# Patient Record
Sex: FEMALE | NOT HISPANIC OR LATINO | URBAN - METROPOLITAN AREA
[De-identification: names, ages, dates, MRNs, and addresses within clinical notes are randomized per-mention and may not be internally consistent; named-entity substitution may affect disease eponyms.]

---

## 2017-05-31 ENCOUNTER — EMERGENCY (EMERGENCY)
Facility: HOSPITAL | Age: 47
LOS: 1 days | Discharge: HOME | End: 2017-05-31
Attending: EMERGENCY MEDICINE | Admitting: EMERGENCY MEDICINE
Payer: COMMERCIAL

## 2017-05-31 VITALS
OXYGEN SATURATION: 100 % | TEMPERATURE: 98 F | HEART RATE: 69 BPM | SYSTOLIC BLOOD PRESSURE: 121 MMHG | DIASTOLIC BLOOD PRESSURE: 71 MMHG | RESPIRATION RATE: 18 BRPM

## 2017-05-31 VITALS
HEIGHT: 66 IN | SYSTOLIC BLOOD PRESSURE: 147 MMHG | DIASTOLIC BLOOD PRESSURE: 85 MMHG | RESPIRATION RATE: 22 BRPM | HEART RATE: 79 BPM | TEMPERATURE: 98 F | OXYGEN SATURATION: 98 %

## 2017-05-31 DIAGNOSIS — Z93.3 COLOSTOMY STATUS: Chronic | ICD-10-CM

## 2017-05-31 DIAGNOSIS — Z90.710 ACQUIRED ABSENCE OF BOTH CERVIX AND UTERUS: Chronic | ICD-10-CM

## 2017-05-31 DIAGNOSIS — R10.9 UNSPECIFIED ABDOMINAL PAIN: ICD-10-CM

## 2017-05-31 LAB
ALBUMIN SERPL ELPH-MCNC: 4.3 G/DL — SIGNIFICANT CHANGE UP (ref 3.3–5)
ALP SERPL-CCNC: 70 U/L — SIGNIFICANT CHANGE UP (ref 40–120)
ALT FLD-CCNC: 16 U/L RC — SIGNIFICANT CHANGE UP (ref 10–45)
ANION GAP SERPL CALC-SCNC: 18 MMOL/L — HIGH (ref 5–17)
APTT BLD: 29 SEC — SIGNIFICANT CHANGE UP (ref 27.5–37.4)
AST SERPL-CCNC: 20 U/L — SIGNIFICANT CHANGE UP (ref 10–40)
BASOPHILS # BLD AUTO: 0 K/UL — SIGNIFICANT CHANGE UP (ref 0–0.2)
BASOPHILS NFR BLD AUTO: 0.2 % — SIGNIFICANT CHANGE UP (ref 0–2)
BILIRUB SERPL-MCNC: 0.4 MG/DL — SIGNIFICANT CHANGE UP (ref 0.2–1.2)
BLD GP AB SCN SERPL QL: NEGATIVE — SIGNIFICANT CHANGE UP
BUN SERPL-MCNC: 21 MG/DL — SIGNIFICANT CHANGE UP (ref 7–23)
CALCIUM SERPL-MCNC: 9.8 MG/DL — SIGNIFICANT CHANGE UP (ref 8.4–10.5)
CHLORIDE SERPL-SCNC: 100 MMOL/L — SIGNIFICANT CHANGE UP (ref 96–108)
CO2 SERPL-SCNC: 26 MMOL/L — SIGNIFICANT CHANGE UP (ref 22–31)
CREAT SERPL-MCNC: 0.66 MG/DL — SIGNIFICANT CHANGE UP (ref 0.5–1.3)
EOSINOPHIL # BLD AUTO: 0 K/UL — SIGNIFICANT CHANGE UP (ref 0–0.5)
EOSINOPHIL NFR BLD AUTO: 0.1 % — SIGNIFICANT CHANGE UP (ref 0–6)
GLUCOSE SERPL-MCNC: 155 MG/DL — HIGH (ref 70–99)
HCT VFR BLD CALC: 41.7 % — SIGNIFICANT CHANGE UP (ref 34.5–45)
HGB BLD-MCNC: 13.5 G/DL — SIGNIFICANT CHANGE UP (ref 11.5–15.5)
INR BLD: 1.09 RATIO — SIGNIFICANT CHANGE UP (ref 0.88–1.16)
LYMPHOCYTES # BLD AUTO: 0.7 K/UL — LOW (ref 1–3.3)
LYMPHOCYTES # BLD AUTO: 5 % — LOW (ref 13–44)
MCHC RBC-ENTMCNC: 28.1 PG — SIGNIFICANT CHANGE UP (ref 27–34)
MCHC RBC-ENTMCNC: 32.4 GM/DL — SIGNIFICANT CHANGE UP (ref 32–36)
MCV RBC AUTO: 87 FL — SIGNIFICANT CHANGE UP (ref 80–100)
MONOCYTES # BLD AUTO: 0.3 K/UL — SIGNIFICANT CHANGE UP (ref 0–0.9)
MONOCYTES NFR BLD AUTO: 1.8 % — LOW (ref 2–14)
NEUTROPHILS # BLD AUTO: 13 K/UL — HIGH (ref 1.8–7.4)
NEUTROPHILS NFR BLD AUTO: 92.9 % — HIGH (ref 43–77)
PLATELET # BLD AUTO: 234 K/UL — SIGNIFICANT CHANGE UP (ref 150–400)
POTASSIUM SERPL-MCNC: 4.1 MMOL/L — SIGNIFICANT CHANGE UP (ref 3.5–5.3)
POTASSIUM SERPL-SCNC: 4.1 MMOL/L — SIGNIFICANT CHANGE UP (ref 3.5–5.3)
PROT SERPL-MCNC: 7.5 G/DL — SIGNIFICANT CHANGE UP (ref 6–8.3)
PROTHROM AB SERPL-ACNC: 11.8 SEC — SIGNIFICANT CHANGE UP (ref 9.8–12.7)
RBC # BLD: 4.79 M/UL — SIGNIFICANT CHANGE UP (ref 3.8–5.2)
RBC # FLD: 14.1 % — SIGNIFICANT CHANGE UP (ref 10.3–14.5)
RH IG SCN BLD-IMP: POSITIVE — SIGNIFICANT CHANGE UP
RH IG SCN BLD-IMP: POSITIVE — SIGNIFICANT CHANGE UP
SODIUM SERPL-SCNC: 144 MMOL/L — SIGNIFICANT CHANGE UP (ref 135–145)
WBC # BLD: 14 K/UL — HIGH (ref 3.8–10.5)
WBC # FLD AUTO: 14 K/UL — HIGH (ref 3.8–10.5)

## 2017-05-31 PROCEDURE — 85730 THROMBOPLASTIN TIME PARTIAL: CPT

## 2017-05-31 PROCEDURE — 86850 RBC ANTIBODY SCREEN: CPT

## 2017-05-31 PROCEDURE — 86900 BLOOD TYPING SEROLOGIC ABO: CPT

## 2017-05-31 PROCEDURE — 96375 TX/PRO/DX INJ NEW DRUG ADDON: CPT

## 2017-05-31 PROCEDURE — 71010: CPT | Mod: 26

## 2017-05-31 PROCEDURE — 85610 PROTHROMBIN TIME: CPT

## 2017-05-31 PROCEDURE — 99285 EMERGENCY DEPT VISIT HI MDM: CPT | Mod: 25

## 2017-05-31 PROCEDURE — 93005 ELECTROCARDIOGRAM TRACING: CPT

## 2017-05-31 PROCEDURE — 74177 CT ABD & PELVIS W/CONTRAST: CPT

## 2017-05-31 PROCEDURE — 93010 ELECTROCARDIOGRAM REPORT: CPT | Mod: 59

## 2017-05-31 PROCEDURE — 96374 THER/PROPH/DIAG INJ IV PUSH: CPT | Mod: XU

## 2017-05-31 PROCEDURE — 99284 EMERGENCY DEPT VISIT MOD MDM: CPT | Mod: 25

## 2017-05-31 PROCEDURE — 74177 CT ABD & PELVIS W/CONTRAST: CPT | Mod: 26

## 2017-05-31 PROCEDURE — 74020: CPT

## 2017-05-31 PROCEDURE — 85027 COMPLETE CBC AUTOMATED: CPT

## 2017-05-31 PROCEDURE — 74020: CPT | Mod: 26

## 2017-05-31 PROCEDURE — 86901 BLOOD TYPING SEROLOGIC RH(D): CPT

## 2017-05-31 PROCEDURE — 80053 COMPREHEN METABOLIC PANEL: CPT

## 2017-05-31 PROCEDURE — 71045 X-RAY EXAM CHEST 1 VIEW: CPT

## 2017-05-31 RX ORDER — MORPHINE SULFATE 50 MG/1
4 CAPSULE, EXTENDED RELEASE ORAL ONCE
Qty: 0 | Refills: 0 | Status: DISCONTINUED | OUTPATIENT
Start: 2017-05-31 | End: 2017-05-31

## 2017-05-31 RX ORDER — SODIUM CHLORIDE 9 MG/ML
3 INJECTION INTRAMUSCULAR; INTRAVENOUS; SUBCUTANEOUS ONCE
Qty: 0 | Refills: 0 | Status: COMPLETED | OUTPATIENT
Start: 2017-05-31 | End: 2017-05-31

## 2017-05-31 RX ORDER — ACETAMINOPHEN 500 MG
1000 TABLET ORAL ONCE
Qty: 0 | Refills: 0 | Status: COMPLETED | OUTPATIENT
Start: 2017-05-31 | End: 2017-05-31

## 2017-05-31 RX ORDER — FAMOTIDINE 10 MG/ML
20 INJECTION INTRAVENOUS ONCE
Qty: 0 | Refills: 0 | Status: COMPLETED | OUTPATIENT
Start: 2017-05-31 | End: 2017-05-31

## 2017-05-31 RX ADMIN — FAMOTIDINE 20 MILLIGRAM(S): 10 INJECTION INTRAVENOUS at 16:53

## 2017-05-31 RX ADMIN — MORPHINE SULFATE 4 MILLIGRAM(S): 50 CAPSULE, EXTENDED RELEASE ORAL at 16:53

## 2017-05-31 RX ADMIN — Medication 400 MILLIGRAM(S): at 16:53

## 2017-05-31 RX ADMIN — MORPHINE SULFATE 4 MILLIGRAM(S): 50 CAPSULE, EXTENDED RELEASE ORAL at 18:09

## 2017-05-31 RX ADMIN — Medication 1000 MILLIGRAM(S): at 18:09

## 2017-05-31 RX ADMIN — SODIUM CHLORIDE 3 MILLILITER(S): 9 INJECTION INTRAMUSCULAR; INTRAVENOUS; SUBCUTANEOUS at 16:44

## 2017-05-31 NOTE — ED PROVIDER NOTE - NS ED MD SCRIBE ATTENDING SCRIBE SECTIONS
VITAL SIGNS( Pullset)/PHYSICAL EXAM/RESULTS/HISTORY OF PRESENT ILLNESS/REVIEW OF SYSTEMS/DISPOSITION/PROGRESS NOTE/HIV/PAST MEDICAL/SURGICAL/SOCIAL HISTORY

## 2017-05-31 NOTE — ED PROVIDER NOTE - PHYSICAL EXAMINATION
NAD, NCAT, mild dry mucous membranes, Trachea midline, PERRL, normal conjunctiva, CTAB, non-tachypneic, Non-tachycardic, abdomen with exploratory laparoscopic scar well healed to distal with wound dressing, no abdominal distension, mild voluntary guarding, mild RUQ abdominal discomfort, normal bowel sounds, output noted in colostomy bag, flatus noted in colostomy bag, No edema, Appropriate, Cooperative with capacitive insight, No rashes, CN grossly intact NAD, NCAT, mild dry mucous membranes, Trachea midline, PERRL, normal conjunctiva, CTAB, non-tachypneic, Non-tachycardic, abdomen with exploratory laparoscopic scar well healed to caudal region with wound dressing, no abdominal distension, mild voluntary guarding, mild RUQ abdominal discomfort, normal bowel sounds, output noted in colostomy bag, flatus noted in colostomy bag, No edema, Appropriate, Cooperative with capacitive insight, No rashes, CN grossly intact, ostomy beefy and well appearing.

## 2017-05-31 NOTE — ED ADULT TRIAGE NOTE - CHIEF COMPLAINT QUOTE
vomiting and abd pain x 2 hrs, sent by visiting nurse, (s/p hysterectomy dec 2016, with subsequent colostomy at [facility] for ovarian cancer), also notes no stool in colostomy since last night, afebrile at home, +voiding normal, (last follow up with surgeon x 3 wks ago)

## 2017-05-31 NOTE — ED PROVIDER NOTE - OBJECTIVE STATEMENT
46 year old female patient with pmhx of ovarian cancer s/p total hysterectomy Dec 2016 presents to the ED c/o abdominal pain today with 1 episode of vomiting, fever, and cold sweats today. Rates pain 10/10. Afebrile now. Patient notes an empty ostomy bag and is concerned for SBO. Patient has a colostomy in place during the hysterectomy. Has not been passing stool through the rectum recently. Still has gallbladder.  Denies fever  Surgeon: Dr. Agata Roth 46 year old female patient with pmhx of ovarian cancer s/p total hysterectomy Dec 2016 presents to the ED c/o abdominal pain today with 1 episode of vomiting, fever, and cold sweats today. Rates pain 10/10. Afebrile now. Crescendo. Patient notes an empty ostomy bag and is concerned for SBO secondary to decreased output and flatus. Patient has a colostomy in place during the hysterectomy. Has not been passing stool through the rectum since surgery. Still has gallbladder in place.  Denies fever  Surgeon: Dr. Agata Roth

## 2017-05-31 NOTE — ED PROVIDER NOTE - PROGRESS NOTE DETAILS
Pt with extensive peritoneal carcinoma mitosis s/p cholecystomy has deduced output for ostomy. Diffused tenderness in the abdomen worse in the RUQ. Bowel sounds presents. CT scan of abdomen reveals extensive carcinoma. Possibility partial SBO. Will call surgery to re-evaluate. Pt with extensive peritoneal carcinoma mitosis s/p cholecystomy has deduced output for ostomy. Diffused tenderness in the abdomen worse in the RUQ. Bowel sounds presents. CT scan of abdomen reveals extensive carcinoma matrices. Possibility partial SBO. Will call surgery to re-evaluate. Pt feeling better tolerating fluids and desires to go home Evaluated by surgery and GYN  will d/c --Dayo Pt with extensive peritoneal carcinoma mitosis s/p cholecystomy has deduced output for ostomy. Diffused tenderness in the abdomen worse in the RUQ. Bowel sounds presents. CT scan of abdomen reveals  extensive carcinoma matrices. Possibility partial SBO. Will call surgery to re-evaluate. -- Oshea

## 2017-05-31 NOTE — ED ADULT NURSE NOTE - CHPI ED SYMPTOMS NEG
no nausea/no fever/no tingling/no numbness/no decreased eating/drinking/no chills/no vomiting/no weakness/no dizziness

## 2017-05-31 NOTE — ED ADULT NURSE NOTE - OBJECTIVE STATEMENT
Patient states that she had a total hysterectomy in december of last year after being diagnosed with cervical cancer with mets to her liver. Patient states that today around 1130 she started to have generalized abdominal pain and vomited one time. Patient states that she was also concerned that her ostomy bag was empty and she was concerned for an obstructions. Patient states that she not having any N/V at this time. Patient has stool noted in her ostomy and gas. Patient states that she is still having sharp abdominal pain. NAD noted.

## 2017-05-31 NOTE — ED PROVIDER NOTE - DETAILS:
Ez Camarillo MD note: The scribe's documentation has been prepared under my direction and personally reviewed by me.  I confirm that the note above accurately reflects my work, treatment, procedures, and medical decision making.

## 2017-05-31 NOTE — ED PROVIDER NOTE - PSH
Colostomy in place  dec 2016 (placed during hysterectomy in dec 2016 at [facility] [place])  H/O abdominal hysterectomy  dec 2016

## 2017-05-31 NOTE — ED PROVIDER NOTE - CARE PLAN
Principal Discharge DX:	Abdominal pain  Secondary Diagnosis:	Malignant neoplasm of ovary, unspecified laterality

## 2017-05-31 NOTE — ED ADULT NURSE NOTE - PSH
Colostomy in place  dec 2016 (placed during hysterectomy in dec 2016 at [facility] [place])  H/O abdominal hysterectomy  dec 2016

## 2017-05-31 NOTE — ED ADULT NURSE NOTE - CHIEF COMPLAINT QUOTE
vomiting and abd pain x 2 hrs, sent by visiting nurse, (s/p hysterectomy dec 2016, with subsequent colostomy at [facility] for ovarian cancer), also notes no stool in colostomy since last night, afebrile at home, +voiding normal, (last follow up with surgeon x 3 wks ago)

## 2017-05-31 NOTE — ED ADULT NURSE REASSESSMENT NOTE - NS ED NURSE REASSESS COMMENT FT1
Patient continues to rest in bed at this time with family at the bedside. Patient states that her pain is better after the pain medication. NAD noted. Updated on progress.

## 2017-05-31 NOTE — ED PROVIDER NOTE - MEDICAL DECISION MAKING DETAILS
Patient with history of hysterectomy and oophorectomy. Now with symptoms including nausea, vomiting, and abdominal pain rated 10/10. Will get labs, abdominal series x-rays to see air fluid levels, antiemetic prn, CT abd/pel with oral contrast to r/o SBO.

## 2017-06-01 ENCOUNTER — INPATIENT (INPATIENT)
Facility: HOSPITAL | Age: 47
LOS: 33 days | Discharge: HOME | DRG: 853 | End: 2017-07-05
Attending: SURGERY | Admitting: SURGERY
Payer: COMMERCIAL

## 2017-06-01 ENCOUNTER — TRANSCRIPTION ENCOUNTER (OUTPATIENT)
Age: 47
End: 2017-06-01

## 2017-06-01 VITALS
OXYGEN SATURATION: 98 % | HEIGHT: 66 IN | TEMPERATURE: 100 F | HEART RATE: 92 BPM | SYSTOLIC BLOOD PRESSURE: 131 MMHG | RESPIRATION RATE: 17 BRPM | DIASTOLIC BLOOD PRESSURE: 81 MMHG

## 2017-06-01 DIAGNOSIS — Z90.710 ACQUIRED ABSENCE OF BOTH CERVIX AND UTERUS: Chronic | ICD-10-CM

## 2017-06-01 DIAGNOSIS — Z93.3 COLOSTOMY STATUS: Chronic | ICD-10-CM

## 2017-06-01 PROBLEM — Z00.00 ENCOUNTER FOR PREVENTIVE HEALTH EXAMINATION: Status: ACTIVE | Noted: 2017-06-01

## 2017-06-01 LAB
ALBUMIN SERPL ELPH-MCNC: 4.1 G/DL — SIGNIFICANT CHANGE UP (ref 3.3–5)
ALP SERPL-CCNC: 70 U/L — SIGNIFICANT CHANGE UP (ref 40–120)
ALT FLD-CCNC: 12 U/L RC — SIGNIFICANT CHANGE UP (ref 10–45)
ANION GAP SERPL CALC-SCNC: 16 MMOL/L — SIGNIFICANT CHANGE UP (ref 5–17)
AST SERPL-CCNC: 17 U/L — SIGNIFICANT CHANGE UP (ref 10–40)
BASOPHILS # BLD AUTO: 0 K/UL — SIGNIFICANT CHANGE UP (ref 0–0.2)
BILIRUB SERPL-MCNC: 1.3 MG/DL — HIGH (ref 0.2–1.2)
BUN SERPL-MCNC: 24 MG/DL — HIGH (ref 7–23)
CALCIUM SERPL-MCNC: 9.8 MG/DL — SIGNIFICANT CHANGE UP (ref 8.4–10.5)
CHLORIDE SERPL-SCNC: 94 MMOL/L — LOW (ref 96–108)
CO2 SERPL-SCNC: 25 MMOL/L — SIGNIFICANT CHANGE UP (ref 22–31)
CREAT SERPL-MCNC: 0.57 MG/DL — SIGNIFICANT CHANGE UP (ref 0.5–1.3)
EOSINOPHIL # BLD AUTO: 0 K/UL — SIGNIFICANT CHANGE UP (ref 0–0.5)
GAS PNL BLDV: SIGNIFICANT CHANGE UP
GLUCOSE SERPL-MCNC: 146 MG/DL — HIGH (ref 70–99)
HCT VFR BLD CALC: 41.4 % — SIGNIFICANT CHANGE UP (ref 34.5–45)
HGB BLD-MCNC: 13.5 G/DL — SIGNIFICANT CHANGE UP (ref 11.5–15.5)
LYMPHOCYTES # BLD AUTO: 0.7 K/UL — LOW (ref 1–3.3)
LYMPHOCYTES # BLD AUTO: 2 % — LOW (ref 13–44)
MCHC RBC-ENTMCNC: 28.1 PG — SIGNIFICANT CHANGE UP (ref 27–34)
MCHC RBC-ENTMCNC: 32.6 GM/DL — SIGNIFICANT CHANGE UP (ref 32–36)
MCV RBC AUTO: 86.1 FL — SIGNIFICANT CHANGE UP (ref 80–100)
MONOCYTES # BLD AUTO: 1.3 K/UL — HIGH (ref 0–0.9)
MONOCYTES NFR BLD AUTO: 4 % — SIGNIFICANT CHANGE UP (ref 2–14)
NEUTROPHILS # BLD AUTO: 22.6 K/UL — HIGH (ref 1.8–7.4)
NEUTROPHILS NFR BLD AUTO: 92 % — HIGH (ref 43–77)
PLATELET # BLD AUTO: 208 K/UL — SIGNIFICANT CHANGE UP (ref 150–400)
POTASSIUM SERPL-MCNC: 3.5 MMOL/L — SIGNIFICANT CHANGE UP (ref 3.5–5.3)
POTASSIUM SERPL-SCNC: 3.5 MMOL/L — SIGNIFICANT CHANGE UP (ref 3.5–5.3)
PROT SERPL-MCNC: 7.6 G/DL — SIGNIFICANT CHANGE UP (ref 6–8.3)
RBC # BLD: 4.81 M/UL — SIGNIFICANT CHANGE UP (ref 3.8–5.2)
RBC # FLD: 14 % — SIGNIFICANT CHANGE UP (ref 10.3–14.5)
SODIUM SERPL-SCNC: 135 MMOL/L — SIGNIFICANT CHANGE UP (ref 135–145)
WBC # BLD: 24.5 K/UL — HIGH (ref 3.8–10.5)
WBC # FLD AUTO: 24.5 K/UL — HIGH (ref 3.8–10.5)

## 2017-06-01 PROCEDURE — 99285 EMERGENCY DEPT VISIT HI MDM: CPT | Mod: 25

## 2017-06-01 PROCEDURE — 71010: CPT | Mod: 26

## 2017-06-01 PROCEDURE — 93010 ELECTROCARDIOGRAM REPORT: CPT | Mod: 59

## 2017-06-01 RX ORDER — SODIUM CHLORIDE 9 MG/ML
1000 INJECTION INTRAMUSCULAR; INTRAVENOUS; SUBCUTANEOUS ONCE
Qty: 0 | Refills: 0 | Status: COMPLETED | OUTPATIENT
Start: 2017-06-01 | End: 2017-06-01

## 2017-06-01 RX ORDER — MORPHINE SULFATE 50 MG/1
4 CAPSULE, EXTENDED RELEASE ORAL ONCE
Qty: 0 | Refills: 0 | Status: DISCONTINUED | OUTPATIENT
Start: 2017-06-01 | End: 2017-06-01

## 2017-06-01 RX ADMIN — SODIUM CHLORIDE 2000 MILLILITER(S): 9 INJECTION INTRAMUSCULAR; INTRAVENOUS; SUBCUTANEOUS at 22:32

## 2017-06-01 RX ADMIN — MORPHINE SULFATE 4 MILLIGRAM(S): 50 CAPSULE, EXTENDED RELEASE ORAL at 23:15

## 2017-06-01 RX ADMIN — MORPHINE SULFATE 4 MILLIGRAM(S): 50 CAPSULE, EXTENDED RELEASE ORAL at 22:32

## 2017-06-01 NOTE — ED PROVIDER NOTE - ATTENDING CONTRIBUTION TO CARE
Dr. Deleon (Attending Physician)  I performed a history and physical exam of the patient and discussed their management with the resident. I reviewed the resident's note and agree with the documented findings and plan of care. My medical decision making and observations are found above.

## 2017-06-01 NOTE — ED PROVIDER NOTE - PROGRESS NOTE DETAILS
Consulted OBGYN.  Will see patient in the ED. CT shows acute cholecystitis, no central PE.  Consulted surgery for acute cholecystitis and will admit to surgery for treatment.

## 2017-06-01 NOTE — ED PROVIDER NOTE - PHYSICAL EXAMINATION
NAD, NCAT, mild dry mucous membranes, Trachea midline, PERRL, normal conjunctiva, CTAB, non-tachypneic, Non-tachycardic, abdomen with exploratory laparoscopic scar well healed to distal with wound dressing, no abdominal distension, voluntary guarding, LUQ and RUQ abdominal TTP, TTP BL lower chest, worse on R, normal bowel sounds, small output noted in colostomy bag, flatus noted in colostomy bag, No edema, Appropriate, Cooperative with capacitive insight, No rashes, CN grossly intact

## 2017-06-01 NOTE — ED ADULT NURSE NOTE - ED STAT RN HANDOFF DETAILS 2
report given to DYLON Siegel in holding, pt remains A;Ox3, no acute distress noted, will continue to monitor

## 2017-06-01 NOTE — ED ADULT NURSE NOTE - OBJECTIVE STATEMENT
46y female c/o abdominal pain. A&Ox3, neuro intact, VSS. Hx of ovarian ca with mets to liver, hysterectomy in december. Patient was in [facility] ED yesterday with abdominal pain, CT negative for bowel obstruction. Patient sent home with pain controlled. Today, patient reports chest pressure starting at 5am 46y female c/o abdominal pain. A&Ox3, neuro intact, VSS. Hx of ovarian ca with mets to liver, hysterectomy in december. Patient was in [facility] ED yesterday with abdominal pain, CT negative for bowel obstruction. Patient sent home with pain controlled. Today, patient reports chest pressure starting at 5am and b/l upper quadrants abdominal pain. PatienUpon exam, abdomen is soft, tender in RUQ, ostomy present in LLQ, 46y female c/o abdominal pain. A&Ox3, neuro intact, VSS. Hx of ovarian ca with mets to liver, hysterectomy in december. Patient was in [facility] ED yesterday with abdominal pain, CT negative for bowel obstruction. Patient sent home with pain controlled. Today, patient reports chest pressure starting at 5am and b/l upper quadrants abdominal pain. States she took 200mg motrin at 1000 and 1400, tylenol 500mg at 1600 with no relief. Patient reports low grade fever, mild nausea, decreased PO intake and + passing gas. Denies vomiting.  Upon exam, abdomen is soft, tender in RUQ, ostomy present in LLQ, +bowel sounds.

## 2017-06-01 NOTE — ED PROVIDER NOTE - NS ED ROS FT
ROS: GENERAL: as per hpi HEENT: no epistaxis, no eye pain, no ear pain, no throat pain CARDIAC: as per hpi PULM: as per hpi GI: as per hpi : no dysuria, no hematuria EXTREMITIES: no arm pain, no leg pain, no back pain SKIN: no purpura, no petechiae NEURO: no headache, no neck pain, no loss of strength/sensation HEME: no easy bruising, no easy bleeding

## 2017-06-01 NOTE — ED PROVIDER NOTE - OBJECTIVE STATEMENT
46 year old female patient with pmhx of ovarian cancer s/p total hysterectomy Dec 2016 presents to the ED c/o worsening abdominal pain and chest pain for the past day.  Pain is BL upper quadrant abdominal pain/lower chest pain.  Pain is sharp, pressure like, w/w movement and palpation, non-pleuritic.  Also c/o SOB, decreasing ostomy output and decreased appetite and PO intake.  Had 1 episode of n/v yesterday.  Pt has a colostomy in place during hysterectomy.  Has not been passing stool through the rectum recently.  Denies fever, diarrhea, HA, diaphoresis.  Has f/u appt with "Dr. Lane" next week to discuss treatment options.      Surgeon: Agata Roth

## 2017-06-01 NOTE — ED PROVIDER NOTE - MEDICAL DECISION MAKING DETAILS
Dr. Deleon (Attending Physician)  Pt. with ovarian ca and metastatic peritoneal carcinomatosis s/p hysterectomy oophorectomy and colectomy with colostomy had CT scan yesterday without clear obstruction now pw worsening abd. pain, no BM, No gas in ostomy bag, as well as lower anterior pleuritic chest pain and shortness of breath.  Will CTA chest and repeat CT of the abdomen with oral contrast.  Labs, Consult gyn, pain control.

## 2017-06-01 NOTE — ED ADULT NURSE NOTE - PSH
Colostomy in place  dec 2016 (placed during hysterectomy in dec 2016 at [facility] [place])  H/O abdominal hysterectomy  dec 2016

## 2017-06-01 NOTE — ED PROVIDER NOTE - PSH
Colostomy in place  dec 2016 (placed during hysterectomy in dec 2016 at [facility] [place])  H/O abdominal hysterectomy  dec 2016

## 2017-06-02 DIAGNOSIS — K81.0 ACUTE CHOLECYSTITIS: ICD-10-CM

## 2017-06-02 LAB
ALBUMIN SERPL ELPH-MCNC: 3.3 G/DL — SIGNIFICANT CHANGE UP (ref 3.3–5)
ALP SERPL-CCNC: 66 U/L — SIGNIFICANT CHANGE UP (ref 40–120)
ALT FLD-CCNC: 26 U/L RC — SIGNIFICANT CHANGE UP (ref 10–45)
ANION GAP SERPL CALC-SCNC: 13 MMOL/L — SIGNIFICANT CHANGE UP (ref 5–17)
APPEARANCE UR: ABNORMAL
APTT BLD: 30.2 SEC — SIGNIFICANT CHANGE UP (ref 27.5–37.4)
AST SERPL-CCNC: 36 U/L — SIGNIFICANT CHANGE UP (ref 10–40)
BILIRUB DIRECT SERPL-MCNC: 0.2 MG/DL — SIGNIFICANT CHANGE UP (ref 0–0.2)
BILIRUB INDIRECT FLD-MCNC: 0.6 MG/DL — SIGNIFICANT CHANGE UP (ref 0.2–1)
BILIRUB SERPL-MCNC: 0.8 MG/DL — SIGNIFICANT CHANGE UP (ref 0.2–1.2)
BILIRUB UR-MCNC: NEGATIVE — SIGNIFICANT CHANGE UP
BLD GP AB SCN SERPL QL: NEGATIVE — SIGNIFICANT CHANGE UP
BUN SERPL-MCNC: 16 MG/DL — SIGNIFICANT CHANGE UP (ref 7–23)
CALCIUM SERPL-MCNC: 8.9 MG/DL — SIGNIFICANT CHANGE UP (ref 8.4–10.5)
CHLORIDE SERPL-SCNC: 100 MMOL/L — SIGNIFICANT CHANGE UP (ref 96–108)
CO2 SERPL-SCNC: 24 MMOL/L — SIGNIFICANT CHANGE UP (ref 22–31)
COLOR SPEC: YELLOW — SIGNIFICANT CHANGE UP
CREAT SERPL-MCNC: 0.52 MG/DL — SIGNIFICANT CHANGE UP (ref 0.5–1.3)
DIFF PNL FLD: ABNORMAL
EPI CELLS # UR: SIGNIFICANT CHANGE UP /HPF
GLUCOSE SERPL-MCNC: 103 MG/DL — HIGH (ref 70–99)
GLUCOSE UR QL: 50
GRAM STN FLD: SIGNIFICANT CHANGE UP
HCT VFR BLD CALC: 38.8 % — SIGNIFICANT CHANGE UP (ref 34.5–45)
HGB BLD-MCNC: 12.2 G/DL — SIGNIFICANT CHANGE UP (ref 11.5–15.5)
HYALINE CASTS # UR AUTO: ABNORMAL
INR BLD: 1.44 RATIO — HIGH (ref 0.88–1.16)
KETONES UR-MCNC: ABNORMAL
LEUKOCYTE ESTERASE UR-ACNC: ABNORMAL
MCHC RBC-ENTMCNC: 27.2 PG — SIGNIFICANT CHANGE UP (ref 27–34)
MCHC RBC-ENTMCNC: 31.4 GM/DL — LOW (ref 32–36)
MCV RBC AUTO: 86.6 FL — SIGNIFICANT CHANGE UP (ref 80–100)
NITRITE UR-MCNC: NEGATIVE — SIGNIFICANT CHANGE UP
PH UR: 6 — SIGNIFICANT CHANGE UP (ref 5–8)
PLATELET # BLD AUTO: 152 K/UL — SIGNIFICANT CHANGE UP (ref 150–400)
POTASSIUM SERPL-MCNC: 3.9 MMOL/L — SIGNIFICANT CHANGE UP (ref 3.5–5.3)
POTASSIUM SERPL-SCNC: 3.9 MMOL/L — SIGNIFICANT CHANGE UP (ref 3.5–5.3)
PROT SERPL-MCNC: 6.3 G/DL — SIGNIFICANT CHANGE UP (ref 6–8.3)
PROT UR-MCNC: 300 MG/DL
PROTHROM AB SERPL-ACNC: 15.7 SEC — HIGH (ref 9.8–12.7)
RBC # BLD: 4.48 M/UL — SIGNIFICANT CHANGE UP (ref 3.8–5.2)
RBC # FLD: 14 % — SIGNIFICANT CHANGE UP (ref 10.3–14.5)
RBC CASTS # UR COMP ASSIST: ABNORMAL /HPF (ref 0–2)
RH IG SCN BLD-IMP: POSITIVE — SIGNIFICANT CHANGE UP
SODIUM SERPL-SCNC: 137 MMOL/L — SIGNIFICANT CHANGE UP (ref 135–145)
SP GR SPEC: >1.03 — HIGH (ref 1.01–1.02)
SPECIMEN SOURCE: SIGNIFICANT CHANGE UP
SPECIMEN SOURCE: SIGNIFICANT CHANGE UP
UROBILINOGEN FLD QL: NEGATIVE — SIGNIFICANT CHANGE UP
WBC # BLD: 16.5 K/UL — HIGH (ref 3.8–10.5)
WBC # FLD AUTO: 16.5 K/UL — HIGH (ref 3.8–10.5)
WBC UR QL: SIGNIFICANT CHANGE UP /HPF (ref 0–5)

## 2017-06-02 PROCEDURE — 71275 CT ANGIOGRAPHY CHEST: CPT | Mod: 26

## 2017-06-02 PROCEDURE — 99222 1ST HOSP IP/OBS MODERATE 55: CPT | Mod: 25,GC

## 2017-06-02 PROCEDURE — 74177 CT ABD & PELVIS W/CONTRAST: CPT | Mod: 26

## 2017-06-02 PROCEDURE — 43240 EGD W/TRANSMURAL DRAIN CYST: CPT | Mod: GC

## 2017-06-02 RX ORDER — ACETAMINOPHEN 500 MG
1000 TABLET ORAL ONCE
Qty: 0 | Refills: 0 | Status: COMPLETED | OUTPATIENT
Start: 2017-06-02 | End: 2017-06-02

## 2017-06-02 RX ORDER — AZTREONAM 2 G
1000 VIAL (EA) INJECTION EVERY 8 HOURS
Qty: 0 | Refills: 0 | Status: DISCONTINUED | OUTPATIENT
Start: 2017-06-03 | End: 2017-06-03

## 2017-06-02 RX ORDER — METRONIDAZOLE 500 MG
TABLET ORAL
Qty: 0 | Refills: 0 | Status: DISCONTINUED | OUTPATIENT
Start: 2017-06-02 | End: 2017-06-02

## 2017-06-02 RX ORDER — AZTREONAM 2 G
1000 VIAL (EA) INJECTION ONCE
Qty: 0 | Refills: 0 | Status: DISCONTINUED | OUTPATIENT
Start: 2017-06-02 | End: 2017-06-03

## 2017-06-02 RX ORDER — DEXTROSE MONOHYDRATE, SODIUM CHLORIDE, AND POTASSIUM CHLORIDE 50; .745; 4.5 G/1000ML; G/1000ML; G/1000ML
1000 INJECTION, SOLUTION INTRAVENOUS
Qty: 0 | Refills: 0 | Status: DISCONTINUED | OUTPATIENT
Start: 2017-06-02 | End: 2017-06-06

## 2017-06-02 RX ORDER — METRONIDAZOLE 500 MG
500 TABLET ORAL ONCE
Qty: 0 | Refills: 0 | Status: COMPLETED | OUTPATIENT
Start: 2017-06-02 | End: 2017-06-02

## 2017-06-02 RX ORDER — HYDROMORPHONE HYDROCHLORIDE 2 MG/ML
0.5 INJECTION INTRAMUSCULAR; INTRAVENOUS; SUBCUTANEOUS EVERY 4 HOURS
Qty: 0 | Refills: 0 | Status: DISCONTINUED | OUTPATIENT
Start: 2017-06-02 | End: 2017-06-03

## 2017-06-02 RX ORDER — ACETAMINOPHEN 500 MG
1000 TABLET ORAL ONCE
Qty: 0 | Refills: 0 | Status: COMPLETED | OUTPATIENT
Start: 2017-06-02 | End: 2017-06-03

## 2017-06-02 RX ORDER — ENOXAPARIN SODIUM 100 MG/ML
40 INJECTION SUBCUTANEOUS DAILY
Qty: 0 | Refills: 0 | Status: DISCONTINUED | OUTPATIENT
Start: 2017-06-02 | End: 2017-06-12

## 2017-06-02 RX ORDER — AZTREONAM 2 G
VIAL (EA) INJECTION
Qty: 0 | Refills: 0 | Status: DISCONTINUED | OUTPATIENT
Start: 2017-06-02 | End: 2017-06-03

## 2017-06-02 RX ORDER — CEFEPIME 1 G/1
2000 INJECTION, POWDER, FOR SOLUTION INTRAMUSCULAR; INTRAVENOUS EVERY 12 HOURS
Qty: 0 | Refills: 0 | Status: DISCONTINUED | OUTPATIENT
Start: 2017-06-02 | End: 2017-06-02

## 2017-06-02 RX ORDER — SODIUM CHLORIDE 9 MG/ML
1000 INJECTION INTRAMUSCULAR; INTRAVENOUS; SUBCUTANEOUS ONCE
Qty: 0 | Refills: 0 | Status: COMPLETED | OUTPATIENT
Start: 2017-06-02 | End: 2017-06-02

## 2017-06-02 RX ORDER — SODIUM CHLORIDE 9 MG/ML
1000 INJECTION, SOLUTION INTRAVENOUS
Qty: 0 | Refills: 0 | Status: DISCONTINUED | OUTPATIENT
Start: 2017-06-02 | End: 2017-06-02

## 2017-06-02 RX ORDER — IBUPROFEN 200 MG
600 TABLET ORAL ONCE
Qty: 0 | Refills: 0 | Status: COMPLETED | OUTPATIENT
Start: 2017-06-02 | End: 2017-06-02

## 2017-06-02 RX ORDER — METRONIDAZOLE 500 MG
500 TABLET ORAL EVERY 8 HOURS
Qty: 0 | Refills: 0 | Status: DISCONTINUED | OUTPATIENT
Start: 2017-06-02 | End: 2017-06-02

## 2017-06-02 RX ADMIN — SODIUM CHLORIDE 150 MILLILITER(S): 9 INJECTION, SOLUTION INTRAVENOUS at 05:58

## 2017-06-02 RX ADMIN — CEFEPIME 100 MILLIGRAM(S): 1 INJECTION, POWDER, FOR SOLUTION INTRAMUSCULAR; INTRAVENOUS at 06:35

## 2017-06-02 RX ADMIN — Medication 100 MILLIGRAM(S): at 05:58

## 2017-06-02 RX ADMIN — HYDROMORPHONE HYDROCHLORIDE 0.5 MILLIGRAM(S): 2 INJECTION INTRAMUSCULAR; INTRAVENOUS; SUBCUTANEOUS at 23:10

## 2017-06-02 RX ADMIN — Medication 1000 MILLIGRAM(S): at 20:44

## 2017-06-02 RX ADMIN — Medication 600 MILLIGRAM(S): at 08:49

## 2017-06-02 RX ADMIN — DEXTROSE MONOHYDRATE, SODIUM CHLORIDE, AND POTASSIUM CHLORIDE 75 MILLILITER(S): 50; .745; 4.5 INJECTION, SOLUTION INTRAVENOUS at 18:44

## 2017-06-02 RX ADMIN — Medication 100 MILLIGRAM(S): at 13:42

## 2017-06-02 RX ADMIN — CEFEPIME 100 MILLIGRAM(S): 1 INJECTION, POWDER, FOR SOLUTION INTRAMUSCULAR; INTRAVENOUS at 20:14

## 2017-06-02 RX ADMIN — HYDROMORPHONE HYDROCHLORIDE 0.5 MILLIGRAM(S): 2 INJECTION INTRAMUSCULAR; INTRAVENOUS; SUBCUTANEOUS at 23:25

## 2017-06-02 RX ADMIN — Medication 400 MILLIGRAM(S): at 20:14

## 2017-06-02 RX ADMIN — SODIUM CHLORIDE 2000 MILLILITER(S): 9 INJECTION INTRAMUSCULAR; INTRAVENOUS; SUBCUTANEOUS at 01:11

## 2017-06-02 RX ADMIN — Medication 400 MILLIGRAM(S): at 03:43

## 2017-06-02 NOTE — H&P ADULT. - PSH
Colostomy in place  dec 2016 (placed during hysterectomy in dec 2016 at [facility] [place])  H/O abdominal hysterectomy  dec 2016

## 2017-06-02 NOTE — H&P ADULT. - HISTORY OF PRESENT ILLNESS
46F presents with abdominal pain. Patient stated that the pain began 5/31, at which point she came to the [facility] ED, was discharged when the pain improved. Patient endorses subjective, low-grade fevers at home with decrease in ostomy output, diaphoresis, one episode of nausea/vomiting. Pain is localized primarily to RUQ.Gonzalez  Patient's past medical/surgical history is primarily significant for ovarian CA s/p GRETCHEN Dec 2016, also has had bowel resection with ostomy at that time. Her surgeries and treatments were at [facility], Dr. Roth. Initiation of chemotherapy was delayed 2/2 poor wound healing. Patient was scheduled to start chemotherapy in the next few weeks, sees Dr. PJ Lane of [facility]. Denies other PMH, denies medications. Allergy to PCN.  On exam patient was afebrile with stable vital signs, uncomfortable appearing. Healing midline incision scar on abdomen with small area of open skin in inferior aspect. LLQ ostomy with gas and stool in bag. Tender RUQ with guarding.   WBC 24, T bili 1.3, CT shows distended gallbladder with thickened wall and pericholecystic fluid.

## 2017-06-02 NOTE — ED ADULT NURSE REASSESSMENT NOTE - NS ED NURSE REASSESS COMMENT FT1
pt remains A;Ox3, no acute resp distress noted, pt remains febrile, MD notified, v/s stable, no c/o abd pain, will continue to monitor

## 2017-06-02 NOTE — H&P ADULT. - ASSESSMENT
46F ovarian CA, acute cholecystitis  - Admit to [facility], Dr. Johns  - Pain control  - Abx  - IV hydration  - Plan for percutaneous cholecystostomy to allow for quicker initiation of chemotherapy, patient's history of poor wound healing

## 2017-06-03 LAB
ALBUMIN SERPL ELPH-MCNC: 2.9 G/DL — LOW (ref 3.3–5)
ALP SERPL-CCNC: 69 U/L — SIGNIFICANT CHANGE UP (ref 40–120)
ALT FLD-CCNC: 27 U/L RC — SIGNIFICANT CHANGE UP (ref 10–45)
ANION GAP SERPL CALC-SCNC: 12 MMOL/L — SIGNIFICANT CHANGE UP (ref 5–17)
AST SERPL-CCNC: 32 U/L — SIGNIFICANT CHANGE UP (ref 10–40)
BILIRUB DIRECT SERPL-MCNC: 0.2 MG/DL — SIGNIFICANT CHANGE UP (ref 0–0.2)
BILIRUB INDIRECT FLD-MCNC: 0.3 MG/DL — SIGNIFICANT CHANGE UP (ref 0.2–1)
BILIRUB SERPL-MCNC: 0.5 MG/DL — SIGNIFICANT CHANGE UP (ref 0.2–1.2)
BUN SERPL-MCNC: 16 MG/DL — SIGNIFICANT CHANGE UP (ref 7–23)
CALCIUM SERPL-MCNC: 8.6 MG/DL — SIGNIFICANT CHANGE UP (ref 8.4–10.5)
CHLORIDE SERPL-SCNC: 102 MMOL/L — SIGNIFICANT CHANGE UP (ref 96–108)
CO2 SERPL-SCNC: 24 MMOL/L — SIGNIFICANT CHANGE UP (ref 22–31)
CREAT SERPL-MCNC: 0.56 MG/DL — SIGNIFICANT CHANGE UP (ref 0.5–1.3)
CULTURE RESULTS: SIGNIFICANT CHANGE UP
GLUCOSE SERPL-MCNC: 98 MG/DL — SIGNIFICANT CHANGE UP (ref 70–99)
GRAM STN FLD: SIGNIFICANT CHANGE UP
HCT VFR BLD CALC: 37.4 % — SIGNIFICANT CHANGE UP (ref 34.5–45)
HGB BLD-MCNC: 11.9 G/DL — SIGNIFICANT CHANGE UP (ref 11.5–15.5)
MAGNESIUM SERPL-MCNC: 1.8 MG/DL — SIGNIFICANT CHANGE UP (ref 1.6–2.6)
MCHC RBC-ENTMCNC: 27.8 PG — SIGNIFICANT CHANGE UP (ref 27–34)
MCHC RBC-ENTMCNC: 31.7 GM/DL — LOW (ref 32–36)
MCV RBC AUTO: 87.6 FL — SIGNIFICANT CHANGE UP (ref 80–100)
PHOSPHATE SERPL-MCNC: 2 MG/DL — LOW (ref 2.5–4.5)
PLATELET # BLD AUTO: 141 K/UL — LOW (ref 150–400)
POTASSIUM SERPL-MCNC: 3.9 MMOL/L — SIGNIFICANT CHANGE UP (ref 3.5–5.3)
POTASSIUM SERPL-SCNC: 3.9 MMOL/L — SIGNIFICANT CHANGE UP (ref 3.5–5.3)
PROT SERPL-MCNC: 5.9 G/DL — LOW (ref 6–8.3)
RBC # BLD: 4.27 M/UL — SIGNIFICANT CHANGE UP (ref 3.8–5.2)
RBC # FLD: 14.1 % — SIGNIFICANT CHANGE UP (ref 10.3–14.5)
SODIUM SERPL-SCNC: 138 MMOL/L — SIGNIFICANT CHANGE UP (ref 135–145)
SPECIMEN SOURCE: SIGNIFICANT CHANGE UP
SPECIMEN SOURCE: SIGNIFICANT CHANGE UP
WBC # BLD: 17.8 K/UL — HIGH (ref 3.8–10.5)
WBC # FLD AUTO: 17.8 K/UL — HIGH (ref 3.8–10.5)

## 2017-06-03 PROCEDURE — 93010 ELECTROCARDIOGRAM REPORT: CPT

## 2017-06-03 PROCEDURE — 74000: CPT | Mod: 26

## 2017-06-03 PROCEDURE — 71010: CPT | Mod: 26

## 2017-06-03 PROCEDURE — 99233 SBSQ HOSP IP/OBS HIGH 50: CPT

## 2017-06-03 PROCEDURE — 99232 SBSQ HOSP IP/OBS MODERATE 35: CPT | Mod: GC

## 2017-06-03 RX ORDER — MAGNESIUM SULFATE 500 MG/ML
2 VIAL (ML) INJECTION ONCE
Qty: 0 | Refills: 0 | Status: COMPLETED | OUTPATIENT
Start: 2017-06-03 | End: 2017-06-03

## 2017-06-03 RX ORDER — CEFEPIME 1 G/1
2000 INJECTION, POWDER, FOR SOLUTION INTRAMUSCULAR; INTRAVENOUS EVERY 12 HOURS
Qty: 0 | Refills: 0 | Status: DISCONTINUED | OUTPATIENT
Start: 2017-06-03 | End: 2017-06-21

## 2017-06-03 RX ORDER — HYDROMORPHONE HYDROCHLORIDE 2 MG/ML
0.5 INJECTION INTRAMUSCULAR; INTRAVENOUS; SUBCUTANEOUS
Qty: 0 | Refills: 0 | Status: DISCONTINUED | OUTPATIENT
Start: 2017-06-03 | End: 2017-06-05

## 2017-06-03 RX ADMIN — CEFEPIME 100 MILLIGRAM(S): 1 INJECTION, POWDER, FOR SOLUTION INTRAMUSCULAR; INTRAVENOUS at 18:03

## 2017-06-03 RX ADMIN — HYDROMORPHONE HYDROCHLORIDE 0.5 MILLIGRAM(S): 2 INJECTION INTRAMUSCULAR; INTRAVENOUS; SUBCUTANEOUS at 14:12

## 2017-06-03 RX ADMIN — HYDROMORPHONE HYDROCHLORIDE 0.5 MILLIGRAM(S): 2 INJECTION INTRAMUSCULAR; INTRAVENOUS; SUBCUTANEOUS at 21:20

## 2017-06-03 RX ADMIN — Medication 50 GRAM(S): at 15:55

## 2017-06-03 RX ADMIN — HYDROMORPHONE HYDROCHLORIDE 0.5 MILLIGRAM(S): 2 INJECTION INTRAMUSCULAR; INTRAVENOUS; SUBCUTANEOUS at 04:25

## 2017-06-03 RX ADMIN — ENOXAPARIN SODIUM 40 MILLIGRAM(S): 100 INJECTION SUBCUTANEOUS at 13:46

## 2017-06-03 RX ADMIN — HYDROMORPHONE HYDROCHLORIDE 0.5 MILLIGRAM(S): 2 INJECTION INTRAMUSCULAR; INTRAVENOUS; SUBCUTANEOUS at 13:57

## 2017-06-03 RX ADMIN — Medication 63.75 MILLIMOLE(S): at 18:02

## 2017-06-03 RX ADMIN — HYDROMORPHONE HYDROCHLORIDE 0.5 MILLIGRAM(S): 2 INJECTION INTRAMUSCULAR; INTRAVENOUS; SUBCUTANEOUS at 08:44

## 2017-06-03 RX ADMIN — HYDROMORPHONE HYDROCHLORIDE 0.5 MILLIGRAM(S): 2 INJECTION INTRAMUSCULAR; INTRAVENOUS; SUBCUTANEOUS at 18:02

## 2017-06-03 RX ADMIN — Medication 1000 MILLIGRAM(S): at 05:20

## 2017-06-03 RX ADMIN — HYDROMORPHONE HYDROCHLORIDE 0.5 MILLIGRAM(S): 2 INJECTION INTRAMUSCULAR; INTRAVENOUS; SUBCUTANEOUS at 08:59

## 2017-06-03 RX ADMIN — CEFEPIME 100 MILLIGRAM(S): 1 INJECTION, POWDER, FOR SOLUTION INTRAMUSCULAR; INTRAVENOUS at 04:41

## 2017-06-03 RX ADMIN — Medication 63.75 MILLIMOLE(S): at 21:20

## 2017-06-03 RX ADMIN — HYDROMORPHONE HYDROCHLORIDE 0.5 MILLIGRAM(S): 2 INJECTION INTRAMUSCULAR; INTRAVENOUS; SUBCUTANEOUS at 18:17

## 2017-06-03 RX ADMIN — HYDROMORPHONE HYDROCHLORIDE 0.5 MILLIGRAM(S): 2 INJECTION INTRAMUSCULAR; INTRAVENOUS; SUBCUTANEOUS at 21:35

## 2017-06-03 RX ADMIN — Medication 400 MILLIGRAM(S): at 05:20

## 2017-06-03 RX ADMIN — HYDROMORPHONE HYDROCHLORIDE 0.5 MILLIGRAM(S): 2 INJECTION INTRAMUSCULAR; INTRAVENOUS; SUBCUTANEOUS at 04:09

## 2017-06-04 LAB
-  AMIKACIN: SIGNIFICANT CHANGE UP
-  AMPICILLIN/SULBACTAM: SIGNIFICANT CHANGE UP
-  AMPICILLIN: SIGNIFICANT CHANGE UP
-  AZTREONAM: SIGNIFICANT CHANGE UP
-  CEFAZOLIN: SIGNIFICANT CHANGE UP
-  CEFEPIME: SIGNIFICANT CHANGE UP
-  CEFOXITIN: SIGNIFICANT CHANGE UP
-  CEFTAZIDIME: SIGNIFICANT CHANGE UP
-  CEFTRIAXONE: SIGNIFICANT CHANGE UP
-  CIPROFLOXACIN: SIGNIFICANT CHANGE UP
-  ERTAPENEM: SIGNIFICANT CHANGE UP
-  GENTAMICIN: SIGNIFICANT CHANGE UP
-  IMIPENEM: SIGNIFICANT CHANGE UP
-  LEVOFLOXACIN: SIGNIFICANT CHANGE UP
-  MEROPENEM: SIGNIFICANT CHANGE UP
-  PIPERACILLIN/TAZOBACTAM: SIGNIFICANT CHANGE UP
-  TOBRAMYCIN: SIGNIFICANT CHANGE UP
-  TRIMETHOPRIM/SULFAMETHOXAZOLE: SIGNIFICANT CHANGE UP
ALBUMIN SERPL ELPH-MCNC: 2.2 G/DL — LOW (ref 3.3–5)
ALP SERPL-CCNC: 63 U/L — SIGNIFICANT CHANGE UP (ref 40–120)
ALT FLD-CCNC: 14 U/L RC — SIGNIFICANT CHANGE UP (ref 10–45)
ANION GAP SERPL CALC-SCNC: 11 MMOL/L — SIGNIFICANT CHANGE UP (ref 5–17)
AST SERPL-CCNC: 16 U/L — SIGNIFICANT CHANGE UP (ref 10–40)
BILIRUB SERPL-MCNC: 0.4 MG/DL — SIGNIFICANT CHANGE UP (ref 0.2–1.2)
BUN SERPL-MCNC: 15 MG/DL — SIGNIFICANT CHANGE UP (ref 7–23)
CALCIUM SERPL-MCNC: 8.3 MG/DL — LOW (ref 8.4–10.5)
CHLORIDE SERPL-SCNC: 100 MMOL/L — SIGNIFICANT CHANGE UP (ref 96–108)
CO2 SERPL-SCNC: 24 MMOL/L — SIGNIFICANT CHANGE UP (ref 22–31)
CREAT SERPL-MCNC: 0.45 MG/DL — LOW (ref 0.5–1.3)
CULTURE RESULTS: SIGNIFICANT CHANGE UP
CULTURE RESULTS: SIGNIFICANT CHANGE UP
GLUCOSE SERPL-MCNC: 109 MG/DL — HIGH (ref 70–99)
HCT VFR BLD CALC: 35.9 % — SIGNIFICANT CHANGE UP (ref 34.5–45)
HGB BLD-MCNC: 11.1 G/DL — LOW (ref 11.5–15.5)
MAGNESIUM SERPL-MCNC: 1.8 MG/DL — SIGNIFICANT CHANGE UP (ref 1.6–2.6)
MCHC RBC-ENTMCNC: 27.3 PG — SIGNIFICANT CHANGE UP (ref 27–34)
MCHC RBC-ENTMCNC: 31 GM/DL — LOW (ref 32–36)
MCV RBC AUTO: 88 FL — SIGNIFICANT CHANGE UP (ref 80–100)
METHOD TYPE: SIGNIFICANT CHANGE UP
ORGANISM # SPEC MICROSCOPIC CNT: SIGNIFICANT CHANGE UP
ORGANISM # SPEC MICROSCOPIC CNT: SIGNIFICANT CHANGE UP
PHOSPHATE SERPL-MCNC: 1.8 MG/DL — LOW (ref 2.5–4.5)
PLATELET # BLD AUTO: 142 K/UL — LOW (ref 150–400)
POTASSIUM SERPL-MCNC: 3.7 MMOL/L — SIGNIFICANT CHANGE UP (ref 3.5–5.3)
POTASSIUM SERPL-SCNC: 3.7 MMOL/L — SIGNIFICANT CHANGE UP (ref 3.5–5.3)
PROT SERPL-MCNC: 5.2 G/DL — LOW (ref 6–8.3)
RBC # BLD: 4.08 M/UL — SIGNIFICANT CHANGE UP (ref 3.8–5.2)
RBC # FLD: 14 % — SIGNIFICANT CHANGE UP (ref 10.3–14.5)
SODIUM SERPL-SCNC: 135 MMOL/L — SIGNIFICANT CHANGE UP (ref 135–145)
SPECIMEN SOURCE: SIGNIFICANT CHANGE UP
SPECIMEN SOURCE: SIGNIFICANT CHANGE UP
WBC # BLD: 10.9 K/UL — HIGH (ref 3.8–10.5)
WBC # FLD AUTO: 10.9 K/UL — HIGH (ref 3.8–10.5)

## 2017-06-04 PROCEDURE — 99232 SBSQ HOSP IP/OBS MODERATE 35: CPT | Mod: GC

## 2017-06-04 RX ORDER — MAGNESIUM SULFATE 500 MG/ML
2 VIAL (ML) INJECTION ONCE
Qty: 0 | Refills: 0 | Status: COMPLETED | OUTPATIENT
Start: 2017-06-04 | End: 2017-06-04

## 2017-06-04 RX ORDER — POTASSIUM PHOSPHATE, MONOBASIC POTASSIUM PHOSPHATE, DIBASIC 236; 224 MG/ML; MG/ML
15 INJECTION, SOLUTION INTRAVENOUS ONCE
Qty: 0 | Refills: 0 | Status: COMPLETED | OUTPATIENT
Start: 2017-06-04 | End: 2017-06-04

## 2017-06-04 RX ADMIN — HYDROMORPHONE HYDROCHLORIDE 0.5 MILLIGRAM(S): 2 INJECTION INTRAMUSCULAR; INTRAVENOUS; SUBCUTANEOUS at 12:44

## 2017-06-04 RX ADMIN — CEFEPIME 100 MILLIGRAM(S): 1 INJECTION, POWDER, FOR SOLUTION INTRAMUSCULAR; INTRAVENOUS at 17:30

## 2017-06-04 RX ADMIN — HYDROMORPHONE HYDROCHLORIDE 0.5 MILLIGRAM(S): 2 INJECTION INTRAMUSCULAR; INTRAVENOUS; SUBCUTANEOUS at 02:33

## 2017-06-04 RX ADMIN — HYDROMORPHONE HYDROCHLORIDE 0.5 MILLIGRAM(S): 2 INJECTION INTRAMUSCULAR; INTRAVENOUS; SUBCUTANEOUS at 17:01

## 2017-06-04 RX ADMIN — HYDROMORPHONE HYDROCHLORIDE 0.5 MILLIGRAM(S): 2 INJECTION INTRAMUSCULAR; INTRAVENOUS; SUBCUTANEOUS at 09:12

## 2017-06-04 RX ADMIN — ENOXAPARIN SODIUM 40 MILLIGRAM(S): 100 INJECTION SUBCUTANEOUS at 12:45

## 2017-06-04 RX ADMIN — Medication 50 GRAM(S): at 10:56

## 2017-06-04 RX ADMIN — HYDROMORPHONE HYDROCHLORIDE 0.5 MILLIGRAM(S): 2 INJECTION INTRAMUSCULAR; INTRAVENOUS; SUBCUTANEOUS at 02:55

## 2017-06-04 RX ADMIN — HYDROMORPHONE HYDROCHLORIDE 0.5 MILLIGRAM(S): 2 INJECTION INTRAMUSCULAR; INTRAVENOUS; SUBCUTANEOUS at 08:57

## 2017-06-04 RX ADMIN — POTASSIUM PHOSPHATE, MONOBASIC POTASSIUM PHOSPHATE, DIBASIC 62.5 MILLIMOLE(S): 236; 224 INJECTION, SOLUTION INTRAVENOUS at 13:27

## 2017-06-04 RX ADMIN — HYDROMORPHONE HYDROCHLORIDE 0.5 MILLIGRAM(S): 2 INJECTION INTRAMUSCULAR; INTRAVENOUS; SUBCUTANEOUS at 21:25

## 2017-06-04 RX ADMIN — HYDROMORPHONE HYDROCHLORIDE 0.5 MILLIGRAM(S): 2 INJECTION INTRAMUSCULAR; INTRAVENOUS; SUBCUTANEOUS at 12:59

## 2017-06-04 RX ADMIN — CEFEPIME 100 MILLIGRAM(S): 1 INJECTION, POWDER, FOR SOLUTION INTRAMUSCULAR; INTRAVENOUS at 05:11

## 2017-06-04 RX ADMIN — HYDROMORPHONE HYDROCHLORIDE 0.5 MILLIGRAM(S): 2 INJECTION INTRAMUSCULAR; INTRAVENOUS; SUBCUTANEOUS at 20:55

## 2017-06-04 RX ADMIN — HYDROMORPHONE HYDROCHLORIDE 0.5 MILLIGRAM(S): 2 INJECTION INTRAMUSCULAR; INTRAVENOUS; SUBCUTANEOUS at 17:16

## 2017-06-05 ENCOUNTER — OUTPATIENT (OUTPATIENT)
Dept: OUTPATIENT SERVICES | Facility: HOSPITAL | Age: 47
LOS: 1 days | Discharge: HOME | End: 2017-06-05

## 2017-06-05 DIAGNOSIS — C56.9 MALIGNANT NEOPLASM OF UNSPECIFIED OVARY: ICD-10-CM

## 2017-06-05 DIAGNOSIS — Z93.3 COLOSTOMY STATUS: Chronic | ICD-10-CM

## 2017-06-05 DIAGNOSIS — Z90.710 ACQUIRED ABSENCE OF BOTH CERVIX AND UTERUS: Chronic | ICD-10-CM

## 2017-06-05 LAB
ALBUMIN SERPL ELPH-MCNC: 2.5 G/DL — LOW (ref 3.3–5)
ALP SERPL-CCNC: 55 U/L — SIGNIFICANT CHANGE UP (ref 40–120)
ALT FLD-CCNC: 10 U/L RC — SIGNIFICANT CHANGE UP (ref 10–45)
ANION GAP SERPL CALC-SCNC: 11 MMOL/L — SIGNIFICANT CHANGE UP (ref 5–17)
APTT BLD: 30.3 SEC — SIGNIFICANT CHANGE UP (ref 27.5–37.4)
AST SERPL-CCNC: 9 U/L — LOW (ref 10–40)
BASOPHILS # BLD AUTO: 0 K/UL — SIGNIFICANT CHANGE UP (ref 0–0.2)
BASOPHILS NFR BLD AUTO: 0 % — SIGNIFICANT CHANGE UP (ref 0–2)
BILIRUB DIRECT SERPL-MCNC: 0.2 MG/DL — SIGNIFICANT CHANGE UP (ref 0–0.2)
BILIRUB INDIRECT FLD-MCNC: 0.4 MG/DL — SIGNIFICANT CHANGE UP (ref 0.2–1)
BILIRUB SERPL-MCNC: 0.6 MG/DL — SIGNIFICANT CHANGE UP (ref 0.2–1.2)
BLD GP AB SCN SERPL QL: NEGATIVE — SIGNIFICANT CHANGE UP
BUN SERPL-MCNC: 11 MG/DL — SIGNIFICANT CHANGE UP (ref 7–23)
CALCIUM SERPL-MCNC: 8 MG/DL — LOW (ref 8.4–10.5)
CHLORIDE SERPL-SCNC: 98 MMOL/L — SIGNIFICANT CHANGE UP (ref 96–108)
CO2 SERPL-SCNC: 26 MMOL/L — SIGNIFICANT CHANGE UP (ref 22–31)
CREAT SERPL-MCNC: 0.42 MG/DL — LOW (ref 0.5–1.3)
EOSINOPHIL # BLD AUTO: 0 K/UL — SIGNIFICANT CHANGE UP (ref 0–0.5)
EOSINOPHIL NFR BLD AUTO: 0.4 % — SIGNIFICANT CHANGE UP (ref 0–6)
GLUCOSE SERPL-MCNC: 113 MG/DL — HIGH (ref 70–99)
HCG SERPL-ACNC: <2 MIU/ML — SIGNIFICANT CHANGE UP
HCT VFR BLD CALC: 34.2 % — LOW (ref 34.5–45)
HGB BLD-MCNC: 10.5 G/DL — LOW (ref 11.5–15.5)
INR BLD: 1.13 RATIO — SIGNIFICANT CHANGE UP (ref 0.88–1.16)
LYMPHOCYTES # BLD AUTO: 0.8 K/UL — LOW (ref 1–3.3)
LYMPHOCYTES # BLD AUTO: 7.5 % — LOW (ref 13–44)
MAGNESIUM SERPL-MCNC: 1.7 MG/DL — SIGNIFICANT CHANGE UP (ref 1.6–2.6)
MCHC RBC-ENTMCNC: 26.8 PG — LOW (ref 27–34)
MCHC RBC-ENTMCNC: 30.8 GM/DL — LOW (ref 32–36)
MCV RBC AUTO: 87.2 FL — SIGNIFICANT CHANGE UP (ref 80–100)
MONOCYTES # BLD AUTO: 1.1 K/UL — HIGH (ref 0–0.9)
MONOCYTES NFR BLD AUTO: 10.5 % — SIGNIFICANT CHANGE UP (ref 2–14)
NEUTROPHILS # BLD AUTO: 8.6 K/UL — HIGH (ref 1.8–7.4)
NEUTROPHILS NFR BLD AUTO: 81.5 % — HIGH (ref 43–77)
PHOSPHATE SERPL-MCNC: 2.6 MG/DL — SIGNIFICANT CHANGE UP (ref 2.5–4.5)
PLATELET # BLD AUTO: 133 K/UL — LOW (ref 150–400)
POTASSIUM SERPL-MCNC: 3.8 MMOL/L — SIGNIFICANT CHANGE UP (ref 3.5–5.3)
POTASSIUM SERPL-SCNC: 3.8 MMOL/L — SIGNIFICANT CHANGE UP (ref 3.5–5.3)
PROT SERPL-MCNC: 5.2 G/DL — LOW (ref 6–8.3)
PROTHROM AB SERPL-ACNC: 12.3 SEC — SIGNIFICANT CHANGE UP (ref 9.8–12.7)
RBC # BLD: 3.92 M/UL — SIGNIFICANT CHANGE UP (ref 3.8–5.2)
RBC # FLD: 13.7 % — SIGNIFICANT CHANGE UP (ref 10.3–14.5)
RH IG SCN BLD-IMP: POSITIVE — SIGNIFICANT CHANGE UP
SODIUM SERPL-SCNC: 135 MMOL/L — SIGNIFICANT CHANGE UP (ref 135–145)
WBC # BLD: 10.6 K/UL — HIGH (ref 3.8–10.5)
WBC # FLD AUTO: 10.6 K/UL — HIGH (ref 3.8–10.5)

## 2017-06-05 PROCEDURE — 99232 SBSQ HOSP IP/OBS MODERATE 35: CPT

## 2017-06-05 PROCEDURE — 99223 1ST HOSP IP/OBS HIGH 75: CPT

## 2017-06-05 PROCEDURE — 99232 SBSQ HOSP IP/OBS MODERATE 35: CPT | Mod: GC

## 2017-06-05 RX ORDER — ACETAMINOPHEN 500 MG
1000 TABLET ORAL ONCE
Qty: 0 | Refills: 0 | Status: DISCONTINUED | OUTPATIENT
Start: 2017-06-05 | End: 2017-06-21

## 2017-06-05 RX ORDER — HYDROMORPHONE HYDROCHLORIDE 2 MG/ML
1 INJECTION INTRAMUSCULAR; INTRAVENOUS; SUBCUTANEOUS
Qty: 0 | Refills: 0 | Status: DISCONTINUED | OUTPATIENT
Start: 2017-06-05 | End: 2017-06-07

## 2017-06-05 RX ORDER — POTASSIUM PHOSPHATE, MONOBASIC POTASSIUM PHOSPHATE, DIBASIC 236; 224 MG/ML; MG/ML
15 INJECTION, SOLUTION INTRAVENOUS ONCE
Qty: 0 | Refills: 0 | Status: COMPLETED | OUTPATIENT
Start: 2017-06-05 | End: 2017-06-05

## 2017-06-05 RX ORDER — OCTREOTIDE ACETATE 200 UG/ML
25 INJECTION, SOLUTION INTRAVENOUS; SUBCUTANEOUS
Qty: 500 | Refills: 0 | Status: DISCONTINUED | OUTPATIENT
Start: 2017-06-05 | End: 2017-06-11

## 2017-06-05 RX ORDER — OXYCODONE HYDROCHLORIDE 5 MG/1
5 TABLET ORAL EVERY 4 HOURS
Qty: 0 | Refills: 0 | Status: DISCONTINUED | OUTPATIENT
Start: 2017-06-05 | End: 2017-06-05

## 2017-06-05 RX ORDER — HYDROMORPHONE HYDROCHLORIDE 2 MG/ML
30 INJECTION INTRAMUSCULAR; INTRAVENOUS; SUBCUTANEOUS
Qty: 0 | Refills: 0 | Status: DISCONTINUED | OUTPATIENT
Start: 2017-06-05 | End: 2017-06-07

## 2017-06-05 RX ORDER — NALOXONE HYDROCHLORIDE 4 MG/.1ML
0.1 SPRAY NASAL
Qty: 0 | Refills: 0 | Status: DISCONTINUED | OUTPATIENT
Start: 2017-06-05 | End: 2017-06-21

## 2017-06-05 RX ORDER — ONDANSETRON 8 MG/1
4 TABLET, FILM COATED ORAL EVERY 8 HOURS
Qty: 0 | Refills: 0 | Status: DISCONTINUED | OUTPATIENT
Start: 2017-06-05 | End: 2017-06-15

## 2017-06-05 RX ORDER — ONDANSETRON 8 MG/1
4 TABLET, FILM COATED ORAL EVERY 6 HOURS
Qty: 0 | Refills: 0 | Status: DISCONTINUED | OUTPATIENT
Start: 2017-06-05 | End: 2017-06-05

## 2017-06-05 RX ORDER — ONDANSETRON 8 MG/1
4 TABLET, FILM COATED ORAL EVERY 8 HOURS
Qty: 0 | Refills: 0 | Status: DISCONTINUED | OUTPATIENT
Start: 2017-06-05 | End: 2017-06-21

## 2017-06-05 RX ADMIN — HYDROMORPHONE HYDROCHLORIDE 0.5 MILLIGRAM(S): 2 INJECTION INTRAMUSCULAR; INTRAVENOUS; SUBCUTANEOUS at 05:58

## 2017-06-05 RX ADMIN — HYDROMORPHONE HYDROCHLORIDE 0.5 MILLIGRAM(S): 2 INJECTION INTRAMUSCULAR; INTRAVENOUS; SUBCUTANEOUS at 05:28

## 2017-06-05 RX ADMIN — DEXTROSE MONOHYDRATE, SODIUM CHLORIDE, AND POTASSIUM CHLORIDE 30 MILLILITER(S): 50; .745; 4.5 INJECTION, SOLUTION INTRAVENOUS at 18:20

## 2017-06-05 RX ADMIN — HYDROMORPHONE HYDROCHLORIDE 0.5 MILLIGRAM(S): 2 INJECTION INTRAMUSCULAR; INTRAVENOUS; SUBCUTANEOUS at 01:24

## 2017-06-05 RX ADMIN — HYDROMORPHONE HYDROCHLORIDE 0.5 MILLIGRAM(S): 2 INJECTION INTRAMUSCULAR; INTRAVENOUS; SUBCUTANEOUS at 14:08

## 2017-06-05 RX ADMIN — ENOXAPARIN SODIUM 40 MILLIGRAM(S): 100 INJECTION SUBCUTANEOUS at 11:11

## 2017-06-05 RX ADMIN — CEFEPIME 100 MILLIGRAM(S): 1 INJECTION, POWDER, FOR SOLUTION INTRAMUSCULAR; INTRAVENOUS at 18:17

## 2017-06-05 RX ADMIN — OCTREOTIDE ACETATE 5 MICROGRAM(S)/HR: 200 INJECTION, SOLUTION INTRAVENOUS; SUBCUTANEOUS at 21:12

## 2017-06-05 RX ADMIN — HYDROMORPHONE HYDROCHLORIDE 30 MILLILITER(S): 2 INJECTION INTRAMUSCULAR; INTRAVENOUS; SUBCUTANEOUS at 20:38

## 2017-06-05 RX ADMIN — HYDROMORPHONE HYDROCHLORIDE 0.5 MILLIGRAM(S): 2 INJECTION INTRAMUSCULAR; INTRAVENOUS; SUBCUTANEOUS at 09:53

## 2017-06-05 RX ADMIN — HYDROMORPHONE HYDROCHLORIDE 0.5 MILLIGRAM(S): 2 INJECTION INTRAMUSCULAR; INTRAVENOUS; SUBCUTANEOUS at 09:23

## 2017-06-05 RX ADMIN — POTASSIUM PHOSPHATE, MONOBASIC POTASSIUM PHOSPHATE, DIBASIC 62.5 MILLIMOLE(S): 236; 224 INJECTION, SOLUTION INTRAVENOUS at 11:11

## 2017-06-05 RX ADMIN — CEFEPIME 100 MILLIGRAM(S): 1 INJECTION, POWDER, FOR SOLUTION INTRAMUSCULAR; INTRAVENOUS at 05:30

## 2017-06-05 RX ADMIN — HYDROMORPHONE HYDROCHLORIDE 0.5 MILLIGRAM(S): 2 INJECTION INTRAMUSCULAR; INTRAVENOUS; SUBCUTANEOUS at 13:38

## 2017-06-05 RX ADMIN — HYDROMORPHONE HYDROCHLORIDE 0.5 MILLIGRAM(S): 2 INJECTION INTRAMUSCULAR; INTRAVENOUS; SUBCUTANEOUS at 00:54

## 2017-06-05 NOTE — PROVIDER CONTACT NOTE (MEDICATION) - SITUATION
Sandostatin infusion was ordered for a patient by Dr. Jones from GI and the patient & her  need explanation of the medication. [facility] advised to contact the GI team. Still unable to get in touch.

## 2017-06-06 LAB
ALBUMIN SERPL ELPH-MCNC: 2.6 G/DL — LOW (ref 3.3–5)
ALP SERPL-CCNC: 70 U/L — SIGNIFICANT CHANGE UP (ref 40–120)
ALT FLD-CCNC: 11 U/L RC — SIGNIFICANT CHANGE UP (ref 10–45)
ANION GAP SERPL CALC-SCNC: 10 MMOL/L — SIGNIFICANT CHANGE UP (ref 5–17)
AST SERPL-CCNC: 11 U/L — SIGNIFICANT CHANGE UP (ref 10–40)
BILIRUB SERPL-MCNC: 0.6 MG/DL — SIGNIFICANT CHANGE UP (ref 0.2–1.2)
BUN SERPL-MCNC: 8 MG/DL — SIGNIFICANT CHANGE UP (ref 7–23)
CALCIUM SERPL-MCNC: 8.5 MG/DL — SIGNIFICANT CHANGE UP (ref 8.4–10.5)
CHLORIDE SERPL-SCNC: 96 MMOL/L — SIGNIFICANT CHANGE UP (ref 96–108)
CO2 SERPL-SCNC: 28 MMOL/L — SIGNIFICANT CHANGE UP (ref 22–31)
CREAT SERPL-MCNC: 0.47 MG/DL — LOW (ref 0.5–1.3)
GLUCOSE SERPL-MCNC: 140 MG/DL — HIGH (ref 70–99)
HCT VFR BLD CALC: 35.2 % — SIGNIFICANT CHANGE UP (ref 34.5–45)
HGB BLD-MCNC: 10.9 G/DL — LOW (ref 11.5–15.5)
MAGNESIUM SERPL-MCNC: 1.7 MG/DL — SIGNIFICANT CHANGE UP (ref 1.6–2.6)
MCHC RBC-ENTMCNC: 27.1 PG — SIGNIFICANT CHANGE UP (ref 27–34)
MCHC RBC-ENTMCNC: 31.1 GM/DL — LOW (ref 32–36)
MCV RBC AUTO: 87.2 FL — SIGNIFICANT CHANGE UP (ref 80–100)
PHOSPHATE SERPL-MCNC: 3.9 MG/DL — SIGNIFICANT CHANGE UP (ref 2.5–4.5)
PLATELET # BLD AUTO: 187 K/UL — SIGNIFICANT CHANGE UP (ref 150–400)
POTASSIUM SERPL-MCNC: 4.2 MMOL/L — SIGNIFICANT CHANGE UP (ref 3.5–5.3)
POTASSIUM SERPL-SCNC: 4.2 MMOL/L — SIGNIFICANT CHANGE UP (ref 3.5–5.3)
PROT SERPL-MCNC: 5.6 G/DL — LOW (ref 6–8.3)
RBC # BLD: 4.04 M/UL — SIGNIFICANT CHANGE UP (ref 3.8–5.2)
RBC # FLD: 13.6 % — SIGNIFICANT CHANGE UP (ref 10.3–14.5)
SODIUM SERPL-SCNC: 134 MMOL/L — LOW (ref 135–145)
WBC # BLD: 14.4 K/UL — HIGH (ref 3.8–10.5)
WBC # FLD AUTO: 14.4 K/UL — HIGH (ref 3.8–10.5)

## 2017-06-06 PROCEDURE — 74177 CT ABD & PELVIS W/CONTRAST: CPT | Mod: 26

## 2017-06-06 PROCEDURE — 99233 SBSQ HOSP IP/OBS HIGH 50: CPT | Mod: GC

## 2017-06-06 PROCEDURE — 99232 SBSQ HOSP IP/OBS MODERATE 35: CPT

## 2017-06-06 RX ORDER — MAGNESIUM SULFATE 500 MG/ML
2 VIAL (ML) INJECTION ONCE
Qty: 0 | Refills: 0 | Status: COMPLETED | OUTPATIENT
Start: 2017-06-06 | End: 2017-06-06

## 2017-06-06 RX ORDER — DEXTROSE MONOHYDRATE, SODIUM CHLORIDE, AND POTASSIUM CHLORIDE 50; .745; 4.5 G/1000ML; G/1000ML; G/1000ML
1000 INJECTION, SOLUTION INTRAVENOUS
Qty: 0 | Refills: 0 | Status: DISCONTINUED | OUTPATIENT
Start: 2017-06-06 | End: 2017-06-11

## 2017-06-06 RX ADMIN — CEFEPIME 100 MILLIGRAM(S): 1 INJECTION, POWDER, FOR SOLUTION INTRAMUSCULAR; INTRAVENOUS at 19:34

## 2017-06-06 RX ADMIN — CEFEPIME 100 MILLIGRAM(S): 1 INJECTION, POWDER, FOR SOLUTION INTRAMUSCULAR; INTRAVENOUS at 05:41

## 2017-06-06 RX ADMIN — HYDROMORPHONE HYDROCHLORIDE 30 MILLILITER(S): 2 INJECTION INTRAMUSCULAR; INTRAVENOUS; SUBCUTANEOUS at 08:00

## 2017-06-06 RX ADMIN — OCTREOTIDE ACETATE 5 MICROGRAM(S)/HR: 200 INJECTION, SOLUTION INTRAVENOUS; SUBCUTANEOUS at 15:33

## 2017-06-06 RX ADMIN — DEXTROSE MONOHYDRATE, SODIUM CHLORIDE, AND POTASSIUM CHLORIDE 30 MILLILITER(S): 50; .745; 4.5 INJECTION, SOLUTION INTRAVENOUS at 14:08

## 2017-06-06 RX ADMIN — ENOXAPARIN SODIUM 40 MILLIGRAM(S): 100 INJECTION SUBCUTANEOUS at 13:55

## 2017-06-06 RX ADMIN — Medication 50 GRAM(S): at 13:56

## 2017-06-06 RX ADMIN — HYDROMORPHONE HYDROCHLORIDE 30 MILLILITER(S): 2 INJECTION INTRAMUSCULAR; INTRAVENOUS; SUBCUTANEOUS at 19:44

## 2017-06-07 ENCOUNTER — APPOINTMENT (OUTPATIENT)
Dept: HEMATOLOGY ONCOLOGY | Facility: CLINIC | Age: 47
End: 2017-06-07

## 2017-06-07 DIAGNOSIS — Z51.5 ENCOUNTER FOR PALLIATIVE CARE: ICD-10-CM

## 2017-06-07 DIAGNOSIS — R52 PAIN, UNSPECIFIED: ICD-10-CM

## 2017-06-07 DIAGNOSIS — K56.60 UNSPECIFIED INTESTINAL OBSTRUCTION: ICD-10-CM

## 2017-06-07 DIAGNOSIS — C56.9 MALIGNANT NEOPLASM OF UNSPECIFIED OVARY: ICD-10-CM

## 2017-06-07 LAB
ALBUMIN SERPL ELPH-MCNC: 2.6 G/DL — LOW (ref 3.3–5)
ALP SERPL-CCNC: 65 U/L — SIGNIFICANT CHANGE UP (ref 40–120)
ALT FLD-CCNC: 7 U/L RC — LOW (ref 10–45)
ANION GAP SERPL CALC-SCNC: 9 MMOL/L — SIGNIFICANT CHANGE UP (ref 5–17)
AST SERPL-CCNC: 10 U/L — SIGNIFICANT CHANGE UP (ref 10–40)
BILIRUB DIRECT SERPL-MCNC: 0.1 MG/DL — SIGNIFICANT CHANGE UP (ref 0–0.2)
BILIRUB INDIRECT FLD-MCNC: 0.4 MG/DL — SIGNIFICANT CHANGE UP (ref 0.2–1)
BILIRUB SERPL-MCNC: 0.5 MG/DL — SIGNIFICANT CHANGE UP (ref 0.2–1.2)
BUN SERPL-MCNC: 6 MG/DL — LOW (ref 7–23)
CALCIUM SERPL-MCNC: 8.1 MG/DL — LOW (ref 8.4–10.5)
CHLORIDE SERPL-SCNC: 97 MMOL/L — SIGNIFICANT CHANGE UP (ref 96–108)
CO2 SERPL-SCNC: 30 MMOL/L — SIGNIFICANT CHANGE UP (ref 22–31)
CREAT SERPL-MCNC: 0.46 MG/DL — LOW (ref 0.5–1.3)
GLUCOSE SERPL-MCNC: 135 MG/DL — HIGH (ref 70–99)
HCT VFR BLD CALC: 34.3 % — LOW (ref 34.5–45)
HGB BLD-MCNC: 10.8 G/DL — LOW (ref 11.5–15.5)
MAGNESIUM SERPL-MCNC: 1.9 MG/DL — SIGNIFICANT CHANGE UP (ref 1.6–2.6)
MCHC RBC-ENTMCNC: 27.3 PG — SIGNIFICANT CHANGE UP (ref 27–34)
MCHC RBC-ENTMCNC: 31.4 GM/DL — LOW (ref 32–36)
MCV RBC AUTO: 87 FL — SIGNIFICANT CHANGE UP (ref 80–100)
PHOSPHATE SERPL-MCNC: 3.5 MG/DL — SIGNIFICANT CHANGE UP (ref 2.5–4.5)
PLATELET # BLD AUTO: 257 K/UL — SIGNIFICANT CHANGE UP (ref 150–400)
POTASSIUM SERPL-MCNC: 4.2 MMOL/L — SIGNIFICANT CHANGE UP (ref 3.5–5.3)
POTASSIUM SERPL-SCNC: 4.2 MMOL/L — SIGNIFICANT CHANGE UP (ref 3.5–5.3)
PROT SERPL-MCNC: 5.4 G/DL — LOW (ref 6–8.3)
RBC # BLD: 3.94 M/UL — SIGNIFICANT CHANGE UP (ref 3.8–5.2)
RBC # FLD: 13.4 % — SIGNIFICANT CHANGE UP (ref 10.3–14.5)
SODIUM SERPL-SCNC: 136 MMOL/L — SIGNIFICANT CHANGE UP (ref 135–145)
WBC # BLD: 14.1 K/UL — HIGH (ref 3.8–10.5)
WBC # FLD AUTO: 14.1 K/UL — HIGH (ref 3.8–10.5)

## 2017-06-07 PROCEDURE — 99232 SBSQ HOSP IP/OBS MODERATE 35: CPT

## 2017-06-07 PROCEDURE — 43235 EGD DIAGNOSTIC BRUSH WASH: CPT | Mod: GC

## 2017-06-07 PROCEDURE — 99233 SBSQ HOSP IP/OBS HIGH 50: CPT | Mod: GC

## 2017-06-07 RX ORDER — HYDROMORPHONE HYDROCHLORIDE 2 MG/ML
30 INJECTION INTRAMUSCULAR; INTRAVENOUS; SUBCUTANEOUS
Qty: 0 | Refills: 0 | Status: DISCONTINUED | OUTPATIENT
Start: 2017-06-07 | End: 2017-06-14

## 2017-06-07 RX ORDER — FLUCONAZOLE 150 MG/1
TABLET ORAL
Qty: 0 | Refills: 0 | Status: DISCONTINUED | OUTPATIENT
Start: 2017-06-07 | End: 2017-06-21

## 2017-06-07 RX ORDER — FLUCONAZOLE 150 MG/1
200 TABLET ORAL ONCE
Qty: 0 | Refills: 0 | Status: COMPLETED | OUTPATIENT
Start: 2017-06-07 | End: 2017-06-07

## 2017-06-07 RX ORDER — HYDROMORPHONE HYDROCHLORIDE 2 MG/ML
0.5 INJECTION INTRAMUSCULAR; INTRAVENOUS; SUBCUTANEOUS
Qty: 0 | Refills: 0 | Status: DISCONTINUED | OUTPATIENT
Start: 2017-06-07 | End: 2017-06-12

## 2017-06-07 RX ORDER — TETRACAINE/BENZOCAINE/BUTAMBEN 2%-14%-2%
1 OINTMENT (GRAM) TOPICAL ONCE
Qty: 0 | Refills: 0 | Status: COMPLETED | OUTPATIENT
Start: 2017-06-07 | End: 2017-06-08

## 2017-06-07 RX ORDER — FLUCONAZOLE 150 MG/1
200 TABLET ORAL EVERY 24 HOURS
Qty: 0 | Refills: 0 | Status: DISCONTINUED | OUTPATIENT
Start: 2017-06-08 | End: 2017-06-21

## 2017-06-07 RX ORDER — HYDROMORPHONE HYDROCHLORIDE 2 MG/ML
1 INJECTION INTRAMUSCULAR; INTRAVENOUS; SUBCUTANEOUS
Qty: 0 | Refills: 0 | Status: DISCONTINUED | OUTPATIENT
Start: 2017-06-07 | End: 2017-06-14

## 2017-06-07 RX ADMIN — ONDANSETRON 4 MILLIGRAM(S): 8 TABLET, FILM COATED ORAL at 21:29

## 2017-06-07 RX ADMIN — CEFEPIME 100 MILLIGRAM(S): 1 INJECTION, POWDER, FOR SOLUTION INTRAMUSCULAR; INTRAVENOUS at 18:29

## 2017-06-07 RX ADMIN — HYDROMORPHONE HYDROCHLORIDE 30 MILLILITER(S): 2 INJECTION INTRAMUSCULAR; INTRAVENOUS; SUBCUTANEOUS at 15:42

## 2017-06-07 RX ADMIN — CEFEPIME 100 MILLIGRAM(S): 1 INJECTION, POWDER, FOR SOLUTION INTRAMUSCULAR; INTRAVENOUS at 05:24

## 2017-06-07 RX ADMIN — HYDROMORPHONE HYDROCHLORIDE 30 MILLILITER(S): 2 INJECTION INTRAMUSCULAR; INTRAVENOUS; SUBCUTANEOUS at 20:11

## 2017-06-07 RX ADMIN — HYDROMORPHONE HYDROCHLORIDE 1 MILLIGRAM(S): 2 INJECTION INTRAMUSCULAR; INTRAVENOUS; SUBCUTANEOUS at 15:42

## 2017-06-07 RX ADMIN — HYDROMORPHONE HYDROCHLORIDE 30 MILLILITER(S): 2 INJECTION INTRAMUSCULAR; INTRAVENOUS; SUBCUTANEOUS at 08:00

## 2017-06-07 RX ADMIN — FLUCONAZOLE 100 MILLIGRAM(S): 150 TABLET ORAL at 19:41

## 2017-06-07 RX ADMIN — ENOXAPARIN SODIUM 40 MILLIGRAM(S): 100 INJECTION SUBCUTANEOUS at 18:29

## 2017-06-07 RX ADMIN — DEXTROSE MONOHYDRATE, SODIUM CHLORIDE, AND POTASSIUM CHLORIDE 100 MILLILITER(S): 50; .745; 4.5 INJECTION, SOLUTION INTRAVENOUS at 05:30

## 2017-06-07 NOTE — PROGRESS NOTE ADULT - SUBJECTIVE AND OBJECTIVE BOX
Referring Physician: Alexander Johns    Procedure: EGD/EUS/ERCP    Moderate sedation [ ]      Sedation by Anesthesia [ ]    Indication for Procedure: Cholecystitis    Pertinent History: 47 yo female with h/o Ovarian Ca    PAST MEDICAL & SURGICAL HISTORY:  Ovarian cancer: dx in dec 2016 (started as fibroid --&gt; large cancerous tumor)  Colostomy in place: dec 2016 (placed during hysterectomy in dec 2016 at [facility])  H/O abdominal hysterectomy: dec 2016      PMH of Gastroparesis [ ]  Gastric Surgery [ ]  Gastric Outlet Obstruction [ ]    Allergies    penicillin (Rash)    Intolerances: None        Latex allergy [ ] yes [x ] no    Medications:MEDICATIONS  (STANDING):  enoxaparin Injectable 40milliGRAM(s) SubCutaneous daily  cefepime  IVPB 2000milliGRAM(s) IV Intermittent every 12 hours  acetaminophen  IVPB. 1000milliGRAM(s) IV Intermittent once  octreotide  Infusion 25MICROgram(s)/Hr IV Continuous <Continuous>  ondansetron Injectable 4milliGRAM(s) IV Push every 8 hours  dextrose 5% + sodium chloride 0.9% with potassium chloride 20 mEq/L 1000milliLiter(s) IV Continuous <Continuous>    MEDICATIONS  (PRN):  naloxone Injectable 0.1milliGRAM(s) IV Push every 3 minutes PRN For ANY of the following changes in patient status:  A. RR LESS THAN 10 breaths per minute, B. Oxygen saturation LESS THAN 90%, C. Sedation score of 6  ondansetron Injectable 4milliGRAM(s) IV Push every 8 hours PRN Nausea      Smoking: [ ] yes  [x ] no    AICD/PPM: [ ] yes   [x ] no    Pertinent lab data:                        10.8   14.1  )-----------( 257      ( 07 Jun 2017 05:37 )             34.3     06-07    136  |  97  |  6<L>  ----------------------------<  135<H>  4.2   |  30  |  0.46<L>    Ca    8.1<L>      07 Jun 2017 05:37  Phos  3.5     06-07  Mg     1.9     06-07    TPro  5.4<L>  /  Alb  2.6<L>  /  TBili  0.5  /  DBili  0.1  /  AST  10  /  ALT  7<L>  /  AlkPhos  65  06-07                Physical Examination:  Daily     Daily   Vital Signs Last 24 Hrs  T(C): 37.3, Max: 37.6 (06-07 @ 05:26)  T(F): 99.2, Max: 99.6 (06-07 @ 05:26)  HR: 91 (73 - 99)  BP: 110/76 (100/68 - 136/86)  BP(mean): --  RR: 18 (16 - 18)  SpO2: 96% (94% - 97%)  BP:                 HR:                  SPO2:               Temperature:    Constitutional: NAD  HEENT: PERRLA, EOMI,     Neck:  No JVD  Respiratory: CTAB/L  Cardiovascular: S1 and S2  Gastrointestinal: BS+, soft, NT/ND  Extremities: No peripheral edema  Neurological: A/O x 3, no focal deficits  Psychiatric: Normal mood, normal affect  : No Lynn  Skin: No rashes    Comments:    ASA Class: I [ ]  II [ ]  III [ ]  IV [ ]    The patient is a suitable candidate for the planned procedure unless box checked [ ]  No, explain: Referring Physician: Alexander Johns    Procedure: EGD/EUS/ERCP    Moderate sedation [ ]      Sedation by Anesthesia [ ]    Indication for Procedure: Acute  Cholecystitis    Pertinent History: 45 yo female with h/o Ovarian Ca with acute cholecystits- s/p axios stent/drainage, E. coli Bacteremia    PAST MEDICAL & SURGICAL HISTORY:  Ovarian cancer: dx in dec 2016 (started as fibroid --&gt; large cancerous tumor)  Colostomy in place: dec 2016 (placed during hysterectomy in dec 2016 at [facility])  H/O abdominal hysterectomy: dec 2016      PMH of Gastroparesis [ ]  Gastric Surgery [ ]  Gastric Outlet Obstruction [ ]    Allergies    penicillin (Rash)    Intolerances: None        Latex allergy [ ] yes [x ] no    Medications:MEDICATIONS  (STANDING):  enoxaparin Injectable 40milliGRAM(s) SubCutaneous daily  cefepime  IVPB 2000milliGRAM(s) IV Intermittent every 12 hours  acetaminophen  IVPB. 1000milliGRAM(s) IV Intermittent once  octreotide  Infusion 25MICROgram(s)/Hr IV Continuous <Continuous>  ondansetron Injectable 4milliGRAM(s) IV Push every 8 hours  dextrose 5% + sodium chloride 0.9% with potassium chloride 20 mEq/L 1000milliLiter(s) IV Continuous <Continuous>    MEDICATIONS  (PRN):  naloxone Injectable 0.1milliGRAM(s) IV Push every 3 minutes PRN For ANY of the following changes in patient status:  A. RR LESS THAN 10 breaths per minute, B. Oxygen saturation LESS THAN 90%, C. Sedation score of 6  ondansetron Injectable 4milliGRAM(s) IV Push every 8 hours PRN Nausea      Smoking: [ ] yes  [x ] no    AICD/PPM: [ ] yes   [x ] no    Pertinent lab data:                        10.8   14.1  )-----------( 257      ( 07 Jun 2017 05:37 )             34.3     06-07    136  |  97  |  6<L>  ----------------------------<  135<H>  4.2   |  30  |  0.46<L>    Ca    8.1<L>      07 Jun 2017 05:37  Phos  3.5     06-07  Mg     1.9     06-07    TPro  5.4<L>  /  Alb  2.6<L>  /  TBili  0.5  /  DBili  0.1  /  AST  10  /  ALT  7<L>  /  AlkPhos  65  06-07                Physical Examination:     Daily   Vital Signs Last 24 Hrs  T(C): 37.3, Max: 37.6 (06-07 @ 05:26)  T(F): 99.2, Max: 99.6 (06-07 @ 05:26)  HR: 91 (73 - 99)  BP: 110/76 (100/68 - 136/86)  BP(mean): --  RR: 18 (16 - 18)  SpO2: 96% (94% - 97%)  BP:  106/67        HR:  94                SPO2: 95%            Temperature: 36.9    Constitutional: NAD  HEENT: PERRLA, EOMI,     Neck:  No JVD  Respiratory: CTAB/L  Cardiovascular: S1 and S2  Gastrointestinal: BS+, soft, NT/ND  Extremities: No peripheral edema  Neurological: A/O x 3, no focal deficits  Psychiatric: Normal mood, normal affect  : No Lynn  Skin: No rashes    Comments:    ASA Class: I [ ]  II [ ]  III [x ]  IV [ ]    The patient is a suitable candidate for the planned procedure unless box checked [ ]  No, explain:

## 2017-06-07 NOTE — PROGRESS NOTE ADULT - PROBLEM SELECTOR PLAN 1
Secondary to CA and perforated gallbladder, partial bowel obstruction and abscesses within the abdomen.  Will re-start Dilaudid PCA at 0.5 mg q 20' DD, no CR, and 1 mg q 1 CB.   Continue Octreotide @ 25 mcg/h   Holist nurse consult called   Management of abscess and perforated GB as per Sx/GI

## 2017-06-07 NOTE — PROGRESS NOTE ADULT - SUBJECTIVE AND OBJECTIVE BOX
INTERVAL HPI/OVERNIGHT EVENTS:    Allergies    penicillin (Rash)    Intolerances        ADVANCE DIRECTIVES:    DNR: [ ] YES [X ] NO           PRESENT SYMPTOMS:   SOURCE:  [ X] Patient   [ ] Family   [ ] Team     Pain:     Dyspnea:  [ ] YES [ ] NO  Anxiety:  [ ] YES [ ] NO  Fatigue: [ ] YES [ ] NO  Nausea: [ ] YES [ ] NO  Loss of Appetite: [ ] YES [ ] NO  Constipation [ ] YES   [ ] No     OTHER SYMPTOMS:  [ X] All other ROS negative     [ ] Unable to obtain due to poor mentation    MEDICATIONS  (STANDING):  enoxaparin Injectable 40milliGRAM(s) SubCutaneous daily  cefepime  IVPB 2000milliGRAM(s) IV Intermittent every 12 hours  acetaminophen  IVPB. 1000milliGRAM(s) IV Intermittent once  octreotide  Infusion 25MICROgram(s)/Hr IV Continuous <Continuous>  ondansetron Injectable 4milliGRAM(s) IV Push every 8 hours  dextrose 5% + sodium chloride 0.9% with potassium chloride 20 mEq/L 1000milliLiter(s) IV Continuous <Continuous>    MEDICATIONS  (PRN):  naloxone Injectable 0.1milliGRAM(s) IV Push every 3 minutes PRN For ANY of the following changes in patient status:  A. RR LESS THAN 10 breaths per minute, B. Oxygen saturation LESS THAN 90%, C. Sedation score of 6  ondansetron Injectable 4milliGRAM(s) IV Push every 8 hours PRN Nausea      Karnofsky Performance Score/Palliative Performance Status Version 2:         %  Protein Calorie Malnutrition:  [ ] Mild   [ ] Moderate   [ ] Severe     Physical Exam:    General: [ ] Alert,  A&O x     [ ] lethargic   [ ] Agitated   [ ] Cachexia   HEENT: [ ] Normal   [ ] Dry mouth   [ ] ET Tube    [ ] Trach   Lungs: [ ] Clear [ ] Rhonchi  [ ] Crackles [ ] Wheezing [ ] Tachypnea  [ ] Audible excessive secretions   Cardiovascular:  [ ] Regular rate and rhythm  [ ] Irregular [ ] Tachycardia   [ ] Bradycardia   Abdomen: [ ] Soft  [ ] Distended  [ ]  [ ] +BS  [ ] Non tender [ ] Tender  [ ]PEG   [ ] NGT   Last BM:     Genitourinary:  [ ] Normal [ ] Incontinent   [ ] Oliguria/Anuria   [ ] Lynn  Musculoskeletal:  [ ] Normal   [ ] Generalized weakness  [ ] Bedbound   Neurological: [ ] No focal deficits  [ ] Cognitive impairment     Skin: [ ] Normal   [ ] Pressure ulcers     Vital Signs Last 24 Hrs  T(C): 37.3, Max: 37.6 (06-07 @ 05:26)  T(F): 99.2, Max: 99.6 (06-07 @ 05:26)  HR: 91 (73 - 99)  BP: 110/76 (100/68 - 136/86)  BP(mean): --  RR: 18 (16 - 18)  SpO2: 96% (94% - 97%)    LABS:                        10.8   14.1  )-----------( 257      ( 07 Jun 2017 05:37 )             34.3     06-07    136  |  97  |  6<L>  ----------------------------<  135<H>  4.2   |  30  |  0.46<L>    Ca    8.1<L>      07 Jun 2017 05:37  Phos  3.5     06-07  Mg     1.9     06-07    TPro  5.4<L>  /  Alb  2.6<L>  /  TBili  0.5  /  DBili  0.1  /  AST  10  /  ALT  7<L>  /  AlkPhos  65  06-07        I&O's Summary  I & Os for 24h ending 07 Jun 2017 07:00  =============================================  IN: 2120 ml / OUT: 1600 ml / NET: 520 ml    I & Os for current day (as of 07 Jun 2017 10:24)  =============================================  IN: 315 ml / OUT: 300 ml / NET: 15 ml      RADIOLOGY & ADDITIONAL STUDIES:  CT Abd/ Pelvis  IMPRESSION:     1. Perforated gangrenous cholecystitis with surrounding abscesses.  2. Cyst gastrostomy tube patency is in question as there is communication   with a very large gas and fluid abscess in the left abdomen.  3. Peritoneal carcinomatosis.  4. Colostomy and Larson's pouch. Mild small and large bowel distention   without a clear transition point, possibly an ileus orpartial   obstruction. INTERVAL HPI/OVERNIGHT EVENTS:    Patient had her CT yesterday. GI to do EGD this AM to re-evaluate her stent per CT findings.   Patient's pain is mild, however, PCA was turned off as patient will get General Anesthesia in endoscopy.   Patient has not had any bowel movements. Per surgery, there may be a malfunctioning ostomy.  There is evidence of partial obstruction vs. ileus on CT findings. Afebrile overnight  Patient is not nauseous. Still getting standing zofran and PRN       ADVANCE DIRECTIVES:    DNR: [ ] YES [X ] NO           PRESENT SYMPTOMS:   SOURCE:  [ X] Patient   [ ] Family   [ ] Team     Pain:     Dyspnea:  [ ] YES [X ] NO  Anxiety:  [ ] YES [X ] NO  Fatigue: [X ] YES [ ] NO  Nausea: [ ] YES [X ] NO  Loss of Appetite: [ ] YES [X ] NO  Constipation [ X] YES   [ ] No     OTHER SYMPTOMS:  [ X] All other ROS negative     [ ] Unable to obtain due to poor mentation    MEDICATIONS  (STANDING):  enoxaparin Injectable 40milliGRAM(s) SubCutaneous daily  cefepime  IVPB 2000milliGRAM(s) IV Intermittent every 12 hours  acetaminophen  IVPB. 1000milliGRAM(s) IV Intermittent once  octreotide  Infusion 25MICROgram(s)/Hr IV Continuous <Continuous>  ondansetron Injectable 4milliGRAM(s) IV Push every 8 hours  dextrose 5% + sodium chloride 0.9% with potassium chloride 20 mEq/L 1000milliLiter(s) IV Continuous <Continuous>    MEDICATIONS  (PRN):  naloxone Injectable 0.1milliGRAM(s) IV Push every 3 minutes PRN For ANY of the following changes in patient status:  A. RR LESS THAN 10 breaths per minute, B. Oxygen saturation LESS THAN 90%, C. Sedation score of 6  ondansetron Injectable 4milliGRAM(s) IV Push every 8 hours PRN Nausea      Karnofsky Performance Score/Palliative Performance Status Version 2:   70 %  Protein Calorie Malnutrition:  [ ] Mild   [ ] Moderate   [ ] Severe     Physical Exam:    General: [X ] Alert,  A&O x3   HEENT: [X ] Normal   [X ] Dry mouth   Lungs: [X ] Clear to ausc. No wheezing or rhonci  Cardiovascular:  [X ] Regular rate and rhythm, No murmurs  Abdomen:[ X] Tender to palpate diffusely with guarding Last BM: 5/31/17  Rigid, firm abdomen. Decreased bowel sounds-Hypoactive  Genitourinary:  [X ] Normal  Musculoskeletal: Generalized weakness   Neurological:   No focal deficits    Skin: [ X] Normal     Vital Signs Last 24 Hrs  T(C): 37.3, Max: 37.6 (06-07 @ 05:26)  T(F): 99.2, Max: 99.6 (06-07 @ 05:26)  HR: 91 (73 - 99)  BP: 110/76 (100/68 - 136/86)  BP(mean): --  RR: 18 (16 - 18)  SpO2: 96% (94% - 97%)    LABS:                        10.8   14.1  )-----------( 257      ( 07 Jun 2017 05:37 )             34.3     136  |  97  |  6<L>  ----------------------------<  135<H>  4.2   |  30  |  0.46<L>    Ca    8.1<L>      07 Jun 2017 05:37  Phos  3.5     06-07  Mg     1.9     06-07    TPro  5.4<L>  /  Alb  2.6<L>  /  TBili  0.5  /  DBili  0.1  /  AST  10  /  ALT  7<L>  /  AlkPhos  65  06-07        I&O's Summary  I & Os for 24h ending 07 Jun 2017 07:00  =============================================  IN: 2120 ml / OUT: 1600 ml / NET: 520 ml    I & Os for current day (as of 07 Jun 2017 10:24)  =============================================  IN: 315 ml / OUT: 300 ml / NET: 15 ml      RADIOLOGY & ADDITIONAL STUDIES:  CT Abd/ Pelvis  IMPRESSION:     1. Perforated gangrenous cholecystitis with surrounding abscesses.  2. Cyst gastrostomy tube patency is in question as there is communication   with a very large gas and fluid abscess in the left abdomen.  3. Peritoneal carcinomatosis.  4. Colostomy and Larson's pouch. Mild small and large bowel distention   without a clear transition point, possibly an ileus orpartial   obstruction. INTERVAL HPI/OVERNIGHT EVENTS: F/U for pain     Patient had her CT yesterday showing an intra-abdominal abscess and a partial bowel obstruction vs. and Ileus. She is just s/p EGD (results are not available right now).   Patient's pain is mild, however, PCA was turned off as patient will get General Anesthesia in endoscopy. Patient still has not had any bowel movements. Afebrile overnight  Patient is not nauseous. Still getting standing zofran and PRN       ADVANCE DIRECTIVES:    DNR: [ ] YES [X ] NO           PRESENT SYMPTOMS:   SOURCE:  [ X] Patient   [ ] Family   [ ] Team     Pain: Mild at this time.     Dyspnea:  [ ] YES [X ] NO  Anxiety:  [ ] YES [X ] NO  Fatigue: [X ] YES [ ] NO  Nausea: [ ] YES [X ] NO  Loss of Appetite: [ ] YES [X ] NO  Constipation [ X] YES   [ ] No     OTHER SYMPTOMS:  [ X] All other ROS negative     [ ] Unable to obtain due to poor mentation    MEDICATIONS  (STANDING):  enoxaparin Injectable 40milliGRAM(s) SubCutaneous daily  cefepime  IVPB 2000milliGRAM(s) IV Intermittent every 12 hours  acetaminophen  IVPB. 1000milliGRAM(s) IV Intermittent once  octreotide  Infusion 25MICROgram(s)/Hr IV Continuous <Continuous>  ondansetron Injectable 4milliGRAM(s) IV Push every 8 hours  dextrose 5% + sodium chloride 0.9% with potassium chloride 20 mEq/L 1000milliLiter(s) IV Continuous <Continuous>    MEDICATIONS  (PRN):  naloxone Injectable 0.1milliGRAM(s) IV Push every 3 minutes PRN For ANY of the following changes in patient status:  A. RR LESS THAN 10 breaths per minute, B. Oxygen saturation LESS THAN 90%, C. Sedation score of 6  ondansetron Injectable 4milliGRAM(s) IV Push every 8 hours PRN Nausea      Karnofsky Performance Score/Palliative Performance Status Version 2:   70 %  Protein Calorie Malnutrition:  [ ] Mild   [ ] Moderate   [ ] Severe     Physical Exam:    General: [X ] Alert,  A&O x3   HEENT: [X ] Normal   [X ] Dry mouth   Lungs: [X ] Clear to ausc. No wheezing or rhonci  Cardiovascular:  [X ] Regular rate and rhythm, No murmurs  Abdomen:[ X] Tender to palpate diffusely with guarding Last BM: 5/31/17  Rigid, firm abdomen. Decreased bowel sounds-Hypoactive  Genitourinary:  [X ] Normal  Musculoskeletal: Generalized weakness   Neurological:   No focal deficits    Skin: [ X] Normal     Vital Signs Last 24 Hrs  T(C): 37.3, Max: 37.6 (06-07 @ 05:26)  T(F): 99.2, Max: 99.6 (06-07 @ 05:26)  HR: 91 (73 - 99)  BP: 110/76 (100/68 - 136/86)  BP(mean): --  RR: 18 (16 - 18)  SpO2: 96% (94% - 97%)    LABS:                        10.8   14.1  )-----------( 257      ( 07 Jun 2017 05:37 )             34.3     136  |  97  |  6<L>  ----------------------------<  135<H>  4.2   |  30  |  0.46<L>    Ca    8.1<L>      07 Jun 2017 05:37  Phos  3.5     06-07  Mg     1.9     06-07    TPro  5.4<L>  /  Alb  2.6<L>  /  TBili  0.5  /  DBili  0.1  /  AST  10  /  ALT  7<L>  /  AlkPhos  65  06-07        I&O's Summary  I & Os for 24h ending 07 Jun 2017 07:00  =============================================  IN: 2120 ml / OUT: 1600 ml / NET: 520 ml    I & Os for current day (as of 07 Jun 2017 10:24)  =============================================  IN: 315 ml / OUT: 300 ml / NET: 15 ml      RADIOLOGY & ADDITIONAL STUDIES:  CT Abd/ Pelvis  IMPRESSION:     1. Perforated gangrenous cholecystitis with surrounding abscesses.  2. Cyst gastrostomy tube patency is in question as there is communication   with a very large gas and fluid abscess in the left abdomen.  3. Peritoneal carcinomatosis.  4. Colostomy and Larson's pouch. Mild small and large bowel distention   without a clear transition point, possibly an ileus orpartial   obstruction.

## 2017-06-07 NOTE — PROGRESS NOTE ADULT - SUBJECTIVE AND OBJECTIVE BOX
[facility] [place] Progress Note    Still with abdominal pain.      OBJECTIVE:   Vital Signs Last 24 Hrs  T(C): 37.3, Max: 37.6 (06-07 @ 05:26)  T(F): 99.2, Max: 99.6 (06-07 @ 05:26)  HR: 91 (73 - 99)  BP: 110/76 (100/68 - 136/86)  BP(mean): --  RR: 18 (16 - 18)  SpO2: 96% (94% - 97%)    Awake, alert  No acute distress  abd; mildly distended, mild diffuse tenderness    LABS:                        10.8   14.1  )-----------( 257      ( 07 Jun 2017 05:37 )             34.3     06-07    136  |  97  |  6<L>  ----------------------------<  135<H>  4.2   |  30  |  0.46<L>    Ca    8.1<L>      07 Jun 2017 05:37  Phos  3.5     06-07  Mg     1.9     06-07    TPro  5.4<L>  /  Alb  2.6<L>  /  TBili  0.5  /  DBili  0.1  /  AST  10  /  ALT  7<L>  /  AlkPhos  65  06-07      MEDICATIONS:  octreotide  Infusion 25MICROgram(s)/Hr IV Continuous <Continuous>  naloxone Injectable 0.1milliGRAM(s) IV Push every 3 minutes PRN  dextrose 5% + sodium chloride 0.9% with potassium chloride 20 mEq/L 1000milliLiter(s) IV Continuous <Continuous>    Anticoagulation:  enoxaparin Injectable 40milliGRAM(s) SubCutaneous daily    Antibiotics:   cefepime  IVPB 2000milliGRAM(s) IV Intermittent every 12 hours    Pain medications:   acetaminophen  IVPB. 1000milliGRAM(s) IV Intermittent once  ondansetron Injectable 4milliGRAM(s) IV Push every 8 hours PRN  ondansetron Injectable 4milliGRAM(s) IV Push every 8 hours      RADIOLOGY & ADDITIONAL STUDIES:    A/P :    Imaging reviewed at length with GI service.  Will need repeat endoscopy for gastric decompression of retro-gastric collection and attempt at gallbladder decompression via ERCP and cystic duct.  The GI service feels that this is the best plan of care given large abscess posterior to stomach and gastric stent not in place in gallbladder  At this time patient is not showing signs of uncontrolled severe sepsis.  Care discussed with patient and  at bedside.  -    Multimodal pain control  -

## 2017-06-07 NOTE — PROGRESS NOTE ADULT - ASSESSMENT
47 yo F with Ovarian cancer s/p total hysterectomy, presented with cholecystitis, abdominal pain, constipation. Now with complications including perforated gallbladder, partial obstruction and abscesses within the abdomen.  Pt currently on her way to EGD. 47 yo F with Ovarian cancer s/p total hysterectomy, presented with cholecystitis, abdominal pain, constipation. Now with complications including perforated gallbladder, partial bowel obstruction and abscesses within the abdomen.

## 2017-06-07 NOTE — PROGRESS NOTE ADULT - SUBJECTIVE AND OBJECTIVE BOX
ADVANCED GI FOLLOW UP NOTE:    SUBJECTIVE:  - Pt reports minimal pain  - No nausea or vomiting  - No fever or chills    OBJECTIVE:    Vital Signs Last 24 Hrs  T(C): 36.9, Max: 37.6 (06-07 @ 05:26)  T(F): 98.5, Max: 99.6 (06-07 @ 05:26)  HR: 73 (73 - 99)  BP: 136/86 (100/68 - 136/86)  BP(mean): --  RR: 16 (16 - 18)  SpO2: 95% (94% - 97%)    PHYSICAL EXAM:  Constitutional: no acute distress  Eyes: no icterus  Neck: no masses, no LAD  Respiratory: normal inspiratory effort; no wheezing or crackles  Cardiovascular: RRR, normal S1/S2, no murmurs/rubs/gallops  Gastrointestinal: soft, L sided ostomy in place, nontender, +BS  Extremities: no LE edema  Neurological: AAOx3, no asterixis  Skin: no rashes, bruises, petechiae    LABS:                      10.8   14.1  )-----------( 257      ( 07 Jun 2017 05:37 )             34.3     06-07  136  |  97  |  6<L>  ----------------------------<  135<H>  4.2   |  30  |  0.46<L>  Ca    8.1<L>      07 Jun 2017 05:37  Phos  3.5     06-07  Mg     1.9     06-07    TPro  5.4<L>  /  Alb  2.6<L>  /  TBili  0.5  /  DBili  0.1  /  AST  10  /  ALT  7<L>  /  AlkPhos  65  06-07    PT/INR - ( 05 Jun 2017 08:20 )   PT: 12.3 sec;   INR: 1.13 ratio       PTT - ( 05 Jun 2017 08:20 )  PTT:30.3 sec  LIVER FUNCTIONS - ( 07 Jun 2017 05:37 )  Alb: 2.6 g/dL / Pro: 5.4 g/dL / ALK PHOS: 65 U/L / ALT: 7 U/L RC / AST: 10 U/L / GGT: x

## 2017-06-07 NOTE — PROGRESS NOTE ADULT - ASSESSMENT
Impression:  1) Acute Cholecystitis - s/p EUS guided Axios stent placement with dilation.  2) Uterine Cancer s/p GRETCHEN and Ilesostomy  3) E Coli Bacteremia    Plan:  - Monitor clinically  - Continue IV abx  - npo  - Plan for EGD with gallbladder cleanout and stent evaluation today Impression:  1) Loculated fluid collection.  EUS-guided stent connects the stomach antrum with the loculated fluid collection in the lesser sac, with expansion of the collection with air, which is contributing to her abdominal pain.    2) Acute cholecystitis, not successfully drained.  3) Uterine Cancer s/p GRETCHEN and Ileostomy, peritoneal carcinomatosis.    Plan:  - Monitor clinically  - Continue IV abx  - npo  - Plan for endoscopic cleanout, drainage of loculated fluid collection, ERCP to evaluate/treat bile leak contributing to loculated fluid collection, and reassessment for endoscopic gallbladder drainage.

## 2017-06-07 NOTE — PROGRESS NOTE ADULT - SUBJECTIVE AND OBJECTIVE BOX
Day _2__ of Anesthesia Pain Management Service    SUBJECTIVE:    Pain Scale Score	At rest: ___ 	With Activity: ___ 	[x ] Refer to charted pain scores    THERAPY:    [ ] IV PCA Morphine		[ ] 5 mg/mL	[ ] 1 mg/mL  [x ] IV PCA Hydromorphone	[ ] 5 mg/mL	[x ] 1 mg/mL  [ ] IV PCA Fentanyl		[ ] 50 micrograms/mL    Demand dose .5   lockout 20   (minutes)     MEDICATIONS  (STANDING):  enoxaparin Injectable 40milliGRAM(s) SubCutaneous daily  cefepime  IVPB 2000milliGRAM(s) IV Intermittent every 12 hours  acetaminophen  IVPB. 1000milliGRAM(s) IV Intermittent once  octreotide  Infusion 25MICROgram(s)/Hr IV Continuous <Continuous>  ondansetron Injectable 4milliGRAM(s) IV Push every 8 hours  dextrose 5% + sodium chloride 0.9% with potassium chloride 20 mEq/L 1000milliLiter(s) IV Continuous <Continuous>    MEDICATIONS  (PRN):  naloxone Injectable 0.1milliGRAM(s) IV Push every 3 minutes PRN For ANY of the following changes in patient status:  A. RR LESS THAN 10 breaths per minute, B. Oxygen saturation LESS THAN 90%, C. Sedation score of 6  ondansetron Injectable 4milliGRAM(s) IV Push every 8 hours PRN Nausea      OBJECTIVE:    Sedation Score:	[x ] Alert	[ ] Drowsy 	[ ] Arousable	[ ] Asleep	[ ] Unresponsive    Side Effects:	[x ] None	[ ] Nausea	[ ] Vomiting	[ ] Pruritus  		[ ] Other:    Vital Signs Last 24 Hrs  T(C): 37.3, Max: 37.6 (06-07 @ 05:26)  T(F): 99.2, Max: 99.6 (06-07 @ 05:26)  HR: 91 (73 - 99)  BP: 110/76 (100/68 - 136/86)  BP(mean): --  RR: 18 (16 - 18)  SpO2: 96% (94% - 97%)    ASSESSMENT/ PLAN    Therapy to  be:	[ ] Continue   [ x] Discontinued   [x ] Change to prn Analgesics    Documentation and Verification of current medications:   [X] Done	[ ] Not done, not elligible    Comments:

## 2017-06-07 NOTE — PROGRESS NOTE ADULT - ATTENDING COMMENTS
Agree with above  Follow Temp and CBC  Consider repeat scan  GB appears decompressed and evidence of biliary drainage from yesterdays procedure  Large walled off collection +/- infection in lesser sac with original transgastric drainage, now removed and cavity lavaged.  This may ultimately require a percutaneous drain.  This also seems to correlate to area of patients long standing pain.

## 2017-06-08 DIAGNOSIS — C56.9 MALIGNANT NEOPLASM OF UNSPECIFIED OVARY: ICD-10-CM

## 2017-06-08 DIAGNOSIS — K81.0 ACUTE CHOLECYSTITIS: ICD-10-CM

## 2017-06-08 LAB
ALBUMIN SERPL ELPH-MCNC: 2.6 G/DL — LOW (ref 3.3–5)
ALP SERPL-CCNC: 59 U/L — SIGNIFICANT CHANGE UP (ref 40–120)
ALT FLD-CCNC: 8 U/L RC — LOW (ref 10–45)
ANION GAP SERPL CALC-SCNC: 10 MMOL/L — SIGNIFICANT CHANGE UP (ref 5–17)
AST SERPL-CCNC: 11 U/L — SIGNIFICANT CHANGE UP (ref 10–40)
BILIRUB DIRECT SERPL-MCNC: 0.2 MG/DL — SIGNIFICANT CHANGE UP (ref 0–0.2)
BILIRUB INDIRECT FLD-MCNC: 0.4 MG/DL — SIGNIFICANT CHANGE UP (ref 0.2–1)
BILIRUB SERPL-MCNC: 0.6 MG/DL — SIGNIFICANT CHANGE UP (ref 0.2–1.2)
BUN SERPL-MCNC: 6 MG/DL — LOW (ref 7–23)
CALCIUM SERPL-MCNC: 8 MG/DL — LOW (ref 8.4–10.5)
CHLORIDE SERPL-SCNC: 95 MMOL/L — LOW (ref 96–108)
CO2 SERPL-SCNC: 29 MMOL/L — SIGNIFICANT CHANGE UP (ref 22–31)
CREAT SERPL-MCNC: 0.46 MG/DL — LOW (ref 0.5–1.3)
CULTURE RESULTS: SIGNIFICANT CHANGE UP
CULTURE RESULTS: SIGNIFICANT CHANGE UP
GLUCOSE SERPL-MCNC: 135 MG/DL — HIGH (ref 70–99)
HCT VFR BLD CALC: 35.2 % — SIGNIFICANT CHANGE UP (ref 34.5–45)
HGB BLD-MCNC: 10.9 G/DL — LOW (ref 11.5–15.5)
MAGNESIUM SERPL-MCNC: 1.9 MG/DL — SIGNIFICANT CHANGE UP (ref 1.6–2.6)
MCHC RBC-ENTMCNC: 27 PG — SIGNIFICANT CHANGE UP (ref 27–34)
MCHC RBC-ENTMCNC: 30.8 GM/DL — LOW (ref 32–36)
MCV RBC AUTO: 87.5 FL — SIGNIFICANT CHANGE UP (ref 80–100)
PHOSPHATE SERPL-MCNC: 3.3 MG/DL — SIGNIFICANT CHANGE UP (ref 2.5–4.5)
PLATELET # BLD AUTO: 381 K/UL — SIGNIFICANT CHANGE UP (ref 150–400)
POTASSIUM SERPL-MCNC: 4.3 MMOL/L — SIGNIFICANT CHANGE UP (ref 3.5–5.3)
POTASSIUM SERPL-SCNC: 4.3 MMOL/L — SIGNIFICANT CHANGE UP (ref 3.5–5.3)
PROT SERPL-MCNC: 5.4 G/DL — LOW (ref 6–8.3)
RBC # BLD: 4.02 M/UL — SIGNIFICANT CHANGE UP (ref 3.8–5.2)
RBC # FLD: 13.3 % — SIGNIFICANT CHANGE UP (ref 10.3–14.5)
SODIUM SERPL-SCNC: 134 MMOL/L — LOW (ref 135–145)
SPECIMEN SOURCE: SIGNIFICANT CHANGE UP
SPECIMEN SOURCE: SIGNIFICANT CHANGE UP
WBC # BLD: 15.5 K/UL — HIGH (ref 3.8–10.5)
WBC # FLD AUTO: 15.5 K/UL — HIGH (ref 3.8–10.5)

## 2017-06-08 PROCEDURE — 99231 SBSQ HOSP IP/OBS SF/LOW 25: CPT | Mod: GC

## 2017-06-08 PROCEDURE — 99233 SBSQ HOSP IP/OBS HIGH 50: CPT | Mod: GC

## 2017-06-08 PROCEDURE — 99232 SBSQ HOSP IP/OBS MODERATE 35: CPT

## 2017-06-08 RX ORDER — MAGNESIUM SULFATE 500 MG/ML
2 VIAL (ML) INJECTION ONCE
Qty: 0 | Refills: 0 | Status: COMPLETED | OUTPATIENT
Start: 2017-06-08 | End: 2017-06-08

## 2017-06-08 RX ADMIN — CEFEPIME 100 MILLIGRAM(S): 1 INJECTION, POWDER, FOR SOLUTION INTRAMUSCULAR; INTRAVENOUS at 06:04

## 2017-06-08 RX ADMIN — Medication 50 GRAM(S): at 13:53

## 2017-06-08 RX ADMIN — CEFEPIME 100 MILLIGRAM(S): 1 INJECTION, POWDER, FOR SOLUTION INTRAMUSCULAR; INTRAVENOUS at 18:21

## 2017-06-08 RX ADMIN — ONDANSETRON 4 MILLIGRAM(S): 8 TABLET, FILM COATED ORAL at 13:53

## 2017-06-08 RX ADMIN — Medication 1 SPRAY(S): at 01:31

## 2017-06-08 RX ADMIN — HYDROMORPHONE HYDROCHLORIDE 30 MILLILITER(S): 2 INJECTION INTRAMUSCULAR; INTRAVENOUS; SUBCUTANEOUS at 07:35

## 2017-06-08 RX ADMIN — FLUCONAZOLE 100 MILLIGRAM(S): 150 TABLET ORAL at 17:10

## 2017-06-08 RX ADMIN — HYDROMORPHONE HYDROCHLORIDE 30 MILLILITER(S): 2 INJECTION INTRAMUSCULAR; INTRAVENOUS; SUBCUTANEOUS at 19:49

## 2017-06-08 RX ADMIN — ENOXAPARIN SODIUM 40 MILLIGRAM(S): 100 INJECTION SUBCUTANEOUS at 13:53

## 2017-06-08 RX ADMIN — ONDANSETRON 4 MILLIGRAM(S): 8 TABLET, FILM COATED ORAL at 06:04

## 2017-06-08 RX ADMIN — ONDANSETRON 4 MILLIGRAM(S): 8 TABLET, FILM COATED ORAL at 21:50

## 2017-06-08 RX ADMIN — OCTREOTIDE ACETATE 5 MICROGRAM(S)/HR: 200 INJECTION, SOLUTION INTRAVENOUS; SUBCUTANEOUS at 13:53

## 2017-06-08 NOTE — PROGRESS NOTE ADULT - ASSESSMENT
45 y/o F s/p endoscopy/stent removal  -await GI function  -pain control  -f/u GI recommendations  -AM labs  -OOB/amb 45 y/o F s/p endoscopy/stent removal  -await GI function  -pain control  -f/u GI recommendations  -AM labs  -OOB/amb  -IR for possible perc ubaldo - will discuss with IR

## 2017-06-08 NOTE — CONSULT NOTE ADULT - ASSESSMENT
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 Pt is a 47yo woman w/ at least FIGO stage IIIB high-grade serous ovarian cancer s/p debulking in December 2016 c/b delayed wound healing and lack of adjuvant chemotherapy, here with acute cholecystitis c/b intra-abdominal abscess and partial SBO.

## 2017-06-08 NOTE — PROGRESS NOTE ADULT - SUBJECTIVE AND OBJECTIVE BOX
INTERVAL HPI/OVERNIGHT EVENTS:    No events overnight, however this AM, patient spiked a fever. Patient's pain was well controlled after starting her PCA around 4 pm yesterday. Patient used about 10 mg total, pushed 20 times since 4 pm yesterday to 10:30 AM.  Patient denies any nausea or vomiting. Currently NPO, with NG to suction.  She did notice some liquid from ostomy. Not much gas. She denies any sob or chest pain.  Abdomen feels a little softer to her, but she still has pain about 5/10 this AM.  She is walking around.    Patient was started on Fluconazole for fungal coverage.     ADVANCE DIRECTIVES:    DNR: NO           PRESENT SYMPTOMS:   SOURCE: Patient      Pain:     Dyspnea:   NO  Anxiety:NO  Fatigue: NO  Nausea: NO  Loss of Appetite:NO  Constipation [ ] YES      OTHER SYMPTOMS:  [ X] All other ROS negative     [ ] Unable to obtain due to poor mentation    MEDICATIONS  (STANDING):  enoxaparin Injectable 40milliGRAM(s) SubCutaneous daily  cefepime  IVPB 2000milliGRAM(s) IV Intermittent every 12 hours  acetaminophen  IVPB. 1000milliGRAM(s) IV Intermittent once  octreotide  Infusion 25MICROgram(s)/Hr IV Continuous <Continuous>  ondansetron Injectable 4milliGRAM(s) IV Push every 8 hours  dextrose 5% + sodium chloride 0.9% with potassium chloride 20 mEq/L 1000milliLiter(s) IV Continuous <Continuous>  HYDROmorphone PCA (1 mG/mL) 30milliLiter(s) PCA Continuous PCA Continuous  fluconAZOLE IVPB  IV Intermittent   fluconAZOLE IVPB 200milliGRAM(s) IV Intermittent every 24 hours    MEDICATIONS  (PRN):  naloxone Injectable 0.1milliGRAM(s) IV Push every 3 minutes PRN For ANY of the following changes in patient status:  A. RR LESS THAN 10 breaths per minute, B. Oxygen saturation LESS THAN 90%, C. Sedation score of 6  ondansetron Injectable 4milliGRAM(s) IV Push every 8 hours PRN Nausea  HYDROmorphone PCA (1 mG/mL) Rescue Clinician Bolus 1milliGRAM(s) IV Push every 1 hour PRN for Pain Scale GREATER THAN 6  HYDROmorphone  Injectable 0.5milliGRAM(s) IV Push every 1 hour PRN Severe Pain (7 - 10)      Karnofsky Performance Score/Palliative Performance Status Version 2: 60  %  Protein Calorie Malnutrition:  [ ] Mild   [ ] Moderate   [ ] Severe     Physical Exam:    General: Awake alert, comfortable.   HEENT: PERRL with EOMI.   Lungs: Clear to Ausc bilaterally, no wheezing.   Cardiovascular:  RRR, no murmurs   Abdomen: Rigid, firm, tenderness to palpate diffusely, normal bowel sounds (increased compared to yesterday)    Last BM:5/31/2017     Genitourinary: Normal  Musculoskeletal:  Normal  Neurological: no focal deficits    Skin: warm and dry     Vital Signs Last 24 Hrs  T(C): 37.4, Max: 38.2 (06-08 @ 00:15)  T(F): 99.4, Max: 100.7 (06-08 @ 00:15)  HR: 101 (80 - 101)  BP: 102/70 (102/70 - 122/76)  BP(mean): --  RR: 18 (17 - 18)  SpO2: 95% (91% - 96%)    LABS:                        10.9   15.5  )-----------( 381      ( 08 Jun 2017 07:38 )             35.2     06-08    134<L>  |  95<L>  |  6<L>  ----------------------------<  135<H>  4.3   |  29  |  0.46<L>    Ca    8.0<L>      08 Jun 2017 07:38  Phos  3.3     06-08  Mg     1.9     06-08    TPro  5.4<L>  /  Alb  2.6<L>  /  TBili  0.6  /  DBili  0.2  /  AST  11  /  ALT  8<L>  /  AlkPhos  59  06-08        I&O's Summary    I & Os for current day (as of 08 Jun 2017 11:02)  =============================================  IN: 2355 ml / OUT: 1205 ml / NET: 1150 ml      RADIOLOGY & ADDITIONAL STUDIES:  Upper Endoscopy   - Normal esophagus.   - A previously placed lumen apposing metal stent connecting the stomach and    the collection was found in the gastric antrum . The cavity of the collection    was entered and the fluid was suctioned. The defect was closed.    - Prior drainage of large collection adjacent to GB in area of lesser sac   appears well contained and clinically consistent with where the patient's   pain ahs been for the last few months.   - Considering improvement in WBC it may have intially been infected or   contributory.    - Collection decompressed and should heal, however some fluid is expected to     remain as there is likely an element of carcinomatosis. INTERVAL HPI/OVERNIGHT EVENTS: Following up for pain     No events overnight, however this AM, patient spiked a fever. Patient's pain was well controlled after starting her PCA around 4 pm yesterday. Patient used about 10 mg total, pushed 20 times since 4 pm yesterday to 10:30 AM.  Patient denies any nausea or vomiting. Currently NPO, with NG to suction.  She did notice some liquid from ostomy. Not much gas. She denies any sob or chest pain.  Abdomen feels a little softer to her, but she still has pain about 5/10 this AM.  She is walking around.    Patient was started on Fluconazole for fungal coverage.     ADVANCE DIRECTIVES:    DNR: NO           PRESENT SYMPTOMS:   SOURCE: Patient      Pain:     Dyspnea:   NO  Anxiety:NO  Fatigue: NO  Nausea: NO  Loss of Appetite:NO  Constipation [ x ] YES      OTHER SYMPTOMS:  [ X] All other ROS negative     [ ] Unable to obtain due to poor mentation    MEDICATIONS  (STANDING):  enoxaparin Injectable 40milliGRAM(s) SubCutaneous daily  cefepime  IVPB 2000milliGRAM(s) IV Intermittent every 12 hours  acetaminophen  IVPB. 1000milliGRAM(s) IV Intermittent once  octreotide  Infusion 25MICROgram(s)/Hr IV Continuous <Continuous>  ondansetron Injectable 4milliGRAM(s) IV Push every 8 hours  dextrose 5% + sodium chloride 0.9% with potassium chloride 20 mEq/L 1000milliLiter(s) IV Continuous <Continuous>  HYDROmorphone PCA (1 mG/mL) 30milliLiter(s) PCA Continuous PCA Continuous  fluconAZOLE IVPB  IV Intermittent   fluconAZOLE IVPB 200milliGRAM(s) IV Intermittent every 24 hours    MEDICATIONS  (PRN):  naloxone Injectable 0.1milliGRAM(s) IV Push every 3 minutes PRN For ANY of the following changes in patient status:  A. RR LESS THAN 10 breaths per minute, B. Oxygen saturation LESS THAN 90%, C. Sedation score of 6  ondansetron Injectable 4milliGRAM(s) IV Push every 8 hours PRN Nausea  HYDROmorphone PCA (1 mG/mL) Rescue Clinician Bolus 1milliGRAM(s) IV Push every 1 hour PRN for Pain Scale GREATER THAN 6  HYDROmorphone  Injectable 0.5milliGRAM(s) IV Push every 1 hour PRN Severe Pain (7 - 10)      Karnofsky Performance Score/Palliative Performance Status Version 2: 60  %  Protein Calorie Malnutrition:  [ ] Mild   [ ] Moderate   [ ] Severe     Physical Exam:    General: Awake alert, comfortable.   HEENT: PERRL with EOMI.   Lungs: Clear to Ausc bilaterally, no wheezing.   Cardiovascular: S1, S2. No S3  RRR, no murmurs   Abdomen: Rigid, firm, tenderness to palpate diffusely, normal bowel sounds (increased compared to yesterday)    Last BM:5/31/2017     Genitourinary: Normal  Musculoskeletal:  Normal  Neurological: no focal deficits. Alert, oriented x 3     Skin: warm and dry     Vital Signs Last 24 Hrs  T(C): 37.4, Max: 38.2 (06-08 @ 00:15)  T(F): 99.4, Max: 100.7 (06-08 @ 00:15)  HR: 101 (80 - 101)  BP: 102/70 (102/70 - 122/76)  BP(mean): --  RR: 18 (17 - 18)  SpO2: 95% (91% - 96%)    LABS:                        10.9   15.5  )-----------( 381      ( 08 Jun 2017 07:38 )             35.2     06-08    134<L>  |  95<L>  |  6<L>  ----------------------------<  135<H>  4.3   |  29  |  0.46<L>    Ca    8.0<L>      08 Jun 2017 07:38  Phos  3.3     06-08  Mg     1.9     06-08    TPro  5.4<L>  /  Alb  2.6<L>  /  TBili  0.6  /  DBili  0.2  /  AST  11  /  ALT  8<L>  /  AlkPhos  59  06-08        I&O's Summary    I & Os for current day (as of 08 Jun 2017 11:02)  =============================================  IN: 2355 ml / OUT: 1205 ml / NET: 1150 ml      RADIOLOGY & ADDITIONAL STUDIES:  Upper Endoscopy   - Normal esophagus.   - A previously placed lumen apposing metal stent connecting the stomach and    the collection was found in the gastric antrum . The cavity of the collection    was entered and the fluid was suctioned. The defect was closed.    - Prior drainage of large collection adjacent to GB in area of lesser sac   appears well contained and clinically consistent with where the patient's   pain ahs been for the last few months.   - Considering improvement in WBC it may have intially been infected or   contributory.    - Collection decompressed and should heal, however some fluid is expected to     remain as there is likely an element of carcinomatosis.

## 2017-06-08 NOTE — PROGRESS NOTE ADULT - SUBJECTIVE AND OBJECTIVE BOX
Pain Management Attending Addendum    SUBJECTIVE:    Therapy:	  [ x] IV PCA	   [ ] Epidural           [ ] s/p Spinal Opoid              [ ] Postpartum infusion	  [ ] Patient controlled regional anesthesia (PCRA)    [ ] prn Analgesics    OBJECTIVE:   [x ] No new signs     [ ] Other:    Side Effects:  [x ] None			[ ] Other:    Assessment of Catheter Site:		[ ] Intact		[ ] Other:    ASSESSMENT/PLAN  [x ] Continue current therapy    [ ] Therapy changed to:    [ ] IV PCA       [ ] Epidural     [ ] prn Analgesics     [ ] post partum infusion    Comments:

## 2017-06-08 NOTE — CONSULT NOTE ADULT - SUBJECTIVE AND OBJECTIVE BOX
Oncology Consult Note    HPI:  46F presents with abdominal pain. Patient stated that the pain began 5/31, at which point she came to the [facility] ED, was discharged when the pain improved. Patient endorses subjective, low-grade fevers at home with decrease in ostomy output, diaphoresis, one episode of nausea/vomiting. Pain is localized primarily to RUQ.Gonzalez  Patient's past medical/surgical history is primarily significant for ovarian CA s/p GRETCHEN Dec 2016, also has had bowel resection with ostomy at that time. Her surgeries and treatments were at [facility], Dr. Roth. Initiation of chemotherapy was delayed 2/2 poor wound healing. Patient was scheduled to start chemotherapy in the next few weeks, was scheduled to see Dr[facility] Kaila Lane [facility].        Allergies    penicillin (Rash)    Intolerances        MEDICATIONS  (STANDING):  enoxaparin Injectable 40milliGRAM(s) SubCutaneous daily  cefepime  IVPB 2000milliGRAM(s) IV Intermittent every 12 hours  acetaminophen  IVPB. 1000milliGRAM(s) IV Intermittent once  octreotide  Infusion 25MICROgram(s)/Hr IV Continuous <Continuous>  ondansetron Injectable 4milliGRAM(s) IV Push every 8 hours  dextrose 5% + sodium chloride 0.9% with potassium chloride 20 mEq/L 1000milliLiter(s) IV Continuous <Continuous>  HYDROmorphone PCA (1 mG/mL) 30milliLiter(s) PCA Continuous PCA Continuous  fluconAZOLE IVPB  IV Intermittent   fluconAZOLE IVPB 200milliGRAM(s) IV Intermittent every 24 hours    MEDICATIONS  (PRN):  naloxone Injectable 0.1milliGRAM(s) IV Push every 3 minutes PRN For ANY of the following changes in patient status:  A. RR LESS THAN 10 breaths per minute, B. Oxygen saturation LESS THAN 90%, C. Sedation score of 6  ondansetron Injectable 4milliGRAM(s) IV Push every 8 hours PRN Nausea  HYDROmorphone PCA (1 mG/mL) Rescue Clinician Bolus 1milliGRAM(s) IV Push every 1 hour PRN for Pain Scale GREATER THAN 6  HYDROmorphone  Injectable 0.5milliGRAM(s) IV Push every 1 hour PRN Severe Pain (7 - 10)      PAST MEDICAL & SURGICAL HISTORY:  Ovarian cancer: dx in dec 2016 (started as fibroid --&gt; large cancerous tumor)  Colostomy in place: dec 2016 (placed during hysterectomy in dec 2016 at [facility] [place])  H/O abdominal hysterectomy: dec 2016      FAMILY HISTORY:  No pertinent family history in first degree relatives      SOCIAL HISTORY: No EtOH, no tobacco    REVIEW OF SYSTEMS:    CONSTITUTIONAL: No weakness, fevers or chills  EYES/ENT: No visual changes;  No vertigo or throat pain   NECK: No pain or stiffness  RESPIRATORY: No cough, wheezing, hemoptysis; No shortness of breath  CARDIOVASCULAR: No chest pain or palpitations  GASTROINTESTINAL: No abdominal or epigastric pain. No nausea, vomiting, or hematemesis; No diarrhea or constipation. No melena or hematochezia.  GENITOURINARY: No dysuria, frequency or hematuria  NEUROLOGICAL: No numbness or weakness  SKIN: No itching, burning, rashes, or lesions   All other review of systems is negative unless indicated above.        T(F): 98.7, Max: 100.7 (06-08 @ 00:15)  HR: 76  BP: 94/67  RR: 18  SpO2: 94%  Wt(kg): --    GENERAL: NAD, well-developed  HEAD:  Atraumatic, Normocephalic  EYES: EOMI, PERRLA, conjunctiva and sclera clear  NECK: Supple, No JVD  CHEST/LUNG: Clear to auscultation bilaterally; No wheeze  HEART: Regular rate and rhythm; No murmurs, rubs, or gallops  ABDOMEN: Soft, Nontender, Nondistended; Bowel sounds present  EXTREMITIES:  2+ Peripheral Pulses, No clubbing, cyanosis, or edema  NEUROLOGY: non-focal  SKIN: No rashes or lesions                          10.9   15.5  )-----------( 381      ( 08 Jun 2017 07:38 )             35.2       06-08    134<L>  |  95<L>  |  6<L>  ----------------------------<  135<H>  4.3   |  29  |  0.46<L>    Ca    8.0<L>      08 Jun 2017 07:38  Phos  3.3     06-08  Mg     1.9     06-08    TPro  5.4<L>  /  Alb  2.6<L>  /  TBili  0.6  /  DBili  0.2  /  AST  11  /  ALT  8<L>  /  AlkPhos  59  06-08      Magnesium, Serum: 1.9 mg/dL (06-08 @ 07:38)  Phosphorus Level, Serum: 3.3 mg/dL (06-08 @ 07:38) Oncology Consult Note    HPI:  46F presents with abdominal pain. Patient stated that the pain began 5/31, at which point she came to the [facility] ED, was discharged when the pain improved. Patient endorses subjective, low-grade fevers at home with decrease in ostomy output, diaphoresis, one episode of nausea/vomiting. Pain is localized primarily to RUQ.Gonzalez  Patient's past medical/surgical history is primarily significant for ovarian CA s/p GRETCHEN Dec 2016, also has had bowel resection with ostomy at that time. Her surgeries and treatments were at [facility], Dr. Roth. Initiation of chemotherapy was delayed 2/2 poor wound healing. Patient was scheduled to start chemotherapy in the next few weeks, was scheduled to see Dr. Kaila Lane of [facility].        Allergies    penicillin (Rash)    Intolerances        MEDICATIONS  (STANDING):  enoxaparin Injectable 40milliGRAM(s) SubCutaneous daily  cefepime  IVPB 2000milliGRAM(s) IV Intermittent every 12 hours  acetaminophen  IVPB. 1000milliGRAM(s) IV Intermittent once  octreotide  Infusion 25MICROgram(s)/Hr IV Continuous <Continuous>  ondansetron Injectable 4milliGRAM(s) IV Push every 8 hours  dextrose 5% + sodium chloride 0.9% with potassium chloride 20 mEq/L 1000milliLiter(s) IV Continuous <Continuous>  HYDROmorphone PCA (1 mG/mL) 30milliLiter(s) PCA Continuous PCA Continuous  fluconAZOLE IVPB  IV Intermittent   fluconAZOLE IVPB 200milliGRAM(s) IV Intermittent every 24 hours    MEDICATIONS  (PRN):  naloxone Injectable 0.1milliGRAM(s) IV Push every 3 minutes PRN For ANY of the following changes in patient status:  A. RR LESS THAN 10 breaths per minute, B. Oxygen saturation LESS THAN 90%, C. Sedation score of 6  ondansetron Injectable 4milliGRAM(s) IV Push every 8 hours PRN Nausea  HYDROmorphone PCA (1 mG/mL) Rescue Clinician Bolus 1milliGRAM(s) IV Push every 1 hour PRN for Pain Scale GREATER THAN 6  HYDROmorphone  Injectable 0.5milliGRAM(s) IV Push every 1 hour PRN Severe Pain (7 - 10)      PAST MEDICAL & SURGICAL HISTORY:  Ovarian cancer: dx in dec 2016 (started as fibroid --&gt; large cancerous tumor)  Colostomy in place: dec 2016 (placed during hysterectomy in dec 2016 at [facility] [place])  H/O abdominal hysterectomy: dec 2016      FAMILY HISTORY:  No pertinent family history in first degree relatives      SOCIAL HISTORY: No EtOH, no tobacco    REVIEW OF SYSTEMS:    CONSTITUTIONAL: No weakness, fevers or chills  EYES/ENT: No visual changes;  No vertigo or throat pain   NECK: No pain or stiffness  RESPIRATORY: No cough, wheezing, hemoptysis; No shortness of breath  CARDIOVASCULAR: No chest pain or palpitations  GASTROINTESTINAL: No abdominal or epigastric pain. No nausea, vomiting, or hematemesis; No diarrhea or constipation. No melena or hematochezia.  GENITOURINARY: No dysuria, frequency or hematuria  NEUROLOGICAL: No numbness or weakness  SKIN: No itching, burning, rashes, or lesions   All other review of systems is negative unless indicated above.        T(F): 98.7, Max: 100.7 (06-08 @ 00:15)  HR: 76  BP: 94/67  RR: 18  SpO2: 94%  Wt(kg): --    GENERAL: NAD, well-developed  HEAD:  Atraumatic, Normocephalic  EYES: EOMI, PERRLA, conjunctiva and sclera clear  NECK: Supple, No JVD  CHEST/LUNG: Clear to auscultation bilaterally; No wheeze  HEART: Regular rate and rhythm; No murmurs, rubs, or gallops  ABDOMEN: Soft, Nontender, Nondistended; Bowel sounds present  EXTREMITIES:  2+ Peripheral Pulses, No clubbing, cyanosis, or edema  NEUROLOGY: non-focal  SKIN: No rashes or lesions                          10.9   15.5  )-----------( 381      ( 08 Jun 2017 07:38 )             35.2       06-08    134<L>  |  95<L>  |  6<L>  ----------------------------<  135<H>  4.3   |  29  |  0.46<L>    Ca    8.0<L>      08 Jun 2017 07:38  Phos  3.3     06-08  Mg     1.9     06-08    TPro  5.4<L>  /  Alb  2.6<L>  /  TBili  0.6  /  DBili  0.2  /  AST  11  /  ALT  8<L>  /  AlkPhos  59  06-08      Magnesium, Serum: 1.9 mg/dL (06-08 @ 07:38)  Phosphorus Level, Serum: 3.3 mg/dL (06-08 @ 07:38)    CT Abd/Pelvis (06-06):    IMPRESSION:     1. Perforated gangrenous cholecystitis with surrounding abscesses.  2. Cyst gastrostomy tube patency is in question as there is communication   with a very large gas and fluid abscess in the left abdomen.  3. Peritoneal carcinomatosis.  4. Colostomy and Larson's pouch. Mild small and large bowel distention   without a clear transition point, possibly an ileus orpartial   obstruction. Oncology Consult Note    HPI:  46F presents with abdominal pain. Patient stated that the pain began 5/31, at which point she came to the [facility] ED, was discharged when the pain improved. Patient endorses subjective, low-grade fevers at home with decrease in ostomy output, diaphoresis, one episode of nausea/vomiting. Pain is localized primarily to RUQ.Gonzalez  Patient's past medical/surgical history is primarily significant for ovarian CA s/p GRETCHEN Dec 2016, also has had bowel resection with ostomy at that time. Her surgeries and treatments were at [facility], Dr. Roth. Initiation of chemotherapy was delayed 2/2 poor wound healing. Patient was scheduled to start chemotherapy in the next few weeks, was scheduled to see Dr. Kaila Lane of [facility].        Allergies    penicillin (Rash)    Intolerances        MEDICATIONS  (STANDING):  enoxaparin Injectable 40milliGRAM(s) SubCutaneous daily  cefepime  IVPB 2000milliGRAM(s) IV Intermittent every 12 hours  acetaminophen  IVPB. 1000milliGRAM(s) IV Intermittent once  octreotide  Infusion 25MICROgram(s)/Hr IV Continuous <Continuous>  ondansetron Injectable 4milliGRAM(s) IV Push every 8 hours  dextrose 5% + sodium chloride 0.9% with potassium chloride 20 mEq/L 1000milliLiter(s) IV Continuous <Continuous>  HYDROmorphone PCA (1 mG/mL) 30milliLiter(s) PCA Continuous PCA Continuous  fluconAZOLE IVPB  IV Intermittent   fluconAZOLE IVPB 200milliGRAM(s) IV Intermittent every 24 hours    MEDICATIONS  (PRN):  naloxone Injectable 0.1milliGRAM(s) IV Push every 3 minutes PRN For ANY of the following changes in patient status:  A. RR LESS THAN 10 breaths per minute, B. Oxygen saturation LESS THAN 90%, C. Sedation score of 6  ondansetron Injectable 4milliGRAM(s) IV Push every 8 hours PRN Nausea  HYDROmorphone PCA (1 mG/mL) Rescue Clinician Bolus 1milliGRAM(s) IV Push every 1 hour PRN for Pain Scale GREATER THAN 6  HYDROmorphone  Injectable 0.5milliGRAM(s) IV Push every 1 hour PRN Severe Pain (7 - 10)      PAST MEDICAL & SURGICAL HISTORY:  Ovarian cancer: dx in dec 2016 (started as fibroid --&gt; large cancerous tumor)  Colostomy in place: dec 2016 (placed during hysterectomy in dec 2016 at [facility] [place])  H/O abdominal hysterectomy: dec 2016      FAMILY HISTORY:  No pertinent family history in first degree relatives      SOCIAL HISTORY: No EtOH, no tobacco    REVIEW OF SYSTEMS:    CONSTITUTIONAL: Weakness, no fevers or chills  EYES/ENT: No visual changes;  No vertigo or throat pain   NECK: No pain or stiffness  RESPIRATORY: No cough, wheezing, hemoptysis; No shortness of breath  CARDIOVASCULAR: No chest pain or palpitations  GASTROINTESTINAL: Diffuse abdominal pain, improved with PCA pump  GENITOURINARY: No dysuria, frequency or hematuria  NEUROLOGICAL: No numbness or weakness  SKIN: No itching, burning, rashes, or lesions   All other review of systems is negative unless indicated above.        T(F): 98.7, Max: 100.7 (06-08 @ 00:15)  HR: 76  BP: 94/67  RR: 18  SpO2: 94%  Wt(kg): --    GENERAL: NAD, well-developed, NGT in place  HEAD:  Atraumatic, Normocephalic  EYES: EOMI, PERRLA, conjunctiva and sclera clear  NECK: Supple, No JVD  CHEST/LUNG: Clear to auscultation bilaterally anteriorly; No wheeze  HEART: Regular rate and rhythm; No murmurs, rubs, or gallops  ABDOMEN: Tender diffusely with guarding. Healing ex-lap scar with small focus of granulation tissue at rostral border. Ostomy w/o stool in bag.  EXTREMITIES:  2+ Peripheral Pulses, No clubbing, cyanosis, or edema  NEUROLOGY: non-focal  SKIN: No rashes or lesions                          10.9   15.5  )-----------( 381      ( 08 Jun 2017 07:38 )             35.2       06-08    134<L>  |  95<L>  |  6<L>  ----------------------------<  135<H>  4.3   |  29  |  0.46<L>    Ca    8.0<L>      08 Jun 2017 07:38  Phos  3.3     06-08  Mg     1.9     06-08    TPro  5.4<L>  /  Alb  2.6<L>  /  TBili  0.6  /  DBili  0.2  /  AST  11  /  ALT  8<L>  /  AlkPhos  59  06-08      Magnesium, Serum: 1.9 mg/dL (06-08 @ 07:38)  Phosphorus Level, Serum: 3.3 mg/dL (06-08 @ 07:38)    CT Abd/Pelvis (06-06):    IMPRESSION:     1. Perforated gangrenous cholecystitis with surrounding abscesses.  2. Cyst gastrostomy tube patency is in question as there is communication   with a very large gas and fluid abscess in the left abdomen.  3. Peritoneal carcinomatosis.  4. Colostomy and Larson's pouch. Mild small and large bowel distention   without a clear transition point, possibly an ileus orpartial   obstruction.

## 2017-06-08 NOTE — PROGRESS NOTE ADULT - ASSESSMENT
45 yo F with ovarian cancer s/p GRETCHEN, presented with abdominal pain, found to have acute cholecystitis, complicated after stent placement with perforated gallbladder with abscesses.  S/p stent removal 6/7/2017 via endoscopy with NG tube with intermittent suction.

## 2017-06-08 NOTE — PROGRESS NOTE ADULT - PROBLEM SELECTOR PLAN 4
Follow up with medical oncology Follow up with medical oncology  Patient is hoping for getting diease modifying Rx

## 2017-06-08 NOTE — PROGRESS NOTE ADULT - PROBLEM SELECTOR PLAN 1
Patient is currently on PCA Dilaudid for pain control.   She is using it appropriately, with pain relief.

## 2017-06-08 NOTE — PROGRESS NOTE ADULT - SUBJECTIVE AND OBJECTIVE BOX
ADVANCED GI FOLLOW UP NOTE:    SUBJECTIVE:  - Pain is controlled with IV medications  - No nausea  - NGT drained 250cc of fluid    OBJECTIVE:    Vital Signs Last 24 Hrs  T(C): 37.9, Max: 38.2 (06-08 @ 00:15)  T(F): 100.2, Max: 100.7 (06-08 @ 00:15)  HR: 96 (80 - 96)  BP: 110/73 (105/72 - 122/76)  BP(mean): --  RR: 17 (17 - 18)  SpO2: 96% (91% - 96%)    PHYSICAL EXAM:  Constitutional: no acute distress, NGT in place with bilious contents  Eyes: no icterus  Neck: no masses, no LAD  Respiratory: normal inspiratory effort; no wheezing or crackles  Cardiovascular: RRR, normal S1/S2, no murmurs/rubs/gallops  Gastrointestinal: soft, nondistended, nontender, left sided colostomy  Extremities: no LE edema  Neurological: AAOx3, no asterixis  Skin: no rashes, bruises, petechiae    LABS:             10.9   15.5  )-----------( 381      ( 08 Jun 2017 07:38 )             35.2     06-08    134<L>  |  95<L>  |  6<L>  ----------------------------<  135<H>  4.3   |  29  |  0.46<L>  Ca    8.0<L>      08 Jun 2017 07:38  Phos  3.3     06-08  Mg     1.9     06-08  TPro  5.4<L>  /  Alb  2.6<L>  /  TBili  0.6  /  DBili  0.2  /  AST  11  /  ALT  8<L>  /  AlkPhos  59  06-08    LIVER FUNCTIONS - ( 08 Jun 2017 07:38 )  Alb: 2.6 g/dL / Pro: 5.4 g/dL / ALK PHOS: 59 U/L / ALT: 8 U/L RC / AST: 11 U/L / GGT: ADVANCED GI FOLLOW UP NOTE:    SUBJECTIVE:  - Pain is controlled with IV medications  - No nausea  - NGT drained 250cc of fluid  - Had low grade fever at 100.7 overnight    OBJECTIVE:    Vital Signs Last 24 Hrs  T(C): 37.9, Max: 38.2 (06-08 @ 00:15)  T(F): 100.2, Max: 100.7 (06-08 @ 00:15)  HR: 96 (80 - 96)  BP: 110/73 (105/72 - 122/76)  BP(mean): --  RR: 17 (17 - 18)  SpO2: 96% (91% - 96%)    PHYSICAL EXAM:  Constitutional: no acute distress, NGT in place with bilious contents  Eyes: no icterus  Neck: no masses, no LAD  Respiratory: normal inspiratory effort; no wheezing or crackles  Cardiovascular: RRR, normal S1/S2, no murmurs/rubs/gallops  Gastrointestinal: soft, nondistended, nontender, left sided colostomy  Extremities: no LE edema  Neurological: AAOx3, no asterixis  Skin: no rashes, bruises, petechiae    LABS:             10.9   15.5  )-----------( 381      ( 08 Jun 2017 07:38 )             35.2     06-08    134<L>  |  95<L>  |  6<L>  ----------------------------<  135<H>  4.3   |  29  |  0.46<L>  Ca    8.0<L>      08 Jun 2017 07:38  Phos  3.3     06-08  Mg     1.9     06-08  TPro  5.4<L>  /  Alb  2.6<L>  /  TBili  0.6  /  DBili  0.2  /  AST  11  /  ALT  8<L>  /  AlkPhos  59  06-08    LIVER FUNCTIONS - ( 08 Jun 2017 07:38 )  Alb: 2.6 g/dL / Pro: 5.4 g/dL / ALK PHOS: 59 U/L / ALT: 8 U/L RC / AST: 11 U/L / GGT:

## 2017-06-08 NOTE — PROGRESS NOTE ADULT - PROBLEM SELECTOR PLAN 2
Patient is now s/p gallbladder perforation, necrosis and abscess, cleared by GI via endoscopy with stent removal.   Continuing Cefepime for E.Coli bacteremia, with likely source being biliary tract.   Care per primary Patient is now s/p gallbladder perforation, necrosis and abscess, cleared by GI via endoscopy with stent removal.   Continuing Cefepime for E.Coli bacteremia, with likely source being biliary tract.   Any further work up and treatment as per GI/Sx/? IR   Care per primary

## 2017-06-08 NOTE — PROGRESS NOTE ADULT - ASSESSMENT
47 yo F s/p Acute cholecystitis s/p gastrocholecystic drainage w/ stent + dilation; E. Coli bacteremia (6/2), s/p endoscopy, removal of stent 6/7

## 2017-06-08 NOTE — PROGRESS NOTE ADULT - SUBJECTIVE AND OBJECTIVE BOX
GENERAL SURGERY DAILY PROGRESS NOTE:       Subjective:  Feels OK.  No n/v.  Pain controlled      Objective:  NAD  Ostomy with gas, no stool                    MEDICATIONS  (STANDING):  enoxaparin Injectable 40milliGRAM(s) SubCutaneous daily  cefepime  IVPB 2000milliGRAM(s) IV Intermittent every 12 hours  acetaminophen  IVPB. 1000milliGRAM(s) IV Intermittent once  octreotide  Infusion 25MICROgram(s)/Hr IV Continuous <Continuous>  ondansetron Injectable 4milliGRAM(s) IV Push every 8 hours  dextrose 5% + sodium chloride 0.9% with potassium chloride 20 mEq/L 1000milliLiter(s) IV Continuous <Continuous>  HYDROmorphone PCA (1 mG/mL) 30milliLiter(s) PCA Continuous PCA Continuous  fluconAZOLE IVPB  IV Intermittent   fluconAZOLE IVPB 200milliGRAM(s) IV Intermittent every 24 hours    MEDICATIONS  (PRN):  naloxone Injectable 0.1milliGRAM(s) IV Push every 3 minutes PRN For ANY of the following changes in patient status:  A. RR LESS THAN 10 breaths per minute, B. Oxygen saturation LESS THAN 90%, C. Sedation score of 6  ondansetron Injectable 4milliGRAM(s) IV Push every 8 hours PRN Nausea  HYDROmorphone PCA (1 mG/mL) Rescue Clinician Bolus 1milliGRAM(s) IV Push every 1 hour PRN for Pain Scale GREATER THAN 6  HYDROmorphone  Injectable 0.5milliGRAM(s) IV Push every 1 hour PRN Severe Pain (7 - 10)      Vital Signs Last 24 Hrs  T(C): 37.9, Max: 38.2 (06-08 @ 00:15)  T(F): 100.2, Max: 100.7 (06-08 @ 00:15)  HR: 96 (80 - 96)  BP: 110/73 (105/72 - 122/76)  BP(mean): --  RR: 17 (17 - 18)  SpO2: 96% (91% - 96%)    I&O's Detail    I & Os for current day (as of 08 Jun 2017 07:38)  =============================================  IN:    dextrose 5% + sodium chloride 0.9% with potassium chloride 20 mEq/L: 1900 ml    IV PiggyBack: 150 ml    octreotide  Infusion: 95 ml    Total IN: 2145 ml  ---------------------------------------------  OUT:    Voided: 950 ml    Nasoenteral Tube: 50 ml    Colostomy: 5 ml    Total OUT: 1005 ml  ---------------------------------------------  Total NET: 1140 ml      Daily     Daily     LABS:                        10.8   14.1  )-----------( 257      ( 07 Jun 2017 05:37 )             34.3     06-07    136  |  97  |  6<L>  ----------------------------<  135<H>  4.2   |  30  |  0.46<L>    Ca    8.1<L>      07 Jun 2017 05:37  Phos  3.5     06-07  Mg     1.9     06-07    TPro  5.4<L>  /  Alb  2.6<L>  /  TBili  0.5  /  DBili  0.1  /  AST  10  /  ALT  7<L>  /  AlkPhos  65  06-07

## 2017-06-08 NOTE — CONSULT NOTE ADULT - PROBLEM SELECTOR RECOMMENDATION 9
-s/p debulking (unclear if optimally debulked at time of surgery), c/b delayed wound healing and inability to begin adjuvant therapy.   -Please obtain surgical documentation from OSH regarding intraoperative findings, as well as imaging from OSH to compare current disease status to prior imaging.  -Now clearly with persistent peritoneal carcinomatosis versus progression of her disease seen on CT A/P.  -Given lack of administration of adjuvant chemotherapy post-operatively, any further treatment would be considered palliative in nature, as this would now be considered an incurable disease  -Given current active infection and bowel obstruction, no treatment is currently being offered, pending resolution of her acute issues.   -Upon discharge, will plan on re-referral to Dr. Kaila Lane/[facility] for evaluation for possible palliative chemotherapy. Appreciate palliative f/u and ongoing symptom management. Pt and  continue to be hopeful for chemotherapeutic intervention.

## 2017-06-08 NOTE — PROGRESS NOTE ADULT - ATTENDING COMMENTS
Impression:    #1.  Loculated fluid collection, s/p removal of lumen apposing stent which was not draining fluid into stomach, and closure of the gastrostomy using endoscopic clips     #2.  Acute cholecystitis, still with RUQ tenderness, but improved.    #3.  Metastatic cancer (uterine).    Recommendations:    #1.  NPO, NGT to low intermittent suction    #2.  Continue IV abx    #3.  No plan to reattempt drainage of the gallbladder endoscopically.  Surgery is evaluating patient for consideration of percutaneous cholecystostomy tube by interventional radiology if there remains concern that the gallbladder is significantly inflammed.

## 2017-06-08 NOTE — PROGRESS NOTE ADULT - ASSESSMENT
Impression:  1) Loculated fluid collection. S/p removal of stent, with cleanout of the gallbladder, and closure of the tract with clips. Suspect component of carcinomatosis contributing to fluid collection.  2) Acute cholecystitis.  3) Uterine Cancer s/p GRETCHEN and Ileostomy, peritoneal carcinomatosis.    Plan:  - Monitor clinically  - Continue IV abx  - npo  - NGT to low intermittent suction  - If clinical condition worsens, consider percutaneous biliary drain Impression:  1) Loculated fluid collection. S/p removal of stent, with cleanout of the cavity containing air, fluid, and debris, and closure of the tract connecting the stomach antrum to the fluid collection with clips. Suspect component of carcinomatosis contributing to fluid collection.  2) Acute cholecystitis, still with some RUQ tenderness, although gallbladder has looked better on serial imaging.  3) Uterine Cancer s/p GRETCHEN and Ileostomy, peritoneal carcinomatosis.    Plan:  - Monitor clinically  - Continue IV abx  - npo  - NGT to low intermittent suction  - If clinical condition worsens, consider percutaneous biliary drain

## 2017-06-09 LAB
ALBUMIN SERPL ELPH-MCNC: 2.6 G/DL — LOW (ref 3.3–5)
ALP SERPL-CCNC: 72 U/L — SIGNIFICANT CHANGE UP (ref 40–120)
ALT FLD-CCNC: 8 U/L RC — LOW (ref 10–45)
ANION GAP SERPL CALC-SCNC: 12 MMOL/L — SIGNIFICANT CHANGE UP (ref 5–17)
AST SERPL-CCNC: 11 U/L — SIGNIFICANT CHANGE UP (ref 10–40)
BILIRUB SERPL-MCNC: 0.7 MG/DL — SIGNIFICANT CHANGE UP (ref 0.2–1.2)
BUN SERPL-MCNC: 6 MG/DL — LOW (ref 7–23)
CALCIUM SERPL-MCNC: 8.5 MG/DL — SIGNIFICANT CHANGE UP (ref 8.4–10.5)
CHLORIDE SERPL-SCNC: 90 MMOL/L — LOW (ref 96–108)
CO2 SERPL-SCNC: 30 MMOL/L — SIGNIFICANT CHANGE UP (ref 22–31)
CREAT SERPL-MCNC: 0.64 MG/DL — SIGNIFICANT CHANGE UP (ref 0.5–1.3)
CULTURE RESULTS: SIGNIFICANT CHANGE UP
CULTURE RESULTS: SIGNIFICANT CHANGE UP
GLUCOSE SERPL-MCNC: 90 MG/DL — SIGNIFICANT CHANGE UP (ref 70–99)
HCT VFR BLD CALC: 35 % — SIGNIFICANT CHANGE UP (ref 34.5–45)
HGB BLD-MCNC: 11.2 G/DL — LOW (ref 11.5–15.5)
MCHC RBC-ENTMCNC: 28.3 PG — SIGNIFICANT CHANGE UP (ref 27–34)
MCHC RBC-ENTMCNC: 31.9 GM/DL — LOW (ref 32–36)
MCV RBC AUTO: 88.8 FL — SIGNIFICANT CHANGE UP (ref 80–100)
PLATELET # BLD AUTO: 538 K/UL — HIGH (ref 150–400)
POTASSIUM SERPL-MCNC: 4.4 MMOL/L — SIGNIFICANT CHANGE UP (ref 3.5–5.3)
POTASSIUM SERPL-SCNC: 4.4 MMOL/L — SIGNIFICANT CHANGE UP (ref 3.5–5.3)
PROT SERPL-MCNC: 6 G/DL — SIGNIFICANT CHANGE UP (ref 6–8.3)
RBC # BLD: 3.94 M/UL — SIGNIFICANT CHANGE UP (ref 3.8–5.2)
RBC # FLD: 13.1 % — SIGNIFICANT CHANGE UP (ref 10.3–14.5)
SODIUM SERPL-SCNC: 132 MMOL/L — LOW (ref 135–145)
SPECIMEN SOURCE: SIGNIFICANT CHANGE UP
SPECIMEN SOURCE: SIGNIFICANT CHANGE UP
WBC # BLD: 18.2 K/UL — HIGH (ref 3.8–10.5)
WBC # FLD AUTO: 18.2 K/UL — HIGH (ref 3.8–10.5)

## 2017-06-09 PROCEDURE — 99232 SBSQ HOSP IP/OBS MODERATE 35: CPT | Mod: GC

## 2017-06-09 PROCEDURE — 47490 INCISION OF GALLBLADDER: CPT

## 2017-06-09 RX ORDER — TETRACAINE/BENZOCAINE/BUTAMBEN 2%-14%-2%
1 OINTMENT (GRAM) TOPICAL THREE TIMES A DAY
Qty: 0 | Refills: 0 | Status: DISCONTINUED | OUTPATIENT
Start: 2017-06-09 | End: 2017-06-21

## 2017-06-09 RX ADMIN — ONDANSETRON 4 MILLIGRAM(S): 8 TABLET, FILM COATED ORAL at 05:06

## 2017-06-09 RX ADMIN — Medication 1 SPRAY(S): at 23:08

## 2017-06-09 RX ADMIN — HYDROMORPHONE HYDROCHLORIDE 0.5 MILLIGRAM(S): 2 INJECTION INTRAMUSCULAR; INTRAVENOUS; SUBCUTANEOUS at 14:55

## 2017-06-09 RX ADMIN — DEXTROSE MONOHYDRATE, SODIUM CHLORIDE, AND POTASSIUM CHLORIDE 100 MILLILITER(S): 50; .745; 4.5 INJECTION, SOLUTION INTRAVENOUS at 14:56

## 2017-06-09 RX ADMIN — ENOXAPARIN SODIUM 40 MILLIGRAM(S): 100 INJECTION SUBCUTANEOUS at 16:53

## 2017-06-09 RX ADMIN — HYDROMORPHONE HYDROCHLORIDE 0.5 MILLIGRAM(S): 2 INJECTION INTRAMUSCULAR; INTRAVENOUS; SUBCUTANEOUS at 14:40

## 2017-06-09 RX ADMIN — HYDROMORPHONE HYDROCHLORIDE 30 MILLILITER(S): 2 INJECTION INTRAMUSCULAR; INTRAVENOUS; SUBCUTANEOUS at 15:48

## 2017-06-09 RX ADMIN — CEFEPIME 100 MILLIGRAM(S): 1 INJECTION, POWDER, FOR SOLUTION INTRAMUSCULAR; INTRAVENOUS at 05:06

## 2017-06-09 RX ADMIN — CEFEPIME 100 MILLIGRAM(S): 1 INJECTION, POWDER, FOR SOLUTION INTRAMUSCULAR; INTRAVENOUS at 18:49

## 2017-06-09 RX ADMIN — HYDROMORPHONE HYDROCHLORIDE 30 MILLILITER(S): 2 INJECTION INTRAMUSCULAR; INTRAVENOUS; SUBCUTANEOUS at 20:18

## 2017-06-09 RX ADMIN — Medication 1 SPRAY(S): at 06:30

## 2017-06-09 RX ADMIN — HYDROMORPHONE HYDROCHLORIDE 30 MILLILITER(S): 2 INJECTION INTRAMUSCULAR; INTRAVENOUS; SUBCUTANEOUS at 07:20

## 2017-06-09 RX ADMIN — OCTREOTIDE ACETATE 5 MICROGRAM(S)/HR: 200 INJECTION, SOLUTION INTRAVENOUS; SUBCUTANEOUS at 14:56

## 2017-06-09 RX ADMIN — ONDANSETRON 4 MILLIGRAM(S): 8 TABLET, FILM COATED ORAL at 23:08

## 2017-06-09 RX ADMIN — FLUCONAZOLE 100 MILLIGRAM(S): 150 TABLET ORAL at 16:54

## 2017-06-09 RX ADMIN — ONDANSETRON 4 MILLIGRAM(S): 8 TABLET, FILM COATED ORAL at 16:53

## 2017-06-09 NOTE — PROGRESS NOTE ADULT - SUBJECTIVE AND OBJECTIVE BOX
Interventional Radiology Procedure Note    Procedure: CHolecystostomy    Indication: Acute cholecystitis    Operators: Mohinder     Anesthesia (type): General    Contrast: 5cc    EBL: 5 cc    Findings/Follow up Plan of Care:  Successful placement of 8 [place] cholecystostomy using ultrasound and fluoroscopic guidance.  Approximately 60cc of brown thick fluid obtained.    Specimens Removed:  Bile culture    Implants: 8 [place] APD    Complications: none    Condition/Disposition:  Pacu then floors if stable.  Discussed with Dr. Johns.    Please call Interventional Radiology x [phone] with any questions, concerns, or issues.

## 2017-06-09 NOTE — PROGRESS NOTE ADULT - ASSESSMENT
Impression:  1) Loculated fluid collection. S/p removal of stent. Suspect component of carcinomatosis contributing to fluid collection.  2) Acute cholecystitis, still with some RUQ tenderness, although gallbladder has looked better on serial imaging.  3) Uterine Cancer s/p GRETCHEN and Ileostomy, peritoneal carcinomatosis.    Plan:  - IR drainage of the gallbladder  - Consider IR drainage of the loculated fluid collection  - Continue IV Abx  - Monitor liver enzymes  - IVF

## 2017-06-09 NOTE — PROGRESS NOTE ADULT - SUBJECTIVE AND OBJECTIVE BOX
TRAUMA SURGERY      SUBJECTIVE:       OBJECTIVE:   T(C): 37.8, Max: 37.9 (06-08 @ 06:06)  HR: 98 (76 - 101)  BP: 129/76 (94/67 - 129/76)  RR: 17 (17 - 18)  SpO2: 92% (92% - 96%)  Wt(kg): --      I&O's Detail  I & Os for 24h ending 08 Jun 2017 07:00  =============================================  IN:    dextrose 5% + sodium chloride 0.9% with potassium chloride 20 mEq/L: 2100 ml    IV PiggyBack: 150 ml    octreotide  Infusion: 105 ml    Total IN: 2355 ml  ---------------------------------------------  OUT:    Voided: 950 ml    Nasoenteral Tube: 250 ml    Colostomy: 5 ml    Total OUT: 1205 ml  ---------------------------------------------  Total NET: 1150 ml    I & Os for current day (as of 09 Jun 2017 05:07)  =============================================  IN:    dextrose 5% + sodium chloride 0.9% with potassium chloride 20 mEq/L: 2100 ml    octreotide  Infusion: 110 ml    IV PiggyBack: 50 ml    Total IN: 2260 ml  ---------------------------------------------  OUT:    Voided: 1000 ml    Nasoenteral Tube: 200 ml    Total OUT: 1200 ml  ---------------------------------------------  Total NET: 1060 ml      Physical exam:  General:  Abdomen:    MEDICATIONS  (STANDING):  enoxaparin Injectable 40milliGRAM(s) SubCutaneous daily  cefepime  IVPB 2000milliGRAM(s) IV Intermittent every 12 hours  acetaminophen  IVPB. 1000milliGRAM(s) IV Intermittent once  octreotide  Infusion 25MICROgram(s)/Hr IV Continuous <Continuous>  ondansetron Injectable 4milliGRAM(s) IV Push every 8 hours  dextrose 5% + sodium chloride 0.9% with potassium chloride 20 mEq/L 1000milliLiter(s) IV Continuous <Continuous>  HYDROmorphone PCA (1 mG/mL) 30milliLiter(s) PCA Continuous PCA Continuous  fluconAZOLE IVPB  IV Intermittent   fluconAZOLE IVPB 200milliGRAM(s) IV Intermittent every 24 hours    MEDICATIONS  (PRN):  naloxone Injectable 0.1milliGRAM(s) IV Push every 3 minutes PRN For ANY of the following changes in patient status:  A. RR LESS THAN 10 breaths per minute, B. Oxygen saturation LESS THAN 90%, C. Sedation score of 6  ondansetron Injectable 4milliGRAM(s) IV Push every 8 hours PRN Nausea  HYDROmorphone PCA (1 mG/mL) Rescue Clinician Bolus 1milliGRAM(s) IV Push every 1 hour PRN for Pain Scale GREATER THAN 6  HYDROmorphone  Injectable 0.5milliGRAM(s) IV Push every 1 hour PRN Severe Pain (7 - 10)      LABS:                        10.9   15.5  )-----------( 381      ( 08 Jun 2017 07:38 )             35.2     06-08    134<L>  |  95<L>  |  6<L>  ----------------------------<  135<H>  4.3   |  29  |  0.46<L>    Ca    8.0<L>      08 Jun 2017 07:38  Phos  3.3     06-08  Mg     1.9     06-08    TPro  5.4<L>  /  Alb  2.6<L>  /  TBili  0.6  /  DBili  0.2  /  AST  11  /  ALT  8<L>  /  AlkPhos  59  06-08        Assessment/Plan: 46y Female acute ubaldo s/p gastrocholecystic drainage w/ stent, E. coli bacteremia, s/p endoscopy TRAUMA SURGERY      SUBJECTIVE:  denies n/v/f/c, denies SOB or CP   pain well controlled       OBJECTIVE:   T(C): 37.8, Max: 37.9 (06-08 @ 06:06)  HR: 98 (76 - 101)  BP: 129/76 (94/67 - 129/76)  RR: 17 (17 - 18)  SpO2: 92% (92% - 96%)  Wt(kg): --      I&O's Detail  I & Os for 24h ending 08 Jun 2017 07:00  =============================================  IN:    dextrose 5% + sodium chloride 0.9% with potassium chloride 20 mEq/L: 2100 ml    IV PiggyBack: 150 ml    octreotide  Infusion: 105 ml    Total IN: 2355 ml  ---------------------------------------------  OUT:    Voided: 950 ml    Nasoenteral Tube: 250 ml    Colostomy: 5 ml    Total OUT: 1205 ml  ---------------------------------------------  Total NET: 1150 ml    I & Os for current day (as of 09 Jun 2017 05:07)  =============================================  IN:    dextrose 5% + sodium chloride 0.9% with potassium chloride 20 mEq/L: 2100 ml    octreotide  Infusion: 110 ml    IV PiggyBack: 50 ml    Total IN: 2260 ml  ---------------------------------------------  OUT:    Voided: 1000 ml    Nasoenteral Tube: 200 ml    Total OUT: 1200 ml  ---------------------------------------------  Total NET: 1060 ml      Physical exam:  General: NAD, AAOx3  HEENT: WNL  Lymph nodes: Non-tender, no palpable masses  Neck: No masses  Cardiovascular: Regular rate and rhythm; normal S1, S2; no murmurs, rubs, or gallops  Lungs: Lungs clear to auscultation bilaterally; No wheezes or crackles    Abdominal:  -Abdomen soft and non-distended  -No pulsatile masses, no abdominal bruits ascultated  - mildly Tender; No reflex tenderness; No guarding;   - midline scar    MEDICATIONS  (STANDING):  enoxaparin Injectable 40milliGRAM(s) SubCutaneous daily  cefepime  IVPB 2000milliGRAM(s) IV Intermittent every 12 hours  acetaminophen  IVPB. 1000milliGRAM(s) IV Intermittent once  octreotide  Infusion 25MICROgram(s)/Hr IV Continuous <Continuous>  ondansetron Injectable 4milliGRAM(s) IV Push every 8 hours  dextrose 5% + sodium chloride 0.9% with potassium chloride 20 mEq/L 1000milliLiter(s) IV Continuous <Continuous>  HYDROmorphone PCA (1 mG/mL) 30milliLiter(s) PCA Continuous PCA Continuous  fluconAZOLE IVPB  IV Intermittent   fluconAZOLE IVPB 200milliGRAM(s) IV Intermittent every 24 hours    MEDICATIONS  (PRN):  naloxone Injectable 0.1milliGRAM(s) IV Push every 3 minutes PRN For ANY of the following changes in patient status:  A. RR LESS THAN 10 breaths per minute, B. Oxygen saturation LESS THAN 90%, C. Sedation score of 6  ondansetron Injectable 4milliGRAM(s) IV Push every 8 hours PRN Nausea  HYDROmorphone PCA (1 mG/mL) Rescue Clinician Bolus 1milliGRAM(s) IV Push every 1 hour PRN for Pain Scale GREATER THAN 6  HYDROmorphone  Injectable 0.5milliGRAM(s) IV Push every 1 hour PRN Severe Pain (7 - 10)      LABS:                        10.9   15.5  )-----------( 381      ( 08 Jun 2017 07:38 )             35.2     06-08    134<L>  |  95<L>  |  6<L>  ----------------------------<  135<H>  4.3   |  29  |  0.46<L>    Ca    8.0<L>      08 Jun 2017 07:38  Phos  3.3     06-08  Mg     1.9     06-08    TPro  5.4<L>  /  Alb  2.6<L>  /  TBili  0.6  /  DBili  0.2  /  AST  11  /  ALT  8<L>  /  AlkPhos  59  06-08        Assessment/Plan: 46y Female acute ubaldo s/p gastrocholecystic drainage w/ stent, E. coli bacteremia, s/p endoscopy

## 2017-06-09 NOTE — CHART NOTE - NSCHARTNOTEFT_GEN_A_CORE
ATP SURGERY    STATUS POST:  perc ubaldo    SUBJECTIVE:   Patient reports mild back pain near the perc ubaldo insertion site, otherwise well.     OBJECTIVE:   T(C): 37.1, Max: 37.8 (06-09 @ 04:55)  HR: 91 (82 - 98)  BP: 113/77 (97/67 - 129/76)  RR: 18 (16 - 18)  SpO2: 95% (92% - 100%)  Wt(kg): --      I&O's Detail  I & Os for 24h ending 09 Jun 2017 07:00  =============================================  IN:    dextrose 5% + sodium chloride 0.9% with potassium chloride 20 mEq/L: 2400 ml    IV PiggyBack: 150 ml    octreotide  Infusion: 125 ml    Total IN: 2675 ml  ---------------------------------------------  OUT:    Voided: 1000 ml    Nasoenteral Tube: 300 ml    Total OUT: 1300 ml  ---------------------------------------------  Total NET: 1375 ml    I & Os for current day (as of 09 Jun 2017 22:19)  =============================================  IN:    dextrose 5% + sodium chloride 0.9% with potassium chloride 20 mEq/L: 1200 ml    IV PiggyBack: 200 ml    octreotide  Infusion: 55 ml    Total IN: 1455 ml  ---------------------------------------------  OUT:    Colostomy: 200 ml    Nasoenteral Tube: 50 ml    Total OUT: 250 ml  ---------------------------------------------  Total NET: 1205 ml      Physical exam:  General: NAD  Abdomen: Perc ubaldo tube in place - copious bilious output.   Abdomen softly distended    MEDICATIONS  (STANDING):  enoxaparin Injectable 40milliGRAM(s) SubCutaneous daily  cefepime  IVPB 2000milliGRAM(s) IV Intermittent every 12 hours  acetaminophen  IVPB. 1000milliGRAM(s) IV Intermittent once  octreotide  Infusion 25MICROgram(s)/Hr IV Continuous <Continuous>  ondansetron Injectable 4milliGRAM(s) IV Push every 8 hours  dextrose 5% + sodium chloride 0.9% with potassium chloride 20 mEq/L 1000milliLiter(s) IV Continuous <Continuous>  HYDROmorphone PCA (1 mG/mL) 30milliLiter(s) PCA Continuous PCA Continuous  fluconAZOLE IVPB  IV Intermittent   fluconAZOLE IVPB 200milliGRAM(s) IV Intermittent every 24 hours  tetracaine/benzocaine/butamben Spray 1Spray(s) Topical three times a day    MEDICATIONS  (PRN):  naloxone Injectable 0.1milliGRAM(s) IV Push every 3 minutes PRN For ANY of the following changes in patient status:  A. RR LESS THAN 10 breaths per minute, B. Oxygen saturation LESS THAN 90%, C. Sedation score of 6  ondansetron Injectable 4milliGRAM(s) IV Push every 8 hours PRN Nausea  HYDROmorphone PCA (1 mG/mL) Rescue Clinician Bolus 1milliGRAM(s) IV Push every 1 hour PRN for Pain Scale GREATER THAN 6  HYDROmorphone  Injectable 0.5milliGRAM(s) IV Push every 1 hour PRN Severe Pain (7 - 10)      LABS:                        11.2   18.2  )-----------( 538      ( 09 Jun 2017 07:19 )             35.0     06-09    132<L>  |  90<L>  |  6<L>  ----------------------------<  90  4.4   |  30  |  0.64    Ca    8.5      09 Jun 2017 07:19  Phos  3.3     06-08  Mg     1.9     06-08    TPro  6.0  /  Alb  2.6<L>  /  TBili  0.7  /  DBili  x   /  AST  11  /  ALT  8<L>  /  AlkPhos  72  06-09          RADIOLOGY & ADDITIONAL STUDIES:    Assessment/Plan: 46y Female s/p IR perc ubaldo  - NPO/NGT  - Pain control PRN  - f/u AM labs

## 2017-06-09 NOTE — PROGRESS NOTE ADULT - ASSESSMENT
45 yo F s/p Acute cholecystitis s/p gastrocholecystic drainage w/ stent + dilation; E. Coli bacteremia (6/2), s/p endoscopy, removal of stent 6/7  - pain control   - IR perc ubaldo today  -DVT ppx  - out of bed, ambulate  - Incentive spirometry  - f/u am labs

## 2017-06-09 NOTE — PROGRESS NOTE ADULT - SUBJECTIVE AND OBJECTIVE BOX
Interventional Radiology Pre-Procedure Note    This is a 46y Female with PMH of Ovarian CA s/p GRETCHEN. Pt was admitted with acute cholecystitis s/p stent placement. PT under went stent dilatation on 6/2. On 6/7 stent was removed d/t it position in the gastric antrum which was previously connected the stomach to the collection. Pt was presents to IR for planned Percutaneous cholecystostomy. CT abd/ pelvis from 6/6 shows perforated gangrenous cholecystitis with surrounding abscesses.     PAST MEDICAL & SURGICAL HISTORY:  Ovarian cancer: dx in dec 2016 (started as fibroid --&gt; large cancerous tumor)  Colostomy in place: dec 2016 (placed during hysterectomy in dec 2016 at [facility])  H/O abdominal hysterectomy: dec 2016       Vitals:Vital Signs Last 24 Hrs  T(C): 37.1, Max: 37.8 (06-09 @ 04:55)  T(F): 98.7, Max: 100.1 (06-09 @ 04:55)  HR: 82 (76 - 98)  BP: 106/71 (94/67 - 129/76)  BP(mean): --  RR: 18 (17 - 18)  SpO2: 94% (92% - 96%)    Allergies: Allergies    penicillin (Rash)    Intolerances        Physical Exam: Gen: NAD A&O x3    Labs:                         11.2   18.2  )-----------( 538      ( 09 Jun 2017 07:19 )             35.0     06-09    132<L>  |  90<L>  |  6<L>  ----------------------------<  90  4.4   |  30  |  0.64    Ca    8.5      09 Jun 2017 07:19  Phos  3.3     06-08  Mg     1.9     06-08    TPro  6.0  /  Alb  2.6<L>  /  TBili  0.7  /  DBili  x   /  AST  11  /  ALT  8<L>  /  AlkPhos  72  06-09        Informed consent obtained. All questions and concerns have been addressed at this time. Interventional Radiology Pre-Procedure Note    This is a 46y Female with PMH of Ovarian CA s/p GRETCHEN. Pt was admitted with acute cholecystitis s/p stent placement. PT under went stent dilatation on 6/2. On 6/7 stent was removed d/t it position in the gastric antrum which was previously connected the stomach to the collection. Pt was presents to IR for planned Percutaneous cholecystostomy. CT abd/ pelvis from 6/6 shows perforated gangrenous cholecystitis with surrounding abscesses.     PAST MEDICAL & SURGICAL HISTORY:  Ovarian cancer: dx in dec 2016 (started as fibroid --&gt; large cancerous tumor)  Colostomy in place: dec 2016 (placed during hysterectomy in dec 2016 at [facility])  H/O abdominal hysterectomy: dec 2016  s/p EUS- 6/2  s/p Endoscopy-6/7 removal of stent       Vitals:Vital Signs Last 24 Hrs  T(C): 37.1, Max: 37.8 (06-09 @ 04:55)  T(F): 98.7, Max: 100.1 (06-09 @ 04:55)  HR: 82 (76 - 98)  BP: 106/71 (94/67 - 129/76)  BP(mean): --  RR: 18 (17 - 18)  SpO2: 94% (92% - 96%)    Allergies: Allergies    penicillin (Rash)    Intolerances        Physical Exam: Gen: NAD A&O x3    Labs:                         11.2   18.2  )-----------( 538      ( 09 Jun 2017 07:19 )             35.0     06-09    132<L>  |  90<L>  |  6<L>  ----------------------------<  90  4.4   |  30  |  0.64    Ca    8.5      09 Jun 2017 07:19  Phos  3.3     06-08  Mg     1.9     06-08    TPro  6.0  /  Alb  2.6<L>  /  TBili  0.7  /  DBili  x   /  AST  11  /  ALT  8<L>  /  AlkPhos  72  06-09        Informed consent obtained. All questions and concerns have been addressed at this time. Interventional Radiology Pre-Procedure Note    This is a 46y Female with PMH of Ovarian CA s/p GRETCHEN. Pt was admitted with acute cholecystitis s/p stent placement. PT under went stent dilatation on 6/2. On 6/7 stent was removed as it was positioned in the gastric antrum which was previously connected the stomach to the collection. Pt presents to IR for planned Percutaneous cholecystostomy. CT abd/ pelvis from 6/6 shows perforated gangrenous cholecystitis with surrounding abscesses.     PAST MEDICAL & SURGICAL HISTORY:  Ovarian cancer: dx in dec 2016 (started as fibroid --&gt; large cancerous tumor)  Colostomy in place: dec 2016 (placed during hysterectomy in dec 2016 at [facility])  H/O abdominal hysterectomy: dec 2016  s/p EUS- 6/2  s/p Endoscopy-6/7 removal of stent       Vitals:Vital Signs Last 24 Hrs  T(C): 37.1, Max: 37.8 (06-09 @ 04:55)  T(F): 98.7, Max: 100.1 (06-09 @ 04:55)  HR: 82 (76 - 98)  BP: 106/71 (94/67 - 129/76)  BP(mean): --  RR: 18 (17 - 18)  SpO2: 94% (92% - 96%)    Allergies: Allergies    penicillin (Rash)    Intolerances        Physical Exam: Gen: NAD A&O x3    Labs:                         11.2   18.2  )-----------( 538      ( 09 Jun 2017 07:19 )             35.0     06-09    132<L>  |  90<L>  |  6<L>  ----------------------------<  90  4.4   |  30  |  0.64    Ca    8.5      09 Jun 2017 07:19  Phos  3.3     06-08  Mg     1.9     06-08    TPro  6.0  /  Alb  2.6<L>  /  TBili  0.7  /  DBili  x   /  AST  11  /  ALT  8<L>  /  AlkPhos  72  06-09        Informed consent obtained. All questions and concerns have been addressed at this time.     As above, pt is on IV antiobiotics.  Will perform with general anesthesia given NG tube and risk of aspiration.

## 2017-06-09 NOTE — PROGRESS NOTE ADULT - SUBJECTIVE AND OBJECTIVE BOX
ADVANCED GI FOLLOW UP NOTE:    SUBJECTIVE:  - NGT drained 300cc of fluid  - Pain is controlled with IV medications  - Denies nausea or vomiting  - Had low grade temp of 100.1 overnight    OBJECTIVE:    Vital Signs Last 24 Hrs  T(C): 37.1, Max: 37.8 (06-09 @ 04:55)  T(F): 98.7, Max: 100.1 (06-09 @ 04:55)  HR: 82 (76 - 98)  BP: 106/71 (94/67 - 129/76)  BP(mean): --  RR: 18 (17 - 18)  SpO2: 94% (92% - 96%)    PHYSICAL EXAM:  Constitutional: no acute distress  Eyes: no icterus  Neck: no masses, no LAD. NGT in place,  Respiratory: normal inspiratory effort; no wheezing or crackles  Cardiovascular: RRR, normal S1/S2, no murmurs/rubs/gallops  Gastrointestinal: soft, nondistended, nontender, +BS. Left sided ostomy in place.  Extremities: no LE edema  Neurological: AAOx3, no asterixis  Skin: no rashes, bruises, petechiae    LABS:                        11.2   18.2  )-----------( 538      ( 09 Jun 2017 07:19 )             35.0     06-09    132<L>  |  90<L>  |  6<L>  ----------------------------<  90  4.4   |  30  |  0.64    Ca    8.5      09 Jun 2017 07:19  Phos  3.3     06-08  Mg     1.9     06-08    TPro  6.0  /  Alb  2.6<L>  /  TBili  0.7  /  DBili  x   /  AST  11  /  ALT  8<L>  /  AlkPhos  72  06-09      LIVER FUNCTIONS - ( 09 Jun 2017 07:19 )  Alb: 2.6 g/dL / Pro: 6.0 g/dL / ALK PHOS: 72 U/L / ALT: 8 U/L RC / AST: 11 U/L / GGT: x

## 2017-06-09 NOTE — PROGRESS NOTE ADULT - ATTENDING COMMENTS
As above.  Spoke to IR attending Dr. Ramires to consider assessment/drainage of loculated fluid collection at same time as percutaneous cholecystostomy tube.  CT scan requested by IR, spoke to Dr. Dilip Johns to order.

## 2017-06-10 DIAGNOSIS — G89.3 NEOPLASM RELATED PAIN (ACUTE) (CHRONIC): ICD-10-CM

## 2017-06-10 LAB
ALBUMIN SERPL ELPH-MCNC: 2.6 G/DL — LOW (ref 3.3–5)
ALP SERPL-CCNC: 62 U/L — SIGNIFICANT CHANGE UP (ref 40–120)
ALT FLD-CCNC: 6 U/L RC — LOW (ref 10–45)
ANION GAP SERPL CALC-SCNC: 10 MMOL/L — SIGNIFICANT CHANGE UP (ref 5–17)
AST SERPL-CCNC: 9 U/L — LOW (ref 10–40)
BILIRUB DIRECT SERPL-MCNC: 0.2 MG/DL — SIGNIFICANT CHANGE UP (ref 0–0.2)
BILIRUB INDIRECT FLD-MCNC: 0.4 MG/DL — SIGNIFICANT CHANGE UP (ref 0.2–1)
BILIRUB SERPL-MCNC: 0.6 MG/DL — SIGNIFICANT CHANGE UP (ref 0.2–1.2)
BUN SERPL-MCNC: 4 MG/DL — LOW (ref 7–23)
CALCIUM SERPL-MCNC: 8.2 MG/DL — LOW (ref 8.4–10.5)
CHLORIDE SERPL-SCNC: 92 MMOL/L — LOW (ref 96–108)
CO2 SERPL-SCNC: 29 MMOL/L — SIGNIFICANT CHANGE UP (ref 22–31)
CREAT SERPL-MCNC: 0.52 MG/DL — SIGNIFICANT CHANGE UP (ref 0.5–1.3)
CULTURE RESULTS: SIGNIFICANT CHANGE UP
GLUCOSE SERPL-MCNC: 157 MG/DL — HIGH (ref 70–99)
GRAM STN FLD: SIGNIFICANT CHANGE UP
HCT VFR BLD CALC: 33.7 % — LOW (ref 34.5–45)
HGB BLD-MCNC: 11.3 G/DL — LOW (ref 11.5–15.5)
MAGNESIUM SERPL-MCNC: 1.8 MG/DL — SIGNIFICANT CHANGE UP (ref 1.6–2.6)
MCHC RBC-ENTMCNC: 29.4 PG — SIGNIFICANT CHANGE UP (ref 27–34)
MCHC RBC-ENTMCNC: 33.6 GM/DL — SIGNIFICANT CHANGE UP (ref 32–36)
MCV RBC AUTO: 87.4 FL — SIGNIFICANT CHANGE UP (ref 80–100)
PHOSPHATE SERPL-MCNC: 3.4 MG/DL — SIGNIFICANT CHANGE UP (ref 2.5–4.5)
PLATELET # BLD AUTO: 530 K/UL — HIGH (ref 150–400)
POTASSIUM SERPL-MCNC: 4.5 MMOL/L — SIGNIFICANT CHANGE UP (ref 3.5–5.3)
POTASSIUM SERPL-SCNC: 4.5 MMOL/L — SIGNIFICANT CHANGE UP (ref 3.5–5.3)
PROT SERPL-MCNC: 5.9 G/DL — LOW (ref 6–8.3)
RBC # BLD: 3.85 M/UL — SIGNIFICANT CHANGE UP (ref 3.8–5.2)
RBC # FLD: 12.9 % — SIGNIFICANT CHANGE UP (ref 10.3–14.5)
SODIUM SERPL-SCNC: 131 MMOL/L — LOW (ref 135–145)
SPECIMEN SOURCE: SIGNIFICANT CHANGE UP
SPECIMEN SOURCE: SIGNIFICANT CHANGE UP
WBC # BLD: 17.4 K/UL — HIGH (ref 3.8–10.5)
WBC # FLD AUTO: 17.4 K/UL — HIGH (ref 3.8–10.5)

## 2017-06-10 PROCEDURE — 99233 SBSQ HOSP IP/OBS HIGH 50: CPT | Mod: GC

## 2017-06-10 PROCEDURE — 99232 SBSQ HOSP IP/OBS MODERATE 35: CPT

## 2017-06-10 PROCEDURE — 99232 SBSQ HOSP IP/OBS MODERATE 35: CPT | Mod: GC

## 2017-06-10 RX ADMIN — HYDROMORPHONE HYDROCHLORIDE 30 MILLILITER(S): 2 INJECTION INTRAMUSCULAR; INTRAVENOUS; SUBCUTANEOUS at 20:21

## 2017-06-10 RX ADMIN — OCTREOTIDE ACETATE 5 MICROGRAM(S)/HR: 200 INJECTION, SOLUTION INTRAVENOUS; SUBCUTANEOUS at 12:05

## 2017-06-10 RX ADMIN — Medication 1 SPRAY(S): at 05:02

## 2017-06-10 RX ADMIN — ENOXAPARIN SODIUM 40 MILLIGRAM(S): 100 INJECTION SUBCUTANEOUS at 11:11

## 2017-06-10 RX ADMIN — HYDROMORPHONE HYDROCHLORIDE 30 MILLILITER(S): 2 INJECTION INTRAMUSCULAR; INTRAVENOUS; SUBCUTANEOUS at 07:14

## 2017-06-10 RX ADMIN — ONDANSETRON 4 MILLIGRAM(S): 8 TABLET, FILM COATED ORAL at 09:01

## 2017-06-10 RX ADMIN — CEFEPIME 100 MILLIGRAM(S): 1 INJECTION, POWDER, FOR SOLUTION INTRAMUSCULAR; INTRAVENOUS at 19:13

## 2017-06-10 RX ADMIN — Medication 1 SPRAY(S): at 19:13

## 2017-06-10 RX ADMIN — CEFEPIME 100 MILLIGRAM(S): 1 INJECTION, POWDER, FOR SOLUTION INTRAMUSCULAR; INTRAVENOUS at 05:03

## 2017-06-10 RX ADMIN — DEXTROSE MONOHYDRATE, SODIUM CHLORIDE, AND POTASSIUM CHLORIDE 100 MILLILITER(S): 50; .745; 4.5 INJECTION, SOLUTION INTRAVENOUS at 09:01

## 2017-06-10 RX ADMIN — FLUCONAZOLE 100 MILLIGRAM(S): 150 TABLET ORAL at 19:05

## 2017-06-10 NOTE — PROGRESS NOTE ADULT - SUBJECTIVE AND OBJECTIVE BOX
INTERVAL HPI/OVERNIGHT EVENTS: Patient is being seen for pain management, follow-up visit.  Dx is ovarian cancer, st IV, bowel obstruction with colostomy, sp IR drainage of gangrenous gallbladder      Allergies    penicillin (Rash)    Intolerances      ADVANCE DIRECTIVES:    DNR  No  MOLST [ ] YES [x ] NO     MEDICATIONS  (STANDING):  enoxaparin Injectable 40milliGRAM(s) SubCutaneous daily  cefepime  IVPB 2000milliGRAM(s) IV Intermittent every 12 hours  acetaminophen  IVPB. 1000milliGRAM(s) IV Intermittent once  octreotide  Infusion 25MICROgram(s)/Hr IV Continuous <Continuous>  ondansetron Injectable 4milliGRAM(s) IV Push every 8 hours  dextrose 5% + sodium chloride 0.9% with potassium chloride 20 mEq/L 1000milliLiter(s) IV Continuous <Continuous>  HYDROmorphone PCA (1 mG/mL) 30milliLiter(s) PCA Continuous PCA Continuous  fluconAZOLE IVPB  IV Intermittent   fluconAZOLE IVPB 200milliGRAM(s) IV Intermittent every 24 hours  tetracaine/benzocaine/butamben Spray 1Spray(s) Topical three times a day    MEDICATIONS  (PRN):  naloxone Injectable 0.1milliGRAM(s) IV Push every 3 minutes PRN For ANY of the following changes in patient status:  A. RR LESS THAN 10 breaths per minute, B. Oxygen saturation LESS THAN 90%, C. Sedation score of 6  ondansetron Injectable 4milliGRAM(s) IV Push every 8 hours PRN Nausea  HYDROmorphone PCA (1 mG/mL) Rescue Clinician Bolus 1milliGRAM(s) IV Push every 1 hour PRN for Pain Scale GREATER THAN 6  HYDROmorphone  Injectable 0.5milliGRAM(s) IV Push every 1 hour PRN Severe Pain (7 - 10)      PRESENT SYMPTOMS:  Source: [x Patient   [ ] Family   [ ] Team     Pain:                        [ ] No [x ] Yes             [ ] Mild [x ] Moderate [ ] Severe    Onset - On or about June 7th  Location - RUQ, at incision site and from NGT  Duration - see onset  Character - somatic/visceral  Alleviating/Aggravating - pain medication is helping achieve pain score of 2-4, movement exacerbates  Radiation - None  Timing - every 1-3 hours    Dyspnea:                [x ] No [ ] Yes             [ ] Mild [ ] Moderate [ ] Severe    Anxiety:                  [x ] No [ ] Yes             [ ] Mild [ ] Moderate [ ] Severe    Fatigue:                  [ ] No [ x] Yes             x[ ] Mild [ ] Moderate [ ] Severe    Nausea:                  [x ] No [ ] Yes             [ ] Mild [ ] Moderate [ ] Severe    Loss of appetite:   x[ ] No [ ] Yes             [ ] Mild [ ] Moderate [ ] Severe    Constipation:        [ x] No [ ] Yes             [ ] Mild [ ] Moderate [ ] Severe    Other Symptoms:  [x ] All other review of systems negative   [ ] Unable to obtain due to poor mentation     Karnofsky Performance Score/Palliative Performance Status Version 2:  60      %    PHYSICAL EXAM:  Vital Signs Last 24 Hrs  T(C): 36.8, Max: 37.6 (06-10 @ 01:20)  T(F): 98.2, Max: 99.6 (06-10 @ 01:20)  HR: 85 (85 - 92)  BP: 98/67 (98/67 - 118/66)  BP(mean): 87 (77 - 87)  RR: 17 (16 - 18)  SpO2: 95% (91% - 100%) I&O's Summary  I & Os for 24h ending 10 Alexx 2017 07:00  =============================================  IN: 2505 ml / OUT: 450 ml / NET: 2055 ml    I & Os for current day (as of 10 Alexx 2017 11:12)  =============================================  IN: 0 ml / OUT: 150 ml / NET: -150 ml      General:  x[ ] Alert  [ x] Oriented x3      [ ] Lethargic  [ ] Agitated   [ ] Cachexia   [ ] Unarousable  [ x] Verbal  [ ] Non-Verbal    HEENT:  [x ] Normal   [ ] Dry mouth   [ ] ET Tube    [ ] Trach  [ ] Oral lesions    Lungs:   [ x] Clear [ ] Tachypnea  [ ] Audible excessive secretions   [ ] Rhonchi        [ ] Right [ ] Left [ ] Bilateral  [ ] Crackles        [ ] Right [ ] Left [ ] Bilateral  [ ] Wheezing     [ ] Right [ ] Left [ ] Bilateral    Cardiovascular:  [ x] Regular S1S2, no murmer [ ] Irregular [ ] Tachycardia   [ ] Bradycardia  [ ] Murmur [ ] Other    Abdomen: [ ] Soft  [ ] Distended   [ x] +BS  [ ] Non tender [x ] Tender  [ ]PEG   [x ]OGT/ NGT   Last BM:   Colostomy midline incision drain     Genitourinary: [x ] Normal [ ] Incontinent   [ ] Oliguria/Anuria   [ ] Lynn    Musculoskeletal:  [x ] Normal   [ ] Weakness  [ ] Bedbound/Wheelchair bound [ ] Edema    Neurological: [x ] No focal deficits  [ ] Cognitive impairment  [ ] Dysphagia [ ] Dysarthria [ ] Paresis [ ] Other     Skin: [x ] Normal   [ ] Pressure ulcer(s)                  [ ] Rash    LABS:                        11.3   17.4  )-----------( 530      ( 10 Alexx 2017 06:04 )             33.7     06-10    131<L>  |  92<L>  |  4<L>  ----------------------------<  157<H>  4.5   |  29  |  0.52    Ca    8.2<L>      10 Alexx 2017 06:04  Phos  3.4     06-10  Mg     1.8     06-10    TPro  5.9<L>  /  Alb  2.6<L>  /  TBili  0.6  /  DBili  0.2  /  AST  9<L>  /  ALT  6<L>  /  AlkPhos  62  06-10          Shock: [ ] Septic [ ] Cardiogenic [ ] Neurologic [ ] Hypovolemic  Vasopressors x   Inotrophs x     Oral Intake: [x ] Unable/mouth care only [ ] Minimal [ ] Moderate [ ] Full Capability    Diet: [ x] NPO [ ] Tube feeds [ ] TPN [ ] Other     RADIOLOGY & ADDITIONAL STUDIES: Reviewed    REFERRALS:   [ ] Chaplaincy  [ ] Hospice  [ ] Child Life  [ ] Social Work  [ ] Case management [ ] Holistic Therapy

## 2017-06-10 NOTE — PROGRESS NOTE ADULT - ASSESSMENT
47 yo F with ovarian cancer s/p GRETCHEN, presented with abdominal pain, found to have acute cholecystitis, with perforated gallbladder with necrosis.  Now s/p IR drainage of gallbladder bed.

## 2017-06-10 NOTE — PROGRESS NOTE ADULT - PROBLEM SELECTOR PLAN 1
Patient is currently on PCA Dilaudid for pain control.   She is using it appropriately, with pain relief.  Usage is stable requiring no increase in dosing today.

## 2017-06-10 NOTE — PROGRESS NOTE ADULT - SUBJECTIVE AND OBJECTIVE BOX
ATP SURGERY    SUBJECTIVE:       OBJECTIVE:   T(C): 37.4, Max: 37.6 (06-10 @ 01:20)  HR: 90 (82 - 92)  BP: 109/71 (106/61 - 118/66)  RR: 17 (16 - 18)  SpO2: 95% (91% - 100%)  Wt(kg): --      I&O's Detail  I & Os for 24h ending 09 Jun 2017 07:00  =============================================  IN:    dextrose 5% + sodium chloride 0.9% with potassium chloride 20 mEq/L: 2400 ml    IV PiggyBack: 150 ml    octreotide  Infusion: 125 ml    Total IN: 2675 ml  ---------------------------------------------  OUT:    Voided: 1000 ml    Nasoenteral Tube: 300 ml    Total OUT: 1300 ml  ---------------------------------------------  Total NET: 1375 ml    I & Os for current day (as of 10 Alexx 2017 05:21)  =============================================  IN:    dextrose 5% + sodium chloride 0.9% with potassium chloride 20 mEq/L: 2200 ml    IV PiggyBack: 200 ml    octreotide  Infusion: 105 ml    Total IN: 2505 ml  ---------------------------------------------  OUT:    Colostomy: 200 ml    Nasoenteral Tube: 50 ml    Total OUT: 250 ml  ---------------------------------------------  Total NET: 2255 ml      Physical exam:  General:  Abdomen:    MEDICATIONS  (STANDING):  enoxaparin Injectable 40milliGRAM(s) SubCutaneous daily  cefepime  IVPB 2000milliGRAM(s) IV Intermittent every 12 hours  acetaminophen  IVPB. 1000milliGRAM(s) IV Intermittent once  octreotide  Infusion 25MICROgram(s)/Hr IV Continuous <Continuous>  ondansetron Injectable 4milliGRAM(s) IV Push every 8 hours  dextrose 5% + sodium chloride 0.9% with potassium chloride 20 mEq/L 1000milliLiter(s) IV Continuous <Continuous>  HYDROmorphone PCA (1 mG/mL) 30milliLiter(s) PCA Continuous PCA Continuous  fluconAZOLE IVPB  IV Intermittent   fluconAZOLE IVPB 200milliGRAM(s) IV Intermittent every 24 hours  tetracaine/benzocaine/butamben Spray 1Spray(s) Topical three times a day    MEDICATIONS  (PRN):  naloxone Injectable 0.1milliGRAM(s) IV Push every 3 minutes PRN For ANY of the following changes in patient status:  A. RR LESS THAN 10 breaths per minute, B. Oxygen saturation LESS THAN 90%, C. Sedation score of 6  ondansetron Injectable 4milliGRAM(s) IV Push every 8 hours PRN Nausea  HYDROmorphone PCA (1 mG/mL) Rescue Clinician Bolus 1milliGRAM(s) IV Push every 1 hour PRN for Pain Scale GREATER THAN 6  HYDROmorphone  Injectable 0.5milliGRAM(s) IV Push every 1 hour PRN Severe Pain (7 - 10) ATP SURGERY    SUBJECTIVE:   denies n/v/f/c, denies SOB or CP     OBJECTIVE:       T(C): 37.4, Max: 37.6 (06-10 @ 01:20)  HR: 90 (82 - 92)  BP: 109/71 (106/61 - 118/66)  RR: 17 (16 - 18)  SpO2: 95% (91% - 100%)  Wt(kg): --      General: NAD, NGT in place  HEENT: WNL  Lymph nodes: Non-tender, no palpable masses  Neck: No masses  Cardiovascular: Regular rate and rhythm; normal S1, S2; no murmurs, rubs, or gallops  Lungs: Lungs clear to auscultation bilaterally; No wheezes or crackles    Abdominal:  -Abdomen soft and non-distended  -No pulsatile masses, no abdominal bruits ascultated  -Non-Tender; No reflex tenderness; No guarding;   - wound well healing    I&O's Detail  I & Os for 24h ending 09 Jun 2017 07:00  =============================================  IN:    dextrose 5% + sodium chloride 0.9% with potassium chloride 20 mEq/L: 2400 ml    IV PiggyBack: 150 ml    octreotide  Infusion: 125 ml    Total IN: 2675 ml  ---------------------------------------------  OUT:    Voided: 1000 ml    Nasoenteral Tube: 300 ml    Total OUT: 1300 ml  ---------------------------------------------  Total NET: 1375 ml    I & Os for current day (as of 10 Alexx 2017 05:21)  =============================================  IN:    dextrose 5% + sodium chloride 0.9% with potassium chloride 20 mEq/L: 2200 ml    IV PiggyBack: 200 ml    octreotide  Infusion: 105 ml    Total IN: 2505 ml  ---------------------------------------------  OUT:    Colostomy: 200 ml    Nasoenteral Tube: 50 ml    Total OUT: 250 ml  ---------------------------------------------  Total NET: 2255 ml      Physical exam:  General:  Abdomen:    MEDICATIONS  (STANDING):  enoxaparin Injectable 40milliGRAM(s) SubCutaneous daily  cefepime  IVPB 2000milliGRAM(s) IV Intermittent every 12 hours  acetaminophen  IVPB. 1000milliGRAM(s) IV Intermittent once  octreotide  Infusion 25MICROgram(s)/Hr IV Continuous <Continuous>  ondansetron Injectable 4milliGRAM(s) IV Push every 8 hours  dextrose 5% + sodium chloride 0.9% with potassium chloride 20 mEq/L 1000milliLiter(s) IV Continuous <Continuous>  HYDROmorphone PCA (1 mG/mL) 30milliLiter(s) PCA Continuous PCA Continuous  fluconAZOLE IVPB  IV Intermittent   fluconAZOLE IVPB 200milliGRAM(s) IV Intermittent every 24 hours  tetracaine/benzocaine/butamben Spray 1Spray(s) Topical three times a day    MEDICATIONS  (PRN):  naloxone Injectable 0.1milliGRAM(s) IV Push every 3 minutes PRN For ANY of the following changes in patient status:  A. RR LESS THAN 10 breaths per minute, B. Oxygen saturation LESS THAN 90%, C. Sedation score of 6  ondansetron Injectable 4milliGRAM(s) IV Push every 8 hours PRN Nausea  HYDROmorphone PCA (1 mG/mL) Rescue Clinician Bolus 1milliGRAM(s) IV Push every 1 hour PRN for Pain Scale GREATER THAN 6  HYDROmorphone  Injectable 0.5milliGRAM(s) IV Push every 1 hour PRN Severe Pain (7 - 10)

## 2017-06-10 NOTE — PROGRESS NOTE ADULT - ASSESSMENT
Impression:  1. Acute cholecystitis- with attempted axios stenting and removal, now with IR perc ubaldo  2. Stage 4 ovarian CA  3. Colostomy due to obstruction    Plan:  1. Monitor CMP  2. Serial abd exams  3. Management by surgical team Impression:  1. Acute cholecystitis- with axios stenting and removal secondary to migration, now with IR perc ubaldo  2. Stage 4 ovarian CA  3. Colostomy due to obstruction    Plan:  1. Monitor CMP  2. Serial abd exams  3. Management by surgical team

## 2017-06-10 NOTE — PROGRESS NOTE ADULT - SUBJECTIVE AND OBJECTIVE BOX
Chief Complaint:  Patient is a 46y old  Female who presents with a chief complaint of Abdominal pain (02 Jun 2017 05:03)      Interval Events: Pt feels better. Still has NGT to low intermittent suction. Had successful IR placement of Perc ubaldo tube yesterday    Allergies:  penicillin (Rash)      Hospital Medications:  enoxaparin Injectable 40milliGRAM(s) SubCutaneous daily  cefepime  IVPB 2000milliGRAM(s) IV Intermittent every 12 hours  acetaminophen  IVPB. 1000milliGRAM(s) IV Intermittent once  octreotide  Infusion 25MICROgram(s)/Hr IV Continuous <Continuous>  naloxone Injectable 0.1milliGRAM(s) IV Push every 3 minutes PRN  ondansetron Injectable 4milliGRAM(s) IV Push every 8 hours PRN  ondansetron Injectable 4milliGRAM(s) IV Push every 8 hours  dextrose 5% + sodium chloride 0.9% with potassium chloride 20 mEq/L 1000milliLiter(s) IV Continuous <Continuous>  HYDROmorphone PCA (1 mG/mL) 30milliLiter(s) PCA Continuous PCA Continuous  HYDROmorphone PCA (1 mG/mL) Rescue Clinician Bolus 1milliGRAM(s) IV Push every 1 hour PRN  HYDROmorphone  Injectable 0.5milliGRAM(s) IV Push every 1 hour PRN  fluconAZOLE IVPB  IV Intermittent   fluconAZOLE IVPB 200milliGRAM(s) IV Intermittent every 24 hours  tetracaine/benzocaine/butamben Spray 1Spray(s) Topical three times a day      PMHX/PSHX:  Ovarian cancer  Colostomy in place  H/O abdominal hysterectomy      Family history:  No pertinent family history in first degree relatives      ROS:     General:  No wt loss, fevers, chills, night sweats, fatigue,   Eyes:  Good vision, no reported pain  ENT:  No sore throat, pain, runny nose, dysphagia  CV:  No pain, palpitations, hypo/hypertension  Resp:  No dyspnea, cough, tachypnea, wheezing  GI:  See HPI  :  No pain, bleeding, incontinence, nocturia  Muscle:  No pain, weakness  Neuro:  No weakness, tingling, memory problems  Psych:  No fatigue, insomnia, mood problems, depression  Endocrine:  No polyuria, polydipsia, cold/heat intolerance  Heme:  No petechiae, ecchymosis, easy bruisability  Skin:  No rash, edema      PHYSICAL EXAM:     GENERAL:  Appears stated age, well-groomed, well-nourished, no distress  HEENT:  NC/AT,  conjunctivae clear, sclera -anicteric  CHEST:  Full & symmetric excursion, no increased effort, breath sounds clear  HEART:  Regular rhythm, S1, S2, no murmur/rub/S3/S4,  no edema  ABDOMEN:  Soft, non-tender, non-distended, normoactive bowel sounds,  no masses ,no hepato-splenomegaly,   EXTREMITIES:  no cyanosis,clubbing or edema  SKIN:  No rash/erythema/ecchymoses/petechiae/wounds/abscess/warm/dry  NEURO:  Alert, oriented    Vital Signs:  Vital Signs Last 24 Hrs  T(C): 37.4, Max: 37.6 (06-10 @ 01:20)  T(F): 99.3, Max: 99.6 (06-10 @ 01:20)  HR: 90 (82 - 92)  BP: 109/71 (106/61 - 118/66)  BP(mean): 87 (77 - 87)  RR: 17 (16 - 18)  SpO2: 95% (91% - 100%)  Daily     Daily     LABS:                        11.3   17.4  )-----------( 530      ( 10 Alexx 2017 06:04 )             33.7     06-10    131<L>  |  92<L>  |  4<L>  ----------------------------<  157<H>  4.5   |  29  |  0.52    Ca    8.2<L>      10 Alexx 2017 06:04  Phos  3.4     06-10  Mg     1.8     06-10    TPro  5.9<L>  /  Alb  2.6<L>  /  TBili  0.6  /  DBili  0.2  /  AST  9<L>  /  ALT  6<L>  /  AlkPhos  62  06-10    LIVER FUNCTIONS - ( 10 Alexx 2017 06:04 )  Alb: 2.6 g/dL / Pro: 5.9 g/dL / ALK PHOS: 62 U/L / ALT: 6 U/L RC / AST: 9 U/L / GGT: x                   Imaging: Chief Complaint:  Patient is a 46y old  Female who presents with a chief complaint of Abdominal pain (02 Jun 2017 05:03)      Interval Events: Pt feels better. Still has NGT to low intermittent suction. Had successful IR placement of Perc ubaldo tube yesterday    Allergies:  penicillin (Rash)      Hospital Medications:  enoxaparin Injectable 40milliGRAM(s) SubCutaneous daily  cefepime  IVPB 2000milliGRAM(s) IV Intermittent every 12 hours  acetaminophen  IVPB. 1000milliGRAM(s) IV Intermittent once  octreotide  Infusion 25MICROgram(s)/Hr IV Continuous <Continuous>  naloxone Injectable 0.1milliGRAM(s) IV Push every 3 minutes PRN  ondansetron Injectable 4milliGRAM(s) IV Push every 8 hours PRN  ondansetron Injectable 4milliGRAM(s) IV Push every 8 hours  dextrose 5% + sodium chloride 0.9% with potassium chloride 20 mEq/L 1000milliLiter(s) IV Continuous <Continuous>  HYDROmorphone PCA (1 mG/mL) 30milliLiter(s) PCA Continuous PCA Continuous  HYDROmorphone PCA (1 mG/mL) Rescue Clinician Bolus 1milliGRAM(s) IV Push every 1 hour PRN  HYDROmorphone  Injectable 0.5milliGRAM(s) IV Push every 1 hour PRN  fluconAZOLE IVPB  IV Intermittent   fluconAZOLE IVPB 200milliGRAM(s) IV Intermittent every 24 hours  tetracaine/benzocaine/butamben Spray 1Spray(s) Topical three times a day      PMHX/PSHX:  Ovarian cancer  Colostomy in place  H/O abdominal hysterectomy      Family history:  No pertinent family history in first degree relatives      ROS:     General:  No wt loss, fevers, chills, night sweats, fatigue,   Eyes:  Good vision, no reported pain  ENT:  No sore throat, pain, runny nose, dysphagia  CV:  No pain, palpitations, hypo/hypertension  Resp:  No dyspnea, cough, tachypnea, wheezing  GI:  See HPI  :  No pain, bleeding, incontinence, nocturia  Muscle:  No pain, weakness  Neuro:  No weakness, tingling, memory problems  Psych:  No fatigue, insomnia, mood problems, depression  Endocrine:  No polyuria, polydipsia, cold/heat intolerance  Heme:  No petechiae, ecchymosis, easy bruisability  Skin:  No rash, edema      PHYSICAL EXAM:     GENERAL:  Appears stated age, well-groomed, well-nourished, no distress  HEENT:  NC/AT,  conjunctivae clear, sclera -anicteric  CHEST:  Full & symmetric excursion, no increased effort, breath sounds clear  HEART:  Regular rhythm, S1, S2, no murmur/rub/S3/S4,  no edema  ABDOMEN:  Soft, surgical wound, colostomy, IR drain with bile  EXTREMITIES:  no cyanosis,clubbing or edema  SKIN:  No rash/erythema/ecchymoses/petechiae/wounds/abscess/warm/dry  NEURO:  Alert, oriented    Vital Signs:  Vital Signs Last 24 Hrs  T(C): 37.4, Max: 37.6 (06-10 @ 01:20)  T(F): 99.3, Max: 99.6 (06-10 @ 01:20)  HR: 90 (82 - 92)  BP: 109/71 (106/61 - 118/66)  BP(mean): 87 (77 - 87)  RR: 17 (16 - 18)  SpO2: 95% (91% - 100%)  Daily     Daily     LABS:                        11.3   17.4  )-----------( 530      ( 10 Alexx 2017 06:04 )             33.7     06-10    131<L>  |  92<L>  |  4<L>  ----------------------------<  157<H>  4.5   |  29  |  0.52    Ca    8.2<L>      10 Alexx 2017 06:04  Phos  3.4     06-10  Mg     1.8     06-10    TPro  5.9<L>  /  Alb  2.6<L>  /  TBili  0.6  /  DBili  0.2  /  AST  9<L>  /  ALT  6<L>  /  AlkPhos  62  06-10    LIVER FUNCTIONS - ( 10 Alexx 2017 06:04 )  Alb: 2.6 g/dL / Pro: 5.9 g/dL / ALK PHOS: 62 U/L / ALT: 6 U/L RC / AST: 9 U/L / GGT: x                   Imaging:

## 2017-06-10 NOTE — PROGRESS NOTE ADULT - ATTENDING COMMENTS
Patient seen and examined.  Agree with house staff note with the exception that at this time patient has mild diffuse abdominal tenderness without peritoneal signs.  GI and VIR notes appreciated.  1. Will continue IV antibiotics for acute cholecystitis and intraabdominal abscess.  2. Will repeat imaging on 6/12/17 to assure complete source control. If imaging showing good source control, will re-start oral nutrition.  3. Palliative care / oncology seeing patient for pain control and eventual palliative chemo tx.  Care discussed with patient and  at bedside.

## 2017-06-10 NOTE — PROGRESS NOTE ADULT - ASSESSMENT
47 yo F s/p Acute cholecystitis s/p gastrocholecystic drainage w/ stent + dilation; E. Coli bacteremia (6/2), s/p endoscopy, removal of stent 6/7  - pain control   - DVT ppx  - out of bed, ambulate  - Incentive spirometry  - f/u am labs 45 yo F s/p Acute cholecystitis s/p gastrocholecystic drainage w/ stent + dilation; E. Coli bacteremia (6/2), s/p endoscopy, removal of stent 6/7  - pain control   - DVT ppx  - out of bed, ambulate  - Incentive spirometry  - f/u am labs   -CT in AM tomorrow

## 2017-06-11 LAB
ANION GAP SERPL CALC-SCNC: 12 MMOL/L — SIGNIFICANT CHANGE UP (ref 5–17)
BUN SERPL-MCNC: 4 MG/DL — LOW (ref 7–23)
CALCIUM SERPL-MCNC: 8.4 MG/DL — SIGNIFICANT CHANGE UP (ref 8.4–10.5)
CHLORIDE SERPL-SCNC: 93 MMOL/L — LOW (ref 96–108)
CO2 SERPL-SCNC: 29 MMOL/L — SIGNIFICANT CHANGE UP (ref 22–31)
CREAT SERPL-MCNC: 0.52 MG/DL — SIGNIFICANT CHANGE UP (ref 0.5–1.3)
GLUCOSE SERPL-MCNC: 124 MG/DL — HIGH (ref 70–99)
HCT VFR BLD CALC: 36 % — SIGNIFICANT CHANGE UP (ref 34.5–45)
HGB BLD-MCNC: 11.1 G/DL — LOW (ref 11.5–15.5)
MAGNESIUM SERPL-MCNC: 2 MG/DL — SIGNIFICANT CHANGE UP (ref 1.6–2.6)
MCHC RBC-ENTMCNC: 27 PG — SIGNIFICANT CHANGE UP (ref 27–34)
MCHC RBC-ENTMCNC: 30.7 GM/DL — LOW (ref 32–36)
MCV RBC AUTO: 87.9 FL — SIGNIFICANT CHANGE UP (ref 80–100)
PHOSPHATE SERPL-MCNC: 3.9 MG/DL — SIGNIFICANT CHANGE UP (ref 2.5–4.5)
PLATELET # BLD AUTO: 539 K/UL — HIGH (ref 150–400)
POTASSIUM SERPL-MCNC: 4.7 MMOL/L — SIGNIFICANT CHANGE UP (ref 3.5–5.3)
POTASSIUM SERPL-SCNC: 4.7 MMOL/L — SIGNIFICANT CHANGE UP (ref 3.5–5.3)
RBC # BLD: 4.1 M/UL — SIGNIFICANT CHANGE UP (ref 3.8–5.2)
RBC # FLD: 13 % — SIGNIFICANT CHANGE UP (ref 10.3–14.5)
SODIUM SERPL-SCNC: 134 MMOL/L — LOW (ref 135–145)
WBC # BLD: 17.1 K/UL — HIGH (ref 3.8–10.5)
WBC # FLD AUTO: 17.1 K/UL — HIGH (ref 3.8–10.5)

## 2017-06-11 PROCEDURE — 74177 CT ABD & PELVIS W/CONTRAST: CPT | Mod: 26

## 2017-06-11 PROCEDURE — 99232 SBSQ HOSP IP/OBS MODERATE 35: CPT

## 2017-06-11 RX ORDER — SODIUM CHLORIDE 9 MG/ML
1000 INJECTION, SOLUTION INTRAVENOUS
Qty: 0 | Refills: 0 | Status: DISCONTINUED | OUTPATIENT
Start: 2017-06-11 | End: 2017-06-21

## 2017-06-11 RX ORDER — SODIUM CHLORIDE 9 MG/ML
1 INJECTION INTRAMUSCULAR; INTRAVENOUS; SUBCUTANEOUS ONCE
Qty: 0 | Refills: 0 | Status: COMPLETED | OUTPATIENT
Start: 2017-06-11 | End: 2017-06-11

## 2017-06-11 RX ADMIN — ONDANSETRON 4 MILLIGRAM(S): 8 TABLET, FILM COATED ORAL at 16:12

## 2017-06-11 RX ADMIN — HYDROMORPHONE HYDROCHLORIDE 30 MILLILITER(S): 2 INJECTION INTRAMUSCULAR; INTRAVENOUS; SUBCUTANEOUS at 19:26

## 2017-06-11 RX ADMIN — SODIUM CHLORIDE 100 MILLILITER(S): 9 INJECTION, SOLUTION INTRAVENOUS at 19:18

## 2017-06-11 RX ADMIN — ENOXAPARIN SODIUM 40 MILLIGRAM(S): 100 INJECTION SUBCUTANEOUS at 11:22

## 2017-06-11 RX ADMIN — FLUCONAZOLE 100 MILLIGRAM(S): 150 TABLET ORAL at 16:12

## 2017-06-11 RX ADMIN — DEXTROSE MONOHYDRATE, SODIUM CHLORIDE, AND POTASSIUM CHLORIDE 100 MILLILITER(S): 50; .745; 4.5 INJECTION, SOLUTION INTRAVENOUS at 01:37

## 2017-06-11 RX ADMIN — HYDROMORPHONE HYDROCHLORIDE 30 MILLILITER(S): 2 INJECTION INTRAMUSCULAR; INTRAVENOUS; SUBCUTANEOUS at 21:56

## 2017-06-11 RX ADMIN — SODIUM CHLORIDE 1 GRAM(S): 9 INJECTION INTRAMUSCULAR; INTRAVENOUS; SUBCUTANEOUS at 10:06

## 2017-06-11 RX ADMIN — CEFEPIME 100 MILLIGRAM(S): 1 INJECTION, POWDER, FOR SOLUTION INTRAMUSCULAR; INTRAVENOUS at 17:33

## 2017-06-11 RX ADMIN — HYDROMORPHONE HYDROCHLORIDE 30 MILLILITER(S): 2 INJECTION INTRAMUSCULAR; INTRAVENOUS; SUBCUTANEOUS at 07:36

## 2017-06-11 RX ADMIN — CEFEPIME 100 MILLIGRAM(S): 1 INJECTION, POWDER, FOR SOLUTION INTRAMUSCULAR; INTRAVENOUS at 06:25

## 2017-06-11 RX ADMIN — ONDANSETRON 4 MILLIGRAM(S): 8 TABLET, FILM COATED ORAL at 07:38

## 2017-06-11 RX ADMIN — OCTREOTIDE ACETATE 5 MICROGRAM(S)/HR: 200 INJECTION, SOLUTION INTRAVENOUS; SUBCUTANEOUS at 11:22

## 2017-06-11 NOTE — PROGRESS NOTE ADULT - SUBJECTIVE AND OBJECTIVE BOX
Patient seen and examined.    C/O throat pain from NGT.    vitals stable, afebrile  Appears chronically ill  No acute distress  abdomen with moderate diffuse tenderness without peritoneal signs  RUQ drain with bilious fluid    CT-abd / pelvis repeated today.  Retrogastric collection still present (to my eye).    Care discussed at length with GI / VIR attendings yesterday.    Will likely need VIR guided drainage of retrogastric collection.  Discussed with patient.  Once collection well drained, will attempt oral nutrition.      Will d/c octreotide at this time as I am not sure if it is being effective with current pathology.

## 2017-06-11 NOTE — PROGRESS NOTE ADULT - SUBJECTIVE AND OBJECTIVE BOX
ATP SURGERY    SUBJECTIVE:   denies n/v/f/c, denies SOB or CP.     OBJECTIVE:       T(C): 37.4, Max: 37.6 (06-10 @ 01:20)  HR: 90 (82 - 92)  BP: 109/71 (106/61 - 118/66)  RR: 17 (16 - 18)  SpO2: 95% (91% - 100%)  Wt(kg): --      General: NAD, NGT in place  HEENT: WNL  Lymph nodes: Non-tender, no palpable masses  Neck: No masses  Cardiovascular: Regular rate and rhythm; normal S1, S2; no murmurs, rubs, or gallops  Lungs: Lungs clear to auscultation bilaterally; No wheezes or crackles    Abdominal:  -Abdomen soft and non-distended. Pink, prolapsed ostomy   -Non-Tender; No reflex tenderness; No guarding;   - wound well healing  - Perc ubaldo drain with bilious output; NGT with bilious output.     Vital Signs Last 24 Hrs  T(C): 37.2, Max: 37.4 (06-11 @ 06:28)  T(F): 99, Max: 99.4 (06-11 @ 06:28)  HR: 96 (78 - 96)  BP: 111/75 (100/67 - 111/75)  BP(mean): --  RR: 16 (16 - 18)  SpO2: 94% (93% - 96%)    I&O's Detail    I & Os for current day (as of 11 Jun 2017 11:35)  =============================================  IN:    dextrose 5% + sodium chloride 0.9% with potassium chloride 20 mEq/L: 2300 ml    octreotide  Infusion: 120 ml    IV PiggyBack: 100 ml    Solution: 50 ml    Total IN: 2570 ml  ---------------------------------------------  OUT:    Voided: 2000 ml    Drain: 400 ml    Nasoenteral Tube: 80 ml    Total OUT: 2480 ml  ---------------------------------------------  Total NET: 90 ml                            11.1   17.1  )-----------( 539      ( 11 Jun 2017 07:02 )             36.0       06-11    134<L>  |  93<L>  |  4<L>  ----------------------------<  124<H>  4.7   |  29  |  0.52    Ca    8.4      11 Jun 2017 07:02  Phos  3.9     06-11  Mg     2.0     06-11    TPro  5.9<L>  /  Alb  2.6<L>  /  TBili  0.6  /  DBili  0.2  /  AST  9<L>  /  ALT  6<L>  /  AlkPhos  62  06-10

## 2017-06-11 NOTE — PROGRESS NOTE ADULT - ASSESSMENT
45 yo F s/p Acute cholecystitis s/p gastrocholecystic drainage w/ stent + dilation; E. Coli bacteremia (6/2), s/p endoscopy, removal of stent 6/7    - CT a/p today  - pain control   - DVT ppx  - out of bed, ambulate  - Incentive spirometry    PANTERA Sultana, PGY1  Pager: [phone]

## 2017-06-12 DIAGNOSIS — K25.1 ACUTE GASTRIC ULCER WITH PERFORATION: ICD-10-CM

## 2017-06-12 DIAGNOSIS — K65.1 PERITONEAL ABSCESS: ICD-10-CM

## 2017-06-12 DIAGNOSIS — C80.0 DISSEMINATED MALIGNANT NEOPLASM, UNSPECIFIED: ICD-10-CM

## 2017-06-12 DIAGNOSIS — S36.39XA OTHER INJURY OF STOMACH, INITIAL ENCOUNTER: ICD-10-CM

## 2017-06-12 LAB
ALBUMIN SERPL ELPH-MCNC: 2.7 G/DL — LOW (ref 3.3–5)
ALP SERPL-CCNC: 65 U/L — SIGNIFICANT CHANGE UP (ref 40–120)
ALT FLD-CCNC: 7 U/L RC — LOW (ref 10–45)
ANION GAP SERPL CALC-SCNC: 10 MMOL/L — SIGNIFICANT CHANGE UP (ref 5–17)
APTT BLD: 31.2 SEC — SIGNIFICANT CHANGE UP (ref 27.5–37.4)
AST SERPL-CCNC: 9 U/L — LOW (ref 10–40)
BILIRUB SERPL-MCNC: 0.5 MG/DL — SIGNIFICANT CHANGE UP (ref 0.2–1.2)
BUN SERPL-MCNC: 5 MG/DL — LOW (ref 7–23)
CALCIUM SERPL-MCNC: 8.3 MG/DL — LOW (ref 8.4–10.5)
CHLORIDE SERPL-SCNC: 93 MMOL/L — LOW (ref 96–108)
CO2 SERPL-SCNC: 29 MMOL/L — SIGNIFICANT CHANGE UP (ref 22–31)
CREAT SERPL-MCNC: 0.52 MG/DL — SIGNIFICANT CHANGE UP (ref 0.5–1.3)
GLUCOSE SERPL-MCNC: 122 MG/DL — HIGH (ref 70–99)
HCT VFR BLD CALC: 34.9 % — SIGNIFICANT CHANGE UP (ref 34.5–45)
HGB BLD-MCNC: 11.2 G/DL — LOW (ref 11.5–15.5)
INR BLD: 1.32 RATIO — HIGH (ref 0.88–1.16)
MCHC RBC-ENTMCNC: 28.2 PG — SIGNIFICANT CHANGE UP (ref 27–34)
MCHC RBC-ENTMCNC: 31.9 GM/DL — LOW (ref 32–36)
MCV RBC AUTO: 88.5 FL — SIGNIFICANT CHANGE UP (ref 80–100)
PLATELET # BLD AUTO: 709 K/UL — HIGH (ref 150–400)
POTASSIUM SERPL-MCNC: 4.5 MMOL/L — SIGNIFICANT CHANGE UP (ref 3.5–5.3)
POTASSIUM SERPL-SCNC: 4.5 MMOL/L — SIGNIFICANT CHANGE UP (ref 3.5–5.3)
PROT SERPL-MCNC: 6.1 G/DL — SIGNIFICANT CHANGE UP (ref 6–8.3)
PROTHROM AB SERPL-ACNC: 14.3 SEC — HIGH (ref 9.8–12.7)
RBC # BLD: 3.95 M/UL — SIGNIFICANT CHANGE UP (ref 3.8–5.2)
RBC # FLD: 12.8 % — SIGNIFICANT CHANGE UP (ref 10.3–14.5)
SODIUM SERPL-SCNC: 132 MMOL/L — LOW (ref 135–145)
WBC # BLD: 16.2 K/UL — HIGH (ref 3.8–10.5)
WBC # FLD AUTO: 16.2 K/UL — HIGH (ref 3.8–10.5)

## 2017-06-12 PROCEDURE — 99231 SBSQ HOSP IP/OBS SF/LOW 25: CPT

## 2017-06-12 PROCEDURE — 99232 SBSQ HOSP IP/OBS MODERATE 35: CPT | Mod: GC

## 2017-06-12 PROCEDURE — 99233 SBSQ HOSP IP/OBS HIGH 50: CPT

## 2017-06-12 PROCEDURE — 49406 IMAGE CATH FLUID PERI/RETRO: CPT | Mod: 79

## 2017-06-12 RX ORDER — ENOXAPARIN SODIUM 100 MG/ML
40 INJECTION SUBCUTANEOUS DAILY
Qty: 0 | Refills: 0 | Status: DISCONTINUED | OUTPATIENT
Start: 2017-06-12 | End: 2017-06-21

## 2017-06-12 RX ORDER — OCTREOTIDE ACETATE 200 UG/ML
200 INJECTION, SOLUTION INTRAVENOUS; SUBCUTANEOUS THREE TIMES A DAY
Qty: 0 | Refills: 0 | Status: DISCONTINUED | OUTPATIENT
Start: 2017-06-12 | End: 2017-06-14

## 2017-06-12 RX ADMIN — HYDROMORPHONE HYDROCHLORIDE 30 MILLILITER(S): 2 INJECTION INTRAMUSCULAR; INTRAVENOUS; SUBCUTANEOUS at 07:10

## 2017-06-12 RX ADMIN — HYDROMORPHONE HYDROCHLORIDE 30 MILLILITER(S): 2 INJECTION INTRAMUSCULAR; INTRAVENOUS; SUBCUTANEOUS at 15:36

## 2017-06-12 RX ADMIN — CEFEPIME 100 MILLIGRAM(S): 1 INJECTION, POWDER, FOR SOLUTION INTRAMUSCULAR; INTRAVENOUS at 17:23

## 2017-06-12 RX ADMIN — CEFEPIME 100 MILLIGRAM(S): 1 INJECTION, POWDER, FOR SOLUTION INTRAMUSCULAR; INTRAVENOUS at 05:50

## 2017-06-12 RX ADMIN — Medication 1 SPRAY(S): at 19:02

## 2017-06-12 RX ADMIN — HYDROMORPHONE HYDROCHLORIDE 30 MILLILITER(S): 2 INJECTION INTRAMUSCULAR; INTRAVENOUS; SUBCUTANEOUS at 19:30

## 2017-06-12 RX ADMIN — FLUCONAZOLE 100 MILLIGRAM(S): 150 TABLET ORAL at 16:30

## 2017-06-12 RX ADMIN — Medication 1 SPRAY(S): at 05:51

## 2017-06-12 NOTE — PROGRESS NOTE ADULT - PROBLEM SELECTOR PLAN 5
Not a candidate for disease modifying therapies given multiple ongoing infectious issues and persistent small bowel obstruction

## 2017-06-12 NOTE — PROGRESS NOTE ADULT - PROBLEM SELECTOR PLAN 4
Unclear if mechanical (due to compression by the large collection), adhesive (given extensive surgical history), or malignant due to carcinomatosis. Not a surgical candidate given very high risks and low likelihood for significant benefit.    Hopeful for some improvement after drainage of the abdominal fluid. Continue octreotide for malignant SBO. Not a good candidate for steroids given infectious issues. If no improvement with drainage of the fluid, will need to re-evaluate if any other palliative options exist with GI (stenting?).

## 2017-06-12 NOTE — PROGRESS NOTE ADULT - ASSESSMENT
45 yo F s/p Acute cholecystitis s/p gastrocholecystic drainage w/ stent + dilation; E. Coli bacteremia (6/2), s/p endoscopy, removal of stent 6/7    - pain control   - DVT ppx  - out of bed, ambulate  - Incentive spirometry    Pager: [phone]

## 2017-06-12 NOTE — PROGRESS NOTE ADULT - ASSESSMENT
Impression:  1) Loculated Perigastric Fluid Collection: S/p removal of stent. Suspect component of carcinomatosis contributing to fluid collection.  2) Acute cholecystitis: s/p IR Percutaneous drain.   3) Uterine Cancer s/p GRETCHEN and Ileostomy, peritoneal carcinomatosis.    Plan:  - Consider IR drainage of the perigastric fluid collection  - Continue IV Abx  - Monitor NGT output  - IVF  - Monitor electrolytes

## 2017-06-12 NOTE — CHART NOTE - NSCHARTNOTEFT_GEN_A_CORE
Interventional Radiology                                                                          Allergy: PCN rash  Pre-Procedure Note-JUAN DAVID    This is a 46 year old right hand dominant female with hx of  acute cholecystitis, intestinal obstruction, ovarian cancer, cervical herniated disc,  who is transported to IR for   an ultrasound and or imaging guided abdominal fluid collection.           Allergies: penicillin   rash  PMHX:  ovarian cancer. cholecystitis, cervical herniated disc  PSHX:  hx of hysterectomy hx of colostomy placement  T(C): 37.1    HR 89  BP 99/65  RR 18  Spo2 95%    Medications: cefepime  IVPB 2000milliGRAM(s) IV Intermittent every 12 hours  acetaminophen  IVPB. 1000milliGRAM(s) IV Intermittent once  naloxone Injectable 0.1milliGRAM(s) IV Push every 3 minutes PRN  ondansetron Injectable 4milliGRAM(s) IV Push every 8 hours PRN  ondansetron Injectable 4milliGRAM(s) IV Push every 8 hours  HYDROmorphone PCA (1 mG/mL) 30milliLiter(s) PCA Continuous PCA Continuous  HYDROmorphone PCA (1 mG/mL) Rescue Clinician Bolus 1milliGRAM(s) IV Push every 1 hour PRN  HYDROmorphone  Injectable 0.5milliGRAM(s) IV Push every 1 hour PRN  fluconAZOLE IVPB  IV Intermittent   fluconAZOLE IVPB 200milliGRAM(s) IV Intermittent every 24 hours  tetracaine/benzocaine/butamben Spray 1Spray(s) Topical three times a day  dextrose 5% + sodium chloride 0.9%. 1000milliLiter(s) IV Continuous <Continuous>    Labs:                      11.2   16.2  )-----------( 709      ( 12 Jun 2017 06:33 )             34.9     132<L>  |  93<L>  |  5<L>  ----------------------------<  122<H>  4.5   |  29  |  0.52    Ca    8.3<L>      12 Jun 2017 06:33  Phos  3.9     06-11  Mg     2.0     06-11    TPro  6.1  /  Alb  2.7<L>  /  TBili  0.5  /  DBili  x   /  AST  9<L>  /  ALT  7<L>  /  AlkPhos  65  06-12    PT/INR: 12 Jun 2017   PT: 14.3 sec;   INR: 1.32 ratio    PTT:31.2 sec    Patient is a 46 year old right hand dominant female with acute cholecystitis,  transported to IR for an ultrasound-imaging  guided abdominal drainage.    Procedure, goals, risks, benefits and alternatives were discussed with patient.  All questions were answered    Patient demonstrates understanding of all risks involved with this procedure and wish to continue.  Risks include but are not limited to infection, hemorrhage and or mild to moderate pain.   Patient signed appropriate consent and consent is in patient's chart. Interventional Radiology                                                                          Allergy: PCN rash  Pre-Procedure Note-JUAN DAVID    This is a 46 year old right hand dominant female with hx of  acute cholecystitis, intestinal obstruction, ovarian cancer, cervical herniated disc,  who is transported to IR for   an ultrasound and or imaging guided abdominal fluid collection.           Allergies: penicillin   rash  PMHX:  ovarian cancer. cholecystitis, cervical herniated disc  PSHX:  hx of hysterectomy hx of colostomy placement  T(C): 37.1    HR 89  BP 99/65  RR 18  Spo2 95%    Medications: cefepime  IVPB 2000milliGRAM(s) IV Intermittent every 12 hours  acetaminophen  IVPB. 1000milliGRAM(s) IV Intermittent once  naloxone Injectable 0.1milliGRAM(s) IV Push every 3 minutes PRN  ondansetron Injectable 4milliGRAM(s) IV Push every 8 hours PRN  ondansetron Injectable 4milliGRAM(s) IV Push every 8 hours  HYDROmorphone PCA (1 mG/mL) 30milliLiter(s) PCA Continuous PCA Continuous  HYDROmorphone PCA (1 mG/mL) Rescue Clinician Bolus 1milliGRAM(s) IV Push every 1 hour PRN  HYDROmorphone  Injectable 0.5milliGRAM(s) IV Push every 1 hour PRN  fluconAZOLE IVPB  IV Intermittent   fluconAZOLE IVPB 200milliGRAM(s) IV Intermittent every 24 hours  tetracaine/benzocaine/butamben Spray 1Spray(s) Topical three times a day  dextrose 5% + sodium chloride 0.9%. 1000milliLiter(s) IV Continuous     CT scan:  6- CT Abd/Pel The retrogastric collection measures 11.4 x 7.3 x 22.6 cm  Labs:                      11.2   16.2  )-----------( 709      ( 12 Jun 2017 06:33 )             34.9     132<L>  |  93<L>  |  5<L>  ----------------------------<  122<H>  4.5   |  29  |  0.52    Ca    8.3<L>      12 Jun 2017 06:33  Phos  3.9     06-11  Mg     2.0     06-11    TPro  6.1  /  Alb  2.7<L>  /  TBili  0.5  /  DBili  x   /  AST  9<L>  /  ALT  7<L>  /  AlkPhos  65  06-12    PT/INR: 12 Jun 2017   PT: 14.3 sec;   INR: 1.32 ratio    PTT:31.2 sec    Patient is a 46 year old right hand dominant female with acute cholecystitis,  transported to IR for an ultrasound-imaging  guided abdominal drainage.    Procedure, goals, risks, benefits and alternatives were discussed with patient.  All questions were answered    Patient demonstrates understanding of all risks involved with this procedure and wish to continue.  Risks include but are not limited to infection, hemorrhage and or mild to moderate pain.   Patient signed appropriate consent and consent is in patient's chart. Interventional Radiology                                                                          Allergy: PCN rash  Pre-Procedure Note-JUAN DAVID    This is a 46 year old right hand dominant female with hx of  acute cholecystitis, intestinal obstruction, ovarian cancer, cervical herniated disc,  who is transported to IR for   an ultrasound and or imaging guided abdominal fluid collection.           Allergies: penicillin   rash  PMHX:  ovarian cancer. cholecystitis, cervical herniated disc  PSHX:  hx of hysterectomy hx of colostomy placement  T(C): 37.1    HR 89  BP 99/65  RR 18  Spo2 95%    Medications: cefepime  IVPB 2000milliGRAM(s) IV Intermittent every 12 hours  acetaminophen  IVPB. 1000milliGRAM(s) IV Intermittent once  naloxone Injectable 0.1milliGRAM(s) IV Push every 3 minutes PRN  ondansetron Injectable 4milliGRAM(s) IV Push every 8 hours PRN  ondansetron Injectable 4milliGRAM(s) IV Push every 8 hours  HYDROmorphone PCA (1 mG/mL) 30milliLiter(s) PCA Continuous PCA Continuous  HYDROmorphone PCA (1 mG/mL) Rescue Clinician Bolus 1milliGRAM(s) IV Push every 1 hour PRN  HYDROmorphone  Injectable 0.5milliGRAM(s) IV Push every 1 hour PRN  fluconAZOLE IVPB  IV Intermittent   fluconAZOLE IVPB 200milliGRAM(s) IV Intermittent every 24 hours  tetracaine/benzocaine/butamben Spray 1Spray(s) Topical three times a day  dextrose 5% + sodium chloride 0.9%. 1000milliLiter(s) IV Continuous     CT scan:  6- CT Abd/Pel The retrogastric collection measures 11.4 x 7.3 x 22.6 cm  Labs:                      11.2   16.2  )-----------( 709      ( 12 Jun 2017 06:33 )             34.9     132<L>  |  93<L>  |  5<L>  ----------------------------<  122<H>  4.5   |  29  |  0.52    Ca    8.3<L>      12 Jun 2017 06:33  Phos  3.9     06-11  Mg     2.0     06-11    TPro  6.1  /  Alb  2.7<L>  /  TBili  0.5  /  DBili  x   /  AST  9<L>  /  ALT  7<L>  /  AlkPhos  65  06-12    PT/INR: 12 Jun 2017   PT: 14.3 sec;   INR: 1.32 ratio    PTT:31.2 sec    Patient is a 46 year old right hand dominant female with acute cholecystitis,  transported to IR for an ultrasound-imaging  guided abdominal drainage.    Procedure, goals, risks, benefits and alternatives were discussed with patient.  All questions were answered    Patient demonstrates understanding of all risks involved with this procedure and wish to continue.  Risks include but are not limited to infection, hemorrhage and or mild to moderate pain.   Patient signed appropriate consent and consent is in patient's chart.    As above, discussed case with Dr. More of surgery.  Will proceed with drainage of lesser sac collection.  Consent obtained and reinforced.

## 2017-06-12 NOTE — PROGRESS NOTE ADULT - PROBLEM SELECTOR PLAN 3
Large lesser sac collection adjacent to gastric perforation. Unresolved after endoscopic drainage. Plan for IR drain today. Will need tube check in ~1 week to assess for any persistent communication with the stomach

## 2017-06-12 NOTE — PROGRESS NOTE ADULT - SUBJECTIVE AND OBJECTIVE BOX
ADVANCED GI FOLLOW UP NOTE:    SUBJECTIVE:  - NGT suctioned 680cc of bilious fluid  - Pain is controlled. No nausea    Review of Systems: negative except as above.    OBJECTIVE:  Vital Signs Last 24 Hrs  T(C): 37.5, Max: 37.6 (06-12 @ 01:06)  T(F): 99.5, Max: 99.7 (06-12 @ 01:06)  HR: 88 (81 - 96)  BP: 101/69 (100/66 - 111/75)  BP(mean): --  RR: 17 (16 - 18)  SpO2: 94% (93% - 95%)    PHYSICAL EXAM:  Constitutional: no acute distress, NGT in place.  Eyes: no icterus  Neck: no masses, no LAD  Respiratory: normal inspiratory effort; no wheezing or crackles  Cardiovascular: RRR, normal S1/S2, no murmurs/rubs/gallops  Gastrointestinal: soft, nontender, left ostomy bag, +BS, RUQ biliary drain  Extremities: no LE edema  Neurological: AAOx3, no asterixis  Skin: no rashes, bruises, petechiae    LABS:                        11.2   16.2  )-----------( 709      ( 12 Jun 2017 06:33 )             34.9     06-12    132<L>  |  93<L>  |  5<L>  ----------------------------<  122<H>  4.5   |  29  |  0.52    Ca    8.3<L>      12 Jun 2017 06:33  Phos  3.9     06-11  Mg     2.0     06-11    TPro  6.1  /  Alb  2.7<L>  /  TBili  0.5  /  DBili  x   /  AST  9<L>  /  ALT  7<L>  /  AlkPhos  65  06-12    PT/INR - ( 12 Jun 2017 06:33 )   PT: 14.3 sec;   INR: 1.32 ratio       PTT - ( 12 Jun 2017 06:33 )  PTT:31.2 sec  LIVER FUNCTIONS - ( 12 Jun 2017 06:33 )  Alb: 2.7 g/dL / Pro: 6.1 g/dL / ALK PHOS: 65 U/L / ALT: 7 U/L RC / AST: 9 U/L / GGT: x

## 2017-06-12 NOTE — PROGRESS NOTE ADULT - ASSESSMENT
47 yo F with ovarian cancer s/p GRETCHEN, presented with abdominal pain, found to have acute cholecystitis, complicated after stent placement with perforated gallbladder with abscesses.  S/p stent removal 6/7/2017 via endoscopy with NG tube placement on intermittent suction. 47 yo F with ovarian cancer s/p GRETCHEN, presented with abdominal pain, found to have acute cholecystitis, complicated after stent placement with perforated gallbladder with ans associated intra-abdominal abscesse.  S/p stent removal 6/7/2017 via endoscopy with NG tube placement on intermittent suction.

## 2017-06-12 NOTE — PROGRESS NOTE ADULT - PROBLEM SELECTOR PLAN 4
At this time I doubt the patient is a candidate for disease modifying Rx understanding advanced illness, fluid collections, complications of procedures, and issues with route for nutrition.  Patient is to follow up with medical ONC when stable and after acute issues resolved.

## 2017-06-12 NOTE — PROGRESS NOTE ADULT - SUBJECTIVE AND OBJECTIVE BOX
ATP SURGERY    SUBJECTIVE:       OBJECTIVE:       General: NAD    Abdominal:  -Abdomen soft and non-distended. Pink, prolapsed ostomy   -Non-Tender; No reflex tenderness; No guarding;   - wound well healing  - Perc ubaldo drain with bilious output; NGT with bilious output. ATP SURGERY    SUBJECTIVE:   denies n/v/f/c, denies SOB or CP       OBJECTIVE:   General: NAD    Abdominal:  -Abdomen soft and non-distended. Pink, prolapsed ostomy   -Non-Tender; No reflex tenderness; No guarding;   - wound well healing  - Perc ubaldo drain with bilious output; NGT with bilious output.     GENERAL SURGERY DAILY PROGRESS NOTE:       Subjective:      Objective:      MEDICATIONS  (STANDING):  enoxaparin Injectable 40milliGRAM(s) SubCutaneous daily  cefepime  IVPB 2000milliGRAM(s) IV Intermittent every 12 hours  acetaminophen  IVPB. 1000milliGRAM(s) IV Intermittent once  ondansetron Injectable 4milliGRAM(s) IV Push every 8 hours  HYDROmorphone PCA (1 mG/mL) 30milliLiter(s) PCA Continuous PCA Continuous  fluconAZOLE IVPB  IV Intermittent   fluconAZOLE IVPB 200milliGRAM(s) IV Intermittent every 24 hours  tetracaine/benzocaine/butamben Spray 1Spray(s) Topical three times a day  dextrose 5% + sodium chloride 0.9%. 1000milliLiter(s) IV Continuous <Continuous>    MEDICATIONS  (PRN):  naloxone Injectable 0.1milliGRAM(s) IV Push every 3 minutes PRN For ANY of the following changes in patient status:  A. RR LESS THAN 10 breaths per minute, B. Oxygen saturation LESS THAN 90%, C. Sedation score of 6  ondansetron Injectable 4milliGRAM(s) IV Push every 8 hours PRN Nausea  HYDROmorphone PCA (1 mG/mL) Rescue Clinician Bolus 1milliGRAM(s) IV Push every 1 hour PRN for Pain Scale GREATER THAN 6  HYDROmorphone  Injectable 0.5milliGRAM(s) IV Push every 1 hour PRN Severe Pain (7 - 10)      Vital Signs Last 24 Hrs  T(C): 37.5, Max: 37.6 (06-12 @ 01:06)  T(F): 99.5, Max: 99.7 (06-12 @ 01:06)  HR: 88 (81 - 96)  BP: 101/69 (100/66 - 111/75)  BP(mean): --  RR: 17 (16 - 18)  SpO2: 94% (93% - 95%)    I&O's Detail    I & Os for current day (as of 12 Jun 2017 08:15)  =============================================  IN:    dextrose 5% + sodium chloride 0.9%.: 1200 ml    dextrose 5% + sodium chloride 0.9% with potassium chloride 20 mEq/L: 950 ml    IV PiggyBack: 200 ml    octreotide  Infusion: 60 ml    Total IN: 2410 ml  ---------------------------------------------  OUT:    Voided: 1600 ml    Nasoenteral Tube: 680 ml    Drain: 225 ml    Total OUT: 2505 ml  ---------------------------------------------  Total NET: -95 ml      Daily     Daily     LABS:                        11.2   16.2  )-----------( 709      ( 12 Jun 2017 06:33 )             34.9     06-12    132<L>  |  93<L>  |  5<L>  ----------------------------<  122<H>  4.5   |  29  |  0.52    Ca    8.3<L>      12 Jun 2017 06:33  Phos  3.9     06-11  Mg     2.0     06-11    TPro  6.1  /  Alb  2.7<L>  /  TBili  0.5  /  DBili  x   /  AST  9<L>  /  ALT  7<L>  /  AlkPhos  65  06-12    PT/INR - ( 12 Jun 2017 06:33 )   PT: 14.3 sec;   INR: 1.32 ratio         PTT - ( 12 Jun 2017 06:33 )  PTT:31.2 sec

## 2017-06-12 NOTE — PROGRESS NOTE ADULT - SUBJECTIVE AND OBJECTIVE BOX
Interventional Radiology Procedure Note    Procedure:  Drainage abdominal abscess    Indication:  Abscess  persistent pain    Operators: Jose Armando Vines    Anesthesia (type):  General    Contrast:  20 cc    EBL: 10 cc    Findings/Follow up Plan of Care: Drainage using US and fluoro guidance yielded approx 400 cc foul-smelling yellow turbid fluid - specimen sent for culture.    Specimens Removed: culture    Implants: 12 [place] drain    Complications: None    Condition/Disposition:  Extubate- PACU then floors.    Please call Interventional Radiology x [phone] with any questions, concerns, or issues.

## 2017-06-12 NOTE — PROGRESS NOTE ADULT - SUBJECTIVE AND OBJECTIVE BOX
INTERVAL HPI/OVERNIGHT EVENTS: Following up for pain     No events overnight, however this AM, patient spiked a fever. Patient's pain was well controlled after starting her PCA around 4 pm yesterday. Patient used about 10 mg total, pushed 20 times since 4 pm yesterday to 10:30 AM.  Patient denies any nausea or vomiting. Currently NPO, with NG to suction.  She did notice some liquid from ostomy. Not much gas. She denies any sob or chest pain.  Abdomen feels a little softer to her, but she still has pain about 5/10 this AM.  She is walking around.    Patient was started on Fluconazole for fungal coverage.     ADVANCE DIRECTIVES:    DNR: NO           PRESENT SYMPTOMS:   SOURCE: Patient      Pain:     Dyspnea:   NO  Anxiety:NO  Fatigue: NO  Nausea: NO  Loss of Appetite:NO  Constipation [ x ] YES      OTHER SYMPTOMS:  [ X] All other ROS negative     [ ] Unable to obtain due to poor mentation    MEDICATIONS  (STANDING):  cefepime  IVPB 2000milliGRAM(s) IV Intermittent every 12 hours  acetaminophen  IVPB. 1000milliGRAM(s) IV Intermittent once  ondansetron Injectable 4milliGRAM(s) IV Push every 8 hours  HYDROmorphone PCA (1 mG/mL) 30milliLiter(s) PCA Continuous PCA Continuous  fluconAZOLE IVPB  IV Intermittent   fluconAZOLE IVPB 200milliGRAM(s) IV Intermittent every 24 hours  tetracaine/benzocaine/butamben Spray 1Spray(s) Topical three times a day  dextrose 5% + sodium chloride 0.9%. 1000milliLiter(s) IV Continuous <Continuous>    MEDICATIONS  (PRN):  naloxone Injectable 0.1milliGRAM(s) IV Push every 3 minutes PRN For ANY of the following changes in patient status:  A. RR LESS THAN 10 breaths per minute, B. Oxygen saturation LESS THAN 90%, C. Sedation score of 6  ondansetron Injectable 4milliGRAM(s) IV Push every 8 hours PRN Nausea  HYDROmorphone PCA (1 mG/mL) Rescue Clinician Bolus 1milliGRAM(s) IV Push every 1 hour PRN for Pain Scale GREATER THAN 6  HYDROmorphone  Injectable 0.5milliGRAM(s) IV Push every 1 hour PRN Severe Pain (7 - 10)      Karnofsky Performance Score/Palliative Performance Status Version 2: 60  %  Protein Calorie Malnutrition:  [ ] Mild   [ ] Moderate   [ ] Severe     Physical Exam:    General: Awake alert, comfortable.   HEENT: PERRL with EOMI.   Lungs: Clear to Ausc bilaterally, no wheezing.   Cardiovascular: S1, S2. No S3  RRR, no murmurs   Abdomen: Rigid, firm, tenderness to palpate diffusely, normal bowel sounds (increased compared to yesterday)    Last BM:5/31/2017     Genitourinary: Normal  Musculoskeletal:  Normal  Neurological: no focal deficits. Alert, oriented x 3     Skin: warm and dry     Vital Signs Last 24 Hrs  T(C): 37.1, Max: 37.6 (06-12 @ 01:06)  T(F): 98.7, Max: 99.7 (06-12 @ 01:06)  HR: 89 (81 - 90)  BP: 99/65 (99/65 - 110/73)  BP(mean): --  RR: 18 (16 - 18)  SpO2: 95% (93% - 95%)                        11.2   16.2  )-----------( 709      ( 12 Jun 2017 06:33 )             34.9     06-12    132<L>  |  93<L>  |  5<L>  ----------------------------<  122<H>  4.5   |  29  |  0.52    Ca    8.3<L>      12 Jun 2017 06:33  Phos  3.9     06-11  Mg     2.0     06-11    TPro  6.1  /  Alb  2.7<L>  /  TBili  0.5  /  DBili  x   /  AST  9<L>  /  ALT  7<L>  /  AlkPhos  65  06-12    IMPRESSION:   1.  The stomach, duodenum, and jejunum are dilated with an abrupt zone of   transition in the left upper quadrant.  2.  The cystogastrostomy communication is not visualized with multiple   clips at its expected location (? Mobile/ligation).  3.  Peritoneal carcinomatosis is unchanged. INTERVAL HPI/OVERNIGHT EVENTS: Following up for pain     Patient indicates pain is well controlled with Dialudid PCA. She indicates she is still not able to have a BM. She denies nausea. CT showing intraabdominal abscess and bowel obstruction.      ADVANCE DIRECTIVES:    DNR: NO           PRESENT SYMPTOMS:   SOURCE: Patient      Pain: Moderate. Dull. Constant. Improving with Dilaudid. Increasing with palpation.     Dyspnea:   NO  Anxiety:NO  Fatigue: Mild to moderate   Nausea: NO  Loss of Appetite:NO  Constipation [ x ] YES. Colostomy output: Elisabeth 280ml since 6/2    OTHER SYMPTOMS:  [ X] All other ROS negative     [ ] Unable to obtain due to poor mentation    MEDICATIONS  (STANDING):  cefepime  IVPB 2000milliGRAM(s) IV Intermittent every 12 hours  acetaminophen  IVPB. 1000milliGRAM(s) IV Intermittent once  ondansetron Injectable 4milliGRAM(s) IV Push every 8 hours  HYDROmorphone PCA (1 mG/mL) 30milliLiter(s) PCA Continuous PCA Continuous  fluconAZOLE IVPB  IV Intermittent   fluconAZOLE IVPB 200milliGRAM(s) IV Intermittent every 24 hours  tetracaine/benzocaine/butamben Spray 1Spray(s) Topical three times a day  dextrose 5% + sodium chloride 0.9%. 1000milliLiter(s) IV Continuous <Continuous>    MEDICATIONS  (PRN):  naloxone Injectable 0.1milliGRAM(s) IV Push every 3 minutes PRN For ANY of the following changes in patient status:  A. RR LESS THAN 10 breaths per minute, B. Oxygen saturation LESS THAN 90%, C. Sedation score of 6  ondansetron Injectable 4milliGRAM(s) IV Push every 8 hours PRN Nausea  HYDROmorphone PCA (1 mG/mL) Rescue Clinician Bolus 1milliGRAM(s) IV Push every 1 hour PRN for Pain Scale GREATER THAN 6  HYDROmorphone  Injectable 0.5milliGRAM(s) IV Push every 1 hour PRN Severe Pain (7 - 10)      Karnofsky Performance Score/Palliative Performance Status Version 2: 60  %  Protein Calorie Malnutrition:  [ ] Mild   [ ] Moderate   [ ] Severe     Physical Exam:    General: Awake alert, comfortable.   HEENT: PERRL with EOMI.   Lungs: Clear to Ausc bilaterally, no wheezing.   Cardiovascular: S1, S2. No S3  RRR, no murmurs   Abdomen: Soft, voluntary guarding, tenderness to palpate diffusely, BS appear hyperactive.     Genitourinary: Normal  Musculoskeletal:  Weakness   Neurological: no focal deficits. Alert, oriented x 3     Skin: warm and dry     Vital Signs Last 24 Hrs  T(C): 37.1, Max: 37.6 (06-12 @ 01:06)  T(F): 98.7, Max: 99.7 (06-12 @ 01:06)  HR: 89 (81 - 90)  BP: 99/65 (99/65 - 110/73)  BP(mean): --  RR: 18 (16 - 18)  SpO2: 95% (93% - 95%)                        11.2   16.2  )-----------( 709      ( 12 Jun 2017 06:33 )             34.9     06-12    132<L>  |  93<L>  |  5<L>  ----------------------------<  122<H>  4.5   |  29  |  0.52    Ca    8.3<L>      12 Jun 2017 06:33  Phos  3.9     06-11  Mg     2.0     06-11    TPro  6.1  /  Alb  2.7<L>  /  TBili  0.5  /  DBili  x   /  AST  9<L>  /  ALT  7<L>  /  AlkPhos  65  06-12    IMPRESSION:   1.  The stomach, duodenum, and jejunum are dilated with an abrupt zone of   transition in the left upper quadrant.  2.  The cystogastrostomy communication is not visualized with multiple   clips at its expected location (? Mobile/ligation).  3.  Peritoneal carcinomatosis is unchanged.

## 2017-06-12 NOTE — DIETITIAN INITIAL EVALUATION ADULT. - OTHER INFO
seen for length of stay. NPO with NGT for suction, 45 yo F with ovarian cancer s/p GRETCHEN,ostomy, presented with abdominal pain, found to have acute cholecystitis, complicated after stent placement with perforated gallbladder with abscesses.  S/p stent removal 6/7/2017 via endoscopy with NG tube placement on intermittent suction.  KATHY

## 2017-06-12 NOTE — PROGRESS NOTE ADULT - ATTENDING COMMENTS
I saw and examined the patient with the ACS team. I reviewed and agree with the findings and plan in the resident's note, except as otherwise noted above.

## 2017-06-12 NOTE — CHART NOTE - NSCHARTNOTEFT_GEN_A_CORE
ATP POST PROCEDURE CHECK    PROCEDURE: IR-guided drainage of abdominal abscess yielding 400 cc foul-smelling yellow turbid fluid; 12 F drain in place    SUBJECTIVE: Patient complains of LUQ abdominal pain, limiting her ability to be OOB. Per her , she is more drowsy than usual tonight and she had to use the bedpan instead of getting OOB due to her discomfort. Patient denies N/V.     OBJECTIVE:     Gen: Resting in bed, AOx3  Abd: Soft, ND, LUQ tenderness. Pink, prolapsed ostomy without gas or contents in bag.    - IR drain with scant purulent drainage. Dressing c/d/i.    - Perc ubaldo drain with bilious output   - NGT with bilious output    Vital Signs Last 24 Hrs  T(C): 36.9, Max: 37.6 (06-12 @ 01:06)  T(F): 98.5, Max: 99.7 (06-12 @ 01:06)  HR: 79 (79 - 96)  BP: 101/69 (99/65 - 114/62)  BP(mean): --  RR: 18 (12 - 18)  SpO2: 93% (93% - 99%)    I&O's Detail  I & Os for 24h ending 12 Jun 2017 07:00  =============================================  IN:    dextrose 5% + sodium chloride 0.9%.: 1200 ml    dextrose 5% + sodium chloride 0.9% with potassium chloride 20 mEq/L: 950 ml    IV PiggyBack: 200 ml    octreotide  Infusion: 60 ml    Total IN: 2410 ml  ---------------------------------------------  OUT:    Voided: 1600 ml    Nasoenteral Tube: 680 ml    Drain: 225 ml    Total OUT: 2505 ml  ---------------------------------------------  Total NET: -95 ml    I & Os for current day (as of 12 Jun 2017 21:47)  =============================================  IN:    dextrose 5% + sodium chloride 0.9%.: 100 ml    Total IN: 100 ml  ---------------------------------------------  OUT:    Voided: 700 ml    Drain: 110 ml    Nasoenteral Tube: 30 ml    Drain: 3 ml    Total OUT: 843 ml  ---------------------------------------------  Total NET: -743 ml                            11.2   16.2  )-----------( 709      ( 12 Jun 2017 06:33 )             34.9       06-12    132<L>  |  93<L>  |  5<L>  ----------------------------<  122<H>  4.5   |  29  |  0.52    Ca    8.3<L>      12 Jun 2017 06:33  Phos  3.9     06-11  Mg     2.0     06-11    TPro  6.1  /  Alb  2.7<L>  /  TBili  0.5  /  DBili  x   /  AST  9<L>  /  ALT  7<L>  /  AlkPhos  65  06-12    ASSESSMENT:   47 yo F s/p Acute cholecystitis s/p gastrocholecystic drainage w/ stent + dilation; E. Coli bacteremia (6/2), s/p endoscopy, removal of stent 6/7 s/p IR drainage (6/12) with SBO of unclear provenance    PLAN:   - NPO / NGT; f/u ostomy function  - c/w octreotide for malignant SBO  - Pain control with PCA  - IR tube check in approximately one week    PANTERA Sultana PGY1  Pager: [phone]

## 2017-06-12 NOTE — PROGRESS NOTE ADULT - SUBJECTIVE AND OBJECTIVE BOX
[facility] Trauma/Acute Care [place] Progress Note    HOSPITAL DAY #: 11  STATUS POST:   6/2: EUS w/ AXIOS stent placement  6/7: EGD w/ AXIOS stent removal, lesser sac drainage and Ovesco clip placement    SUBJECTIVE: no n/v, upper abdominal pain well controlled. no flatus or stool. pain meds are controlling pain well    Pain (0-10): 0    ROS: see HPI    OBJECTIVE:   Vital Signs Last 24 Hrs  T(C): 36.2, Max: 37.6 (06-12 @ 01:06)  T(F): 97.2, Max: 99.7 (06-12 @ 01:06)  HR: 87 (82 - 90)  BP: 106/62 (99/65 - 114/62)  BP(mean): --  RR: 14 (12 - 18)  SpO2: 96% (93% - 99%)    Constitutional: appears comfortable  Respiratory: non-labored, CTAB  Cardiovascular: RRR, no murmors  Gastrointestinal: soft, minimal upper abdominal tenderness  Musculoskeletal: no CCE, DAVIS without pain  Integumentary: CDI  Neurological: alert and oriented x 3, no focal deficits  Psychiatric: somewhat flattened affect, appropriately anxious    LABS:                        11.2   16.2  )-----------( 709      ( 12 Jun 2017 06:33 )             34.9     06-12    132<L>  |  93<L>  |  5<L>  ----------------------------<  122<H>  4.5   |  29  |  0.52    Ca    8.3<L>      12 Jun 2017 06:33  Phos  3.9     06-11  Mg     2.0     06-11    TPro  6.1  /  Alb  2.7<L>  /  TBili  0.5  /  DBili  x   /  AST  9<L>  /  ALT  7<L>  /  AlkPhos  65  06-12    PT/INR - ( 12 Jun 2017 06:33 )   PT: 14.3 sec;   INR: 1.32 ratio         PTT - ( 12 Jun 2017 06:33 )  PTT:31.2 sec      MEDICATIONS:  naloxone Injectable 0.1milliGRAM(s) IV Push every 3 minutes PRN  tetracaine/benzocaine/butamben Spray 1Spray(s) Topical three times a day  dextrose 5% + sodium chloride 0.9%. 1000milliLiter(s) IV Continuous <Continuous>    Anticoagulation:    Antibiotics:   cefepime  IVPB 2000milliGRAM(s) IV Intermittent every 12 hours  fluconAZOLE IVPB  IV Intermittent   fluconAZOLE IVPB 200milliGRAM(s) IV Intermittent every 24 hours    Pain medications:   acetaminophen  IVPB. 1000milliGRAM(s) IV Intermittent once  ondansetron Injectable 4milliGRAM(s) IV Push every 8 hours PRN  ondansetron Injectable 4milliGRAM(s) IV Push every 8 hours  HYDROmorphone PCA (1 mG/mL) 30milliLiter(s) PCA Continuous PCA Continuous  HYDROmorphone PCA (1 mG/mL) Rescue Clinician Bolus 1milliGRAM(s) IV Push every 1 hour PRN  HYDROmorphone  Injectable 0.5milliGRAM(s) IV Push every 1 hour PRN      RADIOLOGY & ADDITIONAL STUDIES:  6/11 CT Abdomen Pelvis images personally viewed  1.  The stomach, duodenum, and jejunum are dilated with an abrupt zone of transition in the left upper quadrant.  (This is adjacent to the lesser sac collection, as well as in a zone of carcinomatosis with possible narrowing of the   2.  The cystogastrostomy communication is not visualized with multiple clips at its expected location (? Mobile/ligation).  3.  Peritoneal carcinomatosis is unchanged.      A/P :  CHAYA PALACIO is a 46y Female who []  -    Pain control with PCA  -    VTE ppx: [ ] Lovenox [ ] Heparin  [ ] contraindicated due to [held for IR procedure]   -    Diet: NPO/NGT  -    Dispo: Home [ ]     Rehab [ ]      [facility] [ ]      To be determined [X] [facility] Trauma/Acute Care [place] Progress Note    HOSPITAL DAY #: 11  STATUS POST:   6/2: EUS w/ AXIOS stent placement  6/7: EGD w/ AXIOS stent removal, lesser sac drainage and Ovesco clip placement    SUBJECTIVE: no n/v, upper abdominal pain well controlled. no flatus or stool. pain meds are controlling pain well    Pain (0-10): 0    ROS: see HPI    PMH/PSH: reviewed in H&P    OBJECTIVE:   Vital Signs Last 24 Hrs  T(C): 36.2, Max: 37.6 (06-12 @ 01:06)  T(F): 97.2, Max: 99.7 (06-12 @ 01:06)  HR: 87 (82 - 90)  BP: 106/62 (99/65 - 114/62)  BP(mean): --  RR: 14 (12 - 18)  SpO2: 96% (93% - 99%)    Constitutional: appears comfortable  Respiratory: non-labored, CTAB  Cardiovascular: RRR, no murmors  Gastrointestinal: soft, minimal upper abdominal tenderness  Musculoskeletal: no CCE, DAVIS without pain  Integumentary: CDI  Neurological: alert and oriented x 3, no focal deficits  Psychiatric: somewhat flattened affect, appropriately anxious    LABS:                        11.2   16.2  )-----------( 709      ( 12 Jun 2017 06:33 )             34.9     06-12    132<L>  |  93<L>  |  5<L>  ----------------------------<  122<H>  4.5   |  29  |  0.52    Ca    8.3<L>      12 Jun 2017 06:33  Phos  3.9     06-11  Mg     2.0     06-11    TPro  6.1  /  Alb  2.7<L>  /  TBili  0.5  /  DBili  x   /  AST  9<L>  /  ALT  7<L>  /  AlkPhos  65  06-12    PT/INR - ( 12 Jun 2017 06:33 )   PT: 14.3 sec;   INR: 1.32 ratio         PTT - ( 12 Jun 2017 06:33 )  PTT:31.2 sec      MEDICATIONS:  naloxone Injectable 0.1milliGRAM(s) IV Push every 3 minutes PRN  tetracaine/benzocaine/butamben Spray 1Spray(s) Topical three times a day  dextrose 5% + sodium chloride 0.9%. 1000milliLiter(s) IV Continuous <Continuous>    Anticoagulation:    Antibiotics:   cefepime  IVPB 2000milliGRAM(s) IV Intermittent every 12 hours  fluconAZOLE IVPB  IV Intermittent   fluconAZOLE IVPB 200milliGRAM(s) IV Intermittent every 24 hours    Pain medications:   acetaminophen  IVPB. 1000milliGRAM(s) IV Intermittent once  ondansetron Injectable 4milliGRAM(s) IV Push every 8 hours PRN  ondansetron Injectable 4milliGRAM(s) IV Push every 8 hours  HYDROmorphone PCA (1 mG/mL) 30milliLiter(s) PCA Continuous PCA Continuous  HYDROmorphone PCA (1 mG/mL) Rescue Clinician Bolus 1milliGRAM(s) IV Push every 1 hour PRN  HYDROmorphone  Injectable 0.5milliGRAM(s) IV Push every 1 hour PRN      RADIOLOGY & ADDITIONAL STUDIES:  6/11 CT Abdomen Pelvis images personally viewed  1.  The stomach, duodenum, and jejunum are dilated with an abrupt zone of transition in the left upper quadrant.  (This is adjacent to the lesser sac collection, as well as in a zone of carcinomatosis with possible narrowing of the   2.  The cystogastrostomy communication is not visualized with multiple clips at its expected location (? Mobile/ligation).  3.  Peritoneal carcinomatosis is unchanged.      A/P :  CHAYA PALACIO is a 46y Female who presented with acute cholecystitis. She underwent an attempted endoscopic cholecystogastrostomy, complicated by gastric perforation into the lesser sac. The AXIOS stent was removed and but she has a persistent lesser sac peritoneal abscess, to be drained by IR today. She also has developed a SBO of unclear provenance (malignant vs adhesive vs mechanical from extrinsic compression by the abscess) which is requiring ongoing NGT drainage.  -    Pain control with PCA  -    VTE ppx: [ ] Lovenox [ ] Heparin  [ ] contraindicated due to [held for IR procedure]   -    Diet: NPO/NGT  -    Dispo: Home [ ]     Rehab [ ]      [facility] [ ]      To be determined [X]

## 2017-06-12 NOTE — PROGRESS NOTE ADULT - PROBLEM SELECTOR PLAN 2
Patient is now s/p gallbladder perforation, necrosis and abscess,  Abx as per primary .   Planning for IR drainage of persistent fluid collection   Any further work up and treatment as per GI/Sx/? IR Patient is now s/p gallbladder perforation, necrosis and abscess,  Abx as per primary .   Planning for IR drainage of persistent fluid collection   Any further work up and treatment as per GI/Sx/IR

## 2017-06-13 LAB
ALBUMIN SERPL ELPH-MCNC: 2.6 G/DL — LOW (ref 3.3–5)
ALP SERPL-CCNC: 59 U/L — SIGNIFICANT CHANGE UP (ref 40–120)
ALT FLD-CCNC: 5 U/L RC — LOW (ref 10–45)
ANION GAP SERPL CALC-SCNC: 13 MMOL/L — SIGNIFICANT CHANGE UP (ref 5–17)
AST SERPL-CCNC: 10 U/L — SIGNIFICANT CHANGE UP (ref 10–40)
BILIRUB DIRECT SERPL-MCNC: 0.1 MG/DL — SIGNIFICANT CHANGE UP (ref 0–0.2)
BILIRUB INDIRECT FLD-MCNC: 0.4 MG/DL — SIGNIFICANT CHANGE UP (ref 0.2–1)
BILIRUB SERPL-MCNC: 0.5 MG/DL — SIGNIFICANT CHANGE UP (ref 0.2–1.2)
BUN SERPL-MCNC: 4 MG/DL — LOW (ref 7–23)
CALCIUM SERPL-MCNC: 8.4 MG/DL — SIGNIFICANT CHANGE UP (ref 8.4–10.5)
CHLORIDE SERPL-SCNC: 94 MMOL/L — LOW (ref 96–108)
CO2 SERPL-SCNC: 28 MMOL/L — SIGNIFICANT CHANGE UP (ref 22–31)
CREAT SERPL-MCNC: 0.41 MG/DL — LOW (ref 0.5–1.3)
GLUCOSE SERPL-MCNC: 125 MG/DL — HIGH (ref 70–99)
GRAM STN FLD: SIGNIFICANT CHANGE UP
HCT VFR BLD CALC: 36.3 % — SIGNIFICANT CHANGE UP (ref 34.5–45)
HGB BLD-MCNC: 10.8 G/DL — LOW (ref 11.5–15.5)
MAGNESIUM SERPL-MCNC: 2.2 MG/DL — SIGNIFICANT CHANGE UP (ref 1.6–2.6)
MCHC RBC-ENTMCNC: 26.3 PG — LOW (ref 27–34)
MCHC RBC-ENTMCNC: 29.7 GM/DL — LOW (ref 32–36)
MCV RBC AUTO: 88.5 FL — SIGNIFICANT CHANGE UP (ref 80–100)
PHOSPHATE SERPL-MCNC: 3.7 MG/DL — SIGNIFICANT CHANGE UP (ref 2.5–4.5)
PLATELET # BLD AUTO: 655 K/UL — HIGH (ref 150–400)
POTASSIUM SERPL-MCNC: 4.2 MMOL/L — SIGNIFICANT CHANGE UP (ref 3.5–5.3)
POTASSIUM SERPL-SCNC: 4.2 MMOL/L — SIGNIFICANT CHANGE UP (ref 3.5–5.3)
PROT SERPL-MCNC: 6 G/DL — SIGNIFICANT CHANGE UP (ref 6–8.3)
RBC # BLD: 4.1 M/UL — SIGNIFICANT CHANGE UP (ref 3.8–5.2)
RBC # FLD: 12.8 % — SIGNIFICANT CHANGE UP (ref 10.3–14.5)
SODIUM SERPL-SCNC: 135 MMOL/L — SIGNIFICANT CHANGE UP (ref 135–145)
SPECIMEN SOURCE: SIGNIFICANT CHANGE UP
WBC # BLD: 10.2 K/UL — SIGNIFICANT CHANGE UP (ref 3.8–10.5)
WBC # FLD AUTO: 10.2 K/UL — SIGNIFICANT CHANGE UP (ref 3.8–10.5)

## 2017-06-13 PROCEDURE — 99232 SBSQ HOSP IP/OBS MODERATE 35: CPT | Mod: GC

## 2017-06-13 PROCEDURE — 99233 SBSQ HOSP IP/OBS HIGH 50: CPT

## 2017-06-13 RX ORDER — METRONIDAZOLE 500 MG
500 TABLET ORAL ONCE
Qty: 0 | Refills: 0 | Status: COMPLETED | OUTPATIENT
Start: 2017-06-13 | End: 2017-06-13

## 2017-06-13 RX ORDER — METRONIDAZOLE 500 MG
500 TABLET ORAL EVERY 8 HOURS
Qty: 0 | Refills: 0 | Status: DISCONTINUED | OUTPATIENT
Start: 2017-06-13 | End: 2017-06-21

## 2017-06-13 RX ORDER — METRONIDAZOLE 500 MG
TABLET ORAL
Qty: 0 | Refills: 0 | Status: DISCONTINUED | OUTPATIENT
Start: 2017-06-13 | End: 2017-06-21

## 2017-06-13 RX ADMIN — Medication 1 SPRAY(S): at 05:14

## 2017-06-13 RX ADMIN — ONDANSETRON 4 MILLIGRAM(S): 8 TABLET, FILM COATED ORAL at 08:09

## 2017-06-13 RX ADMIN — OCTREOTIDE ACETATE 200 MICROGRAM(S): 200 INJECTION, SOLUTION INTRAVENOUS; SUBCUTANEOUS at 00:54

## 2017-06-13 RX ADMIN — CEFEPIME 100 MILLIGRAM(S): 1 INJECTION, POWDER, FOR SOLUTION INTRAMUSCULAR; INTRAVENOUS at 05:13

## 2017-06-13 RX ADMIN — CEFEPIME 100 MILLIGRAM(S): 1 INJECTION, POWDER, FOR SOLUTION INTRAMUSCULAR; INTRAVENOUS at 18:13

## 2017-06-13 RX ADMIN — Medication 1 SPRAY(S): at 13:01

## 2017-06-13 RX ADMIN — HYDROMORPHONE HYDROCHLORIDE 30 MILLILITER(S): 2 INJECTION INTRAMUSCULAR; INTRAVENOUS; SUBCUTANEOUS at 07:35

## 2017-06-13 RX ADMIN — FLUCONAZOLE 100 MILLIGRAM(S): 150 TABLET ORAL at 17:28

## 2017-06-13 RX ADMIN — ONDANSETRON 4 MILLIGRAM(S): 8 TABLET, FILM COATED ORAL at 15:56

## 2017-06-13 RX ADMIN — HYDROMORPHONE HYDROCHLORIDE 30 MILLILITER(S): 2 INJECTION INTRAMUSCULAR; INTRAVENOUS; SUBCUTANEOUS at 19:38

## 2017-06-13 RX ADMIN — OCTREOTIDE ACETATE 200 MICROGRAM(S): 200 INJECTION, SOLUTION INTRAVENOUS; SUBCUTANEOUS at 10:42

## 2017-06-13 RX ADMIN — Medication 100 MILLIGRAM(S): at 11:04

## 2017-06-13 RX ADMIN — OCTREOTIDE ACETATE 200 MICROGRAM(S): 200 INJECTION, SOLUTION INTRAVENOUS; SUBCUTANEOUS at 15:56

## 2017-06-13 RX ADMIN — ENOXAPARIN SODIUM 40 MILLIGRAM(S): 100 INJECTION SUBCUTANEOUS at 11:04

## 2017-06-13 NOTE — PROGRESS NOTE ADULT - NSHPATTENDINGPLANDISCUSS_GEN_ALL_CORE
Maryellen Camarillo (GI), Giovanni (Surg Onc), and Fatoumata (Onc), as well as with the patient herself
patient and patient's

## 2017-06-13 NOTE — CONSULT NOTE ADULT - ASSESSMENT
46y Female who presented with acute cholecystitis. Pt underwent an attempted endoscopic cholecystogastrostomy, complicated by gastric perforation into the lesser sac. She has developed a SBO of unclear provenance (malignant vs adhesive vs mechanical from extrinsic compression by the abscess) which is requiring ongoing NGT drainage.  - recommend  TPN  - can consider gastrectomy 46y Female who presented with acute cholecystitis. Pt underwent an attempted endoscopic cholecystogastrostomy, complicated by gastric perforation into the lesser sac. She has developed a SBO of unclear provenance (malignant vs adhesive vs mechanical from extrinsic compression by the abscess) which is requiring ongoing NGT drainage.  - recommend  TPN  - can consider g-tube  - HARSHIL lipase, pH, amylase

## 2017-06-13 NOTE — PROGRESS NOTE ADULT - ASSESSMENT
47 yo F with ovarian cancer s/p GRETCHEN, presented with abdominal pain, found to have acute cholecystitis, complicated after stent placement with perforated gallbladder with ans associated intra-abdominal abscesse.  S/p stent removal 6/7/2017 via endoscopy with NG tube placement. s/p intraabdominal drain for abscess drainage.

## 2017-06-13 NOTE — PROGRESS NOTE ADULT - ASSESSMENT
1) Loculated Perigastric Fluid Collection likely abscess: S/p removal of stent. Now s/p IR drain.  2) Acute cholecystitis: s/p IR Percutaneous drain.   3) Uterine Cancer s/p GRETCHEN and Ileostomy, peritoneal carcinomatosis.    Plan:  - Monitor IR drain output  - Continue IV Abx  - Monitor NGT output  - IVF  - Monitor electrolytes

## 2017-06-13 NOTE — PROGRESS NOTE ADULT - SUBJECTIVE AND OBJECTIVE BOX
ATP SURGERY    SUBJECTIVE:   Reports LUQ abdominal pain overnight, limiting her ability to be OOB. Denies nausea or vomiting.       OBJECTIVE:   General: NAD    Abdominal: Soft, ND, LUQ tenderness. Pink, prolapsed ostomy without gas or contents in bag.    - IR drain with scant purulent drainage. Dressing c/d/i.    - Perc ubaldo drain with bilious output   - NGT with bilious output

## 2017-06-13 NOTE — PROGRESS NOTE ADULT - ASSESSMENT
47 yo F s/p endoscopy, removal of stent 6/7, s/p IR perc ubaldo 6/9 s/p ir drainage of collection  - cont antibiotics  - await GI function  - NPO/NGT  - oob/ amb  - pain control with pca  - f/u pallative  - f/u cultures from abscess (sent by IR)  - f/u oncology for any further recommendations 47 yo F s/p endoscopy, removal of stent 6/7, s/p IR perc ubaldo 6/9 s/p ir drainage of collection, malignant sbo  - cont antibiotics  - await ostomy fucntion  - NPO/NGT  - oob/ amb  -cont pca  -f/u pallative recommendations regarding prognosis (goals of care)  - f/u cultures from abscess (sent by IR)  - f/u oncology for any further recommendations     Nickie AKBAR [phone]

## 2017-06-13 NOTE — PROGRESS NOTE ADULT - PROBLEM SELECTOR PLAN 4
Unclear if mechanical (due to compression by the large collection), adhesive (given extensive surgical history), or malignant due to carcinomatosis.     - Hopeful for some improvement after drainage of the abdominal fluid. Given that the drain clogged and she continues to have pain, it may take some time for the abscess to fully decompress and alleviate the obstruction (if inflammation or compression by the abscess is the cause)  - Continue octreotide for malignant SBO. Not a good candidate for steroids given infectious issues. Will get surgical oncology consult to evaluate for any palliative surgical options (bypass?)  - May benefit from palliative venting PEG  - Will need to consider the role of nutritional support with PICC/TPN

## 2017-06-13 NOTE — PROGRESS NOTE ADULT - SUBJECTIVE AND OBJECTIVE BOX
IR Follow- Up Note    46y Female with acute cholecystitis s/p percutaneous cholecystostomy on 6/9/2017 and ultrasound guided placement of a lesser sac abdominal drain on 6/12/2017 with Dr. Vines.     Patient seen and examined at the bedside.   The cholecystostomy drain site is c/d/i with 125 cc output over the last 24 hours. Cx results demonstrate no growth.   The left upper quadrant drain site is c/d/i with 93 cc of output since placement on the afternoon of 6/12/2017. Cx results are pending. Leukocytosis has resolved.    T(F): 98.1, Max: 98.7 (06-12 @ 16:31)  HR: 71 (71 - 96)  BP: 102/69 (99/66 - 114/62)  RR: 18 (12 - 18)  SpO2: 94% (93% - 99%)    LABS:                        10.8   10.2  )-----------( 655      ( 13 Jun 2017 06:54 )             36.3     06-12    132<L>  |  93<L>  |  5<L>  ----------------------------<  122<H>  4.5   |  29  |  0.52    Ca    8.3<L>      12 Jun 2017 06:33    TPro  6.1  /  Alb  2.7<L>  /  TBili  0.5  /  DBili  x   /  AST  9<L>  /  ALT  7<L>  /  AlkPhos  65  06-12    PT/INR - ( 12 Jun 2017 06:33 )   PT: 14.3 sec;   INR: 1.32 ratio         PTT - ( 12 Jun 2017 06:33 )  PTT:31.2 sec    Output:    Voided: 1100 ml    Drain  (cholecystostomy): 125 cc/24 hours    Nasoenteral Tube: 130 ml    Drain (LUQ): 93 cc since placement on the afternoon of 6/12/2017      PHYSICAL EXAM:  General: Nontoxic, in NAD.  Neuro:  Alert & oriented x 3  Abdomen: The abdomen is soft. The cholecystostomy is c/d/i with bilious drainage. The left upper quadrant drain site is c/d/i with pink fluid in the bulb. Colostomy is pink without gas or contents in bag.      Impression: 46y Female admitted with aute cholecystitis s/p percutaneous cholecystostomy and a lesser sac sac collection s/p ultrasound-guided pigtail catheter drain placement.    [facility] Ovarian cancer      Plan:  - Continue to monitor drain outputs.  - Continue to flush drain per doctor orders.  - Continue to monitor patient vital signs and bloodwork.  - Findings and plan to be discussed with IR attending.     Please call IR at extension [phone] with any questions, concerns, or issues regarding above.

## 2017-06-13 NOTE — PROGRESS NOTE ADULT - ATTENDING COMMENTS
1) Loculated Perigastric Fluid Collection likely abscess: S/p removal of lumen-apposing stent draining collection into stomach last week. Now s/p IR drain yesterday with improvement in pain.  2) Acute cholecystitis: s/p IR Percutaneous drain several days ago.   3) Uterine Cancer s/p GRETCHEN and Ileostomy, peritoneal carcinomatosis.  4) Possible small bowel obstruction on imaging, with transition point in jejunum. NG tube output is diminishing.    Plan:  - Monitor IR drain output  - Continue IV Abx  - Monitor NGT output  - IVF  - Monitor electrolytes  - If small bowel obstruction worsens, will consider enteroscopy to evaluate for stent placement.

## 2017-06-13 NOTE — PROGRESS NOTE ADULT - PROBLEM SELECTOR PLAN 5
Discussed case with Onc. The patient's current infections and obstruction preclude chemo, but they will follow-up with the patient and assist with ongoing [facility] discussions with the patient and family (along with the other consulting services)

## 2017-06-13 NOTE — PROGRESS NOTE ADULT - PROBLEM SELECTOR PLAN 2
Patient is now s/p gallbladder perforation, necrosis and abscess,  Abx as per primary .   She is s/p  IR drainage of persistent fluid collection   Any further work up and treatment as per GI/Sx/IR

## 2017-06-13 NOTE — PROGRESS NOTE ADULT - PROBLEM SELECTOR PLAN 3
Large lesser sac collection adjacent to gastric perforation, s/p IR drain  - flush drain q6h due to thick pus clogging tue

## 2017-06-13 NOTE — PROGRESS NOTE ADULT - SUBJECTIVE AND OBJECTIVE BOX
[facility] GENERAL SURGERY DAILY PROGRESS NOTE:       SUBJECTIVE/ROS:   Pt stable overnight. Pt has had no flatus or bowel movement. NGT in place. Abdominal pain is controlled with pca.   [x] A ten-point review of systems was otherwise negative except as noted above.       MEDICATIONS  (STANDING):  cefepime  IVPB 2000milliGRAM(s) IV Intermittent every 12 hours  acetaminophen  IVPB. 1000milliGRAM(s) IV Intermittent once  ondansetron Injectable 4milliGRAM(s) IV Push every 8 hours  HYDROmorphone PCA (1 mG/mL) 30milliLiter(s) PCA Continuous PCA Continuous  fluconAZOLE IVPB  IV Intermittent   fluconAZOLE IVPB 200milliGRAM(s) IV Intermittent every 24 hours  tetracaine/benzocaine/butamben Spray 1Spray(s) Topical three times a day  dextrose 5% + sodium chloride 0.9%. 1000milliLiter(s) IV Continuous <Continuous>  octreotide  Injectable 200MICROGram(s) SubCutaneous three times a day  enoxaparin Injectable 40milliGRAM(s) SubCutaneous daily    MEDICATIONS  (PRN):  naloxone Injectable 0.1milliGRAM(s) IV Push every 3 minutes PRN For ANY of the following changes in patient status:  A. RR LESS THAN 10 breaths per minute, B. Oxygen saturation LESS THAN 90%, C. Sedation score of 6  ondansetron Injectable 4milliGRAM(s) IV Push every 8 hours PRN Nausea  HYDROmorphone PCA (1 mG/mL) Rescue Clinician Bolus 1milliGRAM(s) IV Push every 1 hour PRN for Pain Scale GREATER THAN 6      OBJECTIVE:    Vital Signs Last 24 Hrs  T(C): 37, Max: 37.1 ( @ 09:05)  T(F): 98.6, Max: 98.7 ( @ 09:05)  HR: 76 (75 - 96)  BP: 100/65 (99/65 - 114/62)  BP(mean): --  RR: 18 (12 - 18)  SpO2: 95% (93% - 99%)    I&O's Detail    I & Os for current day (as of 2017 07:12)  =============================================  IN:    dextrose 5% + sodium chloride 0.9%.: 1300 ml    IV PiggyBack: 50 ml    Total IN: 1350 ml  ---------------------------------------------  OUT:    Voided: 1400 ml    Drain: 150 ml    Nasoenteral Tube: 130 ml    Drain: 3 ml    Total OUT: 1683 ml  ---------------------------------------------  Total NET: -333 ml      Daily     Daily Weight in k.6 (2017 12:19)    LABS:                        11.2   16.2  )-----------( 709      ( 2017 06:33 )             34.9     06-12    132<L>  |  93<L>  |  5<L>  ----------------------------<  122<H>  4.5   |  29  |  0.52    Ca    8.3<L>      2017 06:33    TPro  6.1  /  Alb  2.7<L>  /  TBili  0.5  /  DBili  x   /  AST  9<L>  /  ALT  7<L>  /  AlkPhos  65  06-12    PT/INR - ( 2017 06:33 )   PT: 14.3 sec;   INR: 1.32 ratio         PTT - ( 2017 06:33 )  PTT:31.2 sec              PHYSICAL EXAM:  Constitutional: well developed, well nourished, NAD  Eyes: anicteric  ENMT: normal facies, symmetric  Neck: supple  Respiratory: CTA bilaterally  Cardiovascular: RRR  Gastrointestinal: abdomen soft, nontender, mild distention. No obvious masses. No peritonitis  Extremities: FROM, warm  Neurological: intact, non-focal  Skin: no gross lesions  Lymph Nodes: no gross adenopathy  Musculoskeletal: equal strength bilateral upper and lower extremities  Psychiatric: oriented x 3; appropriate  Rectal: not examined    Nickie AKBAR   Pager [phone] [facility] GENERAL SURGERY DAILY PROGRESS NOTE:       SUBJECTIVE/ROS:   Pt stable overnight. Pt has no ostomy function. NGT in place. Abdominal pain is controlled with pca.         MEDICATIONS  (STANDING):  cefepime  IVPB 2000milliGRAM(s) IV Intermittent every 12 hours  acetaminophen  IVPB. 1000milliGRAM(s) IV Intermittent once  ondansetron Injectable 4milliGRAM(s) IV Push every 8 hours  HYDROmorphone PCA (1 mG/mL) 30milliLiter(s) PCA Continuous PCA Continuous  fluconAZOLE IVPB  IV Intermittent   fluconAZOLE IVPB 200milliGRAM(s) IV Intermittent every 24 hours  tetracaine/benzocaine/butamben Spray 1Spray(s) Topical three times a day  dextrose 5% + sodium chloride 0.9%. 1000milliLiter(s) IV Continuous <Continuous>  octreotide  Injectable 200MICROGram(s) SubCutaneous three times a day  enoxaparin Injectable 40milliGRAM(s) SubCutaneous daily    MEDICATIONS  (PRN):  naloxone Injectable 0.1milliGRAM(s) IV Push every 3 minutes PRN For ANY of the following changes in patient status:  A. RR LESS THAN 10 breaths per minute, B. Oxygen saturation LESS THAN 90%, C. Sedation score of 6  ondansetron Injectable 4milliGRAM(s) IV Push every 8 hours PRN Nausea  HYDROmorphone PCA (1 mG/mL) Rescue Clinician Bolus 1milliGRAM(s) IV Push every 1 hour PRN for Pain Scale GREATER THAN 6      OBJECTIVE:    Vital Signs Last 24 Hrs  T(C): 37, Max: 37.1 ( @ 09:05)  T(F): 98.6, Max: 98.7 ( @ 09:05)  HR: 76 (75 - 96)  BP: 100/65 (99/65 - 114/62)  BP(mean): --  RR: 18 (12 - 18)  SpO2: 95% (93% - 99%)    I&O's Detail    I & Os for current day (as of 2017 07:12)  =============================================  IN:    dextrose 5% + sodium chloride 0.9%.: 1300 ml    IV PiggyBack: 50 ml    Total IN: 1350 ml  ---------------------------------------------  OUT:    Voided: 1400 ml    Drain: 150 ml    Nasoenteral Tube: 130 ml    Drain: 3 ml    Total OUT: 1683 ml  ---------------------------------------------  Total NET: -333 ml      Daily     Daily Weight in k.6 (2017 12:19)    LABS:                        11.2   16.2  )-----------( 709      ( 2017 06:33 )             34.9     06-12    132<L>  |  93<L>  |  5<L>  ----------------------------<  122<H>  4.5   |  29  |  0.52    Ca    8.3<L>      2017 06:33    TPro  6.1  /  Alb  2.7<L>  /  TBili  0.5  /  DBili  x   /  AST  9<L>  /  ALT  7<L>  /  AlkPhos  65  06-12    PT/INR - ( 2017 06:33 )   PT: 14.3 sec;   INR: 1.32 ratio         PTT - ( 2017 06:33 )  PTT:31.2 sec              PHYSICAL EXAM:  Constitutional: well developed, well nourished, NAD  Eyes: anicteric  ENMT: normal facies, symmetric  Neck: supple  Respiratory: CTA bilaterally  Cardiovascular: RRR  Gastrointestinal: abdomen soft, nontender, mild distention. No obvious masses. No peritonitis   ostomy- viable, no function  Extremities: FROM, warm  Neurological: intact, non-focal  Skin: no gross lesions  Lymph Nodes: no gross adenopathy  Musculoskeletal: equal strength bilateral upper and lower extremities  Psychiatric: oriented x 3; appropriate  Rectal: not examined    Nickie AKBAR   Pager [phone] [facility] GENERAL SURGERY DAILY PROGRESS NOTE:       SUBJECTIVE/ROS:   Pt stable overnight. Pt has no ostomy function. NGT in place. Abdominal pain is controlled with pca.  No nausea or vomiting. Stable upper abdominal pain, left > right. No radiation. No fevers.        MEDICATIONS  (STANDING):  cefepime  IVPB 2000milliGRAM(s) IV Intermittent every 12 hours  acetaminophen  IVPB. 1000milliGRAM(s) IV Intermittent once  ondansetron Injectable 4milliGRAM(s) IV Push every 8 hours  HYDROmorphone PCA (1 mG/mL) 30milliLiter(s) PCA Continuous PCA Continuous  fluconAZOLE IVPB  IV Intermittent   fluconAZOLE IVPB 200milliGRAM(s) IV Intermittent every 24 hours  tetracaine/benzocaine/butamben Spray 1Spray(s) Topical three times a day  dextrose 5% + sodium chloride 0.9%. 1000milliLiter(s) IV Continuous <Continuous>  octreotide  Injectable 200MICROGram(s) SubCutaneous three times a day  enoxaparin Injectable 40milliGRAM(s) SubCutaneous daily    MEDICATIONS  (PRN):  naloxone Injectable 0.1milliGRAM(s) IV Push every 3 minutes PRN For ANY of the following changes in patient status:  A. RR LESS THAN 10 breaths per minute, B. Oxygen saturation LESS THAN 90%, C. Sedation score of 6  ondansetron Injectable 4milliGRAM(s) IV Push every 8 hours PRN Nausea  HYDROmorphone PCA (1 mG/mL) Rescue Clinician Bolus 1milliGRAM(s) IV Push every 1 hour PRN for Pain Scale GREATER THAN 6      OBJECTIVE:    Vital Signs Last 24 Hrs  T(C): 37, Max: 37.1 ( @ 09:05)  T(F): 98.6, Max: 98.7 ( @ 09:05)  HR: 76 (75 - 96)  BP: 100/65 (99/65 - 114/62)  BP(mean): --  RR: 18 (12 - 18)  SpO2: 95% (93% - 99%)    I&O's Detail    I & Os for current day (as of 2017 07:12)  =============================================  IN:    dextrose 5% + sodium chloride 0.9%.: 1300 ml    IV PiggyBack: 50 ml    Total IN: 1350 ml  ---------------------------------------------  OUT:    Voided: 1400 ml    Drain: 150 ml    Nasoenteral Tube: 130 ml    Drain: 3 ml    Total OUT: 1683 ml  ---------------------------------------------  Total NET: -333 ml      Daily     Daily Weight in k.6 (2017 12:19)    LABS:                        11.2   16.2  )-----------( 709      ( 2017 06:33 )             34.9     06-12    132<L>  |  93<L>  |  5<L>  ----------------------------<  122<H>  4.5   |  29  |  0.52    Ca    8.3<L>      2017 06:33    TPro  6.1  /  Alb  2.7<L>  /  TBili  0.5  /  DBili  x   /  AST  9<L>  /  ALT  7<L>  /  AlkPhos  65  06-12    PT/INR - ( 2017 06:33 )   PT: 14.3 sec;   INR: 1.32 ratio         PTT - ( 2017 06:33 )  PTT:31.2 sec              PHYSICAL EXAM:  Constitutional: well developed, well nourished, NAD  Eyes: anicteric  ENMT: normal facies, symmetric  Neck: supple  Respiratory: CTA bilaterally  Cardiovascular: RRR  Gastrointestinal: abdomen soft, tender in upper abdomen, mild distention. No obvious masses. No peritonitis   ostomy- viable, no function. HARSHIL drain with purulent output. Clogged on my visit, flushed twice with 10 ml saline with return of large amount of pus.  Extremities: FROM, warm  Neurological: intact, non-focal  Skin: no gross lesions  Lymph Nodes: no gross adenopathy  Musculoskeletal: equal strength bilateral upper and lower extremities  Psychiatric: oriented x 3; appropriate  Rectal: not examined    Nickie AKBAR   Pager [phone]

## 2017-06-13 NOTE — PROGRESS NOTE ADULT - SUBJECTIVE AND OBJECTIVE BOX
ADVANCED GI FOLLOW UP NOTE:    SUBJECTIVE:  - Underwent IR drainage of intraabdominal collection - 400cc of foul smelling fluid aspiration and drain in place  - Pt reports controlled abdominal pain  - No fever or chills    Review of Systems: negative except as above.    OBJECTIVE:    Vital Signs Last 24 Hrs  T(C): 36.6, Max: 37.1 (06-12 @ 16:31)  T(F): 97.8, Max: 98.7 (06-12 @ 16:31)  HR: 79 (71 - 96)  BP: 93/59 (93/59 - 114/62)  BP(mean): --  RR: 18 (12 - 18)  SpO2: 94% (93% - 99%)    PHYSICAL EXAM:  Constitutional: no acute distress. NGT in place.  Eyes: no icterus  Neck: no masses, no LAD  Respiratory: normal inspiratory effort; no wheezing or crackles  Cardiovascular: RRR, normal S1/S2, no murmurs/rubs/gallops  Gastrointestinal: soft, nondistended, nontender, +BS. Two drains in place.  Extremities: no LE edema  Neurological: AAOx3, no asterixis  Skin: no rashes, bruises, petechiae    LABS:                        10.8   10.2  )-----------( 655      ( 13 Jun 2017 06:54 )             36.3     135  |  94<L>  |  4<L>  ----------------------------<  125<H>  4.2   |  28  |  0.41<L>    Ca    8.4      13 Jun 2017 10:53  Phos  3.7     06-13  Mg     2.2     06-13    TPro  6.0  /  Alb  2.6<L>  /  TBili  0.5  /  DBili  0.1  /  AST  10  /  ALT  5<L>  /  AlkPhos  59  06-13    PT/INR - ( 12 Jun 2017 06:33 )   PT: 14.3 sec;   INR: 1.32 ratio      PTT - ( 12 Jun 2017 06:33 )  PTT:31.2 sec  LIVER FUNCTIONS - ( 13 Jun 2017 10:53 )  Alb: 2.6 g/dL / Pro: 6.0 g/dL / ALK PHOS: 59 U/L / ALT: 5 U/L RC / AST: 10 U/L / GGT: x

## 2017-06-13 NOTE — PROGRESS NOTE ADULT - PROBLEM SELECTOR PLAN 1
Patient is currently on PCA Dilaudid for pain control. She got 16 demand dosages (DD) over 24 hours. She is c/o recurrent pain that is controlled with PCA. Will start a continues rate at 0.2 mg/hour.   She is using it appropriately, with pain relief.

## 2017-06-13 NOTE — PROGRESS NOTE ADULT - SUBJECTIVE AND OBJECTIVE BOX
INTERVAL HPI/OVERNIGHT EVENTS: Following up for pain     Patient indicates pain is well controlled with Dialudid PCA. She indicates she is still not able to have a BM. She denies nausea. She is s/p intraabdominal drain placement for intraabdominal abscess       ADVANCE DIRECTIVES:    DNR: NO           PRESENT SYMPTOMS:   SOURCE: Patient      Pain: Moderate. Dull. Constant. Improving with Dilaudid. Increasing with palpation.     Dyspnea:   NO  Anxiety:NO  Fatigue: Mild to moderate   Nausea: NO  Loss of Appetite:NO  Constipation [ x ] YES. Colostomy output: Elisabeth 280ml since 6/2    OTHER SYMPTOMS:  [ X] All other ROS negative     [ ] Unable to obtain due to poor mentation    MEDICATIONS  (STANDING):  cefepime  IVPB 2000milliGRAM(s) IV Intermittent every 12 hours  acetaminophen  IVPB. 1000milliGRAM(s) IV Intermittent once  ondansetron Injectable 4milliGRAM(s) IV Push every 8 hours  HYDROmorphone PCA (1 mG/mL) 30milliLiter(s) PCA Continuous PCA Continuous  fluconAZOLE IVPB  IV Intermittent   fluconAZOLE IVPB 200milliGRAM(s) IV Intermittent every 24 hours  tetracaine/benzocaine/butamben Spray 1Spray(s) Topical three times a day  dextrose 5% + sodium chloride 0.9%. 1000milliLiter(s) IV Continuous <Continuous>  octreotide  Injectable 200MICROGram(s) SubCutaneous three times a day  enoxaparin Injectable 40milliGRAM(s) SubCutaneous daily  metroNIDAZOLE  IVPB  IV Intermittent   metroNIDAZOLE  IVPB 500milliGRAM(s) IV Intermittent every 8 hours    MEDICATIONS  (PRN):  naloxone Injectable 0.1milliGRAM(s) IV Push every 3 minutes PRN For ANY of the following changes in patient status:  A. RR LESS THAN 10 breaths per minute, B. Oxygen saturation LESS THAN 90%, C. Sedation score of 6  ondansetron Injectable 4milliGRAM(s) IV Push every 8 hours PRN Nausea  HYDROmorphone PCA (1 mG/mL) Rescue Clinician Bolus 1milliGRAM(s) IV Push every 1 hour PRN for Pain Scale GREATER THAN 6    Karnofsky Performance Score/Palliative Performance Status Version 2: 60  %  Protein Calorie Malnutrition:  [ ] Mild   [ ] Moderate   [ ] Severe     Physical Exam:    Vital Signs Last 24 Hrs  T(C): 36.6, Max: 37.1 (06-12 @ 16:31)  T(F): 97.8, Max: 98.7 (06-12 @ 16:31)  HR: 79 (71 - 96)  BP: 93/59 (93/59 - 112/72)  BP(mean): --  RR: 18 (18 - 18)  SpO2: 94% (93% - 97%)    General: Awake alert, comfortable.   HEENT: PERRL with EOMI.   Lungs: Clear to Ausc bilaterally, no wheezing.   Cardiovascular: S1, S2. No S3  RRR, no murmurs   Abdomen: Soft, voluntary guarding, tenderness to palpate diffusely, BS appear hyperactive.     Genitourinary: Normal  Musculoskeletal:  Weakness   Neurological: no focal deficits. Alert, oriented x 3     Skin: warm and dry     Labs:                           10.8   10.2  )-----------( 655      ( 13 Jun 2017 06:54 )             36.3   06-13    135  |  94<L>  |  4<L>  ----------------------------<  125<H>  4.2   |  28  |  0.41<L>    Ca    8.4      13 Jun 2017 10:53  Phos  3.7     06-13  Mg     2.2     06-13    TPro  6.0  /  Alb  2.6<L>  /  TBili  0.5  /  DBili  0.1  /  AST  10  /  ALT  5<L>  /  AlkPhos  59  06-13      IMPRESSION:   1.  The stomach, duodenum, and jejunum are dilated with an abrupt zone of   transition in the left upper quadrant.  2.  The cystogastrostomy communication is not visualized with multiple   clips at its expected location (? Mobile/ligation).  3.  Peritoneal carcinomatosis is unchanged.

## 2017-06-13 NOTE — PROGRESS NOTE ADULT - ASSESSMENT
47 yo F s/p Acute cholecystitis s/p gastrocholecystic drainage w/ stent + dilation; E. Coli bacteremia (6/2), s/p endoscopy, removal of stent 6/7 s/p IR drainage of abdominal abscess (6/12)    - f/u IR cultures; c/w current abx regimen  - c/w octreotide for possible malignant SBO  - NPO / NGT; f/u GI function   - pain control with PCA  - DVT ppx  - out of bed, ambulate  - Incentive spirometry    Pager: [phone]

## 2017-06-13 NOTE — PROGRESS NOTE ADULT - PROBLEM SELECTOR PLAN 2
Iatrogenic, s/p removal of AXIOS stent and bear claw clipping. Continue NGT for now.  - will check HARSHIL amylase/lipase to see if signs of persistent gastric leak

## 2017-06-13 NOTE — CONSULT NOTE ADULT - SUBJECTIVE AND OBJECTIVE BOX
Surgery Consult    HPI:  46F presents with abdominal pain. Patient stated that the pain began 5/31, at which point she came to the [facility] ED, was discharged when the pain improved. Patient endorses subjective, low-grade fevers at home with decrease in ostomy output, diaphoresis, one episode of nausea/vomiting. Pain is localized primarily to RUQ.Gonzalez  Patient's past medical/surgical history is primarily significant for ovarian CA s/p GRETCHEN Dec 2016, also has had bowel resection with ostomy at that time. Her surgeries and treatments were at [facility], Dr. Roth. Initiation of chemotherapy was delayed 2/2 poor wound healing. Patient was scheduled to start chemotherapy in the next few weeks, sees Dr. PJ Lane of [facility]. Denies other PMH, denies medications. Allergy to PCN.  On exam patient was afebrile with stable vital signs, uncomfortable appearing. Healing midline incision scar on abdomen with small area of open skin in inferior aspect. LLQ ostomy with gas and stool in bag. Tender RUQ with guarding.     WBC 24, T bili 1.3, CT shows distended gallbladder with thickened wall and pericholecystic fluid. (02 Jun 2017 05:03)      PAST MEDICAL & SURGICAL HISTORY:  Ovarian cancer: dx in dec 2016 (started as fibroid --&gt; large cancerous tumor)  Colostomy in place: dec 2016 (placed during hysterectomy in dec 2016 at [facility] [place])  H/O abdominal hysterectomy: dec 2016      MEDICATIONS  (STANDING):  cefepime  IVPB 2000milliGRAM(s) IV Intermittent every 12 hours  acetaminophen  IVPB. 1000milliGRAM(s) IV Intermittent once  ondansetron Injectable 4milliGRAM(s) IV Push every 8 hours  HYDROmorphone PCA (1 mG/mL) 30milliLiter(s) PCA Continuous PCA Continuous  fluconAZOLE IVPB  IV Intermittent   fluconAZOLE IVPB 200milliGRAM(s) IV Intermittent every 24 hours  tetracaine/benzocaine/butamben Spray 1Spray(s) Topical three times a day  dextrose 5% + sodium chloride 0.9%. 1000milliLiter(s) IV Continuous <Continuous>  octreotide  Injectable 200MICROGram(s) SubCutaneous three times a day  enoxaparin Injectable 40milliGRAM(s) SubCutaneous daily  metroNIDAZOLE  IVPB  IV Intermittent   metroNIDAZOLE  IVPB 500milliGRAM(s) IV Intermittent every 8 hours    MEDICATIONS  (PRN):  naloxone Injectable 0.1milliGRAM(s) IV Push every 3 minutes PRN For ANY of the following changes in patient status:  A. RR LESS THAN 10 breaths per minute, B. Oxygen saturation LESS THAN 90%, C. Sedation score of 6  ondansetron Injectable 4milliGRAM(s) IV Push every 8 hours PRN Nausea  HYDROmorphone PCA (1 mG/mL) Rescue Clinician Bolus 1milliGRAM(s) IV Push every 1 hour PRN for Pain Scale GREATER THAN 6      Allergies    penicillin (Rash)    Intolerances        Vital Signs Last 24 Hrs  T(C): 36.7, Max: 37.1 (06-12 @ 16:31)  T(F): 98.1, Max: 98.7 (06-12 @ 16:31)  HR: 71 (71 - 96)  BP: 102/69 (99/66 - 114/62)  BP(mean): --  RR: 18 (12 - 18)  SpO2: 94% (93% - 99%)    I&O's Summary  I & Os for 24h ending 13 Jun 2017 07:00  =============================================  IN: 1350 ml / OUT: 1683 ml / NET: -333 ml    I & Os for current day (as of 13 Jun 2017 13:17)  =============================================  IN: 400 ml / OUT: 415 ml / NET: -15 ml      Physical Exam:  General: NAD  HEENT: WNL  Lymph nodes: Non-tender, no palpable masses  Neck: No masses  Cardiovascular: Regular rate and rhythm; normal S1, S2; no murmurs, rubs, or gallops  Lungs: Lungs clear to auscultation bilaterally; No wheezes or crackles    Abdominal:  -Abdomen soft and non-distended  -No pulsatile masses, no abdominal bruits ascultated  -Non-Tender; No reflex tenderness; No guarding;       LABS:                        10.8   10.2  )-----------( 655      ( 13 Jun 2017 06:54 )             36.3     06-13    135  |  94<L>  |  4<L>  ----------------------------<  125<H>  4.2   |  28  |  0.41<L>    Ca    8.4      13 Jun 2017 10:53  Phos  3.7     06-13  Mg     2.2     06-13    TPro  6.0  /  Alb  2.6<L>  /  TBili  0.5  /  DBili  0.1  /  AST  10  /  ALT  5<L>  /  AlkPhos  59  06-13    PT/INR - ( 12 Jun 2017 06:33 )   PT: 14.3 sec;   INR: 1.32 ratio         PTT - ( 12 Jun 2017 06:33 )  PTT:31.2 sec        IMAGING:

## 2017-06-13 NOTE — PROGRESS NOTE ADULT - PROBLEM SELECTOR PLAN 3
Patient has evidence of bowel obstruction on CT an she had not had a BM for several days.   As D/W dr More the plan is to wait at least 24 hours for a BM. No other Sx options appear to be available. Dr More will f/u with GI for any other possible treating options.   Continue Octreotide  s/p NGT

## 2017-06-14 ENCOUNTER — APPOINTMENT (OUTPATIENT)
Dept: GYNECOLOGIC ONCOLOGY | Facility: CLINIC | Age: 47
End: 2017-06-14

## 2017-06-14 DIAGNOSIS — G89.3 NEOPLASM RELATED PAIN (ACUTE) (CHRONIC): ICD-10-CM

## 2017-06-14 LAB
ALBUMIN SERPL ELPH-MCNC: 2.6 G/DL — LOW (ref 3.3–5)
ALP SERPL-CCNC: 57 U/L — SIGNIFICANT CHANGE UP (ref 40–120)
ALT FLD-CCNC: 6 U/L RC — LOW (ref 10–45)
AMYLASE FLD-CCNC: 44 U/L — SIGNIFICANT CHANGE UP
ANION GAP SERPL CALC-SCNC: 10 MMOL/L — SIGNIFICANT CHANGE UP (ref 5–17)
AST SERPL-CCNC: 10 U/L — SIGNIFICANT CHANGE UP (ref 10–40)
BILIRUB SERPL-MCNC: 0.4 MG/DL — SIGNIFICANT CHANGE UP (ref 0.2–1.2)
BUN SERPL-MCNC: 5 MG/DL — LOW (ref 7–23)
CALCIUM SERPL-MCNC: 8.3 MG/DL — LOW (ref 8.4–10.5)
CHLORIDE SERPL-SCNC: 98 MMOL/L — SIGNIFICANT CHANGE UP (ref 96–108)
CO2 SERPL-SCNC: 26 MMOL/L — SIGNIFICANT CHANGE UP (ref 22–31)
CREAT SERPL-MCNC: 0.44 MG/DL — LOW (ref 0.5–1.3)
CULTURE RESULTS: SIGNIFICANT CHANGE UP
GLUCOSE SERPL-MCNC: 129 MG/DL — HIGH (ref 70–99)
HCT VFR BLD CALC: 33.7 % — LOW (ref 34.5–45)
HGB BLD-MCNC: 10.6 G/DL — LOW (ref 11.5–15.5)
MAGNESIUM SERPL-MCNC: 1.8 MG/DL — SIGNIFICANT CHANGE UP (ref 1.6–2.6)
MCHC RBC-ENTMCNC: 27.9 PG — SIGNIFICANT CHANGE UP (ref 27–34)
MCHC RBC-ENTMCNC: 31.5 GM/DL — LOW (ref 32–36)
MCV RBC AUTO: 88.7 FL — SIGNIFICANT CHANGE UP (ref 80–100)
PH FLD: <6.3 — SIGNIFICANT CHANGE UP
PHOSPHATE SERPL-MCNC: 3.5 MG/DL — SIGNIFICANT CHANGE UP (ref 2.5–4.5)
PLATELET # BLD AUTO: 683 K/UL — HIGH (ref 150–400)
POTASSIUM SERPL-MCNC: 4 MMOL/L — SIGNIFICANT CHANGE UP (ref 3.5–5.3)
POTASSIUM SERPL-SCNC: 4 MMOL/L — SIGNIFICANT CHANGE UP (ref 3.5–5.3)
PROT SERPL-MCNC: 5.8 G/DL — LOW (ref 6–8.3)
RBC # BLD: 3.8 M/UL — SIGNIFICANT CHANGE UP (ref 3.8–5.2)
RBC # FLD: 12.9 % — SIGNIFICANT CHANGE UP (ref 10.3–14.5)
SODIUM SERPL-SCNC: 134 MMOL/L — LOW (ref 135–145)
SPECIMEN SOURCE: SIGNIFICANT CHANGE UP
WBC # BLD: 8.6 K/UL — SIGNIFICANT CHANGE UP (ref 3.8–10.5)
WBC # FLD AUTO: 8.6 K/UL — SIGNIFICANT CHANGE UP (ref 3.8–10.5)

## 2017-06-14 PROCEDURE — 99232 SBSQ HOSP IP/OBS MODERATE 35: CPT | Mod: GC

## 2017-06-14 PROCEDURE — 99232 SBSQ HOSP IP/OBS MODERATE 35: CPT

## 2017-06-14 PROCEDURE — 99222 1ST HOSP IP/OBS MODERATE 55: CPT

## 2017-06-14 PROCEDURE — 99233 SBSQ HOSP IP/OBS HIGH 50: CPT

## 2017-06-14 RX ORDER — HYDROMORPHONE HYDROCHLORIDE 2 MG/ML
30 INJECTION INTRAMUSCULAR; INTRAVENOUS; SUBCUTANEOUS
Qty: 0 | Refills: 0 | Status: DISCONTINUED | OUTPATIENT
Start: 2017-06-14 | End: 2017-06-19

## 2017-06-14 RX ORDER — HYDROMORPHONE HYDROCHLORIDE 2 MG/ML
1 INJECTION INTRAMUSCULAR; INTRAVENOUS; SUBCUTANEOUS
Qty: 0 | Refills: 0 | Status: DISCONTINUED | OUTPATIENT
Start: 2017-06-14 | End: 2017-06-19

## 2017-06-14 RX ORDER — MAGNESIUM SULFATE 500 MG/ML
2 VIAL (ML) INJECTION ONCE
Qty: 0 | Refills: 0 | Status: COMPLETED | OUTPATIENT
Start: 2017-06-14 | End: 2017-06-14

## 2017-06-14 RX ORDER — OCTREOTIDE ACETATE 200 UG/ML
300 INJECTION, SOLUTION INTRAVENOUS; SUBCUTANEOUS THREE TIMES A DAY
Qty: 0 | Refills: 0 | Status: DISCONTINUED | OUTPATIENT
Start: 2017-06-14 | End: 2017-06-21

## 2017-06-14 RX ADMIN — Medication 50 GRAM(S): at 10:18

## 2017-06-14 RX ADMIN — HYDROMORPHONE HYDROCHLORIDE 30 MILLILITER(S): 2 INJECTION INTRAMUSCULAR; INTRAVENOUS; SUBCUTANEOUS at 19:30

## 2017-06-14 RX ADMIN — HYDROMORPHONE HYDROCHLORIDE 30 MILLILITER(S): 2 INJECTION INTRAMUSCULAR; INTRAVENOUS; SUBCUTANEOUS at 07:31

## 2017-06-14 RX ADMIN — Medication 100 MILLIGRAM(S): at 13:43

## 2017-06-14 RX ADMIN — Medication 1 SPRAY(S): at 13:45

## 2017-06-14 RX ADMIN — FLUCONAZOLE 100 MILLIGRAM(S): 150 TABLET ORAL at 16:34

## 2017-06-14 RX ADMIN — OCTREOTIDE ACETATE 200 MICROGRAM(S): 200 INJECTION, SOLUTION INTRAVENOUS; SUBCUTANEOUS at 00:04

## 2017-06-14 RX ADMIN — OCTREOTIDE ACETATE 200 MICROGRAM(S): 200 INJECTION, SOLUTION INTRAVENOUS; SUBCUTANEOUS at 13:43

## 2017-06-14 RX ADMIN — ONDANSETRON 4 MILLIGRAM(S): 8 TABLET, FILM COATED ORAL at 07:35

## 2017-06-14 RX ADMIN — CEFEPIME 100 MILLIGRAM(S): 1 INJECTION, POWDER, FOR SOLUTION INTRAMUSCULAR; INTRAVENOUS at 21:19

## 2017-06-14 RX ADMIN — ENOXAPARIN SODIUM 40 MILLIGRAM(S): 100 INJECTION SUBCUTANEOUS at 10:59

## 2017-06-14 RX ADMIN — HYDROMORPHONE HYDROCHLORIDE 30 MILLILITER(S): 2 INJECTION INTRAMUSCULAR; INTRAVENOUS; SUBCUTANEOUS at 11:02

## 2017-06-14 RX ADMIN — CEFEPIME 100 MILLIGRAM(S): 1 INJECTION, POWDER, FOR SOLUTION INTRAMUSCULAR; INTRAVENOUS at 05:10

## 2017-06-14 RX ADMIN — ONDANSETRON 4 MILLIGRAM(S): 8 TABLET, FILM COATED ORAL at 16:34

## 2017-06-14 RX ADMIN — ONDANSETRON 4 MILLIGRAM(S): 8 TABLET, FILM COATED ORAL at 00:04

## 2017-06-14 RX ADMIN — Medication 100 MILLIGRAM(S): at 05:12

## 2017-06-14 RX ADMIN — Medication 100 MILLIGRAM(S): at 21:19

## 2017-06-14 RX ADMIN — OCTREOTIDE ACETATE 200 MICROGRAM(S): 200 INJECTION, SOLUTION INTRAVENOUS; SUBCUTANEOUS at 07:35

## 2017-06-14 RX ADMIN — Medication 100 MILLIGRAM(S): at 00:04

## 2017-06-14 RX ADMIN — OCTREOTIDE ACETATE 300 MICROGRAM(S): 200 INJECTION, SOLUTION INTRAVENOUS; SUBCUTANEOUS at 22:54

## 2017-06-14 RX ADMIN — Medication 1 SPRAY(S): at 05:15

## 2017-06-14 NOTE — PROGRESS NOTE ADULT - SUBJECTIVE AND OBJECTIVE BOX
IR Follow- Up Note    46y Female with acute cholecystitis s/p percutaneous cholecystostomy on 6/9/2017 and ultrasound guided placement of a lesser sac abdominal drain on 6/12/2017 with Dr. Vines.     Patient seen and examined at the bedside. She reports abdominal pain is unchanged from prior days, 10 at baseline improved to 7/10 following analgesic administration.    The cholecystostomy drain site is c/d/i with 135 cc output over the last 24 hours. Cx results demonstrate no growth.   The left upper quadrant drain site is c/d/i with 185 cc of output over the last 24 hours. Cx results are preliminarily positive for a few yeast (will f/up official cultures). Leukocytosis resolved.    T(F): 98.4, Max: 98.6 (06-14 @ 05:32)  HR: 75 (71 - 79)  BP: 100/67 (93/59 - 101/65)  RR: 18 (16 - 18)  SpO2: 94% (94% - 96%)  Wt(kg): --    LABS:                        10.6   8.6   )-----------( 683      ( 14 Jun 2017 06:17 )             33.7     06-14    134<L>  |  98  |  5<L>  ----------------------------<  129<H>  4.0   |  26  |  0.44<L>    Ca    8.3<L>      14 Jun 2017 06:17  Phos  3.5     06-14  Mg     1.8     06-14    TPro  5.8<L>  /  Alb  2.6<L>  /  TBili  0.4  /  DBili  x   /  AST  10  /  ALT  6<L>  /  AlkPhos  57  06-14      Output:    Voided: 1300 ml    Drain  (cholecystostomy): 135 cc output/24 hours    Nasoenteral tube: 200 cc/24 hours    Drain (LUQ): 185 cc of output/ 24 hours      PHYSICAL EXAM:  General: Nontoxic, in NAD  Abdomen: The abdomen is soft. The cholecystostomy is c/d/i with bilious drainage in the bag. The left upper quadrant drain site is c/d/i with pinkish, cloudy fluid in the bulb. Colostomy is pink without gas or contents in bag.  Extremities: no pedal edema or calf tenderness noted       46y Female admitted with acute cholecystitis s/p percutaneous cholecystostomy and a lesser sac sac collection s/p ultrasound-guided pigtail catheter drain placement.    [facility] Ovarian cancer      Plan:  - Continue to monitor drain outputs.  - Continue to flush drain per doctor orders.  - Continue to monitor patient vital signs and bloodwork.  - Findings and plan to be discussed with IR attending.     Please call IR at extension [phone] with any questions, concerns, or issues regarding above.

## 2017-06-14 NOTE — PROGRESS NOTE ADULT - SUBJECTIVE AND OBJECTIVE BOX
Hematology Follow-up    INTERVAL HPI/OVERNIGHT EVENTS:  Patient S&E at bedside.  Pain is controlled with PCA pump. Denies nausea, vomiting.    VITAL SIGNS:  T(F): 98.4  HR: 75  BP: 100/67  RR: 18  SpO2: 94%  Wt(kg): --    PHYSICAL EXAM:    Constitutional: AAOx3, NAD,   Respiratory: CTA b/l  Cardiovascular: RRR, normal S1S2  Gastrointestinal: soft, generalized tenderness, + colostomy, drains in place  Extremities:  no c/c/e  Neurological: Grossly intact    MEDICATIONS  (STANDING):  cefepime  IVPB 2000milliGRAM(s) IV Intermittent every 12 hours  acetaminophen  IVPB. 1000milliGRAM(s) IV Intermittent once  ondansetron Injectable 4milliGRAM(s) IV Push every 8 hours  fluconAZOLE IVPB  IV Intermittent   fluconAZOLE IVPB 200milliGRAM(s) IV Intermittent every 24 hours  tetracaine/benzocaine/butamben Spray 1Spray(s) Topical three times a day  dextrose 5% + sodium chloride 0.9%. 1000milliLiter(s) IV Continuous <Continuous>  octreotide  Injectable 200MICROGram(s) SubCutaneous three times a day  enoxaparin Injectable 40milliGRAM(s) SubCutaneous daily  metroNIDAZOLE  IVPB  IV Intermittent   metroNIDAZOLE  IVPB 500milliGRAM(s) IV Intermittent every 8 hours  HYDROmorphone PCA (1 mG/mL) 30milliLiter(s) PCA Continuous PCA Continuous    MEDICATIONS  (PRN):  naloxone Injectable 0.1milliGRAM(s) IV Push every 3 minutes PRN For ANY of the following changes in patient status:  A. RR LESS THAN 10 breaths per minute, B. Oxygen saturation LESS THAN 90%, C. Sedation score of 6  ondansetron Injectable 4milliGRAM(s) IV Push every 8 hours PRN Nausea  HYDROmorphone PCA (1 mG/mL) Rescue Clinician Bolus 1milliGRAM(s) IV Push every 1 hour PRN for Pain Scale GREATER THAN 6      penicillin (Rash)      LABS:                        10.6   8.6   )-----------( 683      ( 14 Jun 2017 06:17 )             33.7     06-14    134<L>  |  98  |  5<L>  ----------------------------<  129<H>  4.0   |  26  |  0.44<L>    Ca    8.3<L>      14 Jun 2017 06:17  Phos  3.5     06-14  Mg     1.8     06-14    TPro  5.8<L>  /  Alb  2.6<L>  /  TBili  0.4  /  DBili  x   /  AST  10  /  ALT  6<L>  /  AlkPhos  57  06-14           RADIOLOGY & ADDITIONAL TESTS:  Studies reviewed. Hematology Follow-up    INTERVAL HPI/OVERNIGHT EVENTS:  Patient S&E at bedside.  Pain is controlled with PCA pump. Denies nausea, vomiting.    VITAL SIGNS:  T(F): 98.4  HR: 75  BP: 100/67  RR: 18  SpO2: 94%  Wt(kg): --    PHYSICAL EXAM:    Constitutional: AAOx3, NAD,   Respiratory: CTA b/l  Cardiovascular: RRR, normal S1S2  Gastrointestinal: soft, generalized tenderness, + ostomy, drains in place  Extremities:  no c/c/e  Neurological: Grossly intact    MEDICATIONS  (STANDING):  cefepime  IVPB 2000milliGRAM(s) IV Intermittent every 12 hours  acetaminophen  IVPB. 1000milliGRAM(s) IV Intermittent once  ondansetron Injectable 4milliGRAM(s) IV Push every 8 hours  fluconAZOLE IVPB  IV Intermittent   fluconAZOLE IVPB 200milliGRAM(s) IV Intermittent every 24 hours  tetracaine/benzocaine/butamben Spray 1Spray(s) Topical three times a day  dextrose 5% + sodium chloride 0.9%. 1000milliLiter(s) IV Continuous <Continuous>  octreotide  Injectable 200MICROGram(s) SubCutaneous three times a day  enoxaparin Injectable 40milliGRAM(s) SubCutaneous daily  metroNIDAZOLE  IVPB  IV Intermittent   metroNIDAZOLE  IVPB 500milliGRAM(s) IV Intermittent every 8 hours  HYDROmorphone PCA (1 mG/mL) 30milliLiter(s) PCA Continuous PCA Continuous    MEDICATIONS  (PRN):  naloxone Injectable 0.1milliGRAM(s) IV Push every 3 minutes PRN For ANY of the following changes in patient status:  A. RR LESS THAN 10 breaths per minute, B. Oxygen saturation LESS THAN 90%, C. Sedation score of 6  ondansetron Injectable 4milliGRAM(s) IV Push every 8 hours PRN Nausea  HYDROmorphone PCA (1 mG/mL) Rescue Clinician Bolus 1milliGRAM(s) IV Push every 1 hour PRN for Pain Scale GREATER THAN 6      penicillin (Rash)      LABS:                        10.6   8.6   )-----------( 683      ( 14 Jun 2017 06:17 )             33.7     06-14    134<L>  |  98  |  5<L>  ----------------------------<  129<H>  4.0   |  26  |  0.44<L>    Ca    8.3<L>      14 Jun 2017 06:17  Phos  3.5     06-14  Mg     1.8     06-14    TPro  5.8<L>  /  Alb  2.6<L>  /  TBili  0.4  /  DBili  x   /  AST  10  /  ALT  6<L>  /  AlkPhos  57  06-14           RADIOLOGY & ADDITIONAL TESTS:  Studies reviewed.

## 2017-06-14 NOTE — PROGRESS NOTE ADULT - PROBLEM SELECTOR PLAN 4
At this time patient is not a candidate for disease modifying Rx understanding advanced illness, fluid collections, complications of procedures, and issues with route for nutrition.   Patient is to follow up with medical ONC when stable and after acute issues resolved.

## 2017-06-14 NOTE — PROGRESS NOTE ADULT - ASSESSMENT
45 yo F with ovarian cancer s/p GRETCHEN with peritoneal carcinomatosis, presented with abdominal pain, found to have acute cholecystitis, complicated after stent placement with perforated gallbladder with ans associated intra-abdominal abscesse.  S/p stent removal 6/7/2017 via endoscopy with NG tube placement. s/p intraabdominal drain for abscess drainage. 45 yo F with w/ at least FIGO stage IIIB high-grade serous ovarian cancer s/p debulking in December 2016 c/b delayed wound healing and lack of adjuvant chemotherapy  presented with abdominal pain, found to have acute cholecystitis, complicated after stent placement with perforated gallbladder with ans associated intra-abdominal abscess  S/p stent removal 6/7/2017 via endoscopy with NG tube placement. s/p intraabdominal drain for abscess drainage.

## 2017-06-14 NOTE — PROGRESS NOTE ADULT - PROBLEM SELECTOR PLAN 3
Large lesser sac collection adjacent to gastric perforation, s/p IR drain  - flush drain q6h due to thick pus clogging tue Large lesser sac collection adjacent to gastric perforation, s/p IR drain  -drain working well this am. continue flushes.   There was no residual fluid obtained when I flushed/aspirated the abscess this morning, so it seems likely that the abscess is now decompressed.  -likely tube check/CT 5-7 days after drain placement

## 2017-06-14 NOTE — PROGRESS NOTE ADULT - ASSESSMENT
45 yo F s/p endoscopy, removal of stent 6/7, s/p IR perc ubaldo 6/9 s/p ir drainage of collection, malignant sbo  - cont antibiotics  - await ostomy fucntion  - NPO/NGT  - oob/ amb  -cont pca  -f/u pallative recommendations regarding prognosis (goals of care)  - f/u cultures from abscess (sent by IR)  - f/u oncology for any further recommendations     Nickie AKBAR [id number] 47 yo F s/p endoscopy, removal of stent 6/7, s/p IR perc ubaldo 6/9 s/p ir drainage of collection, malignant sbo  - cont antibiotics  - await ostomy fucntion  - NPO/NGT  - oob/ amb  -cont pca  -f/u pallative recommendations regarding prognosis (goals of care)  - f/u cultures from abscess (sent by IR)  - f/u oncology for any further recommendations

## 2017-06-14 NOTE — PROGRESS NOTE ADULT - SUBJECTIVE AND OBJECTIVE BOX
ADVANCED GI FOLLOW UP NOTE:    SUBJECTIVE:  - Pain is controlled on PCA  - 200 cc NGT output (135cc the previous day)  - 225 cc fluid collection drain output    Review of Systems: negative except as above.    OBJECTIVE:    Vital Signs Last 24 Hrs  T(C): 37, Max: 37 (06-14 @ 05:32)  T(F): 98.6, Max: 98.6 (06-14 @ 05:32)  HR: 73 (71 - 79)  BP: 99/64 (93/59 - 102/69)  BP(mean): --  RR: 17 (16 - 18)  SpO2: 95% (94% - 96%)    PHYSICAL EXAM:  Constitutional: no acute distress, NGT in place  Eyes: no icterus  Neck: no masses, no LAD  Respiratory: normal inspiratory effort; no wheezing or crackles  Cardiovascular: RRR, normal S1/S2, no murmurs/rubs/gallops  Gastrointestinal: soft, RUQ and LUQ drains in place, left sided ostomy  Extremities: no LE edema  Neurological: AAOx3, no asterixis  Skin: no rashes, bruises, petechiae    LABS:                        10.6   8.6   )-----------( 683      ( 14 Jun 2017 06:17 )             33.7     134<L>  |  98  |  5<L>  ----------------------------<  129<H>  4.0   |  26  |  0.44<L>    Ca    8.3<L>      14 Jun 2017 06:17  Phos  3.5     06-14  Mg     1.8     06-14    TPro  5.8<L>  /  Alb  2.6<L>  /  TBili  0.4  /  DBili  x   /  AST  10  /  ALT  6<L>  /  AlkPhos  57  06-14  LIVER FUNCTIONS - ( 14 Jun 2017 06:17 )  Alb: 2.6 g/dL / Pro: 5.8 g/dL / ALK PHOS: 57 U/L / ALT: 6 U/L RC / AST: 10 U/L / GGT: x

## 2017-06-14 NOTE — PROGRESS NOTE ADULT - PROBLEM SELECTOR PLAN 1
Patient is currently on PCA Dilaudid for pain control. She received 11 demand dosages (DD) over 24 hours. She is c/o recurrent pain that is partially controlled with PCA. Will start a continues rate at 0.2 mg/hour. This was D/W the patient and her fiance and both agree.   Naloxone PRN  Holistic nurse.

## 2017-06-14 NOTE — PROGRESS NOTE ADULT - ASSESSMENT
Impression:  1) Loculated Perigastric Fluid Collection likely abscess: S/p removal of lumen-apposing stent draining collection into stomach last week. Now s/p IR drain yesterday with improvement in pain.  2) Acute cholecystitis: s/p IR Percutaneous drain several days ago.   3) Uterine Cancer s/p GRETCHEN and Ileostomy, peritoneal carcinomatosis.  4) Possible SBO - on imaging with transition point in jejunum.     Plan:  - Monitor IR drain output  - Continue IV Abx  - Monitor NGT output  - If SBO worsens, will consider enteroscopy to evaluate for stent placement.  - IVF  - Monitor electrolytes

## 2017-06-14 NOTE — PROGRESS NOTE ADULT - SUBJECTIVE AND OBJECTIVE BOX
ACS GENERAL SURGERY DAILY PROGRESS NOTE:       SUBJECTIVE/ROS:   Pt stable overnight. Pt has no ostomy function. NGT in place. Abdominal pain is controlled with pca.  No nausea or vomiting. Stable upper abdominal pain, left > right. No radiation. No fevers.        MEDICATIONS  (STANDING):  cefepime  IVPB 2000milliGRAM(s) IV Intermittent every 12 hours  acetaminophen  IVPB. 1000milliGRAM(s) IV Intermittent once  ondansetron Injectable 4milliGRAM(s) IV Push every 8 hours  HYDROmorphone PCA (1 mG/mL) 30milliLiter(s) PCA Continuous PCA Continuous  fluconAZOLE IVPB  IV Intermittent   fluconAZOLE IVPB 200milliGRAM(s) IV Intermittent every 24 hours  tetracaine/benzocaine/butamben Spray 1Spray(s) Topical three times a day  dextrose 5% + sodium chloride 0.9%. 1000milliLiter(s) IV Continuous <Continuous>  octreotide  Injectable 200MICROGram(s) SubCutaneous three times a day  enoxaparin Injectable 40milliGRAM(s) SubCutaneous daily    MEDICATIONS  (PRN):  naloxone Injectable 0.1milliGRAM(s) IV Push every 3 minutes PRN For ANY of the following changes in patient status:  A. RR LESS THAN 10 breaths per minute, B. Oxygen saturation LESS THAN 90%, C. Sedation score of 6  ondansetron Injectable 4milliGRAM(s) IV Push every 8 hours PRN Nausea  HYDROmorphone PCA (1 mG/mL) Rescue Clinician Bolus 1milliGRAM(s) IV Push every 1 hour PRN for Pain Scale GREATER THAN 6      OBJECTIVE:    Vital Signs Last 24 Hrs  T(C): 37, Max: 37.1 ( @ 09:05)  T(F): 98.6, Max: 98.7 ( @ 09:05)  HR: 76 (75 - 96)  BP: 100/65 (99/65 - 114/62)  BP(mean): --  RR: 18 (12 - 18)  SpO2: 95% (93% - 99%)    I&O's Detail    I & Os for current day (as of 2017 07:12)  =============================================  IN:    dextrose 5% + sodium chloride 0.9%.: 1300 ml    IV PiggyBack: 50 ml    Total IN: 1350 ml  ---------------------------------------------  OUT:    Voided: 1400 ml    Drain: 150 ml    Nasoenteral Tube: 130 ml    Drain: 3 ml    Total OUT: 1683 ml  ---------------------------------------------  Total NET: -333 ml      Daily     Daily Weight in k.6 (2017 12:19)    LABS:                        11.2   16.2  )-----------( 709      ( 2017 06:33 )             34.9     06-12    132<L>  |  93<L>  |  5<L>  ----------------------------<  122<H>  4.5   |  29  |  0.52    Ca    8.3<L>      2017 06:33    TPro  6.1  /  Alb  2.7<L>  /  TBili  0.5  /  DBili  x   /  AST  9<L>  /  ALT  7<L>  /  AlkPhos  65  06-12    PT/INR - ( 2017 06:33 )   PT: 14.3 sec;   INR: 1.32 ratio         PTT - ( 2017 06:33 )  PTT:31.2 sec              PHYSICAL EXAM:  Constitutional: well developed, well nourished, NAD  Eyes: anicteric  ENMT: normal facies, symmetric  Neck: supple  Respiratory: CTA bilaterally  Cardiovascular: RRR  Gastrointestinal: abdomen soft, tender in upper abdomen, mild distention. No obvious masses. No peritonitis   ostomy- viable, no function. HARSHIL drain with purulent output. Clogged on my visit, flushed twice with 10 ml saline with return of large amount of pus.  Extremities: FROM, warm  Neurological: intact, non-focal  Skin: no gross lesions  Lymph Nodes: no gross adenopathy  Musculoskeletal: equal strength bilateral upper and lower extremities  Psychiatric: oriented x 3; appropriate  Rectal: not examined

## 2017-06-14 NOTE — PROGRESS NOTE ADULT - PROBLEM SELECTOR PLAN 5
Discussed case with Onc. The patient's current infections and obstruction preclude chemo, but they will follow-up with the patient and assist with ongoing GoC discussions with the patient and family (along with the other consulting services) Appreciate Onc team follow-up. The patient's current infections and obstruction preclude chemo. We will need a multidisciplinary approach to GoC discussions in the event that the obstruction proves to be malignant and fails to resolve with the abscess.

## 2017-06-14 NOTE — PROGRESS NOTE ADULT - PROBLEM SELECTOR PLAN 4
Unclear if mechanical (due to compression by the large collection), adhesive (given extensive surgical history), or malignant due to carcinomatosis.     - Hopeful for some improvement after drainage of the abdominal fluid. Given that the drain clogged and she continues to have pain, it may take some time for the abscess to fully decompress and alleviate the obstruction (if inflammation or compression by the abscess is the cause)  - Continue octreotide for malignant SBO. Not a good candidate for steroids given infectious issues. Will get surgical oncology consult to evaluate for any palliative surgical options (bypass?)  - May benefit from palliative venting PEG  - Will need to consider the role of nutritional support with PICC/TPN Unclear if mechanical (due to compression by the large collection), adhesive (given extensive surgical history), or malignant due to carcinomatosis.   - Hopeful for some improvement after drainage of the abdominal fluid. Given that the drain clogged and she continues to have pain, it may take some time for the abscess to fully decompress and alleviate the obstruction (if inflammation or compression by the abscess is the cause).  - Continue octreotide for malignant SBO. Not a good candidate for steroids given infectious issues. Surgical oncology consult pending to evaluate for any palliative surgical options (bypass?)  - May benefit from palliative venting PEG--discussed with  and pt this am, they would like to think about this for a bit.  - Consult placed to Dr. Dumont for TPN

## 2017-06-14 NOTE — PROGRESS NOTE ADULT - PROBLEM SELECTOR PLAN 3
Patient has evidence of bowel obstruction on CT an she had not had a BM for several days.   As D/W dr More the plan is to wait for at least 48-72 hours for a BM. If not BM then GI may be able to eval for a palliative stent or Sx for a palliative procedure.   Will increased octreotide to 300 mg tid.   s/p NGT

## 2017-06-14 NOTE — PROGRESS NOTE ADULT - ATTENDING COMMENTS
Agree with above.   patient continues to have abdominal tenderness but this has been present since her surgery so its not clear that this is a sign of infection, peritonitis or collections. Her WBC is significantly improved.

## 2017-06-14 NOTE — PROGRESS NOTE ADULT - SUBJECTIVE AND OBJECTIVE BOX
INTERVAL HPI/OVERNIGHT EVENTS: Following up for pain     Patient indicates pain is well but not optimally controlled with Dialudid PCA. She indicates she is still not able to have a BM. She denies nausea. She is s/p intraabdominal drain placement for intraabdominal abscess       ADVANCE DIRECTIVES:    DNR: NO           PRESENT SYMPTOMS:   SOURCE: Patient      Pain: Moderate. Dull. Constant. Improving with Dilaudid. Increasing with palpation.     Dyspnea:   NO  Anxiety:NO  Fatigue: Mild to moderate   Nausea: NO  Loss of Appetite:NO  Constipation [ x ] YES. Colostomy output: Elisabeth 280ml since 6/2    OTHER SYMPTOMS:  [ X] All other ROS negative     [ ] Unable to obtain due to poor mentation    MEDICATIONS  (STANDING):  cefepime  IVPB 2000milliGRAM(s) IV Intermittent every 12 hours  acetaminophen  IVPB. 1000milliGRAM(s) IV Intermittent once  ondansetron Injectable 4milliGRAM(s) IV Push every 8 hours  HYDROmorphone PCA (1 mG/mL) 30milliLiter(s) PCA Continuous PCA Continuous  fluconAZOLE IVPB  IV Intermittent   fluconAZOLE IVPB 200milliGRAM(s) IV Intermittent every 24 hours  tetracaine/benzocaine/butamben Spray 1Spray(s) Topical three times a day  dextrose 5% + sodium chloride 0.9%. 1000milliLiter(s) IV Continuous <Continuous>  octreotide  Injectable 200MICROGram(s) SubCutaneous three times a day  enoxaparin Injectable 40milliGRAM(s) SubCutaneous daily  metroNIDAZOLE  IVPB  IV Intermittent   metroNIDAZOLE  IVPB 500milliGRAM(s) IV Intermittent every 8 hours    MEDICATIONS  (PRN):  naloxone Injectable 0.1milliGRAM(s) IV Push every 3 minutes PRN For ANY of the following changes in patient status:  A. RR LESS THAN 10 breaths per minute, B. Oxygen saturation LESS THAN 90%, C. Sedation score of 6  ondansetron Injectable 4milliGRAM(s) IV Push every 8 hours PRN Nausea  HYDROmorphone PCA (1 mG/mL) Rescue Clinician Bolus 1milliGRAM(s) IV Push every 1 hour PRN for Pain Scale GREATER THAN 6    Karnofsky Performance Score/Palliative Performance Status Version 2: 60  %  Protein Calorie Malnutrition:  [ ] Mild   [ ] Moderate   [ ] Severe     Physical Exam:    Vital Signs Last 24 Hrs  T(C): 36.7, Max: 37 (06-14 @ 05:32)  T(F): 98, Max: 98.6 (06-14 @ 05:32)  HR: 82 (71 - 82)  BP: 100/68 (95/62 - 103/71)  BP(mean): --  RR: 18 (16 - 18)  SpO2: 95% (94% - 96%)    General: Awake alert, comfortable.   HEENT: PERRL with EOMI.   Lungs: Clear to Ausc bilaterally, no wheezing.   Cardiovascular: S1, S2. No S3  RRR, no murmurs   Abdomen: Soft, No guarding, tenderness to palpate diffusely but less compared with yesterday, BS  x 4 (+).     Genitourinary: Normal  Musculoskeletal:  Weakness   Neurological: no focal deficits. Alert, oriented x 3     Skin: warm and dry     Labs:                           10.6   8.6   )-----------( 683      ( 14 Jun 2017 06:17 )             33.7   06-14    134<L>  |  98  |  5<L>  ----------------------------<  129<H>  4.0   |  26  |  0.44<L>    Ca    8.3<L>      14 Jun 2017 06:17  Phos  3.5     06-14  Mg     1.8     06-14    TPro  5.8<L>  /  Alb  2.6<L>  /  TBili  0.4  /  DBili  x   /  AST  10  /  ALT  6<L>  /  AlkPhos  57  06-14        IMPRESSION:   1.  The stomach, duodenum, and jejunum are dilated with an abrupt zone of   transition in the left upper quadrant.  2.  The cystogastrostomy communication is not visualized with multiple   clips at its expected location (? Mobile/ligation).  3.  Peritoneal carcinomatosis is unchanged.

## 2017-06-14 NOTE — PROGRESS NOTE ADULT - PROBLEM SELECTOR PLAN 1
-s/p incomplete debulking. Pt with complicated hospital course, currently being treated for abscess.   - She is not a candidate for any chemotherapy at this time. Even if she improves from her current medical issues, chemotherapy would only be palliative in nature.

## 2017-06-15 DIAGNOSIS — C56.9 MALIGNANT NEOPLASM OF UNSPECIFIED OVARY: ICD-10-CM

## 2017-06-15 LAB
-  AMPICILLIN: SIGNIFICANT CHANGE UP
-  CIPROFLOXACIN: SIGNIFICANT CHANGE UP
-  ERYTHROMYCIN: SIGNIFICANT CHANGE UP
-  TETRACYCLINE: SIGNIFICANT CHANGE UP
-  VANCOMYCIN: SIGNIFICANT CHANGE UP
ANION GAP SERPL CALC-SCNC: 13 MMOL/L — SIGNIFICANT CHANGE UP (ref 5–17)
BUN SERPL-MCNC: 5 MG/DL — LOW (ref 7–23)
CALCIUM SERPL-MCNC: 8.2 MG/DL — LOW (ref 8.4–10.5)
CHLORIDE SERPL-SCNC: 97 MMOL/L — SIGNIFICANT CHANGE UP (ref 96–108)
CO2 SERPL-SCNC: 25 MMOL/L — SIGNIFICANT CHANGE UP (ref 22–31)
CREAT SERPL-MCNC: 0.49 MG/DL — LOW (ref 0.5–1.3)
GLUCOSE SERPL-MCNC: 135 MG/DL — HIGH (ref 70–99)
HCT VFR BLD CALC: 37.5 % — SIGNIFICANT CHANGE UP (ref 34.5–45)
HGB BLD-MCNC: 10.7 G/DL — LOW (ref 11.5–15.5)
LIPASE FLD-CCNC: 58 U/L — HIGH
MAGNESIUM SERPL-MCNC: 1.9 MG/DL — SIGNIFICANT CHANGE UP (ref 1.6–2.6)
MCHC RBC-ENTMCNC: 25.4 PG — LOW (ref 27–34)
MCHC RBC-ENTMCNC: 28.6 GM/DL — LOW (ref 32–36)
MCV RBC AUTO: 88.5 FL — SIGNIFICANT CHANGE UP (ref 80–100)
METHOD TYPE: SIGNIFICANT CHANGE UP
PHOSPHATE SERPL-MCNC: 3 MG/DL — SIGNIFICANT CHANGE UP (ref 2.5–4.5)
PLATELET # BLD AUTO: 582 K/UL — HIGH (ref 150–400)
POTASSIUM SERPL-MCNC: 4.3 MMOL/L — SIGNIFICANT CHANGE UP (ref 3.5–5.3)
POTASSIUM SERPL-SCNC: 4.3 MMOL/L — SIGNIFICANT CHANGE UP (ref 3.5–5.3)
RBC # BLD: 4.24 M/UL — SIGNIFICANT CHANGE UP (ref 3.8–5.2)
RBC # FLD: 12.8 % — SIGNIFICANT CHANGE UP (ref 10.3–14.5)
SODIUM SERPL-SCNC: 135 MMOL/L — SIGNIFICANT CHANGE UP (ref 135–145)
WBC # BLD: 7.3 K/UL — SIGNIFICANT CHANGE UP (ref 3.8–10.5)
WBC # FLD AUTO: 7.3 K/UL — SIGNIFICANT CHANGE UP (ref 3.8–10.5)

## 2017-06-15 PROCEDURE — 99232 SBSQ HOSP IP/OBS MODERATE 35: CPT | Mod: 24

## 2017-06-15 PROCEDURE — 99232 SBSQ HOSP IP/OBS MODERATE 35: CPT

## 2017-06-15 PROCEDURE — 74000: CPT | Mod: 26

## 2017-06-15 PROCEDURE — 99232 SBSQ HOSP IP/OBS MODERATE 35: CPT | Mod: GC

## 2017-06-15 PROCEDURE — 99233 SBSQ HOSP IP/OBS HIGH 50: CPT

## 2017-06-15 RX ADMIN — Medication 100 MILLIGRAM(S): at 05:31

## 2017-06-15 RX ADMIN — CEFEPIME 100 MILLIGRAM(S): 1 INJECTION, POWDER, FOR SOLUTION INTRAMUSCULAR; INTRAVENOUS at 08:20

## 2017-06-15 RX ADMIN — OCTREOTIDE ACETATE 300 MICROGRAM(S): 200 INJECTION, SOLUTION INTRAVENOUS; SUBCUTANEOUS at 05:32

## 2017-06-15 RX ADMIN — OCTREOTIDE ACETATE 300 MICROGRAM(S): 200 INJECTION, SOLUTION INTRAVENOUS; SUBCUTANEOUS at 22:43

## 2017-06-15 RX ADMIN — ENOXAPARIN SODIUM 40 MILLIGRAM(S): 100 INJECTION SUBCUTANEOUS at 13:51

## 2017-06-15 RX ADMIN — HYDROMORPHONE HYDROCHLORIDE 30 MILLILITER(S): 2 INJECTION INTRAMUSCULAR; INTRAVENOUS; SUBCUTANEOUS at 07:34

## 2017-06-15 RX ADMIN — Medication 1 SPRAY(S): at 13:55

## 2017-06-15 RX ADMIN — HYDROMORPHONE HYDROCHLORIDE 30 MILLILITER(S): 2 INJECTION INTRAMUSCULAR; INTRAVENOUS; SUBCUTANEOUS at 19:21

## 2017-06-15 RX ADMIN — FLUCONAZOLE 100 MILLIGRAM(S): 150 TABLET ORAL at 17:57

## 2017-06-15 RX ADMIN — OCTREOTIDE ACETATE 300 MICROGRAM(S): 200 INJECTION, SOLUTION INTRAVENOUS; SUBCUTANEOUS at 15:42

## 2017-06-15 RX ADMIN — Medication 100 MILLIGRAM(S): at 22:43

## 2017-06-15 RX ADMIN — Medication 100 MILLIGRAM(S): at 13:51

## 2017-06-15 RX ADMIN — ONDANSETRON 4 MILLIGRAM(S): 8 TABLET, FILM COATED ORAL at 15:39

## 2017-06-15 RX ADMIN — ONDANSETRON 4 MILLIGRAM(S): 8 TABLET, FILM COATED ORAL at 08:19

## 2017-06-15 RX ADMIN — CEFEPIME 100 MILLIGRAM(S): 1 INJECTION, POWDER, FOR SOLUTION INTRAMUSCULAR; INTRAVENOUS at 01:00

## 2017-06-15 NOTE — PROGRESS NOTE ADULT - SUBJECTIVE AND OBJECTIVE BOX
IR Follow- Up Note    46y Female with acute cholecystitis s/p percutaneous cholecystostomy on 6/9/2017 and ultrasound guided placement of a lesser sac abdominal drain on 6/12/2017 with Dr. Vines.     Patient seen and examined at the bedside. She reports abdominal pain is unchanged and is bearable. It is about a 10 at baseline improved to 7/10 following analgesic administration.    The cholecystostomy drain site is c/d/i with 300 cc of output/ last 24 hours. Cx results demonstrate no growth.   The left upper quadrant drain site is c/d/i with 55 cc of output/ last 24 hours. Cx results demonstrate preliminary growth of a few yeast cells. Leukocytosis has resolved.      T(F): 98.4, Max: 98.4 (06-15 @ 05:39)  HR: 64 (64 - 82)  BP: 114/73 (99/68 - 114/73)  RR: 18 (16 - 18)  SpO2: 95% (95% - 96%)    LABS:                        10.7   7.3   )-----------( 582      ( 15 Alexx 2017 07:21 )             37.5     06-15    135  |  97  |  5<L>  ----------------------------<  135<H>  4.3   |  25  |  0.49<L>    Ca    8.2<L>      15 Alexx 2017 07:21  Phos  3.0     06-15  Mg     1.9     06-15    TPro  5.8<L>  /  Alb  2.6<L>  /  TBili  0.4  /  DBili  x   /  AST  10  /  ALT  6<L>  /  AlkPhos  57  06-14      PHYSICAL EXAM:  General: resting in bed at time of examination; enteric tube in place.  Abdomen: The abdomen is soft. The cholecystostomy is c/d/i with bilious drainage in the bag. The left upper quadrant drain site is c/d/i with cloudy fluid in the bulb. Colostomy is pink with a small amount of cloudy fluid in the bag.      46y Female admitted with acute cholecystitis s/p percutaneous cholecystostomy and a lesser sac sac collection s/p ultrasound-guided pigtail catheter drain placement.      Plan:  - Continue to monitor drain outputs.  - Continue to flush drain per doctor orders.  - Continue to monitor patient vital signs and bloodwork; follow-up final cultures.  - Findings and plan to be discussed with IR attending.     Please call IR at extension [phone] with any questions, concerns, or issues regarding above.

## 2017-06-15 NOTE — CONSULT NOTE ADULT - PROBLEM SELECTOR RECOMMENDATION 4
Management as per GI, Hematology Oncology, Surgery  no role for PN unless other treatment options become available.  There is no role to wait for PN to 'build up' the patient prior to start of any therapy  Hydration as indicated

## 2017-06-15 NOTE — PROGRESS NOTE ADULT - PROBLEM SELECTOR PLAN 4
At this time patient is not a candidate for disease modifying Rx understanding advanced illness, fluid collections, complications of procedures, and issues with route for nutrition. Onc input noted. If acute issues resolves and patient maintains nutritional/functional status she may be a candidate for palliative disease modifying Rx.  Patient is to follow up with medical ONC when stable and after acute issues resolved.

## 2017-06-15 NOTE — PROGRESS NOTE ADULT - ASSESSMENT
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

## 2017-06-15 NOTE — PROGRESS NOTE ADULT - ASSESSMENT
Impression:  1) Loculated Perigastric Fluid Collection likely abscess: S/p removal of lumen-apposing stent draining collection into stomach last week. Now s/p IR drain yesterday with improvement in pain.  2) Acute cholecystitis: s/p IR Percutaneous drain several days ago.   3) Uterine Cancer s/p GRETCHEN and Ileostomy, peritoneal carcinomatosis.  4) Possible SBO - on imaging with transition point in jejunum.     Plan:  - Monitor NGT output  - Continue IV Abx  - If SBO worsens, will consider enteroscopy to evaluate for stent placement.  - IVF  - Monitor electrolytes

## 2017-06-15 NOTE — PROGRESS NOTE ADULT - PROBLEM SELECTOR PLAN 4
Unclear if mechanical (due to compression by the large collection), adhesive (given extensive surgical history), or malignant due to carcinomatosis.   No improvement with abscess drainage thus far. Will re-evaluate CT. May need to re-approach family and Surg Onc re: palliative surgical options.  - Continue octreotide for malignant SBO. Not a good candidate for steroids given infectious issues.  - Would benefit from palliative venting PEG, family wishes to think on this further  - Evaluated for TPN. Given yeast in drain output, risk of fungemia is a bit higher. Deferring on starting for now.

## 2017-06-15 NOTE — PROGRESS NOTE ADULT - SUBJECTIVE AND OBJECTIVE BOX
ADVANCED GI FOLLOW UP NOTE:    SUBJECTIVE:  - Pain is controlled. No nausea or vomiting  - NGT output = 150cc (6/13) --> 100cc (6/14)    Review of Systems: negative except as above.    OBJECTIVE:    Vital Signs Last 24 Hrs  T(C): 36.9, Max: 36.9 (06-14 @ 09:06)  T(F): 98.4, Max: 98.4 (06-14 @ 09:06)  HR: 64 (64 - 82)  BP: 114/73 (99/68 - 114/73)  BP(mean): --  RR: 18 (16 - 18)  SpO2: 95% (94% - 96%)    PHYSICAL EXAM:  Constitutional: no acute distress, NGT in place  Eyes: no icterus  Neck: no masses, no LAD  Respiratory: normal inspiratory effort; no wheezing or crackles  Cardiovascular: RRR, normal S1/S2, no murmurs/rubs/gallops  Gastrointestinal: soft, RUQ and LUQ drains in place, left sided ostomy  Extremities: no LE edema  Neurological: AAOx3, no asterixis  Skin: no rashes, bruises, petechiae    LABS:                        10.6   8.6   )-----------( 683      ( 14 Jun 2017 06:17 )             33.7     06-14    134<L>  |  98  |  5<L>  ----------------------------<  129<H>  4.0   |  26  |  0.44<L>    Ca    8.3<L>      14 Jun 2017 06:17  Phos  3.5     06-14  Mg     1.8     06-14    TPro  5.8<L>  /  Alb  2.6<L>  /  TBili  0.4  /  DBili  x   /  AST  10  /  ALT  6<L>  /  AlkPhos  57  06-14      LIVER FUNCTIONS - ( 14 Jun 2017 06:17 )  Alb: 2.6 g/dL / Pro: 5.8 g/dL / ALK PHOS: 57 U/L / ALT: 6 U/L RC / AST: 10 U/L / GGT: x

## 2017-06-15 NOTE — CHART NOTE - NSCHARTNOTEFT_GEN_A_CORE
Pt [id number]. Pt with nutrition support consult 6/15 regarding possible PN - not recommended at this time.  Discussed pt with team, plan to pull NGT and advance diet to clear liquids.    Source: Patient [X]    Family [ ]     other [ ]    Diet : [id number] (since 6/7)    Patient reports [ ] nausea  [X] vomiting today [ ] diarrhea [ ] constipation  [ ]chewing problems [ ] swallowing issues  [ ] other:     Enteral /Parenteral Nutrition: N/A  Access: NGT for decompression    Current Weight:   Weight Change    Pertinent Medications: MEDICATIONS  (STANDING):  cefepime  IVPB 2000milliGRAM(s) IV Intermittent every 12 hours  acetaminophen  IVPB. 1000milliGRAM(s) IV Intermittent once  ondansetron Injectable 4milliGRAM(s) IV Push every 8 hours  fluconAZOLE IVPB  IV Intermittent   fluconAZOLE IVPB 200milliGRAM(s) IV Intermittent every 24 hours  tetracaine/benzocaine/butamben Spray 1Spray(s) Topical three times a day  dextrose 5% + sodium chloride 0.9%. 1000milliLiter(s) IV Continuous <Continuous>  enoxaparin Injectable 40milliGRAM(s) SubCutaneous daily  metroNIDAZOLE  IVPB  IV Intermittent   metroNIDAZOLE  IVPB 500milliGRAM(s) IV Intermittent every 8 hours  HYDROmorphone PCA (1 mG/mL) 30milliLiter(s) PCA Continuous PCA Continuous  octreotide  Injectable 300MICROGram(s) SubCutaneous three times a day    MEDICATIONS  (PRN):  naloxone Injectable 0.1milliGRAM(s) IV Push every 3 minutes PRN For ANY of the following changes in patient status:  A. RR LESS THAN 10 breaths per minute, B. Oxygen saturation LESS THAN 90%, C. Sedation score of 6  ondansetron Injectable 4milliGRAM(s) IV Push every 8 hours PRN Nausea  HYDROmorphone PCA (1 mG/mL) Rescue Clinician Bolus 1milliGRAM(s) IV Push every 1 hour PRN for Pain Scale GREATER THAN 6    Pertinent Labs:  06-15 Na135 mmol/L Glu 135 mg/dL<H> K+ 4.3 mmol/L Cr  0.49 mg/dL<L> BUN 5 mg/dL<L> Phos 3.0 mg/dL Alb n/a   PAB n/a         Skin: no pressure injuries  Edema: none noted  Ostomy output: none today    Estimated Needs:   [X] no change since previous assessment  [ ] recalculated:       Previous Nutrition Diagnosis: N/A     Nutrition Diagnosis is [ ] ongoing  [ ] resolved [X] not applicable          New Nutrition Diagnosis:     [ ] Inadequate Protein Energy Intake [ ]Inadequate Oral Intake [ ] Excessive Energy Intake     [ ] Underweight [ ] Increased Nutrient Needs [ ] Overweight/Obesity     [ ] Altered GI Function [ ] Unintended Weight Loss [ ] Food & Nutrition Related Knowledge Deficit[ ] Limited Adherence to nutrition related recommendations [ ] Malnutrition  [ ] other: Free text       Related to: inability to consume sufficient energy     As evidenced by: [id number]     Interventions:     Recommend    [X] Change Diet To: As medically appropriate, advance diet as tolerated through clear liquids to regular diet    [X] Nutrition Supplement: While on clear liquid diet - Recommend Ensure Clear tid (Provides 600kcal and 21gm Protein).  As diet advanced to regular, Recommend Ensure Enlive bid (Provides additional 700kcal, 40gm Protein).    [ ] Nutrition Support    [ ] Other:        Monitoring and Evaluation:     [X] PO intake [X] Tolerance to diet prescription [X] weights [X] follow up per protocol     Zandra Powers MBA RDN CDN Pager [phone] Pt [id number]. Pt with nutrition support consult 6/15 regarding possible PN - not recommended at this time.  Discussed pt with team, plan to pull NGT and advance diet to clear liquids.    Source: Patient [X]    Family [ ]     other [ ]    Diet : [id number] (since 6/7)    Patient reports [ ] nausea  [X] vomiting today [ ] diarrhea [ ] constipation  [ ]chewing problems [ ] swallowing issues  [ ] other:     Enteral /Parenteral Nutrition: N/A  Access: NGT for decompression    Current Weight:   Weight Change    Pertinent Medications: MEDICATIONS  (STANDING):  cefepime  IVPB 2000milliGRAM(s) IV Intermittent every 12 hours  acetaminophen  IVPB. 1000milliGRAM(s) IV Intermittent once  ondansetron Injectable 4milliGRAM(s) IV Push every 8 hours  fluconAZOLE IVPB  IV Intermittent   fluconAZOLE IVPB 200milliGRAM(s) IV Intermittent every 24 hours  tetracaine/benzocaine/butamben Spray 1Spray(s) Topical three times a day  dextrose 5% + sodium chloride 0.9%. 1000milliLiter(s) IV Continuous <Continuous>  enoxaparin Injectable 40milliGRAM(s) SubCutaneous daily  metroNIDAZOLE  IVPB  IV Intermittent   metroNIDAZOLE  IVPB 500milliGRAM(s) IV Intermittent every 8 hours  HYDROmorphone PCA (1 mG/mL) 30milliLiter(s) PCA Continuous PCA Continuous  octreotide  Injectable 300MICROGram(s) SubCutaneous three times a day    MEDICATIONS  (PRN):  naloxone Injectable 0.1milliGRAM(s) IV Push every 3 minutes PRN For ANY of the following changes in patient status:  A. RR LESS THAN 10 breaths per minute, B. Oxygen saturation LESS THAN 90%, C. Sedation score of 6  ondansetron Injectable 4milliGRAM(s) IV Push every 8 hours PRN Nausea  HYDROmorphone PCA (1 mG/mL) Rescue Clinician Bolus 1milliGRAM(s) IV Push every 1 hour PRN for Pain Scale GREATER THAN 6    Pertinent Labs:  06-15 Na135 mmol/L Glu 135 mg/dL<H> K+ 4.3 mmol/L Cr  0.49 mg/dL<L> BUN 5 mg/dL<L> Phos 3.0 mg/dL Alb n/a   PAB n/a         Skin: no pressure injuries  Edema: none noted  Ostomy output: none today    Estimated Needs:   [X] no change since previous assessment  [ ] recalculated:       Previous Nutrition Diagnosis: N/A     Nutrition Diagnosis is [ ] ongoing  [ ] resolved [X] not applicable          New Nutrition Diagnosis: [X] Malnutrition     Related to: inability to consume sufficient energy     As evidenced by: [id number]     Interventions:     Recommend    [X] Change Diet To: As medically appropriate, advance diet as tolerated through clear liquids to regular diet    [X] Nutrition Supplement: While on clear liquid diet - Recommend Ensure Clear tid (Provides 600kcal and 21gm Protein).  As diet advanced to regular, Recommend Ensure Enlive bid (Provides additional 700kcal, 40gm Protein).    [ ] Nutrition Support    [X] Other: Electronic Malnutrition note placed in chart and discussed with team        Monitoring and Evaluation:     [X] PO intake [X] Tolerance to diet prescription [X] weights [X] follow up per protocol     Zandra Powers MBA RDN CDN Pager [phone]

## 2017-06-15 NOTE — PROGRESS NOTE ADULT - PROBLEM SELECTOR PLAN 3
Large lesser sac collection adjacent to gastric perforation, s/p IR drain  - continue drain flushes. output decreasing but remains very purulent and pain is unchanged. will get CT to reevaluate abscess and SBO

## 2017-06-15 NOTE — PROGRESS NOTE ADULT - PROBLEM SELECTOR PLAN 2
Patient has evidence of bowel obstruction on CT an she had not had a BM for several days.   As per primary team, plan is to do a follow up CT to re-eval the abscess as well as degree of TUCKER. If not BM then GI may be able to eval for a palliative stent or Sx for a palliative procedure.   Continue octreotide to 300 mg tid.   s/p NGT

## 2017-06-15 NOTE — PROGRESS NOTE ADULT - PROBLEM SELECTOR PLAN 1
Patient is currently on PCA Dilaudid for pain control. She received 7 demand dosages (DD) over 24 hours. Will continue rate at 0.2 mg/hour.   Naloxone PRN  Holistic nurse.

## 2017-06-15 NOTE — PROGRESS NOTE ADULT - ATTENDING COMMENTS
Agree with above.   Patient appears to be improving daily. WBC decreasing and collection appears to be draining purulent material.   Consider trial of PO liquids. Also consider relastor for reversal of GI opiate effect.

## 2017-06-15 NOTE — PROGRESS NOTE ADULT - SUBJECTIVE AND OBJECTIVE BOX
INTERVAL HPI/OVERNIGHT EVENTS: Following up for pain     Patient indicates pain is well controlled with Dialudid PCA. She indicates she is still not able to have a BM. She denies nausea.      ADVANCE DIRECTIVES:    DNR: NO           PRESENT SYMPTOMS:   SOURCE: Patient      Pain: Mild. Dull. Constant. Improving with Dilaudid. Increasing with palpation.     Dyspnea:   NO  Anxiety:NO  Fatigue: Mild to moderate   Nausea: NO  Loss of Appetite:NO  Constipation [ x ] YES. Colostomy output: Elisabeth 280ml since 6/2    OTHER SYMPTOMS:  [ X] All other ROS negative     [ ] Unable to obtain due to poor mentation    MEDICATIONS  (STANDING):  cefepime  IVPB 2000milliGRAM(s) IV Intermittent every 12 hours  acetaminophen  IVPB. 1000milliGRAM(s) IV Intermittent once  ondansetron Injectable 4milliGRAM(s) IV Push every 8 hours  fluconAZOLE IVPB  IV Intermittent   fluconAZOLE IVPB 200milliGRAM(s) IV Intermittent every 24 hours  tetracaine/benzocaine/butamben Spray 1Spray(s) Topical three times a day  dextrose 5% + sodium chloride 0.9%. 1000milliLiter(s) IV Continuous <Continuous>  enoxaparin Injectable 40milliGRAM(s) SubCutaneous daily  metroNIDAZOLE  IVPB  IV Intermittent   metroNIDAZOLE  IVPB 500milliGRAM(s) IV Intermittent every 8 hours  HYDROmorphone PCA (1 mG/mL) 30milliLiter(s) PCA Continuous PCA Continuous  octreotide  Injectable 300MICROGram(s) SubCutaneous three times a day    MEDICATIONS  (PRN):  naloxone Injectable 0.1milliGRAM(s) IV Push every 3 minutes PRN For ANY of the following changes in patient status:  A. RR LESS THAN 10 breaths per minute, B. Oxygen saturation LESS THAN 90%, C. Sedation score of 6  ondansetron Injectable 4milliGRAM(s) IV Push every 8 hours PRN Nausea  HYDROmorphone PCA (1 mG/mL) Rescue Clinician Bolus 1milliGRAM(s) IV Push every 1 hour PRN for Pain Scale GREATER THAN 6      Karnofsky Performance Score/Palliative Performance Status Version 2: 60  %  Protein Calorie Malnutrition:  [ ] Mild   [ ] Moderate   [ ] Severe     Physical Exam:    Vital Signs Last 24 Hrs  T(C): 36.5, Max: 36.9 (06-15 @ 05:39)  T(F): 97.7, Max: 98.4 (06-15 @ 05:39)  HR: 68 (64 - 75)  BP: 108/72 (99/68 - 114/73)  BP(mean): --  RR: 16 (16 - 18)  SpO2: 96% (95% - 96%)    General: Awake alert, comfortable.   HEENT: PERRL with EOMI.   Lungs: Clear to Ausc bilaterally, no wheezing.   Cardiovascular: S1, S2. No S3  RRR, no murmurs   Abdomen: Soft, No guarding, tenderness to palpate diffusely but less compared with yesterday, BS  x 4 (+).     Genitourinary: Normal  Musculoskeletal:  Weakness   Neurological: no focal deficits. Alert, oriented x 3     Skin: warm and dry     Labs:                           10.7   7.3   )-----------( 582      ( 15 Alexx 2017 07:21 )             37.5   06-15    135  |  97  |  5<L>  ----------------------------<  135<H>  4.3   |  25  |  0.49<L>    Ca    8.2<L>      15 Alexx 2017 07:21  Phos  3.0     06-15  Mg     1.9     06-15    TPro  5.8<L>  /  Alb  2.6<L>  /  TBili  0.4  /  DBili  x   /  AST  10  /  ALT  6<L>  /  AlkPhos  57  06-14        IMPRESSION:   1.  The stomach, duodenum, and jejunum are dilated with an abrupt zone of   transition in the left upper quadrant.  2.  The cystogastrostomy communication is not visualized with multiple   clips at its expected location (? Mobile/ligation).  3.  Peritoneal carcinomatosis is unchanged.

## 2017-06-15 NOTE — CONSULT NOTE ADULT - SUBJECTIVE AND OBJECTIVE BOX
Patient is a 46y Female admitted with Acute cholecystitis and stage IV ovarian ca    Pt is s/p drainage of acute cholecystitis and has evidence of SBO of unclear etiology.  The question of PN was raised for nutritional support  After review of all notes from other services it appears that there are no options for any active treatment with the exception of a possible stent for the SBO.  [facility] states in their note that there is no role for chemo except for possible palliation.    The drainage of the intraabdominal abscess was positive for a few yeast cells.    In view of the overall poor prognosis with no options for treatment there is no role for PN over simple hydration.  PN only be appropriate if there was a plan to treat the underlying condition and even then, in view of the yeast cells in the abscess collection, PN would represent a high risk of infec    06-15    135  |  97  |  5<L>  ----------------------------<  135<H>  4.3   |  25  |  0.49<L>    Ca    8.2<L>      15 Alexx 2017 07:21  Phos  3.0     06-15  Mg     1.9     06-15    TPro  5.8<L>  /  Alb  2.6<L>  /  TBili  0.4  /  DBili  x   /  AST  10  /  ALT  6<L>  /  AlkPhos  57  06-14  LIVER FUNCTIONS - ( 14 Jun 2017 06:17 )  Alb: 2.6 g/dL / Pro: 5.8 g/dL / ALK PHOS: 57 U/L / ALT: 6 U/L RC / AST: 10 U/L / GGT: x

## 2017-06-15 NOTE — CHART NOTE - NSCHARTNOTEFT_GEN_A_CORE
Upon Nutritional Assessment by the Registered Dietitian your patient was determined to meet criteria / has evidence of the following diagnosis/diagnoses:          [ ]  Mild Protein Calorie Malnutrition        [ ]  Moderate Protein Calorie Malnutrition        [X] Severe Protein Calorie Malnutrition        [ ] Unspecified Protein Calorie Malnutrition        [X] Underweight / BMI <19        [ ] Morbid Obesity / BMI > 40      Findings as based on:  [X] Comprehensive nutrition assessment   [ ] Nutrition Focused Physical Exam  [X] Other: 6.24% weight loss x2 weeks, [id number]  BMI=18.9kg/m2      Nutrition Plan/Recommendations:    As medically appropriate, advance diet as tolerated through clear liquid to regular diet  While on clear liquid diet Recommend Ensure Clear tid (Provides 600kcal and 21gm Protein).  When on regular diet Recommend Ensure Enlive bid (Provides additional 700kcal, 40gm Protein).  Zandra Powers MBA RDN CDN Pager [phone]     PROVIDER Section:     By signing this assessment you are acknowledging and agree with the diagnosis/diagnoses assigned by the Registered Dietitian    Comments:

## 2017-06-15 NOTE — CONSULT NOTE ADULT - ATTENDING COMMENTS
I have seen and examined the patient, reviewed the laboratory and radiologic data and agree with practitioner's history, physical assessment, plan and reviewed and edited where appropriate.  I spoke with her at length.  At this point, I think we are better off giving it some more time with conservative management (octreotide based therapy) and bowel rest, especially since she just had the extremely large intraabdominal abscess drained.    Plan:  1) Continue current management - Octreotide, NPO  2) Consider G tube - whether she has surgery or not, I would utilize a G tube for venting given the complicated post-op course and longstanding nature of bowel obstruction  3) May require surgery, although based on my evaluation of the scan this would be extremely high risk, with a low chance of success
history and chart reviewed.  Decision communicated to surgical staff which requested the PN consult

## 2017-06-15 NOTE — PROGRESS NOTE ADULT - ASSESSMENT
46F s/p endoscopy, removal of stent, IR perc ubaldo, IR drainage  -NPO/NGT  -f/u palliative for GOC  -TPN consult  -await GI function  -consider GI stent or surgical G tube  -pain control

## 2017-06-15 NOTE — PROGRESS NOTE ADULT - ASSESSMENT
47 yo F with ovarian cancer s/p GRETCHEN, presented with abdominal pain, found to have acute cholecystitis, complicated after stent placement with perforated gallbladder with ans associated intra-abdominal abscesse and bowel obstruction (likely malignant bowel obstruction).  S/p stent removal 6/7/2017 via endoscopy with NG tube placement. s/p intraabdominal drain for abscess drainage.

## 2017-06-16 LAB
ANION GAP SERPL CALC-SCNC: 10 MMOL/L — SIGNIFICANT CHANGE UP (ref 5–17)
BUN SERPL-MCNC: 5 MG/DL — LOW (ref 7–23)
CALCIUM SERPL-MCNC: 8.6 MG/DL — SIGNIFICANT CHANGE UP (ref 8.4–10.5)
CHLORIDE SERPL-SCNC: 98 MMOL/L — SIGNIFICANT CHANGE UP (ref 96–108)
CO2 SERPL-SCNC: 28 MMOL/L — SIGNIFICANT CHANGE UP (ref 22–31)
CREAT SERPL-MCNC: 0.45 MG/DL — LOW (ref 0.5–1.3)
GLUCOSE SERPL-MCNC: 172 MG/DL — HIGH (ref 70–99)
HCT VFR BLD CALC: 37 % — SIGNIFICANT CHANGE UP (ref 34.5–45)
HGB BLD-MCNC: 11 G/DL — LOW (ref 11.5–15.5)
MCHC RBC-ENTMCNC: 26.4 PG — LOW (ref 27–34)
MCHC RBC-ENTMCNC: 29.8 GM/DL — LOW (ref 32–36)
MCV RBC AUTO: 88.3 FL — SIGNIFICANT CHANGE UP (ref 80–100)
PLATELET # BLD AUTO: 690 K/UL — HIGH (ref 150–400)
POTASSIUM SERPL-MCNC: 4 MMOL/L — SIGNIFICANT CHANGE UP (ref 3.5–5.3)
POTASSIUM SERPL-SCNC: 4 MMOL/L — SIGNIFICANT CHANGE UP (ref 3.5–5.3)
RBC # BLD: 4.19 M/UL — SIGNIFICANT CHANGE UP (ref 3.8–5.2)
RBC # FLD: 12.9 % — SIGNIFICANT CHANGE UP (ref 10.3–14.5)
SODIUM SERPL-SCNC: 136 MMOL/L — SIGNIFICANT CHANGE UP (ref 135–145)
WBC # BLD: 9 K/UL — SIGNIFICANT CHANGE UP (ref 3.8–10.5)
WBC # FLD AUTO: 9 K/UL — SIGNIFICANT CHANGE UP (ref 3.8–10.5)

## 2017-06-16 PROCEDURE — 99232 SBSQ HOSP IP/OBS MODERATE 35: CPT

## 2017-06-16 PROCEDURE — 99233 SBSQ HOSP IP/OBS HIGH 50: CPT

## 2017-06-16 PROCEDURE — 49423 EXCHANGE DRAINAGE CATHETER: CPT | Mod: 78

## 2017-06-16 PROCEDURE — 75984 XRAY CONTROL CATHETER CHANGE: CPT | Mod: 26

## 2017-06-16 PROCEDURE — 99232 SBSQ HOSP IP/OBS MODERATE 35: CPT | Mod: GC

## 2017-06-16 PROCEDURE — 71010: CPT | Mod: 26

## 2017-06-16 RX ORDER — TETRACAINE/BENZOCAINE/BUTAMBEN 2%-14%-2%
1 OINTMENT (GRAM) TOPICAL ONCE
Qty: 0 | Refills: 0 | Status: COMPLETED | OUTPATIENT
Start: 2017-06-16 | End: 2017-06-16

## 2017-06-16 RX ADMIN — HYDROMORPHONE HYDROCHLORIDE 30 MILLILITER(S): 2 INJECTION INTRAMUSCULAR; INTRAVENOUS; SUBCUTANEOUS at 07:53

## 2017-06-16 RX ADMIN — SODIUM CHLORIDE 100 MILLILITER(S): 9 INJECTION, SOLUTION INTRAVENOUS at 23:12

## 2017-06-16 RX ADMIN — HYDROMORPHONE HYDROCHLORIDE 30 MILLILITER(S): 2 INJECTION INTRAMUSCULAR; INTRAVENOUS; SUBCUTANEOUS at 19:42

## 2017-06-16 RX ADMIN — ONDANSETRON 4 MILLIGRAM(S): 8 TABLET, FILM COATED ORAL at 17:43

## 2017-06-16 RX ADMIN — OCTREOTIDE ACETATE 300 MICROGRAM(S): 200 INJECTION, SOLUTION INTRAVENOUS; SUBCUTANEOUS at 21:31

## 2017-06-16 RX ADMIN — OCTREOTIDE ACETATE 300 MICROGRAM(S): 200 INJECTION, SOLUTION INTRAVENOUS; SUBCUTANEOUS at 14:36

## 2017-06-16 RX ADMIN — Medication 100 MILLIGRAM(S): at 14:35

## 2017-06-16 RX ADMIN — FLUCONAZOLE 100 MILLIGRAM(S): 150 TABLET ORAL at 17:43

## 2017-06-16 RX ADMIN — Medication 100 MILLIGRAM(S): at 06:37

## 2017-06-16 RX ADMIN — ENOXAPARIN SODIUM 40 MILLIGRAM(S): 100 INJECTION SUBCUTANEOUS at 13:17

## 2017-06-16 RX ADMIN — ONDANSETRON 4 MILLIGRAM(S): 8 TABLET, FILM COATED ORAL at 23:11

## 2017-06-16 RX ADMIN — Medication 100 MILLIGRAM(S): at 21:19

## 2017-06-16 RX ADMIN — OCTREOTIDE ACETATE 300 MICROGRAM(S): 200 INJECTION, SOLUTION INTRAVENOUS; SUBCUTANEOUS at 06:37

## 2017-06-16 RX ADMIN — Medication 1 SPRAY(S): at 03:08

## 2017-06-16 RX ADMIN — CEFEPIME 100 MILLIGRAM(S): 1 INJECTION, POWDER, FOR SOLUTION INTRAMUSCULAR; INTRAVENOUS at 13:16

## 2017-06-16 RX ADMIN — ONDANSETRON 4 MILLIGRAM(S): 8 TABLET, FILM COATED ORAL at 02:21

## 2017-06-16 RX ADMIN — ONDANSETRON 4 MILLIGRAM(S): 8 TABLET, FILM COATED ORAL at 08:31

## 2017-06-16 RX ADMIN — Medication 1 SPRAY(S): at 21:20

## 2017-06-16 NOTE — PROGRESS NOTE ADULT - SUBJECTIVE AND OBJECTIVE BOX
IR Follow- Up Note    46y Female with acute cholecystitis s/p percutaneous cholecystostomy on 6/9/2017 and ultrasound guided placement of a lesser sac abdominal drain on 6/12/2017 with Dr. Vines.     Patient seen and examined at the bedside. She reports abdominal pain and discomfort with two episodes of emesis overnight. Her enteric tube was replaced and is better tolerated.      T(F): 98.6, Max: 98.6 (06-16 @ 05:50)  HR: 71 (64 - 74)  BP: 119/81 (95/66 - 119/81)  RR: 17 (16 - 18)  SpO2: 96% (95% - 97%)    LABS:                        11.0   9.0   )-----------( 690      ( 16 Jun 2017 07:11 )             37.0     06-16    136  |  98  |  5<L>  ----------------------------<  172<H>  4.0   |  28  |  0.45<L>    Ca    8.6      16 Jun 2017 07:11  Phos  3.0     06-15  Mg     1.9     06-15        The cholecystostomy drain site is c/d/i with 90 cc of output/ last 24 hours (decreased from 300 cc output /the prior 24 hours). Cx results demonstrate no growth.   The left upper quadrant drain site is c/d/i with 185 cc of output/ last 24 hours (increased from 85 cc output /the prior 24 hours). Cx results demonstrate preliminary growth of a few yeast cells, enterococcus faecium, and lactobacillus. Leukocytosis has resolved.      PHYSICAL EXAM:  General: resting in bed at time of examination; enteric tube in place.  Abdomen: Abdominal discomfort with palpation; the cholecystostomy site is c/d/i with bilious drainage in the bag. The left upper quadrant drain site is c/d/i with white, cloudy fluid in the bulb.       46y Female admitted with acute cholecystitis s/p percutaneous cholecystostomy and a lesser sac sac collection s/p ultrasound-guided pigtail catheter drain placement.      Plan:  - Continue to monitor drain outputs.  - Continue to flush drain per doctor orders.  - Continue to monitor patient vital signs and bloodwork; follow-up final cultures. Antibiotic therapy as per primary team.  - Abdominal pain/discomfort management as per primary team.  - Findings and plan to be discussed with IR attending.     Please call IR at extension [phone] with any questions, concerns, or issues regarding above.

## 2017-06-16 NOTE — PROGRESS NOTE ADULT - SUBJECTIVE AND OBJECTIVE BOX
ACS GENERAL SURGERY DAILY PROGRESS NOTE:       SUBJECTIVE/ROS:   Pt vomited o/n, NGT placed and adjusted back. Abdominal pain is controlled with pca. Stable upper abdominal pain, left > right. No radiation. No fevers.      Vital Signs 24Hrs      I&O's Summary  I & Os for 24h ending 15 Alexx 2017 07:00  =============================================  IN: 2550 ml / OUT: 1910 ml / NET: 640 ml    I & Os for current day (as of 16 Jun 2017 04:10)  =============================================  IN: 2620 ml / OUT: 1665 ml / NET: 955 ml      cefepime  IVPB 2000milliGRAM(s) IV Intermittent every 12 hours  acetaminophen  IVPB. 1000milliGRAM(s) IV Intermittent once  naloxone Injectable 0.1milliGRAM(s) IV Push every 3 minutes PRN  ondansetron Injectable 4milliGRAM(s) IV Push every 8 hours PRN  ondansetron Injectable 4milliGRAM(s) IV Push every 8 hours  fluconAZOLE IVPB  IV Intermittent   fluconAZOLE IVPB 200milliGRAM(s) IV Intermittent every 24 hours  tetracaine/benzocaine/butamben Spray 1Spray(s) Topical three times a day  dextrose 5% + sodium chloride 0.9%. 1000milliLiter(s) IV Continuous <Continuous>  enoxaparin Injectable 40milliGRAM(s) SubCutaneous daily  metroNIDAZOLE  IVPB  IV Intermittent   metroNIDAZOLE  IVPB 500milliGRAM(s) IV Intermittent every 8 hours  HYDROmorphone PCA (1 mG/mL) 30milliLiter(s) PCA Continuous PCA Continuous  HYDROmorphone PCA (1 mG/mL) Rescue Clinician Bolus 1milliGRAM(s) IV Push every 1 hour PRN  octreotide  Injectable 300MICROGram(s) SubCutaneous three times a day              PHYSICAL EXAM:  Constitutional: well developed, well nourished, NAD  Eyes: anicteric  ENMT: normal facies, symmetric  Neck: supple  Respiratory: CTA bilaterally  Cardiovascular: RRR  Gastrointestinal: abdomen soft, tender in upper abdomen, mild distention. No obvious masses. No peritonitis   ostomy- viable, no function. HARSHIL drain with purulent output  Extremities: FROM, warm  Neurological: intact, non-focal  Skin: no gross lesions  Lymph Nodes: no gross adenopathy  Musculoskeletal: equal strength bilateral upper and lower extremities  Psychiatric: oriented x 3; appropriate  Rectal: not examined [facility] GENERAL SURGERY DAILY PROGRESS NOTE:     HOSPITAL DAY: 15    SUBJECTIVE/ROS:   Pt vomited o/n, NGT placed and adjusted back. Abdominal pain is controlled with pca. Stable upper abdominal pain, left > right. No radiation. No fevers.        PHYSICAL EXAM:  Constitutional: well developed, well nourished, NAD  Eyes: anicteric  ENMT: normal facies, symmetric  Neck: supple  Respiratory: CTA bilaterally  Cardiovascular: RRR  Gastrointestinal: abdomen soft, tender in upper abdomen, mild distention. No obvious masses. No peritonitis   ostomy- viable, no function. HARSHIL drain with purulent output. IR drain flushed at bedside.   Extremities: FROM, warm  Neurological: intact, non-focal  Skin: no gross lesions  Lymph Nodes: no gross adenopathy  Musculoskeletal: equal strength bilateral upper and lower extremities  Psychiatric: oriented x 3; appropriate  Rectal: not examined      Vital Signs Last 24 Hrs  T(C): 37, Max: 37 (06-16 @ 05:50)  T(F): 98.6, Max: 98.6 (06-16 @ 05:50)  HR: 71 (64 - 74)  BP: 119/81 (95/66 - 119/81)  BP(mean): --  RR: 17 (16 - 18)  SpO2: 96% (95% - 97%)    I&O's Detail    I & Os for current day (as of 16 Jun 2017 07:28)  =============================================  IN:    dextrose 5% + sodium chloride 0.9%.: 2400 ml    IV PiggyBack: 400 ml    Oral Fluid: 120 ml    Total IN: 2920 ml  ---------------------------------------------  OUT:    Voided: 1020 ml    Nasoenteral Tube: 500 ml    Emesis: 250 ml    Drain: 185 ml    Drain: 90 ml    Total OUT: 2045 ml  ---------------------------------------------  Total NET: 875 ml                            10.7   7.3   )-----------( 582      ( 15 Alexx 2017 07:21 )             37.5       06-15    135  |  97  |  5<L>  ----------------------------<  135<H>  4.3   |  25  |  0.49<L>    Ca    8.2<L>      15 Alexx 2017 07:21  Phos  3.0     06-15  Mg     1.9     06-15

## 2017-06-16 NOTE — PROGRESS NOTE ADULT - PROBLEM SELECTOR PLAN 3
Large lesser sac collection adjacent to gastric perforation, s/p IR drain  - continue drain flushes. output decreasing but remains very purulent and pain is unchanged. will get CT to reevaluate abscess and SBO Resolved, continue perc ubaldo tube as definitive therapy for now.

## 2017-06-16 NOTE — PROGRESS NOTE ADULT - PROBLEM SELECTOR PLAN 1
Resolved, continue perc ubaldo tube Large lesser sac collection adjacent to gastric perforation, s/p IR drain  - drain seems positional and clogs easily (sometimes large outputs, sometimes none). discussed with Dr. Vines, plan for drain check and possible upsizing or repositioning today

## 2017-06-16 NOTE — PROGRESS NOTE ADULT - SUBJECTIVE AND OBJECTIVE BOX
ADVANCED GI FOLLOW UP NOTE:    SUBJECTIVE:  - Mild nausea and one episode of emesis with replacement of NGT overnight. Controlled abdominal pain.  - NGT output 150cc (5/14) ---> 500cc (5/15)  - Afebrile    Review of Systems: negative except as above.    OBJECTIVE:    Vital Signs Last 24 Hrs  T(C): 37, Max: 37 (06-16 @ 05:50)  T(F): 98.6, Max: 98.6 (06-16 @ 05:50)  HR: 71 (64 - 74)  BP: 119/81 (95/66 - 119/81)  BP(mean): --  RR: 17 (16 - 18)  SpO2: 96% (95% - 97%)    PHYSICAL EXAM:  Constitutional: no acute distress, NGT in place  Eyes: no icterus  Neck: no masses, no LAD  Respiratory: normal inspiratory effort; no wheezing or crackles  Cardiovascular: RRR, normal S1/S2, no murmurs/rubs/gallops  Gastrointestinal: soft, RUQ and LUQ drains in place, left sided ostomy  Extremities: no LE edema  Neurological: AAOx3, no asterixis  Skin: no rashes, bruises, petechia    LABS:                        11.0   9.0   )-----------( 690      ( 16 Jun 2017 07:11 )             37.0     136  |  98  |  5<L>  ----------------------------<  172<H>  4.0   |  28  |  0.45<L>  Ca    8.6      16 Jun 2017 07:11  Phos  3.0     06-15  Mg     1.9     06-15

## 2017-06-16 NOTE — CHART NOTE - NSCHARTNOTEFT_GEN_A_CORE
Gyn Onc Attd. Case discussed with the Gyn team. Course and history noted. Pall Care plan acknowledged. No Gyn Oncology intervention at this time. Plan for Med Onc follow up noted. Please call as needed.

## 2017-06-16 NOTE — PROGRESS NOTE ADULT - PROBLEM SELECTOR PLAN 2
Patient has evidence of bowel obstruction on CT an she had not had a BM for several days.   As per primary team, plan is continuing a conservative approach until Monday. If not BM then  Sx will eval for a palliative procedure.   Continue octreotide to 300 mg tid.   s/p NGT

## 2017-06-16 NOTE — PROGRESS NOTE ADULT - SUBJECTIVE AND OBJECTIVE BOX
Pt seen and examined.  NGT replaced overnight, patient feels better.  Still without any GI fxn.  No nausea or vomiting.    HPI:  46F presents with abdominal pain. Patient stated that the pain began 5/31, at which point she came to the [facility] ED, was discharged when the pain improved. Patient endorses subjective, low-grade fevers at home with decrease in ostomy output, diaphoresis, one episode of nausea/vomiting. Pain is localized primarily to RUQ.Gonzalez  Patient's past medical/surgical history is primarily significant for ovarian CA s/p GRETCHEN Dec 2016, also has had bowel resection with ostomy at that time. Her surgeries and treatments were at [facility], Dr. Roth. Initiation of chemotherapy was delayed 2/2 poor wound healing. Patient was scheduled to start chemotherapy in the next few weeks, sees Dr. PJ Lane of [facility]. Denies other PMH, denies medications. Allergy to PCN.  On exam patient was afebrile with stable vital signs, uncomfortable appearing. Healing midline incision scar on abdomen with small area of open skin in inferior aspect. LLQ ostomy with gas and stool in bag. Tender RUQ with guarding.   WBC 24, T bili 1.3, CT shows distended gallbladder with thickened wall and pericholecystic fluid. (02 Jun 2017 05:03)      PAST MEDICAL & SURGICAL HISTORY:  Ovarian cancer: dx in dec 2016 (started as fibroid --&gt; large cancerous tumor)  Colostomy in place: dec 2016 (placed during hysterectomy in dec 2016 at [facility])  H/O abdominal hysterectomy: dec 2016      MEDICATIONS  (STANDING):  cefepime  IVPB 2000milliGRAM(s) IV Intermittent every 12 hours  acetaminophen  IVPB. 1000milliGRAM(s) IV Intermittent once  ondansetron Injectable 4milliGRAM(s) IV Push every 8 hours  fluconAZOLE IVPB  IV Intermittent   fluconAZOLE IVPB 200milliGRAM(s) IV Intermittent every 24 hours  tetracaine/benzocaine/butamben Spray 1Spray(s) Topical three times a day  dextrose 5% + sodium chloride 0.9%. 1000milliLiter(s) IV Continuous <Continuous>  enoxaparin Injectable 40milliGRAM(s) SubCutaneous daily  metroNIDAZOLE  IVPB  IV Intermittent   metroNIDAZOLE  IVPB 500milliGRAM(s) IV Intermittent every 8 hours  HYDROmorphone PCA (1 mG/mL) 30milliLiter(s) PCA Continuous PCA Continuous  octreotide  Injectable 300MICROGram(s) SubCutaneous three times a day    MEDICATIONS  (PRN):  naloxone Injectable 0.1milliGRAM(s) IV Push every 3 minutes PRN For ANY of the following changes in patient status:  A. RR LESS THAN 10 breaths per minute, B. Oxygen saturation LESS THAN 90%, C. Sedation score of 6  HYDROmorphone PCA (1 mG/mL) Rescue Clinician Bolus 1milliGRAM(s) IV Push every 1 hour PRN for Pain Scale GREATER THAN 6      Allergies    penicillin (Rash)    Intolerances        FAMILY HISTORY:  No pertinent family history in first degree relatives      Review of Systems    Constitutional, Eyes, ENT, Cardiovascular, Respiratory, Gastrointestinal, Genitourinary, Musculoskeletal, Integumentary, Neurological, Psychiatric, Endocrine, Heme/Lymph and Allergic/Immunologic review of systems are otherwise negative except as noted in HPI.     Vital Signs Last 24 Hrs  T(C): 37, Max: 37 (06-16 @ 05:50)  T(F): 98.6, Max: 98.6 (06-16 @ 05:50)  HR: 71 (64 - 74)  BP: 119/81 (95/66 - 119/81)  BP(mean): --  RR: 17 (16 - 18)  SpO2: 96% (95% - 97%)    Physical Exam  Constitutional: well developed, well nourished, in no acute distress and thin.   Eyes: PERRL, EOMI, no conjunctival infection, anicteric.   ENT: pharynx is unremarkable, moist mucus membrane, no oral lesions.   Neck: supple without JVD, no thyromegaly or masses appreciated.   Pulmonary: clear to auscultation bilaterally, no dullness, no wheezing.   Cardiac: RRR, normal S1S2, no murmurs, rubs, gallops.   Vascular: no JVD, no calf tenderness, venous stasis changes, varices.   Abdomen: normoactive bowel sounds, soft and nontender, no hepatosplenomegaly or masses appreciated.   Lymphatic: no peripheral adenopathy appreciated.   Musculoskeletal: full range of motion and no deformities appreciated.   Skin: normal appearance, no rash, nodules, vesicles, ulcers, erythema.   Neurology: grossly intact.   Psychiatric: affect appropriate.       LABS:  CBC Full  -  ( 15 Alexx 2017 07:21 )  WBC Count : 7.3 K/uL  Hemoglobin : 10.7 g/dL  Hematocrit : 37.5 %  Platelet Count - Automated : 582 K/uL  Mean Cell Volume : 88.5 fl  Mean Cell Hemoglobin : 25.4 pg  Mean Cell Hemoglobin Concentration : 28.6 gm/dL  Auto Neutrophil # : x  Auto Lymphocyte # : x  Auto Monocyte # : x  Auto Eosinophil # : x  Auto Basophil # : x  Auto Neutrophil % : x  Auto Lymphocyte % : x  Auto Monocyte % : x  Auto Eosinophil % : x  Auto Basophil % : x    06-15    135  |  97  |  5<L>  ----------------------------<  135<H>  4.3   |  25  |  0.49<L>    Ca    8.2<L>      15 Alexx 2017 07:21  Phos  3.0     06-15  Mg     1.9     06-15            RADIOLOGY & ADDITIONAL STUDIES: Pt seen and examined.  NGT replaced overnight after episode of emesis, patient feels better.  Still without any GI fxn.  No nausea this morning.  No fevers or chills.    HPI:  46F presents with abdominal pain. Patient stated that the pain began 5/31, at which point she came to the [facility] ED, was discharged when the pain improved. Patient endorses subjective, low-grade fevers at home with decrease in ostomy output, diaphoresis, one episode of nausea/vomiting. Pain is localized primarily to RUQ.Gonzalez  Patient's past medical/surgical history is primarily significant for ovarian CA s/p GRETCHEN Dec 2016, also has had bowel resection with ostomy at that time. Her surgeries and treatments were at [facility], Dr. Roth. Initiation of chemotherapy was delayed 2/2 poor wound healing. Patient was scheduled to start chemotherapy in the next few weeks, sees Dr. PJ Lane of [facility]. Denies other PMH, denies medications. Allergy to PCN.  On exam patient was afebrile with stable vital signs, uncomfortable appearing. Healing midline incision scar on abdomen with small area of open skin in inferior aspect. LLQ ostomy with gas and stool in bag. Tender RUQ with guarding.   WBC 24, T bili 1.3, CT shows distended gallbladder with thickened wall and pericholecystic fluid. (02 Jun 2017 05:03)      PAST MEDICAL & SURGICAL HISTORY:  Ovarian cancer: dx in dec 2016 (started as fibroid --&gt; large cancerous tumor)  Colostomy in place: dec 2016 (placed during hysterectomy in dec 2016 at [facility])  H/O abdominal hysterectomy: dec 2016      MEDICATIONS  (STANDING):  cefepime  IVPB 2000milliGRAM(s) IV Intermittent every 12 hours  acetaminophen  IVPB. 1000milliGRAM(s) IV Intermittent once  ondansetron Injectable 4milliGRAM(s) IV Push every 8 hours  fluconAZOLE IVPB  IV Intermittent   fluconAZOLE IVPB 200milliGRAM(s) IV Intermittent every 24 hours  tetracaine/benzocaine/butamben Spray 1Spray(s) Topical three times a day  dextrose 5% + sodium chloride 0.9%. 1000milliLiter(s) IV Continuous <Continuous>  enoxaparin Injectable 40milliGRAM(s) SubCutaneous daily  metroNIDAZOLE  IVPB  IV Intermittent   metroNIDAZOLE  IVPB 500milliGRAM(s) IV Intermittent every 8 hours  HYDROmorphone PCA (1 mG/mL) 30milliLiter(s) PCA Continuous PCA Continuous  octreotide  Injectable 300MICROGram(s) SubCutaneous three times a day    MEDICATIONS  (PRN):  naloxone Injectable 0.1milliGRAM(s) IV Push every 3 minutes PRN For ANY of the following changes in patient status:  A. RR LESS THAN 10 breaths per minute, B. Oxygen saturation LESS THAN 90%, C. Sedation score of 6  HYDROmorphone PCA (1 mG/mL) Rescue Clinician Bolus 1milliGRAM(s) IV Push every 1 hour PRN for Pain Scale GREATER THAN 6      Allergies    penicillin (Rash)    Intolerances        FAMILY HISTORY:  No pertinent family history in first degree relatives      Review of Systems    Constitutional, Eyes, ENT, Cardiovascular, Respiratory, Gastrointestinal, Genitourinary, Musculoskeletal, Integumentary, Neurological, Psychiatric, Endocrine, Heme/Lymph and Allergic/Immunologic review of systems are otherwise negative except as noted in HPI.     Vital Signs Last 24 Hrs  T(C): 37, Max: 37 (06-16 @ 05:50)  T(F): 98.6, Max: 98.6 (06-16 @ 05:50)  HR: 71 (64 - 74)  BP: 119/81 (95/66 - 119/81)  BP(mean): --  RR: 17 (16 - 18)  SpO2: 96% (95% - 97%)    Physical Exam  Constitutional: well developed, well nourished, in no acute distress and thin.   Eyes: PERRL, EOMI, no conjunctival infection, anicteric.   ENT: pharynx is unremarkable, moist mucus membrane, no oral lesions.   Neck: supple without JVD, no thyromegaly or masses appreciated.   Pulmonary: clear to auscultation bilaterally, no dullness, no wheezing.   Cardiac: RRR, normal S1S2, no murmurs, rubs, gallops.   Vascular: no JVD, no calf tenderness, venous stasis changes, varices.   Abdomen: normoactive bowel sounds, soft and nontender, no hepatosplenomegaly or masses appreciated.   Lymphatic: no peripheral adenopathy appreciated.   Musculoskeletal: full range of motion and no deformities appreciated.   Skin: normal appearance, no rash, nodules, vesicles, ulcers, erythema.   Neurology: grossly intact.   Psychiatric: affect appropriate.       LABS:  CBC Full  -  ( 15 Alexx 2017 07:21 )  WBC Count : 7.3 K/uL  Hemoglobin : 10.7 g/dL  Hematocrit : 37.5 %  Platelet Count - Automated : 582 K/uL  Mean Cell Volume : 88.5 fl  Mean Cell Hemoglobin : 25.4 pg  Mean Cell Hemoglobin Concentration : 28.6 gm/dL  Auto Neutrophil # : x  Auto Lymphocyte # : x  Auto Monocyte # : x  Auto Eosinophil # : x  Auto Basophil # : x  Auto Neutrophil % : x  Auto Lymphocyte % : x  Auto Monocyte % : x  Auto Eosinophil % : x  Auto Basophil % : x    06-15    135  |  97  |  5<L>  ----------------------------<  135<H>  4.3   |  25  |  0.49<L>    Ca    8.2<L>      15 Alexx 2017 07:21  Phos  3.0     06-15  Mg     1.9     06-15            RADIOLOGY & ADDITIONAL STUDIES:

## 2017-06-16 NOTE — PROGRESS NOTE ADULT - PROBLEM SELECTOR PLAN 2
Iatrogenic, s/p removal of AXIOS stent and bear claw clipping.  - no signs of leak (normal amylase) Given lack of response to drainage of the abscess, it seems likely malignant. AXR shows all contrast in colon, although she rapidly failed a trial of NGT removal yesterday.  - Deferring to Surg Onc re: palliative surgical options.  - Continue octreotide  - No TPN given fungemia risk.

## 2017-06-16 NOTE — PROGRESS NOTE ADULT - SUBJECTIVE AND OBJECTIVE BOX
INTERVAL HPI/OVERNIGHT EVENTS: Following up for pain     Patient indicates pain is well controlled with Dialudid PCA. She indicates she is still not able to have a BM. Yesterday, she had  x 1 episode of vomiting after her NGT was removed and she then received clear liquid diet. NGT was then re-placed.      ADVANCE DIRECTIVES:    DNR: NO           PRESENT SYMPTOMS:   SOURCE: Patient      Pain: Mild to moderate. Dull. Constant. Improving with Dilaudid. Increasing with palpation.     Dyspnea:   NO  Anxiety:NO  Fatigue: Mild to moderate   Nausea: NO  Loss of Appetite:NO  Constipation [ x ] YES. Colostomy output: Elisabeth 280ml since 6/2    OTHER SYMPTOMS:  [ X] All other ROS negative     [ ] Unable to obtain due to poor mentation    MEDICATIONS  (STANDING):  cefepime  IVPB 2000milliGRAM(s) IV Intermittent every 12 hours  acetaminophen  IVPB. 1000milliGRAM(s) IV Intermittent once  ondansetron Injectable 4milliGRAM(s) IV Push every 8 hours  fluconAZOLE IVPB  IV Intermittent   fluconAZOLE IVPB 200milliGRAM(s) IV Intermittent every 24 hours  tetracaine/benzocaine/butamben Spray 1Spray(s) Topical three times a day  dextrose 5% + sodium chloride 0.9%. 1000milliLiter(s) IV Continuous <Continuous>  enoxaparin Injectable 40milliGRAM(s) SubCutaneous daily  metroNIDAZOLE  IVPB  IV Intermittent   metroNIDAZOLE  IVPB 500milliGRAM(s) IV Intermittent every 8 hours  HYDROmorphone PCA (1 mG/mL) 30milliLiter(s) PCA Continuous PCA Continuous  octreotide  Injectable 300MICROGram(s) SubCutaneous three times a day    MEDICATIONS  (PRN):  naloxone Injectable 0.1milliGRAM(s) IV Push every 3 minutes PRN For ANY of the following changes in patient status:  A. RR LESS THAN 10 breaths per minute, B. Oxygen saturation LESS THAN 90%, C. Sedation score of 6  HYDROmorphone PCA (1 mG/mL) Rescue Clinician Bolus 1milliGRAM(s) IV Push every 1 hour PRN for Pain Scale GREATER THAN 6      Karnofsky Performance Score/Palliative Performance Status Version 2: 60  %  Protein Calorie Malnutrition:  [ ] Mild   [ ] Moderate   [ ] Severe     Physical Exam:    Vital Signs Last 24 Hrs  T(C): 36.9, Max: 37 (06-16 @ 05:50)  T(F): 98.5, Max: 98.6 (06-16 @ 05:50)  HR: 69 (64 - 74)  BP: 110/72 (95/66 - 119/81)  BP(mean): --  RR: 17 (16 - 18)  SpO2: 98% (95% - 98%)    General: Awake alert, comfortable.   HEENT: PERRL with EOMI.   Lungs: Clear to Ausc bilaterally, no wheezing.   Cardiovascular: S1, S2. No S3  RRR, no murmurs   Abdomen: Soft, No guarding, tenderness to palpate diffusely but less compared with yesterday, BS  x 4 (+).     Genitourinary: Normal  Musculoskeletal:  Weakness   Neurological: no focal deficits. Alert, oriented x 3     Skin: warm and dry     Labs:                           11.0   9.0   )-----------( 690      ( 16 Jun 2017 07:11 )             37.0   06-16    136  |  98  |  5<L>  ----------------------------<  172<H>  4.0   |  28  |  0.45<L>    Ca    8.6      16 Jun 2017 07:11  Phos  3.0     06-15  Mg     1.9     06-15                 EXAM:  CT ABDOMEN AND PELVIS OC IC                            PROCEDURE DATE:  06/11/2017 IMPRESSION:   1.  The stomach, duodenum, and jejunum are dilated with an abrupt zone of   transition in the left upper quadrant.  2.  The cystogastrostomy communication is not visualized with multiple   clips at its expected location (? Mobile/ligation).  3.  Peritoneal carcinomatosis is unchanged.

## 2017-06-16 NOTE — PROGRESS NOTE ADULT - ASSESSMENT
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 46 year old with ovarian CA s/p complicated hospital course s/p IR drain of intraabdominal abscess, now with suspected malignant SBO    Plan:  1) NGT to LCWS  2) NPO/IVF  3) Octreotide  4) Will plan for surgical exploration next week 46 year old with ovarian CA s/p complicated hospital course s/p IR drain of intraabdominal abscess, now with suspected malignant SBO    Plan:  1) NGT to LCWS  2) NPO/IVF  3) Octreotide  4) Will plan for surgical exploration Monday

## 2017-06-16 NOTE — PROGRESS NOTE ADULT - ASSESSMENT
46F s/p endoscopy, removal of stent, IR perc ubaldo, IR drainage  -NPO/NGT  -await GI function  -pain control 46F s/p endoscopy, removal of stent, IR perc ubaldo, IR drainage    - NPO/NGT  - await GI function  - pain control  - IR drain flush daily; flushed at bedside this am    PANTERA Sultana PGY1  Pager: [phone]

## 2017-06-16 NOTE — PROGRESS NOTE ADULT - SUBJECTIVE AND OBJECTIVE BOX
[facility] GENERAL SURGERY POST PROCEDURE NOTE:     Status post: IR tube check, upsizing    Subjective: Patient resting comfortably in bed, rehana pain, denies SOB, CP, palp, N/V, or other issues.    Objective:  Vital Signs Last 24h  T(C): 36.8, Max: 37 (06-16 @ 05:50)  HR: 73 (64 - 73)  BP: 114/77 (103/65 - 119/81)  RR: 18 (17 - 18)  SpO2: 96% (96% - 98%)      Physical Exam:  General: NAD  Respiratory: CBTA  Cardiovascular: RRR  Abdominal: Soft, NT, IR drain in place, ubaldo tube in place      I & Os for 24h ending 06-16 @ 07:00  =============================================  IN: 2920 ml / OUT: 2045 ml / NET: 875 ml        LABS:                          11.0<L>  9.0   )--------------( 690<H>    ( 16 Jun 2017 07:11 )               37.0         136      |    98     |  5<L>   ---------------------------------<   172<H> 16 Jun 2017 07:11  4.0      |    28     |  0.45<L>    Ca    8.6        16 Jun 2017 07:11

## 2017-06-16 NOTE — PROGRESS NOTE ADULT - SUBJECTIVE AND OBJECTIVE BOX
Interventional Radiology Procedure Note    Procedure: Left abdominal drain check and upsizing    Indication: Lesser sac collection with intermittent drainage from the current abdominal drain, presenting for tube check and change/upsizing    Operators: Jose Armando Vines    Anesthesia (type): local anesthesia    Contrast: 35 cc Omnipaque 240    EBL: none    Findings/Follow up Plan of Care: The drain was upsized to a 14F biliary catheter.    Specimens Removed: Purulent fluid aspirated.    Implants: none.    Complications: none.    Condition/Disposition: Patient will return back to her room.    Please call Interventional Radiology [phone] with any questions, concerns, or issues.

## 2017-06-16 NOTE — PROVIDER CONTACT NOTE (OTHER) - ASSESSMENT
Pt complaining of nausea & belching, vomited approximately 100cc of yellow bilious looking emesis, IV zofran 4mg given, pt has noted relief now.

## 2017-06-16 NOTE — CHART NOTE - NSCHARTNOTEFT_GEN_A_CORE
Interventional Radiology Pre-Procedure Note    Diagnosis/Indication: Patient is a 45yo old female s/p percutaneous abdominal drainage catheter in the lesser sac and drainage of abscess, presents today to IR for drain evaluation, possible revision/ replacement/ upsizing.      PAST MEDICAL & SURGICAL HISTORY:  Ovarian cancer: dx in dec 2016 (started as fibroid, then grew to large cancerous tumor)  Colostomy in place: dec 2016 (placed during hysterectomy in dec 2016 at [facility])  H/O abdominal hysterectomy: dec 2016     Allergies: penicillin (Rash)      LABS:                 11.0   9.0   )-----------( 690      ( 16 Jun 2017 07:11 )             37.0     06-16    136  |  98  |  5<L>  ----------------------------<  172<H>  4.0   |  28  |  0.45<L>    Ca    8.6      16 Jun 2017 07:11  Phos  3.0     06-15  Mg     1.9     06-15        Procedure: Drain evaluation, possible revision/ replacement/ upsizing      Procedure/ risks/ benefits were explained, informed consent obtained from patient, verbalizes understanding.    Porsah Carballo PA-C  [phone]

## 2017-06-16 NOTE — PROVIDER CONTACT NOTE (OTHER) - BACKGROUND
Pt is s/p cholycycstectomy, NGT removed in AM on 6/15, pt advanced to clears, burping and nausea noted, pt placed on NPO, denied nausea through most of evening

## 2017-06-16 NOTE — PROGRESS NOTE ADULT - PROBLEM SELECTOR PLAN 1
Patient is currently on PCA Dilaudid for pain control. She received 2 demand dosages (DD) over 24 hours. Will continue rate at 0.2 mg/hour.   Naloxone PRN  Holistic nurse.

## 2017-06-16 NOTE — PROGRESS NOTE ADULT - ASSESSMENT
45 yo F with ovarian cancer s/p GRETCHEN, presented with abdominal pain, found to have acute cholecystitis, complicated after stent placement with perforated gallbladder with ans associated intra-abdominal abscesse and bowel obstruction (likely malignant bowel obstruction).  S/p stent removal 6/7/2017 via endoscopy with NG tube placement. s/p intraabdominal drain for abscess drainage.

## 2017-06-16 NOTE — PROGRESS NOTE ADULT - ASSESSMENT
Impression:  1) Loculated Perigastric Fluid Collection likely abscess: S/p removal of lumen-apposing stent draining collection into stomach last week. Now s/p IR drain.  2) Acute cholecystitis: s/p IR Percutaneous drain several days ago.   3) Uterine Cancer s/p GRETCHEN and Ileostomy, peritoneal carcinomatosis.  4) Possible SBO - on imaging with transition point in jejunum.     Plan:  - Monitor NGT output  - Continue IV Abx  - If SBO worsens, will consider enteroscopy to evaluate for possible stent placement.  - IVF  - Monitor electrolytes Impression:  1) Loculated Perigastric Fluid Collection likely abscess: S/p removal of lumen-apposing stent draining collection into stomach last week. Now s/p IR drain with upsizing today.  2) Acute cholecystitis: s/p IR Percutaneous drain several days ago.   3) Uterine Cancer s/p GRETCHEN and Ileostomy, peritoneal carcinomatosis.  4) Possible SBO - on imaging with transition point in jejunum.  May have some component of ileus (no bowel sounds, on narcotic PCA)    Plan:  - Monitor NGT output and percutaneous drain outputs.  - Continue IV Abx  - IVF  - Monitor electrolytes  - Will reevalute for enteroscopy / possible stent if SBO persists and patient not a surgical candidate.  Given peritoneal carcinomatosis, stent may not provide significant clinical response if obstruction due to adhesion or bowel kinking. Impression:  1) Loculated Perigastric Fluid Collection likely abscess: S/p removal of lumen-apposing stent draining collection into stomach last week. Now s/p IR drain with upsizing today.  2) Acute cholecystitis: s/p IR Percutaneous drain several days ago.   3) Uterine Cancer s/p GRETCHEN and Ileostomy, peritoneal carcinomatosis.  4) Possible SBO - on imaging with transition point in jejunum.  May have some component of ileus (no bowel sounds, on narcotic PCA)    Plan:  - Monitor NGT output and percutaneous drain outputs.  - Continue IV Abx  - IVF  - Monitor electrolytes  - Will reevalute for enteroscopy / possible stent if SBO persists and patient not a surgical candidate.  Given peritoneal carcinomatosis, stent may not provide significant clinical response if obstruction due to adhesion or bowel kinking.  - Relistor relatively contraindicated in setting of possible obstruction.

## 2017-06-16 NOTE — PROGRESS NOTE ADULT - PROBLEM SELECTOR PLAN 4
Unclear if mechanical (due to compression by the large collection), adhesive (given extensive surgical history), or malignant due to carcinomatosis.   No improvement with abscess drainage thus far. Will re-evaluate CT. May need to re-approach family and Surg Onc re: palliative surgical options.  - Continue octreotide for malignant SBO. Not a good candidate for steroids given infectious issues.  - Would benefit from palliative venting PEG, family wishes to think on this further  - Evaluated for TPN. Given yeast in drain output, risk of fungemia is a bit higher. Deferring on starting for now. Iatrogenic, s/p removal of AXIOS stent and bear claw clipping.  - no signs of leak (normal amylase)

## 2017-06-17 LAB
ANION GAP SERPL CALC-SCNC: 10 MMOL/L — SIGNIFICANT CHANGE UP (ref 5–17)
BUN SERPL-MCNC: 4 MG/DL — LOW (ref 7–23)
CALCIUM SERPL-MCNC: 8.2 MG/DL — LOW (ref 8.4–10.5)
CHLORIDE SERPL-SCNC: 98 MMOL/L — SIGNIFICANT CHANGE UP (ref 96–108)
CO2 SERPL-SCNC: 27 MMOL/L — SIGNIFICANT CHANGE UP (ref 22–31)
CREAT SERPL-MCNC: 0.55 MG/DL — SIGNIFICANT CHANGE UP (ref 0.5–1.3)
GLUCOSE SERPL-MCNC: 144 MG/DL — HIGH (ref 70–99)
HCT VFR BLD CALC: 34.5 % — SIGNIFICANT CHANGE UP (ref 34.5–45)
HGB BLD-MCNC: 10.7 G/DL — LOW (ref 11.5–15.5)
MAGNESIUM SERPL-MCNC: 1.5 MG/DL — LOW (ref 1.6–2.6)
MCHC RBC-ENTMCNC: 27.6 PG — SIGNIFICANT CHANGE UP (ref 27–34)
MCHC RBC-ENTMCNC: 31.1 GM/DL — LOW (ref 32–36)
MCV RBC AUTO: 88.8 FL — SIGNIFICANT CHANGE UP (ref 80–100)
PHOSPHATE SERPL-MCNC: 2.7 MG/DL — SIGNIFICANT CHANGE UP (ref 2.5–4.5)
PLATELET # BLD AUTO: 596 K/UL — HIGH (ref 150–400)
POTASSIUM SERPL-MCNC: 4 MMOL/L — SIGNIFICANT CHANGE UP (ref 3.5–5.3)
POTASSIUM SERPL-SCNC: 4 MMOL/L — SIGNIFICANT CHANGE UP (ref 3.5–5.3)
RBC # BLD: 3.89 M/UL — SIGNIFICANT CHANGE UP (ref 3.8–5.2)
RBC # FLD: 12.7 % — SIGNIFICANT CHANGE UP (ref 10.3–14.5)
SODIUM SERPL-SCNC: 135 MMOL/L — SIGNIFICANT CHANGE UP (ref 135–145)
WBC # BLD: 6 K/UL — SIGNIFICANT CHANGE UP (ref 3.8–10.5)
WBC # FLD AUTO: 6 K/UL — SIGNIFICANT CHANGE UP (ref 3.8–10.5)

## 2017-06-17 PROCEDURE — 99232 SBSQ HOSP IP/OBS MODERATE 35: CPT

## 2017-06-17 PROCEDURE — 99233 SBSQ HOSP IP/OBS HIGH 50: CPT

## 2017-06-17 RX ORDER — MAGNESIUM SULFATE 500 MG/ML
2 VIAL (ML) INJECTION ONCE
Qty: 0 | Refills: 0 | Status: COMPLETED | OUTPATIENT
Start: 2017-06-17 | End: 2017-06-17

## 2017-06-17 RX ADMIN — Medication 1 SPRAY(S): at 21:25

## 2017-06-17 RX ADMIN — ENOXAPARIN SODIUM 40 MILLIGRAM(S): 100 INJECTION SUBCUTANEOUS at 13:12

## 2017-06-17 RX ADMIN — OCTREOTIDE ACETATE 300 MICROGRAM(S): 200 INJECTION, SOLUTION INTRAVENOUS; SUBCUTANEOUS at 16:19

## 2017-06-17 RX ADMIN — ONDANSETRON 4 MILLIGRAM(S): 8 TABLET, FILM COATED ORAL at 16:19

## 2017-06-17 RX ADMIN — CEFEPIME 100 MILLIGRAM(S): 1 INJECTION, POWDER, FOR SOLUTION INTRAMUSCULAR; INTRAVENOUS at 13:12

## 2017-06-17 RX ADMIN — FLUCONAZOLE 100 MILLIGRAM(S): 150 TABLET ORAL at 16:18

## 2017-06-17 RX ADMIN — Medication 100 MILLIGRAM(S): at 05:21

## 2017-06-17 RX ADMIN — OCTREOTIDE ACETATE 300 MICROGRAM(S): 200 INJECTION, SOLUTION INTRAVENOUS; SUBCUTANEOUS at 05:21

## 2017-06-17 RX ADMIN — CEFEPIME 100 MILLIGRAM(S): 1 INJECTION, POWDER, FOR SOLUTION INTRAMUSCULAR; INTRAVENOUS at 00:24

## 2017-06-17 RX ADMIN — Medication 1 SPRAY(S): at 13:19

## 2017-06-17 RX ADMIN — Medication 100 MILLIGRAM(S): at 13:13

## 2017-06-17 RX ADMIN — ONDANSETRON 4 MILLIGRAM(S): 8 TABLET, FILM COATED ORAL at 07:42

## 2017-06-17 RX ADMIN — Medication 1 SPRAY(S): at 05:21

## 2017-06-17 RX ADMIN — OCTREOTIDE ACETATE 300 MICROGRAM(S): 200 INJECTION, SOLUTION INTRAVENOUS; SUBCUTANEOUS at 21:25

## 2017-06-17 RX ADMIN — Medication 50 GRAM(S): at 13:12

## 2017-06-17 RX ADMIN — HYDROMORPHONE HYDROCHLORIDE 30 MILLILITER(S): 2 INJECTION INTRAMUSCULAR; INTRAVENOUS; SUBCUTANEOUS at 07:39

## 2017-06-17 RX ADMIN — HYDROMORPHONE HYDROCHLORIDE 30 MILLILITER(S): 2 INJECTION INTRAMUSCULAR; INTRAVENOUS; SUBCUTANEOUS at 19:23

## 2017-06-17 RX ADMIN — Medication 100 MILLIGRAM(S): at 21:25

## 2017-06-17 NOTE — PROGRESS NOTE ADULT - SUBJECTIVE AND OBJECTIVE BOX
INTERVAL HPI/OVERNIGHT EVENTS:  s/p drain change yesterday pressed PCA for pain control primarily post procedure and notes that overnight and now she is feeling at "peace"  Pt has NGT and is comfortable rest of ros is negative    Allergies    penicillin (Rash)    Intolerances        ADVANCE DIRECTIVES:    DNR: [ x] NO [ ] YES (Date) MOLST [ ] NO [ ] YES (Date)    MEDICATIONS  (STANDING):  cefepime  IVPB 2000milliGRAM(s) IV Intermittent every 12 hours  acetaminophen  IVPB. 1000milliGRAM(s) IV Intermittent once  ondansetron Injectable 4milliGRAM(s) IV Push every 8 hours  fluconAZOLE IVPB  IV Intermittent   fluconAZOLE IVPB 200milliGRAM(s) IV Intermittent every 24 hours  tetracaine/benzocaine/butamben Spray 1Spray(s) Topical three times a day  dextrose 5% + sodium chloride 0.9%. 1000milliLiter(s) IV Continuous <Continuous>  enoxaparin Injectable 40milliGRAM(s) SubCutaneous daily  metroNIDAZOLE  IVPB  IV Intermittent   metroNIDAZOLE  IVPB 500milliGRAM(s) IV Intermittent every 8 hours  HYDROmorphone PCA (1 mG/mL) 30milliLiter(s) PCA Continuous PCA Continuous  octreotide  Injectable 300MICROGram(s) SubCutaneous three times a day    MEDICATIONS  (PRN):  naloxone Injectable 0.1milliGRAM(s) IV Push every 3 minutes PRN For ANY of the following changes in patient status:  A. RR LESS THAN 10 breaths per minute, B. Oxygen saturation LESS THAN 90%, C. Sedation score of 6  HYDROmorphone PCA (1 mG/mL) Rescue Clinician Bolus 1milliGRAM(s) IV Push every 1 hour PRN for Pain Scale GREATER THAN 6      PRESENT SYMPTOMS:  Source: [ x] Patient   [ ] Family   [ ] Team     Pain:                        [ ] No [ x] Yes             [x ] Mild [ ] Moderate [ ] Severe    abdominal and primarily @ new she site in the LUQ no n/v/d/c has ngt    Dyspnea:                [x ] No [ ] Yes             [ ] Mild [ ] Moderate [ ] Severe    Anxiety:                  [ x] No [ ] Yes             [ ] Mild [ ] Moderate [ ] Severe    Fatigue:                  [ ] No [x ] Yes             [x ] Mild [ ] Moderate [ ] Severe    Nausea:                  [x ] No [ ] Yes             [ ] Mild [ ] Moderate [ ] Severe    Loss of appetite:   [ ] No [x ] Yes             [ ] Mild [x ] Moderate [ ] Severe    Constipation:        [ ] No [ x] Yes             [ ] Mild [ ] Moderate [ x] Severe    Other Symptoms:  [x ] All other review of systems negative   [ ] Unable to obtain due to poor mentation     Karnofsky Performance Score/Palliative Performance Status Version 2:   30      %    PHYSICAL EXAM:  Vital Signs Last 24 Hrs  T(C): 37, Max: 37 (06-17 @ 05:01)  T(F): 98.6, Max: 98.6 (06-17 @ 05:01)  HR: 63 (63 - 73)  BP: 129/78 (110/72 - 129/78)  BP(mean): --  RR: 16 (16 - 18)  SpO2: 96% (95% - 98%) I&O's Summary    I & Os for current day (as of 17 Jun 2017 09:29)  =============================================  IN: 2500 ml / OUT: 2270 ml / NET: 230 ml      General:  [x ] Alert  [x ] Oriented x    3  [ ] Lethargic  [ ] Agitated   [ ] Cachexia   [ ] Unarousable  [ ] Verbal  [ ] Non-Verbal    HEENT:  [x ] Normal   [ ] Dry mouth   [ ] ET Tube    [ ] Trach  [ ] Oral lesions    Lungs: other: decreaed bs at the bases other wise clear  [ ] Clear [ ] Tachypnea  [ ] Audible excessive secretions   [ ] Rhonchi        [ ] Right [ ] Left [ ] Bilateral  [ ] Crackles        [ ] Right [ ] Left [ ] Bilateral  [ ] Wheezing     [ ] Right [ ] Left [ ] Bilateral    Cardiovascular:  [x ] Regular [ ] Irregular [ ] Tachycardia   [ ] Bradycardia  [ ] Murmur [ ] Other    Abdomen: [ ] Soft  [ ] Distended   [ ] +BS  [ ] Non tender [x ] Tender  [ ]PEG   [ x]OGT/ NGT   Last BM:       Genitourinary: [x ] Normal [ ] Incontinent   [ ] Oliguria/Anuria   [ ] Lynn    Musculoskeletal:  [x ] Normal   [ ] Weakness  [ ] Bedbound/Wheelchair bound [ ] Edema    Neurological: [ x] No focal deficits  [ ] Cognitive impairment  [ ] Dysphagia [ ] Dysarthria [ ] Paresis [ ] Other     Skin: [x ] Normal   [ ] Pressure ulcer(s)                  [ ] Rash    LABS:                        10.7   6.0   )-----------( 596      ( 17 Jun 2017 06:12 )             34.5     06-17    135  |  98  |  4<L>  ----------------------------<  144<H>  4.0   |  27  |  0.55    Ca    8.2<L>      17 Jun 2017 06:12  Phos  2.7     06-17  Mg     1.5     06-17            Shock: [ ] Septic [ ] Cardiogenic [ ] Neurologic [ ] Hypovolemic  Vasopressors x   Inotrophs x     Oral Intake: [x ] Unable/mouth care only [ ] Minimal [ ] Moderate [ ] Full Capability    Diet: [ x] NPO [ ] Tube feeds [ ] TPN [ ] Other     RADIOLOGY & ADDITIONAL STUDIES:    REFERRALS:   [ ] Chaplaincy  [ ] Hospice  [ ] Child Life  [ ] Social Work  [ ] Case management [ ] Holistic Therapy

## 2017-06-17 NOTE — PROGRESS NOTE ADULT - PROBLEM SELECTOR PLAN 4
Iatrogenic, s/p removal of AXIOS stent and bear claw clipping.  - no signs of leak (normal amylase) Iatrogenic, s/p removal of AXIOS stent and bear claw clip

## 2017-06-17 NOTE — PROGRESS NOTE ADULT - SUBJECTIVE AND OBJECTIVE BOX
[facility] GENERAL SURGERY DAILY PROGRESS NOTE:     HOSPITAL DAY: 16    SUBJECTIVE/ROS:   No events o/n. Abdominal pain is controlled with pca. Stable upper abdominal pain, left > right. No radiation. No fevers.        PHYSICAL EXAM:  Constitutional: well developed, well nourished, NAD  Eyes: anicteric  ENMT: normal facies, symmetric  Neck: supple  Respiratory: CTA bilaterally  Cardiovascular: RRR  Gastrointestinal: abdomen soft, tender in upper abdomen, mild distention. No obvious masses. No peritonitis   ostomy- viable, no function. HARSHIL drain with purulent output. IR drain flushed at bedside.   Extremities: FROM, warm  Neurological: intact, non-focal  Skin: no gross lesions  Lymph Nodes: no gross adenopathy  Musculoskeletal: equal strength bilateral upper and lower extremities  Psychiatric: oriented x 3; appropriate  Rectal: not examined      Vital Signs Last 24 Hrs      I&O's Detail      LABS: [facility] GENERAL SURGERY DAILY PROGRESS NOTE:     HOSPITAL DAY: 16    SUBJECTIVE/ROS:   No events o/n. Abdominal pain is controlled with pca. Stable upper abdominal pain, left > right. No radiation. No fevers.        PHYSICAL EXAM:  Constitutional: well developed, well nourished, NAD  Eyes: anicteric  ENMT: normal facies, symmetric  Neck: supple  Respiratory: CTA bilaterally  Cardiovascular: RRR  Gastrointestinal: abdomen soft, tender in upper abdomen, mild distention. No obvious masses. No peritonitis   ostomy- viable, no function. HARSHIL drain with sanguo-purulent output. IR drain flushed at bedside.   Extremities: FROM, warm  Neurological: intact, non-focal  Skin: no gross lesions  Lymph Nodes: no gross adenopathy  Musculoskeletal: equal strength bilateral upper and lower extremities  Psychiatric: oriented x 3; appropriate  Rectal: not examined      Vital Signs Last 24 Hrs      I&O's Detail      LABS:

## 2017-06-17 NOTE — PROGRESS NOTE ADULT - ASSESSMENT
47 yo F with ovarian cancer s/p GRETCHEN, presented with abdominal pain, found to have acute cholecystitis, complicated after stent placement with perforated gallbladder with ans associated intra-abdominal abscesse and bowel obstruction (likely malignant bowel obstruction).  S/p stent removal 6/7/2017 via endoscopy with NG tube placement. s/p intraabdominal drain for abscess drainage s/p drain revision via IR yesterday 6/16

## 2017-06-17 NOTE — PROGRESS NOTE ADULT - PROBLEM SELECTOR PLAN 1
Large lesser sac collection adjacent to gastric perforation, s/p IR drain  - drain seems positional and clogs easily (sometimes large outputs, sometimes none). discussed with Dr. Vines, plan for drain check and possible upsizing or repositioning today Large lesser sac collection adjacent to gastric perforation, s/p IR drain upsizing and repositioning  - drain functioning much better today. slightly pink tinged

## 2017-06-17 NOTE — PROGRESS NOTE ADULT - SUBJECTIVE AND OBJECTIVE BOX
Vital Signs Last 24 Hrs  T(C): 37, Max: 37 (06-17 @ 05:01)  T(F): 98.6, Max: 98.6 (06-17 @ 05:01)  HR: 63 (63 - 73)  BP: 129/78 (110/72 - 129/78)  BP(mean): --  RR: 16 (16 - 18)  SpO2: 96% (95% - 98%)    I&O's Detail    I & Os for current day (as of 17 Jun 2017 08:35)  =============================================  IN:    dextrose 5% + sodium chloride 0.9%.: 2400 ml    IV PiggyBack: 100 ml    Total IN: 2500 ml  ---------------------------------------------  OUT:    Voided: 1300 ml    Nasoenteral Tube: 700 ml    Drain: 200 ml    Drain: 70 ml    Total OUT: 2270 ml  ---------------------------------------------  Total NET: 230 ml                            10.7   6.0   )-----------( 596      ( 17 Jun 2017 06:12 )             34.5       06-17    135  |  98  |  4<L>  ----------------------------<  144<H>  4.0   |  27  |  0.55    Ca    8.2<L>      17 Jun 2017 06:12  Phos  2.7     06-17  Mg     1.5     06-17    Agree with DR Chidi silvestre/plan          PLAN:

## 2017-06-17 NOTE — PROGRESS NOTE ADULT - PROBLEM SELECTOR PLAN 1
Patient is currently on PCA Dilaudid for pain control. She received 2 demand dosages (DD) over 24 hours. Will continue rate at 0.2 mg/hour.   Naloxone PRN  will continue for now;  increased use of PCA due to post procedure pain; now better

## 2017-06-17 NOTE — PROGRESS NOTE ADULT - PROBLEM SELECTOR PLAN 2
Given lack of response to drainage of the abscess, it seems likely malignant. AXR shows all contrast in colon, although she rapidly failed a trial of NGT removal yesterday.  - Deferring to Surg Onc re: palliative surgical options.  - Continue octreotide  - No TPN given fungemia risk. Still unresolving, likely malignant.  - Deferring to Surg Onc re: palliative surgical options.  - Continue octreotide  - No TPN given fungemia risk.

## 2017-06-17 NOTE — PROGRESS NOTE ADULT - ASSESSMENT
46F s/p endoscopy, removal of stent, IR perc ubaldo, IR drainage, and IR tube upsizing    - NPO/NGT  - await GI function  - pain control  - IR drain flush daily; flushed at bedside this am  - transfer to [facility] surgery Monday    Pager: [phone]

## 2017-06-18 LAB
ANION GAP SERPL CALC-SCNC: 10 MMOL/L — SIGNIFICANT CHANGE UP (ref 5–17)
APTT BLD: 34.6 SEC — SIGNIFICANT CHANGE UP (ref 27.5–37.4)
BLD GP AB SCN SERPL QL: NEGATIVE — SIGNIFICANT CHANGE UP
BUN SERPL-MCNC: 3 MG/DL — LOW (ref 7–23)
CALCIUM SERPL-MCNC: 8.3 MG/DL — LOW (ref 8.4–10.5)
CHLORIDE SERPL-SCNC: 97 MMOL/L — SIGNIFICANT CHANGE UP (ref 96–108)
CO2 SERPL-SCNC: 29 MMOL/L — SIGNIFICANT CHANGE UP (ref 22–31)
CREAT SERPL-MCNC: 0.57 MG/DL — SIGNIFICANT CHANGE UP (ref 0.5–1.3)
GLUCOSE SERPL-MCNC: 113 MG/DL — HIGH (ref 70–99)
HCG UR QL: NEGATIVE — SIGNIFICANT CHANGE UP
HCT VFR BLD CALC: 36.9 % — SIGNIFICANT CHANGE UP (ref 34.5–45)
HGB BLD-MCNC: 11.4 G/DL — LOW (ref 11.5–15.5)
INR BLD: 1.42 RATIO — HIGH (ref 0.88–1.16)
MAGNESIUM SERPL-MCNC: 1.7 MG/DL — SIGNIFICANT CHANGE UP (ref 1.6–2.6)
MCHC RBC-ENTMCNC: 27.3 PG — SIGNIFICANT CHANGE UP (ref 27–34)
MCHC RBC-ENTMCNC: 30.9 GM/DL — LOW (ref 32–36)
MCV RBC AUTO: 88.5 FL — SIGNIFICANT CHANGE UP (ref 80–100)
PHOSPHATE SERPL-MCNC: 2.7 MG/DL — SIGNIFICANT CHANGE UP (ref 2.5–4.5)
PLATELET # BLD AUTO: 527 K/UL — HIGH (ref 150–400)
POTASSIUM SERPL-MCNC: 3.9 MMOL/L — SIGNIFICANT CHANGE UP (ref 3.5–5.3)
POTASSIUM SERPL-SCNC: 3.9 MMOL/L — SIGNIFICANT CHANGE UP (ref 3.5–5.3)
PROTHROM AB SERPL-ACNC: 15.4 SEC — HIGH (ref 9.8–12.7)
RBC # BLD: 4.17 M/UL — SIGNIFICANT CHANGE UP (ref 3.8–5.2)
RBC # FLD: 13 % — SIGNIFICANT CHANGE UP (ref 10.3–14.5)
RH IG SCN BLD-IMP: POSITIVE — SIGNIFICANT CHANGE UP
SODIUM SERPL-SCNC: 136 MMOL/L — SIGNIFICANT CHANGE UP (ref 135–145)
WBC # BLD: 4.9 K/UL — SIGNIFICANT CHANGE UP (ref 3.8–10.5)
WBC # FLD AUTO: 4.9 K/UL — SIGNIFICANT CHANGE UP (ref 3.8–10.5)

## 2017-06-18 PROCEDURE — 99233 SBSQ HOSP IP/OBS HIGH 50: CPT | Mod: GC

## 2017-06-18 PROCEDURE — 93010 ELECTROCARDIOGRAM REPORT: CPT

## 2017-06-18 PROCEDURE — 71010: CPT | Mod: 26

## 2017-06-18 PROCEDURE — 99231 SBSQ HOSP IP/OBS SF/LOW 25: CPT | Mod: GC

## 2017-06-18 PROCEDURE — 99232 SBSQ HOSP IP/OBS MODERATE 35: CPT

## 2017-06-18 RX ORDER — PANTOPRAZOLE SODIUM 20 MG/1
40 TABLET, DELAYED RELEASE ORAL DAILY
Qty: 0 | Refills: 0 | Status: COMPLETED | OUTPATIENT
Start: 2017-06-18 | End: 2017-06-19

## 2017-06-18 RX ADMIN — OCTREOTIDE ACETATE 300 MICROGRAM(S): 200 INJECTION, SOLUTION INTRAVENOUS; SUBCUTANEOUS at 12:55

## 2017-06-18 RX ADMIN — Medication 1 SPRAY(S): at 23:04

## 2017-06-18 RX ADMIN — OCTREOTIDE ACETATE 300 MICROGRAM(S): 200 INJECTION, SOLUTION INTRAVENOUS; SUBCUTANEOUS at 23:02

## 2017-06-18 RX ADMIN — ONDANSETRON 4 MILLIGRAM(S): 8 TABLET, FILM COATED ORAL at 00:47

## 2017-06-18 RX ADMIN — CEFEPIME 100 MILLIGRAM(S): 1 INJECTION, POWDER, FOR SOLUTION INTRAMUSCULAR; INTRAVENOUS at 15:12

## 2017-06-18 RX ADMIN — FLUCONAZOLE 100 MILLIGRAM(S): 150 TABLET ORAL at 17:30

## 2017-06-18 RX ADMIN — Medication 100 MILLIGRAM(S): at 05:31

## 2017-06-18 RX ADMIN — Medication 100 MILLIGRAM(S): at 23:01

## 2017-06-18 RX ADMIN — HYDROMORPHONE HYDROCHLORIDE 30 MILLILITER(S): 2 INJECTION INTRAMUSCULAR; INTRAVENOUS; SUBCUTANEOUS at 07:14

## 2017-06-18 RX ADMIN — OCTREOTIDE ACETATE 300 MICROGRAM(S): 200 INJECTION, SOLUTION INTRAVENOUS; SUBCUTANEOUS at 05:33

## 2017-06-18 RX ADMIN — ONDANSETRON 4 MILLIGRAM(S): 8 TABLET, FILM COATED ORAL at 12:54

## 2017-06-18 RX ADMIN — Medication 100 MILLIGRAM(S): at 12:54

## 2017-06-18 RX ADMIN — ENOXAPARIN SODIUM 40 MILLIGRAM(S): 100 INJECTION SUBCUTANEOUS at 12:54

## 2017-06-18 RX ADMIN — CEFEPIME 100 MILLIGRAM(S): 1 INJECTION, POWDER, FOR SOLUTION INTRAMUSCULAR; INTRAVENOUS at 00:47

## 2017-06-18 RX ADMIN — Medication 1 SPRAY(S): at 12:48

## 2017-06-18 RX ADMIN — Medication 1 SPRAY(S): at 05:33

## 2017-06-18 RX ADMIN — ONDANSETRON 4 MILLIGRAM(S): 8 TABLET, FILM COATED ORAL at 07:15

## 2017-06-18 RX ADMIN — SODIUM CHLORIDE 100 MILLILITER(S): 9 INJECTION, SOLUTION INTRAVENOUS at 05:33

## 2017-06-18 RX ADMIN — HYDROMORPHONE HYDROCHLORIDE 30 MILLILITER(S): 2 INJECTION INTRAMUSCULAR; INTRAVENOUS; SUBCUTANEOUS at 19:38

## 2017-06-18 NOTE — CHART NOTE - NSCHARTNOTEFT_GEN_A_CORE
Pre-op diagnosis: other intestinal obstruction    Procedure: ex lap CORDELL, possible bowel resection, possible colon bypass, possible ostomy, possibly G tube placement    Labs: CBC, CMP, Mg, Phos, type + screen, coags, urine preg, UA pending    CXR: pending    EKG: pending    Blood: not needed    Ordered: NPO after MN, IV fluids, AM labs

## 2017-06-18 NOTE — PROGRESS NOTE ADULT - SUBJECTIVE AND OBJECTIVE BOX
INTERVAL HPI/OVERNIGHT EVENTS: no events pt states that her pain is moderate and she feels comfortable;  used only one PRN in 24H; has NGT rest of ros is negative    Allergies    penicillin (Rash)    Intolerances        ADVANCE DIRECTIVES:    DNR: [ x] NO [ ] YES (Date) MOLST [x ] NO [ ] YES (Date)    MEDICATIONS  (STANDING):  cefepime  IVPB 2000milliGRAM(s) IV Intermittent every 12 hours  acetaminophen  IVPB. 1000milliGRAM(s) IV Intermittent once  ondansetron Injectable 4milliGRAM(s) IV Push every 8 hours  fluconAZOLE IVPB  IV Intermittent   fluconAZOLE IVPB 200milliGRAM(s) IV Intermittent every 24 hours  tetracaine/benzocaine/butamben Spray 1Spray(s) Topical three times a day  dextrose 5% + sodium chloride 0.9%. 1000milliLiter(s) IV Continuous <Continuous>  enoxaparin Injectable 40milliGRAM(s) SubCutaneous daily  metroNIDAZOLE  IVPB  IV Intermittent   metroNIDAZOLE  IVPB 500milliGRAM(s) IV Intermittent every 8 hours  HYDROmorphone PCA (1 mG/mL) 30milliLiter(s) PCA Continuous PCA Continuous  octreotide  Injectable 300MICROGram(s) SubCutaneous three times a day    MEDICATIONS  (PRN):  naloxone Injectable 0.1milliGRAM(s) IV Push every 3 minutes PRN For ANY of the following changes in patient status:  A. RR LESS THAN 10 breaths per minute, B. Oxygen saturation LESS THAN 90%, C. Sedation score of 6  HYDROmorphone PCA (1 mG/mL) Rescue Clinician Bolus 1milliGRAM(s) IV Push every 1 hour PRN for Pain Scale GREATER THAN 6      PRESENT SYMPTOMS:  Source: [ x] Patient   [ ] Family   [ ] Team     Pain:                        [ ] No [x ] Yes             [ x] Mild [x ] Moderate [ ] Severe      Dyspnea:                [x ] No [ ] Yes             [ ] Mild [ ] Moderate [ ] Severe    Anxiety:                  x[ ] No [ ] Yes             [ ] Mild [ ] Moderate [ ] Severe    Fatigue:                  [ ] No [ x] Yes             [x ] Mild [ ] Moderate [ ] Severe    Nausea:                  [x ] No [ ] Yes             [ ] Mild [ ] Moderate [ ] Severe    Loss of appetite:   [ ] No [ x] Yes             [ ] Mild [ ] Moderate [x ] Severe    Constipation:        [ ] No [ x] Yes             [ ] Mild [ ] Moderate x[ ] Severe    Other Symptoms:  [ x] All other review of systems negative   [ ] Unable to obtain due to poor mentation     Karnofsky Performance Score/Palliative Performance Status Version 2:   30      %    PHYSICAL EXAM:  Vital Signs Last 24 Hrs  T(C): 36.6, Max: 37.1 (06-18 @ 00:38)  T(F): 97.9, Max: 98.7 (06-18 @ 00:38)  HR: 62 (60 - 66)  BP: 123/82 (107/70 - 123/82)  BP(mean): --  RR: 16 (16 - 17)  SpO2: 98% (97% - 99%) I&O's Summary    I & Os for current day (as of 18 Jun 2017 10:30)  =============================================  IN: 2450 ml / OUT: 2485 ml / NET: -35 ml      General:  [ x] Alert  [ x] Oriented x 3      [ ] Lethargic  [ ] Agitated   [ ] Cachexia   [ ] Unarousable  [ ] Verbal  [ ] Non-Verbal    HEENT:  [x ] Normal   [ ] Dry mouth   [ ] ET Tube    [ ] Trach  [ ] Oral lesions    Lungs: decresaed BS at bases  [ ] Clear [ ] Tachypnea  [ ] Audible excessive secretions   [ ] Rhonchi        [ ] Right [ ] Left [ ] Bilateral  [ ] Crackles        [ ] Right [ ] Left [ ] Bilateral  [ ] Wheezing     [ ] Right [ ] Left [ ] Bilateral    Cardiovascular:  [x ] Regular [ ] Irregular [ ] Tachycardia   [ ] Bradycardia  [ ] Murmur [ ] Other    Abdomen: [ x] Soft  [ ] Distended   [ ] +BS  [ ] Non tender [x ] Tender  [ ]PEG   [ x]OGT/ NGT   Last BM:   + left abscess drain with blood tinged fluid    Genitourinary: [ x] Normal [ ] Incontinent   [ ] Oliguria/Anuria   [ ] Lynn    Musculoskeletal:  [x ] Normal   [ ] Weakness  [ ] Bedbound/Wheelchair bound [ ] Edema    Neurological: [ x] No focal deficits  [ ] Cognitive impairment  [ ] Dysphagia [ ] Dysarthria [ ] Paresis [ ] Other     Skin: [ ] Normal   [ ] Pressure ulcer(s)                  [ ] Rash    LABS:                        11.4   4.9   )-----------( 527      ( 18 Jun 2017 06:57 )             36.9     06-18    136  |  97  |  3<L>  ----------------------------<  113<H>  3.9   |  29  |  0.57    Ca    8.3<L>      18 Jun 2017 06:57  Phos  2.7     06-18  Mg     1.7     06-18      PT/INR - ( 18 Jun 2017 06:57 )   PT: 15.4 sec;   INR: 1.42 ratio         PTT - ( 18 Jun 2017 06:57 )  PTT:34.6 sec      Shock: [ ] Septic [ ] Cardiogenic [ ] Neurologic [ ] Hypovolemic  Vasopressors x   Inotrophs x     Oral Intake: [ ] Unable/mouth care only [ ] Minimal [ ] Moderate [ ] Full Capability    Diet: [ ] NPO [ ] Tube feeds [ ] TPN [ ] Other     RADIOLOGY & ADDITIONAL STUDIES:    REFERRALS:   [x ] Chaplaincy  [ ] Hospice  [ ] Child Life  [x ] Social Work  [ ] Case management [ ] Holistic Therapy

## 2017-06-18 NOTE — PROGRESS NOTE ADULT - PROBLEM SELECTOR PROBLEM 6
Carcinomatosis

## 2017-06-18 NOTE — PROGRESS NOTE ADULT - SUBJECTIVE AND OBJECTIVE BOX
[facility] GENERAL SURGERY DAILY PROGRESS NOTE:     HOSPITAL DAY: 17    SUBJECTIVE/ROS:   Abdominal pain is controlled with pca. Stable upper abdominal pain, left > right. No radiation. No fevers.      OBJECTIVE:     Vital Signs Last 24 Hrs      PHYSICAL EXAM:  Constitutional: well developed, well nourished, NAD  Eyes: anicteric  ENMT: normal facies, symmetric  Neck: supple  Respiratory: CTA bilaterally  Cardiovascular: RRR  Gastrointestinal: abdomen soft, tender in upper abdomen, mild distention. No obvious masses. No peritonitis   ostomy- viable, no function. HARSHIL drain with sanguo-purulent output. IR drain flushed at bedside.   Extremities: FROM, warm  Neurological: intact, non-focal  Skin: no gross lesions  Lymph Nodes: no gross adenopathy  Musculoskeletal: equal strength bilateral upper and lower extremities  Psychiatric: oriented x 3; appropriate  Rectal: not examined        I&O's           LABS: [facility] GENER[facility] SURGERY DAILY PROGRESS NOTE:     HOSPITAL DAY: 17    SUBJECTIVE/ROS:   Abdominal pain is controlled with pca. Stable upper abdominal pain, left > right. No radiation. No fevers.      OBJECTIVE:     Vital Signs Last 24 Hrs      PHYSICAL EXAM:  Constitutional: well developed, well nourished, NAD  Eyes: anicteric  ENMT: normal facies, symmetric  Neck: supple  Respiratory: CTA bilaterally  Cardiovascular: RRR  Gastrointestinal: abdomen soft, tender in upper abdomen, mild distention. No obvious masses. No peritonitis   ostomy- viable, no function. HARSHIL drain with sanguo-purulent output. IR drain flushed at bedside.   Extremities: FROM, warm  Neurological: intact, non-focal  Skin: no gross lesions  Lymph Nodes: no gross adenopathy  Musculoskeletal: equal strength bilateral upper and lower extremities  Psychiatric: oriented x 3; appropriate  Rectal: not examined      MEDICATIONS  (STANDING):  cefepime  IVPB 2000milliGRAM(s) IV Intermittent every 12 hours  acetaminophen  IVPB. 1000milliGRAM(s) IV Intermittent once  ondansetron Injectable 4milliGRAM(s) IV Push every 8 hours  fluconAZOLE IVPB  IV Intermittent   fluconAZOLE IVPB 200milliGRAM(s) IV Intermittent every 24 hours  tetracaine/benzocaine/butamben Spray 1Spray(s) Topical three times a day  dextrose 5% + sodium chloride 0.9%. 1000milliLiter(s) IV Continuous <Continuous>  enoxaparin Injectable 40milliGRAM(s) SubCutaneous daily  metroNIDAZOLE  IVPB  IV Intermittent   metroNIDAZOLE  IVPB 500milliGRAM(s) IV Intermittent every 8 hours  HYDROmorphone PCA (1 mG/mL) 30milliLiter(s) PCA Continuous PCA Continuous  octreotide  Injectable 300MICROGram(s) SubCutaneous three times a day    MEDICATIONS  (PRN):  naloxone Injectable 0.1milliGRAM(s) IV Push every 3 minutes PRN For ANY of the following changes in patient status:  A. RR LESS THAN 10 breaths per minute, B. Oxygen saturation LESS THAN 90%, C. Sedation score of 6  HYDROmorphone PCA (1 mG/mL) Rescue Clinician Bolus 1milliGRAM(s) IV Push every 1 hour PRN for Pain Scale GREATER THAN 6      Vital Signs Last 24 Hrs  T(C): 36.6, Max: 37.1 (06-18 @ 00:38)  T(F): 97.9, Max: 98.7 (06-18 @ 00:38)  HR: 62 (60 - 66)  BP: 123/82 (107/70 - 123/82)  BP(mean): --  RR: 16 (16 - 17)  SpO2: 98% (97% - 99%)    I&O's Detail  I & Os for 24h ending 17 Jun 2017 07:00  =============================================  IN:    dextrose 5% + sodium chloride 0.9%.: 2400 ml    IV PiggyBack: 100 ml    Total IN: 2500 ml  ---------------------------------------------  OUT:    Voided: 1300 ml    Nasoenteral Tube: 700 ml    Drain: 200 ml    Drain: 70 ml    Total OUT: 2270 ml  ---------------------------------------------  Total NET: 230 ml    I & Os for current day (as of 18 Jun 2017 06:32)  =============================================  IN:    dextrose 5% + sodium chloride 0.9%.: 2300 ml    IV PiggyBack: 150 ml    Total IN: 2450 ml  ---------------------------------------------  OUT:    Voided: 1750 ml    Nasoenteral Tube: 500 ml    Drain: 150 ml    Drain: 85 ml    Total OUT: 2485 ml  ---------------------------------------------  Total NET: -35 ml      Daily     Daily     LABS:                        10.7   6.0   )-----------( 596      ( 17 Jun 2017 06:12 )             34.5     06-17    135  |  98  |  4<L>  ----------------------------<  144<H>  4.0   |  27  |  0.55    Ca    8.2<L>      17 Jun 2017 06:12  Phos  2.7     06-17  Mg     1.5     06-17

## 2017-06-18 NOTE — PROGRESS NOTE ADULT - SUBJECTIVE AND OBJECTIVE BOX
Chief Complaint:  Patient is a 46y old  Female who presents with a chief complaint of Abdominal pain (02 Jun 2017 05:03)      Interval Events: Still has abdominal pain but is fairly controlled with PCA dilaudid. No flatus, no bowel movement. NGT in placce still.    Allergies:  penicillin (Rash)      Hospital Medications:  cefepime  IVPB 2000milliGRAM(s) IV Intermittent every 12 hours  acetaminophen  IVPB. 1000milliGRAM(s) IV Intermittent once  naloxone Injectable 0.1milliGRAM(s) IV Push every 3 minutes PRN  ondansetron Injectable 4milliGRAM(s) IV Push every 8 hours  fluconAZOLE IVPB  IV Intermittent   fluconAZOLE IVPB 200milliGRAM(s) IV Intermittent every 24 hours  tetracaine/benzocaine/butamben Spray 1Spray(s) Topical three times a day  dextrose 5% + sodium chloride 0.9%. 1000milliLiter(s) IV Continuous <Continuous>  enoxaparin Injectable 40milliGRAM(s) SubCutaneous daily  metroNIDAZOLE  IVPB  IV Intermittent   metroNIDAZOLE  IVPB 500milliGRAM(s) IV Intermittent every 8 hours  HYDROmorphone PCA (1 mG/mL) 30milliLiter(s) PCA Continuous PCA Continuous  HYDROmorphone PCA (1 mG/mL) Rescue Clinician Bolus 1milliGRAM(s) IV Push every 1 hour PRN  octreotide  Injectable 300MICROGram(s) SubCutaneous three times a day      PMHX/PSHX:  Ovarian cancer  Colostomy in place  H/O abdominal hysterectomy      Family history:  No pertinent family history in first degree relatives      ROS:     General:  No wt loss, fevers, chills  Eyes:  Good vision, no reported pain  ENT:  No sore throat, pain  CV:  No pain, palpitations, hypo/hypertension  Resp:  No dyspnea, cough, tachypnea, wheezing  GI:  See HPI  :  No pain, bleeding, incontinence, nocturia  Muscle:  No pain, weakness  Neuro:  No weakness, tingling, memory problems  Psych:  No fatigue, insomnia, mood problems, depression  Endocrine:  No polyuria, polydipsia, cold/heat intolerance  Heme:  No petechiae, ecchymosis, easy bruisability  Skin:  No rash, edema      PHYSICAL EXAM:   Vital Signs:  Vital Signs Last 24 Hrs  T(C): 36.6, Max: 37.1 (06-18 @ 00:38)  T(F): 97.9, Max: 98.7 (06-18 @ 00:38)  HR: 62 (60 - 66)  BP: 123/82 (107/70 - 123/82)  BP(mean): --  RR: 16 (16 - 17)  SpO2: 98% (97% - 99%)  Daily     Daily     GENERAL:  Appears stated age, no distress  HEENT:  NC/AT,  conjunctivae clear, sclera -anicteric  CHEST:  Full & symmetric excursion, no increased effort, breath sounds clear  HEART:  Regular rhythm, S1, S2, no murmur/rub/S3/S4,  no edema  ABDOMEN:  Soft, non-distended; ileostomy in place; tender diffusely   EXTREMITIES:  no cyanosis,clubbing or edema  SKIN:  No rash/erythema/ecchymoses/petechiae/wounds/abscess/warm/dry  NEURO:  Alert, oriented,     LABS:                        11.4   4.9   )-----------( 527      ( 18 Jun 2017 06:57 )             36.9     06-18    136  |  97  |  3<L>  ----------------------------<  113<H>  3.9   |  29  |  0.57    Ca    8.3<L>      18 Jun 2017 06:57  Phos  2.7     06-18  Mg     1.7     06-18        PT/INR - ( 18 Jun 2017 06:57 )   PT: 15.4 sec;   INR: 1.42 ratio         PTT - ( 18 Jun 2017 06:57 )  PTT:34.6 sec        Imaging: Chief Complaint:  Patient is a 46y old  Female who presents with a chief complaint of Abdominal pain (2017 05:03)      Interval Events: Still has abdominal pain but is fairly controlled with PCA dilaudid. No flatus, no bowel movement. NGT in placce still.    Allergies:  penicillin (Rash)      Hospital Medications:  cefepime  IVPB 2000milliGRAM(s) IV Intermittent every 12 hours  acetaminophen  IVPB. 1000milliGRAM(s) IV Intermittent once  naloxone Injectable 0.1milliGRAM(s) IV Push every 3 minutes PRN  ondansetron Injectable 4milliGRAM(s) IV Push every 8 hours  fluconAZOLE IVPB  IV Intermittent   fluconAZOLE IVPB 200milliGRAM(s) IV Intermittent every 24 hours  tetracaine/benzocaine/butamben Spray 1Spray(s) Topical three times a day  dextrose 5% + sodium chloride 0.9%. 1000milliLiter(s) IV Continuous <Continuous>  enoxaparin Injectable 40milliGRAM(s) SubCutaneous daily  metroNIDAZOLE  IVPB  IV Intermittent   metroNIDAZOLE  IVPB 500milliGRAM(s) IV Intermittent every 8 hours  HYDROmorphone PCA (1 mG/mL) 30milliLiter(s) PCA Continuous PCA Continuous  HYDROmorphone PCA (1 mG/mL) Rescue Clinician Bolus 1milliGRAM(s) IV Push every 1 hour PRN  octreotide  Injectable 300MICROGram(s) SubCutaneous three times a day      PMHX/PSHX:  Ovarian cancer  Colostomy in place  H/O abdominal hysterectomy      Family history:  No pertinent family history in first degree relatives      ROS:     General:  No wt loss, fevers, chills  Eyes:  Good vision, no reported pain  ENT:  No sore throat, pain  CV:  No pain, palpitations, hypo/hypertension  Resp:  No dyspnea, cough, tachypnea, wheezing  GI:  See HPI  :  No pain, bleeding, incontinence, nocturia  Muscle:  No pain, weakness  Neuro:  No weakness, tingling, memory problems  Psych:  No fatigue, insomnia, mood problems, depression  Endocrine:  No polyuria, polydipsia, cold/heat intolerance  Heme:  No petechiae, ecchymosis, easy bruisability  Skin:  No rash, edema      PHYSICAL EXAM:   Vital Signs:  Vital Signs Last 24 Hrs  T(C): 36.6, Max: 37.1 ( @ 00:38)  T(F): 97.9, Max: 98.7 (- @ 00:38)  HR: 62 (60 - 66)  BP: 123/82 (107/70 - 123/82)  BP(mean): --  RR: 16 (16 - 17)  SpO2: 98% (97% - 99%)  Daily     Daily     GENERAL:  Appears stated age, no distress  HEENT:  NC/AT,  conjunctivae clear, sclera -anicteric  CHEST:  Full & symmetric excursion, no increased effort, breath sounds clear  HEART:  Regular rhythm, S1, S2, no murmur/rub/S3/S4,  no edema  ABDOMEN:  Soft, non-distended; ileostomy in place; tender diffusely; hypoactive bowel sounds; percutaneous drains in place  EXTREMITIES:  no cyanosis,clubbing or edema  SKIN:  No rash/erythema/ecchymoses/petechiae/wounds/abscess/warm/dry  NEURO:  Alert, oriented    LABS:                        11.4   4.9   )-----------( 527      ( 2017 06:57 )             36.9         136  |  97  |  3<L>  ----------------------------<  113<H>  3.9   |  29  |  0.57    Ca    8.3<L>      2017 06:57  Phos  2.7       Mg     1.7     18        PT/INR - ( 2017 06:57 )   PT: 15.4 sec;   INR: 1.42 ratio         PTT - ( 2017 06:57 )  PTT:34.6 sec        Imagin/15/17 AXR:  IMPRESSION:     Nonobstructive bowel gas pattern.

## 2017-06-18 NOTE — PROGRESS NOTE ADULT - PROBLEM SELECTOR PLAN 1
Large lesser sac collection adjacent to gastric perforation, s/p IR drain upsizing and repositioning  - drain functioning much better today. slightly pink tinged Large lesser sac collection adjacent to gastric perforation, s/p IR drain upsizing and repositioning  - drain functioning well, abdominal pain much improved today

## 2017-06-18 NOTE — PROGRESS NOTE ADULT - ASSESSMENT
46F s/p endoscopy, removal of stent, IR perc ubaldo, IR drainage, and IR tube upsizing    - NPO/NGT  - await GI function  - pain control  - IR drain flush daily; flushed at bedside this am  - transfer to [facility] Monday    Pager: [phone]

## 2017-06-18 NOTE — PROGRESS NOTE ADULT - PROBLEM SELECTOR PLAN 2
Still unresolving, likely malignant.  - Deferring to Surg Onc re: palliative surgical options.  - Continue octreotide  - No TPN given fungemia risk.

## 2017-06-18 NOTE — PROGRESS NOTE ADULT - PROBLEM SELECTOR PLAN 1
Patient is currently on PCA Dilaudid for pain control. She received 1 demand dosages (DD) over 24 hours. Will continue rate at 0.2 mg/hour.   advise no changes today  Naloxone PRN  will continue for now;  increased use of PCA due to post procedure pain; now better

## 2017-06-18 NOTE — PROGRESS NOTE ADULT - PROBLEM SELECTOR PLAN 3
Patient is now s/p gallbladder perforation, necrosis and abscess,  Abx as per primary .   She is s/p  IR drainage of persistent fluid collection  and revision of drain on 6/16 that was successful  Any further work up and treatment as per GI/Sx/IR

## 2017-06-18 NOTE — PROGRESS NOTE ADULT - PROBLEM SELECTOR PLAN 4
At this time patient is not a candidate for disease modifying Rx understanding advanced illness, fluid collections, complications of procedures, and issues with route for nutrition. Onc input noted. If acute issues resolves and patient maintains nutritional/functional status she may be a candidate for palliative disease modifying Rx.  Patient is to follow up with medical ONC when stable and after acute issues resolved.  tomorrow she will have possible palliative surgery with venting G tube; will follow up tomorrow

## 2017-06-18 NOTE — PROGRESS NOTE ADULT - ASSESSMENT
47 yo F with ovarian cancer s/p GRETCHEN, presented with abdominal pain, found to have acute cholecystitis, complicated after stent placement with perforated gallbladder with ans associated intra-abdominal abscesse and bowel obstruction (likely malignant bowel obstruction).  S/p stent removal 6/7/2017 via endoscopy with NG tube placement. s/p intraabdominal drain for abscess drainage s/p drain revision via IR  6/16 stable

## 2017-06-18 NOTE — PROGRESS NOTE ADULT - ASSESSMENT
Impression:  1) Loculated Perigastric Fluid Collection likely abscess: S/p removal of lumen-apposing stent draining collection into stomach last week, s/p IR drain  2) Acute cholecystitis: s/p IR Percutaneous drain several days ago.   3) Uterine Cancer s/p GRETCHEN and Ileostomy, peritoneal carcinomatosis.  4) Possible malignant SBO - on imaging with transition point in jejunum, differential also includes ileus, narcotic bowel    Plan:  - Monitor NGT output and percutaneous drain outputs.  - Continue IV Abx and antifungal  - IVF, Monitor electrolytes  - Surgical team input appreciated, possible surgical intervention by surgical oncology tomorrow

## 2017-06-19 ENCOUNTER — APPOINTMENT (OUTPATIENT)
Dept: SURGICAL ONCOLOGY | Facility: HOSPITAL | Age: 47
End: 2017-06-19

## 2017-06-19 LAB
ALBUMIN SERPL ELPH-MCNC: 2.5 G/DL — LOW (ref 3.3–5)
ALP SERPL-CCNC: 50 U/L — SIGNIFICANT CHANGE UP (ref 40–120)
ALT FLD-CCNC: 7 U/L RC — LOW (ref 10–45)
ANION GAP SERPL CALC-SCNC: 13 MMOL/L — SIGNIFICANT CHANGE UP (ref 5–17)
APPEARANCE UR: CLEAR — SIGNIFICANT CHANGE UP
APTT BLD: 36.8 SEC — SIGNIFICANT CHANGE UP (ref 27.5–37.4)
AST SERPL-CCNC: 14 U/L — SIGNIFICANT CHANGE UP (ref 10–40)
BILIRUB SERPL-MCNC: 0.2 MG/DL — SIGNIFICANT CHANGE UP (ref 0.2–1.2)
BILIRUB UR-MCNC: NEGATIVE — SIGNIFICANT CHANGE UP
BLD GP AB SCN SERPL QL: NEGATIVE — SIGNIFICANT CHANGE UP
BUN SERPL-MCNC: 3 MG/DL — LOW (ref 7–23)
CALCIUM SERPL-MCNC: 8.3 MG/DL — LOW (ref 8.4–10.5)
CHLORIDE SERPL-SCNC: 97 MMOL/L — SIGNIFICANT CHANGE UP (ref 96–108)
CO2 SERPL-SCNC: 28 MMOL/L — SIGNIFICANT CHANGE UP (ref 22–31)
COLOR SPEC: YELLOW — SIGNIFICANT CHANGE UP
CREAT SERPL-MCNC: 0.55 MG/DL — SIGNIFICANT CHANGE UP (ref 0.5–1.3)
CULTURE RESULTS: SIGNIFICANT CHANGE UP
DIFF PNL FLD: NEGATIVE — SIGNIFICANT CHANGE UP
GLUCOSE SERPL-MCNC: 121 MG/DL — HIGH (ref 70–99)
GLUCOSE UR QL: NEGATIVE — SIGNIFICANT CHANGE UP
HCT VFR BLD CALC: 37.1 % — SIGNIFICANT CHANGE UP (ref 34.5–45)
HGB BLD-MCNC: 11.4 G/DL — LOW (ref 11.5–15.5)
INR BLD: 1.4 RATIO — HIGH (ref 0.88–1.16)
KETONES UR-MCNC: NEGATIVE — SIGNIFICANT CHANGE UP
LEUKOCYTE ESTERASE UR-ACNC: NEGATIVE — SIGNIFICANT CHANGE UP
MAGNESIUM SERPL-MCNC: 1.6 MG/DL — SIGNIFICANT CHANGE UP (ref 1.6–2.6)
MCHC RBC-ENTMCNC: 27.3 PG — SIGNIFICANT CHANGE UP (ref 27–34)
MCHC RBC-ENTMCNC: 30.8 GM/DL — LOW (ref 32–36)
MCV RBC AUTO: 88.5 FL — SIGNIFICANT CHANGE UP (ref 80–100)
NITRITE UR-MCNC: NEGATIVE — SIGNIFICANT CHANGE UP
ORGANISM # SPEC MICROSCOPIC CNT: SIGNIFICANT CHANGE UP
ORGANISM # SPEC MICROSCOPIC CNT: SIGNIFICANT CHANGE UP
PH UR: 7.5 — SIGNIFICANT CHANGE UP (ref 5–8)
PHOSPHATE SERPL-MCNC: 2.8 MG/DL — SIGNIFICANT CHANGE UP (ref 2.5–4.5)
PLATELET # BLD AUTO: 499 K/UL — HIGH (ref 150–400)
POTASSIUM SERPL-MCNC: 3.7 MMOL/L — SIGNIFICANT CHANGE UP (ref 3.5–5.3)
POTASSIUM SERPL-SCNC: 3.7 MMOL/L — SIGNIFICANT CHANGE UP (ref 3.5–5.3)
PROT SERPL-MCNC: 5.8 G/DL — LOW (ref 6–8.3)
PROT UR-MCNC: NEGATIVE — SIGNIFICANT CHANGE UP
PROTHROM AB SERPL-ACNC: 15.4 SEC — HIGH (ref 9.8–12.7)
RBC # BLD: 4.2 M/UL — SIGNIFICANT CHANGE UP (ref 3.8–5.2)
RBC # FLD: 12.9 % — SIGNIFICANT CHANGE UP (ref 10.3–14.5)
RH IG SCN BLD-IMP: POSITIVE — SIGNIFICANT CHANGE UP
SODIUM SERPL-SCNC: 138 MMOL/L — SIGNIFICANT CHANGE UP (ref 135–145)
SP GR SPEC: 1.01 — SIGNIFICANT CHANGE UP (ref 1.01–1.02)
SPECIMEN SOURCE: SIGNIFICANT CHANGE UP
UROBILINOGEN FLD QL: NEGATIVE — SIGNIFICANT CHANGE UP
WBC # BLD: 4.7 K/UL — SIGNIFICANT CHANGE UP (ref 3.8–10.5)
WBC # FLD AUTO: 4.7 K/UL — SIGNIFICANT CHANGE UP (ref 3.8–10.5)

## 2017-06-19 PROCEDURE — 99232 SBSQ HOSP IP/OBS MODERATE 35: CPT

## 2017-06-19 RX ORDER — HYDROMORPHONE HYDROCHLORIDE 2 MG/ML
1 INJECTION INTRAMUSCULAR; INTRAVENOUS; SUBCUTANEOUS ONCE
Qty: 0 | Refills: 0 | Status: DISCONTINUED | OUTPATIENT
Start: 2017-06-19 | End: 2017-06-19

## 2017-06-19 RX ORDER — POTASSIUM CHLORIDE 20 MEQ
10 PACKET (EA) ORAL
Qty: 0 | Refills: 0 | Status: COMPLETED | OUTPATIENT
Start: 2017-06-19 | End: 2017-06-19

## 2017-06-19 RX ORDER — HYDROMORPHONE HYDROCHLORIDE 2 MG/ML
1 INJECTION INTRAMUSCULAR; INTRAVENOUS; SUBCUTANEOUS ONCE
Qty: 0 | Refills: 0 | Status: DISCONTINUED | OUTPATIENT
Start: 2017-06-19 | End: 2017-06-21

## 2017-06-19 RX ORDER — MAGNESIUM SULFATE 500 MG/ML
2 VIAL (ML) INJECTION ONCE
Qty: 0 | Refills: 0 | Status: COMPLETED | OUTPATIENT
Start: 2017-06-19 | End: 2017-06-19

## 2017-06-19 RX ORDER — HYDROMORPHONE HYDROCHLORIDE 2 MG/ML
30 INJECTION INTRAMUSCULAR; INTRAVENOUS; SUBCUTANEOUS
Qty: 0 | Refills: 0 | Status: DISCONTINUED | OUTPATIENT
Start: 2017-06-19 | End: 2017-06-21

## 2017-06-19 RX ORDER — HYDROMORPHONE HYDROCHLORIDE 2 MG/ML
1 INJECTION INTRAMUSCULAR; INTRAVENOUS; SUBCUTANEOUS
Qty: 0 | Refills: 0 | Status: DISCONTINUED | OUTPATIENT
Start: 2017-06-19 | End: 2017-06-21

## 2017-06-19 RX ADMIN — Medication 1 SPRAY(S): at 22:43

## 2017-06-19 RX ADMIN — Medication 100 MILLIEQUIVALENT(S): at 22:36

## 2017-06-19 RX ADMIN — PANTOPRAZOLE SODIUM 40 MILLIGRAM(S): 20 TABLET, DELAYED RELEASE ORAL at 06:08

## 2017-06-19 RX ADMIN — Medication 100 MILLIGRAM(S): at 14:41

## 2017-06-19 RX ADMIN — CEFEPIME 100 MILLIGRAM(S): 1 INJECTION, POWDER, FOR SOLUTION INTRAMUSCULAR; INTRAVENOUS at 13:26

## 2017-06-19 RX ADMIN — ONDANSETRON 4 MILLIGRAM(S): 8 TABLET, FILM COATED ORAL at 00:47

## 2017-06-19 RX ADMIN — ONDANSETRON 4 MILLIGRAM(S): 8 TABLET, FILM COATED ORAL at 17:47

## 2017-06-19 RX ADMIN — CEFEPIME 100 MILLIGRAM(S): 1 INJECTION, POWDER, FOR SOLUTION INTRAMUSCULAR; INTRAVENOUS at 00:47

## 2017-06-19 RX ADMIN — Medication 1 SPRAY(S): at 05:46

## 2017-06-19 RX ADMIN — HYDROMORPHONE HYDROCHLORIDE 30 MILLILITER(S): 2 INJECTION INTRAMUSCULAR; INTRAVENOUS; SUBCUTANEOUS at 07:37

## 2017-06-19 RX ADMIN — HYDROMORPHONE HYDROCHLORIDE 1 MILLIGRAM(S): 2 INJECTION INTRAMUSCULAR; INTRAVENOUS; SUBCUTANEOUS at 22:37

## 2017-06-19 RX ADMIN — Medication 100 MILLIGRAM(S): at 22:36

## 2017-06-19 RX ADMIN — Medication 100 MILLIEQUIVALENT(S): at 14:40

## 2017-06-19 RX ADMIN — Medication 50 GRAM(S): at 08:59

## 2017-06-19 RX ADMIN — ONDANSETRON 4 MILLIGRAM(S): 8 TABLET, FILM COATED ORAL at 08:46

## 2017-06-19 RX ADMIN — OCTREOTIDE ACETATE 300 MICROGRAM(S): 200 INJECTION, SOLUTION INTRAVENOUS; SUBCUTANEOUS at 22:37

## 2017-06-19 RX ADMIN — Medication 100 MILLIGRAM(S): at 05:43

## 2017-06-19 RX ADMIN — OCTREOTIDE ACETATE 300 MICROGRAM(S): 200 INJECTION, SOLUTION INTRAVENOUS; SUBCUTANEOUS at 05:43

## 2017-06-19 RX ADMIN — Medication 100 MILLIEQUIVALENT(S): at 19:37

## 2017-06-19 RX ADMIN — Medication 63.75 MILLIMOLE(S): at 10:21

## 2017-06-19 RX ADMIN — FLUCONAZOLE 100 MILLIGRAM(S): 150 TABLET ORAL at 17:47

## 2017-06-19 RX ADMIN — OCTREOTIDE ACETATE 300 MICROGRAM(S): 200 INJECTION, SOLUTION INTRAVENOUS; SUBCUTANEOUS at 14:40

## 2017-06-19 RX ADMIN — Medication 1 SPRAY(S): at 13:29

## 2017-06-19 RX ADMIN — ENOXAPARIN SODIUM 40 MILLIGRAM(S): 100 INJECTION SUBCUTANEOUS at 13:26

## 2017-06-19 RX ADMIN — HYDROMORPHONE HYDROCHLORIDE 1 MILLIGRAM(S): 2 INJECTION INTRAMUSCULAR; INTRAVENOUS; SUBCUTANEOUS at 23:07

## 2017-06-19 NOTE — PROGRESS NOTE ADULT - SUBJECTIVE AND OBJECTIVE BOX
No pain issues overnight.  NGT in place - no nausea or vomiting.  No GI fxn.    HPI:  46F presents with abdominal pain. Patient stated that the pain began 5/31, at which point she came to the [facility] ED, was discharged when the pain improved. Patient endorses subjective, low-grade fevers at home with decrease in ostomy output, diaphoresis, one episode of nausea/vomiting. Pain is localized primarily to RUQ.[email or web address]  Patient's past medical/surgical history is primarily significant for ovarian CA s/p GRETCHEN Dec 2016, also has had bowel resection with ostomy at that time. Her surgeries and treatments were at [facility], Dr. Roth. Initiation of chemotherapy was delayed 2/2 poor wound healing. Patient was scheduled to start chemotherapy in the next few weeks, sees Dr. PJ Lane of [facility]. Denies other PMH, denies medications. Allergy to PCN.  On exam patient was afebrile with stable vital signs, uncomfortable appearing. Healing midline incision scar on abdomen with small area of open skin in inferior aspect. LLQ ostomy with gas and stool in bag. Tender RUQ with guarding.   WBC 24, T bili 1.3, CT shows distended gallbladder with thickened wall and pericholecystic fluid. (02 Jun 2017 05:03)      PAST MEDICAL & SURGICAL HISTORY:  Ovarian cancer: dx in dec 2016 (started as fibroid --&gt; large cancerous tumor)  Colostomy in place: dec 2016 (placed during hysterectomy in dec 2016 at [facility])  H/O abdominal hysterectomy: dec 2016      MEDICATIONS  (STANDING):  cefepime  IVPB 2000milliGRAM(s) IV Intermittent every 12 hours  acetaminophen  IVPB. 1000milliGRAM(s) IV Intermittent once  ondansetron Injectable 4milliGRAM(s) IV Push every 8 hours  fluconAZOLE IVPB  IV Intermittent   fluconAZOLE IVPB 200milliGRAM(s) IV Intermittent every 24 hours  tetracaine/benzocaine/butamben Spray 1Spray(s) Topical three times a day  dextrose 5% + sodium chloride 0.9%. 1000milliLiter(s) IV Continuous <Continuous>  enoxaparin Injectable 40milliGRAM(s) SubCutaneous daily  metroNIDAZOLE  IVPB  IV Intermittent   metroNIDAZOLE  IVPB 500milliGRAM(s) IV Intermittent every 8 hours  octreotide  Injectable 300MICROGram(s) SubCutaneous three times a day    MEDICATIONS  (PRN):  HYDROmorphone  Injectable 1milliGRAM(s) IV Push once PRN Breakthrough pain  HYDROmorphone PCA (1 mG/mL) Rescue Clinician Bolus 1milliGRAM(s) IV Push every 1 hour PRN for Pain Scale GREATER THAN 6      Allergies    penicillin (Rash)    Intolerances        FAMILY HISTORY:  No pertinent family history in first degree relatives      Review of Systems    Constitutional, Eyes, ENT, Cardiovascular, Respiratory, Gastrointestinal, Genitourinary, Musculoskeletal, Integumentary, Neurological, Psychiatric, Endocrine, Heme/Lymph and Allergic/Immunologic review of systems are otherwise negative except as noted in HPI.     Vital Signs Last 24 Hrs  T(C): 37.1, Max: 37.1 (06-21 @ 06:11)  T(F): 98.7, Max: 98.7 (06-21 @ 06:11)  HR: 67 (58 - 72)  BP: 123/77 (110/73 - 124/77)  BP(mean): --  RR: 17 (16 - 18)  SpO2: 97% (95% - 98%)    Physical Exam  Constitutional: well developed, well nourished, in no acute distress and thin.   Eyes: PERRL, EOMI, no conjunctival infection, anicteric.   ENT: pharynx is unremarkable, moist mucus membrane, no oral lesions.   Neck: supple without JVD, no thyromegaly or masses appreciated.   Pulmonary: clear to auscultation bilaterally, no dullness, no wheezing.   Cardiac: RRR, normal S1S2, no murmurs, rubs, gallops.   Vascular: no JVD, no calf tenderness, venous stasis changes, varices.   Abdomen: normoactive bowel sounds, soft and nontender, no hepatosplenomegaly or masses appreciated.   Lymphatic: no peripheral adenopathy appreciated.   Musculoskeletal: full range of motion and no deformities appreciated.   Skin: normal appearance, no rash, nodules, vesicles, ulcers, erythema.   Neurology: grossly intact.   Psychiatric: affect appropriate.       LABS:  CBC Full  -  ( 20 Jun 2017 07:14 )  WBC Count : 5.0 K/uL  Hemoglobin : 11.1 g/dL  Hematocrit : 33.8 %  Platelet Count - Automated : 452 K/uL  Mean Cell Volume : 88.4 fl  Mean Cell Hemoglobin : 29.0 pg  Mean Cell Hemoglobin Concentration : 32.8 gm/dL  Auto Neutrophil # : x  Auto Lymphocyte # : x  Auto Monocyte # : x  Auto Eosinophil # : x  Auto Basophil # : x  Auto Neutrophil % : x  Auto Lymphocyte % : x  Auto Monocyte % : x  Auto Eosinophil % : x  Auto Basophil % : x    06-21    137  |  97  |  4<L>  ----------------------------<  134<H>  3.8   |  27  |  0.62    Ca    8.3<L>      21 Jun 2017 06:56  Phos  2.9     06-21  Mg     1.8     06-21    TPro  6.0  /  Alb  2.8<L>  /  TBili  0.2  /  DBili  x   /  AST  13  /  ALT  7<L>  /  AlkPhos  53  06-21    PT/INR - ( 21 Jun 2017 06:56 )   PT: 14.9 sec;   INR: 1.36 ratio         PTT - ( 21 Jun 2017 06:56 )  PTT:36.7 sec      RADIOLOGY & ADDITIONAL STUDIES:

## 2017-06-19 NOTE — PROGRESS NOTE ADULT - SUBJECTIVE AND OBJECTIVE BOX
Interventional Radiology Follow- Up Note      46y Female s/p abdominal drain repositioning and upsizing   on 6/16   in Interventional Radiology with Dr. mueller        Patient seen and examined @ bedside. Site c/d/i with  150 cc/24hrs  output.     Vitals: T(F): 98.1, Max: 98.4 (06-18 @ 21:43)  HR: 72 (60 - 72)  BP: 116/74 (106/70 - 129/74)  RR: 18 (16 - 18)  SpO2: 99% (97% - 99%)  Wt(kg): --    LABS:                        11.4   4.7   )-----------( 499      ( 19 Jun 2017 07:11 )             37.1     06-19    138  |  97  |  3<L>  ----------------------------<  121<H>  3.7   |  28  |  0.55    Ca    8.3<L>      19 Jun 2017 07:11  Phos  2.8     06-19  Mg     1.6     06-19    TPro  5.8<L>  /  Alb  2.5<L>  /  TBili  0.2  /  DBili  x   /  AST  14  /  ALT  7<L>  /  AlkPhos  50  06-19    PT/INR - ( 19 Jun 2017 07:11 )   PT: 15.4 sec;   INR: 1.40 ratio         PTT - ( 19 Jun 2017 07:11 )  PTT:36.8 sec  I    OUT:        abdominal drain : 150 ml/24hrs    ubaldo tube Drain: 90 ml/24hrs            PHYSICAL EXAM:  General: Nontoxic, in NAD  Neuro:  Alert & oriented x 3  Abdomen: left abdominal drain flushed with 5cc of ns. No pain/ reistence. Pink fluid noted in HARSHIL. Ubaldo tube with green colored bile. dressing c/d/I.    Impression: 46y Female admitted with Acute cholecystitis s/p perch ubaldo, lesser sac collection s/p drain and upsizing         Plan:  -continue to monitor ouput    -Flush drain per doctor orders  -trend vitals, labs  -will discuss with IR attending     Please call IR at extension [phone] with any questions, concerns, or issues regarding above.

## 2017-06-19 NOTE — PROGRESS NOTE ADULT - PROBLEM SELECTOR PLAN 1
Large lesser sac collection adjacent to gastric perforation, s/p IR drain upsizing and repositioning  - drain functioning well, abdominal pain much improved today

## 2017-06-19 NOTE — PROGRESS NOTE ADULT - ASSESSMENT
46F s/p endoscopy, removal of stent, IR perc ubaldo, IR drainage, and IR tube upsizing    - NPO/NGT  - await GI function  - pain control  - IR drain flush daily; flushed at bedside this am  - OR with Red surgery today    Pager: [phone] 46F s/p endoscopy, removal of stent, IR perc ubaldo, IR drainage, and IR tube upsizing    - NPO/NGT  - await GI function  - pain control  - IR drain flush daily; flushed at bedside this am  - OR with Red surgery likely Wednesday    Pager: [phone]

## 2017-06-19 NOTE — PROGRESS NOTE ADULT - ASSESSMENT
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

## 2017-06-19 NOTE — PROGRESS NOTE ADULT - SUBJECTIVE AND OBJECTIVE BOX
ACS GENERAL SURGERY DAILY PROGRESS NOTE:     HOSPITAL DAY: 18    SUBJECTIVE/ROS:   Abdominal pain is controlled with pca. Stable upper abdominal pain, left > right. No radiation. No fevers.      OBJECTIVE:     Vital Signs Last 24 Hrs      PHYSICAL EXAM:  Constitutional: well developed, well nourished, NAD  Eyes: anicteric  ENMT: normal facies, symmetric  Neck: supple  Respiratory: CTA bilaterally  Cardiovascular: RRR  Gastrointestinal: abdomen soft, tender in upper abdomen, mild distention. No obvious masses. No peritonitis   ostomy- viable, no function. HARSHIL drain with sanguo-purulent output. IR drain flushed at bedside.   Extremities: FROM, warm  Neurological: intact, non-focal  Skin: no gross lesions  Lymph Nodes: no gross adenopathy  Musculoskeletal: equal strength bilateral upper and lower extremities  Psychiatric: oriented x 3; appropriate  Rectal: not examined        I&O's        LABS: [facility] GENERAL SURGERY DAILY PROGRESS NOTE:     HOSPITAL DAY: 18    SUBJECTIVE/ROS:   Abdominal pain is controlled with pca. Stable upper abdominal pain, left > right. No radiation. No fevers.      OBJECTIVE:     Vital Signs Last 24 Hrs  Vital Signs Last 24 Hrs  T(C): 36.8, Max: 36.9 (06-18 @ 21:43)  T(F): 98.3, Max: 98.4 (06-18 @ 21:43)  HR: 62 (60 - 72)  BP: 112/75 (112/75 - 129/74)  BP(mean): --  RR: 16 (16 - 18)  SpO2: 97% (97% - 99%)    I&O's Detail  I & Os for 24h ending 19 Jun 2017 07:00  =============================================  IN:    dextrose 5% + sodium chloride 0.9%.: 2000 ml    IV PiggyBack: 200 ml    Total IN: 2200 ml  ---------------------------------------------  OUT:    Voided: 1450 ml    Nasoenteral Tube: 200 ml    Drain: 150 ml    Drain: 90 ml    Total OUT: 1890 ml  ---------------------------------------------  Total NET: 310 ml    I & Os for current day (as of 19 Jun 2017 20:03)  =============================================  IN:    Total IN: 0 ml  ---------------------------------------------  OUT:    Voided: 1200 ml    Nasoenteral Tube: 100 ml    Drain: 50 ml    Drain: 10 ml    Total OUT: 1360 ml  ---------------------------------------------  Total NET: -1360 ml                            11.4   4.7   )-----------( 499      ( 19 Jun 2017 07:11 )             37.1       06-19    138  |  97  |  3<L>  ----------------------------<  121<H>  3.7   |  28  |  0.55    Ca    8.3<L>      19 Jun 2017 07:11  Phos  2.8     06-19  Mg     1.6     06-19    TPro  5.8<L>  /  Alb  2.5<L>  /  TBili  0.2  /  DBili  x   /  AST  14  /  ALT  7<L>  /  AlkPhos  50  06-19      PHYSICAL EXAM:  Constitutional: well developed, well nourished, NAD  Eyes: anicteric  ENMT: normal facies, symmetric  Neck: supple  Respiratory: CTA bilaterally  Cardiovascular: RRR  Gastrointestinal: abdomen soft, tender in upper abdomen, mild distention. No obvious masses. No peritonitis   ostomy- viable, no function. HARSHIL drain with sanguo-purulent output. IR drain flushed at bedside.   Extremities: FROM, warm  Neurological: intact, non-focal  Skin: no gross lesions  Lymph Nodes: no gross adenopathy  Musculoskeletal: equal strength bilateral upper and lower extremities  Psychiatric: oriented x 3; appropriate  Rectal: not examined

## 2017-06-20 LAB
ALBUMIN SERPL ELPH-MCNC: 2.7 G/DL — LOW (ref 3.3–5)
ALP SERPL-CCNC: 51 U/L — SIGNIFICANT CHANGE UP (ref 40–120)
ALT FLD-CCNC: 6 U/L RC — LOW (ref 10–45)
ANION GAP SERPL CALC-SCNC: 9 MMOL/L — SIGNIFICANT CHANGE UP (ref 5–17)
AST SERPL-CCNC: 14 U/L — SIGNIFICANT CHANGE UP (ref 10–40)
BILIRUB SERPL-MCNC: 0.2 MG/DL — SIGNIFICANT CHANGE UP (ref 0.2–1.2)
BUN SERPL-MCNC: 3 MG/DL — LOW (ref 7–23)
CALCIUM SERPL-MCNC: 8.4 MG/DL — SIGNIFICANT CHANGE UP (ref 8.4–10.5)
CHLORIDE SERPL-SCNC: 99 MMOL/L — SIGNIFICANT CHANGE UP (ref 96–108)
CO2 SERPL-SCNC: 29 MMOL/L — SIGNIFICANT CHANGE UP (ref 22–31)
CREAT SERPL-MCNC: 0.56 MG/DL — SIGNIFICANT CHANGE UP (ref 0.5–1.3)
GLUCOSE SERPL-MCNC: 140 MG/DL — HIGH (ref 70–99)
HCT VFR BLD CALC: 33.8 % — LOW (ref 34.5–45)
HGB BLD-MCNC: 11.1 G/DL — LOW (ref 11.5–15.5)
MAGNESIUM SERPL-MCNC: 1.9 MG/DL — SIGNIFICANT CHANGE UP (ref 1.6–2.6)
MCHC RBC-ENTMCNC: 29 PG — SIGNIFICANT CHANGE UP (ref 27–34)
MCHC RBC-ENTMCNC: 32.8 GM/DL — SIGNIFICANT CHANGE UP (ref 32–36)
MCV RBC AUTO: 88.4 FL — SIGNIFICANT CHANGE UP (ref 80–100)
PHOSPHATE SERPL-MCNC: 2.9 MG/DL — SIGNIFICANT CHANGE UP (ref 2.5–4.5)
PLATELET # BLD AUTO: 452 K/UL — HIGH (ref 150–400)
POTASSIUM SERPL-MCNC: 3.8 MMOL/L — SIGNIFICANT CHANGE UP (ref 3.5–5.3)
POTASSIUM SERPL-SCNC: 3.8 MMOL/L — SIGNIFICANT CHANGE UP (ref 3.5–5.3)
PROT SERPL-MCNC: 5.7 G/DL — LOW (ref 6–8.3)
RBC # BLD: 3.83 M/UL — SIGNIFICANT CHANGE UP (ref 3.8–5.2)
RBC # FLD: 13 % — SIGNIFICANT CHANGE UP (ref 10.3–14.5)
SODIUM SERPL-SCNC: 137 MMOL/L — SIGNIFICANT CHANGE UP (ref 135–145)
WBC # BLD: 5 K/UL — SIGNIFICANT CHANGE UP (ref 3.8–10.5)
WBC # FLD AUTO: 5 K/UL — SIGNIFICANT CHANGE UP (ref 3.8–10.5)

## 2017-06-20 PROCEDURE — 99233 SBSQ HOSP IP/OBS HIGH 50: CPT

## 2017-06-20 RX ADMIN — OCTREOTIDE ACETATE 300 MICROGRAM(S): 200 INJECTION, SOLUTION INTRAVENOUS; SUBCUTANEOUS at 15:32

## 2017-06-20 RX ADMIN — Medication 100 MILLIGRAM(S): at 15:32

## 2017-06-20 RX ADMIN — HYDROMORPHONE HYDROCHLORIDE 30 MILLILITER(S): 2 INJECTION INTRAMUSCULAR; INTRAVENOUS; SUBCUTANEOUS at 06:40

## 2017-06-20 RX ADMIN — HYDROMORPHONE HYDROCHLORIDE 30 MILLILITER(S): 2 INJECTION INTRAMUSCULAR; INTRAVENOUS; SUBCUTANEOUS at 19:33

## 2017-06-20 RX ADMIN — Medication 1 SPRAY(S): at 13:16

## 2017-06-20 RX ADMIN — OCTREOTIDE ACETATE 300 MICROGRAM(S): 200 INJECTION, SOLUTION INTRAVENOUS; SUBCUTANEOUS at 05:20

## 2017-06-20 RX ADMIN — Medication 1 SPRAY(S): at 05:24

## 2017-06-20 RX ADMIN — CEFEPIME 100 MILLIGRAM(S): 1 INJECTION, POWDER, FOR SOLUTION INTRAMUSCULAR; INTRAVENOUS at 00:42

## 2017-06-20 RX ADMIN — Medication 1 SPRAY(S): at 22:20

## 2017-06-20 RX ADMIN — ENOXAPARIN SODIUM 40 MILLIGRAM(S): 100 INJECTION SUBCUTANEOUS at 13:15

## 2017-06-20 RX ADMIN — ONDANSETRON 4 MILLIGRAM(S): 8 TABLET, FILM COATED ORAL at 17:58

## 2017-06-20 RX ADMIN — ONDANSETRON 4 MILLIGRAM(S): 8 TABLET, FILM COATED ORAL at 08:15

## 2017-06-20 RX ADMIN — HYDROMORPHONE HYDROCHLORIDE 30 MILLILITER(S): 2 INJECTION INTRAMUSCULAR; INTRAVENOUS; SUBCUTANEOUS at 01:34

## 2017-06-20 RX ADMIN — OCTREOTIDE ACETATE 300 MICROGRAM(S): 200 INJECTION, SOLUTION INTRAVENOUS; SUBCUTANEOUS at 22:13

## 2017-06-20 RX ADMIN — Medication 100 MILLIGRAM(S): at 22:13

## 2017-06-20 RX ADMIN — Medication 100 MILLIGRAM(S): at 05:23

## 2017-06-20 RX ADMIN — ONDANSETRON 4 MILLIGRAM(S): 8 TABLET, FILM COATED ORAL at 00:42

## 2017-06-20 RX ADMIN — CEFEPIME 100 MILLIGRAM(S): 1 INJECTION, POWDER, FOR SOLUTION INTRAMUSCULAR; INTRAVENOUS at 13:15

## 2017-06-20 RX ADMIN — FLUCONAZOLE 100 MILLIGRAM(S): 150 TABLET ORAL at 17:57

## 2017-06-20 NOTE — PROGRESS NOTE ADULT - SUBJECTIVE AND OBJECTIVE BOX
TR[facility] GENERAL SURGERY DAILY PROGRESS NOTE:   Pt stable overnight. Pt did not go to surgery yesterday due to the time. Pts pain is controlled w/ PCA. No chest pain, no sob.     Subjective:      Objective:  Gen- nad, ngt in place  Abd- mild tenderness, mild distended, Ostomy- viable, no function  Perc ubaldo drain- bilous      MEDICATIONS  (STANDING):  cefepime  IVPB 2000milliGRAM(s) IV Intermittent every 12 hours  acetaminophen  IVPB. 1000milliGRAM(s) IV Intermittent once  ondansetron Injectable 4milliGRAM(s) IV Push every 8 hours  fluconAZOLE IVPB  IV Intermittent   fluconAZOLE IVPB 200milliGRAM(s) IV Intermittent every 24 hours  tetracaine/benzocaine/butamben Spray 1Spray(s) Topical three times a day  dextrose 5% + sodium chloride 0.9%. 1000milliLiter(s) IV Continuous <Continuous>  enoxaparin Injectable 40milliGRAM(s) SubCutaneous daily  metroNIDAZOLE  IVPB  IV Intermittent   metroNIDAZOLE  IVPB 500milliGRAM(s) IV Intermittent every 8 hours  octreotide  Injectable 300MICROGram(s) SubCutaneous three times a day  HYDROmorphone PCA (1 mG/mL) 30milliLiter(s) PCA Continuous PCA Continuous    MEDICATIONS  (PRN):  naloxone Injectable 0.1milliGRAM(s) IV Push every 3 minutes PRN For ANY of the following changes in patient status:  A. RR LESS THAN 10 breaths per minute, B. Oxygen saturation LESS THAN 90%, C. Sedation score of 6  HYDROmorphone  Injectable 1milliGRAM(s) IV Push once PRN Breakthrough pain  HYDROmorphone PCA (1 mG/mL) Rescue Clinician Bolus 1milliGRAM(s) IV Push every 1 hour PRN for Pain Scale GREATER THAN 6      Vital Signs Last 24 Hrs  T(C): 36.7, Max: 36.9 (- @ 21:45)  T(F): 98.1, Max: 98.4 (- @ 21:45)  HR: 60 (60 - 77)  BP: 115/78 (112/75 - 118/77)  BP(mean): --  RR: 17 (16 - 18)  SpO2: 97% (96% - 99%)    I&O's Detail    I & Os for current day (as of 2017 07:21)  =============================================  IN:    dextrose 5% + sodium chloride 0.9%.: 900 ml    IV PiggyBack: 200 ml    Total IN: 1100 ml  ---------------------------------------------  OUT:    Voided: 1400 ml    Drain: 100 ml    Nasoenteral Tube: 100 ml    Drain: 20 ml    Total OUT: 1620 ml  ---------------------------------------------  Total NET: -520 ml      Daily Height in cm: 167.64 (2017 17:03)    Daily     LABS:                        11.4   4.7   )-----------( 499      ( 2017 07:11 )             37.1     06-19    138  |  97  |  3<L>  ----------------------------<  121<H>  3.7   |  28  |  0.55    Ca    8.3<L>      2017 07:11  Phos  2.8     06-19  Mg     1.6     06-19    TPro  5.8<L>  /  Alb  2.5<L>  /  TBili  0.2  /  DBili  x   /  AST  14  /  ALT  7<L>  /  AlkPhos  50  06-19    PT/INR - ( 2017 07:11 )   PT: 15.4 sec;   INR: 1.40 ratio         PTT - ( 2017 07:11 )  PTT:36.8 sec  Urinalysis Basic - ( 2017 23:03 )    Color: Yellow / Appearance: Clear / S.012 / pH: x  Gluc: x / Ketone: Negative  / Bili: Negative / Urobili: Negative   Blood: x / Protein: Negative / Nitrite: Negative   Leuk Esterase: Negative / RBC: x / WBC x   Sq Epi: x / Non Sq Epi: x / Bacteria: x

## 2017-06-20 NOTE — PROGRESS NOTE ADULT - PROBLEM SELECTOR PLAN 1
Patient is currently on PCA Dilaudid for pain control. Will continue continous rate at 0.2 mg/hour.   Naloxone PRN  Holistic nurse.

## 2017-06-20 NOTE — PROGRESS NOTE ADULT - ATTENDING COMMENTS
I have seen and examined the patient, reviewed the laboratory and radiologic data and agree with practitioner's history, physical assessment, plan and reviewed and edited where appropriate.  We discussed the risks, benefits, and alternatives with the patient and her .  She expressed understanding and agrees to proceed.  She understands that this is a heroic surgery to try and relieve her malignant bowel obstruction and that it is possible that we will only be able to place a gastrostomy tube for venting and palliation.    Plan:  1) OR tomorrow - exlap, CORDELL, possible bowel resection, possible bypass, possible gastrostomy tube  2) NPO p midnight  3) GYN onc to be available intraop if needed

## 2017-06-20 NOTE — PROGRESS NOTE ADULT - PROBLEM SELECTOR PLAN 4
At this time patient is not a candidate for disease modifying Rx understanding advanced illness, fluid collections, complications of procedures, and issues with route for nutrition. Onc input noted. If acute issues resolves and patient maintains nutritional/functional status she may be a candidate for palliative disease modifying Rx. Patient's goals are to improve her condition so she can get access to disease modifying rx.   Patient is to follow up with medical ONC when stable and after acute issues resolved.

## 2017-06-20 NOTE — PROGRESS NOTE ADULT - ASSESSMENT
47 yo F s/p s/p endoscopy, removal of stent, s/p IR perc ubaldo, s/p IR drainage of collection 6/12, s/p IR aspiration 6/16  -NPO/ NGT  -Possible OR today or tomorrow  -cont pca  -cont t bili drain    Nickie AKBAR

## 2017-06-20 NOTE — PROGRESS NOTE ADULT - ASSESSMENT
46F s/p endoscopy, removal of stent, IR perc ubaldo, IR drainage, and IR tube upsizing    - NPO/NGT  - await GI function  - pain control w/ PCA  - IR drain flush daily; flushed at bedside this am  - OR with Red surgery likely Wednesday    Pager: [phone]

## 2017-06-20 NOTE — PROGRESS NOTE ADULT - SUBJECTIVE AND OBJECTIVE BOX
INTERVAL HPI/OVERNIGHT EVENTS: Following up for pain     Patient indicates pain is well controlled with Dialudid PCA. She indicates she is still not able to have a BM. Patient is planned for OR today or tomorrow.    ADVANCE DIRECTIVES:    DNR: NO           PRESENT SYMPTOMS:   SOURCE: Patient      Pain: Mild to moderate. Dull. Constant. Improving with Dilaudid. Increasing with palpation.     Dyspnea:   NO  Anxiety:NO  Fatigue: Mild to moderate   Nausea: NO  Loss of Appetite:NO  Constipation [ x ] YES. Colostomy output: Elisabeth 280ml since 6/2    OTHER SYMPTOMS:  [ X] All other ROS negative     [ ] Unable to obtain due to poor mentation    MEDICATIONS  (STANDING):  cefepime  IVPB 2000milliGRAM(s) IV Intermittent every 12 hours  acetaminophen  IVPB. 1000milliGRAM(s) IV Intermittent once  ondansetron Injectable 4milliGRAM(s) IV Push every 8 hours  fluconAZOLE IVPB  IV Intermittent   fluconAZOLE IVPB 200milliGRAM(s) IV Intermittent every 24 hours  tetracaine/benzocaine/butamben Spray 1Spray(s) Topical three times a day  dextrose 5% + sodium chloride 0.9%. 1000milliLiter(s) IV Continuous <Continuous>  enoxaparin Injectable 40milliGRAM(s) SubCutaneous daily  metroNIDAZOLE  IVPB  IV Intermittent   metroNIDAZOLE  IVPB 500milliGRAM(s) IV Intermittent every 8 hours  HYDROmorphone PCA (1 mG/mL) 30milliLiter(s) PCA Continuous PCA Continuous  octreotide  Injectable 300MICROGram(s) SubCutaneous three times a day    MEDICATIONS  (PRN):  naloxone Injectable 0.1milliGRAM(s) IV Push every 3 minutes PRN For ANY of the following changes in patient status:  A. RR LESS THAN 10 breaths per minute, B. Oxygen saturation LESS THAN 90%, C. Sedation score of 6  HYDROmorphone PCA (1 mG/mL) Rescue Clinician Bolus 1milliGRAM(s) IV Push every 1 hour PRN for Pain Scale GREATER THAN 6      Karnofsky Performance Score/Palliative Performance Status Version 2: 60  %  Protein Calorie Malnutrition:  [ ] Mild   [ ] Moderate   [ ] Severe     Physical Exam:    Vital Signs Last 24 Hrs  T(C): 36.8, Max: 36.9 (06-19 @ 21:45)  T(F): 98.2, Max: 98.4 (06-19 @ 21:45)  HR: 60 (60 - 77)  BP: 117/70 (110/73 - 118/77)  BP(mean): --  RR: 18 (16 - 18)  SpO2: 98% (95% - 98%)    General: Awake alert, comfortable.   HEENT: PERRL with EOMI.   Lungs: Clear to Ausc bilaterally, no wheezing.   Cardiovascular: S1, S2. No S3  RRR, no murmurs   Abdomen: Soft, No guarding, tenderness to palpate diffusely but less compared with yesterday, BS  x 4 (+).     Genitourinary: Normal  Musculoskeletal:  Weakness   Neurological: no focal deficits. Alert, oriented x 3     Skin: warm and dry     Labs:     Vital Signs Last 24 Hrs  T(C): 36.8, Max: 36.9 (06-19 @ 21:45)  T(F): 98.2, Max: 98.4 (06-19 @ 21:45)  HR: 60 (60 - 77)  BP: 117/70 (110/73 - 118/77)  BP(mean): --  RR: 18 (16 - 18)  SpO2: 98% (95% - 98%)             Assessment and Plan

## 2017-06-20 NOTE — PROGRESS NOTE ADULT - PROBLEM SELECTOR PROBLEM 5
Palliative care encounter
Palliative care encounter
Ovarian cancer, primary, with metastasis from ovary to other site
Palliative care encounter
Ovarian cancer, primary, with metastasis from ovary to other site
Palliative care encounter

## 2017-06-20 NOTE — PROGRESS NOTE ADULT - SUBJECTIVE AND OBJECTIVE BOX
GENERAL SURGERY DAILY PROGRESS NOTE:       Subjective:  patient did not go for surgery yesterday  denies n/v/f/c, denies SOB or CP     Objective:  General: NAD  HEENT: WNL  Lymph nodes: Non-tender, no palpable masses  Neck: No masses  Cardiovascular: Regular rate and rhythm; normal S1, S2; no murmurs, rubs, or gallops  Lungs: Lungs clear to auscultation bilaterally; No wheezes or crackles    Abdominal:  -Abdomen soft and non-distended  -No pulsatile masses, no abdominal bruits ascultated  -Non-Tender; No reflex tenderness; No guarding;    Psychiatric:   - normal mood and affect     MEDICATIONS  (STANDING):  cefepime  IVPB 2000milliGRAM(s) IV Intermittent every 12 hours  acetaminophen  IVPB. 1000milliGRAM(s) IV Intermittent once  ondansetron Injectable 4milliGRAM(s) IV Push every 8 hours  fluconAZOLE IVPB  IV Intermittent   fluconAZOLE IVPB 200milliGRAM(s) IV Intermittent every 24 hours  tetracaine/benzocaine/butamben Spray 1Spray(s) Topical three times a day  dextrose 5% + sodium chloride 0.9%. 1000milliLiter(s) IV Continuous <Continuous>  enoxaparin Injectable 40milliGRAM(s) SubCutaneous daily  metroNIDAZOLE  IVPB  IV Intermittent   metroNIDAZOLE  IVPB 500milliGRAM(s) IV Intermittent every 8 hours  octreotide  Injectable 300MICROGram(s) SubCutaneous three times a day  HYDROmorphone PCA (1 mG/mL) 30milliLiter(s) PCA Continuous PCA Continuous    MEDICATIONS  (PRN):  naloxone Injectable 0.1milliGRAM(s) IV Push every 3 minutes PRN For ANY of the following changes in patient status:  A. RR LESS THAN 10 breaths per minute, B. Oxygen saturation LESS THAN 90%, C. Sedation score of 6  HYDROmorphone  Injectable 1milliGRAM(s) IV Push once PRN Breakthrough pain  HYDROmorphone PCA (1 mG/mL) Rescue Clinician Bolus 1milliGRAM(s) IV Push every 1 hour PRN for Pain Scale GREATER THAN 6      Vital Signs Last 24 Hrs  T(C): 36.7, Max: 36.9 (-19 @ 21:45)  T(F): 98.1, Max: 98.4 (- @ 21:45)  HR: 60 (60 - 77)  BP: 115/78 (112/75 - 118/77)  BP(mean): --  RR: 17 (16 - 18)  SpO2: 97% (96% - 99%)    I&O's Detail    I & Os for current day (as of 2017 08:11)  =============================================  IN:    dextrose 5% + sodium chloride 0.9%.: 1200 ml    IV PiggyBack: 200 ml    Total IN: 1400 ml  ---------------------------------------------  OUT:    Voided: 1400 ml    Drain: 100 ml    Nasoenteral Tube: 100 ml    Drain: 20 ml    Total OUT: 1620 ml  ---------------------------------------------  Total NET: -220 ml      Daily Height in cm: 167.64 (2017 17:03)    Daily     LABS:                        11.1   5.0   )-----------( 452      ( 2017 07:14 )             33.8     06-20    137  |  99  |  3<L>  ----------------------------<  140<H>  3.8   |  29  |  0.56    Ca    8.4      2017 07:14  Phos  2.9     06-20  Mg     1.9     06-20    TPro  5.7<L>  /  Alb  2.7<L>  /  TBili  0.2  /  DBili  x   /  AST  14  /  ALT  6<L>  /  AlkPhos  51  06-20    PT/INR - ( 2017 07:11 )   PT: 15.4 sec;   INR: 1.40 ratio         PTT - ( 2017 07:11 )  PTT:36.8 sec  Urinalysis Basic - ( 2017 23:03 )    Color: Yellow / Appearance: Clear / S.012 / pH: x  Gluc: x / Ketone: Negative  / Bili: Negative / Urobili: Negative   Blood: x / Protein: Negative / Nitrite: Negative   Leuk Esterase: Negative / RBC: x / WBC x   Sq Epi: x / Non Sq Epi: x / Bacteria: x

## 2017-06-20 NOTE — PROGRESS NOTE ADULT - PROBLEM SELECTOR PLAN 2
Likely Malignant bowel obstruction  Planning for a surgical palliative intervention   Continue octreotide to 300 mg tid.   s/p NGT

## 2017-06-21 ENCOUNTER — APPOINTMENT (OUTPATIENT)
Dept: SURGICAL ONCOLOGY | Facility: HOSPITAL | Age: 47
End: 2017-06-21

## 2017-06-21 ENCOUNTER — RESULT REVIEW (OUTPATIENT)
Age: 47
End: 2017-06-21

## 2017-06-21 LAB
ALBUMIN SERPL ELPH-MCNC: 2.8 G/DL — LOW (ref 3.3–5)
ALP SERPL-CCNC: 53 U/L — SIGNIFICANT CHANGE UP (ref 40–120)
ALT FLD-CCNC: 7 U/L RC — LOW (ref 10–45)
ANION GAP SERPL CALC-SCNC: 13 MMOL/L — SIGNIFICANT CHANGE UP (ref 5–17)
APTT BLD: 36.7 SEC — SIGNIFICANT CHANGE UP (ref 27.5–37.4)
AST SERPL-CCNC: 13 U/L — SIGNIFICANT CHANGE UP (ref 10–40)
BILIRUB SERPL-MCNC: 0.2 MG/DL — SIGNIFICANT CHANGE UP (ref 0.2–1.2)
BUN SERPL-MCNC: 4 MG/DL — LOW (ref 7–23)
CALCIUM SERPL-MCNC: 8.3 MG/DL — LOW (ref 8.4–10.5)
CHLORIDE SERPL-SCNC: 97 MMOL/L — SIGNIFICANT CHANGE UP (ref 96–108)
CO2 SERPL-SCNC: 27 MMOL/L — SIGNIFICANT CHANGE UP (ref 22–31)
CREAT SERPL-MCNC: 0.62 MG/DL — SIGNIFICANT CHANGE UP (ref 0.5–1.3)
GLUCOSE SERPL-MCNC: 134 MG/DL — HIGH (ref 70–99)
HCT VFR BLD CALC: 35.4 % — SIGNIFICANT CHANGE UP (ref 34.5–45)
HGB BLD-MCNC: 11.6 G/DL — SIGNIFICANT CHANGE UP (ref 11.5–15.5)
INR BLD: 1.36 RATIO — HIGH (ref 0.88–1.16)
MAGNESIUM SERPL-MCNC: 1.8 MG/DL — SIGNIFICANT CHANGE UP (ref 1.6–2.6)
MCHC RBC-ENTMCNC: 29.1 PG — SIGNIFICANT CHANGE UP (ref 27–34)
MCHC RBC-ENTMCNC: 32.8 GM/DL — SIGNIFICANT CHANGE UP (ref 32–36)
MCV RBC AUTO: 88.8 FL — SIGNIFICANT CHANGE UP (ref 80–100)
PHOSPHATE SERPL-MCNC: 2.9 MG/DL — SIGNIFICANT CHANGE UP (ref 2.5–4.5)
PLATELET # BLD AUTO: 404 K/UL — HIGH (ref 150–400)
POTASSIUM SERPL-MCNC: 3.8 MMOL/L — SIGNIFICANT CHANGE UP (ref 3.5–5.3)
POTASSIUM SERPL-SCNC: 3.8 MMOL/L — SIGNIFICANT CHANGE UP (ref 3.5–5.3)
PROT SERPL-MCNC: 6 G/DL — SIGNIFICANT CHANGE UP (ref 6–8.3)
PROTHROM AB SERPL-ACNC: 14.9 SEC — HIGH (ref 9.8–12.7)
RBC # BLD: 3.98 M/UL — SIGNIFICANT CHANGE UP (ref 3.8–5.2)
RBC # FLD: 13.1 % — SIGNIFICANT CHANGE UP (ref 10.3–14.5)
SODIUM SERPL-SCNC: 137 MMOL/L — SIGNIFICANT CHANGE UP (ref 135–145)
WBC # BLD: 5.3 K/UL — SIGNIFICANT CHANGE UP (ref 3.8–10.5)
WBC # FLD AUTO: 5.3 K/UL — SIGNIFICANT CHANGE UP (ref 3.8–10.5)

## 2017-06-21 PROCEDURE — 43830 GSTRST OPEN WO CONSTJ TUBE: CPT | Mod: 59

## 2017-06-21 PROCEDURE — 88342 IMHCHEM/IMCYTCHM 1ST ANTB: CPT | Mod: 26

## 2017-06-21 PROCEDURE — 88304 TISSUE EXAM BY PATHOLOGIST: CPT | Mod: 26

## 2017-06-21 PROCEDURE — 88341 IMHCHEM/IMCYTCHM EA ADD ANTB: CPT | Mod: 26

## 2017-06-21 PROCEDURE — 49020 DRAINAGE ABDOM ABSCESS OPEN: CPT

## 2017-06-21 PROCEDURE — 88305 TISSUE EXAM BY PATHOLOGIST: CPT | Mod: 26

## 2017-06-21 PROCEDURE — 88300 SURGICAL PATH GROSS: CPT | Mod: 26,59

## 2017-06-21 PROCEDURE — 49203: CPT | Mod: 59

## 2017-06-21 PROCEDURE — 44050 REDUCE BOWEL OBSTRUCTION: CPT

## 2017-06-21 PROCEDURE — 88331 PATH CONSLTJ SURG 1 BLK 1SPC: CPT | Mod: 26

## 2017-06-21 PROCEDURE — 13101 CMPLX RPR TRUNK 2.6-7.5 CM: CPT | Mod: 59

## 2017-06-21 RX ORDER — HYDROMORPHONE HYDROCHLORIDE 2 MG/ML
30 INJECTION INTRAMUSCULAR; INTRAVENOUS; SUBCUTANEOUS
Qty: 0 | Refills: 0 | Status: DISCONTINUED | OUTPATIENT
Start: 2017-06-21 | End: 2017-06-21

## 2017-06-21 RX ORDER — TETRACAINE/BENZOCAINE/BUTAMBEN 2%-14%-2%
1 OINTMENT (GRAM) TOPICAL THREE TIMES A DAY
Qty: 0 | Refills: 0 | Status: DISCONTINUED | OUTPATIENT
Start: 2017-06-21 | End: 2017-07-05

## 2017-06-21 RX ORDER — ONDANSETRON 8 MG/1
4 TABLET, FILM COATED ORAL EVERY 6 HOURS
Qty: 0 | Refills: 0 | Status: DISCONTINUED | OUTPATIENT
Start: 2017-06-21 | End: 2017-07-03

## 2017-06-21 RX ORDER — SODIUM CHLORIDE 9 MG/ML
1000 INJECTION, SOLUTION INTRAVENOUS
Qty: 0 | Refills: 0 | Status: DISCONTINUED | OUTPATIENT
Start: 2017-06-21 | End: 2017-06-22

## 2017-06-21 RX ORDER — HYDROMORPHONE HYDROCHLORIDE 2 MG/ML
0.5 INJECTION INTRAMUSCULAR; INTRAVENOUS; SUBCUTANEOUS
Qty: 0 | Refills: 0 | Status: DISCONTINUED | OUTPATIENT
Start: 2017-06-21 | End: 2017-06-21

## 2017-06-21 RX ORDER — ACETAMINOPHEN 500 MG
1000 TABLET ORAL ONCE
Qty: 0 | Refills: 0 | Status: COMPLETED | OUTPATIENT
Start: 2017-06-22 | End: 2017-06-22

## 2017-06-21 RX ORDER — HYDROMORPHONE HYDROCHLORIDE 2 MG/ML
0.5 INJECTION INTRAMUSCULAR; INTRAVENOUS; SUBCUTANEOUS ONCE
Qty: 0 | Refills: 0 | Status: DISCONTINUED | OUTPATIENT
Start: 2017-06-21 | End: 2017-06-21

## 2017-06-21 RX ORDER — ONDANSETRON 8 MG/1
4 TABLET, FILM COATED ORAL EVERY 6 HOURS
Qty: 0 | Refills: 0 | Status: DISCONTINUED | OUTPATIENT
Start: 2017-06-21 | End: 2017-06-21

## 2017-06-21 RX ORDER — HYDROMORPHONE HYDROCHLORIDE 2 MG/ML
30 INJECTION INTRAMUSCULAR; INTRAVENOUS; SUBCUTANEOUS
Qty: 0 | Refills: 0 | Status: DISCONTINUED | OUTPATIENT
Start: 2017-06-21 | End: 2017-06-22

## 2017-06-21 RX ORDER — NALOXONE HYDROCHLORIDE 4 MG/.1ML
0.1 SPRAY NASAL
Qty: 0 | Refills: 0 | Status: DISCONTINUED | OUTPATIENT
Start: 2017-06-21 | End: 2017-06-23

## 2017-06-21 RX ORDER — ACETAMINOPHEN 500 MG
1000 TABLET ORAL ONCE
Qty: 0 | Refills: 0 | Status: COMPLETED | OUTPATIENT
Start: 2017-06-21 | End: 2017-06-21

## 2017-06-21 RX ORDER — HYDROMORPHONE HYDROCHLORIDE 2 MG/ML
0.5 INJECTION INTRAMUSCULAR; INTRAVENOUS; SUBCUTANEOUS
Qty: 0 | Refills: 0 | Status: DISCONTINUED | OUTPATIENT
Start: 2017-06-21 | End: 2017-06-23

## 2017-06-21 RX ORDER — NALOXONE HYDROCHLORIDE 4 MG/.1ML
0.1 SPRAY NASAL
Qty: 0 | Refills: 0 | Status: DISCONTINUED | OUTPATIENT
Start: 2017-06-21 | End: 2017-06-21

## 2017-06-21 RX ORDER — ENOXAPARIN SODIUM 100 MG/ML
40 INJECTION SUBCUTANEOUS DAILY
Qty: 0 | Refills: 0 | Status: DISCONTINUED | OUTPATIENT
Start: 2017-06-21 | End: 2017-07-05

## 2017-06-21 RX ADMIN — HYDROMORPHONE HYDROCHLORIDE 0.5 MILLIGRAM(S): 2 INJECTION INTRAMUSCULAR; INTRAVENOUS; SUBCUTANEOUS at 14:00

## 2017-06-21 RX ADMIN — Medication 1 SPRAY(S): at 05:18

## 2017-06-21 RX ADMIN — HYDROMORPHONE HYDROCHLORIDE 0.5 MILLIGRAM(S): 2 INJECTION INTRAMUSCULAR; INTRAVENOUS; SUBCUTANEOUS at 19:08

## 2017-06-21 RX ADMIN — HYDROMORPHONE HYDROCHLORIDE 0.5 MILLIGRAM(S): 2 INJECTION INTRAMUSCULAR; INTRAVENOUS; SUBCUTANEOUS at 14:31

## 2017-06-21 RX ADMIN — HYDROMORPHONE HYDROCHLORIDE 0.5 MILLIGRAM(S): 2 INJECTION INTRAMUSCULAR; INTRAVENOUS; SUBCUTANEOUS at 13:45

## 2017-06-21 RX ADMIN — HYDROMORPHONE HYDROCHLORIDE 30 MILLILITER(S): 2 INJECTION INTRAMUSCULAR; INTRAVENOUS; SUBCUTANEOUS at 16:07

## 2017-06-21 RX ADMIN — ENOXAPARIN SODIUM 40 MILLIGRAM(S): 100 INJECTION SUBCUTANEOUS at 19:07

## 2017-06-21 RX ADMIN — Medication 100 MILLIGRAM(S): at 05:16

## 2017-06-21 RX ADMIN — Medication 400 MILLIGRAM(S): at 20:22

## 2017-06-21 RX ADMIN — HYDROMORPHONE HYDROCHLORIDE 0.5 MILLIGRAM(S): 2 INJECTION INTRAMUSCULAR; INTRAVENOUS; SUBCUTANEOUS at 14:15

## 2017-06-21 RX ADMIN — ONDANSETRON 4 MILLIGRAM(S): 8 TABLET, FILM COATED ORAL at 01:15

## 2017-06-21 RX ADMIN — OCTREOTIDE ACETATE 300 MICROGRAM(S): 200 INJECTION, SOLUTION INTRAVENOUS; SUBCUTANEOUS at 05:16

## 2017-06-21 RX ADMIN — HYDROMORPHONE HYDROCHLORIDE 30 MILLILITER(S): 2 INJECTION INTRAMUSCULAR; INTRAVENOUS; SUBCUTANEOUS at 19:09

## 2017-06-21 RX ADMIN — HYDROMORPHONE HYDROCHLORIDE 0.5 MILLIGRAM(S): 2 INJECTION INTRAMUSCULAR; INTRAVENOUS; SUBCUTANEOUS at 15:15

## 2017-06-21 RX ADMIN — CEFEPIME 100 MILLIGRAM(S): 1 INJECTION, POWDER, FOR SOLUTION INTRAMUSCULAR; INTRAVENOUS at 01:15

## 2017-06-21 RX ADMIN — HYDROMORPHONE HYDROCHLORIDE 0.5 MILLIGRAM(S): 2 INJECTION INTRAMUSCULAR; INTRAVENOUS; SUBCUTANEOUS at 15:45

## 2017-06-21 RX ADMIN — Medication 1000 MILLIGRAM(S): at 20:50

## 2017-06-21 RX ADMIN — HYDROMORPHONE HYDROCHLORIDE 0.5 MILLIGRAM(S): 2 INJECTION INTRAMUSCULAR; INTRAVENOUS; SUBCUTANEOUS at 15:30

## 2017-06-21 RX ADMIN — SODIUM CHLORIDE 100 MILLILITER(S): 9 INJECTION, SOLUTION INTRAVENOUS at 14:30

## 2017-06-21 NOTE — PROGRESS NOTE ADULT - SUBJECTIVE AND OBJECTIVE BOX
GENERAL SURGERY POSTOP NOTE:   s/p ex-lap, CORDELL, G-tube placement  Subjective:  Endorses some abdominal pain. Denies n/v.             Objective:    MEDICATIONS  (STANDING):  lactated ringers. 1000milliLiter(s) IV Continuous <Continuous>  enoxaparin Injectable 40milliGRAM(s) SubCutaneous daily  HYDROmorphone PCA (1 mG/mL) 30milliLiter(s) PCA Continuous PCA Continuous  tetracaine/benzocaine/butamben Spray 1Spray(s) Topical three times a day    MEDICATIONS  (PRN):  HYDROmorphone PCA (1 mG/mL) Rescue Clinician Bolus 0.5milliGRAM(s) IV Push every 15 minutes PRN for Pain Scale GREATER THAN 6  naloxone Injectable 0.1milliGRAM(s) IV Push every 3 minutes PRN For ANY of the following changes in patient status:  A. RR LESS THAN 10 breaths per minute, B. Oxygen saturation LESS THAN 90%, C. Sedation score of 6  ondansetron Injectable 4milliGRAM(s) IV Push every 6 hours PRN Nausea      Vital Signs Last 24 Hrs  T(C): 36.7, Max: 37.5 (06-21 @ 17:00)  T(F): 98, Max: 99.5 (06-21 @ 17:00)  HR: 102 (61 - 106)  BP: 125/85 (114/76 - 141/89)  BP(mean): 109 (93 - 109)  RR: 18 (14 - 19)  SpO2: 100% (95% - 100%)    I&O's Detail  I & Os for 24h ending 21 Jun 2017 07:00  =============================================  IN:    dextrose 5% + sodium chloride 0.9%: 2200 ml    IV PiggyBack: 150 ml    Total IN: 2350 ml  ---------------------------------------------  OUT:    Drain: 175 ml    Nasoenteral Tube: 100 ml    Drain: 60 ml    Total OUT: 335 ml  ---------------------------------------------  Total NET: 2015 ml    I & Os for current day (as of 21 Jun 2017 23:03)  =============================================  IN:    lactated ringers.: 400 ml    Total IN: 400 ml  ---------------------------------------------  OUT:    Indwelling Catheter - Urethral: 485 ml    Drain: 200 ml    Drain: 75 ml    Bulb: 20 ml    Total OUT: 780 ml  ---------------------------------------------  Total NET: -380 ml      Daily     Daily     LABS:                        11.6   5.3   )-----------( 404      ( 21 Jun 2017 06:56 )             35.4     06-21    137  |  97  |  4<L>  ----------------------------<  134<H>  3.8   |  27  |  0.62    Ca    8.3<L>      21 Jun 2017 06:56  Phos  2.9     06-21  Mg     1.8     06-21    TPro  6.0  /  Alb  2.8<L>  /  TBili  0.2  /  DBili  x   /  AST  13  /  ALT  7<L>  /  AlkPhos  53  06-21    PT/INR - ( 21 Jun 2017 06:56 )   PT: 14.9 sec;   INR: 1.36 ratio         PTT - ( 21 Jun 2017 06:56 )  PTT:36.7 sec      NAD, awake and alert  NGT with minimal output  Respirations nonlabored  Abdomen appropriately tender, exlap incision with some oozing, but Tegaderm still occlusive  G -tube site clean  No guarding or rebound tenderness   Ostomy pink and viable, no gas or stool in bag  One drain serosanguinous, other bile tinged

## 2017-06-21 NOTE — PROGRESS NOTE ADULT - ASSESSMENT
47 yo F s/p s/p endoscopy, removal of stent, s/p IR perc ubaldo, s/p IR drainage of collection 6/12, s/p IR aspiration 6/16, now s/p exlap and G-tube insertion  - NPO/ NGT  - PCA  - Await GI fxn  - VTE ppx      Nickie AKBAR 45 yo F s/p s/p endoscopy, removal of stent, s/p IR perc ubaldo, s/p IR drainage of collection 6/12, s/p IR aspiration 6/16, now s/p exlap and G-tube insertion  - NPO/ NGT  - PCA  - Await GI fxn  - VTE ppx

## 2017-06-21 NOTE — BRIEF OPERATIVE NOTE - OPERATION/FINDINGS
exlap, CORDELL  peritoneal carcinomatosis  closure w retention sutures  placement of G tube  removal of IR drain  placement of amador drain in abscess cavity exlap, extensive lysis of adhesions, reduction of internal hernia, drainage of intraabdominal abscess  peritoneal carcinomatosis  closure w retention sutures  placement of G tube  removal of IR drain  placement of amador drain in abscess cavity

## 2017-06-21 NOTE — PROGRESS NOTE ADULT - ASSESSMENT
47 yo F s/p s/p endoscopy, removal of stent, s/p IR perc ubaldo, s/p IR drainage of collection 6/12, s/p IR aspiration 6/16  -NPO/ NGT  - OR today  -cont pca  -cont t bili drain    Nickie AKBAR

## 2017-06-22 LAB
ANION GAP SERPL CALC-SCNC: 10 MMOL/L — SIGNIFICANT CHANGE UP (ref 5–17)
ANION GAP SERPL CALC-SCNC: 11 MMOL/L — SIGNIFICANT CHANGE UP (ref 5–17)
BUN SERPL-MCNC: 14 MG/DL — SIGNIFICANT CHANGE UP (ref 7–23)
BUN SERPL-MCNC: 9 MG/DL — SIGNIFICANT CHANGE UP (ref 7–23)
CALCIUM SERPL-MCNC: 8.2 MG/DL — LOW (ref 8.4–10.5)
CALCIUM SERPL-MCNC: 8.4 MG/DL — SIGNIFICANT CHANGE UP (ref 8.4–10.5)
CHLORIDE SERPL-SCNC: 98 MMOL/L — SIGNIFICANT CHANGE UP (ref 96–108)
CHLORIDE SERPL-SCNC: 98 MMOL/L — SIGNIFICANT CHANGE UP (ref 96–108)
CO2 SERPL-SCNC: 28 MMOL/L — SIGNIFICANT CHANGE UP (ref 22–31)
CO2 SERPL-SCNC: 28 MMOL/L — SIGNIFICANT CHANGE UP (ref 22–31)
CREAT ?TM UR-MCNC: 179 MG/DL — SIGNIFICANT CHANGE UP
CREAT SERPL-MCNC: 0.59 MG/DL — SIGNIFICANT CHANGE UP (ref 0.5–1.3)
CREAT SERPL-MCNC: 0.72 MG/DL — SIGNIFICANT CHANGE UP (ref 0.5–1.3)
GLUCOSE SERPL-MCNC: 124 MG/DL — HIGH (ref 70–99)
GLUCOSE SERPL-MCNC: 142 MG/DL — HIGH (ref 70–99)
HCT VFR BLD CALC: 40.2 % — SIGNIFICANT CHANGE UP (ref 34.5–45)
HGB BLD-MCNC: 12.8 G/DL — SIGNIFICANT CHANGE UP (ref 11.5–15.5)
MAGNESIUM SERPL-MCNC: 1.6 MG/DL — SIGNIFICANT CHANGE UP (ref 1.6–2.6)
MCHC RBC-ENTMCNC: 28.4 PG — SIGNIFICANT CHANGE UP (ref 27–34)
MCHC RBC-ENTMCNC: 31.7 GM/DL — LOW (ref 32–36)
MCV RBC AUTO: 89.4 FL — SIGNIFICANT CHANGE UP (ref 80–100)
PHOSPHATE SERPL-MCNC: 3.8 MG/DL — SIGNIFICANT CHANGE UP (ref 2.5–4.5)
PLATELET # BLD AUTO: 485 K/UL — HIGH (ref 150–400)
POTASSIUM SERPL-MCNC: 4.3 MMOL/L — SIGNIFICANT CHANGE UP (ref 3.5–5.3)
POTASSIUM SERPL-MCNC: 4.8 MMOL/L — SIGNIFICANT CHANGE UP (ref 3.5–5.3)
POTASSIUM SERPL-SCNC: 4.3 MMOL/L — SIGNIFICANT CHANGE UP (ref 3.5–5.3)
POTASSIUM SERPL-SCNC: 4.8 MMOL/L — SIGNIFICANT CHANGE UP (ref 3.5–5.3)
RBC # BLD: 4.5 M/UL — SIGNIFICANT CHANGE UP (ref 3.8–5.2)
RBC # FLD: 13.7 % — SIGNIFICANT CHANGE UP (ref 10.3–14.5)
SODIUM SERPL-SCNC: 136 MMOL/L — SIGNIFICANT CHANGE UP (ref 135–145)
SODIUM SERPL-SCNC: 137 MMOL/L — SIGNIFICANT CHANGE UP (ref 135–145)
SODIUM UR-SCNC: 93 MMOL/L — SIGNIFICANT CHANGE UP
WBC # BLD: 23.1 K/UL — HIGH (ref 3.8–10.5)
WBC # FLD AUTO: 23.1 K/UL — HIGH (ref 3.8–10.5)

## 2017-06-22 RX ORDER — METRONIDAZOLE 500 MG
500 TABLET ORAL ONCE
Qty: 0 | Refills: 0 | Status: COMPLETED | OUTPATIENT
Start: 2017-06-22 | End: 2017-06-22

## 2017-06-22 RX ORDER — SODIUM CHLORIDE 9 MG/ML
1000 INJECTION, SOLUTION INTRAVENOUS ONCE
Qty: 0 | Refills: 0 | Status: COMPLETED | OUTPATIENT
Start: 2017-06-22 | End: 2017-06-22

## 2017-06-22 RX ORDER — HYDROMORPHONE HYDROCHLORIDE 2 MG/ML
30 INJECTION INTRAMUSCULAR; INTRAVENOUS; SUBCUTANEOUS
Qty: 0 | Refills: 0 | Status: DISCONTINUED | OUTPATIENT
Start: 2017-06-22 | End: 2017-06-27

## 2017-06-22 RX ORDER — CEFEPIME 1 G/1
2000 INJECTION, POWDER, FOR SOLUTION INTRAMUSCULAR; INTRAVENOUS EVERY 12 HOURS
Qty: 0 | Refills: 0 | Status: DISCONTINUED | OUTPATIENT
Start: 2017-06-22 | End: 2017-07-01

## 2017-06-22 RX ORDER — MAGNESIUM SULFATE 500 MG/ML
2 VIAL (ML) INJECTION ONCE
Qty: 0 | Refills: 0 | Status: COMPLETED | OUTPATIENT
Start: 2017-06-22 | End: 2017-06-22

## 2017-06-22 RX ORDER — FLUCONAZOLE 150 MG/1
200 TABLET ORAL EVERY 24 HOURS
Qty: 0 | Refills: 0 | Status: DISCONTINUED | OUTPATIENT
Start: 2017-06-22 | End: 2017-07-01

## 2017-06-22 RX ORDER — ACETAMINOPHEN 500 MG
1000 TABLET ORAL EVERY 6 HOURS
Qty: 0 | Refills: 0 | Status: COMPLETED | OUTPATIENT
Start: 2017-06-22 | End: 2017-06-22

## 2017-06-22 RX ORDER — DEXTROSE MONOHYDRATE, SODIUM CHLORIDE, AND POTASSIUM CHLORIDE 50; .745; 4.5 G/1000ML; G/1000ML; G/1000ML
1000 INJECTION, SOLUTION INTRAVENOUS
Qty: 0 | Refills: 0 | Status: DISCONTINUED | OUTPATIENT
Start: 2017-06-22 | End: 2017-06-29

## 2017-06-22 RX ORDER — ACETAMINOPHEN 500 MG
1000 TABLET ORAL ONCE
Qty: 0 | Refills: 0 | Status: COMPLETED | OUTPATIENT
Start: 2017-06-22 | End: 2017-06-22

## 2017-06-22 RX ADMIN — Medication 50 GRAM(S): at 12:04

## 2017-06-22 RX ADMIN — ONDANSETRON 4 MILLIGRAM(S): 8 TABLET, FILM COATED ORAL at 05:06

## 2017-06-22 RX ADMIN — HYDROMORPHONE HYDROCHLORIDE 30 MILLILITER(S): 2 INJECTION INTRAMUSCULAR; INTRAVENOUS; SUBCUTANEOUS at 19:23

## 2017-06-22 RX ADMIN — Medication 1 SPRAY(S): at 05:07

## 2017-06-22 RX ADMIN — Medication 1000 MILLIGRAM(S): at 20:25

## 2017-06-22 RX ADMIN — SODIUM CHLORIDE 1000 MILLILITER(S): 9 INJECTION, SOLUTION INTRAVENOUS at 15:31

## 2017-06-22 RX ADMIN — CEFEPIME 100 MILLIGRAM(S): 1 INJECTION, POWDER, FOR SOLUTION INTRAMUSCULAR; INTRAVENOUS at 18:04

## 2017-06-22 RX ADMIN — SODIUM CHLORIDE 1000 MILLILITER(S): 9 INJECTION, SOLUTION INTRAVENOUS at 19:22

## 2017-06-22 RX ADMIN — Medication 100 MILLIGRAM(S): at 06:10

## 2017-06-22 RX ADMIN — HYDROMORPHONE HYDROCHLORIDE 30 MILLILITER(S): 2 INJECTION INTRAMUSCULAR; INTRAVENOUS; SUBCUTANEOUS at 07:37

## 2017-06-22 RX ADMIN — FLUCONAZOLE 100 MILLIGRAM(S): 150 TABLET ORAL at 07:40

## 2017-06-22 RX ADMIN — Medication 400 MILLIGRAM(S): at 20:08

## 2017-06-22 RX ADMIN — Medication 1 SPRAY(S): at 21:05

## 2017-06-22 RX ADMIN — ENOXAPARIN SODIUM 40 MILLIGRAM(S): 100 INJECTION SUBCUTANEOUS at 12:02

## 2017-06-22 RX ADMIN — CEFEPIME 100 MILLIGRAM(S): 1 INJECTION, POWDER, FOR SOLUTION INTRAMUSCULAR; INTRAVENOUS at 07:41

## 2017-06-22 RX ADMIN — Medication 400 MILLIGRAM(S): at 01:51

## 2017-06-22 RX ADMIN — HYDROMORPHONE HYDROCHLORIDE 30 MILLILITER(S): 2 INJECTION INTRAMUSCULAR; INTRAVENOUS; SUBCUTANEOUS at 20:59

## 2017-06-22 RX ADMIN — Medication 1000 MILLIGRAM(S): at 02:20

## 2017-06-22 RX ADMIN — Medication 400 MILLIGRAM(S): at 11:55

## 2017-06-22 NOTE — CHART NOTE - NSCHARTNOTEFT_GEN_A_CORE
Source: Patient [ x]    Family [ ]     other [x ] Red Surgery    Diet : NPO  pt remains acute S/P exploratory lap and lysis of adhesions. Placement of G-tube and David. Discussed possible initiation of tube feeds with Red Surgery.          Current Weight: Weight (kg): 56.7 (06-19 @ 17:03)(dosing weight which was stated on 6/2, most recent daily weight 6/15-  pounds/53.2kg    Pertinent Medications: MEDICATIONS  (STANDING):  enoxaparin Injectable 40milliGRAM(s) SubCutaneous daily  HYDROmorphone PCA (1 mG/mL) 30milliLiter(s) PCA Continuous PCA Continuous  tetracaine/benzocaine/butamben Spray 1Spray(s) Topical three times a day  cefepime  IVPB 2000milliGRAM(s) IV Intermittent every 12 hours  fluconAZOLE IVPB 200milliGRAM(s) IV Intermittent every 24 hours  dextrose 5% + sodium chloride 0.45% with potassium chloride 20 mEq/L 1000milliLiter(s) IV Continuous <Continuous>  acetaminophen  IVPB. 1000milliGRAM(s) IV Intermittent every 6 hours    MEDICATIONS  (PRN):  HYDROmorphone PCA (1 mG/mL) Rescue Clinician Bolus 0.5milliGRAM(s) IV Push every 15 minutes PRN for Pain Scale GREATER THAN 6  naloxone Injectable 0.1milliGRAM(s) IV Push every 3 minutes PRN For ANY of the following changes in patient status:  A. RR LESS THAN 10 breaths per minute, B. Oxygen saturation LESS THAN 90%, C. Sedation score of 6  ondansetron Injectable 4milliGRAM(s) IV Push every 6 hours PRN Nausea    Pertinent Labs:  06-22 Na137 mmol/L Glu 124 mg/dL<H> K+ 4.3 mmol/L Cr  0.72 mg/dL BUN 9 mg/dL Phos 3.8 mg/dL       Skin: no edema, surgical incision    Estimated Needs:   [ x] no change since previous assessment         Previous Nutrition Diagnosis:  [ x] Malnutrition -severe         Nutrition Diagnosis is [ x] ongoing  [ ] resolved [ ] not applicable          New Nutrition Diagnosis: [ x] not applicable          [ ] Other: Recommend nutrition via appropriate route within 24-48 hours when medically feasible       Monitoring and Evaluation:      [x ] weights     [x ] other:monitor for diet advance and/or nutrition support initiation

## 2017-06-22 NOTE — PROGRESS NOTE ADULT - SUBJECTIVE AND OBJECTIVE BOX
GENERAL SURGERY DAILY PROGRESS NOTE:     Subjective:  Pain better controlled with IV tylenol. Pt prefers this to PCA. Denies n/v.         Objective:    MEDICATIONS  (STANDING):  lactated ringers. 1000milliLiter(s) IV Continuous <Continuous>  enoxaparin Injectable 40milliGRAM(s) SubCutaneous daily  HYDROmorphone PCA (1 mG/mL) 30milliLiter(s) PCA Continuous PCA Continuous  tetracaine/benzocaine/butamben Spray 1Spray(s) Topical three times a day  cefepime  IVPB 2000milliGRAM(s) IV Intermittent every 12 hours  fluconAZOLE IVPB 200milliGRAM(s) IV Intermittent every 24 hours    MEDICATIONS  (PRN):  HYDROmorphone PCA (1 mG/mL) Rescue Clinician Bolus 0.5milliGRAM(s) IV Push every 15 minutes PRN for Pain Scale GREATER THAN 6  naloxone Injectable 0.1milliGRAM(s) IV Push every 3 minutes PRN For ANY of the following changes in patient status:  A. RR LESS THAN 10 breaths per minute, B. Oxygen saturation LESS THAN 90%, C. Sedation score of 6  ondansetron Injectable 4milliGRAM(s) IV Push every 6 hours PRN Nausea      Vital Signs Last 24 Hrs  T(C): 37.5, Max: 37.5 (06-21 @ 17:00)  T(F): 99.5, Max: 99.5 (06-21 @ 17:00)  HR: 102 (84 - 106)  BP: 116/79 (116/79 - 141/89)  BP(mean): 109 (93 - 109)  RR: 18 (14 - 19)  SpO2: 97% (97% - 100%)    I&O's Detail    I & Os for current day (as of 22 Jun 2017 07:32)  =============================================  IN:    lactated ringers.: 1100 ml    Solution: 100 ml    Total IN: 1200 ml  ---------------------------------------------  OUT:    Indwelling Catheter - Urethral: 485 ml    Drain: 200 ml    Drain: 75 ml    Bulb: 20 ml    Nasoenteral Tube: 10 ml    Total OUT: 790 ml  ---------------------------------------------  Total NET: 410 ml      Daily     Daily     LABS:                        12.8   23.1  )-----------( 485      ( 22 Jun 2017 06:22 )             40.2     06-22    137  |  98  |  9   ----------------------------<  124<H>  4.3   |  28  |  0.72    Ca    8.4      22 Jun 2017 06:22  Phos  3.8     06-22  Mg     1.6     06-22    TPro  6.0  /  Alb  2.8<L>  /  TBili  0.2  /  DBili  x   /  AST  13  /  ALT  7<L>  /  AlkPhos  53  06-21    PT/INR - ( 21 Jun 2017 06:56 )   PT: 14.9 sec;   INR: 1.36 ratio         PTT - ( 21 Jun 2017 06:56 )  PTT:36.7 sec    NAD, awake and alert  NGT with minimal output  Respirations nonlabored  Abdomen soft, tender  midline incision with serosanguinous ooze  IR drain bilious  HARSHIL serosanguinous  ostomy pink and viable no output yet  Lynn with clear yellow urine

## 2017-06-22 NOTE — PROGRESS NOTE ADULT - ASSESSMENT
47 yo F s/p s/p endoscopy, removal of stent, s/p IR perc ubaldo, s/p IR drainage of collection 6/12, s/p IR aspiration 6/16, now s/p exlap and G-tube insertion  - d/c NG tube, d/c Lynn TOV  - Pain control  - Await GI fxn  - VTE ppx

## 2017-06-23 LAB
ANION GAP SERPL CALC-SCNC: 8 MMOL/L — SIGNIFICANT CHANGE UP (ref 5–17)
BLD GP AB SCN SERPL QL: NEGATIVE — SIGNIFICANT CHANGE UP
BUN SERPL-MCNC: 12 MG/DL — SIGNIFICANT CHANGE UP (ref 7–23)
CALCIUM SERPL-MCNC: 7.9 MG/DL — LOW (ref 8.4–10.5)
CHLORIDE SERPL-SCNC: 96 MMOL/L — SIGNIFICANT CHANGE UP (ref 96–108)
CO2 SERPL-SCNC: 28 MMOL/L — SIGNIFICANT CHANGE UP (ref 22–31)
CREAT SERPL-MCNC: 0.43 MG/DL — LOW (ref 0.5–1.3)
GLUCOSE SERPL-MCNC: 122 MG/DL — HIGH (ref 70–99)
HCT VFR BLD CALC: 30.6 % — LOW (ref 34.5–45)
HCT VFR BLD CALC: 33.3 % — LOW (ref 34.5–45)
HGB BLD-MCNC: 10.2 G/DL — LOW (ref 11.5–15.5)
HGB BLD-MCNC: 9.8 G/DL — LOW (ref 11.5–15.5)
MAGNESIUM SERPL-MCNC: 1.8 MG/DL — SIGNIFICANT CHANGE UP (ref 1.6–2.6)
MCHC RBC-ENTMCNC: 27.3 PG — SIGNIFICANT CHANGE UP (ref 27–34)
MCHC RBC-ENTMCNC: 28.7 PG — SIGNIFICANT CHANGE UP (ref 27–34)
MCHC RBC-ENTMCNC: 30.7 GM/DL — LOW (ref 32–36)
MCHC RBC-ENTMCNC: 32 GM/DL — SIGNIFICANT CHANGE UP (ref 32–36)
MCV RBC AUTO: 88.8 FL — SIGNIFICANT CHANGE UP (ref 80–100)
MCV RBC AUTO: 89.5 FL — SIGNIFICANT CHANGE UP (ref 80–100)
PHOSPHATE SERPL-MCNC: 1.4 MG/DL — LOW (ref 2.5–4.5)
PLATELET # BLD AUTO: 326 K/UL — SIGNIFICANT CHANGE UP (ref 150–400)
PLATELET # BLD AUTO: 384 K/UL — SIGNIFICANT CHANGE UP (ref 150–400)
POTASSIUM SERPL-MCNC: 4.4 MMOL/L — SIGNIFICANT CHANGE UP (ref 3.5–5.3)
POTASSIUM SERPL-SCNC: 4.4 MMOL/L — SIGNIFICANT CHANGE UP (ref 3.5–5.3)
RBC # BLD: 3.41 M/UL — LOW (ref 3.8–5.2)
RBC # BLD: 3.75 M/UL — LOW (ref 3.8–5.2)
RBC # FLD: 13.5 % — SIGNIFICANT CHANGE UP (ref 10.3–14.5)
RBC # FLD: 13.6 % — SIGNIFICANT CHANGE UP (ref 10.3–14.5)
RH IG SCN BLD-IMP: POSITIVE — SIGNIFICANT CHANGE UP
SODIUM SERPL-SCNC: 132 MMOL/L — LOW (ref 135–145)
WBC # BLD: 18.5 K/UL — HIGH (ref 3.8–10.5)
WBC # BLD: 20.5 K/UL — HIGH (ref 3.8–10.5)
WBC # FLD AUTO: 18.5 K/UL — HIGH (ref 3.8–10.5)
WBC # FLD AUTO: 20.5 K/UL — HIGH (ref 3.8–10.5)

## 2017-06-23 PROCEDURE — 99233 SBSQ HOSP IP/OBS HIGH 50: CPT

## 2017-06-23 RX ORDER — ACETAMINOPHEN 500 MG
1000 TABLET ORAL ONCE
Qty: 0 | Refills: 0 | Status: COMPLETED | OUTPATIENT
Start: 2017-06-23 | End: 2017-06-23

## 2017-06-23 RX ORDER — HYDROMORPHONE HYDROCHLORIDE 2 MG/ML
1 INJECTION INTRAMUSCULAR; INTRAVENOUS; SUBCUTANEOUS
Qty: 0 | Refills: 0 | Status: DISCONTINUED | OUTPATIENT
Start: 2017-06-23 | End: 2017-06-27

## 2017-06-23 RX ORDER — MAGNESIUM SULFATE 500 MG/ML
2 VIAL (ML) INJECTION ONCE
Qty: 0 | Refills: 0 | Status: COMPLETED | OUTPATIENT
Start: 2017-06-23 | End: 2017-06-23

## 2017-06-23 RX ORDER — NALOXONE HYDROCHLORIDE 4 MG/.1ML
0.1 SPRAY NASAL
Qty: 0 | Refills: 0 | Status: DISCONTINUED | OUTPATIENT
Start: 2017-06-23 | End: 2017-07-03

## 2017-06-23 RX ADMIN — ENOXAPARIN SODIUM 40 MILLIGRAM(S): 100 INJECTION SUBCUTANEOUS at 11:35

## 2017-06-23 RX ADMIN — Medication 400 MILLIGRAM(S): at 09:04

## 2017-06-23 RX ADMIN — Medication 50 GRAM(S): at 11:34

## 2017-06-23 RX ADMIN — Medication 63.75 MILLIMOLE(S): at 17:10

## 2017-06-23 RX ADMIN — FLUCONAZOLE 100 MILLIGRAM(S): 150 TABLET ORAL at 05:03

## 2017-06-23 RX ADMIN — HYDROMORPHONE HYDROCHLORIDE 30 MILLILITER(S): 2 INJECTION INTRAMUSCULAR; INTRAVENOUS; SUBCUTANEOUS at 19:22

## 2017-06-23 RX ADMIN — CEFEPIME 100 MILLIGRAM(S): 1 INJECTION, POWDER, FOR SOLUTION INTRAMUSCULAR; INTRAVENOUS at 05:03

## 2017-06-23 RX ADMIN — Medication 1000 MILLIGRAM(S): at 19:30

## 2017-06-23 RX ADMIN — DEXTROSE MONOHYDRATE, SODIUM CHLORIDE, AND POTASSIUM CHLORIDE 100 MILLILITER(S): 50; .745; 4.5 INJECTION, SOLUTION INTRAVENOUS at 11:34

## 2017-06-23 RX ADMIN — Medication 1 SPRAY(S): at 05:02

## 2017-06-23 RX ADMIN — Medication 1000 MILLIGRAM(S): at 09:35

## 2017-06-23 RX ADMIN — Medication 127.5 MILLIMOLE(S): at 12:47

## 2017-06-23 RX ADMIN — CEFEPIME 100 MILLIGRAM(S): 1 INJECTION, POWDER, FOR SOLUTION INTRAMUSCULAR; INTRAVENOUS at 17:07

## 2017-06-23 RX ADMIN — Medication 400 MILLIGRAM(S): at 19:01

## 2017-06-23 NOTE — CONSULT NOTE ADULT - SUBJECTIVE AND OBJECTIVE BOX
INTERVAL HPI/OVERNIGHT EVENTS: Patient was seen and examined at bedside. Patient is s/p  exlap and G - tube insertion. Patient's basal PCA at 0.2 was discontinued and patient was complaining of extreme pain last night. PCA basal at 0.2 was restarted. She has used the 45 DD in the past 24 hours.     Patient indicates pain is well controlled with Dialudid PCA. She indicates she is still not able to have a BM. Patient is planned for OR today or tomorrow.      ADVANCE DIRECTIVES:    DNR: NO           PRESENT SYMPTOMS:   SOURCE: Patient      Pain: Mild to moderate. Dull. Constant. Improving with Dilaudid. Increasing with palpation.     Dyspnea:   NO  Anxiety:NO  Fatigue: Mild to moderate   Nausea: NO  Loss of Appetite:NO  Constipation [ x ] YES. Colostomy output: 0 recently.     OTHER SYMPTOMS:  [ X] All other ROS negative     [ ] Unable to obtain due to poor mentation    MEDICATIONS  (STANDING):  cefepime  IVPB 2000milliGRAM(s) IV Intermittent every 12 hours  acetaminophen  IVPB. 1000milliGRAM(s) IV Intermittent once  ondansetron Injectable 4milliGRAM(s) IV Push every 8 hours  fluconAZOLE IVPB  IV Intermittent   fluconAZOLE IVPB 200milliGRAM(s) IV Intermittent every 24 hours  tetracaine/benzocaine/butamben Spray 1Spray(s) Topical three times a day  dextrose 5% + sodium chloride 0.9%. 1000milliLiter(s) IV Continuous <Continuous>  enoxaparin Injectable 40milliGRAM(s) SubCutaneous daily  metroNIDAZOLE  IVPB  IV Intermittent   metroNIDAZOLE  IVPB 500milliGRAM(s) IV Intermittent every 8 hours  HYDROmorphone PCA (1 mG/mL) 30milliLiter(s) PCA Continuous PCA Continuous  octreotide  Injectable 300MICROGram(s) SubCutaneous three times a day    MEDICATIONS  (PRN):  naloxone Injectable 0.1milliGRAM(s) IV Push every 3 minutes PRN For ANY of the following changes in patient status:  A. RR LESS THAN 10 breaths per minute, B. Oxygen saturation LESS THAN 90%, C. Sedation score of 6  HYDROmorphone PCA (1 mG/mL) Rescue Clinician Bolus 1milliGRAM(s) IV Push every 1 hour PRN for Pain Scale GREATER THAN 6      Karnofsky Performance Score/Palliative Performance Status Version 2: 60  %  Protein Calorie Malnutrition:  [ ] Mild   [ ] Moderate   [ ] Severe     Physical Exam:    Vital Signs Last 24 Hrs  T(C): 36.8, Max: 36.9 (06-19 @ 21:45)  T(F): 98.2, Max: 98.4 (06-19 @ 21:45)  HR: 60 (60 - 77)  BP: 117/70 (110/73 - 118/77)  BP(mean): --  RR: 18 (16 - 18)  SpO2: 98% (95% - 98%)    General: Awake alert, comfortable.   HEENT: PERRL with EOMI.   Lungs: Clear to Ausc bilaterally, no wheezing.   Cardiovascular: S1, S2. No S3  RRR, no murmurs   Abdomen: Soft, No guarding, tenderness to palpate diffusely but less compared with yesterday, BS  x 4 (+).     Genitourinary: Normal  Musculoskeletal:  Weakness   Neurological: no focal deficits. Alert, oriented x 3     Skin: warm and dry     Labs:     Vital Signs Last 24 Hrs  T(C): 36.8, Max: 36.9 (06-19 @ 21:45)  T(F): 98.2, Max: 98.4 (06-19 @ 21:45)  HR: 60 (60 - 77)  BP: 117/70 (110/73 - 118/77)  BP(mean): --  RR: 18 (16 - 18)  SpO2: 98% (95% - 98%)             Assessment and Plan INTERVAL HPI/OVERNIGHT EVENTS: Patient was seen and examined at bedside. Patient is s/p  exlap and G - tube insertion. Patient's basal PCA at 0.2 was discontinued prior to surgery and patient was complaining of extreme pain last night. PCA basal at 0.2 was restarted. She has used the 45 DD in the past 24 hours.   She stated that pain is 0/10 at this time. She stated that she had a good night sleep last night.     ADVANCE DIRECTIVES:    DNR: NO           PRESENT SYMPTOMS:   SOURCE: Patient      Pain: Mild to moderate. Non-radiating, over the scar, Throbbing. Intermittent. Improving with Dilaudid. Increasing with palpation.     Dyspnea:   NO  Anxiety:NO  Fatigue: Mild to moderate   Nausea: NO  Loss of Appetite:NO  Constipation [ x ] YES. Colostomy output: 0 recently.     OTHER SYMPTOMS:  [ X] All other ROS negative     [ ] Unable to obtain due to poor mentation    MEDICATIONS  (STANDING):  cefepime  IVPB 2000milliGRAM(s) IV Intermittent every 12 hours  acetaminophen  IVPB. 1000milliGRAM(s) IV Intermittent once  ondansetron Injectable 4milliGRAM(s) IV Push every 8 hours  fluconAZOLE IVPB  IV Intermittent   fluconAZOLE IVPB 200milliGRAM(s) IV Intermittent every 24 hours  tetracaine/benzocaine/butamben Spray 1Spray(s) Topical three times a day  dextrose 5% + sodium chloride 0.9%. 1000milliLiter(s) IV Continuous <Continuous>  enoxaparin Injectable 40milliGRAM(s) SubCutaneous daily  metroNIDAZOLE  IVPB  IV Intermittent   metroNIDAZOLE  IVPB 500milliGRAM(s) IV Intermittent every 8 hours  HYDROmorphone PCA (1 mG/mL) 30milliLiter(s) PCA Continuous PCA Continuous  octreotide  Injectable 300MICROGram(s) SubCutaneous three times a day    MEDICATIONS  (PRN):  naloxone Injectable 0.1milliGRAM(s) IV Push every 3 minutes PRN For ANY of the following changes in patient status:  A. RR LESS THAN 10 breaths per minute, B. Oxygen saturation LESS THAN 90%, C. Sedation score of 6  HYDROmorphone PCA (1 mG/mL) Rescue Clinician Bolus 1milliGRAM(s) IV Push every 1 hour PRN for Pain Scale GREATER THAN 6      Karnofsky Performance Score/Palliative Performance Status Version 2: 60  %  Protein Calorie Malnutrition:  [ ] Mild   [ ] Moderate   [ ] Severe     Physical Exam:    Vital Signs Last 24 Hrs  T(C): 36.8, Max: 36.9 (06-19 @ 21:45)  T(F): 98.2, Max: 98.4 (06-19 @ 21:45)  HR: 60 (60 - 77)  BP: 117/70 (110/73 - 118/77)  BP(mean): --  RR: 18 (16 - 18)  SpO2: 98% (95% - 98%)    General: Awake alert, comfortable.   HEENT: PERRL with EOMI.   Lungs: Clear to Ausc bilaterally, no wheezing.   Cardiovascular: S1, S2. No S3  RRR, no murmurs   Abdomen: Soft, No guarding, tenderness to palpate diffusely but less compared with yesterday, BS  x 4 (+).     Genitourinary: Normal  Musculoskeletal:  Weakness   Neurological: no focal deficits. Alert, oriented x 3     Skin: warm and dry     Labs:     Vital Signs Last 24 Hrs  T(C): 36.8, Max: 36.9 (06-19 @ 21:45)  T(F): 98.2, Max: 98.4 (06-19 @ 21:45)  HR: 60 (60 - 77)  BP: 117/70 (110/73 - 118/77)  BP(mean): --  RR: 18 (16 - 18)  SpO2: 98% (95% - 98%)             Assessment and Plan INTERVAL HPI/OVERNIGHT EVENTS: Patient was seen and examined at bedside. Patient is s/p  exlap and G - tube insertion. Patient's basal PCA at 0.2 was discontinued prior to surgery and patient was complaining of extreme pain last night. PCA basal at 0.2 was restarted. She has used the 45 DD in the past 24 hours.   She stated that pain is 0/10 at this time. She stated that she had a good after continues rate was started.     ADVANCE DIRECTIVES:    DNR: NO           PRESENT SYMPTOMS:   SOURCE: Patient      Pain: Mild to moderate. Non-radiating, over the scar, Throbbing. Intermittent. Improving with Dilaudid. Increasing with palpation.     Dyspnea:   NO  Anxiety:NO  Fatigue: Mild to moderate   Nausea: NO  Loss of Appetite:NO  Constipation [ x ] YES. Colostomy output: 0 recently.     OTHER SYMPTOMS:  [ X] All other ROS negative     [ ] Unable to obtain due to poor mentation        Karnofsky Performance Score/Palliative Performance Status Version 2: 60  %  Protein Calorie Malnutrition:  [ ] Mild   [ ] Moderate   [ ] Severe     Physical Exam:      General: Awake alert, comfortable.   HEENT: PERRL with EOMI.   Lungs: Clear to Ausc bilaterally, no wheezing.   Cardiovascular: S1, S2. No S3  RRR, no murmurs   Abdomen: Soft, No guarding, tenderness to palpate diffusely but less compared with yesterday, BS  x 4 (+).     Genitourinary: Normal  Musculoskeletal:  Weakness   Neurological: no focal deficits. Alert, oriented x 3     Skin: warm and dry     Labs:              Assessment and Plan INTERVAL HPI/OVERNIGHT EVENTS: Patient was seen and examined at bedside. Patient is s/p  exlap and G - tube insertion. Patient's basal PCA at 0.2 was discontinued prior to surgery and patient was complaining of extreme pain last night. PCA basal at 0.2 was restarted. She has used the 45 DD in the past 24 hours.   She stated that pain is 0/10 at this time. She stated that she had a good after continues rate was started.     ADVANCE DIRECTIVES:    DNR: NO           PRESENT SYMPTOMS:   SOURCE: Patient      Pain: Mild to moderate. Non-radiating, over the scar, Throbbing. Intermittent. Improving with Dilaudid. Increasing with palpation.     Dyspnea:   NO  Anxiety:NO  Fatigue: Mild to moderate   Nausea: NO  Loss of Appetite:NO  Constipation [ x ] YES. Colostomy output: 0 recently.     OTHER SYMPTOMS:  [ X] All other ROS negative     [ ] Unable to obtain due to poor mentation    MEDICATIONS  (STANDING):  enoxaparin Injectable 40milliGRAM(s) SubCutaneous daily  tetracaine/benzocaine/butamben Spray 1Spray(s) Topical three times a day  cefepime  IVPB 2000milliGRAM(s) IV Intermittent every 12 hours  fluconAZOLE IVPB 200milliGRAM(s) IV Intermittent every 24 hours  dextrose 5% + sodium chloride 0.45% with potassium chloride 20 mEq/L 1000milliLiter(s) IV Continuous <Continuous>  HYDROmorphone PCA (1 mG/mL) 30milliLiter(s) PCA Continuous PCA Continuous    MEDICATIONS  (PRN):  HYDROmorphone PCA (1 mG/mL) Rescue Clinician Bolus 0.5milliGRAM(s) IV Push every 15 minutes PRN for Pain Scale GREATER THAN 6  naloxone Injectable 0.1milliGRAM(s) IV Push every 3 minutes PRN For ANY of the following changes in patient status:  A. RR LESS THAN 10 breaths per minute, B. Oxygen saturation LESS THAN 90%, C. Sedation score of 6  ondansetron Injectable 4milliGRAM(s) IV Push every 6 hours PRN Nausea        Karnofsky Performance Score/Palliative Performance Status Version 2: 60  %  Protein Calorie Malnutrition:  [ ] Mild   [ ] Moderate   [ ] Severe     Physical Exam:    Vital Signs Last 24 Hrs  T(C): 37, Max: 37.5 (06-23 @ 09:21)  T(F): 98.6, Max: 99.5 (06-23 @ 09:21)  HR: 83 (83 - 98)  BP: 110/75 (105/69 - 120/77)  BP(mean): --  RR: 16 (16 - 18)  SpO2: 95% (92% - 97%)    General: Awake alert, comfortable.   HEENT: PERRL with EOMI.   Lungs: Clear to Ausc bilaterally, no wheezing.   Cardiovascular: S1, S2. No S3  RRR, no murmurs   Abdomen: Soft, No guarding, tenderness to palpate diffusely but less compared with yesterday, BS  x 4 (+).     Genitourinary: Normal  Musculoskeletal:  Weakness   Neurological: no focal deficits. Alert, oriented x 3     Skin: warm and dry     Labs:                         10.2   20.5  )-----------( 384      ( 23 Jun 2017 11:57 )             33.3   06-23    132<L>  |  96  |  12  ----------------------------<  122<H>  4.4   |  28  |  0.43<L>    Ca    7.9<L>      23 Jun 2017 06:09  Phos  1.4     06-23  Mg     1.8     06-23               Assessment and Plan

## 2017-06-23 NOTE — PROGRESS NOTE ADULT - SUBJECTIVE AND OBJECTIVE BOX
[facility] GENERAL SURGERY DAILY PROGRESS NOTE:       Subjective: Pain controlled o/n. NPO. Denies N/V.       Objective:  Gen: NAD, AAOx3  Abd:       MEDICATIONS  (STANDING):  enoxaparin Injectable 40milliGRAM(s) SubCutaneous daily  tetracaine/benzocaine/butamben Spray 1Spray(s) Topical three times a day  cefepime  IVPB 2000milliGRAM(s) IV Intermittent every 12 hours  fluconAZOLE IVPB 200milliGRAM(s) IV Intermittent every 24 hours  dextrose 5% + sodium chloride 0.45% with potassium chloride 20 mEq/L 1000milliLiter(s) IV Continuous <Continuous>  HYDROmorphone PCA (1 mG/mL) 30milliLiter(s) PCA Continuous PCA Continuous  sodium phosphate IVPB 15milliMole(s) IV Intermittent every 2 hours    MEDICATIONS  (PRN):  HYDROmorphone PCA (1 mG/mL) Rescue Clinician Bolus 0.5milliGRAM(s) IV Push every 15 minutes PRN for Pain Scale GREATER THAN 6  naloxone Injectable 0.1milliGRAM(s) IV Push every 3 minutes PRN For ANY of the following changes in patient status:  A. RR LESS THAN 10 breaths per minute, B. Oxygen saturation LESS THAN 90%, C. Sedation score of 6  ondansetron Injectable 4milliGRAM(s) IV Push every 6 hours PRN Nausea      Vital Signs Last 24 Hrs  T(C): 37.5, Max: 37.6 (06-22 @ 14:01)  T(F): 99.5, Max: 99.7 (06-22 @ 14:01)  HR: 98 (87 - 104)  BP: 120/77 (110/72 - 122/86)  BP(mean): --  RR: 18 (16 - 18)  SpO2: 93% (92% - 97%)    I&O's Detail    I & Os for current day (as of 23 Jun 2017 12:03)  =============================================  IN:    dextrose 5% + sodium chloride 0.45% with potassium chloride 20 mEq/L: 2300 ml    Lactated Ringers IV Bolus: 2000 ml    IV PiggyBack: 200 ml    Total IN: 4500 ml  ---------------------------------------------  OUT:    Indwelling Catheter - Urethral: 750 ml    Drain: 100 ml    Bulb: 10 ml    Drain: 5 ml    Total OUT: 865 ml  ---------------------------------------------  Total NET: 3635 ml      Daily     Daily     LABS:                        9.8    18.5  )-----------( 326      ( 23 Jun 2017 06:09 )             30.6     06-23    132<L>  |  96  |  12  ----------------------------<  122<H>  4.4   |  28  |  0.43<L>    Ca    7.9<L>      23 Jun 2017 06:09  Phos  1.4     06-23  Mg     1.8     06-23            RADIOLOGY & ADDITIONAL STUDIES: [facility] GENERAL SURGERY DAILY PROGRESS NOTE:       Subjective: Pain controlled o/n. NPO. Denies N/V.       Objective:  Gen: NAD, AAOx3  Abd: soft, appropriately tender, ND. Retention sutures in place. G-tube in place. David is SS. Perc ubaldo bilious.       MEDICATIONS  (STANDING):  enoxaparin Injectable 40milliGRAM(s) SubCutaneous daily  tetracaine/benzocaine/butamben Spray 1Spray(s) Topical three times a day  cefepime  IVPB 2000milliGRAM(s) IV Intermittent every 12 hours  fluconAZOLE IVPB 200milliGRAM(s) IV Intermittent every 24 hours  dextrose 5% + sodium chloride 0.45% with potassium chloride 20 mEq/L 1000milliLiter(s) IV Continuous <Continuous>  HYDROmorphone PCA (1 mG/mL) 30milliLiter(s) PCA Continuous PCA Continuous  sodium phosphate IVPB 15milliMole(s) IV Intermittent every 2 hours    MEDICATIONS  (PRN):  HYDROmorphone PCA (1 mG/mL) Rescue Clinician Bolus 0.5milliGRAM(s) IV Push every 15 minutes PRN for Pain Scale GREATER THAN 6  naloxone Injectable 0.1milliGRAM(s) IV Push every 3 minutes PRN For ANY of the following changes in patient status:  A. RR LESS THAN 10 breaths per minute, B. Oxygen saturation LESS THAN 90%, C. Sedation score of 6  ondansetron Injectable 4milliGRAM(s) IV Push every 6 hours PRN Nausea      Vital Signs Last 24 Hrs  T(C): 37.5, Max: 37.6 (06-22 @ 14:01)  T(F): 99.5, Max: 99.7 (06-22 @ 14:01)  HR: 98 (87 - 104)  BP: 120/77 (110/72 - 122/86)  BP(mean): --  RR: 18 (16 - 18)  SpO2: 93% (92% - 97%)    I&O's Detail    I & Os for current day (as of 23 Jun 2017 12:03)  =============================================  IN:    dextrose 5% + sodium chloride 0.45% with potassium chloride 20 mEq/L: 2300 ml    Lactated Ringers IV Bolus: 2000 ml    IV PiggyBack: 200 ml    Total IN: 4500 ml  ---------------------------------------------  OUT:    Indwelling Catheter - Urethral: 750 ml    Drain: 100 ml    Bulb: 10 ml    Drain: 5 ml    Total OUT: 865 ml  ---------------------------------------------  Total NET: 3635 ml      Daily     Daily     LABS:                        9.8    18.5  )-----------( 326      ( 23 Jun 2017 06:09 )             30.6     06-23    132<L>  |  96  |  12  ----------------------------<  122<H>  4.4   |  28  |  0.43<L>    Ca    7.9<L>      23 Jun 2017 06:09  Phos  1.4     06-23  Mg     1.8     06-23            RADIOLOGY & ADDITIONAL STUDIES:

## 2017-06-23 NOTE — CONSULT NOTE ADULT - PROBLEM SELECTOR RECOMMENDATION 2
s/p exlap, G-tube placement, s/p colostomy s/p Sx as above.    Plan of care as per Sx s/p exlap, extensive lysis of adhesions, reduction of internal hernia, drainage of intraabdominal abscess, placement of G tube, removal of IR drain, placement of amador drain in abscess cavity   Plan of care as per Sx

## 2017-06-23 NOTE — CONSULT NOTE ADULT - PROBLEM SELECTOR RECOMMENDATION 4
At this time patient is not a candidate for disease modifying Rx understanding advanced illness, fluid collections, complications of procedures, and issues with route for nutrition. Patient is s/p G-tube placement.   Patient is to follow up with medical ONC when stable and after acute issues resolved. Goals are towards continuing disease modifying Rx.   Will attempt to complete HCP form.

## 2017-06-23 NOTE — PROGRESS NOTE ADULT - ASSESSMENT
45 yo F s/p endoscopy, removal of stent, s/p IR perc ubaldo, s/p IR drainage of collection 6/12, s/p IR aspiration 6/16, now s/p exlap and G-tube insertion  - Pain control  - Await GI fxn  - Strict I/O's  - Lynn removed, DTV  - VTE ppx

## 2017-06-23 NOTE — CONSULT NOTE ADULT - PROBLEM SELECTOR RECOMMENDATION 9
Restarted on PCA pump, pain controlled now, will consider increasing the rate today.   Naloxone PRN Restarted on PCA pump, Dilaudid 0.2 mg/h, Dilaudid 0.5 mg q 15 DD, and 1 mg q 1 PRN CB. Pain crisis was likely 2/2 to not having CR.   Naloxone PRN

## 2017-06-23 NOTE — CONSULT NOTE ADULT - ASSESSMENT
45 yo F with ovarian cancer s/p GRETCHEN, presented with abdominal pain, found to have acute cholecystitis, complicated after stent placement with perforated gallbladder with ans associated intra-abdominal abscesse and bowel obstruction (likely malignant bowel obstruction).  S/p stent removal 6/7/2017 via endoscopy with NG tube placement. s/p intraabdominal drain for abscess drainage. s/p exlap with G-tube insertion yesterday. Seen for Pain management.

## 2017-06-23 NOTE — CONSULT NOTE ADULT - PROBLEM SELECTOR RECOMMENDATION 3
Abx as per primary .   She is s/p  IR drainage of persistent fluid collection  and revision of drain on 6/16 that was successful  Any further work up and treatment as per GI/Sx/IR. At this time patient is not a candidate for disease modifying Rx understanding advanced illness, fluid collections, complications of procedures, and issues with route for nutrition. Patient is s/p G-tube placement.   Patient is to follow up with medical ONC when stable and after acute issues resolved.

## 2017-06-24 LAB
ANION GAP SERPL CALC-SCNC: 7 MMOL/L — SIGNIFICANT CHANGE UP (ref 5–17)
BUN SERPL-MCNC: 6 MG/DL — LOW (ref 7–23)
CALCIUM SERPL-MCNC: 7.7 MG/DL — LOW (ref 8.4–10.5)
CHLORIDE SERPL-SCNC: 94 MMOL/L — LOW (ref 96–108)
CO2 SERPL-SCNC: 29 MMOL/L — SIGNIFICANT CHANGE UP (ref 22–31)
CREAT SERPL-MCNC: 0.31 MG/DL — LOW (ref 0.5–1.3)
GLUCOSE SERPL-MCNC: 115 MG/DL — HIGH (ref 70–99)
HCT VFR BLD CALC: 28.3 % — LOW (ref 34.5–45)
HGB BLD-MCNC: 9 G/DL — LOW (ref 11.5–15.5)
MAGNESIUM SERPL-MCNC: 1.7 MG/DL — SIGNIFICANT CHANGE UP (ref 1.6–2.6)
MCHC RBC-ENTMCNC: 28.2 PG — SIGNIFICANT CHANGE UP (ref 27–34)
MCHC RBC-ENTMCNC: 31.9 GM/DL — LOW (ref 32–36)
MCV RBC AUTO: 88.4 FL — SIGNIFICANT CHANGE UP (ref 80–100)
PHOSPHATE SERPL-MCNC: 2.4 MG/DL — LOW (ref 2.5–4.5)
PLATELET # BLD AUTO: 241 K/UL — SIGNIFICANT CHANGE UP (ref 150–400)
POTASSIUM SERPL-MCNC: 3.9 MMOL/L — SIGNIFICANT CHANGE UP (ref 3.5–5.3)
POTASSIUM SERPL-SCNC: 3.9 MMOL/L — SIGNIFICANT CHANGE UP (ref 3.5–5.3)
RBC # BLD: 3.2 M/UL — LOW (ref 3.8–5.2)
RBC # FLD: 13.5 % — SIGNIFICANT CHANGE UP (ref 10.3–14.5)
SODIUM SERPL-SCNC: 130 MMOL/L — LOW (ref 135–145)
WBC # BLD: 14.5 K/UL — HIGH (ref 3.8–10.5)
WBC # FLD AUTO: 14.5 K/UL — HIGH (ref 3.8–10.5)

## 2017-06-24 RX ORDER — ACETAMINOPHEN 500 MG
1000 TABLET ORAL ONCE
Qty: 0 | Refills: 0 | Status: COMPLETED | OUTPATIENT
Start: 2017-06-24 | End: 2017-06-24

## 2017-06-24 RX ADMIN — HYDROMORPHONE HYDROCHLORIDE 30 MILLILITER(S): 2 INJECTION INTRAMUSCULAR; INTRAVENOUS; SUBCUTANEOUS at 07:27

## 2017-06-24 RX ADMIN — Medication 1000 MILLIGRAM(S): at 03:00

## 2017-06-24 RX ADMIN — DEXTROSE MONOHYDRATE, SODIUM CHLORIDE, AND POTASSIUM CHLORIDE 100 MILLILITER(S): 50; .745; 4.5 INJECTION, SOLUTION INTRAVENOUS at 02:09

## 2017-06-24 RX ADMIN — HYDROMORPHONE HYDROCHLORIDE 30 MILLILITER(S): 2 INJECTION INTRAMUSCULAR; INTRAVENOUS; SUBCUTANEOUS at 19:11

## 2017-06-24 RX ADMIN — Medication 400 MILLIGRAM(S): at 01:58

## 2017-06-24 RX ADMIN — FLUCONAZOLE 100 MILLIGRAM(S): 150 TABLET ORAL at 06:53

## 2017-06-24 RX ADMIN — Medication 400 MILLIGRAM(S): at 19:11

## 2017-06-24 RX ADMIN — Medication 1000 MILLIGRAM(S): at 19:30

## 2017-06-24 RX ADMIN — Medication 1000 MILLIGRAM(S): at 09:30

## 2017-06-24 RX ADMIN — ENOXAPARIN SODIUM 40 MILLIGRAM(S): 100 INJECTION SUBCUTANEOUS at 14:16

## 2017-06-24 RX ADMIN — HYDROMORPHONE HYDROCHLORIDE 30 MILLILITER(S): 2 INJECTION INTRAMUSCULAR; INTRAVENOUS; SUBCUTANEOUS at 02:04

## 2017-06-24 RX ADMIN — CEFEPIME 100 MILLIGRAM(S): 1 INJECTION, POWDER, FOR SOLUTION INTRAMUSCULAR; INTRAVENOUS at 05:32

## 2017-06-24 RX ADMIN — CEFEPIME 100 MILLIGRAM(S): 1 INJECTION, POWDER, FOR SOLUTION INTRAMUSCULAR; INTRAVENOUS at 17:40

## 2017-06-24 RX ADMIN — Medication 400 MILLIGRAM(S): at 09:10

## 2017-06-24 NOTE — PROGRESS NOTE ADULT - ASSESSMENT
47 yo F s/p endoscopy, removal of stent, s/p IR perc ubaldo, s/p IR drainage of collection 6/12, s/p IR aspiration 6/16, now s/p exlap and G-tube insertion  - Pain control  - Await GI fxn  - Strict I/O's  - VTE ppx  - will advance to clears  - G tube to gravity

## 2017-06-24 NOTE — PROGRESS NOTE ADULT - SUBJECTIVE AND OBJECTIVE BOX
GENERAL SURGERY DAILY PROGRESS NOTE:     Subjective:  Pain tolerable but not fully controlled.   Denies n/v.   +void        Objective:    MEDICATIONS  (STANDING):  enoxaparin Injectable 40milliGRAM(s) SubCutaneous daily  tetracaine/benzocaine/butamben Spray 1Spray(s) Topical three times a day  cefepime  IVPB 2000milliGRAM(s) IV Intermittent every 12 hours  fluconAZOLE IVPB 200milliGRAM(s) IV Intermittent every 24 hours  dextrose 5% + sodium chloride 0.45% with potassium chloride 20 mEq/L 1000milliLiter(s) IV Continuous <Continuous>  HYDROmorphone PCA (1 mG/mL) 30milliLiter(s) PCA Continuous PCA Continuous    MEDICATIONS  (PRN):  ondansetron Injectable 4milliGRAM(s) IV Push every 6 hours PRN Nausea  naloxone Injectable 0.1milliGRAM(s) IV Push every 3 minutes PRN For ANY of the following changes in patient status:  A. RR LESS THAN 10 breaths per minute, B. Oxygen saturation LESS THAN 90%, C. Sedation score of 6  HYDROmorphone PCA (1 mG/mL) Rescue Clinician Bolus 1milliGRAM(s) IV Push every 1 hour PRN for Pain Scale GREATER THAN 6      Vital Signs Last 24 Hrs  T(C): 36.9, Max: 37 (06-23 @ 17:03)  T(F): 98.5, Max: 98.6 (06-23 @ 17:03)  HR: 80 (75 - 83)  BP: 122/82 (104/69 - 122/82)  BP(mean): --  RR: 16 (16 - 18)  SpO2: 97% (95% - 97%)    I&O's Detail  I & Os for 24h ending 24 Jun 2017 07:00  =============================================  IN:    dextrose 5% + sodium chloride 0.45% with potassium chloride 20 mEq/L: 2100 ml    Solution: 350 ml    Solution: 100 ml    Solution: 100 ml    Total IN: 2650 ml  ---------------------------------------------  OUT:    Voided: 1100 ml    Indwelling Catheter - Urethral: 500 ml    Drain: 350 ml    Drain: 250 ml    Bulb: 10 ml    Total OUT: 2210 ml  ---------------------------------------------  Total NET: 440 ml    I & Os for current day (as of 24 Jun 2017 09:33)  =============================================  IN:    Total IN: 0 ml  ---------------------------------------------  OUT:    Voided: 400 ml    Total OUT: 400 ml  ---------------------------------------------  Total NET: -400 ml      Daily     Daily     LABS:                        9.0    14.5  )-----------( 241      ( 24 Jun 2017 06:14 )             28.3     06-24    130<L>  |  94<L>  |  6<L>  ----------------------------<  115<H>  3.9   |  29  |  0.31<L>    Ca    7.7<L>      24 Jun 2017 06:14  Phos  2.4     06-24  Mg     1.7     06-24          NAD, awake and alert  Respirations nonlabored  Abdomen soft, min tender  G tube to drainage, minimal output  Ostomy pink and viable but with no output yet  No guarding or rebound tenderness

## 2017-06-25 LAB
ANION GAP SERPL CALC-SCNC: 9 MMOL/L — SIGNIFICANT CHANGE UP (ref 5–17)
BUN SERPL-MCNC: 4 MG/DL — LOW (ref 7–23)
CALCIUM SERPL-MCNC: 8 MG/DL — LOW (ref 8.4–10.5)
CHLORIDE SERPL-SCNC: 94 MMOL/L — LOW (ref 96–108)
CO2 SERPL-SCNC: 28 MMOL/L — SIGNIFICANT CHANGE UP (ref 22–31)
CREAT SERPL-MCNC: 0.34 MG/DL — LOW (ref 0.5–1.3)
GLUCOSE SERPL-MCNC: 106 MG/DL — HIGH (ref 70–99)
HCT VFR BLD CALC: 27.8 % — LOW (ref 34.5–45)
HGB BLD-MCNC: 9.1 G/DL — LOW (ref 11.5–15.5)
MAGNESIUM SERPL-MCNC: 1.6 MG/DL — SIGNIFICANT CHANGE UP (ref 1.6–2.6)
MCHC RBC-ENTMCNC: 28.7 PG — SIGNIFICANT CHANGE UP (ref 27–34)
MCHC RBC-ENTMCNC: 32.8 GM/DL — SIGNIFICANT CHANGE UP (ref 32–36)
MCV RBC AUTO: 87.4 FL — SIGNIFICANT CHANGE UP (ref 80–100)
PHOSPHATE SERPL-MCNC: 2.8 MG/DL — SIGNIFICANT CHANGE UP (ref 2.5–4.5)
PLATELET # BLD AUTO: 274 K/UL — SIGNIFICANT CHANGE UP (ref 150–400)
POTASSIUM SERPL-MCNC: 4.1 MMOL/L — SIGNIFICANT CHANGE UP (ref 3.5–5.3)
POTASSIUM SERPL-SCNC: 4.1 MMOL/L — SIGNIFICANT CHANGE UP (ref 3.5–5.3)
RBC # BLD: 3.18 M/UL — LOW (ref 3.8–5.2)
RBC # FLD: 13.4 % — SIGNIFICANT CHANGE UP (ref 10.3–14.5)
SODIUM SERPL-SCNC: 131 MMOL/L — LOW (ref 135–145)
WBC # BLD: 11.7 K/UL — HIGH (ref 3.8–10.5)
WBC # FLD AUTO: 11.7 K/UL — HIGH (ref 3.8–10.5)

## 2017-06-25 RX ORDER — ACETAMINOPHEN 500 MG
1000 TABLET ORAL ONCE
Qty: 0 | Refills: 0 | Status: COMPLETED | OUTPATIENT
Start: 2017-06-25 | End: 2017-06-25

## 2017-06-25 RX ORDER — ACETAMINOPHEN 500 MG
650 TABLET ORAL EVERY 6 HOURS
Qty: 0 | Refills: 0 | Status: DISCONTINUED | OUTPATIENT
Start: 2017-06-25 | End: 2017-06-25

## 2017-06-25 RX ORDER — MAGNESIUM SULFATE 500 MG/ML
2 VIAL (ML) INJECTION ONCE
Qty: 0 | Refills: 0 | Status: COMPLETED | OUTPATIENT
Start: 2017-06-25 | End: 2017-06-25

## 2017-06-25 RX ADMIN — HYDROMORPHONE HYDROCHLORIDE 30 MILLILITER(S): 2 INJECTION INTRAMUSCULAR; INTRAVENOUS; SUBCUTANEOUS at 19:11

## 2017-06-25 RX ADMIN — CEFEPIME 100 MILLIGRAM(S): 1 INJECTION, POWDER, FOR SOLUTION INTRAMUSCULAR; INTRAVENOUS at 05:16

## 2017-06-25 RX ADMIN — Medication 1000 MILLIGRAM(S): at 17:44

## 2017-06-25 RX ADMIN — FLUCONAZOLE 100 MILLIGRAM(S): 150 TABLET ORAL at 05:17

## 2017-06-25 RX ADMIN — Medication 1000 MILLIGRAM(S): at 23:15

## 2017-06-25 RX ADMIN — Medication 400 MILLIGRAM(S): at 05:16

## 2017-06-25 RX ADMIN — Medication 50 GRAM(S): at 13:32

## 2017-06-25 RX ADMIN — DEXTROSE MONOHYDRATE, SODIUM CHLORIDE, AND POTASSIUM CHLORIDE 100 MILLILITER(S): 50; .745; 4.5 INJECTION, SOLUTION INTRAVENOUS at 13:32

## 2017-06-25 RX ADMIN — CEFEPIME 100 MILLIGRAM(S): 1 INJECTION, POWDER, FOR SOLUTION INTRAMUSCULAR; INTRAVENOUS at 18:43

## 2017-06-25 RX ADMIN — Medication 400 MILLIGRAM(S): at 17:14

## 2017-06-25 RX ADMIN — Medication 1000 MILLIGRAM(S): at 05:35

## 2017-06-25 RX ADMIN — HYDROMORPHONE HYDROCHLORIDE 30 MILLILITER(S): 2 INJECTION INTRAMUSCULAR; INTRAVENOUS; SUBCUTANEOUS at 13:31

## 2017-06-25 RX ADMIN — Medication 400 MILLIGRAM(S): at 22:53

## 2017-06-25 RX ADMIN — ENOXAPARIN SODIUM 40 MILLIGRAM(S): 100 INJECTION SUBCUTANEOUS at 13:44

## 2017-06-25 RX ADMIN — HYDROMORPHONE HYDROCHLORIDE 30 MILLILITER(S): 2 INJECTION INTRAMUSCULAR; INTRAVENOUS; SUBCUTANEOUS at 07:41

## 2017-06-25 RX ADMIN — Medication 63.75 MILLIMOLE(S): at 13:32

## 2017-06-25 NOTE — PROGRESS NOTE ADULT - SUBJECTIVE AND OBJECTIVE BOX
GENERAL SURGERY DAILY PROGRESS NOTE:     Subjective:  Denies n/v. Pain controlled. Feels rumbling in abdomen. +OOB.            Objective:    MEDICATIONS  (STANDING):  enoxaparin Injectable 40milliGRAM(s) SubCutaneous daily  tetracaine/benzocaine/butamben Spray 1Spray(s) Topical three times a day  cefepime  IVPB 2000milliGRAM(s) IV Intermittent every 12 hours  fluconAZOLE IVPB 200milliGRAM(s) IV Intermittent every 24 hours  dextrose 5% + sodium chloride 0.45% with potassium chloride 20 mEq/L 1000milliLiter(s) IV Continuous <Continuous>  HYDROmorphone PCA (1 mG/mL) 30milliLiter(s) PCA Continuous PCA Continuous    MEDICATIONS  (PRN):  ondansetron Injectable 4milliGRAM(s) IV Push every 6 hours PRN Nausea  naloxone Injectable 0.1milliGRAM(s) IV Push every 3 minutes PRN For ANY of the following changes in patient status:  A. RR LESS THAN 10 breaths per minute, B. Oxygen saturation LESS THAN 90%, C. Sedation score of 6  HYDROmorphone PCA (1 mG/mL) Rescue Clinician Bolus 1milliGRAM(s) IV Push every 1 hour PRN for Pain Scale GREATER THAN 6      Vital Signs Last 24 Hrs  T(C): 37, Max: 37.2 (06-25 @ 01:42)  T(F): 98.6, Max: 99 (06-25 @ 01:42)  HR: 82 (79 - 87)  BP: 127/83 (112/76 - 127/83)  BP(mean): --  RR: 16 (16 - 16)  SpO2: 98% (96% - 99%)    I&O's Detail    I & Os for current day (as of 25 Jun 2017 07:23)  =============================================  IN:    dextrose 5% + sodium chloride 0.45% with potassium chloride 20 mEq/L: 2100 ml    Oral Fluid: 1140 ml    Solution: 50 ml    Total IN: 3290 ml  ---------------------------------------------  OUT:    Voided: 3600 ml    Drain: 350 ml    Drain: 50 ml    Bulb: 5 ml    Total OUT: 4005 ml  ---------------------------------------------  Total NET: -715 ml      Daily     Daily     LABS:                        9.1    11.7  )-----------( 274      ( 25 Jun 2017 06:09 )             27.8     06-25    131<L>  |  94<L>  |  4<L>  ----------------------------<  106<H>  4.1   |  28  |  0.34<L>    Ca    8.0<L>      25 Jun 2017 06:09  Phos  2.8     06-25  Mg     1.6     06-25        NAD, awake and alert  Respirations nonlabored  Abdomen soft, nontender  No guarding or rebound tenderness   Midline incision clean with retention suture  PTC bilious  G tube site c/d/i, no drainage  Ostomy pink and viable, no output yet  SCDs in place

## 2017-06-25 NOTE — PROGRESS NOTE ADULT - ASSESSMENT
45 yo F s/p endoscopy, removal of stent, s/p IR perc ubaldo, s/p IR drainage of collection 6/12, s/p IR aspiration 6/16, now s/p exlap and G-tube insertion  - Pain control  - Await GI fxn  - Strict I/O's  - VTE ppx  - G tube to gravity 47 yo F s/p endoscopy, removal of stent, s/p IR perc ubaldo, s/p IR drainage of collection 6/12, s/p IR aspiration 6/16, now s/p exlap and G-tube insertion  - Pain control  - Await GI fxn  - Strict I/O's  - VTE ppx  - G tube to gravity  - CLD as tolerated  -Cotninue Abx

## 2017-06-26 ENCOUNTER — TRANSCRIPTION ENCOUNTER (OUTPATIENT)
Age: 47
End: 2017-06-26

## 2017-06-26 LAB
ANION GAP SERPL CALC-SCNC: 8 MMOL/L — SIGNIFICANT CHANGE UP (ref 5–17)
BUN SERPL-MCNC: 5 MG/DL — LOW (ref 7–23)
CALCIUM SERPL-MCNC: 8.3 MG/DL — LOW (ref 8.4–10.5)
CHLORIDE SERPL-SCNC: 96 MMOL/L — SIGNIFICANT CHANGE UP (ref 96–108)
CO2 SERPL-SCNC: 30 MMOL/L — SIGNIFICANT CHANGE UP (ref 22–31)
CREAT SERPL-MCNC: 0.35 MG/DL — LOW (ref 0.5–1.3)
GLUCOSE SERPL-MCNC: 105 MG/DL — HIGH (ref 70–99)
HCT VFR BLD CALC: 29.4 % — LOW (ref 34.5–45)
HGB BLD-MCNC: 9.6 G/DL — LOW (ref 11.5–15.5)
MAGNESIUM SERPL-MCNC: 2 MG/DL — SIGNIFICANT CHANGE UP (ref 1.6–2.6)
MCHC RBC-ENTMCNC: 28.6 PG — SIGNIFICANT CHANGE UP (ref 27–34)
MCHC RBC-ENTMCNC: 32.7 GM/DL — SIGNIFICANT CHANGE UP (ref 32–36)
MCV RBC AUTO: 87.4 FL — SIGNIFICANT CHANGE UP (ref 80–100)
PHOSPHATE SERPL-MCNC: 3.7 MG/DL — SIGNIFICANT CHANGE UP (ref 2.5–4.5)
PLATELET # BLD AUTO: 278 K/UL — SIGNIFICANT CHANGE UP (ref 150–400)
POTASSIUM SERPL-MCNC: 4.3 MMOL/L — SIGNIFICANT CHANGE UP (ref 3.5–5.3)
POTASSIUM SERPL-SCNC: 4.3 MMOL/L — SIGNIFICANT CHANGE UP (ref 3.5–5.3)
RBC # BLD: 3.37 M/UL — LOW (ref 3.8–5.2)
RBC # FLD: 13.4 % — SIGNIFICANT CHANGE UP (ref 10.3–14.5)
SODIUM SERPL-SCNC: 134 MMOL/L — LOW (ref 135–145)
WBC # BLD: 8.7 K/UL — SIGNIFICANT CHANGE UP (ref 3.8–10.5)
WBC # FLD AUTO: 8.7 K/UL — SIGNIFICANT CHANGE UP (ref 3.8–10.5)

## 2017-06-26 PROCEDURE — 99233 SBSQ HOSP IP/OBS HIGH 50: CPT

## 2017-06-26 RX ORDER — ACETAMINOPHEN 500 MG
1000 TABLET ORAL ONCE
Qty: 0 | Refills: 0 | Status: COMPLETED | OUTPATIENT
Start: 2017-06-26 | End: 2017-06-26

## 2017-06-26 RX ORDER — ACETAMINOPHEN 500 MG
1000 TABLET ORAL
Qty: 0 | Refills: 0 | Status: COMPLETED | OUTPATIENT
Start: 2017-06-27 | End: 2017-06-27

## 2017-06-26 RX ORDER — ACETAMINOPHEN 500 MG
1000 TABLET ORAL
Qty: 0 | Refills: 0 | Status: COMPLETED | OUTPATIENT
Start: 2017-06-26 | End: 2017-06-27

## 2017-06-26 RX ADMIN — FLUCONAZOLE 100 MILLIGRAM(S): 150 TABLET ORAL at 05:00

## 2017-06-26 RX ADMIN — CEFEPIME 100 MILLIGRAM(S): 1 INJECTION, POWDER, FOR SOLUTION INTRAMUSCULAR; INTRAVENOUS at 05:00

## 2017-06-26 RX ADMIN — Medication 1000 MILLIGRAM(S): at 17:20

## 2017-06-26 RX ADMIN — ENOXAPARIN SODIUM 40 MILLIGRAM(S): 100 INJECTION SUBCUTANEOUS at 14:16

## 2017-06-26 RX ADMIN — Medication 1000 MILLIGRAM(S): at 09:51

## 2017-06-26 RX ADMIN — Medication 400 MILLIGRAM(S): at 09:21

## 2017-06-26 RX ADMIN — Medication 400 MILLIGRAM(S): at 16:50

## 2017-06-26 RX ADMIN — HYDROMORPHONE HYDROCHLORIDE 30 MILLILITER(S): 2 INJECTION INTRAMUSCULAR; INTRAVENOUS; SUBCUTANEOUS at 22:59

## 2017-06-26 RX ADMIN — HYDROMORPHONE HYDROCHLORIDE 30 MILLILITER(S): 2 INJECTION INTRAMUSCULAR; INTRAVENOUS; SUBCUTANEOUS at 19:48

## 2017-06-26 RX ADMIN — CEFEPIME 100 MILLIGRAM(S): 1 INJECTION, POWDER, FOR SOLUTION INTRAMUSCULAR; INTRAVENOUS at 18:10

## 2017-06-26 RX ADMIN — HYDROMORPHONE HYDROCHLORIDE 30 MILLILITER(S): 2 INJECTION INTRAMUSCULAR; INTRAVENOUS; SUBCUTANEOUS at 07:31

## 2017-06-26 NOTE — PROGRESS NOTE ADULT - PROBLEM SELECTOR PLAN 1
Continue Dilaudid PCA 0.2 mg/h CR, 0.5 mg q 15 DD, and 1 mg q 1 CB. Increasing CR to 0.5 mg was discussed with patient; however, she would like to hold on increasing it until tomorrow. She would like to attempt using non opiods medications. Will start Tylenol IV q 8 ( x 3 doses).

## 2017-06-26 NOTE — PROGRESS NOTE ADULT - PROBLEM SELECTOR PLAN 2
s/p Sx.   Waiting for bowel function to start after Sx.  Primary team to re-eval options for TPN understanding Rx of Abscess and recent Sx.

## 2017-06-26 NOTE — CHART NOTE - NSCHARTNOTEFT_GEN_A_CORE
Hospital Course: Pt NPO/Clear liquid diet throughout hospital stay, PN not recommended at this time. Pt s/p exlap and G-tube placement for drainage. s/p stent removal. Pt with no ostomy output. Pt reports no gas since 6/1/17.    Source: Patient [X]    Family [ ]     other [ ]    Diet : clear liquids      Patient reports [ ] nausea  [ ] vomiting [ ] diarrhea [ ] constipation  [ ]chewing problems [ ] swallowing issues  [X] other: N/A     PO intake: [X] < 50% [ ] 50-75% [ ]   % [X]  other : Pt reports consuming limited fluids with almost all drainage through G-Tube. Pt consuming promote daily.     Source for PO intake [X] Patient [ ] family [ ] chart [X] staff [ ] other      Current Weight: Weight (kg): 56.7 (06-19 @ 17:03)   Weight Change - no updated weight in chart    Pertinent Medications: MEDICATIONS  (STANDING):  enoxaparin Injectable 40milliGRAM(s) SubCutaneous daily  tetracaine/benzocaine/butamben Spray 1Spray(s) Topical three times a day  cefepime  IVPB 2000milliGRAM(s) IV Intermittent every 12 hours  fluconAZOLE IVPB 200milliGRAM(s) IV Intermittent every 24 hours  dextrose 5% + sodium chloride 0.45% with potassium chloride 20 mEq/L 1000milliLiter(s) IV Continuous <Continuous>  HYDROmorphone PCA (1 mG/mL) 30milliLiter(s) PCA Continuous PCA Continuous  acetaminophen  IVPB. 1000milliGRAM(s) IV Intermittent once    MEDICATIONS  (PRN):  ondansetron Injectable 4milliGRAM(s) IV Push every 6 hours PRN Nausea  naloxone Injectable 0.1milliGRAM(s) IV Push every 3 minutes PRN For ANY of the following changes in patient status:  A. RR LESS THAN 10 breaths per minute, B. Oxygen saturation LESS THAN 90%, C. Sedation score of 6  HYDROmorphone PCA (1 mG/mL) Rescue Clinician Bolus 1milliGRAM(s) IV Push every 1 hour PRN for Pain Scale GREATER THAN 6    Pertinent Labs:  06-26 Na134 mmol/L<L> Glu 105 mg/dL<H> K+ 4.3 mmol/L Cr  0.35 mg/dL<L> BUN 5 mg/dL<L> Phos 3.7 mg/dL Alb n/a   PAB n/a         Skin: no pressure ulcers noted  Ostomy output: none  Edema: none noted    Estimated Needs:   [X] no change since previous assessment  [ ] recalculated:       Previous Nutrition Diagnosis:  [X] Malnutrition       Nutrition Diagnosis is [X] ongoing  [ ] resolved [ ] not applicable       New Nutrition Diagnosis: [X] not applicable    Interventions:     Recommend    [X] Change Diet To: As medically appropriate, advance diet as tolerated to regular diet.    [X] Nutrition Supplement: Recommend Promote 4x/day (Provides 948kcal, 59.2gm Protein)    [ ] Nutrition Support    [ ] Other:        Monitoring and Evaluation:     [X] PO intake [X] Tolerance to diet prescription [X] weights [X] follow up per protocol    [X] other: RD remains available Zandra Powers MBA RDN CDN Pager [phone]

## 2017-06-26 NOTE — PROGRESS NOTE ADULT - SUBJECTIVE AND OBJECTIVE BOX
INTERVAL HPI/OVERNIGHT EVENTS: Patient was seen and examined at bedside. Patient is s/p  exlap and G - tube insertion. Patient is c/o recurrent pain over her abdomen. Pain is generalized and partially improving with Dilaudid. She denies having any bowel movements. She also denies nausea or vomiting. \    ADVANCE DIRECTIVES:    DNR: NO           PRESENT SYMPTOMS:   SOURCE: Patient      Pain: Mild to moderate. Non-radiating, generalized but more over post OP region , Throbbing. Intermittent. Improving with Dilaudid. Increasing with palpation.     Dyspnea:   NO  Anxiety:NO  Fatigue: Mild to moderate   Nausea: NO  Loss of Appetite:NO  Constipation [ x ] YES. Colostomy output: 0 recently.     OTHER SYMPTOMS:  [ X] All other ROS negative     [ ] Unable to obtain due to poor mentation    MEDICATIONS  (STANDING):  enoxaparin Injectable 40milliGRAM(s) SubCutaneous daily  tetracaine/benzocaine/butamben Spray 1Spray(s) Topical three times a day  cefepime  IVPB 2000milliGRAM(s) IV Intermittent every 12 hours  fluconAZOLE IVPB 200milliGRAM(s) IV Intermittent every 24 hours  dextrose 5% + sodium chloride 0.45% with potassium chloride 20 mEq/L 1000milliLiter(s) IV Continuous <Continuous>  HYDROmorphone PCA (1 mG/mL) 30milliLiter(s) PCA Continuous PCA Continuous    MEDICATIONS  (PRN):  ondansetron Injectable 4milliGRAM(s) IV Push every 6 hours PRN Nausea  naloxone Injectable 0.1milliGRAM(s) IV Push every 3 minutes PRN For ANY of the following changes in patient status:  A. RR LESS THAN 10 breaths per minute, B. Oxygen saturation LESS THAN 90%, C. Sedation score of 6  HYDROmorphone PCA (1 mG/mL) Rescue Clinician Bolus 1milliGRAM(s) IV Push every 1 hour PRN for Pain Scale GREATER THAN 6          Karnofsky Performance Score/Palliative Performance Status Version 2: 60  %  Protein Calorie Malnutrition:  [ ] Mild   [ ] Moderate   [ ] Severe     Physical Exam:    Vital Signs Last 24 Hrs  T(C): 36.6, Max: 36.9 (06-26 @ 05:07)  T(F): 97.9, Max: 98.5 (06-26 @ 05:07)  HR: 98 (74 - 98)  BP: 112/78 (112/75 - 125/83)  BP(mean): --  RR: 18 (16 - 18)  SpO2: 97% (95% - 99%)    General: Awake alert, comfortable.   HEENT: PERRL with EOMI.   Lungs: Clear to Ausc bilaterally, no wheezing.   Cardiovascular: S1, S2. No S3  RRR, no murmurs   Abdomen: Soft, No guarding, tenderness to palpate diffusely.  BS  x 4 (+) but decreased.   Genitourinary: Normal  Musculoskeletal:  Weakness   Neurological: no focal deficits. Alert, oriented x 3     Skin: warm and dry     Labs:                                    9.6    8.7   )-----------( 278      ( 26 Jun 2017 06:25 )             29.4   06-26    134<L>  |  96  |  5<L>  ----------------------------<  105<H>  4.3   |  30  |  0.35<L>    Ca    8.3<L>      26 Jun 2017 06:25  Phos  3.7     06-26  Mg     2.0     06-26                   Assessment and Plan

## 2017-06-26 NOTE — PROGRESS NOTE ADULT - ASSESSMENT
47 yo F with ovarian cancer s/p GRETCHEN, presented with abdominal pain, found to have acute cholecystitis, complicated after stent placement with perforated gallbladder with ans associated intra-abdominal abscesse and bowel obstruction (likely malignant bowel obstruction).  S/p stent removal 6/7/2017 via endoscopy with NG tube placement. s/p intraabdominal drain for abscess drainage. s/p exlap with G-tube insertion yesterday. Seen for Pain management.

## 2017-06-26 NOTE — PROGRESS NOTE ADULT - SUBJECTIVE AND OBJECTIVE BOX
GENERAL SURGERY DAILY PROGRESS NOTE:     Subjective:  Endorses abdominal pain.        Objective:    MEDICATIONS  (STANDING):  enoxaparin Injectable 40milliGRAM(s) SubCutaneous daily  tetracaine/benzocaine/butamben Spray 1Spray(s) Topical three times a day  cefepime  IVPB 2000milliGRAM(s) IV Intermittent every 12 hours  fluconAZOLE IVPB 200milliGRAM(s) IV Intermittent every 24 hours  dextrose 5% + sodium chloride 0.45% with potassium chloride 20 mEq/L 1000milliLiter(s) IV Continuous <Continuous>  HYDROmorphone PCA (1 mG/mL) 30milliLiter(s) PCA Continuous PCA Continuous    MEDICATIONS  (PRN):  ondansetron Injectable 4milliGRAM(s) IV Push every 6 hours PRN Nausea  naloxone Injectable 0.1milliGRAM(s) IV Push every 3 minutes PRN For ANY of the following changes in patient status:  A. RR LESS THAN 10 breaths per minute, B. Oxygen saturation LESS THAN 90%, C. Sedation score of 6  HYDROmorphone PCA (1 mG/mL) Rescue Clinician Bolus 1milliGRAM(s) IV Push every 1 hour PRN for Pain Scale GREATER THAN 6      Vital Signs Last 24 Hrs  T(C): 36.9, Max: 37 (06-25 @ 17:05)  T(F): 98.5, Max: 98.6 (06-25 @ 17:05)  HR: 78 (73 - 90)  BP: 125/83 (120/80 - 133/81)  BP(mean): --  RR: 16 (16 - 18)  SpO2: 98% (96% - 99%)    I&O's Detail  I & Os for 24h ending 25 Jun 2017 07:00  =============================================  IN:    dextrose 5% + sodium chloride 0.45% with potassium chloride 20 mEq/L: 2100 ml    Oral Fluid: 1140 ml    Solution: 50 ml    Total IN: 3290 ml  ---------------------------------------------  OUT:    Voided: 3600 ml    Drain: 350 ml    Drain: 50 ml    Bulb: 5 ml    Total OUT: 4005 ml  ---------------------------------------------  Total NET: -715 ml    I & Os for current day (as of 26 Jun 2017 06:07)  =============================================  IN:    dextrose 5% + sodium chloride 0.45% with potassium chloride 20 mEq/L: 2100 ml    Oral Fluid: 960 ml    Solution: 400 ml    Solution: 50 ml    Total IN: 3510 ml  ---------------------------------------------  OUT:    Voided: 2050 ml    Drain: 400 ml    Drain: 75 ml    Bulb: 10 ml    Total OUT: 2535 ml  ---------------------------------------------  Total NET: 975 ml      Daily     Daily     LABS:                        9.1    11.7  )-----------( 274      ( 25 Jun 2017 06:09 )             27.8     06-25    131<L>  |  94<L>  |  4<L>  ----------------------------<  106<H>  4.1   |  28  |  0.34<L>    Ca    8.0<L>      25 Jun 2017 06:09  Phos  2.8     06-25  Mg     1.6     06-25      NAD, awake and alert  Respirations nonlabored  Abdomen soft, appropriately tender, nondistended  No guarding or rebound tenderness   Ostomy pink and viable but no gas or stool  G tube site c/d/i  PTC bilious  HARSHIL serosanguinous  Incision c/d/i with retention sutures

## 2017-06-26 NOTE — PROGRESS NOTE ADULT - ASSESSMENT
45 yo F s/p endoscopy, removal of stent, s/p IR perc ubaldo, s/p IR drainage of collection 6/12, s/p IR aspiration 6/16, now s/p exlap and G-tube insertion  - Pain control  - Await GI fxn  - Strict I/O's  - VTE ppx  - G tube to gravity  - CLD as tolerated  -Continue Abx

## 2017-06-27 LAB
ANION GAP SERPL CALC-SCNC: 9 MMOL/L — SIGNIFICANT CHANGE UP (ref 5–17)
BUN SERPL-MCNC: 4 MG/DL — LOW (ref 7–23)
CALCIUM SERPL-MCNC: 8.6 MG/DL — SIGNIFICANT CHANGE UP (ref 8.4–10.5)
CHLORIDE SERPL-SCNC: 96 MMOL/L — SIGNIFICANT CHANGE UP (ref 96–108)
CO2 SERPL-SCNC: 29 MMOL/L — SIGNIFICANT CHANGE UP (ref 22–31)
CREAT SERPL-MCNC: 0.39 MG/DL — LOW (ref 0.5–1.3)
GLUCOSE SERPL-MCNC: 96 MG/DL — SIGNIFICANT CHANGE UP (ref 70–99)
HCT VFR BLD CALC: 27.9 % — LOW (ref 34.5–45)
HGB BLD-MCNC: 9.1 G/DL — LOW (ref 11.5–15.5)
MAGNESIUM SERPL-MCNC: 1.8 MG/DL — SIGNIFICANT CHANGE UP (ref 1.6–2.6)
MCHC RBC-ENTMCNC: 28.7 PG — SIGNIFICANT CHANGE UP (ref 27–34)
MCHC RBC-ENTMCNC: 32.5 GM/DL — SIGNIFICANT CHANGE UP (ref 32–36)
MCV RBC AUTO: 88.1 FL — SIGNIFICANT CHANGE UP (ref 80–100)
PHOSPHATE SERPL-MCNC: 3.8 MG/DL — SIGNIFICANT CHANGE UP (ref 2.5–4.5)
PLATELET # BLD AUTO: 324 K/UL — SIGNIFICANT CHANGE UP (ref 150–400)
POTASSIUM SERPL-MCNC: 4.4 MMOL/L — SIGNIFICANT CHANGE UP (ref 3.5–5.3)
POTASSIUM SERPL-SCNC: 4.4 MMOL/L — SIGNIFICANT CHANGE UP (ref 3.5–5.3)
RBC # BLD: 3.17 M/UL — LOW (ref 3.8–5.2)
RBC # FLD: 13.4 % — SIGNIFICANT CHANGE UP (ref 10.3–14.5)
SODIUM SERPL-SCNC: 134 MMOL/L — LOW (ref 135–145)
WBC # BLD: 9.7 K/UL — SIGNIFICANT CHANGE UP (ref 3.8–10.5)
WBC # FLD AUTO: 9.7 K/UL — SIGNIFICANT CHANGE UP (ref 3.8–10.5)

## 2017-06-27 PROCEDURE — 99233 SBSQ HOSP IP/OBS HIGH 50: CPT

## 2017-06-27 RX ORDER — ACETAMINOPHEN 500 MG
1000 TABLET ORAL ONCE
Qty: 0 | Refills: 0 | Status: COMPLETED | OUTPATIENT
Start: 2017-06-27 | End: 2017-06-27

## 2017-06-27 RX ORDER — ACETAMINOPHEN 500 MG
1000 TABLET ORAL
Qty: 0 | Refills: 0 | Status: COMPLETED | OUTPATIENT
Start: 2017-06-28 | End: 2017-06-28

## 2017-06-27 RX ORDER — HYDROMORPHONE HYDROCHLORIDE 2 MG/ML
1 INJECTION INTRAMUSCULAR; INTRAVENOUS; SUBCUTANEOUS
Qty: 0 | Refills: 0 | Status: DISCONTINUED | OUTPATIENT
Start: 2017-06-27 | End: 2017-07-02

## 2017-06-27 RX ORDER — HYDROMORPHONE HYDROCHLORIDE 2 MG/ML
30 INJECTION INTRAMUSCULAR; INTRAVENOUS; SUBCUTANEOUS
Qty: 0 | Refills: 0 | Status: DISCONTINUED | OUTPATIENT
Start: 2017-06-27 | End: 2017-07-02

## 2017-06-27 RX ORDER — MAGNESIUM SULFATE 500 MG/ML
2 VIAL (ML) INJECTION ONCE
Qty: 0 | Refills: 0 | Status: COMPLETED | OUTPATIENT
Start: 2017-06-27 | End: 2017-06-27

## 2017-06-27 RX ADMIN — Medication 50 GRAM(S): at 11:36

## 2017-06-27 RX ADMIN — HYDROMORPHONE HYDROCHLORIDE 30 MILLILITER(S): 2 INJECTION INTRAMUSCULAR; INTRAVENOUS; SUBCUTANEOUS at 19:31

## 2017-06-27 RX ADMIN — CEFEPIME 100 MILLIGRAM(S): 1 INJECTION, POWDER, FOR SOLUTION INTRAMUSCULAR; INTRAVENOUS at 05:47

## 2017-06-27 RX ADMIN — Medication 1000 MILLIGRAM(S): at 21:05

## 2017-06-27 RX ADMIN — Medication 400 MILLIGRAM(S): at 02:52

## 2017-06-27 RX ADMIN — ONDANSETRON 4 MILLIGRAM(S): 8 TABLET, FILM COATED ORAL at 16:48

## 2017-06-27 RX ADMIN — CEFEPIME 100 MILLIGRAM(S): 1 INJECTION, POWDER, FOR SOLUTION INTRAMUSCULAR; INTRAVENOUS at 17:14

## 2017-06-27 RX ADMIN — DEXTROSE MONOHYDRATE, SODIUM CHLORIDE, AND POTASSIUM CHLORIDE 100 MILLILITER(S): 50; .745; 4.5 INJECTION, SOLUTION INTRAVENOUS at 17:15

## 2017-06-27 RX ADMIN — FLUCONAZOLE 100 MILLIGRAM(S): 150 TABLET ORAL at 06:32

## 2017-06-27 RX ADMIN — Medication 400 MILLIGRAM(S): at 20:51

## 2017-06-27 RX ADMIN — Medication 400 MILLIGRAM(S): at 13:55

## 2017-06-27 RX ADMIN — ENOXAPARIN SODIUM 40 MILLIGRAM(S): 100 INJECTION SUBCUTANEOUS at 11:36

## 2017-06-27 RX ADMIN — HYDROMORPHONE HYDROCHLORIDE 1 MILLIGRAM(S): 2 INJECTION INTRAMUSCULAR; INTRAVENOUS; SUBCUTANEOUS at 13:07

## 2017-06-27 RX ADMIN — Medication 1000 MILLIGRAM(S): at 14:10

## 2017-06-27 RX ADMIN — Medication 1000 MILLIGRAM(S): at 08:20

## 2017-06-27 RX ADMIN — HYDROMORPHONE HYDROCHLORIDE 30 MILLILITER(S): 2 INJECTION INTRAMUSCULAR; INTRAVENOUS; SUBCUTANEOUS at 07:38

## 2017-06-27 RX ADMIN — HYDROMORPHONE HYDROCHLORIDE 30 MILLILITER(S): 2 INJECTION INTRAMUSCULAR; INTRAVENOUS; SUBCUTANEOUS at 18:35

## 2017-06-27 RX ADMIN — Medication 400 MILLIGRAM(S): at 08:05

## 2017-06-27 RX ADMIN — Medication 1000 MILLIGRAM(S): at 03:05

## 2017-06-27 NOTE — PROGRESS NOTE ADULT - ASSESSMENT
47 yo F s/p endoscopy, removal of stent, s/p IR perc ubaldo, s/p IR drainage of collection 6/12, s/p IR aspiration 6/16, now s/p exlap and G-tube insertion  - Pain control  - Await GI fxn  - Strict I/O's  - VTE ppx  - G tube to gravity  - CLD as tolerated  -Continue Abx

## 2017-06-27 NOTE — PROGRESS NOTE ADULT - PROBLEM SELECTOR PLAN 1
Will increase  Dilauid PCA CR to 0.5 mg/h, and will continue DD 0.5 mg q 15 DD. Will also continue tylenol 1 gram q 8 hours for at least 24 hours.

## 2017-06-27 NOTE — PROGRESS NOTE ADULT - SUBJECTIVE AND OBJECTIVE BOX
INTERVAL HPI/OVERNIGHT EVENTS: Patient was seen and examined at bedside. Patient is s/p  exlap and G - tube insertion. Patient is c/o recurrent pain over her abdomen. Pain is generalized and partially improving with Dilaudid/Tylenol. She used 22 DD since yesterday's afternoon.  She denies having any bowel movements. She also denies nausea or vomiting.     ADVANCE DIRECTIVES:    DNR: NO           PRESENT SYMPTOMS:   SOURCE: Patient      Pain: Mild to moderate. Non-radiating, generalized but more over post OP region , Throbbing. Intermittent. Improving with Dilaudid. Increasing with palpation.     Dyspnea:   NO  Anxiety:NO  Fatigue: Mild to moderate   Nausea: NO  Loss of Appetite:NO  Constipation [ x ] YES. Colostomy output: 0 recently.     OTHER SYMPTOMS:  [ X] All other ROS negative     [ ] Unable to obtain due to poor mentation    MEDICATIONS  (STANDING):  MEDICATIONS  (STANDING):  enoxaparin Injectable 40 milliGRAM(s) SubCutaneous daily  tetracaine/benzocaine/butamben Spray 1 Spray(s) Topical three times a day  cefepime  IVPB 2000 milliGRAM(s) IV Intermittent every 12 hours  fluconAZOLE IVPB 200 milliGRAM(s) IV Intermittent every 24 hours  dextrose 5% + sodium chloride 0.45% with potassium chloride 20 mEq/L 1000 milliLiter(s) (100 mL/Hr) IV Continuous <Continuous>  acetaminophen  IVPB. 1000 milliGRAM(s) IV Intermittent once  HYDROmorphone PCA (1 mG/mL) 30 milliLiter(s) PCA Continuous PCA Continuous    MEDICATIONS  (PRN):  ondansetron Injectable 4 milliGRAM(s) IV Push every 6 hours PRN Nausea  naloxone Injectable 0.1 milliGRAM(s) IV Push every 3 minutes PRN For ANY of the following changes in patient status:  A. RR LESS THAN 10 breaths per minute, B. Oxygen saturation LESS THAN 90%, C. Sedation score of 6  HYDROmorphone PCA (1 mG/mL) Rescue Clinician Bolus 1 milliGRAM(s) IV Push every 1 hour PRN for Pain Scale GREATER THAN 6        Karnofsky Performance Score/Palliative Performance Status Version 2: 60  %  Protein Calorie Malnutrition:  [ ] Mild   [ ] Moderate   [ ] Severe     Physical Exam:    Vital Signs Last 24 Hrs  T(C): 36.6 (27 Jun 2017 16:05), Max: 36.8 (27 Jun 2017 00:51)  T(F): 97.8 (27 Jun 2017 16:05), Max: 98.3 (27 Jun 2017 00:51)  HR: 76 (27 Jun 2017 16:05) (76 - 89)  BP: 118/74 (27 Jun 2017 16:05) (108/68 - 122/68)  BP(mean): --  RR: 16 (27 Jun 2017 16:05) (16 - 18)  SpO2: 98% (27 Jun 2017 16:05) (95% - 98%)    General: Awake alert, comfortable.   HEENT: PERRL with EOMI.   Lungs: Clear to Ausc bilaterally, no wheezing.   Cardiovascular: S1, S2. No S3  RRR, no murmurs   Abdomen: Soft, No guarding, tenderness to palpate diffusely.  BS  x 4 (+) but decreased.   Genitourinary: Normal  Musculoskeletal:  Weakness   Neurological: no focal deficits. Alert, oriented x 3     Skin: warm and dry     Labs:                                               9.1    9.7   )-----------( 324      ( 27 Jun 2017 07:12 )             27.9   06-27    134<L>  |  96  |  4<L>  ----------------------------<  96  4.4   |  29  |  0.39<L>    Ca    8.6      27 Jun 2017 07:12  Phos  3.8     06-27  Mg     1.8     06-27                       Assessment and Plan

## 2017-06-27 NOTE — PROGRESS NOTE ADULT - SUBJECTIVE AND OBJECTIVE BOX
GENERAL SURGERY DAILY PROGRESS NOTE:     Subjective:  Pain controlled. +OOB. +void. Denies n/v.          Objective:    MEDICATIONS  (STANDING):  enoxaparin Injectable 40milliGRAM(s) SubCutaneous daily  tetracaine/benzocaine/butamben Spray 1Spray(s) Topical three times a day  cefepime  IVPB 2000milliGRAM(s) IV Intermittent every 12 hours  fluconAZOLE IVPB 200milliGRAM(s) IV Intermittent every 24 hours  dextrose 5% + sodium chloride 0.45% with potassium chloride 20 mEq/L 1000milliLiter(s) IV Continuous <Continuous>  HYDROmorphone PCA (1 mG/mL) 30milliLiter(s) PCA Continuous PCA Continuous  acetaminophen  IVPB. 1000milliGRAM(s) IV Intermittent <User Schedule>  acetaminophen  IVPB. 1000milliGRAM(s) IV Intermittent <User Schedule>    MEDICATIONS  (PRN):  ondansetron Injectable 4milliGRAM(s) IV Push every 6 hours PRN Nausea  naloxone Injectable 0.1milliGRAM(s) IV Push every 3 minutes PRN For ANY of the following changes in patient status:  A. RR LESS THAN 10 breaths per minute, B. Oxygen saturation LESS THAN 90%, C. Sedation score of 6  HYDROmorphone PCA (1 mG/mL) Rescue Clinician Bolus 1milliGRAM(s) IV Push every 1 hour PRN for Pain Scale GREATER THAN 6      Vital Signs Last 24 Hrs  T(C): 36.8, Max: 36.9 (06-26 @ 09:28)  T(F): 98.3, Max: 98.5 (06-26 @ 09:28)  HR: 89 (74 - 98)  BP: 116/70 (108/68 - 121/82)  BP(mean): --  RR: 17 (16 - 18)  SpO2: 98% (95% - 98%)    I&O's Detail  I & Os for 24h ending 26 Jun 2017 07:00  =============================================  IN:    dextrose 5% + sodium chloride 0.45% with potassium chloride 20 mEq/L: 2400 ml    Oral Fluid: 960 ml    Solution: 400 ml    Solution: 50 ml    Total IN: 3810 ml  ---------------------------------------------  OUT:    Voided: 2450 ml    Drain: 400 ml    Drain: 75 ml    Bulb: 10 ml    Total OUT: 2935 ml  ---------------------------------------------  Total NET: 875 ml    I & Os for current day (as of 27 Jun 2017 05:58)  =============================================  IN:    dextrose 5% + sodium chloride 0.45% with potassium chloride 20 mEq/L: 2300 ml    Oral Fluid: 610 ml    Solution: 250 ml    Solution: 50 ml    Solution: 50 ml    Total IN: 3260 ml  ---------------------------------------------  OUT:    Voided: 2650 ml    Drain: 300 ml    Drain: 50 ml    Total OUT: 3000 ml  ---------------------------------------------  Total NET: 260 ml      Daily     Daily     LABS:                        9.6    8.7   )-----------( 278      ( 26 Jun 2017 06:25 )             29.4     06-26    134<L>  |  96  |  5<L>  ----------------------------<  105<H>  4.3   |  30  |  0.35<L>    Ca    8.3<L>      26 Jun 2017 06:25  Phos  3.7     06-26  Mg     2.0     06-26        NAD, awake and alert  Respirations nonlabored  Abdomen soft, min tender  No guarding or rebound tenderness   G tube site c/d/i, to gravity  Ostomy pink and viable, but no gas or stool in bag

## 2017-06-27 NOTE — PHYSICAL THERAPY INITIAL EVALUATION ADULT - PERTINENT HX OF CURRENT PROBLEM, REHAB EVAL
46yoF with ovarian Ca, 2/p GRETCHEN on Dec-2016, p/w c/o RUQ pain, 6/18/17 ECG, bradycardiac, 6/18/17 CXR, L pleural effusion, 6/6/17 CT ABD, peritoneal carcinomatosis

## 2017-06-27 NOTE — PHYSICAL THERAPY INITIAL EVALUATION ADULT - DISCHARGE DISPOSITION, PT EVAL
outpatient PT for general strengthening, gait and balance training, endurance training, home with assist as needed/home/home w/ assist/home w/ outpatient services

## 2017-06-27 NOTE — PHYSICAL THERAPY INITIAL EVALUATION ADULT - ADDITIONAL COMMENTS
as per pt, resides in an APT with elevator, +ramp to enter, PTA, pt amb (I), sometimes use SC for fall prevention purposes, required assist for some of the ADLs, private aid 24/7

## 2017-06-27 NOTE — PROGRESS NOTE ADULT - ATTENDING COMMENTS
I have seen and examined the patient, reviewed the laboratory and radiologic data and agree with practitioner's history, physical assessment, plan and reviewed and edited where appropriate.    Plan:  1) Awaiting GI fxn  2) G tube to gravity  3) Clears for comfort  4) OOB/ambulate

## 2017-06-28 LAB
ANION GAP SERPL CALC-SCNC: 10 MMOL/L — SIGNIFICANT CHANGE UP (ref 5–17)
BUN SERPL-MCNC: 4 MG/DL — LOW (ref 7–23)
CALCIUM SERPL-MCNC: 9.1 MG/DL — SIGNIFICANT CHANGE UP (ref 8.4–10.5)
CHLORIDE SERPL-SCNC: 94 MMOL/L — LOW (ref 96–108)
CO2 SERPL-SCNC: 30 MMOL/L — SIGNIFICANT CHANGE UP (ref 22–31)
CREAT SERPL-MCNC: 0.45 MG/DL — LOW (ref 0.5–1.3)
GLUCOSE SERPL-MCNC: 110 MG/DL — HIGH (ref 70–99)
HCT VFR BLD CALC: 32 % — LOW (ref 34.5–45)
HGB BLD-MCNC: 9.9 G/DL — LOW (ref 11.5–15.5)
MAGNESIUM SERPL-MCNC: 1.9 MG/DL — SIGNIFICANT CHANGE UP (ref 1.6–2.6)
MCHC RBC-ENTMCNC: 27.1 PG — SIGNIFICANT CHANGE UP (ref 27–34)
MCHC RBC-ENTMCNC: 30.8 GM/DL — LOW (ref 32–36)
MCV RBC AUTO: 87.9 FL — SIGNIFICANT CHANGE UP (ref 80–100)
PHOSPHATE SERPL-MCNC: 3.9 MG/DL — SIGNIFICANT CHANGE UP (ref 2.5–4.5)
PLATELET # BLD AUTO: 390 K/UL — SIGNIFICANT CHANGE UP (ref 150–400)
POTASSIUM SERPL-MCNC: 4.7 MMOL/L — SIGNIFICANT CHANGE UP (ref 3.5–5.3)
POTASSIUM SERPL-SCNC: 4.7 MMOL/L — SIGNIFICANT CHANGE UP (ref 3.5–5.3)
RBC # BLD: 3.64 M/UL — LOW (ref 3.8–5.2)
RBC # FLD: 13.4 % — SIGNIFICANT CHANGE UP (ref 10.3–14.5)
SODIUM SERPL-SCNC: 134 MMOL/L — LOW (ref 135–145)
WBC # BLD: 11.9 K/UL — HIGH (ref 3.8–10.5)
WBC # FLD AUTO: 11.9 K/UL — HIGH (ref 3.8–10.5)

## 2017-06-28 PROCEDURE — 99233 SBSQ HOSP IP/OBS HIGH 50: CPT

## 2017-06-28 RX ORDER — ACETAMINOPHEN 500 MG
1000 TABLET ORAL
Qty: 0 | Refills: 0 | Status: COMPLETED | OUTPATIENT
Start: 2017-06-29 | End: 2017-06-29

## 2017-06-28 RX ADMIN — CEFEPIME 100 MILLIGRAM(S): 1 INJECTION, POWDER, FOR SOLUTION INTRAMUSCULAR; INTRAVENOUS at 05:29

## 2017-06-28 RX ADMIN — Medication 1000 MILLIGRAM(S): at 22:38

## 2017-06-28 RX ADMIN — Medication 1000 MILLIGRAM(S): at 11:40

## 2017-06-28 RX ADMIN — Medication 400 MILLIGRAM(S): at 03:19

## 2017-06-28 RX ADMIN — HYDROMORPHONE HYDROCHLORIDE 30 MILLILITER(S): 2 INJECTION INTRAMUSCULAR; INTRAVENOUS; SUBCUTANEOUS at 07:51

## 2017-06-28 RX ADMIN — Medication 400 MILLIGRAM(S): at 22:08

## 2017-06-28 RX ADMIN — HYDROMORPHONE HYDROCHLORIDE 30 MILLILITER(S): 2 INJECTION INTRAMUSCULAR; INTRAVENOUS; SUBCUTANEOUS at 02:08

## 2017-06-28 RX ADMIN — HYDROMORPHONE HYDROCHLORIDE 30 MILLILITER(S): 2 INJECTION INTRAMUSCULAR; INTRAVENOUS; SUBCUTANEOUS at 23:13

## 2017-06-28 RX ADMIN — FLUCONAZOLE 100 MILLIGRAM(S): 150 TABLET ORAL at 06:34

## 2017-06-28 RX ADMIN — DEXTROSE MONOHYDRATE, SODIUM CHLORIDE, AND POTASSIUM CHLORIDE 100 MILLILITER(S): 50; .745; 4.5 INJECTION, SOLUTION INTRAVENOUS at 03:25

## 2017-06-28 RX ADMIN — Medication 400 MILLIGRAM(S): at 11:10

## 2017-06-28 RX ADMIN — CEFEPIME 100 MILLIGRAM(S): 1 INJECTION, POWDER, FOR SOLUTION INTRAMUSCULAR; INTRAVENOUS at 18:59

## 2017-06-28 RX ADMIN — Medication 1000 MILLIGRAM(S): at 03:49

## 2017-06-28 RX ADMIN — ENOXAPARIN SODIUM 40 MILLIGRAM(S): 100 INJECTION SUBCUTANEOUS at 11:03

## 2017-06-28 NOTE — PROGRESS NOTE ADULT - SUBJECTIVE AND OBJECTIVE BOX
INTERVAL HPI/OVERNIGHT EVENTS: Patient was seen and examined at bedside. Patient is s/p  exlap and G - tube insertion. Patient is c/o recurrent pain over her abdomen. Pain is generalized and partially improving with Dilaudid/Tylenol. She used 29 DD over 24 hours.  She denies having any bowel movements. She is c/o nausea but denies vomiting.     ADVANCE DIRECTIVES:    DNR: NO           PRESENT SYMPTOMS:   SOURCE: Patient      Pain: Moderate to severe. Non-radiating, generalized, Throbbing, Intermittent, Partially Improving with Dilaudid. Increasing with palpation.     Dyspnea:   NO  Anxiety:NO  Fatigue: Mild to moderate   Nausea: NO  Loss of Appetite:NO  Constipation [ x ] YES. Colostomy output: 0 recently.     OTHER SYMPTOMS:  [ X] All other ROS negative     [ ] Unable to obtain due to poor mentation    MEDICATIONS  (STANDING):  enoxaparin Injectable 40 milliGRAM(s) SubCutaneous daily  tetracaine/benzocaine/butamben Spray 1 Spray(s) Topical three times a day  cefepime  IVPB 2000 milliGRAM(s) IV Intermittent every 12 hours  fluconAZOLE IVPB 200 milliGRAM(s) IV Intermittent every 24 hours  dextrose 5% + sodium chloride 0.45% with potassium chloride 20 mEq/L 1000 milliLiter(s) (100 mL/Hr) IV Continuous <Continuous>  acetaminophen  IVPB. 1000 milliGRAM(s) IV Intermittent <User Schedule>  HYDROmorphone PCA (1 mG/mL) 30 milliLiter(s) PCA Continuous PCA Continuous    MEDICATIONS  (PRN):  ondansetron Injectable 4 milliGRAM(s) IV Push every 6 hours PRN Nausea  naloxone Injectable 0.1 milliGRAM(s) IV Push every 3 minutes PRN For ANY of the following changes in patient status:  A. RR LESS THAN 10 breaths per minute, B. Oxygen saturation LESS THAN 90%, C. Sedation score of 6  HYDROmorphone PCA (1 mG/mL) Rescue Clinician Bolus 1 milliGRAM(s) IV Push every 1 hour PRN for Pain Scale GREATER THAN 6        Karnofsky Performance Score/Palliative Performance Status Version 2: 50  %  Protein Calorie Malnutrition:  [ ] Mild   [ ] Moderate   [ ] Severe     Physical Exam:    Vital Signs Last 24 Hrs  T(C): 36.8 (28 Jun 2017 14:41), Max: 37.1 (28 Jun 2017 01:36)  T(F): 98.3 (28 Jun 2017 14:41), Max: 98.7 (28 Jun 2017 01:36)  HR: 18 (28 Jun 2017 14:41) (18 - 91)  BP: 100/68 (28 Jun 2017 14:41) (100/68 - 125/79)  BP(mean): --  RR: 18 (28 Jun 2017 14:41) (16 - 18)  SpO2: 98% (28 Jun 2017 14:41) (98% - 99%)    General: Awake alert, comfortable.   HEENT: PERRL with EOMI.   Lungs: Clear to Ausc bilaterally, no wheezing.   Cardiovascular: S1, S2. No S3  RRR, no murmurs   Abdomen: Soft, No guarding, tenderness to palpate diffusely.  BS  x 4 (+) but decreased.   Genitourinary: Normal  Musculoskeletal:  Weakness   Neurological: no focal deficits. Alert, oriented x 3     Skin: warm and dry     Labs:                                               9.9    11.9  )-----------( 390      ( 28 Jun 2017 08:41 )             32.0   06-28    134<L>  |  94<L>  |  4<L>  ----------------------------<  110<H>  4.7   |  30  |  0.45<L>    Ca    9.1      28 Jun 2017 08:41  Phos  3.9     06-28  Mg     1.9     06-28                           Assessment and Plan

## 2017-06-28 NOTE — PROGRESS NOTE ADULT - PROBLEM SELECTOR PLAN 2
s/p Sx.   Waiting for bowel function to start after Sx.  Primary team to re-eval options for TPN understanding Rx of Abscess and recent Sx.  Consider repeating CT

## 2017-06-28 NOTE — CHART NOTE - NSCHARTNOTEFT_GEN_A_CORE
Spoke to patient about planned CT scan.  She stated that she feels well and doesn't really want the test. She wants to keep walking and trying holistic therapies.  She has had issues passing gas in the past, and she says she knows her body and feels that the gas will come soon.  Will hold off for now. Reasess tomorrow.

## 2017-06-28 NOTE — PROGRESS NOTE ADULT - SUBJECTIVE AND OBJECTIVE BOX
GENERAL SURGERY DAILY PROGRESS NOTE:     Subjective:  Pain tolerable with tylenol. Tolerating pleasure clears. Still no GI fxn in bag. +OOB +void      Objective:    MEDICATIONS  (STANDING):  enoxaparin Injectable 40 milliGRAM(s) SubCutaneous daily  tetracaine/benzocaine/butamben Spray 1 Spray(s) Topical three times a day  cefepime  IVPB 2000 milliGRAM(s) IV Intermittent every 12 hours  fluconAZOLE IVPB 200 milliGRAM(s) IV Intermittent every 24 hours  dextrose 5% + sodium chloride 0.45% with potassium chloride 20 mEq/L 1000 milliLiter(s) (100 mL/Hr) IV Continuous <Continuous>  acetaminophen  IVPB. 1000 milliGRAM(s) IV Intermittent <User Schedule>  acetaminophen  IVPB. 1000 milliGRAM(s) IV Intermittent <User Schedule>  HYDROmorphone PCA (1 mG/mL) 30 milliLiter(s) PCA Continuous PCA Continuous    MEDICATIONS  (PRN):  ondansetron Injectable 4 milliGRAM(s) IV Push every 6 hours PRN Nausea  naloxone Injectable 0.1 milliGRAM(s) IV Push every 3 minutes PRN For ANY of the following changes in patient status:  A. RR LESS THAN 10 breaths per minute, B. Oxygen saturation LESS THAN 90%, C. Sedation score of 6  HYDROmorphone PCA (1 mG/mL) Rescue Clinician Bolus 1 milliGRAM(s) IV Push every 1 hour PRN for Pain Scale GREATER THAN 6      Vital Signs Last 24 Hrs  T(C): 37.1 (28 Jun 2017 01:36), Max: 37.1 (28 Jun 2017 01:36)  T(F): 98.7 (28 Jun 2017 01:36), Max: 98.7 (28 Jun 2017 01:36)  HR: 91 (28 Jun 2017 01:36) (76 - 91)  BP: 124/70 (28 Jun 2017 01:36) (114/68 - 124/70)  BP(mean): --  RR: 17 (28 Jun 2017 01:36) (16 - 18)  SpO2: 99% (28 Jun 2017 01:36) (95% - 99%)    I&O's Detail    26 Jun 2017 07:01  -  27 Jun 2017 07:00  --------------------------------------------------------  IN:    dextrose 5% + sodium chloride 0.45% with potassium chloride 20 mEq/L: 2400 mL    Oral Fluid: 730 mL    Solution: 150 mL    Solution: 250 mL    Solution: 50 mL  Total IN: 3580 mL    OUT:    Bulb: 5 mL    Drain: 60 mL    Drain: 315 mL    Voided: 3050 mL  Total OUT: 3430 mL    Total NET: 150 mL      27 Jun 2017 07:01  -  28 Jun 2017 06:23  --------------------------------------------------------  IN:    dextrose 5% + sodium chloride 0.45% with potassium chloride 20 mEq/L: 2200 mL    Oral Fluid: 600 mL  Total IN: 2800 mL    OUT:    Bulb: 10 mL    Drain: 10 mL    Drain: 450 mL    Voided: 1450 mL  Total OUT: 1920 mL    Total NET: 880 mL          Daily     Daily     LABS:                        9.1    9.7   )-----------( 324      ( 27 Jun 2017 07:12 )             27.9     06-27    134<L>  |  96  |  4<L>  ----------------------------<  96  4.4   |  29  |  0.39<L>    Ca    8.6      27 Jun 2017 07:12  Phos  3.8     06-27  Mg     1.8     06-27          NAD, awake and alert  Respirations nonlabored  Abdomen soft. Midline incision c/d/i with retention sutures  G tube site clean and dry  No guarding or rebound tenderness   Ostomy pink and viable, no output  PTC bilious

## 2017-06-28 NOTE — PROGRESS NOTE ADULT - PROBLEM SELECTOR PLAN 1
Will continue  Dilauid PCA CR @ 0.5 mg/h, and  DD 0.5 mg q 15 DD. Will also continue tylenol 1 gram q 8 hours for at least 24 hours.  Consider repeating CT

## 2017-06-28 NOTE — PROVIDER CONTACT NOTE (OTHER) - BACKGROUND
Pt is admitted w/ acute cholycystitis, is s/p xlap, CORDELL, gtube placement on 6/21, pt ambulatory w/ x1 assist, on Lovenox QD 40mg, PAS applied while in bed or chair

## 2017-06-29 LAB
ANION GAP SERPL CALC-SCNC: 11 MMOL/L — SIGNIFICANT CHANGE UP (ref 5–17)
BUN SERPL-MCNC: 3 MG/DL — LOW (ref 7–23)
CALCIUM SERPL-MCNC: 9.3 MG/DL — SIGNIFICANT CHANGE UP (ref 8.4–10.5)
CHLORIDE SERPL-SCNC: 93 MMOL/L — LOW (ref 96–108)
CO2 SERPL-SCNC: 29 MMOL/L — SIGNIFICANT CHANGE UP (ref 22–31)
CREAT SERPL-MCNC: 0.44 MG/DL — LOW (ref 0.5–1.3)
GLUCOSE SERPL-MCNC: 102 MG/DL — HIGH (ref 70–99)
HCT VFR BLD CALC: 30.6 % — LOW (ref 34.5–45)
HGB BLD-MCNC: 9.8 G/DL — LOW (ref 11.5–15.5)
MAGNESIUM SERPL-MCNC: 2 MG/DL — SIGNIFICANT CHANGE UP (ref 1.6–2.6)
MCHC RBC-ENTMCNC: 28.1 PG — SIGNIFICANT CHANGE UP (ref 27–34)
MCHC RBC-ENTMCNC: 32.2 GM/DL — SIGNIFICANT CHANGE UP (ref 32–36)
MCV RBC AUTO: 87.4 FL — SIGNIFICANT CHANGE UP (ref 80–100)
PHOSPHATE SERPL-MCNC: 3.9 MG/DL — SIGNIFICANT CHANGE UP (ref 2.5–4.5)
PLATELET # BLD AUTO: 429 K/UL — HIGH (ref 150–400)
POTASSIUM SERPL-MCNC: 4.5 MMOL/L — SIGNIFICANT CHANGE UP (ref 3.5–5.3)
POTASSIUM SERPL-SCNC: 4.5 MMOL/L — SIGNIFICANT CHANGE UP (ref 3.5–5.3)
RBC # BLD: 3.5 M/UL — LOW (ref 3.8–5.2)
RBC # FLD: 13.3 % — SIGNIFICANT CHANGE UP (ref 10.3–14.5)
SODIUM SERPL-SCNC: 133 MMOL/L — LOW (ref 135–145)
TROPONIN T SERPL-MCNC: <0.01 NG/ML — SIGNIFICANT CHANGE UP (ref 0–0.06)
WBC # BLD: 13.5 K/UL — HIGH (ref 3.8–10.5)
WBC # FLD AUTO: 13.5 K/UL — HIGH (ref 3.8–10.5)

## 2017-06-29 PROCEDURE — 74177 CT ABD & PELVIS W/CONTRAST: CPT | Mod: 26

## 2017-06-29 PROCEDURE — 93010 ELECTROCARDIOGRAM REPORT: CPT

## 2017-06-29 PROCEDURE — 99233 SBSQ HOSP IP/OBS HIGH 50: CPT

## 2017-06-29 RX ORDER — ACETAMINOPHEN 500 MG
1000 TABLET ORAL
Qty: 0 | Refills: 0 | Status: COMPLETED | OUTPATIENT
Start: 2017-06-30 | End: 2017-06-30

## 2017-06-29 RX ORDER — SODIUM CHLORIDE 9 MG/ML
1000 INJECTION, SOLUTION INTRAVENOUS
Qty: 0 | Refills: 0 | Status: DISCONTINUED | OUTPATIENT
Start: 2017-06-29 | End: 2017-07-03

## 2017-06-29 RX ADMIN — Medication 400 MILLIGRAM(S): at 14:58

## 2017-06-29 RX ADMIN — CEFEPIME 100 MILLIGRAM(S): 1 INJECTION, POWDER, FOR SOLUTION INTRAMUSCULAR; INTRAVENOUS at 18:15

## 2017-06-29 RX ADMIN — HYDROMORPHONE HYDROCHLORIDE 30 MILLILITER(S): 2 INJECTION INTRAMUSCULAR; INTRAVENOUS; SUBCUTANEOUS at 07:24

## 2017-06-29 RX ADMIN — HYDROMORPHONE HYDROCHLORIDE 30 MILLILITER(S): 2 INJECTION INTRAMUSCULAR; INTRAVENOUS; SUBCUTANEOUS at 11:25

## 2017-06-29 RX ADMIN — CEFEPIME 100 MILLIGRAM(S): 1 INJECTION, POWDER, FOR SOLUTION INTRAMUSCULAR; INTRAVENOUS at 06:32

## 2017-06-29 RX ADMIN — Medication 400 MILLIGRAM(S): at 22:08

## 2017-06-29 RX ADMIN — HYDROMORPHONE HYDROCHLORIDE 30 MILLILITER(S): 2 INJECTION INTRAMUSCULAR; INTRAVENOUS; SUBCUTANEOUS at 19:46

## 2017-06-29 RX ADMIN — ENOXAPARIN SODIUM 40 MILLIGRAM(S): 100 INJECTION SUBCUTANEOUS at 11:14

## 2017-06-29 RX ADMIN — SODIUM CHLORIDE 75 MILLILITER(S): 9 INJECTION, SOLUTION INTRAVENOUS at 23:40

## 2017-06-29 RX ADMIN — Medication 400 MILLIGRAM(S): at 06:04

## 2017-06-29 RX ADMIN — SODIUM CHLORIDE 75 MILLILITER(S): 9 INJECTION, SOLUTION INTRAVENOUS at 11:40

## 2017-06-29 RX ADMIN — Medication 1000 MILLIGRAM(S): at 16:15

## 2017-06-29 RX ADMIN — Medication 1000 MILLIGRAM(S): at 22:24

## 2017-06-29 RX ADMIN — FLUCONAZOLE 100 MILLIGRAM(S): 150 TABLET ORAL at 07:22

## 2017-06-29 RX ADMIN — Medication 1000 MILLIGRAM(S): at 06:34

## 2017-06-29 NOTE — PROGRESS NOTE ADULT - SUBJECTIVE AND OBJECTIVE BOX
INTERVAL HPI/OVERNIGHT EVENTS: Patient was seen and examined at bedside. Patient is s/p  exlap and G - tube insertion. Patient is c/o recurrent pain over her abdomen. Pain is generalized and partially improving with Dilaudid/Tylenol. She used 29 DD over 24 hours.  She denies having any bowel movements. She is c/o nausea but denies vomiting.     ADVANCE DIRECTIVES:    DNR: NO           PRESENT SYMPTOMS:   SOURCE: Patient      Pain: Moderate to severe. Non-radiating, generalized, Throbbing, Intermittent, Partially Improving with Dilaudid. Increasing with palpation.     Dyspnea:   NO  Anxiety:NO  Fatigue: Mild to moderate   Nausea: NO  Loss of Appetite:NO  Constipation [ x ] YES. Colostomy output: 0 recently.     OTHER SYMPTOMS:  [ X] All other ROS negative     [ ] Unable to obtain due to poor mentation    MEDICATIONS  (STANDING):  enoxaparin Injectable 40 milliGRAM(s) SubCutaneous daily  tetracaine/benzocaine/butamben Spray 1 Spray(s) Topical three times a day  cefepime  IVPB 2000 milliGRAM(s) IV Intermittent every 12 hours  fluconAZOLE IVPB 200 milliGRAM(s) IV Intermittent every 24 hours  dextrose 5% + sodium chloride 0.45% with potassium chloride 20 mEq/L 1000 milliLiter(s) (100 mL/Hr) IV Continuous <Continuous>  acetaminophen  IVPB. 1000 milliGRAM(s) IV Intermittent <User Schedule>  HYDROmorphone PCA (1 mG/mL) 30 milliLiter(s) PCA Continuous PCA Continuous    MEDICATIONS  (PRN):  ondansetron Injectable 4 milliGRAM(s) IV Push every 6 hours PRN Nausea  naloxone Injectable 0.1 milliGRAM(s) IV Push every 3 minutes PRN For ANY of the following changes in patient status:  A. RR LESS THAN 10 breaths per minute, B. Oxygen saturation LESS THAN 90%, C. Sedation score of 6  HYDROmorphone PCA (1 mG/mL) Rescue Clinician Bolus 1 milliGRAM(s) IV Push every 1 hour PRN for Pain Scale GREATER THAN 6        Karnofsky Performance Score/Palliative Performance Status Version 2: 50  %  Protein Calorie Malnutrition:  [ ] Mild   [ ] Moderate   [ ] Severe     Physical Exam:    Vital Signs Last 24 Hrs  T(C): 36.8 (28 Jun 2017 14:41), Max: 37.1 (28 Jun 2017 01:36)  T(F): 98.3 (28 Jun 2017 14:41), Max: 98.7 (28 Jun 2017 01:36)  HR: 18 (28 Jun 2017 14:41) (18 - 91)  BP: 100/68 (28 Jun 2017 14:41) (100/68 - 125/79)  BP(mean): --  RR: 18 (28 Jun 2017 14:41) (16 - 18)  SpO2: 98% (28 Jun 2017 14:41) (98% - 99%)    General: Awake alert, comfortable.   HEENT: PERRL with EOMI.   Lungs: Clear to Ausc bilaterally, no wheezing.   Cardiovascular: S1, S2. No S3  RRR, no murmurs   Abdomen: Soft, No guarding, tenderness to palpate diffusely.  BS  x 4 (+) but decreased.   Genitourinary: Normal  Musculoskeletal:  Weakness   Neurological: no focal deficits. Alert, oriented x 3     Skin: warm and dry     Labs:                                               9.9    11.9  )-----------( 390      ( 28 Jun 2017 08:41 )             32.0   06-28    134<L>  |  94<L>  |  4<L>  ----------------------------<  110<H>  4.7   |  30  |  0.45<L>    Ca    9.1      28 Jun 2017 08:41  Phos  3.9     06-28  Mg     1.9     06-28                           Assessment and Plan Patient seen and examined at  17:00 on 6/29  INTERVAL HPI/OVERNIGHT EVENTS: Patient was seen and examined at bedside. Patient is s/p  exlap and G - tube insertion. Patient is c/o recurrent pain over her abdomen; however, it is better than yesterday. Pain is generalized and partially improving with Dilaudid/Tylenol. She used 29 DD over 24 hours.  She denies having any bowel movements. She is c/o nausea but denies vomiting.     ADVANCE DIRECTIVES:    DNR: NO           PRESENT SYMPTOMS:   SOURCE: Patient      Pain: Moderate to severe. Non-radiating, generalized, Throbbing, Intermittent, Partially Improving with Dilaudid. Increasing with palpation.     Dyspnea:   NO  Anxiety:NO  Fatigue: Mild to moderate   Nausea: NO  Loss of Appetite:NO  Constipation [ x ] YES. Colostomy output: 0 recently.     OTHER SYMPTOMS:  [ X] All other ROS negative     [ ] Unable to obtain due to poor mentation    MEDICATIONS  (STANDING):  enoxaparin Injectable 40 milliGRAM(s) SubCutaneous daily  tetracaine/benzocaine/butamben Spray 1 Spray(s) Topical three times a day  cefepime  IVPB 2000 milliGRAM(s) IV Intermittent every 12 hours  fluconAZOLE IVPB 200 milliGRAM(s) IV Intermittent every 24 hours  HYDROmorphone PCA (1 mG/mL) 30 milliLiter(s) PCA Continuous PCA Continuous  dextrose 5% + sodium chloride 0.9%. 1000 milliLiter(s) (75 mL/Hr) IV Continuous <Continuous>  acetaminophen  IVPB. 1000 milliGRAM(s) IV Intermittent <User Schedule>  acetaminophen  IVPB. 1000 milliGRAM(s) IV Intermittent <User Schedule>  acetaminophen  IVPB. 1000 milliGRAM(s) IV Intermittent <User Schedule>    MEDICATIONS  (PRN):  ondansetron Injectable 4 milliGRAM(s) IV Push every 6 hours PRN Nausea  naloxone Injectable 0.1 milliGRAM(s) IV Push every 3 minutes PRN For ANY of the following changes in patient status:  A. RR LESS THAN 10 breaths per minute, B. Oxygen saturation LESS THAN 90%, C. Sedation score of 6  HYDROmorphone PCA (1 mG/mL) Rescue Clinician Bolus 1 milliGRAM(s) IV Push every 1 hour PRN for Pain Scale GREATER THAN 6          Karnofsky Performance Score/Palliative Performance Status Version 2: 50  %  Protein Calorie Malnutrition:  [ ] Mild   [ ] Moderate   [ ] Severe     Physical Exam:    ICU Vital Signs Last 24 Hrs  T(C): 36.9 (29 Jun 2017 21:02), Max: 37.1 (29 Jun 2017 01:05)  T(F): 98.5 (29 Jun 2017 21:02), Max: 98.7 (29 Jun 2017 01:05)  HR: 86 (29 Jun 2017 21:02) (80 - 98)  BP: 118/74 (29 Jun 2017 21:02) (105/73 - 121/77)  BP(mean): --  ABP: --  ABP(mean): --  RR: 18 (29 Jun 2017 21:02) (16 - 18)  SpO2: 97% (29 Jun 2017 21:02) (97% - 99%)    General: Awake alert, comfortable.   HEENT: PERRL with EOMI.   Lungs: Clear to Ausc bilaterally, no wheezing.   Cardiovascular: S1, S2. No S3  RRR, no murmurs   Abdomen: Soft, No guarding, tenderness to palpate diffusely.  BS  x 4 (+) but decreased.   Genitourinary: Normal  Musculoskeletal:  Weakness   Neurological: no focal deficits. Alert, oriented x 3     Skin: warm and dry     Labs:                                               9.8    13.5  )-----------( 429      ( 29 Jun 2017 07:00 )             30.6   06-29    133<L>  |  93<L>  |  3<L>  ----------------------------<  102<H>  4.5   |  29  |  0.44<L>    Ca    9.3      29 Jun 2017 07:00  Phos  3.9     06-29  Mg     2.0     06-29                               Assessment and Plan

## 2017-06-29 NOTE — CHART NOTE - NSCHARTNOTEFT_GEN_A_CORE
Source: Patient [x ]    Family [ ]     other [x ]chart, Red surg, Nurse    Diet : Clear Liquid with 1 x Promote daily  pt states she is only able to tolerate warm foods and water is best liquid. she is tolerating Promote, requesting that is be warmed up and increased to 2 x daily-Red surg made aware. Clears is causing her acid. Pt states she would like to try diet advance to Full Liquid, Red surg with no plan to advance to Full Liquid until bowel function improves. pt with colostomy. no output. Pt family is bringing in rice soup, it is a  milky liquid and she tolerates.       Patient reports [ ] nausea  [ ] vomiting [ ] diarrhea [ ] constipation  [ ]chewing problems [ ] swallowing issues  [ x] other: hiccups     PO intake:  < 50% [x ] 50-75% [ ]   % [ ]  other :     Source for PO intake [ x] Patient [ ] family [ ] chart [ ] staff [ ] other           Current Weight: 123.4pounds/56kg  % Weight Change:5% weight gain since 6/15    Pertinent Medications: MEDICATIONS  (STANDING):  enoxaparin Injectable 40 milliGRAM(s) SubCutaneous daily  tetracaine/benzocaine/butamben Spray 1 Spray(s) Topical three times a day  cefepime  IVPB 2000 milliGRAM(s) IV Intermittent every 12 hours  fluconAZOLE IVPB 200 milliGRAM(s) IV Intermittent every 24 hours  dextrose 5% + sodium chloride 0.45% with potassium chloride 20 mEq/L 1000 milliLiter(s) (100 mL/Hr) IV Continuous <Continuous>  HYDROmorphone PCA (1 mG/mL) 30 milliLiter(s) PCA Continuous PCA Continuous  acetaminophen  IVPB. 1000 milliGRAM(s) IV Intermittent <User Schedule>  acetaminophen  IVPB. 1000 milliGRAM(s) IV Intermittent <User Schedule>    MEDICATIONS  (PRN):  ondansetron Injectable 4 milliGRAM(s) IV Push every 6 hours PRN Nausea  naloxone Injectable 0.1 milliGRAM(s) IV Push every 3 minutes PRN For ANY of the following changes in patient status:  A. RR LESS THAN 10 breaths per minute, B. Oxygen saturation LESS THAN 90%, C. Sedation score of 6  HYDROmorphone PCA (1 mG/mL) Rescue Clinician Bolus 1 milliGRAM(s) IV Push every 1 hour PRN for Pain Scale GREATER THAN 6    Pertinent Labs:  06-29 Na133 mmol/L<L> Glu 102 mg/dL<H> K+ 4.5 mmol/L Cr  0.44 mg/dL<L> BUN 3 mg/dL<L> Phos 3.9 mg/dL        Skin: no edema, surgical incision    Estimated Needs:   [ x] no change since previous assessment        Previous Nutrition Diagnosis:   [ x] Malnutrition          Nutrition Diagnosis is [ x] ongoing           New Nutrition Diagnosis: [x ] not applicable             Recommend      [ x] Nutrition Supplement-increase Promote to 2 x daily         Monitoring and Evaluation:     [x ] PO intake [ x] Tolerance to diet prescription [x ] weights

## 2017-06-29 NOTE — PROGRESS NOTE ADULT - SUBJECTIVE AND OBJECTIVE BOX
GENERAL SURGERY DAILY PROGRESS NOTE:     Subjective:  -flatus -stool in bag  +OOB. Denies n/v. +void            Objective:    MEDICATIONS  (STANDING):  enoxaparin Injectable 40 milliGRAM(s) SubCutaneous daily  tetracaine/benzocaine/butamben Spray 1 Spray(s) Topical three times a day  cefepime  IVPB 2000 milliGRAM(s) IV Intermittent every 12 hours  fluconAZOLE IVPB 200 milliGRAM(s) IV Intermittent every 24 hours  dextrose 5% + sodium chloride 0.45% with potassium chloride 20 mEq/L 1000 milliLiter(s) (100 mL/Hr) IV Continuous <Continuous>  HYDROmorphone PCA (1 mG/mL) 30 milliLiter(s) PCA Continuous PCA Continuous  acetaminophen  IVPB. 1000 milliGRAM(s) IV Intermittent <User Schedule>  acetaminophen  IVPB. 1000 milliGRAM(s) IV Intermittent <User Schedule>    MEDICATIONS  (PRN):  ondansetron Injectable 4 milliGRAM(s) IV Push every 6 hours PRN Nausea  naloxone Injectable 0.1 milliGRAM(s) IV Push every 3 minutes PRN For ANY of the following changes in patient status:  A. RR LESS THAN 10 breaths per minute, B. Oxygen saturation LESS THAN 90%, C. Sedation score of 6  HYDROmorphone PCA (1 mG/mL) Rescue Clinician Bolus 1 milliGRAM(s) IV Push every 1 hour PRN for Pain Scale GREATER THAN 6      Vital Signs Last 24 Hrs  T(C): 37.1 (2017 06:01), Max: 37.4 (2017 21:05)  T(F): 98.7 (2017 06:01), Max: 99.3 (2017 21:05)  HR: 98 (2017 06:01) (18 - 98)  BP: 111/76 (2017 06:01) (100/68 - 125/79)  BP(mean): --  RR: 18 (2017 06:01) (16 - 18)  SpO2: 99% (2017 06:01) (94% - 99%)    I&O's Detail    2017 07:01  -  2017 07:00  --------------------------------------------------------  IN:    dextrose 5% + sodium chloride 0.45% with potassium chloride 20 mEq/L: 2400 mL    Oral Fluid: 650 mL  Total IN: 3050 mL    OUT:    Bulb: 10 mL    Drain: 10 mL    Drain: 450 mL    Voided: 1450 mL  Total OUT: 1920 mL    Total NET: 1130 mL      2017 07:01  -  2017 06:05  --------------------------------------------------------  IN:    dextrose 5% + sodium chloride 0.45% with potassium chloride 20 mEq/L: 1900 mL    Oral Fluid: 700 mL  Total IN: 2600 mL    OUT:    Drain: 220 mL    Voided: 1960 mL  Total OUT: 2180 mL    Total NET: 420 mL          Daily     Daily Weight in k (2017 13:55)    LABS:                        9.9    11.9  )-----------( 390      ( 2017 08:41 )             32.0     -    134<L>  |  94<L>  |  4<L>  ----------------------------<  110<H>  4.7   |  30  |  0.45<L>    Ca    9.1      2017 08:41  Phos  3.9       Mg     1.9           NAD, awake and alert  Respirations nonlabored  Abdomen soft, nontender, nondistended  No guarding or rebound tenderness   Retention sutures in place  G tube site c/d/i  HARSHIL dark serosang  PTC bilious

## 2017-06-29 NOTE — PROGRESS NOTE ADULT - PROBLEM SELECTOR PLAN 4
Jessica is already following up. Chaplaincy is already following up.  Plan of care was discussed with the patient and her fiance at bedside

## 2017-06-30 LAB
ANION GAP SERPL CALC-SCNC: 12 MMOL/L — SIGNIFICANT CHANGE UP (ref 5–17)
BUN SERPL-MCNC: 4 MG/DL — LOW (ref 7–23)
CALCIUM SERPL-MCNC: 9 MG/DL — SIGNIFICANT CHANGE UP (ref 8.4–10.5)
CHLORIDE SERPL-SCNC: 96 MMOL/L — SIGNIFICANT CHANGE UP (ref 96–108)
CO2 SERPL-SCNC: 27 MMOL/L — SIGNIFICANT CHANGE UP (ref 22–31)
CREAT SERPL-MCNC: 0.37 MG/DL — LOW (ref 0.5–1.3)
GLUCOSE SERPL-MCNC: 91 MG/DL — SIGNIFICANT CHANGE UP (ref 70–99)
HCT VFR BLD CALC: 29.1 % — LOW (ref 34.5–45)
HGB BLD-MCNC: 9.5 G/DL — LOW (ref 11.5–15.5)
MAGNESIUM SERPL-MCNC: 1.8 MG/DL — SIGNIFICANT CHANGE UP (ref 1.6–2.6)
MCHC RBC-ENTMCNC: 28.6 PG — SIGNIFICANT CHANGE UP (ref 27–34)
MCHC RBC-ENTMCNC: 32.5 GM/DL — SIGNIFICANT CHANGE UP (ref 32–36)
MCV RBC AUTO: 87.8 FL — SIGNIFICANT CHANGE UP (ref 80–100)
PHOSPHATE SERPL-MCNC: 3.9 MG/DL — SIGNIFICANT CHANGE UP (ref 2.5–4.5)
PLATELET # BLD AUTO: 405 K/UL — HIGH (ref 150–400)
POTASSIUM SERPL-MCNC: 4 MMOL/L — SIGNIFICANT CHANGE UP (ref 3.5–5.3)
POTASSIUM SERPL-SCNC: 4 MMOL/L — SIGNIFICANT CHANGE UP (ref 3.5–5.3)
RBC # BLD: 3.32 M/UL — LOW (ref 3.8–5.2)
RBC # FLD: 13.4 % — SIGNIFICANT CHANGE UP (ref 10.3–14.5)
SODIUM SERPL-SCNC: 135 MMOL/L — SIGNIFICANT CHANGE UP (ref 135–145)
WBC # BLD: 11.3 K/UL — HIGH (ref 3.8–10.5)
WBC # FLD AUTO: 11.3 K/UL — HIGH (ref 3.8–10.5)

## 2017-06-30 PROCEDURE — 99233 SBSQ HOSP IP/OBS HIGH 50: CPT

## 2017-06-30 RX ORDER — MAGNESIUM SULFATE 500 MG/ML
2 VIAL (ML) INJECTION ONCE
Qty: 0 | Refills: 0 | Status: COMPLETED | OUTPATIENT
Start: 2017-06-30 | End: 2017-06-30

## 2017-06-30 RX ADMIN — Medication 50 GRAM(S): at 14:40

## 2017-06-30 RX ADMIN — HYDROMORPHONE HYDROCHLORIDE 30 MILLILITER(S): 2 INJECTION INTRAMUSCULAR; INTRAVENOUS; SUBCUTANEOUS at 07:40

## 2017-06-30 RX ADMIN — Medication 1000 MILLIGRAM(S): at 14:08

## 2017-06-30 RX ADMIN — CEFEPIME 100 MILLIGRAM(S): 1 INJECTION, POWDER, FOR SOLUTION INTRAMUSCULAR; INTRAVENOUS at 17:40

## 2017-06-30 RX ADMIN — SODIUM CHLORIDE 75 MILLILITER(S): 9 INJECTION, SOLUTION INTRAVENOUS at 22:25

## 2017-06-30 RX ADMIN — Medication 1000 MILLIGRAM(S): at 22:53

## 2017-06-30 RX ADMIN — Medication 1000 MILLIGRAM(S): at 06:32

## 2017-06-30 RX ADMIN — CEFEPIME 100 MILLIGRAM(S): 1 INJECTION, POWDER, FOR SOLUTION INTRAMUSCULAR; INTRAVENOUS at 06:48

## 2017-06-30 RX ADMIN — HYDROMORPHONE HYDROCHLORIDE 30 MILLILITER(S): 2 INJECTION INTRAMUSCULAR; INTRAVENOUS; SUBCUTANEOUS at 23:02

## 2017-06-30 RX ADMIN — ENOXAPARIN SODIUM 40 MILLIGRAM(S): 100 INJECTION SUBCUTANEOUS at 12:12

## 2017-06-30 RX ADMIN — HYDROMORPHONE HYDROCHLORIDE 30 MILLILITER(S): 2 INJECTION INTRAMUSCULAR; INTRAVENOUS; SUBCUTANEOUS at 19:07

## 2017-06-30 RX ADMIN — Medication 400 MILLIGRAM(S): at 22:23

## 2017-06-30 RX ADMIN — Medication 400 MILLIGRAM(S): at 06:17

## 2017-06-30 RX ADMIN — Medication 400 MILLIGRAM(S): at 13:57

## 2017-06-30 RX ADMIN — FLUCONAZOLE 100 MILLIGRAM(S): 150 TABLET ORAL at 07:37

## 2017-06-30 NOTE — PROVIDER CONTACT NOTE (OTHER) - RECOMMENDATIONS
Give IV tylenol ordered for pain - repeat temp
Possible replacement of NGT, nausea management
Rule out possible DVT in B/L LE, suggest B/L duplex/doppler scan, removed PAS until confirmed no DVT
Speak with provider before applying PAS stockings to patient again
bolus
ekg and provider to evaluate at bedside

## 2017-06-30 NOTE — PROGRESS NOTE ADULT - ATTENDING COMMENTS
I have seen and examined the patient, reviewed the laboratory and radiologic data and agree with practitioner's history, physical assessment, plan and reviewed and edited where appropriate.  CT scan results reviewed.    Plan:  1)  2)  3) I have seen and examined the patient, reviewed the laboratory and radiologic data and agree with practitioner's history, physical assessment, plan and reviewed and edited where appropriate.  CT scan results reviewed - no evidence of bowel obstruction or ileus.  Ostomy digitalized and no evidence of stenosis at fascia.    Plan:  1) Would clamp G tube   2) Trial of PO intake as tolerated  3) Pain control  4) Awaiting GI fxn

## 2017-06-30 NOTE — PROGRESS NOTE ADULT - ASSESSMENT
45 yo F s/p endoscopy, removal of stent, s/p IR perc ubaldo, s/p IR drainage of collection 6/12, s/p IR aspiration 6/16, now s/p exlap and G-tube insertion  - Pain control  - Await GI fxn  - Strict I/O's  - VTE ppx  - G tube to gravity  - CLD as tolerated  -Continue Abx 47 yo F s/p endoscopy, removal of stent, s/p IR perc ubaldo, s/p IR drainage of collection 6/12, s/p IR aspiration 6/16, now s/p exlap and G-tube insertion  - Pain control  - Await GI fxn  - Strict I/O's  - VTE ppx  - G tube to gravity  - CLD as tolerated  - Continue Abx

## 2017-06-30 NOTE — PROVIDER CONTACT NOTE (OTHER) - ACTION/TREATMENT ORDERED:
Give IV tylenol - repeat temp
MD Crowell aware, okay to use venodynes on patient for VTE PPX. Pt evaluated by team on 6/28 and found to no longer warrant duplex scan
MD Crowell notified, plan for patient to go for B/L LE Duplex Scan
ekg and provider to evaluate at bedside
lr 1L bolus given
spoke w/ MD Murillo who is coming to the floor for evaluation of patient bedside

## 2017-06-30 NOTE — CHART NOTE - NSCHARTNOTEFT_GEN_A_CORE
Source: Patient [ x]    other [x ]    Diet : Regular/ 2 cans promote daily  diet advanced to Regular today-pt states that she is eating 50% of the meals, she is consuming 2 x promote daily and denies need to increase  pt states she passed gas today-after she saw MD. Being followed by palliative for pain management. remains acute S/P exlap with G-tube insertion as of care coordinator note on 6/27.           PO intake:  < 50% [x ] 50-75% [ ]   % [ ]  other :     Source for PO intake [x ] Patient [ ] family [ ] chart [ ] staff [ ] other          Current Weight: 123.4pounds/56kg  % Weight Change:5% increase since 6/15    Pertinent Medications: MEDICATIONS  (STANDING):  enoxaparin Injectable 40 milliGRAM(s) SubCutaneous daily  tetracaine/benzocaine/butamben Spray 1 Spray(s) Topical three times a day  cefepime  IVPB 2000 milliGRAM(s) IV Intermittent every 12 hours  fluconAZOLE IVPB 200 milliGRAM(s) IV Intermittent every 24 hours  HYDROmorphone PCA (1 mG/mL) 30 milliLiter(s) PCA Continuous PCA Continuous  dextrose 5% + sodium chloride 0.9%. 1000 milliLiter(s) (75 mL/Hr) IV Continuous <Continuous>  acetaminophen  IVPB. 1000 milliGRAM(s) IV Intermittent <User Schedule>  magnesium sulfate  IVPB 2 Gram(s) IV Intermittent once    MEDICATIONS  (PRN):  ondansetron Injectable 4 milliGRAM(s) IV Push every 6 hours PRN Nausea  naloxone Injectable 0.1 milliGRAM(s) IV Push every 3 minutes PRN For ANY of the following changes in patient status:  A. RR LESS THAN 10 breaths per minute, B. Oxygen saturation LESS THAN 90%, C. Sedation score of 6  HYDROmorphone PCA (1 mG/mL) Rescue Clinician Bolus 1 milliGRAM(s) IV Push every 1 hour PRN for Pain Scale GREATER THAN 6    Pertinent Labs:  06-30 Na135 mmol/L Glu 91 mg/dL K+ 4.0 mmol/L Cr  0.37 mg/dL<L> BUN 4 mg/dL<L> Phos 3.9 mg/dL      Skin: no edema, surgical incision    Estimated Needs:   [ x] no change since previous assessment      Previous Nutrition Diagnosis:     [x ] Malnutrition-severe         Nutrition Diagnosis is [ x] ongoing -being addressed         New Nutrition Diagnosis: [ x] not applicable           Recommend    [x ] Other: continue current diet order and supplements       Monitoring and Evaluation:     [ x] PO intake [x ] Tolerance to diet prescription [ x] weights

## 2017-06-30 NOTE — PROGRESS NOTE ADULT - SUBJECTIVE AND OBJECTIVE BOX
Patient seen and examined at  17:00 on 6/29  INTERVAL HPI/OVERNIGHT EVENTS: Patient was seen and examined at bedside. Patient is s/p  exlap and G - tube insertion. Patient is c/o recurrent pain over her abdomen; however, it is better than yesterday. Pain is generalized and partially improving with Dilaudid/Tylenol. She used 14 DD over 24 hours.  She denies having any bowel movements. She is c/o nausea but denies vomiting.     ADVANCE DIRECTIVES:    DNR: NO           PRESENT SYMPTOMS:   SOURCE: Patient      Pain: Moderate to severe. Non-radiating, generalized, Throbbing, Intermittent, Partially Improving with Dilaudid. Increasing with palpation.     Dyspnea:   NO  Anxiety:NO  Fatigue: Mild to moderate   Nausea: NO  Loss of Appetite:NO  Constipation [ x ] YES. Colostomy output: 0 recently.     OTHER SYMPTOMS:  [ X] All other ROS negative     [ ] Unable to obtain due to poor mentation    MEDICATIONS  (STANDING):  enoxaparin Injectable 40 milliGRAM(s) SubCutaneous daily  tetracaine/benzocaine/butamben Spray 1 Spray(s) Topical three times a day  cefepime  IVPB 2000 milliGRAM(s) IV Intermittent every 12 hours  fluconAZOLE IVPB 200 milliGRAM(s) IV Intermittent every 24 hours  HYDROmorphone PCA (1 mG/mL) 30 milliLiter(s) PCA Continuous PCA Continuous  dextrose 5% + sodium chloride 0.9%. 1000 milliLiter(s) (75 mL/Hr) IV Continuous <Continuous>  acetaminophen  IVPB. 1000 milliGRAM(s) IV Intermittent <User Schedule>    MEDICATIONS  (PRN):  ondansetron Injectable 4 milliGRAM(s) IV Push every 6 hours PRN Nausea  naloxone Injectable 0.1 milliGRAM(s) IV Push every 3 minutes PRN For ANY of the following changes in patient status:  A. RR LESS THAN 10 breaths per minute, B. Oxygen saturation LESS THAN 90%, C. Sedation score of 6  HYDROmorphone PCA (1 mG/mL) Rescue Clinician Bolus 1 milliGRAM(s) IV Push every 1 hour PRN for Pain Scale GREATER THAN 6    Karnofsky Performance Score/Palliative Performance Status Version 2: 40-50  %  Protein Calorie Malnutrition:  [ ] Mild   [ ] Moderate   [ ] Severe     Physical Exam:    Vital Signs Last 24 Hrs  T(C): 36.9 (30 Jun 2017 18:12), Max: 37.1 (30 Jun 2017 14:08)  T(F): 98.4 (30 Jun 2017 18:12), Max: 98.7 (30 Jun 2017 14:08)  HR: 92 (30 Jun 2017 18:12) (86 - 100)  BP: 104/70 (30 Jun 2017 18:12) (104/70 - 121/84)  BP(mean): --  RR: 16 (30 Jun 2017 18:12) (16 - 18)  SpO2: 99% (30 Jun 2017 18:12) (96% - 99%)    General: Awake alert, comfortable.   HEENT: PERRL with EOMI.   Lungs: Clear to Ausc bilaterally, no wheezing.   Cardiovascular: S1, S2. No S3  RRR, no murmurs   Abdomen: Soft, No guarding, tenderness to palpate diffusely.  BS  x 4 (+) but decreased.  venting PEG  Genitourinary: Normal  Musculoskeletal:  Weakness   Neurological: no focal deficits. Alert, oriented x 3     Skin: warm and dry     Labs:                                                               9.5    11.3  )-----------( 405      ( 30 Jun 2017 07:07 )             29.1   06-30    135  |  96  |  4<L>  ----------------------------<  91  4.0   |  27  |  0.37<L>    Ca    9.0      30 Jun 2017 07:04  Phos  3.9     06-30  Mg     1.8     06-30      Imaging  < from: CT Abdomen and Pelvis w/ Oral Cont and w/ IV Cont (06.29.17 @ 21:36) >  EXAM:  CT ABDOMEN AND PELVIS OC IC                            PROCEDURE DATE:  06/29/2017  IMPRESSION: Marked interval decrease in the size of a left upper quadrant   fluid collection status post placement of a percutaneous drainage   catheter.    Minimal increase in size of several peritoneal implants.    < end of copied text >                             Assessment and Plan

## 2017-06-30 NOTE — PROVIDER CONTACT NOTE (OTHER) - BACKGROUND
Pt admitted on 6/2 with acute cholycystitis, hx of ovarian CA with mets and s/p GRETCHEN with Hartmans procedure, currenltly on Lovenox 40mg QD and PAS venodynes for VTE PPX

## 2017-06-30 NOTE — PROGRESS NOTE ADULT - PROBLEM SELECTOR PLAN 4
Chaplaincy is already following up.  Plan of care was discussed with the patient and family at bedside.

## 2017-06-30 NOTE — PROGRESS NOTE ADULT - PROBLEM SELECTOR PLAN 1
Will continue  Dilauid PCA CR @ 0.5 mg/h, and  DD 0.5 mg q 15 DD. Will also continue tylenol 1 gram q 8 hours for at least 24 hours.  CT results noted.

## 2017-06-30 NOTE — PROGRESS NOTE ADULT - SUBJECTIVE AND OBJECTIVE BOX
GENERAL SURGERY DAILY PROGRESS NOTE:     Subjective:  No ostomy fxn yet. Pain controlled. +OOB. +void      Objective:    MEDICATIONS  (STANDING):  enoxaparin Injectable 40 milliGRAM(s) SubCutaneous daily  tetracaine/benzocaine/butamben Spray 1 Spray(s) Topical three times a day  cefepime  IVPB 2000 milliGRAM(s) IV Intermittent every 12 hours  fluconAZOLE IVPB 200 milliGRAM(s) IV Intermittent every 24 hours  HYDROmorphone PCA (1 mG/mL) 30 milliLiter(s) PCA Continuous PCA Continuous  dextrose 5% + sodium chloride 0.9%. 1000 milliLiter(s) (75 mL/Hr) IV Continuous <Continuous>  acetaminophen  IVPB. 1000 milliGRAM(s) IV Intermittent <User Schedule>  acetaminophen  IVPB. 1000 milliGRAM(s) IV Intermittent <User Schedule>  acetaminophen  IVPB. 1000 milliGRAM(s) IV Intermittent <User Schedule>    MEDICATIONS  (PRN):  ondansetron Injectable 4 milliGRAM(s) IV Push every 6 hours PRN Nausea  naloxone Injectable 0.1 milliGRAM(s) IV Push every 3 minutes PRN For ANY of the following changes in patient status:  A. RR LESS THAN 10 breaths per minute, B. Oxygen saturation LESS THAN 90%, C. Sedation score of 6  HYDROmorphone PCA (1 mG/mL) Rescue Clinician Bolus 1 milliGRAM(s) IV Push every 1 hour PRN for Pain Scale GREATER THAN 6      Vital Signs Last 24 Hrs  T(C): 36.9 (30 Jun 2017 01:07), Max: 37.1 (29 Jun 2017 06:01)  T(F): 98.4 (30 Jun 2017 01:07), Max: 98.7 (29 Jun 2017 06:01)  HR: 91 (30 Jun 2017 01:07) (80 - 98)  BP: 108/74 (30 Jun 2017 01:07) (108/74 - 121/77)  BP(mean): --  RR: 18 (30 Jun 2017 01:07) (16 - 18)  SpO2: 98% (30 Jun 2017 01:07) (97% - 99%)    I&O's Detail    28 Jun 2017 07:01  -  29 Jun 2017 07:00  --------------------------------------------------------  IN:    dextrose 5% + sodium chloride 0.45% with potassium chloride 20 mEq/L: 2100 mL    Oral Fluid: 820 mL  Total IN: 2920 mL    OUT:    Bulb: 5 mL    Drain: 225 mL    Voided: 1960 mL  Total OUT: 2190 mL    Total NET: 730 mL      29 Jun 2017 07:01  -  30 Jun 2017 05:16  --------------------------------------------------------  IN:    dextrose 5% + sodium chloride 0.45% with potassium chloride 20 mEq/L: 400 mL    dextrose 5% + sodium chloride 0.9%.: 1125 mL    Solution: 100 mL  Total IN: 1625 mL    OUT:    Bulb: 5 mL    Drain: 50 mL    Drain: 500 mL    Voided: 1900 mL  Total OUT: 2455 mL    Total NET: -830 mL          Daily     Daily     LABS:                        9.8    13.5  )-----------( 429      ( 29 Jun 2017 07:00 )             30.6     06-29    133<L>  |  93<L>  |  3<L>  ----------------------------<  102<H>  4.5   |  29  |  0.44<L>    Ca    9.3      29 Jun 2017 07:00  Phos  3.9     06-29  Mg     2.0     06-29        NAD, awake and alert  Respirations nonlabored  Abdomen soft, nontender  Retention sutures in midline c/d/i  No guarding or rebound tenderness  Ostomy pink and viable, no output  PTC bilious  HARSHIL serrous

## 2017-06-30 NOTE — PROGRESS NOTE ADULT - ASSESSMENT
45 yo F with ovarian cancer s/p GRETCHEN, presented with abdominal pain, found to have acute cholecystitis, complicated after stent placement with perforated gallbladder with an associated intra-abdominal abscesse and bowel obstruction (likely malignant bowel obstruction).  S/p stent removal 6/7/2017 via endoscopy with NG tube placement. s/p intraabdominal drain for abscess drainage. s/p exlap with G-tube insertion. Seen for Pain management.

## 2017-06-30 NOTE — PROVIDER CONTACT NOTE (OTHER) - ASSESSMENT
Pt has hx of B/L calf pain, denies this pain now, no swelling noted in either lower extremity, duplex scan ordered for r/o DVT but cancelled and never completed

## 2017-07-01 LAB
ANION GAP SERPL CALC-SCNC: 12 MMOL/L — SIGNIFICANT CHANGE UP (ref 5–17)
ANION GAP SERPL CALC-SCNC: 13 MMOL/L — SIGNIFICANT CHANGE UP (ref 5–17)
BASOPHILS # BLD AUTO: 0 K/UL — SIGNIFICANT CHANGE UP (ref 0–0.2)
BASOPHILS NFR BLD AUTO: 0.3 % — SIGNIFICANT CHANGE UP (ref 0–2)
BUN SERPL-MCNC: 6 MG/DL — LOW (ref 7–23)
BUN SERPL-MCNC: 6 MG/DL — LOW (ref 7–23)
CALCIUM SERPL-MCNC: 8.5 MG/DL — SIGNIFICANT CHANGE UP (ref 8.4–10.5)
CALCIUM SERPL-MCNC: 8.6 MG/DL — SIGNIFICANT CHANGE UP (ref 8.4–10.5)
CHLORIDE SERPL-SCNC: 97 MMOL/L — SIGNIFICANT CHANGE UP (ref 96–108)
CHLORIDE SERPL-SCNC: 98 MMOL/L — SIGNIFICANT CHANGE UP (ref 96–108)
CO2 SERPL-SCNC: 24 MMOL/L — SIGNIFICANT CHANGE UP (ref 22–31)
CO2 SERPL-SCNC: 27 MMOL/L — SIGNIFICANT CHANGE UP (ref 22–31)
CREAT SERPL-MCNC: 0.38 MG/DL — LOW (ref 0.5–1.3)
CREAT SERPL-MCNC: 0.41 MG/DL — LOW (ref 0.5–1.3)
EOSINOPHIL # BLD AUTO: 0.1 K/UL — SIGNIFICANT CHANGE UP (ref 0–0.5)
EOSINOPHIL NFR BLD AUTO: 1.1 % — SIGNIFICANT CHANGE UP (ref 0–6)
GLUCOSE SERPL-MCNC: 91 MG/DL — SIGNIFICANT CHANGE UP (ref 70–99)
GLUCOSE SERPL-MCNC: 96 MG/DL — SIGNIFICANT CHANGE UP (ref 70–99)
HCT VFR BLD CALC: 27.2 % — LOW (ref 34.5–45)
HCT VFR BLD CALC: 28.3 % — LOW (ref 34.5–45)
HGB BLD-MCNC: 8.9 G/DL — LOW (ref 11.5–15.5)
HGB BLD-MCNC: 8.9 G/DL — LOW (ref 11.5–15.5)
LYMPHOCYTES # BLD AUTO: 1.1 K/UL — SIGNIFICANT CHANGE UP (ref 1–3.3)
LYMPHOCYTES # BLD AUTO: 11 % — LOW (ref 13–44)
MAGNESIUM SERPL-MCNC: 1.8 MG/DL — SIGNIFICANT CHANGE UP (ref 1.6–2.6)
MCHC RBC-ENTMCNC: 27.8 PG — SIGNIFICANT CHANGE UP (ref 27–34)
MCHC RBC-ENTMCNC: 28.6 PG — SIGNIFICANT CHANGE UP (ref 27–34)
MCHC RBC-ENTMCNC: 31.5 GM/DL — LOW (ref 32–36)
MCHC RBC-ENTMCNC: 32.7 GM/DL — SIGNIFICANT CHANGE UP (ref 32–36)
MCV RBC AUTO: 87.5 FL — SIGNIFICANT CHANGE UP (ref 80–100)
MCV RBC AUTO: 88.2 FL — SIGNIFICANT CHANGE UP (ref 80–100)
MONOCYTES # BLD AUTO: 0.6 K/UL — SIGNIFICANT CHANGE UP (ref 0–0.9)
MONOCYTES NFR BLD AUTO: 6.7 % — SIGNIFICANT CHANGE UP (ref 2–14)
NEUTROPHILS # BLD AUTO: 7.9 K/UL — HIGH (ref 1.8–7.4)
NEUTROPHILS NFR BLD AUTO: 80.9 % — HIGH (ref 43–77)
PHOSPHATE SERPL-MCNC: 3.1 MG/DL — SIGNIFICANT CHANGE UP (ref 2.5–4.5)
PLATELET # BLD AUTO: 433 K/UL — HIGH (ref 150–400)
PLATELET # BLD AUTO: 455 K/UL — HIGH (ref 150–400)
POTASSIUM SERPL-MCNC: 3.8 MMOL/L — SIGNIFICANT CHANGE UP (ref 3.5–5.3)
POTASSIUM SERPL-MCNC: 4.1 MMOL/L — SIGNIFICANT CHANGE UP (ref 3.5–5.3)
POTASSIUM SERPL-SCNC: 3.8 MMOL/L — SIGNIFICANT CHANGE UP (ref 3.5–5.3)
POTASSIUM SERPL-SCNC: 4.1 MMOL/L — SIGNIFICANT CHANGE UP (ref 3.5–5.3)
RBC # BLD: 3.11 M/UL — LOW (ref 3.8–5.2)
RBC # BLD: 3.2 M/UL — LOW (ref 3.8–5.2)
RBC # FLD: 13.2 % — SIGNIFICANT CHANGE UP (ref 10.3–14.5)
RBC # FLD: 13.3 % — SIGNIFICANT CHANGE UP (ref 10.3–14.5)
SODIUM SERPL-SCNC: 134 MMOL/L — LOW (ref 135–145)
SODIUM SERPL-SCNC: 137 MMOL/L — SIGNIFICANT CHANGE UP (ref 135–145)
WBC # BLD: 8.4 K/UL — SIGNIFICANT CHANGE UP (ref 3.8–10.5)
WBC # BLD: 9.8 K/UL — SIGNIFICANT CHANGE UP (ref 3.8–10.5)
WBC # FLD AUTO: 8.4 K/UL — SIGNIFICANT CHANGE UP (ref 3.8–10.5)
WBC # FLD AUTO: 9.8 K/UL — SIGNIFICANT CHANGE UP (ref 3.8–10.5)

## 2017-07-01 RX ORDER — POTASSIUM CHLORIDE 20 MEQ
20 PACKET (EA) ORAL ONCE
Qty: 0 | Refills: 0 | Status: DISCONTINUED | OUTPATIENT
Start: 2017-07-01 | End: 2017-07-01

## 2017-07-01 RX ORDER — POLYETHYLENE GLYCOL 3350 17 G/17G
17 POWDER, FOR SOLUTION ORAL ONCE
Qty: 0 | Refills: 0 | Status: COMPLETED | OUTPATIENT
Start: 2017-07-01 | End: 2017-07-01

## 2017-07-01 RX ORDER — MAGNESIUM SULFATE 500 MG/ML
2 VIAL (ML) INJECTION ONCE
Qty: 0 | Refills: 0 | Status: DISCONTINUED | OUTPATIENT
Start: 2017-07-01 | End: 2017-07-01

## 2017-07-01 RX ADMIN — FLUCONAZOLE 100 MILLIGRAM(S): 150 TABLET ORAL at 06:00

## 2017-07-01 RX ADMIN — CEFEPIME 100 MILLIGRAM(S): 1 INJECTION, POWDER, FOR SOLUTION INTRAMUSCULAR; INTRAVENOUS at 05:03

## 2017-07-01 RX ADMIN — ENOXAPARIN SODIUM 40 MILLIGRAM(S): 100 INJECTION SUBCUTANEOUS at 12:41

## 2017-07-01 RX ADMIN — HYDROMORPHONE HYDROCHLORIDE 30 MILLILITER(S): 2 INJECTION INTRAMUSCULAR; INTRAVENOUS; SUBCUTANEOUS at 07:19

## 2017-07-01 RX ADMIN — HYDROMORPHONE HYDROCHLORIDE 30 MILLILITER(S): 2 INJECTION INTRAMUSCULAR; INTRAVENOUS; SUBCUTANEOUS at 19:25

## 2017-07-01 RX ADMIN — SODIUM CHLORIDE 75 MILLILITER(S): 9 INJECTION, SOLUTION INTRAVENOUS at 06:00

## 2017-07-01 NOTE — PROGRESS NOTE ADULT - SUBJECTIVE AND OBJECTIVE BOX
Pt c/o back pain this morning. No abd pain. No nausea or vomiting, tolerated G tube clamp.    Objective:   Vitals:   Vital Signs Last 24 Hrs  T(C): 36.9 (01 Jul 2017 05:17), Max: 37.1 (30 Jun 2017 14:08)  T(F): 98.4 (01 Jul 2017 05:17), Max: 98.7 (30 Jun 2017 14:08)  HR: 84 (01 Jul 2017 05:17) (80 - 98)  BP: 118/80 (01 Jul 2017 05:17) (104/70 - 118/80)  BP(mean): --  RR: 16 (01 Jul 2017 05:17) (16 - 16)  SpO2: 98% (01 Jul 2017 05:17) (97% - 99%)  In/Outs:    06-30 @ 07:01  -  07-01 @ 07:00  --------------------------------------------------------  IN:    dextrose 5% + sodium chloride 0.9%.: 1875 mL    Oral Fluid: 1110 mL    Solution: 100 mL    Solution: 100 mL    Solution: 100 mL  Total IN: 3285 mL    OUT:    Bulb: 7 mL    Colostomy: 50 mL    Drain: 75 mL    Drain: 15 mL    Voided: 1300 mL  Total OUT: 1447 mL    Total NET: 1838 mL      Physical Exam  General: NAD   Abdomen: soft, NT, ND. ostomy with + gas in the bag, no output (50 mL documented yesterday)  Cholecystostomy tube bilious  Drain- dark brown nonpurulent drainage.  Incision is clean, dry and intact    MEDICATIONS  (STANDING):  enoxaparin Injectable 40 milliGRAM(s) SubCutaneous daily  tetracaine/benzocaine/butamben Spray 1 Spray(s) Topical three times a day  HYDROmorphone PCA (1 mG/mL) 30 milliLiter(s) PCA Continuous PCA Continuous  dextrose 5% + sodium chloride 0.9%. 1000 milliLiter(s) (75 mL/Hr) IV Continuous <Continuous>    MEDICATIONS  (PRN):  ondansetron Injectable 4 milliGRAM(s) IV Push every 6 hours PRN Nausea  naloxone Injectable 0.1 milliGRAM(s) IV Push every 3 minutes PRN For ANY of the following changes in patient status:  A. RR LESS THAN 10 breaths per minute, B. Oxygen saturation LESS THAN 90%, C. Sedation score of 6  HYDROmorphone PCA (1 mG/mL) Rescue Clinician Bolus 1 milliGRAM(s) IV Push every 1 hour PRN for Pain Scale GREATER THAN 6      LABS:                        8.9    8.4   )-----------( 455      ( 01 Jul 2017 06:46 )             28.3     07-01    137  |  98  |  6<L>  ----------------------------<  91  3.8   |  27  |  0.38<L>    Ca    8.5      01 Jul 2017 06:46  Phos  3.1     07-01  Mg     1.8     07-01      RADIOLOGY & ADDITIONAL STUDIES:  < from: CT Abdomen and Pelvis w/ Oral Cont and w/ IV Cont (06.29.17 @ 21:36) >  There has been interval placement of a percutaneous   drainage catheter with tip resting in the left upper quadrant collection.   There is overall decrease in the size of the collection which measures   proximally 12.5 x 3.3 cm (previously 23x 9.2 cm). Percutaneous   gastrostomy tube noted with balloon in the gastric lumen. Stable surgical   clips adjacent to the stomach. Status post left lower quadrant colostomy   and Larson's pouch. No bowel obstruction.    < end of copied text >    45 yo F who had an attempted gastro-cholecystic stent drainage for acute cholecystitis which was complicated by a large retro-gastric abscess which was drained by IR and stent was removed endoscopically, s/p IR perc ubaldo, s/p IR aspiration 6/16, now s/p exlap CORDELL for malignant SBO and G-tube insertion POD 10. Pt had not had any GI fxn since surgery until yesterday. She also had a CT scan on 6/29 showing resolution of her obstruction.  - Pain control  - continue to clamp G tube  - VTE ppx  - Reg as tolerated  - discontinue Abx and diflucan today.

## 2017-07-02 PROCEDURE — 99233 SBSQ HOSP IP/OBS HIGH 50: CPT

## 2017-07-02 RX ORDER — HYDROMORPHONE HYDROCHLORIDE 2 MG/ML
30 INJECTION INTRAMUSCULAR; INTRAVENOUS; SUBCUTANEOUS
Qty: 0 | Refills: 0 | Status: DISCONTINUED | OUTPATIENT
Start: 2017-07-02 | End: 2017-07-02

## 2017-07-02 RX ORDER — POLYETHYLENE GLYCOL 3350 17 G/17G
17 POWDER, FOR SOLUTION ORAL ONCE
Qty: 0 | Refills: 0 | Status: COMPLETED | OUTPATIENT
Start: 2017-07-02 | End: 2017-07-02

## 2017-07-02 RX ORDER — HYDROMORPHONE HYDROCHLORIDE 2 MG/ML
1 INJECTION INTRAMUSCULAR; INTRAVENOUS; SUBCUTANEOUS
Qty: 0 | Refills: 0 | Status: DISCONTINUED | OUTPATIENT
Start: 2017-07-02 | End: 2017-07-03

## 2017-07-02 RX ORDER — HYDROMORPHONE HYDROCHLORIDE 2 MG/ML
1 INJECTION INTRAMUSCULAR; INTRAVENOUS; SUBCUTANEOUS
Qty: 0 | Refills: 0 | Status: DISCONTINUED | OUTPATIENT
Start: 2017-07-02 | End: 2017-07-02

## 2017-07-02 RX ORDER — FENTANYL CITRATE 50 UG/ML
1 INJECTION INTRAVENOUS
Qty: 0 | Refills: 0 | Status: DISCONTINUED | OUTPATIENT
Start: 2017-07-02 | End: 2017-07-04

## 2017-07-02 RX ADMIN — POLYETHYLENE GLYCOL 3350 17 GRAM(S): 17 POWDER, FOR SOLUTION ORAL at 21:33

## 2017-07-02 RX ADMIN — HYDROMORPHONE HYDROCHLORIDE 30 MILLILITER(S): 2 INJECTION INTRAMUSCULAR; INTRAVENOUS; SUBCUTANEOUS at 12:17

## 2017-07-02 RX ADMIN — HYDROMORPHONE HYDROCHLORIDE 30 MILLILITER(S): 2 INJECTION INTRAMUSCULAR; INTRAVENOUS; SUBCUTANEOUS at 19:56

## 2017-07-02 RX ADMIN — FENTANYL CITRATE 1 PATCH: 50 INJECTION INTRAVENOUS at 12:18

## 2017-07-02 RX ADMIN — HYDROMORPHONE HYDROCHLORIDE 30 MILLILITER(S): 2 INJECTION INTRAMUSCULAR; INTRAVENOUS; SUBCUTANEOUS at 07:11

## 2017-07-02 RX ADMIN — ENOXAPARIN SODIUM 40 MILLIGRAM(S): 100 INJECTION SUBCUTANEOUS at 12:18

## 2017-07-02 RX ADMIN — POLYETHYLENE GLYCOL 3350 17 GRAM(S): 17 POWDER, FOR SOLUTION ORAL at 13:05

## 2017-07-02 NOTE — PROGRESS NOTE ADULT - PROBLEM SELECTOR PLAN 4
Chaplaincy is already following up.  Plan of care was discussed with the patient and the primary team.

## 2017-07-02 NOTE — PROGRESS NOTE ADULT - PROBLEM SELECTOR PLAN 1
Will DC Dialudid PCA today at around 7 pm.   Will start Fentanyl 50 mcg/h q 72 h. Will Start Dilaudid PRN 2 mg IV q 2 PRN Will DC Dialudid PCA today at around 7 pm.   Will start Fentanyl 50 mcg/h q 72 h. Will Start Dilaudid PRN 1 mg IV q 2 PRN

## 2017-07-02 NOTE — PROGRESS NOTE ADULT - SUBJECTIVE AND OBJECTIVE BOX
Objective:   Vitals:   Vital Signs Last 24 Hrs  T(C): 37 (02 Jul 2017 08:49), Max: 37.4 (01 Jul 2017 17:01)  T(F): 98.6 (02 Jul 2017 08:49), Max: 99.3 (01 Jul 2017 17:01)  HR: 87 (02 Jul 2017 08:49) (87 - 96)  BP: 103/66 (02 Jul 2017 08:49) (103/66 - 120/83)  BP(mean): --  RR: 17 (02 Jul 2017 08:49) (16 - 17)  SpO2: 96% (02 Jul 2017 08:49) (96% - 99%)  In/Outs:    07-01 @ 07:01  -  07-02 @ 07:00  --------------------------------------------------------  IN:    dextrose 5% + sodium chloride 0.9%.: 900 mL    Oral Fluid: 360 mL  Total IN: 1260 mL    OUT:    Colostomy: 10 mL    Drain: 25 mL    Voided: 1475 mL  Total OUT: 1510 mL    Total NET: -250 mL      07-02 @ 07:01  -  07-02 @ 09:35  --------------------------------------------------------  IN:    Oral Fluid: 180 mL  Total IN: 180 mL    OUT:  Total OUT: 0 mL    Total NET: 180 mL          Physical Exam  General: NAD   Abdomen: soft, NT, ND.Colostomy + gas, no stool output. Digitalizes past the fascia easily, no stool.  Incision is clean, dry and intact    Cholecystotomy tube - bilious  AHRSHIL drain- dark nonpurulent minimal drainage.    MEDICATIONS  (STANDING):  enoxaparin Injectable 40 milliGRAM(s) SubCutaneous daily  tetracaine/benzocaine/butamben Spray 1 Spray(s) Topical three times a day  HYDROmorphone PCA (1 mG/mL) 30 milliLiter(s) PCA Continuous PCA Continuous  dextrose 5% + sodium chloride 0.9%. 1000 milliLiter(s) (75 mL/Hr) IV Continuous <Continuous>    MEDICATIONS  (PRN):  ondansetron Injectable 4 milliGRAM(s) IV Push every 6 hours PRN Nausea  naloxone Injectable 0.1 milliGRAM(s) IV Push every 3 minutes PRN For ANY of the following changes in patient status:  A. RR LESS THAN 10 breaths per minute, B. Oxygen saturation LESS THAN 90%, C. Sedation score of 6  HYDROmorphone PCA (1 mG/mL) Rescue Clinician Bolus 1 milliGRAM(s) IV Push every 1 hour PRN for Pain Scale GREATER THAN 6      LABS:                        8.9    9.8   )-----------( 433      ( 01 Jul 2017 16:44 )             27.2     07-01    134<L>  |  97  |  6<L>  ----------------------------<  96  4.1   |  24  |  0.41<L>    Ca    8.6      01 Jul 2017 16:44  Phos  3.1     07-01  Mg     1.8     07-01        45 yo F who had an attempted gastro-cholecystic stent drainage for acute cholecystitis which was complicated by a large retro-gastric abscess which was drained by IR and stent was removed endoscopically, s/p IR perc ubaldo, s/p IR aspiration 6/16, now s/p exlap CORDELL for malignant SBO and G-tube insertion POD 10. Pt had not had any GI fxn since surgery until yesterday. She also had a CT scan on 6/29 showing resolution of her obstruction.  - Pain control  - continue to clamp G tube  - VTE ppx  - Reg as tolerated  - Miralax No complaints.  Raquel diet. Ost w air            Objective:   Vitals:   Vital Signs Last 24 Hrs  T(C): 37 (02 Jul 2017 08:49), Max: 37.4 (01 Jul 2017 17:01)  T(F): 98.6 (02 Jul 2017 08:49), Max: 99.3 (01 Jul 2017 17:01)  HR: 87 (02 Jul 2017 08:49) (87 - 96)  BP: 103/66 (02 Jul 2017 08:49) (103/66 - 120/83)  BP(mean): --  RR: 17 (02 Jul 2017 08:49) (16 - 17)  SpO2: 96% (02 Jul 2017 08:49) (96% - 99%)  In/Outs:    07-01 @ 07:01  -  07-02 @ 07:00  --------------------------------------------------------  IN:    dextrose 5% + sodium chloride 0.9%.: 900 mL    Oral Fluid: 360 mL  Total IN: 1260 mL    OUT:    Colostomy: 10 mL    Drain: 25 mL    Voided: 1475 mL  Total OUT: 1510 mL    Total NET: -250 mL      07-02 @ 07:01  -  07-02 @ 09:35  --------------------------------------------------------  IN:    Oral Fluid: 180 mL  Total IN: 180 mL    OUT:  Total OUT: 0 mL    Total NET: 180 mL          Physical Exam  General: NAD   Abdomen: soft, NT, ND.Colostomy + gas, no stool output. Digitalizes past the fascia easily, no stool.  Incision is clean, dry and intact    Cholecystotomy tube - bilious  HARSHIL drain- dark nonpurulent minimal drainage.    MEDICATIONS  (STANDING):  enoxaparin Injectable 40 milliGRAM(s) SubCutaneous daily  tetracaine/benzocaine/butamben Spray 1 Spray(s) Topical three times a day  HYDROmorphone PCA (1 mG/mL) 30 milliLiter(s) PCA Continuous PCA Continuous  dextrose 5% + sodium chloride 0.9%. 1000 milliLiter(s) (75 mL/Hr) IV Continuous <Continuous>    MEDICATIONS  (PRN):  ondansetron Injectable 4 milliGRAM(s) IV Push every 6 hours PRN Nausea  naloxone Injectable 0.1 milliGRAM(s) IV Push every 3 minutes PRN For ANY of the following changes in patient status:  A. RR LESS THAN 10 breaths per minute, B. Oxygen saturation LESS THAN 90%, C. Sedation score of 6  HYDROmorphone PCA (1 mG/mL) Rescue Clinician Bolus 1 milliGRAM(s) IV Push every 1 hour PRN for Pain Scale GREATER THAN 6      LABS:                        8.9    9.8   )-----------( 433      ( 01 Jul 2017 16:44 )             27.2     07-01    134<L>  |  97  |  6<L>  ----------------------------<  96  4.1   |  24  |  0.41<L>    Ca    8.6      01 Jul 2017 16:44  Phos  3.1     07-01  Mg     1.8     07-01        45 yo F who had an attempted gastro-cholecystic stent drainage for acute cholecystitis which was complicated by a large retro-gastric abscess which was drained by IR and stent was removed endoscopically, s/p IR perc ubaldo, s/p IR aspiration 6/16, now s/p exlap CORDELL for malignant SBO and G-tube insertion POD 10. Pt had not had any GI fxn since surgery until yesterday. She also had a CT scan on 6/29 showing resolution of her obstruction.  - Pain control  - continue to clamp G tube  - VTE ppx  - Reg as tolerated  - Miralax

## 2017-07-02 NOTE — PROGRESS NOTE ADULT - SUBJECTIVE AND OBJECTIVE BOX
Patient seen and examined at  17:00 on 6/29  INTERVAL HPI/OVERNIGHT EVENTS: Following up for pain. Patient indicates pain is improving progressively. She also indicates she is having small liquid BMs   ADVANCE DIRECTIVES:    DNR: NO           PRESENT SYMPTOMS:   SOURCE: Patient      Pain: Mild. Non-radiating, generalized, Throbbing, Intermittent, Improving with Dilaudid. Increasing with palpation.     Dyspnea:   NO  Anxiety:NO  Fatigue: Mild to moderate   Nausea: NO  Loss of Appetite:NO  Constipation [ x ] YES. Colostomy output: 0 recently.     OTHER SYMPTOMS:  [ X] All other ROS negative     [ ] Unable to obtain due to poor mentation    MEDICATIONS  (STANDING):  MEDICATIONS  (STANDING):  enoxaparin Injectable 40 milliGRAM(s) SubCutaneous daily  tetracaine/benzocaine/butamben Spray 1 Spray(s) Topical three times a day  dextrose 5% + sodium chloride 0.9%. 1000 milliLiter(s) (75 mL/Hr) IV Continuous <Continuous>  HYDROmorphone PCA (1 mG/mL) 30 milliLiter(s) PCA Continuous PCA Continuous  fentaNYL   Patch  50 MICROgram(s)/Hr 1 Patch Transdermal every 72 hours    MEDICATIONS  (PRN):  ondansetron Injectable 4 milliGRAM(s) IV Push every 6 hours PRN Nausea  naloxone Injectable 0.1 milliGRAM(s) IV Push every 3 minutes PRN For ANY of the following changes in patient status:  A. RR LESS THAN 10 breaths per minute, B. Oxygen saturation LESS THAN 90%, C. Sedation score of 6  HYDROmorphone PCA (1 mG/mL) Rescue Clinician Bolus 1 milliGRAM(s) IV Push every 1 hour PRN for Pain Scale GREATER THAN 6    Karnofsky Performance Score/Palliative Performance Status Version 2: 40-50  %  Protein Calorie Malnutrition:  [ ] Mild   [ ] Moderate   [ ] Severe     Physical Exam:    Vital Signs Last 24 Hrs  T(C): 37 (02 Jul 2017 08:49), Max: 37.4 (01 Jul 2017 17:01)  T(F): 98.6 (02 Jul 2017 08:49), Max: 99.3 (01 Jul 2017 17:01)  HR: 87 (02 Jul 2017 08:49) (87 - 96)  BP: 103/66 (02 Jul 2017 08:49) (103/66 - 120/83)  BP(mean): --  RR: 17 (02 Jul 2017 08:49) (16 - 17)  SpO2: 96% (02 Jul 2017 08:49) (96% - 99%)    General: Awake alert, comfortable.   HEENT: PERRL with EOMI.   Lungs: Clear to Ausc bilaterally, no wheezing.   Cardiovascular: S1, S2. No S3  RRR, no murmurs   Abdomen: Soft, No guarding, tenderness to palpate diffusely.  BS  x 4 (+) but decreased.  venting PEG  Genitourinary: Normal  Musculoskeletal:  Weakness   Neurological: no focal deficits. Alert, oriented x 3     Skin: warm and dry     Labs:                                    8.9    9.8   )-----------( 433      ( 01 Jul 2017 16:44 )             27.2   07-01    134<L>  |  97  |  6<L>  ----------------------------<  96  4.1   |  24  |  0.41<L>    Ca    8.6      01 Jul 2017 16:44  Phos  3.1     07-01  Mg     1.8     07-01        Imaging  < from: CT Abdomen and Pelvis w/ Oral Cont and w/ IV Cont (06.29.17 @ 21:36) >  EXAM:  CT ABDOMEN AND PELVIS OC IC                            PROCEDURE DATE:  06/29/2017  IMPRESSION: Marked interval decrease in the size of a left upper quadrant   fluid collection status post placement of a percutaneous drainage   catheter.    Minimal increase in size of several peritoneal implants.    < end of copied text >                             Assessment and Plan

## 2017-07-03 ENCOUNTER — TRANSCRIPTION ENCOUNTER (OUTPATIENT)
Age: 47
End: 2017-07-03

## 2017-07-03 PROCEDURE — 99231 SBSQ HOSP IP/OBS SF/LOW 25: CPT

## 2017-07-03 RX ORDER — HYDROMORPHONE HYDROCHLORIDE 2 MG/ML
4 INJECTION INTRAMUSCULAR; INTRAVENOUS; SUBCUTANEOUS EVERY 4 HOURS
Qty: 0 | Refills: 0 | Status: DISCONTINUED | OUTPATIENT
Start: 2017-07-03 | End: 2017-07-04

## 2017-07-03 RX ADMIN — HYDROMORPHONE HYDROCHLORIDE 4 MILLIGRAM(S): 2 INJECTION INTRAMUSCULAR; INTRAVENOUS; SUBCUTANEOUS at 19:37

## 2017-07-03 RX ADMIN — ENOXAPARIN SODIUM 40 MILLIGRAM(S): 100 INJECTION SUBCUTANEOUS at 12:26

## 2017-07-03 RX ADMIN — HYDROMORPHONE HYDROCHLORIDE 4 MILLIGRAM(S): 2 INJECTION INTRAMUSCULAR; INTRAVENOUS; SUBCUTANEOUS at 20:01

## 2017-07-03 RX ADMIN — SODIUM CHLORIDE 10 MILLILITER(S): 9 INJECTION, SOLUTION INTRAVENOUS at 12:26

## 2017-07-03 NOTE — PROGRESS NOTE ADULT - SUBJECTIVE AND OBJECTIVE BOX
Interventional Radiology    S/P Cholecystostomy Catheter placement on 6/9/17 and s/p Left side abd fluid collection drainage on 6/12 and exchange upsizing of catheter on 6/16.  Left side abdominal drainage catheter was since removed by Surgical team.    Vital Signs Last 24 Hrs  T(C): 37 (03 Jul 2017 13:48), Max: 37.6 (03 Jul 2017 01:59)  T(F): 98.6 (03 Jul 2017 13:48), Max: 99.7 (03 Jul 2017 01:59)  HR: 87 (03 Jul 2017 13:48) (87 - 94)  BP: 113/79 (03 Jul 2017 13:48) (96/68 - 113/79)  BP(mean): --  RR: 18 (03 Jul 2017 13:48) (16 - 18)  SpO2: 97% (03 Jul 2017 13:48) (93% - 100%)    General Appearance: Pt is in NAD seen at bedside    Procedure Site (RUQ): Drainage catheter intact draining to leg bag, draining brown fluid to leg bag    Outputs:   Drain: 20 mL        46y Female with PMH of Ovarian cancer S/P cholecystostomy catheter placement    Continue care as per primary team  Monitor output from cholecystostomy catheter, flush with 5 ml sterile normal saline once daily, pt can f/u for tube check with IR with referral from Surgical team after 4 weeks.  Maintain dressing Clean, dry, intact over drainage catheter site at all times.      Jeremiah Villagomez, ONOFRE-C  [facility] [address]  Ext [phone]

## 2017-07-03 NOTE — PROGRESS NOTE ADULT - ASSESSMENT
47 yo F HD #[id number], underwent attempted drainage for acute cholecystitis via gastro-cholecystic stent complicated by a large retro-gastric abscess; s/p endoscopic stent removal (6/7); s/p IR percutaneous cholecystostomy (6/9), s/p IR drainage of retrogastic abscess (6/12) and repeat IR aspiration (6/16), now POD # 12 s/p ex-lap, lysis of adhesions for malignant SBO and G-tube insertion (6/21), afebrile, hemodynamically stable, and with returning GI function (gas and stool observed in ostomy).    - analgesia PRN  - continue to clamp G tube and feed PO, regular diet as tolerated  - VTE ppx with Lovenox  - no evidence of acute infection at this time, monitor for fever and trend WBC off antibiotics    --LEN Blair, [phone]

## 2017-07-03 NOTE — DISCHARGE NOTE ADULT - PROVIDER TOKENS
SRI:'[id number]' TOKEN:'[id number]:'[id number]:'[id number]'

## 2017-07-03 NOTE — DISCHARGE NOTE ADULT - CARE PROVIDER_API CALL
Papi Mixon), Surgery; Surgical Oncology  [address] [place] [address]  Phone: [phone]  Fax: [phone] Papi Mixon), Surgery; Surgical Oncology  [address] [place] [address]  Phone: [phone]  Fax: [phone]    Irwin Vines), Diagnostic Radiology; [facility]  [address] [place] [address]  Phone: [phone]  Fax: [phone]    Eric Hernández), Gastroenterology; Internal Medicine  [address] [place] [address]  Phone: [phone]  Fax: [phone]    Jeremiah Fernandez), [facility] Medicine; Internal Medicine; Medical Oncology  [address] [place] [address]  Phone: [phone]  Fax: [phone]

## 2017-07-03 NOTE — DISCHARGE NOTE ADULT - HOSPITAL COURSE
46F presents with abdominal pain. Patient stated that the pain began 5/31, at which point she came to the [facility] ED, was discharged when the pain improved. Patient endorses subjective, low-grade fevers at home with decrease in ostomy output, diaphoresis, one episode of nausea/vomiting. Pain is localized primarily to RUQ.Gonzalez Patient's past medical/surgical history is primarily significant for ovarian CA s/p GRETCHEN Dec 2016, also has had bowel resection with ostomy at that time. Her surgeries and treatments were at [facility], Dr. Roth. Initiation of chemotherapy was delayed 2/2 poor wound healing. Patient was scheduled to start chemotherapy in the next few weeks, sees Dr. PJ Lane of [facility]. Denies other PMH, denies medications. Allergy to PCN. On exam patient was afebrile with stable vital signs, uncomfortable appearing. Healing midline incision scar on abdomen with small area of open skin in inferior aspect. LLQ ostomy with gas and stool in bag. Tender RUQ with guarding.   WBC 24, T bili 1.3, CT shows distended gallbladder with thickened wall and pericholecystic fluid. EUS performed on 6/2/2017 which revealed normal appearing CBD, Enlarged GB with mild pericholecystuic fluid and copious debris. GB drainage using a 15 mm AXIOS (Lumen apposing stent) Evacuation of GB contents endoscopically. Stent appears to be in good position. Pt found to have E. Coli bacteremia (6/2). CT was repeated on 6/6/2017 which revealed Perforated gangrenous cholecystitis with surrounding abscesses.  2. Cyst gastrostomy tube patency is in question as there is communication with a very large gas and fluid abscess in the left abdomen. 3. Peritoneal carcinomatosis. 4. Colostomy and Larson's pouch. Mild small and large bowel distention without a clear transition point, possibly an ileus or partial obstruction. Palliative was consulted. Upper endoscopy performed on 6/7/2017, which revealed A previously placed lumen apposing metal stent connecting the stomach and the collection was found in the gastric antrum . The cavity of the collection was entered and the fluid was suctioned. The defect was closed. Prior drainage of large collection adjacent to GB in area of lesser sac appears well contained and clinically consistent with where the patient's pain ahs been for the last few months. Considering improvement in WBC it may have initially been infected or contributory. Collection decompressed and should heal, however some fluid is expected to remain as there is likely an element of carcinomatosis. IR perc ubaldo performed on 6/9/2017. CT A/P on 6/11 revealed retrogastric collection measures 11.4 x 7.3 x 22.6 cm, Pt returned to IR on 6/12 for abscess drainage Approximately 400 cc of thick, yellowish, turbid fluid was aspirated manually. Drain was then exchanged and upsized on 6/16/2017. Follow up CT a/p on 6/29 revealed Marked interval decrease in the size of a left upper quadrant fluid collection status post placement of a percutaneous drainage catheter. Minimal increase in size of several peritoneal implants. Pt tried with non operative management of her bowel obstruction. NGT was removed on 6/16 and required to be replaced soon after. Pt was optimized for surgery and underwent an exlap, extensive CORDELL, reduction of internal hernia, drainage of intraabdominal abscess, peritoneal carcinomatosis, closure w/ retention sutures, placement of G tube, removal of IR drain, placement of amador drain in abscess cavity on 6/21/2017. She tolerated the procedure well. 46F presents with abdominal pain. Patient stated that the pain began 5/31, at which point she came to the [facility] ED, was discharged when the pain improved. Patient endorses subjective, low-grade fevers at home with decrease in ostomy output, diaphoresis, one episode of nausea/vomiting. Pain is localized primarily to RUQ.Gonzalez Patient's past medical/surgical history is primarily significant for ovarian CA s/p GRETCHEN Dec 2016, also has had bowel resection with ostomy at that time. Her surgeries and treatments were at [facility], Dr. Roth. Initiation of chemotherapy was delayed 2/2 poor wound healing. Patient was scheduled to start chemotherapy in the next few weeks, sees Dr. PJ Lane of [facility]. Denies other PMH, denies medications. Allergy to PCN. On exam patient was afebrile with stable vital signs, uncomfortable appearing. Healing midline incision scar on abdomen with small area of open skin in inferior aspect. LLQ ostomy with gas and stool in bag. Tender RUQ with guarding.   WBC 24, T bili 1.3, CT shows distended gallbladder with thickened wall and pericholecystic fluid. EUS performed on 6/2/2017 which revealed normal appearing CBD, Enlarged GB with mild pericholecystuic fluid and copious debris. GB drainage using a 15 mm AXIOS (Lumen apposing stent) Evacuation of GB contents endoscopically. Stent appears to be in good position. Pt found to have E. Coli bacteremia (6/2). CT was repeated on 6/6/2017 which revealed Perforated gangrenous cholecystitis with surrounding abscesses.  2. Cyst gastrostomy tube patency is in question as there is communication with a very large gas and fluid abscess in the left abdomen. 3. Peritoneal carcinomatosis. 4. Colostomy and Larson's pouch. Mild small and large bowel distention without a clear transition point, possibly an ileus or partial obstruction. Palliative was consulted. Upper endoscopy performed on 6/7/2017, which revealed A previously placed lumen apposing metal stent connecting the stomach and the collection was found in the gastric antrum . The cavity of the collection was entered and the fluid was suctioned. The defect was closed. Prior drainage of large collection adjacent to GB in area of lesser sac appears well contained and clinically consistent with where the patient's pain ahs been for the last few months. Considering improvement in WBC it may have initially been infected or contributory. Collection decompressed and should heal, however some fluid is expected to remain as there is likely an element of carcinomatosis. IR perc ubaldo performed on 6/9/2017. CT A/P on 6/11 revealed retrogastric collection measures 11.4 x 7.3 x 22.6 cm, Pt returned to IR on 6/12 for abscess drainage Approximately 400 cc of thick, yellowish, turbid fluid was aspirated manually. Drain was then exchanged and upsized on 6/16/2017. Follow up CT a/p on 6/29 revealed Marked interval decrease in the size of a left upper quadrant fluid collection status post placement of a percutaneous drainage catheter. Minimal increase in size of several peritoneal implants. Pt tried with non operative management of her bowel obstruction. NGT was removed on 6/16 and required to be replaced soon after. Pt was optimized for surgery and underwent an exlap, extensive CORDELL, reduction of internal hernia, drainage of intraabdominal abscess, peritoneal carcinomatosis, closure w/ retention sutures, placement of G tube, removal of IR drain, placement of amador drain in abscess cavity on 6/21/2017. She tolerated the procedure well. Pt's NGT was removed once GI function resumed. G tube was clamped and diet advanced as tolerated. Pt currently tolerating diet, ambulating, voiding and pain controlled. Pt instructed to follow up with Dr. Mixon, Dr. Vines, Dr. Hernández, Dr. Ham.

## 2017-07-03 NOTE — DISCHARGE NOTE ADULT - ADDITIONAL INSTRUCTIONS
Perc ubaldo tube- please flush tube forward only with 5cc of normal saline daily Perc ubaldo tube- please flush tube forward only with 5cc of normal saline daily  Please have g tube clamped unless you are nauseated then unclamp and call your physician

## 2017-07-03 NOTE — DISCHARGE NOTE ADULT - MEDICATION SUMMARY - MEDICATIONS TO TAKE
I will START or STAY ON the medications listed below when I get home from the hospital:    ibuprofen 600 mg oral tablet  -- 1 tab(s) by mouth every 6 hours, As needed, Mild to moderate pain MDD:4  -- Indication: For Pain due to neoplasm    fentaNYL 50 mcg/hr transdermal film, extended release  -- 1 patch by transdermal patch every 72 hours MDD:1  -- Indication: For Pain due to neoplasm    acetaminophen 500 mg oral tablet  -- 2 tab(s) by mouth every 6 hours  -- Indication: For Pain due to neoplasm    HYDROmorphone 2 mg oral tablet  -- 3 tab(s) by mouth every 4 hours, As needed, breakthrough pain MDD:6  -- Indication: For Pain due to neoplasm    docusate sodium 100 mg oral capsule  -- 1 cap(s) by mouth 3 times a day  -- Indication: For Constipatin I will START or STAY ON the medications listed below when I get home from the hospital:    fentaNYL 50 mcg/hr transdermal film, extended release  -- 1 patch by transdermal patch every 72 hours MDD:1  -- Indication: For Pain due to neoplasm    acetaminophen 500 mg oral tablet  -- 2 tab(s) by mouth every 6 hours  -- Indication: For Pain due to neoplasm    HYDROmorphone 2 mg oral tablet  -- 3 tab(s) by mouth every 4 hours, As needed, breakthrough pain MDD:6  -- Indication: For Pain due to neoplasm    ibuprofen 600 mg oral tablet  -- 1 tab(s) by mouth every 6 hours, As needed, Mild to moderate pain MDD:4  -- Indication: For Palliative care encounter    docusate sodium 100 mg oral capsule  -- 1 cap(s) by mouth 3 times a day  -- Indication: For Constipatin

## 2017-07-03 NOTE — DISCHARGE NOTE ADULT - OTHER SIGNIFICANT FINDINGS
ACCESSION No:  [id number]    CHAYA PALACIO                             2        Surgical Final Report          Final Diagnosis    1. Bowel implant, excision:  - Poorly differentiated adenocarcinoma, high-grade with  serous features, compatible with Mullerian origin, see note.    Note: The neoplastic cells highlight with WT1 and PAX-8. The case  findings were reviewed and confirmed  in agreement by an intra-  departmental gynecologic pathology consultation on 6/27/2017.    2. IR tube, removal:  - Gross examination only.    3. Abscess cavity, excision:  - Fibrinopurulent exudate consistent with abscess cavity.    Ridge Artis M.D.  (Electronic Signature)  Reported on: 06/27/17    Intraoperative Consultation  1. Bowel implant  - Poorly differentiated carcinoma  By Dr. Calixto    Clinical History  malignant small bowel of obstruction, acute cholecystitis, intra-  abdominal abscess  Per electronic medical charts - Ovarian CA s/p GRETCHEN in Dec 2016    Specimen(s) Submitted  1     Bowel implant  2     IR tube  3     Abscess cavity    Gross Description  1. The specimen is received fresh for intraoperative consultation  and the specimen container is labeled: Bowel implant.  It  consists of a 1.1 cm tan-pink tissue that is bisected.  Representative sections are submitted for frozen section  evaluation in 1-FSA.  The remaining specimen is entirely  submitted in one cassette    2. The specimen is received without fixative and the specimen  container is labeled: IR tube.  It consists of a 25.5 cm in  length x 0.5 cm in diameter white plastic tube.  No tissue is  identified.  The specimen is gross only.              CHAYA PALACIO                             2        Surgical Final Report            3. The specimen is received in formalin and the specimen  container is labeled: Abscess cavity.  It consists of a 4.3 x 3.0  x 0.6 cm aggregate of two tan white to yellow, rubbery portions  of tissue.  Representative sections are submitted in one  cassette.      In addition to other data that may appear on the specimen  containers, all labels have been inspected to confirm the  presence of the patient's name and date of birth.     06/22/17 09:34

## 2017-07-03 NOTE — PROGRESS NOTE ADULT - SUBJECTIVE AND OBJECTIVE BOX
S: Patient slept well, tolerated regular diet, pain well controlled. Now with both gas and stool in ostomy; denies fevers, chills, nausea, emesis, chest pain, SOB.    O: Vital Signs Last 24 Hrs  T(C): 36.9 (03 Jul 2017 05:51), Max: 37.6 (03 Jul 2017 01:59)  T(F): 98.4 (03 Jul 2017 05:51), Max: 99.7 (03 Jul 2017 01:59)  HR: 90 (03 Jul 2017 05:51) (87 - 100)  BP: 103/68 (03 Jul 2017 05:51) (96/68 - 118/84)  BP(mean): --  RR: 16 (03 Jul 2017 05:51) (16 - 17)  SpO2: 93% (03 Jul 2017 05:51) (93% - 100%)  I&O's Detail    02 Jul 2017 07:01  -  03 Jul 2017 07:00  --------------------------------------------------------  IN:    dextrose 5% + sodium chloride 0.9%.: 750 mL    Oral Fluid: 680 mL  Total IN: 1430 mL    OUT:    Drain: 30 mL    Voided: 600 mL  Total OUT: 630 mL    Total NET: 800 mL    General: alert and oriented, NAD  Resp: airway patent, respirations unlabored  CVS: regular rate and rhythm  Abdomen: soft, nontender, nondistended, incision C/D/I with retention sutures in place, drain serosanguinous  Extremities: no edema  Skin: warm, dry, appropriate color                          8.9    9.8   )-----------( 433      ( 01 Jul 2017 16:44 )             27.2   07-01    134<L>  |  97  |  6<L>  ----------------------------<  96  4.1   |  24  |  0.41<L>    Ca    8.6      01 Jul 2017 16:44

## 2017-07-03 NOTE — CHART NOTE - NSCHARTNOTEFT_GEN_A_CORE
Source: Patient [x ]   other [x ]chart    Diet : Regular     PO intake:  [x ]   %     Source for PO intake [x ] Patient      Current Weight: 123.4pounds/56kg as of 6/28 % Weight Change:5% gain since 6/15    Pertinent Medications: MEDICATIONS  (STANDING):  enoxaparin Injectable 40 milliGRAM(s) SubCutaneous daily  tetracaine/benzocaine/butamben Spray 1 Spray(s) Topical three times a day  dextrose 5% + sodium chloride 0.9%. 1000 milliLiter(s) (10 mL/Hr) IV Continuous <Continuous>  fentaNYL   Patch  50 MICROgram(s)/Hr 1 Patch Transdermal every 72 hours    MEDICATIONS  (PRN):  HYDROmorphone   Tablet 4 milliGRAM(s) Oral every 4 hours PRN Severe Pain (7 - 10)    Pertinent Labs:  07-01 Na134 mmol/L<L> Glu 96 mg/dL K+ 4.1 mmol/L Cr  0.41 mg/dL<L> BUN 6 mg/dL<L>       Skin: no edema, surgical incision    Estimated Needs:   [x ] no change since previous assessment      Previous Nutrition Diagnosis:    [ x] Malnutrition-severe       Nutrition Diagnosis is [ x] ongoing -being addressed      Recommend    [X] change diet to Low Fiber-diet ed provided      [x ] Nutrition Supplement: continue Promote 2 x daily, pt tolerates best when it is warm.          Monitoring and Evaluation:     [x ] PO intake [x ] Tolerance to diet prescription [x ] weights

## 2017-07-03 NOTE — DISCHARGE NOTE ADULT - CARE PLAN
Principal Discharge DX:	Adenocarcinoma  Goal:	recover from surgery  Instructions for follow-up, activity and diet:	Follow up with Dr. Mixon (Surgeon) in one week. Please call to schedule an appointment.   NOTIFY YOUR SURGEON IF: You have any bleeding that does not stop, any pus draining from your wound, any fever (over 100.4 F) or chills, persistent nausea/vomiting, persistent diarrhea, or if your pain is not controlled on your discharge pain medications.  Instructions for follow-up, activity and diet:	Please follow up with your primary care physician regarding your hospitalization. Please schedule an appointment with your primary care provider within two weeks after your discharge to review your hospital course. Principal Discharge DX:	Adenocarcinoma  Goal:	recover from surgery  Instructions for follow-up, activity and diet:	Follow up:  (1) Dr. Mixon (Surgeon) in one week. Please call to schedule an appointment.   (2) Dr. Vines (Interventional Radiology) in 2 weeks. Please call to schedule an appointment.  (3) Dr. Ham (Heme/Onc) in 1-2 weeks. Please call to schedule an appointment.   (4) Dr. Jones (Palliative)  NOTIFY YOUR SURGEON IF: You have any bleeding that does not stop, any pus draining from your wound, any fever (over 100.4 F) or chills, persistent nausea/vomiting, persistent diarrhea, or if your pain is not controlled on your discharge pain medications.  Instructions for follow-up, activity and diet:	Please follow up with your primary care physician regarding your hospitalization. Please schedule an appointment with your primary care provider within two weeks after your discharge to review your hospital course.

## 2017-07-03 NOTE — DISCHARGE NOTE ADULT - PLAN OF CARE
recover from surgery Follow up with Dr. Mixon (Surgeon) in one week. Please call to schedule an appointment.   NOTIFY YOUR SURGEON IF: You have any bleeding that does not stop, any pus draining from your wound, any fever (over 100.4 F) or chills, persistent nausea/vomiting, persistent diarrhea, or if your pain is not controlled on your discharge pain medications. Please follow up with your primary care physician regarding your hospitalization. Please schedule an appointment with your primary care provider within two weeks after your discharge to review your hospital course. Follow up:  (1) Dr. Mixon (Surgeon) in one week. Please call to schedule an appointment.   (2) Dr. Vines (Interventional Radiology) in 2 weeks. Please call to schedule an appointment.  (3) Dr. Ham (Heme/Onc) in 1-2 weeks. Please call to schedule an appointment.   (4) Dr. Jones (Palliative)  NOTIFY YOUR SURGEON IF: You have any bleeding that does not stop, any pus draining from your wound, any fever (over 100.4 F) or chills, persistent nausea/vomiting, persistent diarrhea, or if your pain is not controlled on your discharge pain medications.

## 2017-07-03 NOTE — DISCHARGE NOTE ADULT - CARE PROVIDERS DIRECT ADDRESSES
,[email or web address]

## 2017-07-03 NOTE — DISCHARGE NOTE ADULT - PATIENT PORTAL LINK FT
“You can access the [employer] Patient Portal, offered by [facility], by registering with the following website: [email or web address]”

## 2017-07-03 NOTE — CHART NOTE - NSCHARTNOTESELECT_GEN_ALL_CORE
Event Note
Event Note
Event Note/Interventional Radiology
Nutrition Services
Post Procedure Check
Red Sx- Pre Op Note
post-procedure check
Event Note

## 2017-07-03 NOTE — DISCHARGE NOTE ADULT - NS AS ACTIVITY OBS
No Heavy lifting/straining/Walking-Outdoors allowed/Walking-Indoors allowed/Showering allowed/Stairs allowed

## 2017-07-04 PROCEDURE — 99233 SBSQ HOSP IP/OBS HIGH 50: CPT

## 2017-07-04 RX ORDER — ACETAMINOPHEN 500 MG
1000 TABLET ORAL EVERY 6 HOURS
Qty: 0 | Refills: 0 | Status: DISCONTINUED | OUTPATIENT
Start: 2017-07-04 | End: 2017-07-05

## 2017-07-04 RX ORDER — IBUPROFEN 200 MG
600 TABLET ORAL EVERY 6 HOURS
Qty: 0 | Refills: 0 | Status: DISCONTINUED | OUTPATIENT
Start: 2017-07-04 | End: 2017-07-05

## 2017-07-04 RX ORDER — OXYCODONE HYDROCHLORIDE 5 MG/1
5 TABLET ORAL EVERY 4 HOURS
Qty: 0 | Refills: 0 | Status: DISCONTINUED | OUTPATIENT
Start: 2017-07-04 | End: 2017-07-04

## 2017-07-04 RX ORDER — POTASSIUM CHLORIDE 20 MEQ
20 PACKET (EA) ORAL ONCE
Qty: 0 | Refills: 0 | Status: DISCONTINUED | OUTPATIENT
Start: 2017-07-04 | End: 2017-07-04

## 2017-07-04 RX ORDER — FENTANYL CITRATE 50 UG/ML
1 INJECTION INTRAVENOUS
Qty: 0 | Refills: 0 | Status: DISCONTINUED | OUTPATIENT
Start: 2017-07-04 | End: 2017-07-05

## 2017-07-04 RX ORDER — OXYCODONE HYDROCHLORIDE 5 MG/1
10 TABLET ORAL EVERY 4 HOURS
Qty: 0 | Refills: 0 | Status: DISCONTINUED | OUTPATIENT
Start: 2017-07-04 | End: 2017-07-04

## 2017-07-04 RX ORDER — HYDROMORPHONE HYDROCHLORIDE 2 MG/ML
1 INJECTION INTRAMUSCULAR; INTRAVENOUS; SUBCUTANEOUS
Qty: 0 | Refills: 0 | Status: DISCONTINUED | OUTPATIENT
Start: 2017-07-04 | End: 2017-07-04

## 2017-07-04 RX ORDER — DOCUSATE SODIUM 100 MG
100 CAPSULE ORAL THREE TIMES A DAY
Qty: 0 | Refills: 0 | Status: DISCONTINUED | OUTPATIENT
Start: 2017-07-04 | End: 2017-07-05

## 2017-07-04 RX ORDER — HYDROMORPHONE HYDROCHLORIDE 2 MG/ML
1 INJECTION INTRAMUSCULAR; INTRAVENOUS; SUBCUTANEOUS
Qty: 0 | Refills: 0 | Status: DISCONTINUED | OUTPATIENT
Start: 2017-07-04 | End: 2017-07-05

## 2017-07-04 RX ADMIN — Medication 100 MILLIGRAM(S): at 23:16

## 2017-07-04 RX ADMIN — HYDROMORPHONE HYDROCHLORIDE 4 MILLIGRAM(S): 2 INJECTION INTRAMUSCULAR; INTRAVENOUS; SUBCUTANEOUS at 06:17

## 2017-07-04 RX ADMIN — FENTANYL CITRATE 1 PATCH: 50 INJECTION INTRAVENOUS at 16:07

## 2017-07-04 RX ADMIN — Medication 1000 MILLIGRAM(S): at 23:16

## 2017-07-04 RX ADMIN — Medication 1000 MILLIGRAM(S): at 18:25

## 2017-07-04 RX ADMIN — Medication 1000 MILLIGRAM(S): at 23:46

## 2017-07-04 RX ADMIN — Medication 100 MILLIGRAM(S): at 13:22

## 2017-07-04 RX ADMIN — HYDROMORPHONE HYDROCHLORIDE 1 MILLIGRAM(S): 2 INJECTION INTRAMUSCULAR; INTRAVENOUS; SUBCUTANEOUS at 20:31

## 2017-07-04 RX ADMIN — HYDROMORPHONE HYDROCHLORIDE 1 MILLIGRAM(S): 2 INJECTION INTRAMUSCULAR; INTRAVENOUS; SUBCUTANEOUS at 17:21

## 2017-07-04 RX ADMIN — Medication 1000 MILLIGRAM(S): at 13:32

## 2017-07-04 RX ADMIN — Medication 1000 MILLIGRAM(S): at 18:26

## 2017-07-04 RX ADMIN — ENOXAPARIN SODIUM 40 MILLIGRAM(S): 100 INJECTION SUBCUTANEOUS at 13:22

## 2017-07-04 RX ADMIN — HYDROMORPHONE HYDROCHLORIDE 1 MILLIGRAM(S): 2 INJECTION INTRAMUSCULAR; INTRAVENOUS; SUBCUTANEOUS at 20:16

## 2017-07-04 RX ADMIN — HYDROMORPHONE HYDROCHLORIDE 1 MILLIGRAM(S): 2 INJECTION INTRAMUSCULAR; INTRAVENOUS; SUBCUTANEOUS at 17:35

## 2017-07-04 NOTE — PROGRESS NOTE ADULT - SUBJECTIVE AND OBJECTIVE BOX
Patient seen and examined at  17:00 on 6/29  INTERVAL HPI/OVERNIGHT EVENTS: Following up for pain. Patient indicates pain has increased since an abdominal drain was removed. Pain is generalized over her abdomen; however, it is mor pronounced over over the area where the drain was removed. Pain is mild to moderate and partially improves with dilaudid.   ADVANCE DIRECTIVES:    DNR: NO           PRESENT SYMPTOMS:   SOURCE: Patient      Pain: Mild. Non-radiating, generalized, Throbbing, Intermittent, Improving with Dilaudid. Increasing with palpation.     Dyspnea:   NO  Anxiety:NO  Fatigue: Mild to moderate   Nausea: NO  Loss of Appetite:NO  Constipation [ x ] YES. Colostomy output: 0 recently.     OTHER SYMPTOMS:  [ X] All other ROS negative     [ ] Unable to obtain due to poor mentation    MEDICATIONS  (STANDING):  enoxaparin Injectable 40 milliGRAM(s) SubCutaneous daily  tetracaine/benzocaine/butamben Spray 1 Spray(s) Topical three times a day  fentaNYL   Patch  50 MICROgram(s)/Hr 1 Patch Transdermal every 72 hours    MEDICATIONS  (PRN):  HYDROmorphone  Injectable 1 milliGRAM(s) IV Push every 2 hours PRN Breakthrough pain  Karnofsky Performance Score/Palliative Performance Status Version 2: 40-50  %  Protein Calorie Malnutrition:  [ ] Mild   [ ] Moderate   [ ] Severe     Physical Exam:    Vital Signs Last 24 Hrs  T(C): 36.7 (04 Jul 2017 09:08), Max: 37.3 (04 Jul 2017 00:47)  T(F): 98.1 (04 Jul 2017 09:08), Max: 99.1 (04 Jul 2017 00:47)  HR: 90 (04 Jul 2017 09:08) (87 - 99)  BP: 115/80 (04 Jul 2017 09:08) (109/75 - 115/80)  BP(mean): --  RR: 18 (04 Jul 2017 09:08) (18 - 18)  SpO2: 99% (04 Jul 2017 09:08) (96% - 99%)    General: Awake alert, comfortable.   HEENT: PERRL with EOMI.   Lungs: Clear to Ausc bilaterally, no wheezing.   Cardiovascular: S1, S2. No S3  RRR, no murmurs   Abdomen: Soft, No guarding, tenderness to palpate diffusely.  BS  x 4 (+).  venting PEG. Last BM 80 ml 7/4  Genitourinary: Normal  Musculoskeletal:  Weakness   Neurological: no focal deficits. Alert, oriented x 3     Skin: warm and dry     Labs:               No new labs       Imaging  < from: CT Abdomen and Pelvis w/ Oral Cont and w/ IV Cont (06.29.17 @ 21:36) >  EXAM:  CT ABDOMEN AND PELVIS OC IC                            PROCEDURE DATE:  06/29/2017  IMPRESSION: Marked interval decrease in the size of a left upper quadrant   fluid collection status post placement of a percutaneous drainage   catheter.    Minimal increase in size of several peritoneal implants.    < end of copied text >                             Assessment and Plan

## 2017-07-04 NOTE — PROGRESS NOTE ADULT - SUBJECTIVE AND OBJECTIVE BOX
S: Patient doing well, some pain limiting activity. Cold exacerbates pain (+) flatus, (+) BM; denies fevers, chills, nausea, emesis, chest pain, SOB.     O: Vital Signs Last 24 Hrs  T(C): 36.7 (04 Jul 2017 09:08), Max: 37.3 (04 Jul 2017 00:47)  T(F): 98.1 (04 Jul 2017 09:08), Max: 99.1 (04 Jul 2017 00:47)  HR: 90 (04 Jul 2017 09:08) (87 - 99)  BP: 115/80 (04 Jul 2017 09:08) (109/75 - 115/80)  BP(mean): --  RR: 18 (04 Jul 2017 09:08) (18 - 18)  SpO2: 99% (04 Jul 2017 09:08) (96% - 99%)  I&O's Detail    03 Jul 2017 07:01  -  04 Jul 2017 07:00  --------------------------------------------------------  IN:    Oral Fluid: 820 mL  Total IN: 820 mL    OUT:    Colostomy: 81 mL    Drain: 20 mL    Voided: 1000 mL  Total OUT: 1101 mL    Total NET: -281 mL        General: alert and oriented, NAD  Resp: airway patent, respirations unlabored  CVS: regular rate and rhythm  Abdomen: soft, nontender, nondistended. Incision clean, dry, intact. Colostomy with trace stool and gas in bag. percutaneous ubaldo drain in place bilious output.  Extremities: no edema  Skin: warm, dry, appropriate color

## 2017-07-04 NOTE — PROGRESS NOTE ADULT - ASSESSMENT
45 yo female HD #[id number] with ovarian ca s/p GRETCHEN BSO and debulking liver mets with colostomy complicated by retrogastric abscess collection now POD 13 from ex-lap CORDELL and placement of amador drain and G-tube. Tolerating diet and planning for discharge after PO pain regimen established.    - appreciate wound ostomy teaching  - analgesia PRN, palliative on-board  - continue bowel regimen, regular diet  - VTE ppx  - continue clamp g-tube

## 2017-07-04 NOTE — PROGRESS NOTE ADULT - PROBLEM SELECTOR PLAN 1
On Fentanyl 50 mcg/h q 72 h. Continue Dilaudid PRN 1 mg IV q 2 PRN.   Will hold on increasing Fentanyl until tomorrow.

## 2017-07-05 VITALS
RESPIRATION RATE: 18 BRPM | DIASTOLIC BLOOD PRESSURE: 78 MMHG | TEMPERATURE: 99 F | OXYGEN SATURATION: 99 % | HEART RATE: 88 BPM | SYSTOLIC BLOOD PRESSURE: 110 MMHG

## 2017-07-05 LAB
ANION GAP SERPL CALC-SCNC: 12 MMOL/L — SIGNIFICANT CHANGE UP (ref 5–17)
BUN SERPL-MCNC: 10 MG/DL — SIGNIFICANT CHANGE UP (ref 7–23)
CALCIUM SERPL-MCNC: 8.8 MG/DL — SIGNIFICANT CHANGE UP (ref 8.4–10.5)
CHLORIDE SERPL-SCNC: 99 MMOL/L — SIGNIFICANT CHANGE UP (ref 96–108)
CO2 SERPL-SCNC: 28 MMOL/L — SIGNIFICANT CHANGE UP (ref 22–31)
CREAT SERPL-MCNC: 0.41 MG/DL — LOW (ref 0.5–1.3)
GLUCOSE SERPL-MCNC: 90 MG/DL — SIGNIFICANT CHANGE UP (ref 70–99)
HCT VFR BLD CALC: 28.7 % — LOW (ref 34.5–45)
HGB BLD-MCNC: 9.4 G/DL — LOW (ref 11.5–15.5)
MCHC RBC-ENTMCNC: 29 PG — SIGNIFICANT CHANGE UP (ref 27–34)
MCHC RBC-ENTMCNC: 32.9 GM/DL — SIGNIFICANT CHANGE UP (ref 32–36)
MCV RBC AUTO: 88.1 FL — SIGNIFICANT CHANGE UP (ref 80–100)
PLATELET # BLD AUTO: 502 K/UL — HIGH (ref 150–400)
POTASSIUM SERPL-MCNC: 4.1 MMOL/L — SIGNIFICANT CHANGE UP (ref 3.5–5.3)
POTASSIUM SERPL-SCNC: 4.1 MMOL/L — SIGNIFICANT CHANGE UP (ref 3.5–5.3)
RBC # BLD: 3.25 M/UL — LOW (ref 3.8–5.2)
RBC # FLD: 13.6 % — SIGNIFICANT CHANGE UP (ref 10.3–14.5)
SODIUM SERPL-SCNC: 139 MMOL/L — SIGNIFICANT CHANGE UP (ref 135–145)
WBC # BLD: 7.2 K/UL — SIGNIFICANT CHANGE UP (ref 3.8–10.5)
WBC # FLD AUTO: 7.2 K/UL — SIGNIFICANT CHANGE UP (ref 3.8–10.5)

## 2017-07-05 PROCEDURE — C1729: CPT

## 2017-07-05 PROCEDURE — 80076 HEPATIC FUNCTION PANEL: CPT

## 2017-07-05 PROCEDURE — 82248 BILIRUBIN DIRECT: CPT

## 2017-07-05 PROCEDURE — 85014 HEMATOCRIT: CPT

## 2017-07-05 PROCEDURE — 75984 XRAY CONTROL CATHETER CHANGE: CPT

## 2017-07-05 PROCEDURE — 93005 ELECTROCARDIOGRAM TRACING: CPT

## 2017-07-05 PROCEDURE — 85730 THROMBOPLASTIN TIME PARTIAL: CPT

## 2017-07-05 PROCEDURE — 82803 BLOOD GASES ANY COMBINATION: CPT

## 2017-07-05 PROCEDURE — 87186 SC STD MICRODIL/AGAR DIL: CPT

## 2017-07-05 PROCEDURE — 87040 BLOOD CULTURE FOR BACTERIA: CPT

## 2017-07-05 PROCEDURE — C1726: CPT

## 2017-07-05 PROCEDURE — 88305 TISSUE EXAM BY PATHOLOGIST: CPT

## 2017-07-05 PROCEDURE — 81001 URINALYSIS AUTO W/SCOPE: CPT

## 2017-07-05 PROCEDURE — 87086 URINE CULTURE/COLONY COUNT: CPT

## 2017-07-05 PROCEDURE — 86901 BLOOD TYPING SEROLOGIC RH(D): CPT

## 2017-07-05 PROCEDURE — 99285 EMERGENCY DEPT VISIT HI MDM: CPT | Mod: 25

## 2017-07-05 PROCEDURE — 47490 INCISION OF GALLBLADDER: CPT

## 2017-07-05 PROCEDURE — 82565 ASSAY OF CREATININE: CPT

## 2017-07-05 PROCEDURE — 83986 ASSAY PH BODY FLUID NOS: CPT

## 2017-07-05 PROCEDURE — 80053 COMPREHEN METABOLIC PANEL: CPT

## 2017-07-05 PROCEDURE — 83605 ASSAY OF LACTIC ACID: CPT

## 2017-07-05 PROCEDURE — 49423 EXCHANGE DRAINAGE CATHETER: CPT

## 2017-07-05 PROCEDURE — 87075 CULTR BACTERIA EXCEPT BLOOD: CPT

## 2017-07-05 PROCEDURE — 86900 BLOOD TYPING SEROLOGIC ABO: CPT

## 2017-07-05 PROCEDURE — 82435 ASSAY OF BLOOD CHLORIDE: CPT

## 2017-07-05 PROCEDURE — 86923 COMPATIBILITY TEST ELECTRIC: CPT

## 2017-07-05 PROCEDURE — C1889: CPT

## 2017-07-05 PROCEDURE — P9016: CPT

## 2017-07-05 PROCEDURE — C1874: CPT

## 2017-07-05 PROCEDURE — 82947 ASSAY GLUCOSE BLOOD QUANT: CPT

## 2017-07-05 PROCEDURE — 99233 SBSQ HOSP IP/OBS HIGH 50: CPT

## 2017-07-05 PROCEDURE — 88341 IMHCHEM/IMCYTCHM EA ADD ANTB: CPT

## 2017-07-05 PROCEDURE — 84300 ASSAY OF URINE SODIUM: CPT

## 2017-07-05 PROCEDURE — 81025 URINE PREGNANCY TEST: CPT

## 2017-07-05 PROCEDURE — 88300 SURGICAL PATH GROSS: CPT

## 2017-07-05 PROCEDURE — 84702 CHORIONIC GONADOTROPIN TEST: CPT

## 2017-07-05 PROCEDURE — 96375 TX/PRO/DX INJ NEW DRUG ADDON: CPT | Mod: XU

## 2017-07-05 PROCEDURE — 84295 ASSAY OF SERUM SODIUM: CPT

## 2017-07-05 PROCEDURE — 87070 CULTURE OTHR SPECIMN AEROBIC: CPT

## 2017-07-05 PROCEDURE — 87205 SMEAR GRAM STAIN: CPT

## 2017-07-05 PROCEDURE — 86850 RBC ANTIBODY SCREEN: CPT

## 2017-07-05 PROCEDURE — 84100 ASSAY OF PHOSPHORUS: CPT

## 2017-07-05 PROCEDURE — 83690 ASSAY OF LIPASE: CPT

## 2017-07-05 PROCEDURE — 88331 PATH CONSLTJ SURG 1 BLK 1SPC: CPT

## 2017-07-05 PROCEDURE — 97162 PT EVAL MOD COMPLEX 30 MIN: CPT

## 2017-07-05 PROCEDURE — 84132 ASSAY OF SERUM POTASSIUM: CPT

## 2017-07-05 PROCEDURE — 96374 THER/PROPH/DIAG INJ IV PUSH: CPT | Mod: XU

## 2017-07-05 PROCEDURE — 80048 BASIC METABOLIC PNL TOTAL CA: CPT

## 2017-07-05 PROCEDURE — 84484 ASSAY OF TROPONIN QUANT: CPT

## 2017-07-05 PROCEDURE — 74177 CT ABD & PELVIS W/CONTRAST: CPT

## 2017-07-05 PROCEDURE — 83735 ASSAY OF MAGNESIUM: CPT

## 2017-07-05 PROCEDURE — 88342 IMHCHEM/IMCYTCHM 1ST ANTB: CPT

## 2017-07-05 PROCEDURE — 82330 ASSAY OF CALCIUM: CPT

## 2017-07-05 PROCEDURE — 49406 IMAGE CATH FLUID PERI/RETRO: CPT

## 2017-07-05 PROCEDURE — 71275 CT ANGIOGRAPHY CHEST: CPT

## 2017-07-05 PROCEDURE — 85610 PROTHROMBIN TIME: CPT

## 2017-07-05 PROCEDURE — 88304 TISSUE EXAM BY PATHOLOGIST: CPT

## 2017-07-05 PROCEDURE — 82570 ASSAY OF URINE CREATININE: CPT

## 2017-07-05 PROCEDURE — 74018 RADEX ABDOMEN 1 VIEW: CPT

## 2017-07-05 PROCEDURE — 82150 ASSAY OF AMYLASE: CPT

## 2017-07-05 PROCEDURE — 71045 X-RAY EXAM CHEST 1 VIEW: CPT

## 2017-07-05 PROCEDURE — 81003 URINALYSIS AUTO W/O SCOPE: CPT

## 2017-07-05 PROCEDURE — 85027 COMPLETE CBC AUTOMATED: CPT

## 2017-07-05 PROCEDURE — C1769: CPT

## 2017-07-05 RX ORDER — HYDROMORPHONE HYDROCHLORIDE 2 MG/ML
3 INJECTION INTRAMUSCULAR; INTRAVENOUS; SUBCUTANEOUS
Qty: 252 | Refills: 0 | OUTPATIENT
Start: 2017-07-05 | End: 2017-07-19

## 2017-07-05 RX ORDER — ACETAMINOPHEN 500 MG
1 TABLET ORAL
Qty: 0 | Refills: 0 | COMMUNITY
Start: 2017-07-05

## 2017-07-05 RX ORDER — HYDROMORPHONE HYDROCHLORIDE 2 MG/ML
6 INJECTION INTRAMUSCULAR; INTRAVENOUS; SUBCUTANEOUS EVERY 4 HOURS
Qty: 0 | Refills: 0 | Status: DISCONTINUED | OUTPATIENT
Start: 2017-07-05 | End: 2017-07-05

## 2017-07-05 RX ORDER — HYDROMORPHONE HYDROCHLORIDE 2 MG/ML
5 INJECTION INTRAMUSCULAR; INTRAVENOUS; SUBCUTANEOUS
Qty: 0 | Refills: 0 | Status: DISCONTINUED | OUTPATIENT
Start: 2017-07-05 | End: 2017-07-05

## 2017-07-05 RX ORDER — ACETAMINOPHEN 500 MG
2 TABLET ORAL
Qty: 0 | Refills: 0 | COMMUNITY
Start: 2017-07-05

## 2017-07-05 RX ORDER — IBUPROFEN 200 MG
1 TABLET ORAL
Qty: 120 | Refills: 0 | OUTPATIENT
Start: 2017-07-05 | End: 2017-08-04

## 2017-07-05 RX ORDER — DOCUSATE SODIUM 100 MG
1 CAPSULE ORAL
Qty: 0 | Refills: 0 | COMMUNITY
Start: 2017-07-05

## 2017-07-05 RX ORDER — FENTANYL CITRATE 50 UG/ML
1 INJECTION INTRAVENOUS
Qty: 7 | Refills: 0 | OUTPATIENT
Start: 2017-07-05 | End: 2017-07-19

## 2017-07-05 RX ADMIN — HYDROMORPHONE HYDROCHLORIDE 6 MILLIGRAM(S): 2 INJECTION INTRAMUSCULAR; INTRAVENOUS; SUBCUTANEOUS at 15:57

## 2017-07-05 RX ADMIN — HYDROMORPHONE HYDROCHLORIDE 6 MILLIGRAM(S): 2 INJECTION INTRAMUSCULAR; INTRAVENOUS; SUBCUTANEOUS at 15:27

## 2017-07-05 RX ADMIN — Medication 1000 MILLIGRAM(S): at 11:44

## 2017-07-05 RX ADMIN — Medication 1000 MILLIGRAM(S): at 06:36

## 2017-07-05 RX ADMIN — Medication 600 MILLIGRAM(S): at 14:17

## 2017-07-05 RX ADMIN — HYDROMORPHONE HYDROCHLORIDE 1 MILLIGRAM(S): 2 INJECTION INTRAMUSCULAR; INTRAVENOUS; SUBCUTANEOUS at 07:43

## 2017-07-05 RX ADMIN — ENOXAPARIN SODIUM 40 MILLIGRAM(S): 100 INJECTION SUBCUTANEOUS at 11:14

## 2017-07-05 RX ADMIN — Medication 100 MILLIGRAM(S): at 14:17

## 2017-07-05 RX ADMIN — Medication 1000 MILLIGRAM(S): at 06:06

## 2017-07-05 RX ADMIN — Medication 600 MILLIGRAM(S): at 14:47

## 2017-07-05 RX ADMIN — Medication 100 MILLIGRAM(S): at 06:06

## 2017-07-05 RX ADMIN — Medication 1000 MILLIGRAM(S): at 11:14

## 2017-07-05 RX ADMIN — HYDROMORPHONE HYDROCHLORIDE 1 MILLIGRAM(S): 2 INJECTION INTRAMUSCULAR; INTRAVENOUS; SUBCUTANEOUS at 08:14

## 2017-07-05 NOTE — PROGRESS NOTE ADULT - SUBJECTIVE AND OBJECTIVE BOX
S: Patient (+) flatus, (+) BM; denies fevers, chills, nausea, emesis, chest pain, SOB. Pain much improved, ready for ostomy training and planning home.    O: Vital Signs Last 24 Hrs  T(C): 36.8 (05 Jul 2017 05:50), Max: 36.9 (04 Jul 2017 13:31)  T(F): 98.2 (05 Jul 2017 05:50), Max: 98.4 (04 Jul 2017 13:31)  HR: 80 (05 Jul 2017 05:50) (80 - 92)  BP: 95/60 (05 Jul 2017 05:50) (95/60 - 115/80)  BP(mean): --  RR: 18 (05 Jul 2017 05:50) (18 - 18)  SpO2: 98% (05 Jul 2017 05:50) (98% - 99%)  I&O's Detail    04 Jul 2017 07:01  -  05 Jul 2017 07:00  --------------------------------------------------------  IN:    Oral Fluid: 1300 mL  Total IN: 1300 mL    OUT:    Colostomy: 150 mL    Drain: 95 mL    Voided: 200 mL  Total OUT: 445 mL    Total NET: 855 mL        General: alert and oriented, NAD  Resp: airway patent, respirations unlabored  CVS: regular rate and rhythm  Abdomen: soft, nontender, nondistended  Extremities: no edema  Skin: warm, dry, appropriate color

## 2017-07-05 NOTE — PROGRESS NOTE ADULT - ASSESSMENT
47 yo F with ovarian cancer s/p GRETCHEN, presented with abdominal pain, found to have acute cholecystitis, complicated after stent placement with perforated gallbladder with an associated intra-abdominal abscesse and bowel obstruction (likely malignant bowel obstruction).  S/p stent removal 6/7/2017 via endoscopy with NG tube placement. s/p intraabdominal drain for abscess drainage. s/p exlap with G-tube insertion. Seen for Pain management.

## 2017-07-05 NOTE — PROGRESS NOTE ADULT - ASSESSMENT
47 yo female HD #34 with ovarian ca s/p GRETCHEN BSO and debulking liver mets with colostomy complicated by retrogastric abscess collection now POD 13 from ex-lap CORDELL and placement of amador drain and G-tube. Tolerating diet and planning for discharge after PO pain regimen established.    - wound ostomy today  - analgesia PRN, palliative on-board  - bowel regimen, regular diet  - VTE ppx  - continue clamp g-tube

## 2017-07-05 NOTE — PROGRESS NOTE ADULT - PROBLEM SELECTOR PLAN 1
On Fentanyl 50 mcg/h q 72 h. DC Dilaudid IV and start Dilaudid 5 mg PO q 3 h PRN.   Patient is to follow up with Dr. Ahmet Alvarenga at [address] [place] [address], [phone] for pain management and opiods tapering. On Fentanyl 50 mcg/h q 72 h. DC Dilaudid IV and start Dilaudid 5 mg PO q 3 h PRN.   Patient is to follow up with Dr. Ahmet Alvarenga at [address] [place] [address], [phone] for pain management and opiods tapering.  Narcan 4mg/0.1ml per one nostril if opioid overdose and repeating after 2 minutes if needed was prescribed a the patient's pharmacy ([facility] [phone])  Plan of care d/w patient and her fiance as well as with the primary team

## 2017-07-05 NOTE — PROGRESS NOTE ADULT - SUBJECTIVE AND OBJECTIVE BOX
Patient seen and examined at  17:00 on 6/29  INTERVAL HPI/OVERNIGHT EVENTS: Following up for pain. Patient indicates pain is better controlled today. Pain is generalized over her abdomen; however, it is more pronounced over  the area where a drain was removed. Pain is mild to moderate and partially improving with Dilaudid.   ADVANCE DIRECTIVES:    DNR: NO           PRESENT SYMPTOMS:   SOURCE: Patient      Pain: Mild. Non-radiating, generalized, Throbbing, Intermittent, Improving with Dilaudid. Increasing with palpation.     Dyspnea:   NO  Anxiety:NO  Fatigue: Mild to moderate   Nausea: NO  Loss of Appetite:NO  Constipation [ x ] YES. Colostomy output: 0 recently.     OTHER SYMPTOMS:  [ X] All other ROS negative     [ ] Unable to obtain due to poor mentation    MEDICATIONS  (STANDING):  enoxaparin Injectable 40 milliGRAM(s) SubCutaneous daily  acetaminophen   Tablet. 1000 milliGRAM(s) Oral every 6 hours  docusate sodium 100 milliGRAM(s) Oral three times a day  fentaNYL   Patch  50 MICROgram(s)/Hr 1 Patch Transdermal every 72 hours    MEDICATIONS  (PRN):  ibuprofen  Tablet 600 milliGRAM(s) Oral every 6 hours PRN Mild to moderate pain  HYDROmorphone  Injectable 1 milliGRAM(s) IV Push every 2 hours PRN Breakthrough pain    Karnofsky Performance Score/Palliative Performance Status Version 2: 40-50  %  Protein Calorie Malnutrition:  [ ] Mild   [ ] Moderate   [ ] Severe     Physical Exam:    Vital Signs Last 24 Hrs  T(C): 36.7 (05 Jul 2017 10:49), Max: 36.9 (04 Jul 2017 13:31)  T(F): 98.1 (05 Jul 2017 10:49), Max: 98.4 (04 Jul 2017 13:31)  HR: 90 (05 Jul 2017 10:49) (80 - 92)  BP: 94/61 (05 Jul 2017 10:49) (94/61 - 101/73)  BP(mean): --  RR: 18 (05 Jul 2017 10:49) (18 - 18)  SpO2: 95% (05 Jul 2017 10:49) (95% - 99%)    General: Awake alert, comfortable.   HEENT: PERRL with EOMI.   Lungs: Clear to Ausc bilaterally, no wheezing.   Cardiovascular: S1, S2. No S3  RRR, no murmurs   Abdomen: Soft, No guarding, tenderness to palpate diffusely.  BS  x 4 (+).  venting PEG. Last  ml 7/4  Genitourinary: Normal  Musculoskeletal:  Weakness   Neurological: no focal deficits. Alert, oriented x 3     Skin: warm and dry     Labs:               No new labs       Imaging  < from: CT Abdomen and Pelvis w/ Oral Cont and w/ IV Cont (06.29.17 @ 21:36) >  EXAM:  CT ABDOMEN AND PELVIS OC IC                            PROCEDURE DATE:  06/29/2017  IMPRESSION: Marked interval decrease in the size of a left upper quadrant   fluid collection status post placement of a percutaneous drainage   catheter.    Minimal increase in size of several peritoneal implants.    < end of copied text >                             Assessment and Plan

## 2017-07-11 RX ORDER — HYDROMORPHONE HYDROCHLORIDE 2 MG/1
2 TABLET ORAL
Qty: 60 | Refills: 0 | Status: DISCONTINUED | COMMUNITY
Start: 2017-07-10 | End: 2017-07-11

## 2017-07-13 ENCOUNTER — APPOINTMENT (OUTPATIENT)
Dept: GERIATRICS | Facility: CLINIC | Age: 47
End: 2017-07-13

## 2017-07-13 VITALS
RESPIRATION RATE: 16 BRPM | SYSTOLIC BLOOD PRESSURE: 100 MMHG | TEMPERATURE: 99.1 F | OXYGEN SATURATION: 98 % | BODY MASS INDEX: 16.55 KG/M2 | WEIGHT: 103 LBS | DIASTOLIC BLOOD PRESSURE: 70 MMHG | HEART RATE: 97 BPM | HEIGHT: 66 IN

## 2017-07-13 DIAGNOSIS — Z80.0 FAMILY HISTORY OF MALIGNANT NEOPLASM OF DIGESTIVE ORGANS: ICD-10-CM

## 2017-07-13 DIAGNOSIS — Z80.3 FAMILY HISTORY OF MALIGNANT NEOPLASM OF BREAST: ICD-10-CM

## 2017-07-13 DIAGNOSIS — Z83.1 FAMILY HISTORY OF OTHER INFECTIOUS AND PARASITIC DISEASES: ICD-10-CM

## 2017-07-17 ENCOUNTER — OUTPATIENT (OUTPATIENT)
Dept: OUTPATIENT SERVICES | Facility: HOSPITAL | Age: 47
LOS: 1 days | Discharge: HOME | End: 2017-07-17
Payer: COMMERCIAL

## 2017-07-17 DIAGNOSIS — Z93.3 COLOSTOMY STATUS: Chronic | ICD-10-CM

## 2017-07-17 DIAGNOSIS — C56.9 MALIGNANT NEOPLASM OF UNSPECIFIED OVARY: ICD-10-CM

## 2017-07-17 DIAGNOSIS — Z90.710 ACQUIRED ABSENCE OF BOTH CERVIX AND UTERUS: Chronic | ICD-10-CM

## 2017-07-18 ENCOUNTER — FORM ENCOUNTER (OUTPATIENT)
Age: 47
End: 2017-07-18

## 2017-07-18 ENCOUNTER — APPOINTMENT (OUTPATIENT)
Dept: SURGICAL ONCOLOGY | Facility: CLINIC | Age: 47
End: 2017-07-18

## 2017-07-18 ENCOUNTER — RESULT REVIEW (OUTPATIENT)
Age: 47
End: 2017-07-18

## 2017-07-18 VITALS
OXYGEN SATURATION: 99 % | WEIGHT: 104 LBS | HEART RATE: 88 BPM | HEIGHT: 66 IN | SYSTOLIC BLOOD PRESSURE: 100 MMHG | RESPIRATION RATE: 15 BRPM | BODY MASS INDEX: 16.71 KG/M2 | DIASTOLIC BLOOD PRESSURE: 67 MMHG

## 2017-07-18 PROCEDURE — 88321 CONSLTJ&REPRT SLD PREP ELSWR: CPT

## 2017-07-19 ENCOUNTER — OUTPATIENT (OUTPATIENT)
Dept: OUTPATIENT SERVICES | Facility: HOSPITAL | Age: 47
LOS: 1 days | End: 2017-07-19
Payer: COMMERCIAL

## 2017-07-19 DIAGNOSIS — K65.1 PERITONEAL ABSCESS: ICD-10-CM

## 2017-07-19 DIAGNOSIS — Z93.3 COLOSTOMY STATUS: Chronic | ICD-10-CM

## 2017-07-19 DIAGNOSIS — Z90.710 ACQUIRED ABSENCE OF BOTH CERVIX AND UTERUS: Chronic | ICD-10-CM

## 2017-07-19 PROCEDURE — 47531 INJECTION FOR CHOLANGIOGRAM: CPT

## 2017-07-24 DIAGNOSIS — Z43.4 ENCOUNTER FOR ATTENTION TO OTHER ARTIFICIAL OPENINGS OF DIGESTIVE TRACT: ICD-10-CM

## 2017-07-24 DIAGNOSIS — K80.62 CALCULUS OF GALLBLADDER AND BILE DUCT WITH ACUTE CHOLECYSTITIS WITHOUT OBSTRUCTION: ICD-10-CM

## 2017-07-26 ENCOUNTER — APPOINTMENT (OUTPATIENT)
Dept: HEMATOLOGY ONCOLOGY | Facility: CLINIC | Age: 47
End: 2017-07-26

## 2017-07-28 ENCOUNTER — APPOINTMENT (OUTPATIENT)
Dept: GERIATRICS | Facility: CLINIC | Age: 47
End: 2017-07-28
Payer: COMMERCIAL

## 2017-07-28 VITALS
HEART RATE: 71 BPM | SYSTOLIC BLOOD PRESSURE: 108 MMHG | DIASTOLIC BLOOD PRESSURE: 74 MMHG | RESPIRATION RATE: 16 BRPM | TEMPERATURE: 98.8 F | OXYGEN SATURATION: 100 % | WEIGHT: 105.38 LBS | HEIGHT: 65.98 IN | BODY MASS INDEX: 16.94 KG/M2

## 2017-07-28 PROCEDURE — 99215 OFFICE O/P EST HI 40 MIN: CPT

## 2017-07-31 ENCOUNTER — INPATIENT (INPATIENT)
Facility: HOSPITAL | Age: 47
LOS: 10 days | Discharge: HOME | DRG: 445 | End: 2017-08-11
Attending: INTERNAL MEDICINE | Admitting: HOSPITALIST
Payer: COMMERCIAL

## 2017-07-31 VITALS
OXYGEN SATURATION: 99 % | HEART RATE: 90 BPM | DIASTOLIC BLOOD PRESSURE: 77 MMHG | TEMPERATURE: 98 F | RESPIRATION RATE: 17 BRPM | SYSTOLIC BLOOD PRESSURE: 104 MMHG

## 2017-07-31 DIAGNOSIS — Z93.3 COLOSTOMY STATUS: Chronic | ICD-10-CM

## 2017-07-31 DIAGNOSIS — Z90.710 ACQUIRED ABSENCE OF BOTH CERVIX AND UTERUS: Chronic | ICD-10-CM

## 2017-07-31 PROCEDURE — 99285 EMERGENCY DEPT VISIT HI MDM: CPT

## 2017-07-31 RX ORDER — SODIUM CHLORIDE 9 MG/ML
1000 INJECTION INTRAMUSCULAR; INTRAVENOUS; SUBCUTANEOUS ONCE
Qty: 0 | Refills: 0 | Status: COMPLETED | OUTPATIENT
Start: 2017-07-31 | End: 2017-07-31

## 2017-07-31 RX ORDER — HYDROMORPHONE HYDROCHLORIDE 2 MG/ML
1 INJECTION INTRAMUSCULAR; INTRAVENOUS; SUBCUTANEOUS ONCE
Qty: 0 | Refills: 0 | Status: DISCONTINUED | OUTPATIENT
Start: 2017-07-31 | End: 2017-07-31

## 2017-07-31 NOTE — ED ADULT NURSE NOTE - OBJECTIVE STATEMENT
Pt is a 47 yo female with a pmh of ovarian ca s/p GRETCHEN, colostomy cp abdominal pain worsening since yesterday. Pt also has a drain from the gall bladder in place. She denies any N/V/D. She reports taking tylenol at home every 6 hours for pain and the last time she took tylenol was 7 pm, Pt has a fentanyl patch in place on her right arm. She denies any CP or SOB no N/V/D no cough fever or chills.

## 2017-07-31 NOTE — ED ADULT NURSE NOTE - PSH
Colostomy in place  dec 2016 (placed during hysterectomy in dec 2016 at [facility])  H/O abdominal hysterectomy  dec 2016

## 2017-08-01 DIAGNOSIS — C56.9 MALIGNANT NEOPLASM OF UNSPECIFIED OVARY: ICD-10-CM

## 2017-08-01 DIAGNOSIS — R10.84 GENERALIZED ABDOMINAL PAIN: ICD-10-CM

## 2017-08-01 DIAGNOSIS — Z29.9 ENCOUNTER FOR PROPHYLACTIC MEASURES, UNSPECIFIED: ICD-10-CM

## 2017-08-01 DIAGNOSIS — R93.5 ABNORMAL FINDINGS ON DIAGNOSTIC IMAGING OF OTHER ABDOMINAL REGIONS, INCLUDING RETROPERITONEUM: ICD-10-CM

## 2017-08-01 DIAGNOSIS — R10.9 UNSPECIFIED ABDOMINAL PAIN: ICD-10-CM

## 2017-08-01 LAB
ALBUMIN SERPL ELPH-MCNC: 4 G/DL — SIGNIFICANT CHANGE UP (ref 3.3–5)
ALP SERPL-CCNC: 86 U/L — SIGNIFICANT CHANGE UP (ref 40–120)
ALT FLD-CCNC: 5 U/L RC — LOW (ref 10–45)
ANION GAP SERPL CALC-SCNC: 16 MMOL/L — SIGNIFICANT CHANGE UP (ref 5–17)
APTT BLD: 35.2 SEC — SIGNIFICANT CHANGE UP (ref 27.5–37.4)
AST SERPL-CCNC: 18 U/L — SIGNIFICANT CHANGE UP (ref 10–40)
BASOPHILS # BLD AUTO: 0 K/UL — SIGNIFICANT CHANGE UP (ref 0–0.2)
BASOPHILS NFR BLD AUTO: 0.1 % — SIGNIFICANT CHANGE UP (ref 0–2)
BILIRUB SERPL-MCNC: 0.3 MG/DL — SIGNIFICANT CHANGE UP (ref 0.2–1.2)
BUN SERPL-MCNC: 13 MG/DL — SIGNIFICANT CHANGE UP (ref 7–23)
CALCIUM SERPL-MCNC: 9.7 MG/DL — SIGNIFICANT CHANGE UP (ref 8.4–10.5)
CHLORIDE SERPL-SCNC: 97 MMOL/L — SIGNIFICANT CHANGE UP (ref 96–108)
CO2 SERPL-SCNC: 25 MMOL/L — SIGNIFICANT CHANGE UP (ref 22–31)
CREAT SERPL-MCNC: 0.52 MG/DL — SIGNIFICANT CHANGE UP (ref 0.5–1.3)
EOSINOPHIL # BLD AUTO: 0.1 K/UL — SIGNIFICANT CHANGE UP (ref 0–0.5)
EOSINOPHIL NFR BLD AUTO: 0.8 % — SIGNIFICANT CHANGE UP (ref 0–6)
GLUCOSE SERPL-MCNC: 107 MG/DL — HIGH (ref 70–99)
HCT VFR BLD CALC: 35.3 % — SIGNIFICANT CHANGE UP (ref 34.5–45)
HGB BLD-MCNC: 11.2 G/DL — LOW (ref 11.5–15.5)
INR BLD: 1.07 RATIO — SIGNIFICANT CHANGE UP (ref 0.88–1.16)
LYMPHOCYTES # BLD AUTO: 1.6 K/UL — SIGNIFICANT CHANGE UP (ref 1–3.3)
LYMPHOCYTES # BLD AUTO: 16.2 % — SIGNIFICANT CHANGE UP (ref 13–44)
MCHC RBC-ENTMCNC: 27.5 PG — SIGNIFICANT CHANGE UP (ref 27–34)
MCHC RBC-ENTMCNC: 31.7 GM/DL — LOW (ref 32–36)
MCV RBC AUTO: 86.7 FL — SIGNIFICANT CHANGE UP (ref 80–100)
MONOCYTES # BLD AUTO: 0.5 K/UL — SIGNIFICANT CHANGE UP (ref 0–0.9)
MONOCYTES NFR BLD AUTO: 4.9 % — SIGNIFICANT CHANGE UP (ref 2–14)
NEUTROPHILS # BLD AUTO: 7.6 K/UL — HIGH (ref 1.8–7.4)
NEUTROPHILS NFR BLD AUTO: 78 % — HIGH (ref 43–77)
PLATELET # BLD AUTO: 370 K/UL — SIGNIFICANT CHANGE UP (ref 150–400)
POTASSIUM SERPL-MCNC: 4.5 MMOL/L — SIGNIFICANT CHANGE UP (ref 3.5–5.3)
POTASSIUM SERPL-SCNC: 4.5 MMOL/L — SIGNIFICANT CHANGE UP (ref 3.5–5.3)
PROT SERPL-MCNC: 7.5 G/DL — SIGNIFICANT CHANGE UP (ref 6–8.3)
PROTHROM AB SERPL-ACNC: 11.6 SEC — SIGNIFICANT CHANGE UP (ref 9.8–12.7)
RBC # BLD: 4.07 M/UL — SIGNIFICANT CHANGE UP (ref 3.8–5.2)
RBC # FLD: 13.4 % — SIGNIFICANT CHANGE UP (ref 10.3–14.5)
SODIUM SERPL-SCNC: 138 MMOL/L — SIGNIFICANT CHANGE UP (ref 135–145)
WBC # BLD: 9.7 K/UL — SIGNIFICANT CHANGE UP (ref 3.8–10.5)
WBC # FLD AUTO: 9.7 K/UL — SIGNIFICANT CHANGE UP (ref 3.8–10.5)

## 2017-08-01 PROCEDURE — 74177 CT ABD & PELVIS W/CONTRAST: CPT | Mod: 26

## 2017-08-01 PROCEDURE — 99223 1ST HOSP IP/OBS HIGH 75: CPT

## 2017-08-01 PROCEDURE — 99255 IP/OBS CONSLTJ NEW/EST HI 80: CPT | Mod: GC

## 2017-08-01 RX ORDER — FENTANYL CITRATE 50 UG/ML
1 INJECTION INTRAVENOUS
Qty: 0 | Refills: 0 | Status: DISCONTINUED | OUTPATIENT
Start: 2017-08-01 | End: 2017-08-04

## 2017-08-01 RX ORDER — LACTULOSE 10 G/15ML
15 SOLUTION ORAL
Qty: 0 | Refills: 0 | COMMUNITY

## 2017-08-01 RX ORDER — ACETAMINOPHEN 500 MG
650 TABLET ORAL EVERY 6 HOURS
Qty: 0 | Refills: 0 | Status: DISCONTINUED | OUTPATIENT
Start: 2017-08-01 | End: 2017-08-11

## 2017-08-01 RX ORDER — HYDROMORPHONE HYDROCHLORIDE 2 MG/ML
1 INJECTION INTRAMUSCULAR; INTRAVENOUS; SUBCUTANEOUS EVERY 4 HOURS
Qty: 0 | Refills: 0 | Status: DISCONTINUED | OUTPATIENT
Start: 2017-08-01 | End: 2017-08-02

## 2017-08-01 RX ORDER — SODIUM CHLORIDE 9 MG/ML
1000 INJECTION INTRAMUSCULAR; INTRAVENOUS; SUBCUTANEOUS ONCE
Qty: 0 | Refills: 0 | Status: COMPLETED | OUTPATIENT
Start: 2017-08-01 | End: 2017-08-01

## 2017-08-01 RX ORDER — HYDROMORPHONE HYDROCHLORIDE 2 MG/ML
1 INJECTION INTRAMUSCULAR; INTRAVENOUS; SUBCUTANEOUS ONCE
Qty: 0 | Refills: 0 | Status: DISCONTINUED | OUTPATIENT
Start: 2017-08-01 | End: 2017-08-01

## 2017-08-01 RX ORDER — ACETAMINOPHEN 500 MG
1000 TABLET ORAL ONCE
Qty: 0 | Refills: 0 | Status: COMPLETED | OUTPATIENT
Start: 2017-08-01 | End: 2017-08-01

## 2017-08-01 RX ORDER — DOCUSATE SODIUM 100 MG
100 CAPSULE ORAL THREE TIMES A DAY
Qty: 0 | Refills: 0 | Status: DISCONTINUED | OUTPATIENT
Start: 2017-08-01 | End: 2017-08-11

## 2017-08-01 RX ORDER — SODIUM CHLORIDE 9 MG/ML
1000 INJECTION, SOLUTION INTRAVENOUS
Qty: 0 | Refills: 0 | Status: DISCONTINUED | OUTPATIENT
Start: 2017-08-01 | End: 2017-08-07

## 2017-08-01 RX ORDER — ENOXAPARIN SODIUM 100 MG/ML
40 INJECTION SUBCUTANEOUS DAILY
Qty: 0 | Refills: 0 | Status: DISCONTINUED | OUTPATIENT
Start: 2017-08-01 | End: 2017-08-11

## 2017-08-01 RX ADMIN — HYDROMORPHONE HYDROCHLORIDE 1 MILLIGRAM(S): 2 INJECTION INTRAMUSCULAR; INTRAVENOUS; SUBCUTANEOUS at 15:20

## 2017-08-01 RX ADMIN — Medication 400 MILLIGRAM(S): at 06:22

## 2017-08-01 RX ADMIN — SODIUM CHLORIDE 75 MILLILITER(S): 9 INJECTION, SOLUTION INTRAVENOUS at 11:40

## 2017-08-01 RX ADMIN — HYDROMORPHONE HYDROCHLORIDE 1 MILLIGRAM(S): 2 INJECTION INTRAMUSCULAR; INTRAVENOUS; SUBCUTANEOUS at 03:39

## 2017-08-01 RX ADMIN — HYDROMORPHONE HYDROCHLORIDE 1 MILLIGRAM(S): 2 INJECTION INTRAMUSCULAR; INTRAVENOUS; SUBCUTANEOUS at 22:10

## 2017-08-01 RX ADMIN — HYDROMORPHONE HYDROCHLORIDE 1 MILLIGRAM(S): 2 INJECTION INTRAMUSCULAR; INTRAVENOUS; SUBCUTANEOUS at 14:50

## 2017-08-01 RX ADMIN — HYDROMORPHONE HYDROCHLORIDE 1 MILLIGRAM(S): 2 INJECTION INTRAMUSCULAR; INTRAVENOUS; SUBCUTANEOUS at 07:38

## 2017-08-01 RX ADMIN — Medication 650 MILLIGRAM(S): at 22:29

## 2017-08-01 RX ADMIN — HYDROMORPHONE HYDROCHLORIDE 1 MILLIGRAM(S): 2 INJECTION INTRAMUSCULAR; INTRAVENOUS; SUBCUTANEOUS at 20:58

## 2017-08-01 RX ADMIN — HYDROMORPHONE HYDROCHLORIDE 1 MILLIGRAM(S): 2 INJECTION INTRAMUSCULAR; INTRAVENOUS; SUBCUTANEOUS at 02:55

## 2017-08-01 RX ADMIN — SODIUM CHLORIDE 1000 MILLILITER(S): 9 INJECTION INTRAMUSCULAR; INTRAVENOUS; SUBCUTANEOUS at 00:08

## 2017-08-01 RX ADMIN — HYDROMORPHONE HYDROCHLORIDE 1 MILLIGRAM(S): 2 INJECTION INTRAMUSCULAR; INTRAVENOUS; SUBCUTANEOUS at 08:08

## 2017-08-01 RX ADMIN — Medication 1000 MILLIGRAM(S): at 06:22

## 2017-08-01 RX ADMIN — HYDROMORPHONE HYDROCHLORIDE 1 MILLIGRAM(S): 2 INJECTION INTRAMUSCULAR; INTRAVENOUS; SUBCUTANEOUS at 01:58

## 2017-08-01 RX ADMIN — HYDROMORPHONE HYDROCHLORIDE 1 MILLIGRAM(S): 2 INJECTION INTRAMUSCULAR; INTRAVENOUS; SUBCUTANEOUS at 12:00

## 2017-08-01 RX ADMIN — HYDROMORPHONE HYDROCHLORIDE 1 MILLIGRAM(S): 2 INJECTION INTRAMUSCULAR; INTRAVENOUS; SUBCUTANEOUS at 06:22

## 2017-08-01 RX ADMIN — ENOXAPARIN SODIUM 40 MILLIGRAM(S): 100 INJECTION SUBCUTANEOUS at 20:57

## 2017-08-01 RX ADMIN — HYDROMORPHONE HYDROCHLORIDE 1 MILLIGRAM(S): 2 INJECTION INTRAMUSCULAR; INTRAVENOUS; SUBCUTANEOUS at 11:33

## 2017-08-01 RX ADMIN — SODIUM CHLORIDE 1000 MILLILITER(S): 9 INJECTION INTRAMUSCULAR; INTRAVENOUS; SUBCUTANEOUS at 06:22

## 2017-08-01 RX ADMIN — Medication 100 MILLIGRAM(S): at 14:50

## 2017-08-01 RX ADMIN — HYDROMORPHONE HYDROCHLORIDE 1 MILLIGRAM(S): 2 INJECTION INTRAMUSCULAR; INTRAVENOUS; SUBCUTANEOUS at 00:08

## 2017-08-01 NOTE — CONSULT NOTE ADULT - SUBJECTIVE AND OBJECTIVE BOX
Chief Complaint:  Patient is a 46y old  Female who presents with a chief complaint of generalized abdominal pain x 1 day    HPI: 45yo female PMH ovarian cancer s/p GRETCHEN, colostomy (2016), lesser sac collection s/p evacuation by EUS and later IR drain placement (6/12/17) , acute cholecystitis s/p percutaneous cholecystostomy drain placement by IR (6/9/17) with drain check (last 7/19/17) found to have a CBD stone during drain check, also presenting with generalized abdominal pain since yesterday, dull, aching, non radiating, intermittent. No associated nausea or vomiting, no fever, chills       PAST MEDICAL & SURGICAL HISTORY:  Ovarian cancer: dx in dec 2016 (started as fibroid --&gt; large cancerous tumor)  Colostomy in place: dec 2016 (placed during hysterectomy in dec 2016 at [facility])  H/O abdominal hysterectomy: dec 2016      Previous Diagnostic Testing:    EGD:     < from: Upper Endoscopy (06.07.17 @ 11:48) >  Findings:       The examined esophagus was normal.       A previously placed lumen apposing metal stent connecting the stomach and the collection was        found in the gastric antrum . The cavity of the collection was entered and the fluid was     suctioned. Large amount of sterile water was irrigated into the collection cavity and        suctioned out to clean it out. The walls of the collection were formed and there was evidence        of metastatic disese and metastatic implants. These were not biopsied.. The lumen apposing        metal stent was then removed with a snare. The cavity was then accessed again to clean up the        remiaing fluid and collpsed well with suction. An Over the Scope clip was placed in the usual        fashoin at the site of the defect to help with closure. Three hemostatic clips (Instinct        clips) were placed to re-enforce the closure. A nasogastric tube was placed under direct        vizualization.                                                       Impression:          - Normal esophagus.                       - A previously placed lumen apposing metal stent connecting the stomach and                        the collection was found in the gastric antrum . The cavity of the collection                        was entered and the fluid was suctioned. The defect was closed.                       - Prior drainage of large collection adjacent to GB in area of lesser sac                        appears well contained and clinically consistent with where the patient's                        pain ahs been for the last few months.                       - Considering improvement in WBC it may have intially been infected or                        contributory.                       - Collection decompressed and should heal, however some fluid is expected to                        remain as there is likely an element of carcinomatosis.  Recommendation:      - Return patient to [facility] for [facility] care.                       - NPO today.                       - NGT to intermittent suction.                       - Assess clinical course. If does not improve consider percutaneous drain.    < end of copied text >      < from: Upper EUS (06.02.17 @ 16:55) >  Findings:       Endosonographic Finding :       The CBD was identified and appeared unremrkable. The GB was also identified and this was        dilated with turbid fluid, layering debris and some larger stones. Needle aspiration for        fluid was performed and this produced turbid bile. 3 mLs were collected and sent for        microbiology.       A window for gastrocholecystic drainage was identified in the prepyloric region. Using the        electrocautery enhanced AXIOS (15mm) the GB was drained. The stent was dilated slightly to 10        mm using a balloon TTS dilator and the cotnents of the GB were evacuated. The internal GB        wall was identified.                                                                                                        Impression:          - Normal appearing CBD                       - Enlarged GB with mild pericholecystuic fluid and copious debris.                       - GB drainage using a 15 mm AXIOS (Lumen apposing stent)                       - Evancuation of GB contents endoscopically. Stent appears to be in good                        position.  Recommendation:      - Return patient to [facility] for ongoing care.                       - NPO except for ice chips                       - Continue Abx and trend WBC    < end of copied text >      Colonoscopy: ---       FAMILY HISTORY:  No pertinent family history in first degree relatives      Social History: Marital Status:  Lives With: Spouse Substance Abuse: none Smoker: Unknown     Allergies    penicillin (Rash)    Intolerances: ---         Review of Systems:    General:  No wt loss, fevers, chills, night sweats, +fatigue,   Eyes:  Good vision, no reported pain  ENT:  No sore throat, pain, runny nose, dysphagia  CV:  No pain, palpitations hypo/hypertension  Resp:  No dyspnea, cough, tachypnea, wheezing  :  No pain, bleeding, incontinence, nocturia  Muscle:  No pain, +weakness  Neuro:  No weakness, tingling, memory problems  Psych:  No fatigue, insomnia, mood problems, depression  Endocrine:  No polyuria, polydypsia, cold/heat intolerance  Heme:  No petechiae, ecchymosis, easy bruisability  Skin:  No rash, tattoos, scars, edema    Physical Exam:    Vital Signs:  Vital Signs Last 24 Hrs  T(C): 36.7 (01 Aug 2017 07:15), Max: 36.8 (01 Aug 2017 00:10)  T(F): 98.1 (01 Aug 2017 07:15), Max: 98.3 (01 Aug 2017 01:37)  HR: 70 (01 Aug 2017 07:15) (70 - 90)  BP: 110/74 (01 Aug 2017 07:15) (104/77 - 127/82)  BP(mean): --  RR: 16 (01 Aug 2017 07:15) (16 - 19)  SpO2: 97% (01 Aug 2017 07:15) (97% - 99%)  Daily     Daily     General:  Appears stated age, well-groomed, well-nourished, in no acute distress  HEENT:  NC/AT,  conjunctivae clear and pink, no thyromegaly, nodules, adenopathy, no JVD  Chest:  Full & symmetric excursion, no increased effort, breath sounds clear  Cardiovascular:  Regular rhythm, S1, S2, no murmur/rub/S3/S4, no abdominal bruit, no edema  Abdomen:  Soft, generalized tenderness to palpation, non-distended, normoactive bowel sounds,  no masses ,no hepatosplenomegaly, no signs of chronic liver disease, PEG c/d/i - percutaneous cholecystostomy with adequate output, no leak, colostomy w/ brown stool   Extremities:  no cyanosis, clubbing or edema  Skin:  No rash/erythema/ecchymoses/petechiae/wounds/abscess/warm/dry  Neuro/Psych:  Alert, oriented, no asterixis, no tremor, no encephalopathy      Imaging:    < from: CT Abdomen and Pelvis w/ Oral Cont and w/ IV Cont (08.01.17 @ 03:18) >  FINDINGS:    LOWER CHEST: Trace bilateral pleural effusions.    LIVER: Within normal limits.  BILE DUCTS: Mild intrahepatic biliary ductal dilatation. Common bile duct   measures up to 6 mm.  GALLBLADDER: Percutaneous cholecystectomy tube is in place with a   decompressed gallbladder.  SPLEEN: Unchanged splenic lesions.  PANCREAS: Within normal limits.  ADRENALS: Within normal limits.  KIDNEYS/URETERS: Within normal limits.    BLADDER: Distended but within normal limits.  REPRODUCTIVE ORGANS: Status post hysterectomy.    BOWEL/PERITONEUM: Gastrostomy tube is in place with balloon inflated   within the stomach. Status post left lower quadrant colostomy and   Larson's pouch. Surgical clips seen in the left upper quadrant. No bowel   obstruction. Large amount of stool throughout the colon. Interval removal   of the left upper quadrant surgical drain. Marked interval decrease in   size of the left upper quadrant collection which is difficult to measure   on this current exam. Interval worsening of peritoneal carcinomatosis   with increase in size of nodular masses in the lesser sac and along the   greater curvature of the stomach, perisplenic implants and perihepatic   implants, and numerous peritoneal and pelvic nodules/masses. For example,   a previously seen cystic and solid implant at the level of the left lower   quadrant ostomy has increased in size, previously measuring up to 3.2 x   1.3 cm and currently measuring up to 5.1 x 2.9 cm. No pneumoperitoneum.   Trace ascites.  VESSELS:  Within normal limits.  RETROPERITONEUM: Numerous mildly enlarged retroperitoneal lymph nodes,   unchanged.    ABDOMINAL WALL: Percutaneous cholecystostomy tube, percutaneous   gastrostomy, and left lower quadrant colostomy as above.  BONES: Within normal limits.    IMPRESSION:     Worsening peritoneal carcinomatosis.    No bowel obstruction. Large amount of stool throughout the colon.    Interval removal of left upper quadrant surgical drain. Marked interval   decrease in size of left upper quadrant collection which is difficult to   measure on the current exam.    Cholecystostomy tube in place. Mild intrahepatic biliary ductal   dilatation. Mild prominence of the common bile duct up to 6 mm.    < end of copied text >      Laboratory:  08-01    138  |  97  |  13  ----------------------------<  107<H>  4.5   |  25  |  0.52    Ca    9.7      01 Aug 2017 00:12    TPro  7.5  /  Alb  4.0  /  TBili  0.3  /  DBili  x   /  AST  18  /  ALT  5<L>  /  AlkPhos  86  08-01                          11.2   9.7   )-----------( 370      ( 01 Aug 2017 00:12 )             35.3         LIVER FUNCTIONS - ( 01 Aug 2017 00:12 )  Alb: 4.0 g/dL / Pro: 7.5 g/dL / ALK PHOS: 86 U/L / ALT: 5 U/L RC / AST: 18 U/L / GGT: x           PT/INR - ( 01 Aug 2017 00:12 )   PT: 11.6 sec;   INR: 1.07 ratio         PTT - ( 01 Aug 2017 00:12 )  PTT:35.2 sec

## 2017-08-01 NOTE — ED PROVIDER NOTE - ATTENDING CONTRIBUTION TO CARE
Attending MD Heart: I personally have seen and examined this patient.  Resident note reviewed and agree on plan of care and except where noted.  See below for details.    46M with extensive PMH including ovarian cancer s/p GRETCHEN presents to the ED with worsening abdominal pain.  Reports that she was seen on Friday by palliative care, Dr. Salgado, but was not feeling pain at that time.  Reports pain markedly worsened over weekend, possible fever but unclear, denies vomiting, blood in stool.  Reports recent constipation with last BM Friday, +passing gas.  Reports that she is scheduled with Dr. Kyle for removal of a stone from the bile duct which they suspect may be causing pain.  Reports Dr. Papi Mixon was surgical oncologist.  Reports takes Dilaudid 6mg po q4h and has a 50 microgram Fentanyl patch and takes Tylenol po for pain.  +allergy to PCN (rash unkonwn, in childhood).  On exam, mild distress with movement, weak, thin, head NCAT, PERRL, FROM at neck, no tenderness to palpation or stepoffs along length of spine, lungs CTAB with poor inspiratory effort and decreased lung sounds in bilateral bases, +S1S2, no m/r/g, abdomen soft with G tube in L abdomen, biliary drain in R upper abdomen and multiple surgical scars, healing, marked tenderness to abdomen diffusely but also along mildline incision and horizontal retention sutures over midline incision, rash +retention sutures C/D/I, +tenderness to RUQ, +G tube in place with no surrounding erythema/bleeding or purulence, +biliary drain with no surrounding erythema, +bilious drainage in bag, moving all extremities; A/P: 46F with known ovarian cancer and mets, with abdominal pain and poor po intake,  pain control, labs, Ddx includes worsening of metastatic disease, SBO, worsening of ductal pathology in GB

## 2017-08-01 NOTE — H&P ADULT - HISTORY OF PRESENT ILLNESS
46F with h/o ovarian cancer s/p GRETCHEN, colostomy (2016), lesser sac collection s/p evacuation by EUS and later IR drain placement (6/12/17) , acute cholecystitis s/p percutaneous cholecystostomy drain placement by IR (6/9/17) who presents with generalized abdominal pain x 1 day. Pain is rated 8/10, dull, aching, non radiating, intermittent. Pain is relieved by change in position. Denies associated fever, chills, nausea or vomiting.  Of note last drain check was  7/19/17 and that time she was found to have a CBD stone.    ED course  VS : 46F with h/o ovarian cancer s/p GRETCHEN, colostomy (2016), lesser sac collection s/p evacuation by EUS and later IR drain placement (6/12/17) , acute cholecystitis s/p percutaneous cholecystostomy drain placement by IR (6/9/17) who presents with generalized abdominal pain x 1 day. Pain is rated 8/10, dull, aching, non radiating, intermittent. Pain is relieved by change in position. Denies associated fever, chills, nausea or vomiting.  Of note last drain check was  7/19/17 and that time she was found to have a CBD stone.    ED course  VS : 104/77 90 17 O2 99% on room air T 98.1F  Labs : wbc 9.7 h/h 11/35 plt 370 bun/creat 13/0.52  ALP/AST/ALT 86/18/5  Treatment : IVF NS 1L  x 2, IV Tylenol 1g x 1, Hydromorphone 1g IV x 6  Seen by Gastroenterology

## 2017-08-01 NOTE — H&P ADULT - NSHPLABSRESULTS_GEN_ALL_CORE
Labs reviewed : no leukocytosis, LFTs are wnl    EKG personally reviewed 6/29/17 : NSR, 90bpm    CT abdomen pelvis dated 6/29/2017  Worsening peritoneal carcinomatosis.  No bowel obstruction. Large amount of stool throughout the colon.  Interval removal of left upper quadrant surgical drain. Marked interval   decrease in size of left upper quadrant collection which is difficult to   measure on the current exam.  Cholecystostomy tube in place. Mild intrahepatic biliary ductal   dilatation. Mild prominence of the common bile duct up to 6 mm.

## 2017-08-01 NOTE — H&P ADULT - ASSESSMENT
46F with h/o ovarian cancer s/p GRETCHEN, colostomy (2016), lesser sac collection s/p evacuation by EUS and later IR drain placement (6/12/17) , acute cholecystitis s/p percutaneous cholecystostomy drain placement by IR (6/9/17) who presents with generalized abdominal pain x 1 day. Physical exam is notable for diffuse abdominal pain. Labs are negative for leukocytosis. Abd CT done 6/29/17 shows worsening peritoneal carcinomatosis and mild prominence of the common bile duct up to 6 mm.

## 2017-08-01 NOTE — CONSULT NOTE ADULT - ASSESSMENT
45 y/o F with history of ovarian cancer s/p GRETCHEN with tumor debulking and colon resection, complicated post-op course and recent hospitalization for cholangitis with IR perc ubaldo tube in place and malignant SBO s/p surgical, with known CBD stone planned for retrieval Thursday here for worsening abdominal pain. 47 y/o F with history of ovarian cancer s/p GRETCHEN-BSO with tumor debulking and colon resection, complicated post-op course with poor wound healing and recent hospitalization for cholangitis with IR perc ubaldo tube in place and malignant SBO s/p surgical, with known CBD stone planned for retrieval Thursday here for worsening abdominal pain.

## 2017-08-01 NOTE — CONSULT NOTE ADULT - SUBJECTIVE AND OBJECTIVE BOX
HPI:  Onc Hx:  45 y/o F with history of ovarian cancer, initially diagnosed and treated at OSH with GRETCHEN with debulking including colon resection and end descending colostomy, complicated by wound healing issues in 12/2016, never treated with chemotherapy and admitted to [facility] in June with RUQ pain and leukocytosis with evidence for cholecystitis at the time. On that admission patient was attempted for transgastric drainage of GB which was c/b abscess requiring IR drainage. Perc ubaldo tube placed by IR. She also had malignant SBO on that admission, which was conservatively treated at first but eventually required ex-lap which CORDELL and drainage of intraabdominal abscesses with the placement of a gastrostomy tube and closure of retention sutures. 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 Peritoneal carcinomatosis was also present.         She now presents with generalized abdominal pain x 1 day. Pain is rated 8/10, dull, aching, non radiating, intermittent. Pain is relieved by change in position. Denies associated fever, chills, nausea or vomiting. Of note last drain check was  7/19/17 and that time she was found to have a CBD stone. She was planned for outpatient stone retrieval this Thursday. She has not yet been evaluated by Dr. Lane for adjuvant chemotherapy as she has missed appointments due to hospitalization, however plans to do so in the near future.       PAST MEDICAL & SURGICAL HISTORY:  Ovarian cancer: dx in dec 2016 (started as fibroid --&gt; large cancerous tumor)  Colostomy in place: dec 2016 (placed during hysterectomy in dec 2016 at [facility])  H/O abdominal hysterectomy: dec 2016      Allergies    penicillin (Rash)    Intolerances        MEDICATIONS  (STANDING):  dextrose 5% + sodium chloride 0.9%. 1000 milliLiter(s) (75 mL/Hr) IV Continuous <Continuous>  fentaNYL   Patch  50 MICROgram(s)/Hr 1 Patch Transdermal every 72 hours  docusate sodium 100 milliGRAM(s) Oral three times a day  enoxaparin Injectable 40 milliGRAM(s) SubCutaneous daily    MEDICATIONS  (PRN):  HYDROmorphone  Injectable 1 milliGRAM(s) IV Push every 4 hours PRN Severe Pain (7 - 10)      FAMILY HISTORY:  No pertinent family history in first degree relatives      SOCIAL HISTORY: No EtOH, no tobacco    REVIEW OF SYSTEMS:    CONSTITUTIONAL: No weakness, fevers or chills  EYES/ENT: No visual changes;  No vertigo or throat pain   NECK: No pain or stiffness  RESPIRATORY: No cough, wheezing, hemoptysis; No shortness of breath  CARDIOVASCULAR: No chest pain or palpitations  GASTROINTESTINAL: +Abdominal pain as per HPI. No nausea, vomiting, or hematemesis; No diarrhea or constipation. No melena or hematochezia.  GENITOURINARY: No dysuria, frequency or hematuria  NEUROLOGICAL: No numbness or weakness  SKIN: No itching, burning, rashes, or lesions   All other review of systems is negative unless indicated above.        T(F): 97.8 (08-01-17 @ 12:03), Max: 98.3 (08-01-17 @ 01:37)  HR: 69 (08-01-17 @ 12:03)  BP: 106/69 (08-01-17 @ 12:03)  RR: 17 (08-01-17 @ 12:03)  SpO2: 98% (08-01-17 @ 12:03)  Wt(kg): --    GENERAL: NAD, well-developed  HEAD:  Atraumatic, Normocephalic  EYES: EOMI, PERRLA, conjunctiva and sclera clear  NECK: Supple, No JVD  CHEST/LUNG: Clear to auscultation bilaterally; No wheeze  HEART: Regular rate and rhythm; No murmurs, rubs, or gallops  ABDOMEN: Soft with tenderness, Nondistended; Bowel sounds present, PEG in place, cholecystostomy in place with adequate drainage, cholostomy in place with brown stool.   EXTREMITIES:  2+ Peripheral Pulses, No clubbing, cyanosis, or edema  NEUROLOGY: non-focal  SKIN: No rashes or lesions                          11.2   9.7   )-----------( 370      ( 01 Aug 2017 00:12 )             35.3       08-01    138  |  97  |  13  ----------------------------<  107<H>  4.5   |  25  |  0.52    Ca    9.7      01 Aug 2017 00:12    TPro  7.5  /  Alb  4.0  /  TBili  0.3  /  DBili  x   /  AST  18  /  ALT  5<L>  /  AlkPhos  86  08-01          PT/INR - ( 01 Aug 2017 00:12 )   PT: 11.6 sec;   INR: 1.07 ratio         PTT - ( 01 Aug 2017 00:12 )  PTT:35.2 sec      EXAM:  CT ABDOMEN AND PELVIS OC IC                        PROCEDURE DATE:  08/01/2017    IMPRESSION:     Worsening peritoneal carcinomatosis.    No bowel obstruction. Large amount of stool throughout the colon.    Interval removal of left upper quadrant surgical drain. Marked interval   decrease in size of left upper quadrant collection which is difficult to   measure on the current exam.    Cholecystostomy tube in place. Mild intrahepatic biliary ductal   dilatation. Mild prominence of the common bile duct up to 6 mm. HPI:  Onc Hx:  45 y/o F with history of ovarian cancer, initially diagnosed and treated at [facility] in [place] in 12/16 with GRETCHEN-BSO with debulking including colon resection and end descending colostomy, complicated by wound healing issues. Due to wound healing issues and weight loss she was never treated with chemotherapy. In 6/17 she was admitted to [facility] with RUQ pain and leukocytosis with evidence for cholecystitis at the time. On that admission patient was attempted for transgastric drainage of GB which was c/b abscess requiring IR drainage. Perc ubaldo tube placed by IR. She also had malignant SBO on that admission, which was conservatively treated at first but eventually required ex-lap with CORDELL and drainage of intraabdominal abscesses with the placement of a gastrostomy tube and closure of retention sutures. Evidence of recurrent disease intraoperatively. 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 Peritoneal carcinomatosis was also present. She was referred to see Dr. Lane for adjuvant chemotherapy at [facility], but has not yet seen her as she has missed appointments due to hospitalization. She states that she plans to do so in the near future.  She now presents with increased abdominal pain for over a week with marked worsening in last day. Pain is rated 8/10, dull, aching, non radiating, intermittent. Pain is relieved by change in position. Denies associated fever, chills, nausea or vomiting. Of note last drain check was  7/19/17 and that time she was found to have a CBD stone. She was planned for outpatient stone retrieval this Thursday.       PAST MEDICAL & SURGICAL HISTORY:  Ovarian cancer: dx in dec 2016 (started as fibroid --&gt; large cancerous tumor)  Colostomy in place: dec 2016 (placed during hysterectomy in dec 2016 at [facility])  H/O GRETCHEN-BSO: dec 2016      Allergies: penicillin (Rash)    Intolerances        MEDICATIONS  (STANDING):  dextrose 5% + sodium chloride 0.9%. 1000 milliLiter(s) (75 mL/Hr) IV Continuous <Continuous>  fentaNYL   Patch  50 MICROgram(s)/Hr 1 Patch Transdermal every 72 hours  docusate sodium 100 milliGRAM(s) Oral three times a day  enoxaparin Injectable 40 milliGRAM(s) SubCutaneous daily    MEDICATIONS  (PRN):  HYDROmorphone  Injectable 1 milliGRAM(s) IV Push every 4 hours PRN Severe Pain (7 - 10)      FAMILY HISTORY:  No pertinent family history in first degree relatives      SOCIAL HISTORY: No EtOH, no tobacco    REVIEW OF SYSTEMS:    CONSTITUTIONAL: No weakness, fevers or chills  EYES/ENT: No visual changes;  No vertigo or throat pain   NECK: No pain or stiffness  RESPIRATORY: No cough, wheezing, hemoptysis; No shortness of breath  CARDIOVASCULAR: No chest pain or palpitations  GASTROINTESTINAL: +Abdominal pain as per HPI. No nausea, vomiting, or hematemesis; No diarrhea or constipation. No melena or hematochezia.  GENITOURINARY: No dysuria, frequency or hematuria  NEUROLOGICAL: No numbness or weakness  SKIN: No itching, burning, rashes, or lesions   All other review of systems is negative unless indicated above.        T(F): 97.8 (08-01-17 @ 12:03), Max: 98.3 (08-01-17 @ 01:37)  HR: 69 (08-01-17 @ 12:03)  BP: 106/69 (08-01-17 @ 12:03)  RR: 17 (08-01-17 @ 12:03)  SpO2: 98% (08-01-17 @ 12:03)  Wt(kg): --    GENERAL: NAD, well-developed  HEAD:  Atraumatic, Normocephalic  EYES: EOMI, PERRLA, conjunctiva and sclera clear  NECK: Supple, No JVD  CHEST/LUNG: Clear to auscultation bilaterally; No wheeze  HEART: Regular rate and rhythm; No murmurs, rubs, or gallops  ABDOMEN: Soft with tenderness, Nondistended; Bowel sounds present, PEG in place, cholecystostomy in place with adequate drainage, cholostomy in place with brown stool.   EXTREMITIES:  2+ Peripheral Pulses, No clubbing, cyanosis, or edema  NEUROLOGY: non-focal  SKIN: No rashes or lesions                          11.2   9.7   )-----------( 370      ( 01 Aug 2017 00:12 )             35.3       08-01    138  |  97  |  13  ----------------------------<  107<H>  4.5   |  25  |  0.52    Ca    9.7      01 Aug 2017 00:12    TPro  7.5  /  Alb  4.0  /  TBili  0.3  /  DBili  x   /  AST  18  /  ALT  5<L>  /  AlkPhos  86  08-01          PT/INR - ( 01 Aug 2017 00:12 )   PT: 11.6 sec;   INR: 1.07 ratio         PTT - ( 01 Aug 2017 00:12 )  PTT:35.2 sec      EXAM:  CT ABDOMEN AND PELVIS OC IC                        PROCEDURE DATE:  08/01/2017    IMPRESSION:     Worsening peritoneal carcinomatosis.    No bowel obstruction. Large amount of stool throughout the colon.    Interval removal of left upper quadrant surgical drain. Marked interval   decrease in size of left upper quadrant collection which is difficult to   measure on the current exam.    Cholecystostomy tube in place. Mild intrahepatic biliary ductal   dilatation. Mild prominence of the common bile duct up to 6 mm.

## 2017-08-01 NOTE — H&P ADULT - PROBLEM SELECTOR PLAN 1
May be 2/2 choledocholithiasis as well as worsening peritoneal carcinomatosis   - Pain control PRN  - NPO   - IVF.  - Possible ERCP tomorrow

## 2017-08-01 NOTE — CONSULT NOTE ADULT - PROBLEM SELECTOR RECOMMENDATION 9
- May be 2/2 choledocholithiasis as well as worsening peritoneal carcinomatosis   - Pain control PRN  - NPO/ IVF

## 2017-08-01 NOTE — ED PROVIDER NOTE - PHYSICAL EXAMINATION
Gen: NAD, AOx3  Head: NCAT  Lung: CTAB, no respiratory distress, no wheezing, rales, rhonchi  CV: normal s1/s2, rrr, no murmurs, Normal perfusion, pulses 2+ throughout  Abd: retention sutures in place, colostomy in place draining stool, percutaneous gallbladder tube draining bile, +TTP diffusely   MSK: No edema, no visible deformities, full range of motion in all 4 extremities  Neuro: CN II-XII grossly intact, No focal neurologic deficits  Skin: No rash   Psych: normal affect

## 2017-08-01 NOTE — CONSULT NOTE ADULT - ATTENDING COMMENTS
ercp in am  d/w patient and family  clear liquid diet  npo p mn
The patient has had a complicated course with wound healing issues but there also appears to be a component of avoidance as her PS had improved in March and it is likely that she could have started treatment then.  When pushed to make appt with Dr. Lane her partner talked about other things they have to get done.  Reassured that consultation would not involve treatment first day but would help to establish options.  Her partner is interested in alternative therapy such as "iv copper based nutrients."  We discussed that there is no data to support this in treating ovarian cancer and recommend pursuing proven chemotherapy.  Chance of establishing control with chemo but prognosis is very poor if she does not start treatment soon.

## 2017-08-01 NOTE — CONSULT NOTE ADULT - ASSESSMENT
47yo female PMH ovarian cancer s/p GRETCHEN, colostomy (2016), lesser sac collection s/p evacuation by EUS and later IR drain placement (6/12/17) , acute cholecystitis s/p percutaneous cholecystostomy drain placement by IR (6/9/17) with drain check (last 7/19/17) found to have a CBD stone during drain check, also presenting with generalized abdominal pain x 1 day

## 2017-08-01 NOTE — CONSULT NOTE ADULT - PROBLEM SELECTOR RECOMMENDATION 9
CT scan shows intrahepatic biliary dilatation, likely due to known impacted stone. Plan is for ERCP tomorrow with GI and retrieval of stone.   Pain control with IV analgesia and other supportive care until then as per primary team.  Will discuss chemotherapeutic options with Dr. Kaila Lane as outpatient upon discharge. CT scan shows intrahepatic biliary dilatation, likely due to known impacted stone. Plan is for ERCP tomorrow with GI and retrieval of stone.   Pain control with IV analgesia and other supportive care until then as per primary team.  We had a lengthy conversation with the patient and her partner regarding the disease, prognosis and potential benefit from chemotherapy.  The patient will discuss chemotherapeutic options with Dr. Kaila Lane as outpatient upon discharge.

## 2017-08-01 NOTE — ED PROVIDER NOTE - OBJECTIVE STATEMENT
45yo female PMH ovarian cancer s/p GRETCHEN, colostomy presenting with abdominal pain since yesterday. Patient has a known history of stone in CBD. No nausea or vomiting. Patient states that she often gets low grade fevers, last took Tylenol 7pm.     GI: Dr. Aguiar  Surgeon: Dr. Mixon  Family: Carter Falcon  Onc: Kaila Lane

## 2017-08-01 NOTE — ED PROVIDER NOTE - MEDICAL DECISION MAKING DETAILS
45yo female PMH ovarian cancer presenting with abdominal pain, afebrile, with h/o cholangitis concerning for acute cholangitis, will obtain ct a/p, pain control, IVF, reassess. Nany Grullon DO

## 2017-08-01 NOTE — H&P ADULT - PSH
Colostomy in place  dec 2016 (placed during hysterectomy in dec 2016 at [facility] [place])  H/O abdominal hysterectomy  dec 2016

## 2017-08-01 NOTE — ED PROVIDER NOTE - PSH
Colostomy in place  dec 2016 (placed during hysterectomy in dec 2016 at [facility] [place])  H/O abdominal hysterectomy  dec 2016

## 2017-08-02 DIAGNOSIS — C80.0 DISSEMINATED MALIGNANT NEOPLASM, UNSPECIFIED: ICD-10-CM

## 2017-08-02 DIAGNOSIS — Z51.5 ENCOUNTER FOR PALLIATIVE CARE: ICD-10-CM

## 2017-08-02 DIAGNOSIS — K59.00 CONSTIPATION, UNSPECIFIED: ICD-10-CM

## 2017-08-02 LAB
ALBUMIN SERPL ELPH-MCNC: 3 G/DL — LOW (ref 3.3–5)
ALP SERPL-CCNC: 67 U/L — SIGNIFICANT CHANGE UP (ref 40–120)
ALT FLD-CCNC: <4 U/L — LOW (ref 10–45)
ANION GAP SERPL CALC-SCNC: 14 MMOL/L — SIGNIFICANT CHANGE UP (ref 5–17)
APTT BLD: 36.4 SEC — SIGNIFICANT CHANGE UP (ref 27.5–37.4)
AST SERPL-CCNC: 16 U/L — SIGNIFICANT CHANGE UP (ref 10–40)
BILIRUB SERPL-MCNC: 0.4 MG/DL — SIGNIFICANT CHANGE UP (ref 0.2–1.2)
BUN SERPL-MCNC: 3 MG/DL — LOW (ref 7–23)
CALCIUM SERPL-MCNC: 8.6 MG/DL — SIGNIFICANT CHANGE UP (ref 8.4–10.5)
CHLORIDE SERPL-SCNC: 98 MMOL/L — SIGNIFICANT CHANGE UP (ref 96–108)
CO2 SERPL-SCNC: 25 MMOL/L — SIGNIFICANT CHANGE UP (ref 22–31)
CREAT SERPL-MCNC: 0.32 MG/DL — LOW (ref 0.5–1.3)
GLUCOSE SERPL-MCNC: 83 MG/DL — SIGNIFICANT CHANGE UP (ref 70–99)
HCT VFR BLD CALC: 32 % — LOW (ref 34.5–45)
HGB BLD-MCNC: 9.7 G/DL — LOW (ref 11.5–15.5)
INR BLD: 1.1 RATIO — SIGNIFICANT CHANGE UP (ref 0.88–1.16)
MCHC RBC-ENTMCNC: 25.2 PG — LOW (ref 27–34)
MCHC RBC-ENTMCNC: 30.3 GM/DL — LOW (ref 32–36)
MCV RBC AUTO: 83.1 FL — SIGNIFICANT CHANGE UP (ref 80–100)
PLATELET # BLD AUTO: 337 K/UL — SIGNIFICANT CHANGE UP (ref 150–400)
POTASSIUM SERPL-MCNC: 4.1 MMOL/L — SIGNIFICANT CHANGE UP (ref 3.5–5.3)
POTASSIUM SERPL-SCNC: 4.1 MMOL/L — SIGNIFICANT CHANGE UP (ref 3.5–5.3)
PROT SERPL-MCNC: 6.3 G/DL — SIGNIFICANT CHANGE UP (ref 6–8.3)
PROTHROM AB SERPL-ACNC: 12.4 SEC — SIGNIFICANT CHANGE UP (ref 10–13.1)
RBC # BLD: 3.85 M/UL — SIGNIFICANT CHANGE UP (ref 3.8–5.2)
RBC # FLD: 14.5 % — SIGNIFICANT CHANGE UP (ref 10.3–14.5)
SODIUM SERPL-SCNC: 137 MMOL/L — SIGNIFICANT CHANGE UP (ref 135–145)
WBC # BLD: 6.63 K/UL — SIGNIFICANT CHANGE UP (ref 3.8–10.5)
WBC # FLD AUTO: 6.63 K/UL — SIGNIFICANT CHANGE UP (ref 3.8–10.5)

## 2017-08-02 PROCEDURE — 99233 SBSQ HOSP IP/OBS HIGH 50: CPT

## 2017-08-02 PROCEDURE — 99223 1ST HOSP IP/OBS HIGH 75: CPT | Mod: GC

## 2017-08-02 RX ORDER — HYDROMORPHONE HYDROCHLORIDE 2 MG/ML
1 INJECTION INTRAMUSCULAR; INTRAVENOUS; SUBCUTANEOUS
Qty: 0 | Refills: 0 | Status: DISCONTINUED | OUTPATIENT
Start: 2017-08-02 | End: 2017-08-03

## 2017-08-02 RX ORDER — HYDROMORPHONE HYDROCHLORIDE 2 MG/ML
30 INJECTION INTRAMUSCULAR; INTRAVENOUS; SUBCUTANEOUS
Qty: 0 | Refills: 0 | Status: DISCONTINUED | OUTPATIENT
Start: 2017-08-02 | End: 2017-08-07

## 2017-08-02 RX ORDER — HYDROMORPHONE HYDROCHLORIDE 2 MG/ML
2 INJECTION INTRAMUSCULAR; INTRAVENOUS; SUBCUTANEOUS
Qty: 0 | Refills: 0 | Status: DISCONTINUED | OUTPATIENT
Start: 2017-08-02 | End: 2017-08-07

## 2017-08-02 RX ORDER — HYDROMORPHONE HYDROCHLORIDE 2 MG/ML
2 INJECTION INTRAMUSCULAR; INTRAVENOUS; SUBCUTANEOUS
Qty: 0 | Refills: 0 | Status: DISCONTINUED | OUTPATIENT
Start: 2017-08-02 | End: 2017-08-02

## 2017-08-02 RX ORDER — SENNA PLUS 8.6 MG/1
2 TABLET ORAL AT BEDTIME
Qty: 0 | Refills: 0 | Status: DISCONTINUED | OUTPATIENT
Start: 2017-08-02 | End: 2017-08-11

## 2017-08-02 RX ORDER — NALOXONE HYDROCHLORIDE 4 MG/.1ML
0.1 SPRAY NASAL
Qty: 0 | Refills: 0 | Status: DISCONTINUED | OUTPATIENT
Start: 2017-08-02 | End: 2017-08-11

## 2017-08-02 RX ORDER — ONDANSETRON 8 MG/1
4 TABLET, FILM COATED ORAL EVERY 6 HOURS
Qty: 0 | Refills: 0 | Status: DISCONTINUED | OUTPATIENT
Start: 2017-08-02 | End: 2017-08-11

## 2017-08-02 RX ADMIN — SODIUM CHLORIDE 75 MILLILITER(S): 9 INJECTION, SOLUTION INTRAVENOUS at 18:26

## 2017-08-02 RX ADMIN — Medication 650 MILLIGRAM(S): at 20:36

## 2017-08-02 RX ADMIN — HYDROMORPHONE HYDROCHLORIDE 30 MILLILITER(S): 2 INJECTION INTRAMUSCULAR; INTRAVENOUS; SUBCUTANEOUS at 18:12

## 2017-08-02 RX ADMIN — HYDROMORPHONE HYDROCHLORIDE 30 MILLILITER(S): 2 INJECTION INTRAMUSCULAR; INTRAVENOUS; SUBCUTANEOUS at 19:25

## 2017-08-02 RX ADMIN — HYDROMORPHONE HYDROCHLORIDE 1 MILLIGRAM(S): 2 INJECTION INTRAMUSCULAR; INTRAVENOUS; SUBCUTANEOUS at 01:34

## 2017-08-02 RX ADMIN — HYDROMORPHONE HYDROCHLORIDE 1 MILLIGRAM(S): 2 INJECTION INTRAMUSCULAR; INTRAVENOUS; SUBCUTANEOUS at 09:43

## 2017-08-02 RX ADMIN — Medication 100 MILLIGRAM(S): at 21:07

## 2017-08-02 RX ADMIN — SENNA PLUS 2 TABLET(S): 8.6 TABLET ORAL at 21:07

## 2017-08-02 RX ADMIN — HYDROMORPHONE HYDROCHLORIDE 1 MILLIGRAM(S): 2 INJECTION INTRAMUSCULAR; INTRAVENOUS; SUBCUTANEOUS at 01:11

## 2017-08-02 RX ADMIN — HYDROMORPHONE HYDROCHLORIDE 1 MILLIGRAM(S): 2 INJECTION INTRAMUSCULAR; INTRAVENOUS; SUBCUTANEOUS at 10:10

## 2017-08-02 RX ADMIN — HYDROMORPHONE HYDROCHLORIDE 1 MILLIGRAM(S): 2 INJECTION INTRAMUSCULAR; INTRAVENOUS; SUBCUTANEOUS at 13:37

## 2017-08-02 RX ADMIN — SODIUM CHLORIDE 75 MILLILITER(S): 9 INJECTION, SOLUTION INTRAVENOUS at 05:13

## 2017-08-02 RX ADMIN — HYDROMORPHONE HYDROCHLORIDE 1 MILLIGRAM(S): 2 INJECTION INTRAMUSCULAR; INTRAVENOUS; SUBCUTANEOUS at 05:35

## 2017-08-02 RX ADMIN — HYDROMORPHONE HYDROCHLORIDE 1 MILLIGRAM(S): 2 INJECTION INTRAMUSCULAR; INTRAVENOUS; SUBCUTANEOUS at 13:07

## 2017-08-02 RX ADMIN — HYDROMORPHONE HYDROCHLORIDE 1 MILLIGRAM(S): 2 INJECTION INTRAMUSCULAR; INTRAVENOUS; SUBCUTANEOUS at 05:11

## 2017-08-02 NOTE — CONSULT NOTE ADULT - PROBLEM SELECTOR RECOMMENDATION 4
Pain was assessed w/ patient and surrogate (fiance) at bedside  Patient agrees with plan   PCA pump to resume after ERCP

## 2017-08-02 NOTE — CONSULT NOTE ADULT - SUBJECTIVE AND OBJECTIVE BOX
HPI:  Patient is a 45 y/o F with h/o ovarian cancer s/p GRETCHEN, colostomy (2016), lesser sac collection s/p evacuation by EUS and later IR drain placement (6/12/17) , acute cholecystitis s/p percutaneous cholecystostomy drain placement by IR (6/9/17) who presents with generalized abdominal pain x 1 day. Pain is rated 8/10, dull, aching, non radiating, intermittent. Pain is relieved by change in position. Denies associated fever, chills, nausea or vomiting.  Of note last drain check was  7/19/17 and that time she was found to have a CBD stone.    ED course  VS : 104/77 90 17 O2 99% on room air T 98.1F  Labs : wbc 9.7 h/h 11/35 plt 370 bun/creat 13/0.52  ALP/AST/ALT 86/18/5  Treatment : IVF NS 1L  x 2, IV Tylenol 1g x 1, Hydromorphone 1g IV x 6  Seen by Gastroenterology (01 Aug 2017 11:40)      PERTINENT PMH REVIEWED:  [x ] YES [ ] NO           SOCIAL HISTORY:   Significant other/partner:    Fiance           Children: None                   Cheondoism/Spirituality: Church   Substance hx:  [ ] YES   [x] NO                   Tobacco hx:  [ ] YES  [x ] NO                       Alcohol hx: [ ] YES  [x ] NO         Home Opioid hx:  [ x] YES  [ ] NO   Living Situation: [x ] Home  [ ] Long term care  [ ] Rehab [ ] Other    FAMILY HISTORY:  No pertinent family history in first degree relatives    [x ] Family history non-contributory     BASELINE (I)ADLs (prior to admission):  [place]: [ ] total  [x ] moderate [ ] dependent    ADVANCE DIRECTIVES:    DNR [ ] YES [x ] NO                            [ ] Completed  MOLST  [ ] YES [x ] NO                      [ ] Completed  Health Care Proxy [ ] YES  [x ] NO   [ ] Completed  Living Will  [ ] YES [x ] NO             [x ] Surrogate  [ ] HCP  [ ] Guardian:        Porter Nino   Phone#: [phone]    Allergies: penicillin (Rash)    MEDICATIONS  (STANDING):  dextrose 5% + sodium chloride 0.9%. 1000 milliLiter(s) (75 mL/Hr) IV Continuous <Continuous>  fentaNYL   Patch  50 MICROgram(s)/Hr 1 Patch Transdermal every 72 hours  docusate sodium 100 milliGRAM(s) Oral three times a day  enoxaparin Injectable 40 milliGRAM(s) SubCutaneous daily  HYDROmorphone PCA (1 mG/mL) 30 milliLiter(s) PCA Continuous PCA Continuous  senna 2 Tablet(s) Oral at bedtime    MEDICATIONS  (PRN):  acetaminophen   Tablet 650 milliGRAM(s) Oral every 6 hours PRN pain  naloxone Injectable 0.1 milliGRAM(s) IV Push every 3 minutes PRN For ANY of the following changes in patient status:  A. RR LESS THAN 10 breaths per minute, B. Oxygen saturation LESS THAN 90%, C. Sedation score of 6  ondansetron Injectable 4 milliGRAM(s) IV Push every 6 hours PRN Nausea  HYDROmorphone  Injectable 1 milliGRAM(s) IV Push every 2 hours PRN Breakthrough pain  HYDROmorphone PCA (5 mG/mL) Rescue Clinician Bolus 2 milliGRAM(s) IV Push every 1 hour PRN Severe Pain (7 - 10)      PRESENT SYMPTOMS:  Source: [ x] Patient   [ ] Family   [ ] Team     Pain:                        [ ] No [x ] Yes             [ ] Mild [ x] Moderate [ ] Severe    Onset - chronic   Location - abdominal   Duration - intermittent   Character - dull   Alleviating/Aggravating - aggravated with positioning   Radiation - non radiating   Timing -      Dyspnea:                [x ] No [ ] Yes             [ ] Mild [ ] Moderate [ ] Severe    Anxiety:                  [x ] No [ ] Yes             [ ] Mild [ ] Moderate [ ] Severe    Fatigue:                  [x ] No [ ] Yes             [ ] Mild [ ] Moderate [ ] Severe    Nausea:                  [x ] No [ ] Yes             [ ] Mild [ ] Moderate [ ] Severe    Loss of appetite:   [x ] No [ ] Yes             [ ] Mild [ ] Moderate [ ] Severe    Constipation:        [ ] No [x ] Yes             [ ] Mild [ ] Moderate [ ] Severe    Other Symptoms:  [x ] All other review of systems negative      Karnofsky Performance Score/Palliative Performance Status Version 2:   50      %    PHYSICAL EXAM:  Vital Signs Last 24 Hrs  T(C): 36.2 (02 Aug 2017 04:50), Max: 36.9 (01 Aug 2017 19:54)  T(F): 97.1 (02 Aug 2017 04:50), Max: 98.4 (01 Aug 2017 19:54)  HR: 64 (02 Aug 2017 04:50) (64 - 84)  BP: 103/65 (02 Aug 2017 04:50) (95/58 - 112/70)  BP(mean): --  RR: 18 (02 Aug 2017 04:50) (17 - 18)  SpO2: 99% (02 Aug 2017 04:50) (98% - 99%) I&O's Summary    01 Aug 2017 07:01  -  02 Aug 2017 07:00  --------------------------------------------------------  IN: 526 mL / OUT: 1400 mL / NET: -874 mL    02 Aug 2017 07:01  -  02 Aug 2017 15:25  --------------------------------------------------------  IN: 0 mL / OUT: 400 mL / NET: -400 mL        General:  [x  ] Normal. Alert, Oriented x 3.     HEENT:  [x ] Normal   [ ] Dry mouth   [ ] ET Tube    [ ] Trach  [ ] Oral lesions    Lungs:   [x ] Clear [ ] Tachypnea  [ ] Audible excessive secretions   [ ] Rhonchi        [ ] Right [ ] Left [ ] Bilateral  [ ] Crackles        [ ] Right [ ] Left [ ] Bilateral  [ ] Wheezing     [ ] Right [ ] Left [ ] Bilateral    Cardiovascular:  S1, S2. No S3. No murmurs     Abdomen: [ x] Soft  [ ] Distended   [x ] +BS  [ ] Non tender [ ] Tender  [ ]PEG   [x ]OGT/ NGT   Last BM:   can not recall    Genitourinary: [x ] Normal [ ] Incontinent   [ ] Oliguria/Anuria   [ ] Lynn    Musculoskeletal:  [ ] Normal   [x ] Weakness  [ ] Bedbound/Wheelchair bound [ ] Edema    Neurological: [x ] No focal deficits  [ ] Cognitive impairment  [ ] Dysphagia [ ] Dysarthria [ ] Paresis [ ] Other     Skin: [x ] Normal   [ ] Pressure ulcer(s)                  [ ] Rash    LABS:                        9.7    6.63  )-----------( 337      ( 02 Aug 2017 08:41 )             32.0     08-02    137  |  98  |  3<L>  ----------------------------<  83  4.1   |  25  |  0.32<L>    Ca    8.6      02 Aug 2017 08:26    TPro  6.3  /  Alb  3.0<L>  /  TBili  0.4  /  DBili  x   /  AST  16  /  ALT  <4<L>  /  AlkPhos  67  08-02    PT/INR - ( 02 Aug 2017 08:37 )   PT: 12.4 sec;   INR: 1.10 ratio         PTT - ( 02 Aug 2017 08:37 )  PTT:36.4 sec      Shock: [ ] Septic [ ] Cardiogenic [ ] Neurologic [ ] Hypovolemic  Vasopressors x   Inotrophs x     Protein Calorie Malnutrition: [ ] Mild [x ] Moderate [ ] Severe    Oral Intake: [ ] Unable/mouth care only [x ] Minimal [ ] Moderate [ ] Full Capability  Diet: [ ] NPO [ ] Tube feeds [ ] TPN [x ] Other : clear liquid     RADIOLOGY & ADDITIONAL STUDIES:    REFERRALS:   [ ] Chaplaincy  [ ] Hospice  [ ] Child Life  [ ] Social Work  [ ] Case management [ ] Holistic Therapy     Anson Jones MD [phone] HPI:  Patient is a 47 y/o F with h/o ovarian cancer s/p GRETCHEN, colostomy (2016), lesser sac collection s/p evacuation by EUS and later IR drain placement (6/12/17) , acute cholecystitis s/p percutaneous cholecystostomy drain placement by IR (6/9/17) who presents with generalized abdominal pain x 1 day. Pain is rated 8/10, dull, aching, non radiating, intermittent. Pain is relieved by change in position. Denies associated fever, chills, nausea or vomiting.  Of note last drain check was  7/19/17 and that time she was found to have a CBD stone.    ED course  VS : 104/77 90 17 O2 99% on room air T 98.1F  Labs : wbc 9.7 h/h 11/35 plt 370 bun/creat 13/0.52  ALP/AST/ALT 86/18/5  Treatment : IVF NS 1L  x 2, IV Tylenol 1g x 1, Hydromorphone 1g IV x 6  Seen by Gastroenterology (01 Aug 2017 11:40)      PERTINENT PMH REVIEWED:  [x ] YES [ ] NO           SOCIAL HISTORY:   Significant other/partner:    Fiance           Children: None                   Oriental orthodox/Spirituality: Druze   Substance hx:  [ ] YES   [x] NO                   Tobacco hx:  [ ] YES  [x ] NO                       Alcohol hx: [ ] YES  [x ] NO         Home Opioid hx:  [ x] YES  [ ] NO   Living Situation: [x ] Home  [ ] Long term care  [ ] Rehab [ ] Other    FAMILY HISTORY:  No pertinent family history in first degree relatives    [x ] Family history non-contributory     BASELINE (I)ADLs (prior to admission):  [place]: [ ] total  [x ] moderate [ ] dependent    ADVANCE DIRECTIVES:    DNR [ ] YES [x ] NO                            [ ] Completed  MOLST  [ ] YES [x ] NO                      [ ] Completed  Health Care Proxy [ ] YES  [x ] NO   [ ] Completed  Living Will  [ ] YES [x ] NO             [x ] Surrogate  [ ] HCP  [ ] Guardian:        Porter Nino   Phone#: [phone]    Allergies: penicillin (Rash)    MEDICATIONS  (STANDING):  dextrose 5% + sodium chloride 0.9%. 1000 milliLiter(s) (75 mL/Hr) IV Continuous <Continuous>  fentaNYL   Patch  50 MICROgram(s)/Hr 1 Patch Transdermal every 72 hours  docusate sodium 100 milliGRAM(s) Oral three times a day  enoxaparin Injectable 40 milliGRAM(s) SubCutaneous daily  HYDROmorphone PCA (1 mG/mL) 30 milliLiter(s) PCA Continuous PCA Continuous  senna 2 Tablet(s) Oral at bedtime    MEDICATIONS  (PRN):  acetaminophen   Tablet 650 milliGRAM(s) Oral every 6 hours PRN pain  naloxone Injectable 0.1 milliGRAM(s) IV Push every 3 minutes PRN For ANY of the following changes in patient status:  A. RR LESS THAN 10 breaths per minute, B. Oxygen saturation LESS THAN 90%, C. Sedation score of 6  ondansetron Injectable 4 milliGRAM(s) IV Push every 6 hours PRN Nausea  HYDROmorphone  Injectable 1 milliGRAM(s) IV Push every 2 hours PRN Breakthrough pain  HYDROmorphone PCA (5 mG/mL) Rescue Clinician Bolus 2 milliGRAM(s) IV Push every 1 hour PRN Severe Pain (7 - 10)      PRESENT SYMPTOMS:  Source: [ x] Patient   [ ] Family   [ ] Team     Pain:                        [ ] No [x ] Yes             [ ] Mild [ x] Moderate [ ] Severe    Onset - chronic   Location - abdominal   Duration - intermittent   Character - dull   Alleviating/Aggravating - aggravated with positioning   Radiation - non radiating   Timing -      Dyspnea:                [x ] No [ ] Yes             [ ] Mild [ ] Moderate [ ] Severe    Anxiety:                  [x ] No [ ] Yes             [ ] Mild [ ] Moderate [ ] Severe    Fatigue:                  [x ] No [ ] Yes             [ ] Mild [ ] Moderate [ ] Severe    Nausea:                  [x ] No [ ] Yes             [ ] Mild [ ] Moderate [ ] Severe    Loss of appetite:   [x ] No [ ] Yes             [ ] Mild [ ] Moderate [ ] Severe    Constipation:        [ ] No [x ] Yes             [ ] Mild [ ] Moderate [ ] Severe    Other Symptoms:  [x ] All other review of systems negative      Karnofsky Performance Score/Palliative Performance Status Version 2:   50      %    PHYSICAL EXAM:  Vital Signs Last 24 Hrs  T(C): 36.2 (02 Aug 2017 04:50), Max: 36.9 (01 Aug 2017 19:54)  T(F): 97.1 (02 Aug 2017 04:50), Max: 98.4 (01 Aug 2017 19:54)  HR: 64 (02 Aug 2017 04:50) (64 - 84)  BP: 103/65 (02 Aug 2017 04:50) (95/58 - 112/70)  BP(mean): --  RR: 18 (02 Aug 2017 04:50) (17 - 18)  SpO2: 99% (02 Aug 2017 04:50) (98% - 99%) I&O's Summary    01 Aug 2017 07:01  -  02 Aug 2017 07:00  --------------------------------------------------------  IN: 526 mL / OUT: 1400 mL / NET: -874 mL    02 Aug 2017 07:01  -  02 Aug 2017 15:25  --------------------------------------------------------  IN: 0 mL / OUT: 400 mL / NET: -400 mL        General:  [x  ] Normal. Alert, Oriented x 3.     HEENT:  [x ] Normal   [ ] Dry mouth   [ ] ET Tube    [ ] Trach  [ ] Oral lesions    Lungs:   [x ] Clear [ ] Tachypnea  [ ] Audible excessive secretions   [ ] Rhonchi        [ ] Right [ ] Left [ ] Bilateral  [ ] Crackles        [ ] Right [ ] Left [ ] Bilateral  [ ] Wheezing     [ ] Right [ ] Left [ ] Bilateral    Cardiovascular:  S1, S2. No S3. No murmurs     Abdomen: [ x] Soft  [ ] Distended   [x ] +BS  [ ] Non tender [ ] Tender  [ ]PEG   [x ]OGT/ NGT   Last BM:   can not recall. Multiple post op scars.     Genitourinary: [x ] Normal [ ] Incontinent   [ ] Oliguria/Anuria   [ ] Lynn    Musculoskeletal:  [ ] Normal   [x ] Weakness  [ ] Bedbound/Wheelchair bound [ ] Edema    Neurological: [x ] No focal deficits  [ ] Cognitive impairment  [ ] Dysphagia [ ] Dysarthria [ ] Paresis [ ] Other     Skin: [ ] Normal   [ ] Pressure ulcer(s)                  [ ] Rash. post OP scars over abdomen.     LABS:                        9.7    6.63  )-----------( 337      ( 02 Aug 2017 08:41 )             32.0     08-02    137  |  98  |  3<L>  ----------------------------<  83  4.1   |  25  |  0.32<L>    Ca    8.6      02 Aug 2017 08:26    TPro  6.3  /  Alb  3.0<L>  /  TBili  0.4  /  DBili  x   /  AST  16  /  ALT  <4<L>  /  AlkPhos  67  08-02    PT/INR - ( 02 Aug 2017 08:37 )   PT: 12.4 sec;   INR: 1.10 ratio         PTT - ( 02 Aug 2017 08:37 )  PTT:36.4 sec      Shock: [ ] Septic [ ] Cardiogenic [ ] Neurologic [ ] Hypovolemic  Vasopressors x   Inotrophs x     Protein Calorie Malnutrition: [ ] Mild [x ] Moderate [ ] Severe    Oral Intake: [ ] Unable/mouth care only [x ] Minimal [ ] Moderate [ ] Full Capability  Diet: [ ] NPO [ ] Tube feeds [ ] TPN [x ] Other : clear liquid     RADIOLOGY & ADDITIONAL STUDIES:  < from: CT Abdomen and Pelvis w/ Oral Cont and w/ IV Cont (08.01.17 @ 03:18) >  EXAM:  CT ABDOMEN AND PELVIS OC IC                            PROCEDURE DATE:  08/01/2017  Worsening peritoneal carcinomatosis.    No bowel obstruction. Large amount of stool throughout the colon.    Interval removal of left upper quadrant surgical drain. Marked interval   decrease in size of left upper quadrant collection which is difficult to   measure on the current exam.    Cholecystostomy tube in place. Mild intrahepatic biliary ductal   dilatation. Mild prominence of the common bile duct up to 6 mm.    < end of copied text >    REFERRALS:   [ ] Chaplaincy  [ ] Hospice  [ ] Child Life  [ ] Social Work  [ ] Case management [ ] Holistic Therapy     Anson Jones MD [phone]

## 2017-08-02 NOTE — PROGRESS NOTE ADULT - SUBJECTIVE AND OBJECTIVE BOX
CHAYA PALACIO  46y  Female      Patient is a 46y old  Female who presents with a chief complaint of abdominal pain (01 Aug 2017 11:40)      INTERVAL HPI/OVERNIGHT EVENTS:    Patient is ready to go for ERCP today and is complaining of significant abdominal pain.  NO vomiting      REVIEW OF SYSTEMS:  CONSTITUTIONAL: No fever  EYES: No visual disturbances  ENMT:  No throat pain  NECK: No pain or stiffness  RESPIRATORY: No cough, No shortness of breath  CARDIOVASCULAR: No chest pain, palpitations, dizziness, or leg swelling  GASTROINTESTINAL: pos abdominal  No nausea, vomiting, or hematemesis.  NEUROLOGICAL: No headaches  SKIN: multiple abdominal surgical scar       T(C): 36.2 (08-02-17 @ 04:50), Max: 36.9 (08-01-17 @ 19:54)  HR: 64 (08-02-17 @ 04:50) (64 - 84)  BP: 103/65 (08-02-17 @ 04:50) (95/58 - 112/70)  RR: 18 (08-02-17 @ 04:50) (17 - 18)  SpO2: 99% (08-02-17 @ 04:50) (98% - 99%)  Wt(kg): --Vital Signs Last 24 Hrs  T(C): 36.2 (02 Aug 2017 04:50), Max: 36.9 (01 Aug 2017 19:54)  T(F): 97.1 (02 Aug 2017 04:50), Max: 98.4 (01 Aug 2017 19:54)  HR: 64 (02 Aug 2017 04:50) (64 - 84)  BP: 103/65 (02 Aug 2017 04:50) (95/58 - 112/70)  BP(mean): --  RR: 18 (02 Aug 2017 04:50) (17 - 18)  SpO2: 99% (02 Aug 2017 04:50) (98% - 99%)  Wt(kg): --    PHYSICAL EXAM: / limited examination since patient states that she was in pain.    GENERAL: NAD, well-groomed, well-developed  HEAD:       Atraumatic, Normocephalic  ENMT:      Moist mucous membranes  NECK: Supple, No JVD, Normal thyroid  NERVOUS SYSTEM:  Alert & Oriented X3   CHEST/LUNG: Clear to percussion bilaterally; No rales, rhonchi, wheezing, or rubs  HEART: Regular rate and rhythm; No murmurs, rubs, or gallops  ABDOMEN: Soft, pos tenderness on palpation, pos Colostomy bag, pos drainage tube and abdominal surgical scar       Consultant(s) Notes Reviewed:  [x ] YES  [ ] NO  Care Discussed with Consultants/Other Providers [ x] YES  [ ] NO    LABS:                        9.7    6.63  )-----------( 337      ( 02 Aug 2017 08:41 )             32.0     08-02    137  |  98  |  3<L>  ----------------------------<  83  4.1   |  25  |  0.32<L>    Ca    8.6      02 Aug 2017 08:26    TPro  6.3  /  Alb  3.0<L>  /  TBili  0.4  /  DBili  x   /  AST  16  /  ALT  <4<L>  /  AlkPhos  67  08-02    PT/INR - ( 02 Aug 2017 08:37 )   PT: 12.4 sec;   INR: 1.10 ratio         PTT - ( 02 Aug 2017 08:37 )  PTT:36.4 sec    CAPILLARY BLOOD GLUCOSE          HEALTH ISSUES - PROBLEM Dx:  Prophylactic measure: Prophylactic measure  Malignant neoplasm of ovary, unspecified laterality: Malignant neoplasm of ovary, unspecified laterality  Ovarian cancer: Ovarian cancer  Abnormal CT of the abdomen: Abnormal CT of the abdomen  Generalized abdominal pain: Generalized abdominal pain

## 2017-08-02 NOTE — CONSULT NOTE ADULT - ASSESSMENT
Patient is a 45 y/o F with h/o ovarian cancer s/p GRETCHEN, colostomy (2016), lesser sac collection s/p evacuation by EUS and later IR drain placement (6/12/17) , acute cholecystitis s/p percutaneous cholecystostomy drain placement by IR (6/9/17) who presents with generalized abdominal pain x 1 day. Palliative consulted for pain management.

## 2017-08-02 NOTE — CONSULT NOTE ADULT - PROBLEM SELECTOR RECOMMENDATION 2
Recommend Dilaudid PCA pump for maintenance  Dilaudid 1 mg Q2H PRN for breakthrough pain  C/w Fentanyl Patch 50 mcg every 72 hrs Recommend Dilaudid PCA pump 1 mg q 15 DD only. No CR. CB 2 mg q 1 PRN.   Dilaudid 1 mg Q2H PRN for breakthrough pain  C/w Fentanyl Patch 50 mcg every 72 hrs  Naloxone PRN.

## 2017-08-02 NOTE — PROGRESS NOTE ADULT - PROBLEM SELECTOR PLAN 1
Patient has seen the Palliative care team and PCA pump was started   Patient is also on Dilaudid IV for breakthrough pain   continue Fentanyl patch   cont to monitor patient

## 2017-08-02 NOTE — CONSULT NOTE ADULT - PROBLEM SELECTOR RECOMMENDATION 9
Abd CT done 6/29/17: worsening peritoneal carcinomatosis and mild prominence of the common bile duct up to 6 mm.   ERCP to be done today  F/u oncology recommendations after procedure

## 2017-08-03 ENCOUNTER — TRANSCRIPTION ENCOUNTER (OUTPATIENT)
Age: 47
End: 2017-08-03

## 2017-08-03 DIAGNOSIS — K80.50 CALCULUS OF BILE DUCT WITHOUT CHOLANGITIS OR CHOLECYSTITIS WITHOUT OBSTRUCTION: ICD-10-CM

## 2017-08-03 LAB
ALT FLD-CCNC: 14 U/L — SIGNIFICANT CHANGE UP (ref 10–45)
ANION GAP SERPL CALC-SCNC: 16 MMOL/L — SIGNIFICANT CHANGE UP (ref 5–17)
AST SERPL-CCNC: 23 U/L — SIGNIFICANT CHANGE UP (ref 10–40)
BILIRUB SERPL-MCNC: 0.4 MG/DL — SIGNIFICANT CHANGE UP (ref 0.2–1.2)
BUN SERPL-MCNC: 6 MG/DL — LOW (ref 7–23)
CALCIUM SERPL-MCNC: 9.1 MG/DL — SIGNIFICANT CHANGE UP (ref 8.4–10.5)
CHLORIDE SERPL-SCNC: 99 MMOL/L — SIGNIFICANT CHANGE UP (ref 96–108)
CO2 SERPL-SCNC: 25 MMOL/L — SIGNIFICANT CHANGE UP (ref 22–31)
CREAT SERPL-MCNC: 0.46 MG/DL — LOW (ref 0.5–1.3)
GLUCOSE SERPL-MCNC: 117 MG/DL — HIGH (ref 70–99)
HCT VFR BLD CALC: 30.7 % — LOW (ref 34.5–45)
HGB BLD-MCNC: 9.4 G/DL — LOW (ref 11.5–15.5)
MCHC RBC-ENTMCNC: 25.5 PG — LOW (ref 27–34)
MCHC RBC-ENTMCNC: 30.6 GM/DL — LOW (ref 32–36)
MCV RBC AUTO: 83.2 FL — SIGNIFICANT CHANGE UP (ref 80–100)
PHOSPHATE SERPL-MCNC: 4 MG/DL — SIGNIFICANT CHANGE UP (ref 2.5–4.5)
PLATELET # BLD AUTO: 363 K/UL — SIGNIFICANT CHANGE UP (ref 150–400)
POTASSIUM SERPL-MCNC: 3.9 MMOL/L — SIGNIFICANT CHANGE UP (ref 3.5–5.3)
POTASSIUM SERPL-SCNC: 3.9 MMOL/L — SIGNIFICANT CHANGE UP (ref 3.5–5.3)
RBC # BLD: 3.69 M/UL — LOW (ref 3.8–5.2)
RBC # FLD: 14.3 % — SIGNIFICANT CHANGE UP (ref 10.3–14.5)
SODIUM SERPL-SCNC: 140 MMOL/L — SIGNIFICANT CHANGE UP (ref 135–145)
WBC # BLD: 7.21 K/UL — SIGNIFICANT CHANGE UP (ref 3.8–10.5)
WBC # FLD AUTO: 7.21 K/UL — SIGNIFICANT CHANGE UP (ref 3.8–10.5)

## 2017-08-03 PROCEDURE — 99233 SBSQ HOSP IP/OBS HIGH 50: CPT | Mod: GC

## 2017-08-03 PROCEDURE — 99233 SBSQ HOSP IP/OBS HIGH 50: CPT

## 2017-08-03 RX ORDER — POLYETHYLENE GLYCOL 3350 17 G/17G
17 POWDER, FOR SOLUTION ORAL DAILY
Qty: 0 | Refills: 0 | Status: DISCONTINUED | OUTPATIENT
Start: 2017-08-03 | End: 2017-08-04

## 2017-08-03 RX ORDER — ACETAMINOPHEN 500 MG
650 TABLET ORAL ONCE
Qty: 0 | Refills: 0 | Status: COMPLETED | OUTPATIENT
Start: 2017-08-03 | End: 2017-08-03

## 2017-08-03 RX ADMIN — Medication 650 MILLIGRAM(S): at 16:24

## 2017-08-03 RX ADMIN — Medication 100 MILLIGRAM(S): at 06:05

## 2017-08-03 RX ADMIN — Medication 100 MILLIGRAM(S): at 14:05

## 2017-08-03 RX ADMIN — Medication 650 MILLIGRAM(S): at 03:04

## 2017-08-03 RX ADMIN — SODIUM CHLORIDE 75 MILLILITER(S): 9 INJECTION, SOLUTION INTRAVENOUS at 06:05

## 2017-08-03 RX ADMIN — Medication 650 MILLIGRAM(S): at 18:29

## 2017-08-03 RX ADMIN — SODIUM CHLORIDE 75 MILLILITER(S): 9 INJECTION, SOLUTION INTRAVENOUS at 16:29

## 2017-08-03 RX ADMIN — POLYETHYLENE GLYCOL 3350 17 GRAM(S): 17 POWDER, FOR SOLUTION ORAL at 18:29

## 2017-08-03 RX ADMIN — Medication 650 MILLIGRAM(S): at 09:25

## 2017-08-03 RX ADMIN — ENOXAPARIN SODIUM 40 MILLIGRAM(S): 100 INJECTION SUBCUTANEOUS at 14:05

## 2017-08-03 RX ADMIN — HYDROMORPHONE HYDROCHLORIDE 30 MILLILITER(S): 2 INJECTION INTRAMUSCULAR; INTRAVENOUS; SUBCUTANEOUS at 07:11

## 2017-08-03 RX ADMIN — HYDROMORPHONE HYDROCHLORIDE 30 MILLILITER(S): 2 INJECTION INTRAMUSCULAR; INTRAVENOUS; SUBCUTANEOUS at 11:55

## 2017-08-03 RX ADMIN — FENTANYL CITRATE 1 PATCH: 50 INJECTION INTRAVENOUS at 09:31

## 2017-08-03 RX ADMIN — HYDROMORPHONE HYDROCHLORIDE 30 MILLILITER(S): 2 INJECTION INTRAMUSCULAR; INTRAVENOUS; SUBCUTANEOUS at 19:26

## 2017-08-03 RX ADMIN — Medication 650 MILLIGRAM(S): at 18:59

## 2017-08-03 NOTE — PROGRESS NOTE ADULT - PROBLEM SELECTOR PLAN 2
- Choledocholithiasis noted on last IR tube check of perc drain / mild biliary dilatation on CT ; Also w/ worsening peritoneal carcinomatosis  - S/P ERCP yesterday for choledocholithiasis removal  - Monitor lfts   - May advance diet to soft

## 2017-08-03 NOTE — PROGRESS NOTE ADULT - SUBJECTIVE AND OBJECTIVE BOX
CHAYA PALACIO  46y  Female      Patient is a 46y old  Female who presents with a chief complaint of abdominal pain (01 Aug 2017 11:40)      INTERVAL HPI/OVERNIGHT EVENTS:    Patient states that her pain has greatly improved .  Patient is s/p ERCP    REVIEW OF SYSTEMS:  CONSTITUTIONAL: No fever  ENMT:  No throat pain  NECK: No pain or stiffness  RESPIRATORY: No cough, wheezing, chills or hemoptysis; No shortness of breath  CARDIOVASCULAR: No chest pain, palpitations, dizziness, or leg swelling  GASTROINTESTINAL: improved  abdominal or epigastric pain. No nausea, vomiting, or hematemesis; No diarrhea or constipation. No melena or hematochezia.  NEUROLOGICAL: No headaches  SKIN: No itching, burning, rashes, or lesions   MUSCULOSKELETAL: h/o back pain       T(C): 36.8 (08-03-17 @ 13:28), Max: 37.1 (08-03-17 @ 06:00)  HR: 65 (08-03-17 @ 13:28) (65 - 72)  BP: 100/67 (08-03-17 @ 13:28) (96/60 - 110/69)  RR: 18 (08-03-17 @ 13:28) (16 - 18)  SpO2: 99% (08-03-17 @ 13:28) (97% - 99%)  Wt(kg): --Vital Signs Last 24 Hrs  T(C): 36.8 (03 Aug 2017 13:28), Max: 37.1 (03 Aug 2017 06:00)  T(F): 98.2 (03 Aug 2017 13:28), Max: 98.8 (03 Aug 2017 06:00)  HR: 65 (03 Aug 2017 13:28) (65 - 72)  BP: 100/67 (03 Aug 2017 13:28) (96/60 - 110/69)  BP(mean): --  RR: 18 (03 Aug 2017 13:28) (16 - 18)  SpO2: 99% (03 Aug 2017 13:28) (97% - 99%)  Wt(kg): --    PHYSICAL EXAM:  GENERAL: NAD, well-groomed, well-developed  HEAD:  Atraumatic, Normocephalic  ENMT:  Moist mucous membranes  NECK: Supple, No JVD, Normal thyroid  NERVOUS SYSTEM:  Alert & Oriented X3, Good concentration  CHEST/LUNG: Clear to percussion bilaterally; No rales, rhonchi, wheezing, or rubs  HEART: Regular rate and rhythm; No murmurs, rubs, or gallops  ABDOMEN: Soft, , tender with Colostomy bag and drainage tube   EXTREMITIES: no edema      Consultant(s) Notes Reviewed:  [x ] YES  / Palliative and GI       LABS:                        9.4    7.21  )-----------( 363      ( 03 Aug 2017 12:37 )             30.7     08-03    140  |  99  |  6<L>  ----------------------------<  117<H>  3.9   |  25  |  0.46<L>    Ca    9.1      03 Aug 2017 12:37  Phos  4.0     08-03    TPro  x   /  Alb  x   /  TBili  0.4  /  DBili  x   /  AST  23  /  ALT  14  /  AlkPhos  x   08-03    PT/INR - ( 02 Aug 2017 08:37 )   PT: 12.4 sec;   INR: 1.10 ratio         PTT - ( 02 Aug 2017 08:37 )  PTT:36.4 sec      HEALTH ISSUES - PROBLEM Dx:  Encounter for palliative care: Encounter for palliative care  Constipation, unspecified constipation type: Constipation, unspecified constipation type  Carcinomatosis: Carcinomatosis  Prophylactic measure: Prophylactic measure  Malignant neoplasm of ovary, unspecified laterality: Malignant neoplasm of ovary, unspecified laterality  Ovarian cancer: Ovarian cancer  Abnormal CT of the abdomen: Abnormal CT of the abdomen  Generalized abdominal pain: Generalized abdominal pain

## 2017-08-03 NOTE — PROGRESS NOTE ADULT - PROBLEM SELECTOR PLAN 2
Diagnosed and treated at [facility] in [place] in 12/16 with GRETCHEN-BSO with debulking including colon resection and end descending colostomy    Due to wound healing issues and weight loss she was never treated with chemotherapy as per Oncologist   The patient will discuss chemotherapeutic options with Dr. Kaila Lane as outpatient upon discharge as per oncologist

## 2017-08-03 NOTE — PROGRESS NOTE ADULT - PROBLEM SELECTOR PLAN 3
C/w Colace, senna   Solid foods to be reintroduced today  Monitor BM. Noted worsening peritoneal carcinomatosis.   As per Onc note, patient is to decide about disease modifying Rx.

## 2017-08-03 NOTE — PROGRESS NOTE ADULT - PROBLEM SELECTOR PLAN 1
- May be 2/2 choledocholithiasis as well as worsening peritoneal carcinomatosis   - Pain control PRN

## 2017-08-03 NOTE — PROGRESS NOTE ADULT - PROBLEM SELECTOR PLAN 4
Pain was assessed w/ patient at bedside- currently 3-4/10  Will continue to monitor demands on PCA pump today and will consider transitioning to PO Dilaudid tomorrow   Patient agrees with plan

## 2017-08-03 NOTE — PROGRESS NOTE ADULT - PROBLEM SELECTOR PLAN 1
Noted worsening peritoneal carcinomatosis and mild prominence of the common bile duct up to 6 mm.   S/p ERCP 8/2/17- tolerated procedure well  F/u oncology recommendations after procedure. C/w Dilaudid PCA pump 1 mg q 15 DD only. No CR. CB 2 mg q 1 PRN.   C/w Fentanyl Patch 50 mcg every 72 hrs  Naloxone PRN.

## 2017-08-03 NOTE — PROGRESS NOTE ADULT - PROBLEM SELECTOR PLAN 4
S/P ERCP   S/P  Complete removal of stone by biliary  sphincterotomy and balloon extraction.  Pt tolerated her diet   F/UP LFT

## 2017-08-03 NOTE — PROGRESS NOTE ADULT - SUBJECTIVE AND OBJECTIVE BOX
INTERVAL HPI/OVERNIGHT EVENTS:  Pt S/E -- resting in bed comfortably  Still w/ mild abdominal discomfort, on PCA pump - mild nausea but overall tolerating clear liquids, s/p ercp yesterday      MEDICATIONS  (STANDING):  dextrose 5% + sodium chloride 0.9%. 1000 milliLiter(s) (75 mL/Hr) IV Continuous <Continuous>  fentaNYL   Patch  50 MICROgram(s)/Hr 1 Patch Transdermal every 72 hours  docusate sodium 100 milliGRAM(s) Oral three times a day  enoxaparin Injectable 40 milliGRAM(s) SubCutaneous daily  HYDROmorphone PCA (1 mG/mL) 30 milliLiter(s) PCA Continuous PCA Continuous  senna 2 Tablet(s) Oral at bedtime    MEDICATIONS  (PRN):  acetaminophen   Tablet 650 milliGRAM(s) Oral every 6 hours PRN pain  naloxone Injectable 0.1 milliGRAM(s) IV Push every 3 minutes PRN For ANY of the following changes in patient status:  A. RR LESS THAN 10 breaths per minute, B. Oxygen saturation LESS THAN 90%, C. Sedation score of 6  ondansetron Injectable 4 milliGRAM(s) IV Push every 6 hours PRN Nausea  HYDROmorphone  Injectable 1 milliGRAM(s) IV Push every 2 hours PRN Breakthrough pain  HYDROmorphone PCA (1 mG/mL) Rescue Clinician Bolus 2 milliGRAM(s) IV Push every 1 hour PRN Severe Pain (7 - 10)      Allergies    penicillin (Rash)    Intolerances: ---     ROS:   General:  No loss, no fevers, chills, night sweats, +fatigue,   Eyes:  Good vision, no reported pain  ENT:  No sore throat, pain, runny nose, dysphagia  CV:  No pain, palpitations, hypo/hypertension  Resp:  No dyspnea, cough, tachypnea, wheezing  GI:  +pain, +nausea, No vomiting, No diarrhea, No constipation, no weight loss, No fever, No pruritis, No rectal bleeding, No tarry stools, No dysphagia,  :  No pain, bleeding, incontinence, nocturia  Muscle:  No pain, +weakness  Neuro:  No weakness, tingling, memory problems  Psych:  No fatigue, insomnia, mood problems, depression  Endocrine:  No polyuria, polydipsia, cold/heat intolerance  Heme:  No petechiae, ecchymosis, easy bruisability  Skin:  No rash, tattoos, scars, edema      PHYSICAL EXAM:   Vital Signs:  Vital Signs Last 24 Hrs  T(C): 36.6 (03 Aug 2017 09:15), Max: 37.1 (03 Aug 2017 06:00)  T(F): 97.8 (03 Aug 2017 09:15), Max: 98.8 (03 Aug 2017 06:00)  HR: 69 (03 Aug 2017 09:15) (66 - 72)  BP: 110/69 (03 Aug 2017 09:15) (96/60 - 110/69)  BP(mean): --  RR: 18 (03 Aug 2017 09:15) (16 - 18)  SpO2: 97% (03 Aug 2017 09:15) (97% - 98%)  Daily     Daily     GENERAL:  Appears stated age, well-groomed, well-nourished, no distress  HEENT:  NC/AT,  conjunctivae clear and pink, no thyromegaly, nodules, adenopathy, no JVD, sclera -anicteric  CHEST:  Full & symmetric excursion, no increased effort, breath sounds clear  HEART:  Regular rhythm, S1, S2, no murmur/rub/S3/S4, no abdominal bruit, no edema  ABDOMEN:  Soft, mildly tender to palpation generalized; non-distended, normoactive bowel sounds,  no masses ,no hepato-splenomegaly, no signs of chronic liver disease, PEG c/d/i  EXTEREMITIES:  no cyanosis ,clubbing or edema  SKIN:  No rash/erythema/ecchymoses/petechiae/wounds/abscess/warm/dry  NEURO:  Alert, oriented, no asterixis, no tremor, no encephalopathy      LABS:                        9.7    6.63  )-----------( 337      ( 02 Aug 2017 08:41 )             32.0     08-02    137  |  98  |  3<L>  ----------------------------<  83  4.1   |  25  |  0.32<L>    Ca    8.6      02 Aug 2017 08:26    TPro  6.3  /  Alb  3.0<L>  /  TBili  0.4  /  DBili  x   /  AST  16  /  ALT  <4<L>  /  AlkPhos  67  08-02    PT/INR - ( 02 Aug 2017 08:37 )   PT: 12.4 sec;   INR: 1.10 ratio         PTT - ( 02 Aug 2017 08:37 )  PTT:36.4 sec      RADIOLOGY & ADDITIONAL TESTS:      < from: ERCP (08.02.17 @ 14:42) >  Findings:       The  film was normal. A standard esophagogastroduodenoscopy scope was used for the    examination of the upper gastrointestinal tract. The scope was passed under direct vision        through the upper GI tract. The examined esophagus was normal. A standard        esophagogastroduodenoscopy scope was used for the examination of the upper gastrointestinal        tract. The scope was passed under direct vision through the upper GI tract. There was        evidence of an intact gastrostomy with a patent G-tube present in the gastric body. There        were multiple clips in the antrumfrom her recent endoscopic drainage procedure. The examined        duodenum was normal. The major papilla was normal. The bile duct was deeply cannulated with        the Hydratome. Contrast was injected. I personally interpreted the bile duct images.There        was brisk flow of contrast through the ducts. The lower third of the main bile duct contained        one stone, which was small in diameter. A 10 mm biliary sphincterotomy was made with a        Hydratome using blended current. There wasno post-sphincterotomy bleeding. The biliary tree        was swept with an 11.5 mm balloon starting at the bifurcation. Sludge was swept from the        duct. All stones were removed.                                      Impression:          - Normal esophagus.                       - Intact gastrostomy with a patent G-tube present.                       - Normal examined duodenum.                       - The major papilla appearednormal.                       - A sphincterotomy was performed.                       - The biliary tree was swept.                       - Choledocholithiasis was found. Complete removal was accomplished by biliary                        sphincterotomy and balloon extraction.  Recommendation:      - Return patient to [facility] for ongoing care.                       - Clear liquid diet.                       - Check liver enzymes (AST, ALT, alkaline phosphatase, bilirubin) in the              morning.                                                                                                        Attending Participation:       I personally performed the entire procedure.                                                       < end of copied text >

## 2017-08-03 NOTE — PROGRESS NOTE ADULT - SUBJECTIVE AND OBJECTIVE BOX
INTERVAL HPI/OVERNIGHT EVENTS: pain was controlled overnight       Allergies: penicillin (Rash)      ADVANCE DIRECTIVES:    DNR   MOLST [ ] YES [x ] NO     MEDICATIONS  (STANDING):  dextrose 5% + sodium chloride 0.9%. 1000 milliLiter(s) (75 mL/Hr) IV Continuous <Continuous>  fentaNYL   Patch  50 MICROgram(s)/Hr 1 Patch Transdermal every 72 hours  docusate sodium 100 milliGRAM(s) Oral three times a day  enoxaparin Injectable 40 milliGRAM(s) SubCutaneous daily  HYDROmorphone PCA (1 mG/mL) 30 milliLiter(s) PCA Continuous PCA Continuous  senna 2 Tablet(s) Oral at bedtime    MEDICATIONS  (PRN):  acetaminophen   Tablet 650 milliGRAM(s) Oral every 6 hours PRN pain  naloxone Injectable 0.1 milliGRAM(s) IV Push every 3 minutes PRN For ANY of the following changes in patient status:  A. RR LESS THAN 10 breaths per minute, B. Oxygen saturation LESS THAN 90%, C. Sedation score of 6  ondansetron Injectable 4 milliGRAM(s) IV Push every 6 hours PRN Nausea  HYDROmorphone  Injectable 1 milliGRAM(s) IV Push every 2 hours PRN Breakthrough pain  HYDROmorphone PCA (1 mG/mL) Rescue Clinician Bolus 2 milliGRAM(s) IV Push every 1 hour PRN Severe Pain (7 - 10)      PRESENT SYMPTOMS:  Source: [x ] Patient   [ ] Family   [ ] Team     Pain:                        [ ] No [x ] Yes             [ ] Mild [x ] Moderate [ ] Severe    Onset - chronic  Location - abdominal   Duration - continuous  Character - dull  Alleviating/Aggravating - aggravated by positional changes   Radiation - non radiating      Dyspnea:                [ x] No [ ] Yes             [ ] Mild [ ] Moderate [ ] Severe    Anxiety:                  [x ] No [ ] Yes             [ ] Mild [ ] Moderate [ ] Severe    Fatigue:                  [ ] No [x ] Yes             [ ] Mild [x ] Moderate [ ] Severe    Nausea:                  [ ] No [ x] Yes             [x ] Mild [ ] Moderate [ ] Severe    Loss of appetite:   [ ] No [x ] Yes             [ ] Mild [x ] Moderate [ ] Severe    Constipation:        [ ] No [x ] Yes             [ ] Mild [x ] Moderate [ ] Severe    Other Symptoms:  [x ] All other review of systems negative   [ ] Unable to obtain due to poor mentation     Karnofsky Performance Score/Palliative Performance Status Version 2: 50        %    PHYSICAL EXAM:  Vital Signs Last 24 Hrs  T(C): 36.6 (03 Aug 2017 09:15), Max: 37.1 (03 Aug 2017 06:00)  T(F): 97.8 (03 Aug 2017 09:15), Max: 98.8 (03 Aug 2017 06:00)  HR: 69 (03 Aug 2017 09:15) (66 - 72)  BP: 110/69 (03 Aug 2017 09:15) (96/60 - 110/69)  BP(mean): --  RR: 18 (03 Aug 2017 09:15) (16 - 18)  SpO2: 97% (03 Aug 2017 09:15) (97% - 98%) I&O's Summary    02 Aug 2017 07:01  -  03 Aug 2017 07:00  --------------------------------------------------------  IN: 1140 mL / OUT: 975 mL / NET: 165 mL    03 Aug 2017 07:01  -  03 Aug 2017 12:02  --------------------------------------------------------  IN: 1120 mL / OUT: 250 mL / NET: 870 mL        General:  [ x] Alert  [x ] Oriented x3      [ ] Lethargic  [ ] Agitated   [ ] Cachexia   [ ] Unarousable  [x ] Verbal  [ ] Non-Verbal    HEENT:  [x ] Normal   [ ] Dry mouth   [ ] ET Tube    [ ] Trach  [ ] Oral lesions    Lungs:   [ x] Clear [ ] Tachypnea  [ ] Audible excessive secretions   [ ] Rhonchi        [ ] Right [ ] Left [ ] Bilateral  [ ] Crackles        [ ] Right [ ] Left [ ] Bilateral  [ ] Wheezing     [ ] Right [ ] Left [ ] Bilateral    Cardiovascular:  [x ] Regular [ ] Irregular [ ] Tachycardia   [ ] Bradycardia  [ ] Murmur [ ] Other    Abdomen: [x ] Soft  [ ] Distended   [ x] +BS  [ ] Non tender [ ] Tender  [ ]PEG   [ ]OGT/ NGT   Last BM:       Genitourinary: [x ] Normal [ ] Incontinent   [ ] Oliguria/Anuria   [ ] Lynn    Musculoskeletal:  [ ] Normal   [x ] Weakness  [ ] Bedbound/Wheelchair bound [ ] Edema    Neurological: [x ] No focal deficits  [ ] Cognitive impairment  [ ] Dysphagia [ ] Dysarthria [ ] Paresis [ ] Other     Skin: [x ] Normal   [ ] Pressure ulcer(s)                  [ ] Rash    LABS:                        9.7    6.63  )-----------( 337      ( 02 Aug 2017 08:41 )             32.0     08-02    137  |  98  |  3<L>  ----------------------------<  83  4.1   |  25  |  0.32<L>    Ca    8.6      02 Aug 2017 08:26    TPro  6.3  /  Alb  3.0<L>  /  TBili  0.4  /  DBili  x   /  AST  16  /  ALT  <4<L>  /  AlkPhos  67  08-02    PT/INR - ( 02 Aug 2017 08:37 )   PT: 12.4 sec;   INR: 1.10 ratio         PTT - ( 02 Aug 2017 08:37 )  PTT:36.4 sec      Shock: [ ] Septic [ ] Cardiogenic [ ] Neurologic [ ] Hypovolemic  Vasopressors x none  Inotrophs x none     Oral Intake: [ ] Unable/mouth care only [x ] Minimal [ ] Moderate [ ] Full Capability    Diet: [ ] NPO [ ] Tube feeds [ ] TPN [x ] Other: Diet-  fluids     RADIOLOGY & ADDITIONAL STUDIES:    REFERRALS:   [ ] Chaplaincy  [ ] Hospice  [ ] Child Life  [ ] Social Work  [ ] Case management [ ] Holistic Therapy   Anson Jones MD [phone] INTERVAL HPI/OVERNIGHT EVENTS: pain was controlled overnight     Following up for pain       Allergies: penicillin (Rash)      ADVANCE DIRECTIVES:    DNR   MOLST [ ] YES [x ] NO     MEDICATIONS  (STANDING):  dextrose 5% + sodium chloride 0.9%. 1000 milliLiter(s) (75 mL/Hr) IV Continuous <Continuous>  fentaNYL   Patch  50 MICROgram(s)/Hr 1 Patch Transdermal every 72 hours  docusate sodium 100 milliGRAM(s) Oral three times a day  enoxaparin Injectable 40 milliGRAM(s) SubCutaneous daily  HYDROmorphone PCA (1 mG/mL) 30 milliLiter(s) PCA Continuous PCA Continuous  senna 2 Tablet(s) Oral at bedtime    MEDICATIONS  (PRN):  acetaminophen   Tablet 650 milliGRAM(s) Oral every 6 hours PRN pain  naloxone Injectable 0.1 milliGRAM(s) IV Push every 3 minutes PRN For ANY of the following changes in patient status:  A. RR LESS THAN 10 breaths per minute, B. Oxygen saturation LESS THAN 90%, C. Sedation score of 6  ondansetron Injectable 4 milliGRAM(s) IV Push every 6 hours PRN Nausea  HYDROmorphone  Injectable 1 milliGRAM(s) IV Push every 2 hours PRN Breakthrough pain  HYDROmorphone PCA (1 mG/mL) Rescue Clinician Bolus 2 milliGRAM(s) IV Push every 1 hour PRN Severe Pain (7 - 10)      PRESENT SYMPTOMS:  Source: [x ] Patient   [ ] Family   [ ] Team     Pain:                        [ ] No [x ] Yes             [ ] Mild [x ] Moderate [ ] Severe    Onset - chronic  Location - abdominal   Duration - continuous  Character - dull  Alleviating/Aggravating - aggravated by positional changes   Radiation - non radiating      Dyspnea:                [ x] No [ ] Yes             [ ] Mild [ ] Moderate [ ] Severe    Anxiety:                  [x ] No [ ] Yes             [ ] Mild [ ] Moderate [ ] Severe    Fatigue:                  [ ] No [x ] Yes             [ ] Mild [x ] Moderate [ ] Severe    Nausea:                  [ ] No [ x] Yes             [x ] Mild [ ] Moderate [ ] Severe    Loss of appetite:   [ ] No [x ] Yes             [ ] Mild [x ] Moderate [ ] Severe    Constipation:        [ ] No [x ] Yes             [ ] Mild [x ] Moderate [ ] Severe    Other Symptoms:  [x ] All other review of systems negative   [ ] Unable to obtain due to poor mentation     Karnofsky Performance Score/Palliative Performance Status Version 2: 50        %    PHYSICAL EXAM:  Vital Signs Last 24 Hrs  T(C): 36.6 (03 Aug 2017 09:15), Max: 37.1 (03 Aug 2017 06:00)  T(F): 97.8 (03 Aug 2017 09:15), Max: 98.8 (03 Aug 2017 06:00)  HR: 69 (03 Aug 2017 09:15) (66 - 72)  BP: 110/69 (03 Aug 2017 09:15) (96/60 - 110/69)  BP(mean): --  RR: 18 (03 Aug 2017 09:15) (16 - 18)  SpO2: 97% (03 Aug 2017 09:15) (97% - 98%) I&O's Summary    02 Aug 2017 07:01  -  03 Aug 2017 07:00  --------------------------------------------------------  IN: 1140 mL / OUT: 975 mL / NET: 165 mL    03 Aug 2017 07:01  -  03 Aug 2017 12:02  --------------------------------------------------------  IN: 1120 mL / OUT: 250 mL / NET: 870 mL        General:  [ x] Alert  [x ] Oriented x3      [ ] Lethargic  [ ] Agitated   [ ] Cachexia   [ ] Unarousable  [x ] Verbal  [ ] Non-Verbal    HEENT:  [x ] Normal   [ ] Dry mouth   [ ] ET Tube    [ ] Trach  [ ] Oral lesions    Lungs:   [ x] Clear [ ] Tachypnea  [ ] Audible excessive secretions   [ ] Rhonchi        [ ] Right [ ] Left [ ] Bilateral  [ ] Crackles        [ ] Right [ ] Left [ ] Bilateral  [ ] Wheezing     [ ] Right [ ] Left [ ] Bilateral    Cardiovascular:  [x ] Regular [ ] Irregular [ ] Tachycardia   [ ] Bradycardia  [ ] Murmur [ ] Other    Abdomen: [x ] Soft  [ ] Distended   [ x] +BS  [ ] Non tender [ ] Tender  [ ]PEG   [ ]OGT/ NGT   Last BM:  patient does not remember      Genitourinary: [x ] Normal [ ] Incontinent   [ ] Oliguria/Anuria   [ ] Lynn    Musculoskeletal:  [ ] Normal   [x ] Weakness  [ ] Bedbound/Wheelchair bound [ ] Edema    Neurological: [x ] No focal deficits  [ ] Cognitive impairment  [ ] Dysphagia [ ] Dysarthria [ ] Paresis [ ] Other     Skin: [x ] Normal   [ ] Pressure ulcer(s)                  [ ] Rash    LABS:                        9.7    6.63  )-----------( 337      ( 02 Aug 2017 08:41 )             32.0     08-02    137  |  98  |  3<L>  ----------------------------<  83  4.1   |  25  |  0.32<L>    Ca    8.6      02 Aug 2017 08:26    TPro  6.3  /  Alb  3.0<L>  /  TBili  0.4  /  DBili  x   /  AST  16  /  ALT  <4<L>  /  AlkPhos  67  08-02    PT/INR - ( 02 Aug 2017 08:37 )   PT: 12.4 sec;   INR: 1.10 ratio         PTT - ( 02 Aug 2017 08:37 )  PTT:36.4 sec      Shock: [ ] Septic [ ] Cardiogenic [ ] Neurologic [ ] Hypovolemic  Vasopressors x none  Inotrophs x none     Oral Intake: [ ] Unable/mouth care only [x ] Minimal [ ] Moderate [ ] Full Capability    Diet: [ ] NPO [ ] Tube feeds [ ] TPN [x ] Other: Diet-  fluids     RADIOLOGY & ADDITIONAL STUDIES:    REFERRALS:   [ ] Chaplaincy  [ ] Hospice  [ ] Child Life  [ ] Social Work  [ ] Case management [ ] Holistic Therapy   Anson Jones MD [phone]

## 2017-08-03 NOTE — PROGRESS NOTE ADULT - PROBLEM SELECTOR PLAN 2
C/w Dilaudid PCA pump 1 mg q 15 DD only. No CR. CB 2 mg q 1 PRN.   Dilaudid 1 mg Q2H PRN for breakthrough pain  C/w Fentanyl Patch 50 mcg every 72 hrs  Naloxone PRN. No signs of bowel obstruction on CT   C/w Colace, senna   As per GI Solid foods to be reintroduced today  Monitor for BM and signs of bowel obstruction

## 2017-08-04 DIAGNOSIS — D64.9 ANEMIA, UNSPECIFIED: ICD-10-CM

## 2017-08-04 LAB
ALBUMIN SERPL ELPH-MCNC: 3.1 G/DL — LOW (ref 3.3–5)
ALP SERPL-CCNC: 68 U/L — SIGNIFICANT CHANGE UP (ref 40–120)
ALT FLD-CCNC: 8 U/L — LOW (ref 10–45)
ANION GAP SERPL CALC-SCNC: 12 MMOL/L — SIGNIFICANT CHANGE UP (ref 5–17)
AST SERPL-CCNC: 18 U/L — SIGNIFICANT CHANGE UP (ref 10–40)
BASOPHILS # BLD AUTO: 0.01 K/UL — SIGNIFICANT CHANGE UP (ref 0–0.2)
BASOPHILS NFR BLD AUTO: 0.2 % — SIGNIFICANT CHANGE UP (ref 0–2)
BILIRUB SERPL-MCNC: 0.3 MG/DL — SIGNIFICANT CHANGE UP (ref 0.2–1.2)
BUN SERPL-MCNC: 3 MG/DL — LOW (ref 7–23)
CALCIUM SERPL-MCNC: 8.8 MG/DL — SIGNIFICANT CHANGE UP (ref 8.4–10.5)
CHLORIDE SERPL-SCNC: 99 MMOL/L — SIGNIFICANT CHANGE UP (ref 96–108)
CO2 SERPL-SCNC: 27 MMOL/L — SIGNIFICANT CHANGE UP (ref 22–31)
CREAT SERPL-MCNC: 0.39 MG/DL — LOW (ref 0.5–1.3)
EOSINOPHIL # BLD AUTO: 0.11 K/UL — SIGNIFICANT CHANGE UP (ref 0–0.5)
EOSINOPHIL NFR BLD AUTO: 1.9 % — SIGNIFICANT CHANGE UP (ref 0–6)
GLUCOSE SERPL-MCNC: 85 MG/DL — SIGNIFICANT CHANGE UP (ref 70–99)
HCT VFR BLD CALC: 31.6 % — LOW (ref 34.5–45)
HGB BLD-MCNC: 9.6 G/DL — LOW (ref 11.5–15.5)
IMM GRANULOCYTES NFR BLD AUTO: 0.2 % — SIGNIFICANT CHANGE UP (ref 0–1.5)
LYMPHOCYTES # BLD AUTO: 1.06 K/UL — SIGNIFICANT CHANGE UP (ref 1–3.3)
LYMPHOCYTES # BLD AUTO: 18 % — SIGNIFICANT CHANGE UP (ref 13–44)
MCHC RBC-ENTMCNC: 25.5 PG — LOW (ref 27–34)
MCHC RBC-ENTMCNC: 30.4 GM/DL — LOW (ref 32–36)
MCV RBC AUTO: 84 FL — SIGNIFICANT CHANGE UP (ref 80–100)
MONOCYTES # BLD AUTO: 0.43 K/UL — SIGNIFICANT CHANGE UP (ref 0–0.9)
MONOCYTES NFR BLD AUTO: 7.3 % — SIGNIFICANT CHANGE UP (ref 2–14)
NEUTROPHILS # BLD AUTO: 4.26 K/UL — SIGNIFICANT CHANGE UP (ref 1.8–7.4)
NEUTROPHILS NFR BLD AUTO: 72.4 % — SIGNIFICANT CHANGE UP (ref 43–77)
PLATELET # BLD AUTO: 306 K/UL — SIGNIFICANT CHANGE UP (ref 150–400)
POTASSIUM SERPL-MCNC: 4.1 MMOL/L — SIGNIFICANT CHANGE UP (ref 3.5–5.3)
POTASSIUM SERPL-SCNC: 4.1 MMOL/L — SIGNIFICANT CHANGE UP (ref 3.5–5.3)
PROT SERPL-MCNC: 6.1 G/DL — SIGNIFICANT CHANGE UP (ref 6–8.3)
RBC # BLD: 3.76 M/UL — LOW (ref 3.8–5.2)
RBC # FLD: 14.3 % — SIGNIFICANT CHANGE UP (ref 10.3–14.5)
SODIUM SERPL-SCNC: 138 MMOL/L — SIGNIFICANT CHANGE UP (ref 135–145)
WBC # BLD: 5.88 K/UL — SIGNIFICANT CHANGE UP (ref 3.8–10.5)
WBC # FLD AUTO: 5.88 K/UL — SIGNIFICANT CHANGE UP (ref 3.8–10.5)

## 2017-08-04 PROCEDURE — 99233 SBSQ HOSP IP/OBS HIGH 50: CPT

## 2017-08-04 PROCEDURE — 99233 SBSQ HOSP IP/OBS HIGH 50: CPT | Mod: GC

## 2017-08-04 RX ORDER — POLYETHYLENE GLYCOL 3350 17 G/17G
17 POWDER, FOR SOLUTION ORAL
Qty: 0 | Refills: 0 | Status: DISCONTINUED | OUTPATIENT
Start: 2017-08-04 | End: 2017-08-06

## 2017-08-04 RX ORDER — FENTANYL CITRATE 50 UG/ML
1 INJECTION INTRAVENOUS
Qty: 0 | Refills: 0 | Status: DISCONTINUED | OUTPATIENT
Start: 2017-08-04 | End: 2017-08-04

## 2017-08-04 RX ORDER — FENTANYL CITRATE 50 UG/ML
1 INJECTION INTRAVENOUS
Qty: 0 | Refills: 0 | Status: DISCONTINUED | OUTPATIENT
Start: 2017-08-04 | End: 2017-08-08

## 2017-08-04 RX ADMIN — FENTANYL CITRATE 1 PATCH: 50 INJECTION INTRAVENOUS at 16:20

## 2017-08-04 RX ADMIN — Medication 100 MILLIGRAM(S): at 06:00

## 2017-08-04 RX ADMIN — Medication 100 MILLIGRAM(S): at 13:19

## 2017-08-04 RX ADMIN — HYDROMORPHONE HYDROCHLORIDE 30 MILLILITER(S): 2 INJECTION INTRAMUSCULAR; INTRAVENOUS; SUBCUTANEOUS at 19:12

## 2017-08-04 RX ADMIN — ENOXAPARIN SODIUM 40 MILLIGRAM(S): 100 INJECTION SUBCUTANEOUS at 13:19

## 2017-08-04 RX ADMIN — Medication 650 MILLIGRAM(S): at 02:06

## 2017-08-04 RX ADMIN — Medication 650 MILLIGRAM(S): at 22:50

## 2017-08-04 RX ADMIN — HYDROMORPHONE HYDROCHLORIDE 30 MILLILITER(S): 2 INJECTION INTRAMUSCULAR; INTRAVENOUS; SUBCUTANEOUS at 07:18

## 2017-08-04 RX ADMIN — SODIUM CHLORIDE 75 MILLILITER(S): 9 INJECTION, SOLUTION INTRAVENOUS at 05:58

## 2017-08-04 RX ADMIN — Medication 100 MILLIGRAM(S): at 22:50

## 2017-08-04 RX ADMIN — Medication 650 MILLIGRAM(S): at 16:01

## 2017-08-04 RX ADMIN — HYDROMORPHONE HYDROCHLORIDE 30 MILLILITER(S): 2 INJECTION INTRAMUSCULAR; INTRAVENOUS; SUBCUTANEOUS at 14:23

## 2017-08-04 RX ADMIN — SENNA PLUS 2 TABLET(S): 8.6 TABLET ORAL at 22:50

## 2017-08-04 RX ADMIN — POLYETHYLENE GLYCOL 3350 17 GRAM(S): 17 POWDER, FOR SOLUTION ORAL at 16:05

## 2017-08-04 RX ADMIN — SODIUM CHLORIDE 75 MILLILITER(S): 9 INJECTION, SOLUTION INTRAVENOUS at 16:02

## 2017-08-04 RX ADMIN — SODIUM CHLORIDE 75 MILLILITER(S): 9 INJECTION, SOLUTION INTRAVENOUS at 09:49

## 2017-08-04 RX ADMIN — FENTANYL CITRATE 1 PATCH: 50 INJECTION INTRAVENOUS at 16:25

## 2017-08-04 RX ADMIN — SODIUM CHLORIDE 75 MILLILITER(S): 9 INJECTION, SOLUTION INTRAVENOUS at 13:19

## 2017-08-04 RX ADMIN — Medication 650 MILLIGRAM(S): at 09:48

## 2017-08-04 NOTE — CHART NOTE - NSCHARTNOTEFT_GEN_A_CORE
Upon Nutritional Assessment by the Registered Dietitian your patient was determined to meet criteria / has evidence of the following diagnosis/diagnoses:          [ ]  Mild Protein Calorie Malnutrition        [ ]  Moderate Protein Calorie Malnutrition        [x ] Severe Protein Calorie Malnutrition        [ ] Unspecified Protein Calorie Malnutrition        [x ] Underweight / BMI <19        [ ] Morbid Obesity / BMI > 40      Findings as based on:  [ x] Comprehensive nutrition assessment: 22 lb wt loss within 2 months, meeting <75% estimated needs for >1 month  [ ] Nutrition Focused Physical Exam  [ ] Other:       Nutrition Plan/Recommendations:  As medically feasible, advance diet to regular diet, consider adding prosource 2 x daily to further optimize intake-declined ensure supplements at this time.         PROVIDER Section:     By signing this assessment you are acknowledging and agree with the diagnosis/diagnoses assigned by the Registered Dietitian    Comments:

## 2017-08-04 NOTE — PROGRESS NOTE ADULT - PROBLEM SELECTOR PLAN 1
C/w Dilaudid PCA pump 1 mg q 15 DD only. No CR. CB 2 mg q 1 PRN.   Increase Fentanyl Patch to 100 mcg every 72 hrs  Naloxone PRN.  Tylenol can be given PRN

## 2017-08-04 NOTE — PROGRESS NOTE ADULT - PROBLEM SELECTOR PLAN 2
S/P ERCP with Complete removal of stone by biliary  sphincterotomy and balloon extraction.  Pt tolerated her diet    LFT within range

## 2017-08-04 NOTE — PROGRESS NOTE ADULT - PROBLEM SELECTOR PLAN 2
- Choledocholithiasis noted on last IR tube check of perc drain / mild biliary dilatation on CT ; Also w/ worsening peritoneal carcinomatosis  - S/P ERCP for choledocholithiasis removal  - Monitor lfts   - Soft diet as tolerated

## 2017-08-04 NOTE — PROGRESS NOTE ADULT - PROBLEM SELECTOR PLAN 3
Noted worsening peritoneal carcinomatosis.   As per Onc note, patient is to decide about disease modifying Rx. No signs of bowel obstruction on CT   C/w Colace, senna standing. Dulcolax 5 mg Q12 H prn    Solid foods reintroduced- Monitor for BM and signs of bowel obstruction

## 2017-08-04 NOTE — DIETITIAN INITIAL EVALUATION ADULT. - NS AS NUTRI INTERV ED CONTENT
Provided low fat nutrition therapy diet education, reviewed high calorie/high protein nutrition therapy with emphasis of adequate nutrition and goal for weight gain

## 2017-08-04 NOTE — DIETITIAN INITIAL EVALUATION ADULT. - OTHER INFO
Pt reports UBW of ~128 lbs, noted previous dosing weight 125 lbs (6/2017 stated), current admit weight 103.3 lbs (standing 8/1) Pt reports UBW of ~128 lbs, noted previous dosing weight 125 lbs (6/2017 stated), current admit weight 103.3 lbs (standing 8/1) pt with 22 lb wt loss within 2 months. Pt with no N+V, pt with colostomy, output has been less than normal. Pt with no difficulty chewing/swallowing. In house pt reports fair appetite had 75% of soup and bites of pasta, not receptive to ensure but willing to take prosource.

## 2017-08-04 NOTE — DIETITIAN INITIAL EVALUATION ADULT. - ORAL INTAKE PTA
fair/Pt reports decreased appetite since previous hospitalization 6/2-7/5, pt states since discharge she was not eating very much due to dislike of her "nanny's" cooking, states since she hired a new  her intake improved as she liked the meals more. Pt states she was trying to consume smaller, more frequent meals consisting of a variety of asian dishes prepared by , usually 6 meals.

## 2017-08-04 NOTE — PROGRESS NOTE ADULT - SUBJECTIVE AND OBJECTIVE BOX
INTERVAL HPI/OVERNIGHT EVENTS: pain controlled overnight. Tolerated solid diet.       Allergies: penicillin (Rash)    ADVANCE DIRECTIVES:    DNR   MOLST [ ] YES [x ] NO     MEDICATIONS  (STANDING):  dextrose 5% + sodium chloride 0.9%. 1000 milliLiter(s) (75 mL/Hr) IV Continuous <Continuous>  fentaNYL   Patch  50 MICROgram(s)/Hr 1 Patch Transdermal every 72 hours  docusate sodium 100 milliGRAM(s) Oral three times a day  enoxaparin Injectable 40 milliGRAM(s) SubCutaneous daily  HYDROmorphone PCA (1 mG/mL) 30 milliLiter(s) PCA Continuous PCA Continuous  senna 2 Tablet(s) Oral at bedtime  polyethylene glycol 3350 17 Gram(s) Oral two times a day    MEDICATIONS  (PRN):  acetaminophen   Tablet 650 milliGRAM(s) Oral every 6 hours PRN pain  naloxone Injectable 0.1 milliGRAM(s) IV Push every 3 minutes PRN For ANY of the following changes in patient status:  A. RR LESS THAN 10 breaths per minute, B. Oxygen saturation LESS THAN 90%, C. Sedation score of 6  ondansetron Injectable 4 milliGRAM(s) IV Push every 6 hours PRN Nausea  HYDROmorphone PCA (1 mG/mL) Rescue Clinician Bolus 2 milliGRAM(s) IV Push every 1 hour PRN Severe Pain (7 - 10)  bisacodyl 5 milliGRAM(s) Oral every 12 hours PRN Constipation      PRESENT SYMPTOMS:  Source: [x ] Patient   [ ] Family   [ ] Team     Pain:                        [ ] No [x ] Yes             [ ] Mild [ ] Moderate [ ] Severe    Onset - chronic   Location - abdomen   Duration - continuous  Character - dull  Alleviating/Aggravating - aggravated with position  Radiation - non radiating     Dyspnea:                [x ] No [ ] Yes             [ ] Mild [ ] Moderate [ ] Severe    Anxiety:                  [x ] No [ ] Yes             [ ] Mild [ ] Moderate [ ] Severe    Fatigue:                  [ ] No [x ] Yes             [ ] Mild [x ] Moderate [ ] Severe    Nausea:                  [ ] No [x ] Yes             [x ] Mild [ ] Moderate [ ] Severe    Loss of appetite:   [x ] No [ ] Yes             [ ] Mild [ ] Moderate [ ] Severe    Constipation:        [ ] No [x ] Yes             [ ] Mild [x ] Moderate [ ] Severe    Other Symptoms:  [x ] All other review of systems negative   [ ] Unable to obtain due to poor mentation     Karnofsky Performance Score/Palliative Performance Status Version 2:  50       %    PHYSICAL EXAM:  Vital Signs Last 24 Hrs  T(C): 36.7 (04 Aug 2017 13:34), Max: 37 (04 Aug 2017 02:00)  T(F): 98.1 (04 Aug 2017 13:34), Max: 98.6 (04 Aug 2017 02:00)  HR: 66 (04 Aug 2017 13:34) (65 - 70)  BP: 126/79 (04 Aug 2017 13:34) (102/70 - 133/68)  BP(mean): --  RR: 18 (04 Aug 2017 13:34) (17 - 18)  SpO2: 97% (04 Aug 2017 13:34) (97% - 99%) I&O's Summary    03 Aug 2017 07:01  -  04 Aug 2017 07:00  --------------------------------------------------------  IN: 3807 mL / OUT: 2150 mL / NET: 1657 mL    04 Aug 2017 07:01  -  04 Aug 2017 15:54  --------------------------------------------------------  IN: 971 mL / OUT: 1400 mL / NET: -429 mL        General:  [x ] Alert  [x ] Oriented x  3    [ ] Lethargic  [ ] Agitated   [ ] Cachexia   [ ] Unarousable  [x ] Verbal  [ ] Non-Verbal    HEENT:  [x ] Normal   [ ] Dry mouth   [ ] ET Tube    [ ] Trach  [ ] Oral lesions    Lungs:   [x ] Clear [ ] Tachypnea  [ ] Audible excessive secretions   [ ] Rhonchi        [ ] Right [ ] Left [ ] Bilateral  [ ] Crackles        [ ] Right [ ] Left [ ] Bilateral  [ ] Wheezing     [ ] Right [ ] Left [ ] Bilateral    Cardiovascular:  [x ] Regular [ ] Irregular [ ] Tachycardia   [ ] Bradycardia  [ ] Murmur [ ] Other    Abdomen: [x ] Soft  [ ] Distended   [ ] +BS  [ ] Non tender [ ] Tender  [ ]PEG   [ ]OGT/ NGT   Last BM:       Genitourinary: [ ] Normal [x ] Incontinent   [ ] Oliguria/Anuria   [ ] Lynn    Musculoskeletal:  [ ] Normal   [x ] Weakness  [ ] Bedbound/Wheelchair bound [ ] Edema    Neurological: [x ] No focal deficits  [ ] Cognitive impairment  [ ] Dysphagia [ ] Dysarthria [ ] Paresis [ ] Other     Skin: [x ] Normal   [ ] Pressure ulcer(s)                  [ ] Rash    LABS:                        9.6    5.88  )-----------( 306      ( 04 Aug 2017 08:40 )             31.6     08-04    138  |  99  |  3<L>  ----------------------------<  85  4.1   |  27  |  0.39<L>    Ca    8.8      04 Aug 2017 08:58  Phos  4.0     08-03    TPro  6.1  /  Alb  3.1<L>  /  TBili  0.3  /  DBili  x   /  AST  18  /  ALT  8<L>  /  AlkPhos  68  08-04      Shock: [ ] Septic [ ] Cardiogenic [ ] Neurologic [ ] Hypovolemic  Vasopressors x none    Inotrophs x none     Oral Intake: [ ] Unable/mouth care only [ ] Minimal [ x] Moderate [ ] Full Capability    Diet: [ ] NPO [ ] Tube feeds [ ] TPN [ x] Other Diet- regular     RADIOLOGY & ADDITIONAL STUDIES:  ERCP 4/2: Impression:          - Normal esophagus.                       - Intact gastrostomy with a patent G-tube present.                       - Normal examined duodenum.                       - The major papilla appeared normal.                       - A sphincterotomy was performed.                       - The biliary tree was swept.                       - Choledocholithiasis was found. Complete removal was accomplished by biliary                        sphincterotomy and balloon extraction.      REFERRALS:   [ ] Chaplaincy  [ ] Hospice  [ ] Child Life  [ ] Social Work  [ ] Case management [ ] Holistic Therapy   Anson oJnes MD [phone] INTERVAL HPI/OVERNIGHT EVENTS: pain controlled overnight. Tolerated solid diet.   Currently f/u for pain       Allergies: penicillin (Rash)    ADVANCE DIRECTIVES:    DNR   MOLST [ ] YES [x ] NO     MEDICATIONS  (STANDING):  dextrose 5% + sodium chloride 0.9%. 1000 milliLiter(s) (75 mL/Hr) IV Continuous <Continuous>  fentaNYL   Patch  50 MICROgram(s)/Hr 1 Patch Transdermal every 72 hours  docusate sodium 100 milliGRAM(s) Oral three times a day  enoxaparin Injectable 40 milliGRAM(s) SubCutaneous daily  HYDROmorphone PCA (1 mG/mL) 30 milliLiter(s) PCA Continuous PCA Continuous  senna 2 Tablet(s) Oral at bedtime  polyethylene glycol 3350 17 Gram(s) Oral two times a day    MEDICATIONS  (PRN):  acetaminophen   Tablet 650 milliGRAM(s) Oral every 6 hours PRN pain  naloxone Injectable 0.1 milliGRAM(s) IV Push every 3 minutes PRN For ANY of the following changes in patient status:  A. RR LESS THAN 10 breaths per minute, B. Oxygen saturation LESS THAN 90%, C. Sedation score of 6  ondansetron Injectable 4 milliGRAM(s) IV Push every 6 hours PRN Nausea  HYDROmorphone PCA (1 mG/mL) Rescue Clinician Bolus 2 milliGRAM(s) IV Push every 1 hour PRN Severe Pain (7 - 10)  bisacodyl 5 milliGRAM(s) Oral every 12 hours PRN Constipation      PRESENT SYMPTOMS:  Source: [x ] Patient   [ ] Family   [ ] Team     Pain:                        [ ] No [x ] Yes             [ x] Mild to Moderate [ ] Severe    Onset - chronic   Location - abdomen   Duration - continuous  Character - dull  Alleviating/Aggravating - aggravated with position  Radiation - non radiating     Dyspnea:                [x ] No [ ] Yes             [ ] Mild [ ] Moderate [ ] Severe    Anxiety:                  [x ] No [ ] Yes             [ ] Mild [ ] Moderate [ ] Severe    Fatigue:                  [ ] No [x ] Yes             [ ] Mild [x ] Moderate [ ] Severe    Nausea:                  [ ] No [x ] Yes             [x ] Mild [ ] Moderate [ ] Severe    Loss of appetite:   [x ] No [ ] Yes             [ ] Mild [ ] Moderate [ ] Severe    Constipation:        [ ] No [x ] Yes             [ ] Mild [x ] Moderate [ ] Severe    Other Symptoms:  [x ] All other review of systems negative   [ ] Unable to obtain due to poor mentation     Karnofsky Performance Score/Palliative Performance Status Version 2:  50       %    PHYSICAL EXAM:  Vital Signs Last 24 Hrs  T(C): 36.7 (04 Aug 2017 13:34), Max: 37 (04 Aug 2017 02:00)  T(F): 98.1 (04 Aug 2017 13:34), Max: 98.6 (04 Aug 2017 02:00)  HR: 66 (04 Aug 2017 13:34) (65 - 70)  BP: 126/79 (04 Aug 2017 13:34) (102/70 - 133/68)  BP(mean): --  RR: 18 (04 Aug 2017 13:34) (17 - 18)  SpO2: 97% (04 Aug 2017 13:34) (97% - 99%) I&O's Summary    03 Aug 2017 07:01  -  04 Aug 2017 07:00  --------------------------------------------------------  IN: 3807 mL / OUT: 2150 mL / NET: 1657 mL    04 Aug 2017 07:01  -  04 Aug 2017 15:54  --------------------------------------------------------  IN: 971 mL / OUT: 1400 mL / NET: -429 mL        General:  [x ] Alert  [x ] Oriented x  3    [ ] Lethargic  [ ] Agitated   [ ] Cachexia   [ ] Unarousable  [x ] Verbal  [ ] Non-Verbal    HEENT:  [x ] Normal   [ ] Dry mouth   [ ] ET Tube    [ ] Trach  [ ] Oral lesions    Lungs:   [x ] Clear [ ] Tachypnea  [ ] Audible excessive secretions   [ ] Rhonchi        [ ] Right [ ] Left [ ] Bilateral  [ ] Crackles        [ ] Right [ ] Left [ ] Bilateral  [ ] Wheezing     [ ] Right [ ] Left [ ] Bilateral    Cardiovascular:  [x ] Regular [ ] Irregular [ ] Tachycardia   [ ] Bradycardia  [ ] Murmur [ ] Other    Abdomen: [x ] Soft  [ ] Distended   [ ] +BS  [ ] Non tender [ ] Tender  [ ]PEG   [ ]OGT/ NGT   Last BM:       Genitourinary: [ ] Normal [x ] Incontinent   [ ] Oliguria/Anuria   [ ] Lynn    Musculoskeletal:  [ ] Normal   [x ] Weakness  [ ] Bedbound/Wheelchair bound [ ] Edema    Neurological: [x ] No focal deficits  [ ] Cognitive impairment  [ ] Dysphagia [ ] Dysarthria [ ] Paresis [ ] Other     Skin: Multiple scars over abdomen.     LABS:                        9.6    5.88  )-----------( 306      ( 04 Aug 2017 08:40 )             31.6     08-04    138  |  99  |  3<L>  ----------------------------<  85  4.1   |  27  |  0.39<L>    Ca    8.8      04 Aug 2017 08:58  Phos  4.0     08-03    TPro  6.1  /  Alb  3.1<L>  /  TBili  0.3  /  DBili  x   /  AST  18  /  ALT  8<L>  /  AlkPhos  68  08-04      Shock: [ ] Septic [ ] Cardiogenic [ ] Neurologic [ ] Hypovolemic  Vasopressors x none    Inotrophs x none     Oral Intake: [ ] Unable/mouth care only [ ] Minimal [ x] Moderate [ ] Full Capability    Diet: [ ] NPO [ ] Tube feeds [ ] TPN [ x] Other Diet- regular     RADIOLOGY & ADDITIONAL STUDIES:  ERCP 4/2: Impression:          - Normal esophagus.                       - Intact gastrostomy with a patent G-tube present.                       - Normal examined duodenum.                       - The major papilla appeared normal.                       - A sphincterotomy was performed.                       - The biliary tree was swept.                       - Choledocholithiasis was found. Complete removal was accomplished by biliary                        sphincterotomy and balloon extraction.      REFERRALS:   [ ] Chaplaincy  [ ] Hospice  [ ] Child Life  [ ] Social Work  [ ] Case management [ ] Holistic Therapy   Anson Jones MD [phone]

## 2017-08-04 NOTE — DIETITIAN INITIAL EVALUATION ADULT. - ENERGY NEEDS
Pt seen for BMI <19. Pt with PMH Including ovarian cancer S/P GRETCHEN, colostomy, acute cholecystitis S/P percutaneous drain now admitted with abdominal pain CT 6/29 shows worsening peritoneal carcinomatosis S/P ERCP 8/2 found to have choledocholithiasis-now removed.    Ht:5'3" , Wt:103.3 lbs, BMI:18.3 kg/m2, IBW: 115 lbs +/- 10%, %IBW: 90%  No edema, Skin free of pressure injury

## 2017-08-04 NOTE — PROGRESS NOTE ADULT - SUBJECTIVE AND OBJECTIVE BOX
INTERVAL HPI/OVERNIGHT EVENTS:  Pt S/E -- resting in bed comfortably  Abdominal pain controlled - no nausea  Tolerating diet well, remains constipated       MEDICATIONS  (STANDING):  dextrose 5% + sodium chloride 0.9%. 1000 milliLiter(s) (75 mL/Hr) IV Continuous <Continuous>  fentaNYL   Patch  50 MICROgram(s)/Hr 1 Patch Transdermal every 72 hours  docusate sodium 100 milliGRAM(s) Oral three times a day  enoxaparin Injectable 40 milliGRAM(s) SubCutaneous daily  HYDROmorphone PCA (1 mG/mL) 30 milliLiter(s) PCA Continuous PCA Continuous  senna 2 Tablet(s) Oral at bedtime  polyethylene glycol 3350 17 Gram(s) Oral daily    MEDICATIONS  (PRN):  acetaminophen   Tablet 650 milliGRAM(s) Oral every 6 hours PRN pain  naloxone Injectable 0.1 milliGRAM(s) IV Push every 3 minutes PRN For ANY of the following changes in patient status:  A. RR LESS THAN 10 breaths per minute, B. Oxygen saturation LESS THAN 90%, C. Sedation score of 6  ondansetron Injectable 4 milliGRAM(s) IV Push every 6 hours PRN Nausea  HYDROmorphone PCA (1 mG/mL) Rescue Clinician Bolus 2 milliGRAM(s) IV Push every 1 hour PRN Severe Pain (7 - 10)  bisacodyl 5 milliGRAM(s) Oral every 12 hours PRN Constipation    Allergies    penicillin (Rash)    Intolerances: ---     ROS:   General:  No loss, no fevers, chills, night sweats, +fatigue,   Eyes:  Good vision, no reported pain  ENT:  No sore throat, pain, runny nose, dysphagia  CV:  No pain, palpitations, hypo/hypertension  Resp:  No dyspnea, cough, tachypnea, wheezing  GI:  no pain, no nausea, No vomiting, No diarrhea, +constipation, no weight loss, No fever, No pruritis, No rectal bleeding, No tarry stools, No dysphagia,  :  No pain, bleeding, incontinence, nocturia  Muscle:  No pain, +weakness  Neuro:  No weakness, tingling, memory problems  Psych:  No fatigue, insomnia, mood problems, depression  Endocrine:  No polyuria, polydipsia, cold/heat intolerance  Heme:  No petechiae, ecchymosis, easy bruisability  Skin:  No rash, tattoos, scars, edema      PHYSICAL EXAM:   Vital Signs:   Vital Signs Last 24 Hrs  T(C): 36.8 (04 Aug 2017 10:18), Max: 37 (04 Aug 2017 02:00)  T(F): 98.2 (04 Aug 2017 10:18), Max: 98.6 (04 Aug 2017 02:00)  HR: 67 (04 Aug 2017 10:18) (65 - 70)  BP: 123/69 (04 Aug 2017 10:18) (100/67 - 133/68)  BP(mean): --  RR: 18 (04 Aug 2017 10:18) (17 - 18)  SpO2: 97% (04 Aug 2017 10:18) (97% - 99%)  Daily     Daily     GENERAL:  Resting in bed comfortably in NAD   HEENT:  NC/AT,  conjunctivae clear and pink, no thyromegaly, nodules, adenopathy, no JVD, sclera -anicteric  CHEST:  Full & symmetric excursion, no increased effort, breath sounds clear  HEART:  Regular rhythm, S1, S2, no murmur/rub/S3/S4, no abdominal bruit, no edema  ABDOMEN:  Soft, mildly tender to palpation generalized; non-distended, normoactive bowel sounds,  no masses ,no hepato-splenomegaly, no signs of chronic liver disease, PEG c/d/i  EXTEREMITIES:  no cyanosis ,clubbing or edema  SKIN:  No rash/erythema/ecchymoses/petechiae/wounds/abscess/warm/dry  NEURO:  Alert, oriented, no asterixis, no tremor, no encephalopathy      LABS:                        9.6    5.88  )-----------( 306      ( 04 Aug 2017 08:40 )             31.6     08-04    138  |  99  |  3<L>  ----------------------------<  85  4.1   |  27  |  0.39<L>    Ca    8.8      04 Aug 2017 08:58  Phos  4.0     08-03    TPro  6.1  /  Alb  3.1<L>  /  TBili  0.3  /  DBili  x   /  AST  18  /  ALT  8<L>  /  AlkPhos  68  08-04        RADIOLOGY & ADDITIONAL TESTS:

## 2017-08-04 NOTE — PROGRESS NOTE ADULT - PROBLEM SELECTOR PLAN 1
Cont PCA pump  Patient is also on Dilaudid IV for breakthrough pain   Fentanyl patch was increased to 100mcg/hre  cont to monitor patient.

## 2017-08-04 NOTE — PROGRESS NOTE ADULT - PROBLEM SELECTOR PLAN 1
- May be 2/2 choledocholithiasis as well as worsening peritoneal carcinomatosis   - Pain control PRN  - Bowel regimen / dulcolax bid prn

## 2017-08-04 NOTE — PROGRESS NOTE ADULT - PROBLEM SELECTOR PLAN 2
No signs of bowel obstruction on CT   C/w Colace, senna standing. Dulcolax 5 mg Q12 H prn    Solid foods reintroduced- Monitor for BM and signs of bowel obstruction Noted worsening peritoneal carcinomatosis.   As per Onc note, patient is to decide about disease modifying Rx.

## 2017-08-04 NOTE — PROGRESS NOTE ADULT - SUBJECTIVE AND OBJECTIVE BOX
CHAYA PALACIO  46y  Female      Patient is a 46y old  Female who presents with a chief complaint of abdominal pain (01 Aug 2017 11:40)      INTERVAL HPI/OVERNIGHT EVENTS:    Patient states that the pain has improved, but she is constipated , she tolerated the soft diet.  She had nausea today with no vomiting.        REVIEW OF SYSTEMS:  CONSTITUTIONAL: No fever  EYES:  No visual disturbances  ENMT:   No sinus or throat pain  NECK: No pain or stiffness  RESPIRATORY: No cough, No shortness of breath  CARDIOVASCULAR: No chest pain, palpitations, dizziness, or leg swelling  GASTROINTESTINAL: improved abdominal pain .  pos nausea, no vomiting. No diarrhea, pos constipation.   GENITOURINARY: No dysuria  NEUROLOGICAL: No headaches.  MUSCULOSKELETAL: No joint pain or swelling    T(C): 36.7 (08-04-17 @ 13:34), Max: 37 (08-04-17 @ 02:00)  HR: 66 (08-04-17 @ 13:34) (65 - 70)  BP: 126/79 (08-04-17 @ 13:34) (102/70 - 133/68)  RR: 18 (08-04-17 @ 13:34) (17 - 18)  SpO2: 97% (08-04-17 @ 13:34) (97% - 99%)  Wt(kg): --Vital Signs Last 24 Hrs  T(C): 36.7 (04 Aug 2017 13:34), Max: 37 (04 Aug 2017 02:00)  T(F): 98.1 (04 Aug 2017 13:34), Max: 98.6 (04 Aug 2017 02:00)  HR: 66 (04 Aug 2017 13:34) (65 - 70)  BP: 126/79 (04 Aug 2017 13:34) (102/70 - 133/68)  BP(mean): --  RR: 18 (04 Aug 2017 13:34) (17 - 18)  SpO2: 97% (04 Aug 2017 13:34) (97% - 99%)  Wt(kg): --    PHYSICAL EXAM:  GENERAL: NAD, well-groomed, well-developed  HEAD:  Atraumatic, Normocephalic  EYES: conjunctiva and sclera clear  ENMT: Moist mucous membranes  NECK: Supple, No JVD, Normal thyroid  NERVOUS SYSTEM:  Alert & Oriented X3, Good concentration  CHEST/LUNG: Clear to percussion bilaterally; No rales, rhonchi, wheezing, or rubs  HEART: Regular rate and rhythm; No murmurs, rubs, or gallops  ABDOMEN: pos healing surgical scar, pos colostomy, pos drainage tube, pos bowel sounds    EXTREMITIES:  NO edema     Consultant(s) Notes Reviewed:  [x ] YES  Palliative care     LABS:                        9.6    5.88  )-----------( 306      ( 04 Aug 2017 08:40 )             31.6     08-04    138  |  99  |  3<L>  ----------------------------<  85  4.1   |  27  |  0.39<L>    Ca    8.8      04 Aug 2017 08:58  Phos  4.0     08-03    TPro  6.1  /  Alb  3.1<L>  /  TBili  0.3  /  DBili  x   /  AST  18  /  ALT  8<L>  /  AlkPhos  68  08-04        CAPILLARY BLOOD GLUCOSE                RADIOLOGY & ADDITIONAL TESTS:    Imaging Personally Reviewed:  [ ] YES  [ ] NO    HEALTH ISSUES - PROBLEM Dx:  Choledocholithiasis: Choledocholithiasis  Encounter for palliative care: Encounter for palliative care  Constipation, unspecified constipation type: Constipation, unspecified constipation type  Carcinomatosis: Carcinomatosis  Prophylactic measure: Prophylactic measure  Malignant neoplasm of ovary, unspecified laterality: Malignant neoplasm of ovary, unspecified laterality  Ovarian cancer: Ovarian cancer  Abnormal CT of the abdomen: Abnormal CT of the abdomen  Generalized abdominal pain: Generalized abdominal pain

## 2017-08-04 NOTE — DIETITIAN INITIAL EVALUATION ADULT. - NS AS NUTRI INTERV MEALS SNACK
1. As medically feasible advance diet as tolerated to regular diet, 2. Consider adding prosource 2 x daily to further optimize intake-pt declined ensure supplements, 3. Continue to encourage po intake and obtain/honor food preferences as able, 4. Monitor PO intake, diet tolerance, weight trends, labs, and skin integrity, 5. RD to remain available as needed

## 2017-08-04 NOTE — PROGRESS NOTE ADULT - PROBLEM SELECTOR PLAN 3
Patient is Miralax / Dulcolax/COlace/ Senna   if not improve might consider Lactulose or ??Relistor  cont soft diet

## 2017-08-04 NOTE — PROGRESS NOTE ADULT - PROBLEM SELECTOR PLAN 4
Pain was assessed w/ patient at bedside-  3-4/10  Will continue to monitor demands on PCA pump over the weekend  Increased Fentanyl to 100 mcg today   Patient agrees with plan Pain was assessed w/ patient at bedside-  3-4/10  Will continue to monitor demands on PCA pump over the weekend  Increased Fentanyl to 100 mcg today   Patient agrees with plan  Agree with PT/OT  Palliative to follow Pain was assessed w/ patient at bedside-  3-4/10  Will continue to monitor demands on PCA pump over the weekend  Increased Fentanyl to 100 mcg today   Patient agrees with plan  Agree with PT/OT  Plan discussed with patients surrogate Trung Buenrostro [phone]  Palliative to follow

## 2017-08-05 DIAGNOSIS — Z51.5 ENCOUNTER FOR PALLIATIVE CARE: ICD-10-CM

## 2017-08-05 DIAGNOSIS — G89.3 NEOPLASM RELATED PAIN (ACUTE) (CHRONIC): ICD-10-CM

## 2017-08-05 DIAGNOSIS — R53.81 OTHER MALAISE: ICD-10-CM

## 2017-08-05 PROCEDURE — 99232 SBSQ HOSP IP/OBS MODERATE 35: CPT

## 2017-08-05 PROCEDURE — 99233 SBSQ HOSP IP/OBS HIGH 50: CPT

## 2017-08-05 RX ORDER — MUPIROCIN 20 MG/G
1 OINTMENT TOPICAL
Qty: 0 | Refills: 0 | Status: DISCONTINUED | OUTPATIENT
Start: 2017-08-05 | End: 2017-08-11

## 2017-08-05 RX ADMIN — SODIUM CHLORIDE 75 MILLILITER(S): 9 INJECTION, SOLUTION INTRAVENOUS at 05:53

## 2017-08-05 RX ADMIN — MUPIROCIN 1 APPLICATION(S): 20 OINTMENT TOPICAL at 14:14

## 2017-08-05 RX ADMIN — Medication 30 MILLILITER(S): at 21:07

## 2017-08-05 RX ADMIN — SODIUM CHLORIDE 75 MILLILITER(S): 9 INJECTION, SOLUTION INTRAVENOUS at 07:18

## 2017-08-05 RX ADMIN — Medication 30 MILLILITER(S): at 23:52

## 2017-08-05 RX ADMIN — HYDROMORPHONE HYDROCHLORIDE 30 MILLILITER(S): 2 INJECTION INTRAMUSCULAR; INTRAVENOUS; SUBCUTANEOUS at 19:05

## 2017-08-05 RX ADMIN — Medication 650 MILLIGRAM(S): at 19:41

## 2017-08-05 RX ADMIN — SODIUM CHLORIDE 75 MILLILITER(S): 9 INJECTION, SOLUTION INTRAVENOUS at 13:55

## 2017-08-05 RX ADMIN — HYDROMORPHONE HYDROCHLORIDE 30 MILLILITER(S): 2 INJECTION INTRAMUSCULAR; INTRAVENOUS; SUBCUTANEOUS at 07:16

## 2017-08-05 RX ADMIN — POLYETHYLENE GLYCOL 3350 17 GRAM(S): 17 POWDER, FOR SOLUTION ORAL at 05:53

## 2017-08-05 RX ADMIN — ENOXAPARIN SODIUM 40 MILLIGRAM(S): 100 INJECTION SUBCUTANEOUS at 11:13

## 2017-08-05 RX ADMIN — Medication 650 MILLIGRAM(S): at 08:53

## 2017-08-05 RX ADMIN — SENNA PLUS 2 TABLET(S): 8.6 TABLET ORAL at 21:07

## 2017-08-05 RX ADMIN — POLYETHYLENE GLYCOL 3350 17 GRAM(S): 17 POWDER, FOR SOLUTION ORAL at 21:07

## 2017-08-05 RX ADMIN — Medication 100 MILLIGRAM(S): at 13:54

## 2017-08-05 RX ADMIN — Medication 30 MILLILITER(S): at 13:49

## 2017-08-05 RX ADMIN — Medication 100 MILLIGRAM(S): at 05:53

## 2017-08-05 RX ADMIN — SODIUM CHLORIDE 75 MILLILITER(S): 9 INJECTION, SOLUTION INTRAVENOUS at 11:15

## 2017-08-05 RX ADMIN — HYDROMORPHONE HYDROCHLORIDE 30 MILLILITER(S): 2 INJECTION INTRAMUSCULAR; INTRAVENOUS; SUBCUTANEOUS at 21:42

## 2017-08-05 RX ADMIN — Medication 100 MILLIGRAM(S): at 21:07

## 2017-08-05 NOTE — PROGRESS NOTE ADULT - PROBLEM SELECTOR PLAN 5
Noted worsening peritoneal carcinomatosis.   As per Onc note, patient is to decide about disease modifying tx. Pain recs as above.  Will continue to follow.

## 2017-08-05 NOTE — PROGRESS NOTE ADULT - PROBLEM SELECTOR PLAN 3
Currently on aggressive bowel regimen with Senna, Colace, Miralax, Dulcolax.   Will consider Relistor if no BM by tomorrow.

## 2017-08-05 NOTE — PROGRESS NOTE ADULT - PROBLEM SELECTOR PLAN 2
Followed closely by Palliative.  Currently on Dilaudid PCA, bridging to Fentanyl patch. (100mcg, increased yesterday).  Pain is improved.

## 2017-08-05 NOTE — PROGRESS NOTE ADULT - PROBLEM SELECTOR PLAN 3
Currently on aggressive bowel regimen with Senna, Colace, Miralax, Dulcolax.   Will consider Relistor if no BM.

## 2017-08-05 NOTE — PROGRESS NOTE ADULT - SUBJECTIVE AND OBJECTIVE BOX
INTERVAL HPI/OVERNIGHT EVENTS:  Pt S/E -- resting in bed comfortably  Abdominal pain controlled - mild nausea  Tolerating diet well, remains constipated       MEDICATIONS  (STANDING):  dextrose 5% + sodium chloride 0.9%. 1000 milliLiter(s) (75 mL/Hr) IV Continuous <Continuous>  fentaNYL   Patch  50 MICROgram(s)/Hr 1 Patch Transdermal every 72 hours  docusate sodium 100 milliGRAM(s) Oral three times a day  enoxaparin Injectable 40 milliGRAM(s) SubCutaneous daily  HYDROmorphone PCA (1 mG/mL) 30 milliLiter(s) PCA Continuous PCA Continuous  senna 2 Tablet(s) Oral at bedtime  polyethylene glycol 3350 17 Gram(s) Oral daily    MEDICATIONS  (PRN):  acetaminophen   Tablet 650 milliGRAM(s) Oral every 6 hours PRN pain  naloxone Injectable 0.1 milliGRAM(s) IV Push every 3 minutes PRN For ANY of the following changes in patient status:  A. RR LESS THAN 10 breaths per minute, B. Oxygen saturation LESS THAN 90%, C. Sedation score of 6  ondansetron Injectable 4 milliGRAM(s) IV Push every 6 hours PRN Nausea  HYDROmorphone PCA (1 mG/mL) Rescue Clinician Bolus 2 milliGRAM(s) IV Push every 1 hour PRN Severe Pain (7 - 10)  bisacodyl 5 milliGRAM(s) Oral every 12 hours PRN Constipation    Allergies    penicillin (Rash)    Intolerances: ---     ROS:   General:  No loss, no fevers, chills, night sweats, +fatigue,   Eyes:  Good vision, no reported pain  ENT:  No sore throat, pain, runny nose, dysphagia  CV:  No pain, palpitations, hypo/hypertension  Resp:  No dyspnea, cough, tachypnea, wheezing  GI:  no pain, no nausea, No vomiting, No diarrhea, +constipation, no weight loss, No fever, No pruritis, No rectal bleeding, No tarry stools, No dysphagia,  :  No pain, bleeding, incontinence, nocturia  Muscle:  No pain, +weakness  Neuro:  No weakness, tingling, memory problems  Psych:  No fatigue, insomnia, mood problems, depression  Endocrine:  No polyuria, polydipsia, cold/heat intolerance  Heme:  No petechiae, ecchymosis, easy bruisability  Skin:  No rash, tattoos, scars, edema      PHYSICAL EXAM:   Vital Signs:   Vital Signs Last 24 Hrs  T(C): 36.8 (04 Aug 2017 10:18), Max: 37 (04 Aug 2017 02:00)  T(F): 98.2 (04 Aug 2017 10:18), Max: 98.6 (04 Aug 2017 02:00)  HR: 67 (04 Aug 2017 10:18) (65 - 70)  BP: 123/69 (04 Aug 2017 10:18) (100/67 - 133/68)  BP(mean): --  RR: 18 (04 Aug 2017 10:18) (17 - 18)  SpO2: 97% (04 Aug 2017 10:18) (97% - 99%)  Daily     Daily     GENERAL:  Resting in bed comfortably in NAD   HEENT:  NC/AT,  conjunctivae clear and pink, no thyromegaly, nodules, adenopathy, no JVD, sclera -anicteric  CHEST:  Full & symmetric excursion, no increased effort, breath sounds clear  HEART:  Regular rhythm, S1, S2, no murmur/rub/S3/S4, no abdominal bruit, no edema  ABDOMEN:  Soft, mildly tender to palpation generalized; non-distended, normoactive bowel sounds,  no masses ,no hepato-splenomegaly, no signs of chronic liver disease, PEG c/d/i  EXTEREMITIES:  no cyanosis ,clubbing or edema  SKIN:  No rash/erythema/ecchymoses/petechiae/wounds/abscess/warm/dry  NEURO:  Alert, oriented, no asterixis, no tremor, no encephalopathy      LABS:                        9.6    5.88  )-----------( 306      ( 04 Aug 2017 08:40 )             31.6     08-04    138  |  99  |  3<L>  ----------------------------<  85  4.1   |  27  |  0.39<L>    Ca    8.8      04 Aug 2017 08:58  Phos  4.0     08-03    TPro  6.1  /  Alb  3.1<L>  /  TBili  0.3  /  DBili  x   /  AST  18  /  ALT  8<L>  /  AlkPhos  68  08-04        RADIOLOGY & ADDITIONAL TESTS:

## 2017-08-05 NOTE — PROGRESS NOTE ADULT - PROBLEM SELECTOR PLAN 1
- May be 2/2 choledocholithiasis as well as worsening peritoneal carcinomatosis   - Pain control PRN  - Bowel regimen / miralax bid

## 2017-08-05 NOTE — PROGRESS NOTE ADULT - SUBJECTIVE AND OBJECTIVE BOX
INTERVAL HPI/OVERNIGHT EVENTS:Patient overall with improved pain.  However, still with constipation.       Allergies    penicillin (Rash)    Intolerances        ADVANCE DIRECTIVES:  [ ] YES [x ] NO    DNR: [ ] YES [x ] NO      Date Completed:                    MOLST [ ] YES [ x] NO   Date Completed:       PRESENT SYMPTOMS:   SOURCE:  [x ] Patient   [ ] Family   [ ] Team     Pain: improved on current pain regimen. No pain at this time.     Dyspnea:  [ ] YES [x ] NO  Anxiety:  [ ] YES [x ] NO  Fatigue: [x ] YES [ ] NO  Nausea: [ ] YES [x ] NO  Loss of Appetite: [ ] YES [x ] NO  Constipation [ ] YES   [x ] No     OTHER SYMPTOMS:  [ ] All other ROS negative     [ ] Unable to obtain due to poor mentation    MEDICATIONS  (STANDING):  dextrose 5% + sodium chloride 0.9%. 1000 milliLiter(s) (75 mL/Hr) IV Continuous <Continuous>  docusate sodium 100 milliGRAM(s) Oral three times a day  enoxaparin Injectable 40 milliGRAM(s) SubCutaneous daily  HYDROmorphone PCA (1 mG/mL) 30 milliLiter(s) PCA Continuous PCA Continuous  senna 2 Tablet(s) Oral at bedtime  polyethylene glycol 3350 17 Gram(s) Oral two times a day  fentaNYL   Patch 100 MICROgram(s)/Hr 1 Patch Transdermal every 72 hours  aluminum hydroxide/magnesium hydroxide/simethicone Suspension 30 milliLiter(s) Enteral Tube every 6 hours  mupirocin 2% Ointment 1 Application(s) Topical two times a day    MEDICATIONS  (PRN):  acetaminophen   Tablet 650 milliGRAM(s) Oral every 6 hours PRN pain  naloxone Injectable 0.1 milliGRAM(s) IV Push every 3 minutes PRN For ANY of the following changes in patient status:  A. RR LESS THAN 10 breaths per minute, B. Oxygen saturation LESS THAN 90%, C. Sedation score of 6  ondansetron Injectable 4 milliGRAM(s) IV Push every 6 hours PRN Nausea  HYDROmorphone PCA (1 mG/mL) Rescue Clinician Bolus 2 milliGRAM(s) IV Push every 1 hour PRN Severe Pain (7 - 10)  bisacodyl 5 milliGRAM(s) Oral every 12 hours PRN Constipation      Karnofsky Performance Score/Palliative Performance Status Version 2:   50      %  Protein Calorie Malnutrition:  [ ] Mild   [ ] Moderate   [ ] Severe     Physical Exam:    General: [x ] Alert,  A&O x 3    [ ] lethargic   [ ] Agitated   [ ] Cachexia   HEENT: [x ] Normal   [ ] Dry mouth   [ ] ET Tube    [ ] Trach   Lungs: [x ] Normal   [ ] Tachypnea   [ ] Audible excessive secretions   Cardiovascular:  [x ] Normal    [ ] Tachycardia   [ ] Bradycardia   Abdomen: [ ] Normal   [ ] Distended   [ ] Tender   [ ] Bowel Sounds   [ ]PEG   [ ] NGT   Last BM:     Genitourinary:  [ ] Normal   [ ] Incontinent   [ ] Oliguria/Anuria   [ ] Lynn  Musculoskeletal:  [x ] Normal   [ ] Generalized weakness  [ ] Bedbound   Neurological: [x ] Normal   [ ] Cognitive impairment     Skin: [x ] Normal   [ ] Pressure ulcers     Vital Signs Last 24 Hrs  T(C): 36.6 (05 Aug 2017 09:55), Max: 37.2 (05 Aug 2017 06:00)  T(F): 97.9 (05 Aug 2017 09:55), Max: 98.9 (05 Aug 2017 06:00)  HR: 80 (05 Aug 2017 09:55) (60 - 80)  BP: 126/78 (05 Aug 2017 09:55) (104/65 - 126/78)  BP(mean): --  RR: 18 (05 Aug 2017 09:55) (16 - 18)  SpO2: 98% (05 Aug 2017 09:55) (97% - 99%)    LABS:                        9.6    5.88  )-----------( 306      ( 04 Aug 2017 08:40 )             31.6     08-04    138  |  99  |  3<L>  ----------------------------<  85  4.1   |  27  |  0.39<L>    Ca    8.8      04 Aug 2017 08:58    TPro  6.1  /  Alb  3.1<L>  /  TBili  0.3  /  DBili  x   /  AST  18  /  ALT  8<L>  /  AlkPhos  68  08-04        I&O's Summary    04 Aug 2017 07:01  -  05 Aug 2017 07:00  --------------------------------------------------------  IN: 2693 mL / OUT: 4250 mL / NET: -1557 mL    05 Aug 2017 07:01  -  05 Aug 2017 14:28  --------------------------------------------------------  IN: 756 mL / OUT: 0 mL / NET: 756 mL        RADIOLOGY & ADDITIONAL STUDIES:

## 2017-08-05 NOTE — PROGRESS NOTE ADULT - PROBLEM SELECTOR PLAN 1
Patient with progression of metastatic disease and carcinomatosis.  Supportive care.   Patient will follow up with Dr. Kaila Lane as an outpatient to discuss further disease modifying interventions.

## 2017-08-05 NOTE — PROGRESS NOTE ADULT - SUBJECTIVE AND OBJECTIVE BOX
Patient is a 46y old  Female who presents with a chief complaint of abdominal pain (01 Aug 2017 11:40)    SUBJECTIVE / OVERNIGHT EVENTS:  Patient overall with improved pain.  However, still with constipation.   All other ROS negative    MEDICATIONS  (STANDING):  dextrose 5% + sodium chloride 0.9%. 1000 milliLiter(s) (75 mL/Hr) IV Continuous <Continuous>  docusate sodium 100 milliGRAM(s) Oral three times a day  enoxaparin Injectable 40 milliGRAM(s) SubCutaneous daily  HYDROmorphone PCA (1 mG/mL) 30 milliLiter(s) PCA Continuous PCA Continuous  senna 2 Tablet(s) Oral at bedtime  polyethylene glycol 3350 17 Gram(s) Oral two times a day  fentaNYL   Patch 100 MICROgram(s)/Hr 1 Patch Transdermal every 72 hours    MEDICATIONS  (PRN):  acetaminophen   Tablet 650 milliGRAM(s) Oral every 6 hours PRN pain  naloxone Injectable 0.1 milliGRAM(s) IV Push every 3 minutes PRN For ANY of the following changes in patient status:  A. RR LESS THAN 10 breaths per minute, B. Oxygen saturation LESS THAN 90%, C. Sedation score of 6  ondansetron Injectable 4 milliGRAM(s) IV Push every 6 hours PRN Nausea  HYDROmorphone PCA (1 mG/mL) Rescue Clinician Bolus 2 milliGRAM(s) IV Push every 1 hour PRN Severe Pain (7 - 10)  bisacodyl 5 milliGRAM(s) Oral every 12 hours PRN Constipation        CAPILLARY BLOOD GLUCOSE        I&O's Summary    04 Aug 2017 07:01  -  05 Aug 2017 07:00  --------------------------------------------------------  IN: 2693 mL / OUT: 4250 mL / NET: -1557 mL        PHYSICAL EXAM:  GENERAL: NAD, chronically ill appearing  HEAD:  Atraumatic, Normocephalic  EYES: EOMI, PERRLA, conjunctiva and sclera clear  NECK: Supple, No JVD  CHEST/LUNG: Clear to auscultation bilaterally; No wheeze  HEART: Regular rate and rhythm; No murmurs, rubs, or gallops  ABDOMEN: Soft, minimally tender, distended, colostomy, drainage tube  EXTREMITIES:  2+ Peripheral Pulses, No clubbing, cyanosis, or edema  PSYCH: AAOx3  NEUROLOGY: non-focal  SKIN: No rashes or lesions    LABS:                        9.6    5.88  )-----------( 306      ( 04 Aug 2017 08:40 )             31.6     08-04    138  |  99  |  3<L>  ----------------------------<  85  4.1   |  27  |  0.39<L>    Ca    8.8      04 Aug 2017 08:58  Phos  4.0     08-03    TPro  6.1  /  Alb  3.1<L>  /  TBili  0.3  /  DBili  x   /  AST  18  /  ALT  8<L>  /  AlkPhos  68  08-04              RADIOLOGY & ADDITIONAL TESTS:    Imaging Personally Reviewed:    Consultant(s) Notes Reviewed:      Care Discussed with Consultants/Other Providers:

## 2017-08-05 NOTE — PROGRESS NOTE ADULT - PROBLEM SELECTOR PLAN 2
Currently on Dilaudid PCA, bridging to Fentanyl patch 100mcg/ q72 hrs- increased yesterday.  Pain is improved. No changes today.

## 2017-08-06 DIAGNOSIS — R64 CACHEXIA: ICD-10-CM

## 2017-08-06 DIAGNOSIS — K59.03 DRUG INDUCED CONSTIPATION: ICD-10-CM

## 2017-08-06 LAB
CULTURE RESULTS: SIGNIFICANT CHANGE UP
CULTURE RESULTS: SIGNIFICANT CHANGE UP
SPECIMEN SOURCE: SIGNIFICANT CHANGE UP
SPECIMEN SOURCE: SIGNIFICANT CHANGE UP

## 2017-08-06 PROCEDURE — 99233 SBSQ HOSP IP/OBS HIGH 50: CPT

## 2017-08-06 RX ORDER — POLYETHYLENE GLYCOL 3350 17 G/17G
17 POWDER, FOR SOLUTION ORAL
Qty: 0 | Refills: 0 | Status: DISCONTINUED | OUTPATIENT
Start: 2017-08-06 | End: 2017-08-08

## 2017-08-06 RX ADMIN — Medication 30 MILLILITER(S): at 23:02

## 2017-08-06 RX ADMIN — Medication 30 MILLILITER(S): at 06:33

## 2017-08-06 RX ADMIN — SODIUM CHLORIDE 75 MILLILITER(S): 9 INJECTION, SOLUTION INTRAVENOUS at 06:33

## 2017-08-06 RX ADMIN — POLYETHYLENE GLYCOL 3350 17 GRAM(S): 17 POWDER, FOR SOLUTION ORAL at 17:26

## 2017-08-06 RX ADMIN — Medication 100 MILLIGRAM(S): at 22:06

## 2017-08-06 RX ADMIN — SENNA PLUS 2 TABLET(S): 8.6 TABLET ORAL at 22:06

## 2017-08-06 RX ADMIN — ENOXAPARIN SODIUM 40 MILLIGRAM(S): 100 INJECTION SUBCUTANEOUS at 13:27

## 2017-08-06 RX ADMIN — HYDROMORPHONE HYDROCHLORIDE 30 MILLILITER(S): 2 INJECTION INTRAMUSCULAR; INTRAVENOUS; SUBCUTANEOUS at 20:21

## 2017-08-06 RX ADMIN — Medication 30 MILLILITER(S): at 17:26

## 2017-08-06 RX ADMIN — Medication 30 MILLILITER(S): at 13:28

## 2017-08-06 RX ADMIN — Medication 650 MILLIGRAM(S): at 09:58

## 2017-08-06 RX ADMIN — HYDROMORPHONE HYDROCHLORIDE 30 MILLILITER(S): 2 INJECTION INTRAMUSCULAR; INTRAVENOUS; SUBCUTANEOUS at 07:26

## 2017-08-06 RX ADMIN — MUPIROCIN 1 APPLICATION(S): 20 OINTMENT TOPICAL at 17:26

## 2017-08-06 RX ADMIN — POLYETHYLENE GLYCOL 3350 17 GRAM(S): 17 POWDER, FOR SOLUTION ORAL at 23:02

## 2017-08-06 RX ADMIN — SODIUM CHLORIDE 75 MILLILITER(S): 9 INJECTION, SOLUTION INTRAVENOUS at 19:15

## 2017-08-06 RX ADMIN — Medication 650 MILLIGRAM(S): at 23:02

## 2017-08-06 RX ADMIN — MUPIROCIN 1 APPLICATION(S): 20 OINTMENT TOPICAL at 06:33

## 2017-08-06 RX ADMIN — Medication 650 MILLIGRAM(S): at 16:49

## 2017-08-06 RX ADMIN — HYDROMORPHONE HYDROCHLORIDE 30 MILLILITER(S): 2 INJECTION INTRAMUSCULAR; INTRAVENOUS; SUBCUTANEOUS at 19:14

## 2017-08-06 RX ADMIN — Medication 100 MILLIGRAM(S): at 13:28

## 2017-08-06 RX ADMIN — Medication 650 MILLIGRAM(S): at 03:54

## 2017-08-06 RX ADMIN — POLYETHYLENE GLYCOL 3350 17 GRAM(S): 17 POWDER, FOR SOLUTION ORAL at 06:33

## 2017-08-06 RX ADMIN — Medication 100 MILLIGRAM(S): at 06:33

## 2017-08-06 NOTE — PROGRESS NOTE ADULT - SUBJECTIVE AND OBJECTIVE BOX
INTERVAL HPI/OVERNIGHT EVENTS:    Allergies    penicillin (Rash)    Intolerances        ADVANCE DIRECTIVES:  [ ] YES [x ] NO    DNR: [ ] YES [x ] NO      Date Completed:                    MOLST [ ] YES [x ] NO   Date Completed:       PRESENT SYMPTOMS:   SOURCE:  [x ] Patient   [ ] Family   [ ] Team     Pain: improved on current regimen, but still requiring PCA prns.  Comfortable now.     Dyspnea:  [ ] YES [x ] NO  Anxiety:  [ ] YES [x ] NO  Fatigue: [x ] YES [ ] NO  Nausea: [ ] YES [x ] NO  Loss of Appetite:[ x] YES [ ] NO  Constipation [x ] YES   [ ] No     OTHER SYMPTOMS:  [ ] All other ROS negative     [ ] Unable to obtain due to poor mentation    MEDICATIONS  (STANDING):  dextrose 5% + sodium chloride 0.9%. 1000 milliLiter(s) (75 mL/Hr) IV Continuous <Continuous>  docusate sodium 100 milliGRAM(s) Oral three times a day  enoxaparin Injectable 40 milliGRAM(s) SubCutaneous daily  HYDROmorphone PCA (1 mG/mL) 30 milliLiter(s) PCA Continuous PCA Continuous  senna 2 Tablet(s) Oral at bedtime  polyethylene glycol 3350 17 Gram(s) Oral two times a day  fentaNYL   Patch 100 MICROgram(s)/Hr 1 Patch Transdermal every 72 hours  aluminum hydroxide/magnesium hydroxide/simethicone Suspension 30 milliLiter(s) Enteral Tube every 6 hours  mupirocin 2% Ointment 1 Application(s) Topical two times a day    MEDICATIONS  (PRN):  acetaminophen   Tablet 650 milliGRAM(s) Oral every 6 hours PRN pain  naloxone Injectable 0.1 milliGRAM(s) IV Push every 3 minutes PRN For ANY of the following changes in patient status:  A. RR LESS THAN 10 breaths per minute, B. Oxygen saturation LESS THAN 90%, C. Sedation score of 6  ondansetron Injectable 4 milliGRAM(s) IV Push every 6 hours PRN Nausea  HYDROmorphone PCA (1 mG/mL) Rescue Clinician Bolus 2 milliGRAM(s) IV Push every 1 hour PRN Severe Pain (7 - 10)  bisacodyl 5 milliGRAM(s) Oral every 12 hours PRN Constipation      Karnofsky Performance Score/Palliative Performance Status Version 2:40-50  %  Protein Calorie Malnutrition:  [ ] Mild   [ ] Moderate   [ ] Severe     Physical Exam:    General: [x ] Alert,  A&O x 3   [ ] lethargic   [ ] Agitated   [x ] Cachexia   HEENT: [x ] Normal   [ ] Dry mouth   [ ] ET Tube    [ ] Trach   Lungs: [x ] Normal   [ ] Tachypnea   [ ] Audible excessive secretions   Cardiovascular:  [x ] Normal    [ ] Tachycardia   [ ] Bradycardia   Abdomen: [x ] Normal   [ ] Distended   [ ] Tender   [ ] Bowel Sounds   [ ]PEG   [ ] NGT   Last BM:     Genitourinary:  [x ] Normal   [ ] Incontinent   [ ] Oliguria/Anuria   [ ] Lynn  Musculoskeletal:  [ ] Normal   [x ] Generalized weakness  [ ] Bedbound   Neurological: [x ] Normal   [ ] Cognitive impairment     Skin: [ ] Normal   [ ] Pressure ulcers     Vital Signs Last 24 Hrs  T(C): 36.8 (06 Aug 2017 09:18), Max: 37.5 (06 Aug 2017 02:04)  T(F): 98.2 (06 Aug 2017 09:18), Max: 99.5 (06 Aug 2017 02:04)  HR: 70 (06 Aug 2017 09:18) (64 - 70)  BP: 110/74 (06 Aug 2017 09:18) (100/65 - 117/73)  BP(mean): 0 (05 Aug 2017 21:00) (0 - 0)  RR: 18 (06 Aug 2017 09:18) (18 - 20)  SpO2: 98% (06 Aug 2017 09:18) (96% - 98%)    LABS:              I&O's Summary    05 Aug 2017 07:01  -  06 Aug 2017 07:00  --------------------------------------------------------  IN: 2462 mL / OUT: 700 mL / NET: 1762 mL    06 Aug 2017 07:01  -  06 Aug 2017 11:55  --------------------------------------------------------  IN: 240 mL / OUT: 0 mL / NET: 240 mL        RADIOLOGY & ADDITIONAL STUDIES:

## 2017-08-06 NOTE — PHYSICAL THERAPY INITIAL EVALUATION ADULT - PLANNED THERAPY INTERVENTIONS, PT EVAL
balance training/stair negotiation/strengthening/transfer training/bed mobility training/gait training

## 2017-08-06 NOTE — PROGRESS NOTE ADULT - PROBLEM SELECTOR PLAN 6
H/H currently stable   cont to monitor
We will continue to follow for pain management and constipation, as well as ongoing GOC discussions.

## 2017-08-06 NOTE — PROGRESS NOTE ADULT - PROBLEM SELECTOR PLAN 2
Pain is adequately controlled. Still requiring significant PRN doses via PCA.  Discussed transitioning to PO Dilaudid likely tomorrow and said she is open to it as long as her pain is controlled.  Also expressed Tylenol is helping as well. Continue with Fentanyl patch- will likely require increase of patch tomorrow. Did not want any changes today.

## 2017-08-06 NOTE — PHYSICAL THERAPY INITIAL EVALUATION ADULT - PRECAUTIONS/LIMITATIONS, REHAB EVAL
fall precautions fall precautions/CT abd/pelvis: Worsening peritoneal carcinomatosis.No bowel obstruction. Large amount of stool throughout the colon.Interval removal of left upper quadrant surgical drain. Marked interval decrease in size of left upper quadrant collection which is difficult to measure on the current exam. Cholecystostomy tube in place. Mild intrahepatic biliary ductal dilatation. Mild prominence of the common bile duct up to 6 mm. Pt s/p ERCP on 8/2 Choledocholithiasis was found and complete removal was accomplished by biliary sphincterotomy and balloon extraction.

## 2017-08-06 NOTE — PROGRESS NOTE ADULT - SUBJECTIVE AND OBJECTIVE BOX
Patient is a 46y old  Female who presents with a chief complaint of abdominal pain (01 Aug 2017 11:40)      SUBJECTIVE / OVERNIGHT EVENTS:  Pain is adequately controlled.  No bowel movements yet, however starting to pass gas.  Feels likely she will have a BM today.   Reluctant to take Relistor.     MEDICATIONS  (STANDING):  dextrose 5% + sodium chloride 0.9%. 1000 milliLiter(s) (75 mL/Hr) IV Continuous <Continuous>  docusate sodium 100 milliGRAM(s) Oral three times a day  enoxaparin Injectable 40 milliGRAM(s) SubCutaneous daily  HYDROmorphone PCA (1 mG/mL) 30 milliLiter(s) PCA Continuous PCA Continuous  senna 2 Tablet(s) Oral at bedtime  polyethylene glycol 3350 17 Gram(s) Oral two times a day  fentaNYL   Patch 100 MICROgram(s)/Hr 1 Patch Transdermal every 72 hours  aluminum hydroxide/magnesium hydroxide/simethicone Suspension 30 milliLiter(s) Enteral Tube every 6 hours  mupirocin 2% Ointment 1 Application(s) Topical two times a day    MEDICATIONS  (PRN):  acetaminophen   Tablet 650 milliGRAM(s) Oral every 6 hours PRN pain  naloxone Injectable 0.1 milliGRAM(s) IV Push every 3 minutes PRN For ANY of the following changes in patient status:  A. RR LESS THAN 10 breaths per minute, B. Oxygen saturation LESS THAN 90%, C. Sedation score of 6  ondansetron Injectable 4 milliGRAM(s) IV Push every 6 hours PRN Nausea  HYDROmorphone PCA (1 mG/mL) Rescue Clinician Bolus 2 milliGRAM(s) IV Push every 1 hour PRN Severe Pain (7 - 10)  bisacodyl 5 milliGRAM(s) Oral every 12 hours PRN Constipation        CAPILLARY BLOOD GLUCOSE        I&O's Summary    05 Aug 2017 07:01  -  06 Aug 2017 07:00  --------------------------------------------------------  IN: 2462 mL / OUT: 700 mL / NET: 1762 mL        PHYSICAL EXAM:  GENERAL: NAD, chronically ill appearing   HEAD:  Atraumatic, Normocephalic  EYES: EOMI, PERRLA, conjunctiva and sclera clear  NECK: Supple, No JVD  CHEST/LUNG: Clear to auscultation bilaterally; No wheeze  HEART: Regular rate and rhythm; No murmurs, rubs, or gallops  ABDOMEN: Soft, minimally tender, Nondistended; Bowel sounds present, colostomy, drainage tube  EXTREMITIES:  2+ Peripheral Pulses, No clubbing, cyanosis, or edema  PSYCH: AAOx3  NEUROLOGY: non-focal  SKIN: No rashes or lesions    LABS:                        9.6    5.88  )-----------( 306      ( 04 Aug 2017 08:40 )             31.6     08-04    138  |  99  |  3<L>  ----------------------------<  85  4.1   |  27  |  0.39<L>    Ca    8.8      04 Aug 2017 08:58    TPro  6.1  /  Alb  3.1<L>  /  TBili  0.3  /  DBili  x   /  AST  18  /  ALT  8<L>  /  AlkPhos  68  08-04              RADIOLOGY & ADDITIONAL TESTS:    Imaging Personally Reviewed:    Consultant(s) Notes Reviewed:      Care Discussed with Consultants/Other Providers:

## 2017-08-06 NOTE — PROGRESS NOTE ADULT - PROBLEM SELECTOR PLAN 3
Continue with aggressive bowel regimen.  Senna, Colace, Miralax BID as well as Dulcolax BID.  Patient hesitant to take Relistor.

## 2017-08-06 NOTE — PHYSICAL THERAPY INITIAL EVALUATION ADULT - GAIT DEVIATIONS NOTED, PT EVAL
decreased trip/decreased step length/decreased velocity of limb motion/decreased weight-shifting ability

## 2017-08-06 NOTE — PROGRESS NOTE ADULT - PROBLEM SELECTOR PLAN 1
Patient has extensive disease burden. Given poor functional status, poor nutritional status, not an ideal candidate for further chemotherapy at this time.

## 2017-08-06 NOTE — PROGRESS NOTE ADULT - PROBLEM SELECTOR PLAN 1
- May be 2/2 choledocholithiasis as well as worsening peritoneal carcinomatosis   - Pain control PRN  - Bowel regimen / miralax increased to q6h  - maalox to peg site  - bactroban to retention suture iritation

## 2017-08-06 NOTE — PROGRESS NOTE ADULT - PROBLEM SELECTOR PLAN 5
No bowel movements yet, however starting to pass gas. Feels likely she will have a BM today.   Reluctant to take Relistor as per 1ry team.

## 2017-08-06 NOTE — PHYSICAL THERAPY INITIAL EVALUATION ADULT - PERTINENT HX OF CURRENT PROBLEM, REHAB EVAL
Pt is a 47 y/o female admitted to [facility] on 8/1/17 presents with generalized abdominal pain x 1 day. Pain is rated 8/10, dull, aching, non radiating, intermittent. Pain is relieved by change in position. Pt with  h/o ovarian cancer s/p GRETCHEN, colostomy (2016), lesser sac collection s/p evacuation by EUS and later IR drain placement (6/12/17) , acute cholecystitis s/p percutaneous cholecystostomy drain placement by IR (6/9/17)

## 2017-08-06 NOTE — PROGRESS NOTE ADULT - PROBLEM SELECTOR PLAN 1
Patient has extensive disease burden.   Given poor functional status, poor nutritional status, patient not an ideal candidate for chemotherapy at this time.  Encouraged patient to participate with PT as much as possible  Still remains hopeful and wishes to follow up with Dr. Kaila Lane as an outpatient.

## 2017-08-06 NOTE — PHYSICAL THERAPY INITIAL EVALUATION ADULT - ADDITIONAL COMMENTS
Pt lives alone in a private house with ramp access and one flight of steps inside. Pt was Ind with amb with SC but required assist with ADLs. Pt has a HHA 24/7 Pt lives alone in an apt with ramp access and elevator access. Pt was Ind with amb with SC but required assist with ADLs. Pt has a HHA 24/7

## 2017-08-06 NOTE — PROGRESS NOTE ADULT - SUBJECTIVE AND OBJECTIVE BOX
INTERVAL HPI/OVERNIGHT EVENTS:  Pt S/E -- resting in bed comfortably  Abdominal pain controlled  tolerating diet better  still no bm        MEDICATIONS  (STANDING):  dextrose 5% + sodium chloride 0.9%. 1000 milliLiter(s) (75 mL/Hr) IV Continuous <Continuous>  fentaNYL   Patch  50 MICROgram(s)/Hr 1 Patch Transdermal every 72 hours  docusate sodium 100 milliGRAM(s) Oral three times a day  enoxaparin Injectable 40 milliGRAM(s) SubCutaneous daily  HYDROmorphone PCA (1 mG/mL) 30 milliLiter(s) PCA Continuous PCA Continuous  senna 2 Tablet(s) Oral at bedtime  polyethylene glycol 3350 17 Gram(s) Oral daily    MEDICATIONS  (PRN):  acetaminophen   Tablet 650 milliGRAM(s) Oral every 6 hours PRN pain  naloxone Injectable 0.1 milliGRAM(s) IV Push every 3 minutes PRN For ANY of the following changes in patient status:  A. RR LESS THAN 10 breaths per minute, B. Oxygen saturation LESS THAN 90%, C. Sedation score of 6  ondansetron Injectable 4 milliGRAM(s) IV Push every 6 hours PRN Nausea  HYDROmorphone PCA (1 mG/mL) Rescue Clinician Bolus 2 milliGRAM(s) IV Push every 1 hour PRN Severe Pain (7 - 10)  bisacodyl 5 milliGRAM(s) Oral every 12 hours PRN Constipation    Allergies    penicillin (Rash)    Intolerances: ---     ROS:   General:  No loss, no fevers, chills, night sweats, +fatigue,   Eyes:  Good vision, no reported pain  ENT:  No sore throat, pain, runny nose, dysphagia  CV:  No pain, palpitations, hypo/hypertension  Resp:  No dyspnea, cough, tachypnea, wheezing  GI:  no pain, no nausea, No vomiting, No diarrhea, +constipation, no weight loss, No fever, No pruritis, No rectal bleeding, No tarry stools, No dysphagia,  :  No pain, bleeding, incontinence, nocturia  Muscle:  No pain, +weakness  Neuro:  No weakness, tingling, memory problems  Psych:  No fatigue, insomnia, mood problems, depression  Endocrine:  No polyuria, polydipsia, cold/heat intolerance  Heme:  No petechiae, ecchymosis, easy bruisability  Skin:  No rash, tattoos, scars, edema      PHYSICAL EXAM:   Vital Signs:   Vital Signs Last 24 Hrs  T(C): 36.8 (04 Aug 2017 10:18), Max: 37 (04 Aug 2017 02:00)  T(F): 98.2 (04 Aug 2017 10:18), Max: 98.6 (04 Aug 2017 02:00)  HR: 67 (04 Aug 2017 10:18) (65 - 70)  BP: 123/69 (04 Aug 2017 10:18) (100/67 - 133/68)  BP(mean): --  RR: 18 (04 Aug 2017 10:18) (17 - 18)  SpO2: 97% (04 Aug 2017 10:18) (97% - 99%)  Daily     Daily     GENERAL:  Resting in bed comfortably in NAD   HEENT:  NC/AT,  conjunctivae clear and pink, no thyromegaly, nodules, adenopathy, no JVD, sclera -anicteric  CHEST:  Full & symmetric excursion, no increased effort, breath sounds clear  HEART:  Regular rhythm, S1, S2, no murmur/rub/S3/S4, no abdominal bruit, no edema  ABDOMEN:  Soft, mildly tender to palpation generalized; non-distended, normoactive bowel sounds,  no masses ,no hepato-splenomegaly, no signs of chronic liver disease, PEG c/d/i  EXTEREMITIES:  no cyanosis ,clubbing or edema  SKIN:  No rash/erythema/ecchymoses/petechiae/wounds/abscess/warm/dry  NEURO:  Alert, oriented, no asterixis, no tremor, no encephalopathy      LABS:                        9.6    5.88  )-----------( 306      ( 04 Aug 2017 08:40 )             31.6     08-04    138  |  99  |  3<L>  ----------------------------<  85  4.1   |  27  |  0.39<L>    Ca    8.8      04 Aug 2017 08:58  Phos  4.0     08-03    TPro  6.1  /  Alb  3.1<L>  /  TBili  0.3  /  DBili  x   /  AST  18  /  ALT  8<L>  /  AlkPhos  68  08-04        RADIOLOGY & ADDITIONAL TESTS:

## 2017-08-07 DIAGNOSIS — R10.9 UNSPECIFIED ABDOMINAL PAIN: ICD-10-CM

## 2017-08-07 PROCEDURE — 99233 SBSQ HOSP IP/OBS HIGH 50: CPT

## 2017-08-07 PROCEDURE — 99497 ADVNCD CARE PLAN 30 MIN: CPT

## 2017-08-07 PROCEDURE — 99233 SBSQ HOSP IP/OBS HIGH 50: CPT | Mod: GC

## 2017-08-07 RX ORDER — HYDROMORPHONE HYDROCHLORIDE 2 MG/ML
1.5 INJECTION INTRAMUSCULAR; INTRAVENOUS; SUBCUTANEOUS
Qty: 0 | Refills: 0 | Status: DISCONTINUED | OUTPATIENT
Start: 2017-08-07 | End: 2017-08-08

## 2017-08-07 RX ORDER — MULTIVIT WITH MIN/MFOLATE/K2 340-15/3 G
300 POWDER (GRAM) ORAL ONCE
Qty: 0 | Refills: 0 | Status: COMPLETED | OUTPATIENT
Start: 2017-08-07 | End: 2017-08-07

## 2017-08-07 RX ORDER — ACETAMINOPHEN 500 MG
1000 TABLET ORAL ONCE
Qty: 0 | Refills: 0 | Status: DISCONTINUED | OUTPATIENT
Start: 2017-08-07 | End: 2017-08-07

## 2017-08-07 RX ORDER — ACETAMINOPHEN 500 MG
850 TABLET ORAL ONCE
Qty: 0 | Refills: 0 | Status: COMPLETED | OUTPATIENT
Start: 2017-08-07 | End: 2017-08-07

## 2017-08-07 RX ADMIN — FENTANYL CITRATE 1 PATCH: 50 INJECTION INTRAVENOUS at 16:33

## 2017-08-07 RX ADMIN — FENTANYL CITRATE 1 PATCH: 50 INJECTION INTRAVENOUS at 16:27

## 2017-08-07 RX ADMIN — Medication 100 MILLIGRAM(S): at 13:13

## 2017-08-07 RX ADMIN — HYDROMORPHONE HYDROCHLORIDE 1.5 MILLIGRAM(S): 2 INJECTION INTRAMUSCULAR; INTRAVENOUS; SUBCUTANEOUS at 16:58

## 2017-08-07 RX ADMIN — MUPIROCIN 1 APPLICATION(S): 20 OINTMENT TOPICAL at 05:59

## 2017-08-07 RX ADMIN — Medication 340 MILLIGRAM(S): at 20:00

## 2017-08-07 RX ADMIN — HYDROMORPHONE HYDROCHLORIDE 1.5 MILLIGRAM(S): 2 INJECTION INTRAMUSCULAR; INTRAVENOUS; SUBCUTANEOUS at 17:15

## 2017-08-07 RX ADMIN — POLYETHYLENE GLYCOL 3350 17 GRAM(S): 17 POWDER, FOR SOLUTION ORAL at 05:59

## 2017-08-07 RX ADMIN — POLYETHYLENE GLYCOL 3350 17 GRAM(S): 17 POWDER, FOR SOLUTION ORAL at 11:41

## 2017-08-07 RX ADMIN — MUPIROCIN 1 APPLICATION(S): 20 OINTMENT TOPICAL at 18:16

## 2017-08-07 RX ADMIN — Medication 30 MILLILITER(S): at 05:59

## 2017-08-07 RX ADMIN — Medication 300 MILLILITER(S): at 15:28

## 2017-08-07 RX ADMIN — ENOXAPARIN SODIUM 40 MILLIGRAM(S): 100 INJECTION SUBCUTANEOUS at 11:41

## 2017-08-07 RX ADMIN — SENNA PLUS 2 TABLET(S): 8.6 TABLET ORAL at 22:42

## 2017-08-07 RX ADMIN — POLYETHYLENE GLYCOL 3350 17 GRAM(S): 17 POWDER, FOR SOLUTION ORAL at 23:33

## 2017-08-07 RX ADMIN — Medication 650 MILLIGRAM(S): at 13:27

## 2017-08-07 RX ADMIN — POLYETHYLENE GLYCOL 3350 17 GRAM(S): 17 POWDER, FOR SOLUTION ORAL at 18:07

## 2017-08-07 RX ADMIN — HYDROMORPHONE HYDROCHLORIDE 1.5 MILLIGRAM(S): 2 INJECTION INTRAMUSCULAR; INTRAVENOUS; SUBCUTANEOUS at 23:20

## 2017-08-07 RX ADMIN — HYDROMORPHONE HYDROCHLORIDE 1.5 MILLIGRAM(S): 2 INJECTION INTRAMUSCULAR; INTRAVENOUS; SUBCUTANEOUS at 22:50

## 2017-08-07 RX ADMIN — Medication 100 MILLIGRAM(S): at 22:41

## 2017-08-07 RX ADMIN — Medication 100 MILLIGRAM(S): at 05:59

## 2017-08-07 RX ADMIN — Medication 30 MILLILITER(S): at 18:38

## 2017-08-07 RX ADMIN — Medication 30 MILLILITER(S): at 23:33

## 2017-08-07 RX ADMIN — HYDROMORPHONE HYDROCHLORIDE 30 MILLILITER(S): 2 INJECTION INTRAMUSCULAR; INTRAVENOUS; SUBCUTANEOUS at 07:39

## 2017-08-07 RX ADMIN — Medication 650 MILLIGRAM(S): at 05:59

## 2017-08-07 NOTE — PROGRESS NOTE ADULT - PROBLEM SELECTOR PLAN 1
Patient with very significant bulk of disease, however all still confined to the peritoneal cavity so technically speaking she is a stage III disease patient.  Her pain may ultimately be due to the cancer worsening rather than due to the CBD stone which has been since removed, as she is not really improving since it was removed.   Although patient's performance status is currently poor (ECOG 3-4), she is likely mostly bedbound due to the pain and this is all likely due to burden of disease and this is ovarian cancer which has the potential to be very chemo-sensitive. May ultimately require inpatient chemo however would like to first get surgical oncology input given constipation and history of malignant SBO and also optimally would have patient working with physical therapy to get out of bed and walk. Patient with very significant bulk of disease, however all still confined to the peritoneal cavity so technically speaking she is a stage III disease patient.  Her pain may ultimately be due to the cancer worsening rather than due to the CBD stone which has been since removed, as she is not really improving since it was removed. She has delayed getting chemotherapy up until this time but is now expressing interest in being treated.  Although her PS has deteriorated due to disease progression there is still a role for palliative chemotherapy.  This was discussed with the patient and should likely be initiated during this hospitalization when other acute issues have been resolved.  Although patient's performance status is currently poor (ECOG 3-4), she is likely mostly bedbound due to the pain and this is all likely due to burden of disease and this is ovarian cancer which has the potential to be very chemo-sensitive. May ultimately require inpatient chemo however would like to first get surgical oncology input given constipation and history of malignant SBO and also optimally would have patient working with physical therapy to get out of bed and walk.

## 2017-08-07 NOTE — PROGRESS NOTE ADULT - PROBLEM SELECTOR PLAN 4
No bowel movements yet, however starting to pass gas.   Will not use Relistor once patient has peritoneal carcinomatosis and recent bowel resection.   continue bowel regimen.   If constipation is persistent, will then recommend further imaging studies. -No bowel movements yet, however starting to pass gas.   -Will not use Relistor once patient has peritoneal carcinomatosis and recent bowel resection.   -Continue bowel regimen as per GI.   -If constipation is persistent, will then recommend further imaging studies.

## 2017-08-07 NOTE — PROGRESS NOTE ADULT - SUBJECTIVE AND OBJECTIVE BOX
INTERVAL HPI/OVERNIGHT EVENTS: f/U for pain and constipation     Allergies    penicillin (Rash)    Intolerances        ADVANCE DIRECTIVES:  [ ] YES [x ] NO    DNR: [ ] YES [x ] NO      Date Completed:                    MOLST [ ] YES [x ] NO   Date Completed:       PRESENT SYMPTOMS:   SOURCE:  [x ] Patient   [ ] Family   [ ] Team     Pain: improved on current regimen, but still requiring PCA prns (23 DD/24h).  Comfortable now.     Dyspnea:  [ ] YES [x ] NO  Anxiety:  [ ] YES [x ] NO  Fatigue: [x ] YES [ ] NO  Nausea: [ ] YES [x ] NO  Loss of Appetite:[ x] YES [ ] NO  Constipation [x ] YES   [ ] No. Last BM several days ago.     OTHER SYMPTOMS:  [ x] All other ROS negative     [ ] Unable to obtain due to poor mentation    MEDICATIONS  (STANDING):  MEDICATIONS  (STANDING):  dextrose 5% + sodium chloride 0.9%. 1000 milliLiter(s) (75 mL/Hr) IV Continuous <Continuous>  docusate sodium 100 milliGRAM(s) Oral three times a day  enoxaparin Injectable 40 milliGRAM(s) SubCutaneous daily  senna 2 Tablet(s) Oral at bedtime  fentaNYL   Patch 100 MICROgram(s)/Hr 1 Patch Transdermal every 72 hours  aluminum hydroxide/magnesium hydroxide/simethicone Suspension 30 milliLiter(s) Enteral Tube every 6 hours  mupirocin 2% Ointment 1 Application(s) Topical two times a day  polyethylene glycol 3350 17 Gram(s) Oral four times a day    MEDICATIONS  (PRN):  acetaminophen   Tablet 650 milliGRAM(s) Oral every 6 hours PRN pain  naloxone Injectable 0.1 milliGRAM(s) IV Push every 3 minutes PRN For ANY of the following changes in patient status:  A. RR LESS THAN 10 breaths per minute, B. Oxygen saturation LESS THAN 90%, C. Sedation score of 6  ondansetron Injectable 4 milliGRAM(s) IV Push every 6 hours PRN Nausea  bisacodyl 5 milliGRAM(s) Oral every 12 hours PRN Constipation  HYDROmorphone  Injectable 1.5 milliGRAM(s) IV Push every 2 hours PRN Breakthrough pain  bisacodyl 5 milliGRAM(s) Oral every 12 hours PRN Constipation      Karnofsky Performance Score/Palliative Performance Status Version 2:40-50  %  Protein Calorie Malnutrition:  [ ] Mild   [ ] Moderate   [ ] Severe     Physical Exam:    Vital Signs Last 24 Hrs  T(C): 36.6 (07 Aug 2017 13:23), Max: 37.2 (06 Aug 2017 20:59)  T(F): 97.9 (07 Aug 2017 13:23), Max: 98.9 (06 Aug 2017 20:59)  HR: 70 (07 Aug 2017 13:23) (60 - 70)  BP: 140/80 (07 Aug 2017 13:23) (93/68 - 140/80)  BP(mean): --  RR: 18 (07 Aug 2017 13:23) (18 - 18)  SpO2: 99% (07 Aug 2017 13:23) (97% - 99%)    General: [x ] Alert,  A&O x 3   [ ] lethargic   [ ] Agitated   [x ] Cachexia   HEENT: [x ] Normal   [ ] Dry mouth   [ ] ET Tube    [ ] Trach   Lungs: [x ] Normal   [ ] Tachypnea   [ ] Audible excessive secretions   Cardiovascular:  [x ] Normal    [ ] Tachycardia   [ ] Bradycardia   Abdomen: [x ] Normal   [ ] Distended   [ ] Tender   [ ] Bowel Sounds   [ ]PEG   [ ] NGT   Last BM:     Genitourinary:  [x ] Normal   [ ] Incontinent   [ ] Oliguria/Anuria   [ ] Lynn  Musculoskeletal:  [ ] Normal   [x ] Generalized weakness  [ ] Bedbound   Neurological: [x ] Normal   [ ] Cognitive impairment     Skin: [ ] Normal   [ ] Pressure ulcers       LABS:    RADIOLOGY & ADDITIONAL STUDIES: INTERVAL HPI/OVERNIGHT EVENTS: f/U for pain and constipation. Patient indicates pain is stable. She indicates constipation is persistent; however, she denies nausea or vomiting. She also indicates she is passing gas.     Allergies    penicillin (Rash)    Intolerances        ADVANCE DIRECTIVES:  [ ] YES [x ] NO    DNR: [ ] YES [x ] NO      Date Completed:                    MOLST [ ] YES [x ] NO   Date Completed:       PRESENT SYMPTOMS:   SOURCE:  [x ] Patient   [ ] Family   [ ] Team     Pain: improved on current regimen, but still requiring PCA prns (23 DD/24h).  Comfortable now.     Dyspnea:  [ ] YES [x ] NO  Anxiety:  [ ] YES [x ] NO  Fatigue: [x ] YES [ ] NO  Nausea: [ ] YES [x ] NO  Loss of Appetite:[ x] YES [ ] NO  Constipation [x ] YES   [ ] No. Last BM several days ago.     OTHER SYMPTOMS:  [ x] All other ROS negative     [ ] Unable to obtain due to poor mentation    MEDICATIONS  (STANDING):  MEDICATIONS  (STANDING):  dextrose 5% + sodium chloride 0.9%. 1000 milliLiter(s) (75 mL/Hr) IV Continuous <Continuous>  docusate sodium 100 milliGRAM(s) Oral three times a day  enoxaparin Injectable 40 milliGRAM(s) SubCutaneous daily  senna 2 Tablet(s) Oral at bedtime  fentaNYL   Patch 100 MICROgram(s)/Hr 1 Patch Transdermal every 72 hours  aluminum hydroxide/magnesium hydroxide/simethicone Suspension 30 milliLiter(s) Enteral Tube every 6 hours  mupirocin 2% Ointment 1 Application(s) Topical two times a day  polyethylene glycol 3350 17 Gram(s) Oral four times a day    MEDICATIONS  (PRN):  acetaminophen   Tablet 650 milliGRAM(s) Oral every 6 hours PRN pain  naloxone Injectable 0.1 milliGRAM(s) IV Push every 3 minutes PRN For ANY of the following changes in patient status:  A. RR LESS THAN 10 breaths per minute, B. Oxygen saturation LESS THAN 90%, C. Sedation score of 6  ondansetron Injectable 4 milliGRAM(s) IV Push every 6 hours PRN Nausea  bisacodyl 5 milliGRAM(s) Oral every 12 hours PRN Constipation  HYDROmorphone  Injectable 1.5 milliGRAM(s) IV Push every 2 hours PRN Breakthrough pain  bisacodyl 5 milliGRAM(s) Oral every 12 hours PRN Constipation      Karnofsky Performance Score/Palliative Performance Status Version 2:40-50  %  Protein Calorie Malnutrition:  [ ] Mild   [ ] Moderate   [ ] Severe     Physical Exam:    Vital Signs Last 24 Hrs  T(C): 36.6 (07 Aug 2017 13:23), Max: 37.2 (06 Aug 2017 20:59)  T(F): 97.9 (07 Aug 2017 13:23), Max: 98.9 (06 Aug 2017 20:59)  HR: 70 (07 Aug 2017 13:23) (60 - 70)  BP: 140/80 (07 Aug 2017 13:23) (93/68 - 140/80)  BP(mean): --  RR: 18 (07 Aug 2017 13:23) (18 - 18)  SpO2: 99% (07 Aug 2017 13:23) (97% - 99%)    General: [x ] Alert,  A&O x 3   [ ] lethargic   [ ] Agitated   [x ] Cachexia   HEENT: [x ] Normal   [ ] Dry mouth   [ ] ET Tube    [ ] Trach   Lungs: [x ] Normal   [ ] Tachypnea   [ ] Audible excessive secretions   Cardiovascular:  [x ] Normal    [ ] Tachycardia   [ ] Bradycardia   Abdomen: [x ] Normal   [ ] Distended   [ ] Tender   [ ] Bowel Sounds   [ ]PEG   [ ] NGT   Last BM:  several days ago. Ileostomy   Genitourinary:  [x ] Normal   [ ] Incontinent   [ ] Oliguria/Anuria   [ ] Lynn  Musculoskeletal:  [ ] Normal   [x ] Generalized weakness  [ ] Bedbound   Neurological: [x ] Normal   [ ] Cognitive impairment     Skin: Multiple abdominal scars s/p sx.       LABS:    RADIOLOGY & ADDITIONAL STUDIES:

## 2017-08-07 NOTE — CHART NOTE - NSCHARTNOTEFT_GEN_A_CORE
1745h:D/w attending Dr. Salgado re: protruding stoma, states okay to administer Fleets enema via protruding stoma. Per [facility] Nurse: due to change in pt's stoma as it was noted to be flat this am and now protruding, they will not administer fleets enema.       -1815h: d/w Dr. Seaman (covering hospitalist) regarding above. Hold fleets enema and contact GI Dr. Holland for further recommendations. Call placed to Dr. Holland service. D/W pt and family at bedside, and RN.       [place] Robb[place] Medicine [id number]

## 2017-08-07 NOTE — PROGRESS NOTE ADULT - SUBJECTIVE AND OBJECTIVE BOX
INTERVAL HPI/OVERNIGHT EVENTS:  Patient S&E at bedside. No o/n events, patient is still with severe abdominal pain which is requiring IV pain medications ATC. She still reports severe weakness and she has been constipated for 2 weeks although she is having flatus.     VITAL SIGNS:  T(F): 97.9 (08-07-17 @ 13:23)  HR: 70 (08-07-17 @ 13:23)  BP: 140/80 (08-07-17 @ 13:23)  RR: 18 (08-07-17 @ 13:23)  SpO2: 99% (08-07-17 @ 13:23)  Wt(kg): --    PHYSICAL EXAM:    Constitutional: NAD, cachexia  Eyes: EOMI, sclera non-icteric  Neck: supple, no masses, no JVD  Respiratory: CTA b/l, good air entry b/l  Cardiovascular: RRR, no M/R/G  Gastrointestinal: soft, gastrostomy/colostomy tubes intact, diffuse tenderness+ but non-distended.   Extremities: no c/c/e  Neurological: AAOx3      MEDICATIONS  (STANDING):  dextrose 5% + sodium chloride 0.9%. 1000 milliLiter(s) (75 mL/Hr) IV Continuous <Continuous>  docusate sodium 100 milliGRAM(s) Oral three times a day  enoxaparin Injectable 40 milliGRAM(s) SubCutaneous daily  senna 2 Tablet(s) Oral at bedtime  fentaNYL   Patch 100 MICROgram(s)/Hr 1 Patch Transdermal every 72 hours  aluminum hydroxide/magnesium hydroxide/simethicone Suspension 30 milliLiter(s) Enteral Tube every 6 hours  mupirocin 2% Ointment 1 Application(s) Topical two times a day  polyethylene glycol 3350 17 Gram(s) Oral four times a day  sodium biphosphate Rectal Enema 1 Enema Rectal once    MEDICATIONS  (PRN):  acetaminophen   Tablet 650 milliGRAM(s) Oral every 6 hours PRN pain  naloxone Injectable 0.1 milliGRAM(s) IV Push every 3 minutes PRN For ANY of the following changes in patient status:  A. RR LESS THAN 10 breaths per minute, B. Oxygen saturation LESS THAN 90%, C. Sedation score of 6  ondansetron Injectable 4 milliGRAM(s) IV Push every 6 hours PRN Nausea  bisacodyl 5 milliGRAM(s) Oral every 12 hours PRN Constipation  HYDROmorphone  Injectable 1.5 milliGRAM(s) IV Push every 2 hours PRN Breakthrough pain      Allergies    penicillin (Rash)    Intolerances        LABS:  None done since 8/4.               RADIOLOGY & ADDITIONAL TESTS:  Studies reviewed. No new studies since 8/1.

## 2017-08-07 NOTE — CHART NOTE - NSCHARTNOTEFT_GEN_A_CORE
pt c malignant neoplasm of ovary, neoplastic pain, on PCA pump, S/P ERCP and removal of choledocholithiasis.       Source: Patient [x]        Diet : soft c Ensure Enlive x 3 daily       Patient reports she has been eating small amounts of some meals, prefers traditional  cuisine, reports constipation and gas have been affecting her appetite at this time. Reports she was told during a previous admission in June 2017 by the ostomy nurses to continue to avoid salads & raw vegetables since she had been having blockages. Encouraged cooked vegetables, however, pt reports dislike for more institutional foods. Reports she does not like the Ensure Enlive either, willing to try Prosource supplement-discussed c NP. Noted per flow sheets, pt has been consuming % of meals. Reports she has not been putting her G-tube to gravity too often.        Current Weight: Weight (kg): 46.9 (08-01 @ 20:40); no new wt       Pertinent Medications: MEDICATIONS  (STANDING):  dextrose 5% + sodium chloride 0.9%. 1000 milliLiter(s) (75 mL/Hr) IV Continuous <Continuous>  docusate sodium 100 milliGRAM(s) Oral three times a day  enoxaparin Injectable 40 milliGRAM(s) SubCutaneous daily  senna 2 Tablet(s) Oral at bedtime  fentaNYL   Patch 100 MICROgram(s)/Hr 1 Patch Transdermal every 72 hours  aluminum hydroxide/magnesium hydroxide/simethicone Suspension 30 milliLiter(s) Enteral Tube every 6 hours  mupirocin 2% Ointment 1 Application(s) Topical two times a day  polyethylene glycol 3350 17 Gram(s) Oral four times a day  magnesium citrate Solution 300 milliLiter(s) Oral once    MEDICATIONS  (PRN):  acetaminophen   Tablet 650 milliGRAM(s) Oral every 6 hours PRN pain  naloxone Injectable 0.1 milliGRAM(s) IV Push every 3 minutes PRN For ANY of the following changes in patient status:  A. RR LESS THAN 10 breaths per minute, B. Oxygen saturation LESS THAN 90%, C. Sedation score of 6  ondansetron Injectable 4 milliGRAM(s) IV Push every 6 hours PRN Nausea  bisacodyl 5 milliGRAM(s) Oral every 12 hours PRN Constipation  HYDROmorphone  Injectable 1.5 milliGRAM(s) IV Push every 2 hours PRN Breakthrough pain    Pertinent Labs:  None pertinent to address at this time.     Skin: intact, no edema noted at this time     Estimated Needs:   [x] no change since previous assessment    Previous Nutrition Diagnosis:     [x] Malnutrition     Nutrition Diagnosis is [x] ongoing- addressed c food preferences and gradually improving intake     New Nutrition Diagnosis: [x] not applicable      Recommend    1. Continue c current diet.   2. Encouraged PO intake-small, frequent meals and nutrient dense snacks.   3. Discussed protein rich foods available on menu. Discussed consuming protein first at meal times and then eating the other foods.   4. DC Ensure Enlive.   5. Recommend Prosource x 2 packets daily.        Monitoring and Evaluation:     [x] PO intake [x] Tolerance to diet prescription [x] weights [x] follow up per protocol    RD remains available.   Marine Riggins, MS, RD, , [facility] #[phone]

## 2017-08-07 NOTE — PROGRESS NOTE ADULT - SUBJECTIVE AND OBJECTIVE BOX
INTERVAL HPI/OVERNIGHT EVENTS    Resting in bed comfortably  tolerating diet better  still no bm        MEDICATIONS  (STANDING):  dextrose 5% + sodium chloride 0.9%. 1000 milliLiter(s) (75 mL/Hr) IV Continuous <Continuous>  fentaNYL   Patch  50 MICROgram(s)/Hr 1 Patch Transdermal every 72 hours  docusate sodium 100 milliGRAM(s) Oral three times a day  enoxaparin Injectable 40 milliGRAM(s) SubCutaneous daily  HYDROmorphone PCA (1 mG/mL) 30 milliLiter(s) PCA Continuous PCA Continuous  senna 2 Tablet(s) Oral at bedtime  polyethylene glycol 3350 17 Gram(s) Oral daily    MEDICATIONS  (PRN):  acetaminophen   Tablet 650 milliGRAM(s) Oral every 6 hours PRN pain  naloxone Injectable 0.1 milliGRAM(s) IV Push every 3 minutes PRN For ANY of the following changes in patient status:  A. RR LESS THAN 10 breaths per minute, B. Oxygen saturation LESS THAN 90%, C. Sedation score of 6  ondansetron Injectable 4 milliGRAM(s) IV Push every 6 hours PRN Nausea  HYDROmorphone PCA (1 mG/mL) Rescue Clinician Bolus 2 milliGRAM(s) IV Push every 1 hour PRN Severe Pain (7 - 10)  bisacodyl 5 milliGRAM(s) Oral every 12 hours PRN Constipation    Allergies    penicillin (Rash)    Intolerances: ---     ROS:   General:  No loss, no fevers, chills, night sweats, +fatigue,   Eyes:  Good vision, no reported pain  ENT:  No sore throat, pain, runny nose, dysphagia  CV:  No pain, palpitations, hypo/hypertension  Resp:  No dyspnea, cough, tachypnea, wheezing  GI:  no pain, no nausea, No vomiting, No diarrhea, +constipation, no weight loss, No fever, No pruritis, No rectal bleeding, No tarry stools, No dysphagia,  :  No pain, bleeding, incontinence, nocturia  Muscle:  No pain, +weakness  Neuro:  No weakness, tingling, memory problems  Psych:  No fatigue, insomnia, mood problems, depression  Endocrine:  No polyuria, polydipsia, cold/heat intolerance  Heme:  No petechiae, ecchymosis, easy bruisability  Skin:  No rash, tattoos, scars, edema      PHYSICAL EXAM:   Vital Signs:   Vital Signs Last 24 Hrs  T(C): 36.8 (04 Aug 2017 10:18), Max: 37 (04 Aug 2017 02:00)  T(F): 98.2 (04 Aug 2017 10:18), Max: 98.6 (04 Aug 2017 02:00)  HR: 67 (04 Aug 2017 10:18) (65 - 70)  BP: 123/69 (04 Aug 2017 10:18) (100/67 - 133/68)  BP(mean): --  RR: 18 (04 Aug 2017 10:18) (17 - 18)  SpO2: 97% (04 Aug 2017 10:18) (97% - 99%)  Daily     Daily     GENERAL:  Resting in bed comfortably in NAD   HEENT:  NC/AT,  conjunctivae clear and pink, no thyromegaly, nodules, adenopathy, no JVD, sclera -anicteric  CHEST:  Full & symmetric excursion, no increased effort, breath sounds clear  HEART:  Regular rhythm, S1, S2, no murmur/rub/S3/S4, no abdominal bruit, no edema  ABDOMEN:  Soft, mildly tender to palpation generalized; non-distended, normoactive bowel sounds,  no masses ,no hepato-splenomegaly, no signs of chronic liver disease, PEG c/d/i  EXTEREMITIES:  no cyanosis ,clubbing or edema  SKIN:  No rash/erythema/ecchymoses/petechiae/wounds/abscess/warm/dry  NEURO:  Alert, oriented, no asterixis, no tremor, no encephalopathy      LABS:                        9.6    5.88  )-----------( 306      ( 04 Aug 2017 08:40 )             31.6     08-04    138  |  99  |  3<L>  ----------------------------<  85  4.1   |  27  |  0.39<L>    Ca    8.8      04 Aug 2017 08:58  Phos  4.0     08-03    TPro  6.1  /  Alb  3.1<L>  /  TBili  0.3  /  DBili  x   /  AST  18  /  ALT  8<L>  /  AlkPhos  68  08-04        RADIOLOGY & ADDITIONAL TESTS:

## 2017-08-07 NOTE — PROGRESS NOTE ADULT - PROBLEM SELECTOR PLAN 3
Although possibly drug induced, and patient is moving flatus, would like surgical oncology input to assess for malignant SBO given extensive history.

## 2017-08-07 NOTE — PROGRESS NOTE ADULT - PROBLEM SELECTOR PLAN 5
We will continue to follow for pain management and constipation, as well as ongoing GOC discussions. -We will continue to follow for pain management and constipation, as well as ongoing GOC discussions.

## 2017-08-07 NOTE — PROGRESS NOTE ADULT - PROBLEM SELECTOR PLAN 2
Continue with IV dilaudid as per palliative care recommendations.   Ultimately, may be due to severe peritoneal disease and chemotherapy inpatient may be required for palliative goals as well as treatment.

## 2017-08-07 NOTE — PROGRESS NOTE ADULT - PROBLEM SELECTOR PLAN 2
Likely opiate induced. Spoke with pt's surgeon Papi Mixon- pt to get Fleet's enema through colostomy. Monitor symptoms

## 2017-08-07 NOTE — PROGRESS NOTE ADULT - SUBJECTIVE AND OBJECTIVE BOX
Patient is a 46y old  Female who presents with a chief complaint of abdominal pain (01 Aug 2017 11:40)    HPI: Abdominal pain controlled. PCA d/simin today. Started on iv dilaudid prn. No BM for 2 weeks.    Vital Signs Last 24 Hrs  T(C): 36.6 (07 Aug 2017 13:23), Max: 37.2 (06 Aug 2017 20:59)  T(F): 97.9 (07 Aug 2017 13:23), Max: 98.9 (06 Aug 2017 20:59)  HR: 70 (07 Aug 2017 13:23) (60 - 70)  BP: 140/80 (07 Aug 2017 13:23) (93/68 - 140/80)  BP(mean): --  RR: 18 (07 Aug 2017 13:23) (18 - 18)  SpO2: 99% (07 Aug 2017 13:23) (97% - 99%)      MEDICATIONS  (STANDING):  dextrose 5% + sodium chloride 0.9%. 1000 milliLiter(s) (75 mL/Hr) IV Continuous <Continuous>  docusate sodium 100 milliGRAM(s) Oral three times a day  enoxaparin Injectable 40 milliGRAM(s) SubCutaneous daily  senna 2 Tablet(s) Oral at bedtime  fentaNYL   Patch 100 MICROgram(s)/Hr 1 Patch Transdermal every 72 hours  aluminum hydroxide/magnesium hydroxide/simethicone Suspension 30 milliLiter(s) Enteral Tube every 6 hours  mupirocin 2% Ointment 1 Application(s) Topical two times a day  polyethylene glycol 3350 17 Gram(s) Oral four times a day  sodium biphosphate Rectal Enema 1 Enema Rectal once    MEDICATIONS  (PRN):  acetaminophen   Tablet 650 milliGRAM(s) Oral every 6 hours PRN pain  naloxone Injectable 0.1 milliGRAM(s) IV Push every 3 minutes PRN For ANY of the following changes in patient status:  A. RR LESS THAN 10 breaths per minute, B. Oxygen saturation LESS THAN 90%, C. Sedation score of 6  ondansetron Injectable 4 milliGRAM(s) IV Push every 6 hours PRN Nausea  bisacodyl 5 milliGRAM(s) Oral every 12 hours PRN Constipation  HYDROmorphone  Injectable 1.5 milliGRAM(s) IV Push every 2 hours PRN Breakthrough pain

## 2017-08-07 NOTE — PROGRESS NOTE ADULT - PROBLEM SELECTOR PLAN 1
- May be 2/2 choledocholithiasis as well as worsening peritoneal carcinomatosis   - Pain control PRN  - Bowel regimen / miralax increased to q6h  - will start citrate of mag today  = if patient gets nauseas then peg to gravity  - maalox to peg site  - bactroban to retention suture iritation

## 2017-08-07 NOTE — PROGRESS NOTE ADULT - PROBLEM SELECTOR PLAN 1
Oncology input noted.   Will see how a FM can be scheduled to further discuss GOC and available services. Oncology input noted.   Patient is able to give verbalized understanding about her advance illness and Rx options. She indicated her goals are to get chemo as soon as she can. She indicated that there has been some miscommunication mainly due to providers not talking to her but to her fiance. 20' spent in GO discussions.   -D/W Neftali that will call Onc to follow up.

## 2017-08-08 DIAGNOSIS — Z43.4 ENCOUNTER FOR ATTENTION TO OTHER ARTIFICIAL OPENINGS OF DIGESTIVE TRACT: ICD-10-CM

## 2017-08-08 LAB
ANION GAP SERPL CALC-SCNC: 12 MMOL/L — SIGNIFICANT CHANGE UP (ref 5–17)
BUN SERPL-MCNC: 13 MG/DL — SIGNIFICANT CHANGE UP (ref 7–23)
CALCIUM SERPL-MCNC: 8.3 MG/DL — LOW (ref 8.4–10.5)
CHLORIDE SERPL-SCNC: 95 MMOL/L — LOW (ref 96–108)
CO2 SERPL-SCNC: 27 MMOL/L — SIGNIFICANT CHANGE UP (ref 22–31)
CREAT SERPL-MCNC: 0.34 MG/DL — LOW (ref 0.5–1.3)
GLUCOSE SERPL-MCNC: 94 MG/DL — SIGNIFICANT CHANGE UP (ref 70–99)
HCT VFR BLD CALC: 34.6 % — SIGNIFICANT CHANGE UP (ref 34.5–45)
HGB BLD-MCNC: 10.6 G/DL — LOW (ref 11.5–15.5)
MCHC RBC-ENTMCNC: 25.2 PG — LOW (ref 27–34)
MCHC RBC-ENTMCNC: 30.6 GM/DL — LOW (ref 32–36)
MCV RBC AUTO: 82.2 FL — SIGNIFICANT CHANGE UP (ref 80–100)
PLATELET # BLD AUTO: 413 K/UL — HIGH (ref 150–400)
POTASSIUM SERPL-MCNC: 4.5 MMOL/L — SIGNIFICANT CHANGE UP (ref 3.5–5.3)
POTASSIUM SERPL-SCNC: 4.5 MMOL/L — SIGNIFICANT CHANGE UP (ref 3.5–5.3)
RBC # BLD: 4.21 M/UL — SIGNIFICANT CHANGE UP (ref 3.8–5.2)
RBC # FLD: 14 % — SIGNIFICANT CHANGE UP (ref 10.3–14.5)
SODIUM SERPL-SCNC: 134 MMOL/L — LOW (ref 135–145)
WBC # BLD: 10.84 K/UL — HIGH (ref 3.8–10.5)
WBC # FLD AUTO: 10.84 K/UL — HIGH (ref 3.8–10.5)

## 2017-08-08 PROCEDURE — 99233 SBSQ HOSP IP/OBS HIGH 50: CPT

## 2017-08-08 PROCEDURE — 99232 SBSQ HOSP IP/OBS MODERATE 35: CPT | Mod: 24

## 2017-08-08 RX ORDER — FENTANYL CITRATE 50 UG/ML
1 INJECTION INTRAVENOUS
Qty: 0 | Refills: 0 | Status: DISCONTINUED | OUTPATIENT
Start: 2017-08-08 | End: 2017-08-10

## 2017-08-08 RX ORDER — HYDROMORPHONE HYDROCHLORIDE 2 MG/ML
8 INJECTION INTRAMUSCULAR; INTRAVENOUS; SUBCUTANEOUS
Qty: 0 | Refills: 0 | Status: DISCONTINUED | OUTPATIENT
Start: 2017-08-08 | End: 2017-08-11

## 2017-08-08 RX ORDER — FENTANYL CITRATE 50 UG/ML
1 INJECTION INTRAVENOUS
Qty: 0 | Refills: 0 | Status: DISCONTINUED | OUTPATIENT
Start: 2017-08-08 | End: 2017-08-09

## 2017-08-08 RX ADMIN — Medication 30 MILLILITER(S): at 18:03

## 2017-08-08 RX ADMIN — Medication 650 MILLIGRAM(S): at 19:26

## 2017-08-08 RX ADMIN — HYDROMORPHONE HYDROCHLORIDE 1.5 MILLIGRAM(S): 2 INJECTION INTRAMUSCULAR; INTRAVENOUS; SUBCUTANEOUS at 09:23

## 2017-08-08 RX ADMIN — HYDROMORPHONE HYDROCHLORIDE 1.5 MILLIGRAM(S): 2 INJECTION INTRAMUSCULAR; INTRAVENOUS; SUBCUTANEOUS at 09:45

## 2017-08-08 RX ADMIN — MUPIROCIN 1 APPLICATION(S): 20 OINTMENT TOPICAL at 05:55

## 2017-08-08 RX ADMIN — MUPIROCIN 1 APPLICATION(S): 20 OINTMENT TOPICAL at 18:03

## 2017-08-08 RX ADMIN — ONDANSETRON 4 MILLIGRAM(S): 8 TABLET, FILM COATED ORAL at 06:10

## 2017-08-08 RX ADMIN — HYDROMORPHONE HYDROCHLORIDE 8 MILLIGRAM(S): 2 INJECTION INTRAMUSCULAR; INTRAVENOUS; SUBCUTANEOUS at 18:01

## 2017-08-08 RX ADMIN — Medication 100 MILLIGRAM(S): at 13:18

## 2017-08-08 RX ADMIN — Medication 650 MILLIGRAM(S): at 13:18

## 2017-08-08 RX ADMIN — HYDROMORPHONE HYDROCHLORIDE 1.5 MILLIGRAM(S): 2 INJECTION INTRAMUSCULAR; INTRAVENOUS; SUBCUTANEOUS at 06:15

## 2017-08-08 RX ADMIN — Medication 30 MILLILITER(S): at 05:55

## 2017-08-08 RX ADMIN — POLYETHYLENE GLYCOL 3350 17 GRAM(S): 17 POWDER, FOR SOLUTION ORAL at 05:56

## 2017-08-08 RX ADMIN — Medication 100 MILLIGRAM(S): at 22:41

## 2017-08-08 RX ADMIN — HYDROMORPHONE HYDROCHLORIDE 8 MILLIGRAM(S): 2 INJECTION INTRAMUSCULAR; INTRAVENOUS; SUBCUTANEOUS at 22:42

## 2017-08-08 RX ADMIN — ENOXAPARIN SODIUM 40 MILLIGRAM(S): 100 INJECTION SUBCUTANEOUS at 11:34

## 2017-08-08 RX ADMIN — HYDROMORPHONE HYDROCHLORIDE 1.5 MILLIGRAM(S): 2 INJECTION INTRAMUSCULAR; INTRAVENOUS; SUBCUTANEOUS at 14:34

## 2017-08-08 RX ADMIN — SENNA PLUS 2 TABLET(S): 8.6 TABLET ORAL at 22:41

## 2017-08-08 RX ADMIN — HYDROMORPHONE HYDROCHLORIDE 1.5 MILLIGRAM(S): 2 INJECTION INTRAMUSCULAR; INTRAVENOUS; SUBCUTANEOUS at 05:45

## 2017-08-08 RX ADMIN — HYDROMORPHONE HYDROCHLORIDE 1.5 MILLIGRAM(S): 2 INJECTION INTRAMUSCULAR; INTRAVENOUS; SUBCUTANEOUS at 12:00

## 2017-08-08 RX ADMIN — FENTANYL CITRATE 1 PATCH: 50 INJECTION INTRAVENOUS at 16:26

## 2017-08-08 RX ADMIN — ONDANSETRON 4 MILLIGRAM(S): 8 TABLET, FILM COATED ORAL at 17:59

## 2017-08-08 RX ADMIN — HYDROMORPHONE HYDROCHLORIDE 1.5 MILLIGRAM(S): 2 INJECTION INTRAMUSCULAR; INTRAVENOUS; SUBCUTANEOUS at 14:50

## 2017-08-08 RX ADMIN — HYDROMORPHONE HYDROCHLORIDE 1.5 MILLIGRAM(S): 2 INJECTION INTRAMUSCULAR; INTRAVENOUS; SUBCUTANEOUS at 11:34

## 2017-08-08 RX ADMIN — HYDROMORPHONE HYDROCHLORIDE 8 MILLIGRAM(S): 2 INJECTION INTRAMUSCULAR; INTRAVENOUS; SUBCUTANEOUS at 19:08

## 2017-08-08 RX ADMIN — HYDROMORPHONE HYDROCHLORIDE 8 MILLIGRAM(S): 2 INJECTION INTRAMUSCULAR; INTRAVENOUS; SUBCUTANEOUS at 23:38

## 2017-08-08 RX ADMIN — Medication 100 MILLIGRAM(S): at 05:55

## 2017-08-08 NOTE — PROGRESS NOTE ADULT - PROBLEM SELECTOR PLAN 1
Will increase Fentanyl to 125 mcg/h q 72 h  Will stop Dilaudid IV and start Dilaudid PO 8 mg q 3 PRN   Naloxone PRN

## 2017-08-08 NOTE — PROGRESS NOTE ADULT - SUBJECTIVE AND OBJECTIVE BOX
Pt seen and examined.  Ostomy digitalized, did not receive enema.  Ostomy began working well.  Denies nausea or vomiting.    AVSS  NAD  softly distended, non tender  ostomy functioning

## 2017-08-08 NOTE — PROGRESS NOTE ADULT - SUBJECTIVE AND OBJECTIVE BOX
Patient is a 46y old  Female who presents with a chief complaint of abdominal pain (01 Aug 2017 11:40)    HPI: Multiple BMs overnight. Vomited once this morning after taking Maalox. Pain controlled at this time.    Vital Signs Last 24 Hrs  T(C): 37 (08 Aug 2017 14:09), Max: 37.1 (08 Aug 2017 00:11)  T(F): 98.6 (08 Aug 2017 14:09), Max: 98.7 (08 Aug 2017 00:11)  HR: 78 (08 Aug 2017 14:09) (69 - 83)  BP: 112/72 (08 Aug 2017 14:09) (102/65 - 137/81)  BP(mean): --  RR: 20 (08 Aug 2017 14:09) (16 - 20)  SpO2: 98% (08 Aug 2017 14:09) (98% - 99%)                          10.6   10.84 )-----------( 413      ( 08 Aug 2017 08:45 )             34.6     08-08    134<L>  |  95<L>  |  13  ----------------------------<  94  4.5   |  27  |  0.34<L>    Ca    8.3<L>      08 Aug 2017 08:21      MEDICATIONS  (STANDING):  docusate sodium 100 milliGRAM(s) Oral three times a day  enoxaparin Injectable 40 milliGRAM(s) SubCutaneous daily  senna 2 Tablet(s) Oral at bedtime  aluminum hydroxide/magnesium hydroxide/simethicone Suspension 30 milliLiter(s) Enteral Tube every 6 hours  mupirocin 2% Ointment 1 Application(s) Topical two times a day  polyethylene glycol 3350 17 Gram(s) Oral four times a day  fentaNYL   Patch 100 MICROgram(s)/Hr 1 Patch Transdermal every 72 hours  fentaNYL   Patch  25 MICROgram(s)/Hr 1 Patch Transdermal every 72 hours    MEDICATIONS  (PRN):  acetaminophen   Tablet 650 milliGRAM(s) Oral every 6 hours PRN pain  naloxone Injectable 0.1 milliGRAM(s) IV Push every 3 minutes PRN For ANY of the following changes in patient status:  A. RR LESS THAN 10 breaths per minute, B. Oxygen saturation LESS THAN 90%, C. Sedation score of 6  ondansetron Injectable 4 milliGRAM(s) IV Push every 6 hours PRN Nausea  bisacodyl 5 milliGRAM(s) Oral every 12 hours PRN Constipation  HYDROmorphone   Tablet 8 milliGRAM(s) Oral every 3 hours PRN Breakthrough pain

## 2017-08-08 NOTE — PROGRESS NOTE ADULT - SUBJECTIVE AND OBJECTIVE BOX
INTERVAL HPI/OVERNIGHT EVENTS: f/U for pain and constipation. Patient indicates pain is stable. She had a " good " BM overnight.   Allergies    penicillin (Rash)    Intolerances        ADVANCE DIRECTIVES:  [ ] YES [x ] NO    DNR: [ ] YES [x ] NO      Date Completed:                    MOLST [ ] YES [x ] NO   Date Completed:       PRESENT SYMPTOMS:   SOURCE:  [x ] Patient   [ ] Family   [ ] Team     Pain: Improving. Patient used 6 mg IV Dilaudid over 24 hours. She is off of PCA. Pain is described as "soreness", all over her abdomen but mainly over her right lower and mid abdomen. Pain can be up to 10/10 but going to 2/10 with Dilaudid. Pain increases with abdominal palpation.     Dyspnea:  [ ] YES [x ] NO  Anxiety:  [ ] YES [x ] NO  Fatigue: [x ] YES [ ] NO  Nausea: [ ] YES [x ] NO  Loss of Appetite:[ x] YES [ ] NO  Constipation [x ] YES   [ ] No. Last BM several days ago.     OTHER SYMPTOMS:  [ x] All other ROS negative. She indicates x 1 episode of non bilious, non bloody vomiting this AM. However, she denies any current nausea.    [ ] Unable to obtain due to poor mentation      MEDICATIONS  (STANDING):  docusate sodium 100 milliGRAM(s) Oral three times a day  enoxaparin Injectable 40 milliGRAM(s) SubCutaneous daily  senna 2 Tablet(s) Oral at bedtime  aluminum hydroxide/magnesium hydroxide/simethicone Suspension 30 milliLiter(s) Enteral Tube every 6 hours  mupirocin 2% Ointment 1 Application(s) Topical two times a day  polyethylene glycol 3350 17 Gram(s) Oral four times a day  fentaNYL   Patch 100 MICROgram(s)/Hr 1 Patch Transdermal every 72 hours  fentaNYL   Patch  25 MICROgram(s)/Hr 1 Patch Transdermal every 72 hours    MEDICATIONS  (PRN):  acetaminophen   Tablet 650 milliGRAM(s) Oral every 6 hours PRN pain  naloxone Injectable 0.1 milliGRAM(s) IV Push every 3 minutes PRN For ANY of the following changes in patient status:  A. RR LESS THAN 10 breaths per minute, B. Oxygen saturation LESS THAN 90%, C. Sedation score of 6  ondansetron Injectable 4 milliGRAM(s) IV Push every 6 hours PRN Nausea  bisacodyl 5 milliGRAM(s) Oral every 12 hours PRN Constipation  HYDROmorphone   Tablet 8 milliGRAM(s) Oral every 3 hours PRN Breakthrough pain      Karnofsky Performance Score/Palliative Performance Status Version 2:40-50  %  Protein Calorie Malnutrition:  [ ] Mild   [ ] Moderate   [ ] Severe     Physical Exam:    Vital Signs Last 24 Hrs  Vital Signs Last 24 Hrs  T(C): 37 (08 Aug 2017 14:09), Max: 37.1 (08 Aug 2017 00:11)  T(F): 98.6 (08 Aug 2017 14:09), Max: 98.7 (08 Aug 2017 00:11)  HR: 78 (08 Aug 2017 14:09) (69 - 83)  BP: 112/72 (08 Aug 2017 14:09) (102/65 - 137/81)  BP(mean): --  RR: 20 (08 Aug 2017 14:09) (16 - 20)  SpO2: 98% (08 Aug 2017 14:09) (98% - 99%)    General: [x ] Alert,  A&O x 3   [ ] lethargic   [ ] Agitated   [x ] Cachexia   HEENT: [x ] Normal   [ ] Dry mouth   [ ] ET Tube    [ ] Trach   Lungs: [x ] Normal   [ ] Tachypnea   [ ] Audible excessive secretions   Cardiovascular:  [x ] Normal    [ ] Tachycardia   [ ] Bradycardia   Abdomen: [ ] Normal   [ ] Distended   [ x] Generalized Tender; however, abdomen appears soft   [ x] Bowel Sounds x 4   [ ]PEG   [ ] NGT   Last BM:  8/7   Genitourinary:  [x ] Normal   [ ] Incontinent   [ ] Oliguria/Anuria   [ ] Lynn  Musculoskeletal:  [ ] Normal   [x ] Generalized weakness  [ ] Bedbound   Neurological: [x ] Normal   [ ] Cognitive impairment     Skin: Multiple abdominal scars s/p sx.       LABS:    RADIOLOGY & ADDITIONAL STUDIES:

## 2017-08-08 NOTE — PROGRESS NOTE ADULT - SUBJECTIVE AND OBJECTIVE BOX
INTERVAL HPI/OVERNIGHT EVENTS    Resting in bed comfortably  large amount of stool after citrate of mag  tolerating diet        MEDICATIONS  (STANDING):  dextrose 5% + sodium chloride 0.9%. 1000 milliLiter(s) (75 mL/Hr) IV Continuous <Continuous>  fentaNYL   Patch  50 MICROgram(s)/Hr 1 Patch Transdermal every 72 hours  docusate sodium 100 milliGRAM(s) Oral three times a day  enoxaparin Injectable 40 milliGRAM(s) SubCutaneous daily  HYDROmorphone PCA (1 mG/mL) 30 milliLiter(s) PCA Continuous PCA Continuous  senna 2 Tablet(s) Oral at bedtime  polyethylene glycol 3350 17 Gram(s) Oral daily    MEDICATIONS  (PRN):  acetaminophen   Tablet 650 milliGRAM(s) Oral every 6 hours PRN pain  naloxone Injectable 0.1 milliGRAM(s) IV Push every 3 minutes PRN For ANY of the following changes in patient status:  A. RR LESS THAN 10 breaths per minute, B. Oxygen saturation LESS THAN 90%, C. Sedation score of 6  ondansetron Injectable 4 milliGRAM(s) IV Push every 6 hours PRN Nausea  HYDROmorphone PCA (1 mG/mL) Rescue Clinician Bolus 2 milliGRAM(s) IV Push every 1 hour PRN Severe Pain (7 - 10)  bisacodyl 5 milliGRAM(s) Oral every 12 hours PRN Constipation    Allergies    penicillin (Rash)    Intolerances: ---     ROS:   General:  No loss, no fevers, chills, night sweats, +fatigue,   Eyes:  Good vision, no reported pain  ENT:  No sore throat, pain, runny nose, dysphagia  CV:  No pain, palpitations, hypo/hypertension  Resp:  No dyspnea, cough, tachypnea, wheezing  GI:  no pain, no nausea, No vomiting, No diarrhea, +constipation, no weight loss, No fever, No pruritis, No rectal bleeding, No tarry stools, No dysphagia,  :  No pain, bleeding, incontinence, nocturia  Muscle:  No pain, +weakness  Neuro:  No weakness, tingling, memory problems  Psych:  No fatigue, insomnia, mood problems, depression  Endocrine:  No polyuria, polydipsia, cold/heat intolerance  Heme:  No petechiae, ecchymosis, easy bruisability  Skin:  No rash, tattoos, scars, edema      PHYSICAL EXAM:   Vital Signs:   Vital Signs Last 24 Hrs  T(C): 36.8 (04 Aug 2017 10:18), Max: 37 (04 Aug 2017 02:00)  T(F): 98.2 (04 Aug 2017 10:18), Max: 98.6 (04 Aug 2017 02:00)  HR: 67 (04 Aug 2017 10:18) (65 - 70)  BP: 123/69 (04 Aug 2017 10:18) (100/67 - 133/68)  BP(mean): --  RR: 18 (04 Aug 2017 10:18) (17 - 18)  SpO2: 97% (04 Aug 2017 10:18) (97% - 99%)  Daily     Daily     GENERAL:  Resting in bed comfortably in NAD   HEENT:  NC/AT,  conjunctivae clear and pink, no thyromegaly, nodules, adenopathy, no JVD, sclera -anicteric  CHEST:  Full & symmetric excursion, no increased effort, breath sounds clear  HEART:  Regular rhythm, S1, S2, no murmur/rub/S3/S4, no abdominal bruit, no edema  ABDOMEN:  Soft, mildly tender to palpation generalized; non-distended, normoactive bowel sounds,  no masses ,no hepato-splenomegaly, no signs of chronic liver disease, PEG c/d/i  EXTEREMITIES:  no cyanosis ,clubbing or edema  SKIN:  No rash/erythema/ecchymoses/petechiae/wounds/abscess/warm/dry  NEURO:  Alert, oriented, no asterixis, no tremor, no encephalopathy      LABS:                        9.6    5.88  )-----------( 306      ( 04 Aug 2017 08:40 )             31.6     08-04    138  |  99  |  3<L>  ----------------------------<  85  4.1   |  27  |  0.39<L>    Ca    8.8      04 Aug 2017 08:58  Phos  4.0     08-03    TPro  6.1  /  Alb  3.1<L>  /  TBili  0.3  /  DBili  x   /  AST  18  /  ALT  8<L>  /  AlkPhos  68  08-04        RADIOLOGY & ADDITIONAL TESTS:

## 2017-08-09 DIAGNOSIS — R11.0 NAUSEA: ICD-10-CM

## 2017-08-09 PROCEDURE — 99232 SBSQ HOSP IP/OBS MODERATE 35: CPT | Mod: GC

## 2017-08-09 PROCEDURE — 99232 SBSQ HOSP IP/OBS MODERATE 35: CPT

## 2017-08-09 PROCEDURE — 47531 INJECTION FOR CHOLANGIOGRAM: CPT

## 2017-08-09 PROCEDURE — 99233 SBSQ HOSP IP/OBS HIGH 50: CPT

## 2017-08-09 PROCEDURE — 99232 SBSQ HOSP IP/OBS MODERATE 35: CPT | Mod: 24

## 2017-08-09 RX ORDER — HYDROMORPHONE HYDROCHLORIDE 2 MG/ML
2 INJECTION INTRAMUSCULAR; INTRAVENOUS; SUBCUTANEOUS ONCE
Qty: 0 | Refills: 0 | Status: DISCONTINUED | OUTPATIENT
Start: 2017-08-09 | End: 2017-08-09

## 2017-08-09 RX ORDER — ONDANSETRON 8 MG/1
4 TABLET, FILM COATED ORAL EVERY 8 HOURS
Qty: 0 | Refills: 0 | Status: DISCONTINUED | OUTPATIENT
Start: 2017-08-09 | End: 2017-08-11

## 2017-08-09 RX ORDER — FENTANYL CITRATE 50 UG/ML
1 INJECTION INTRAVENOUS
Qty: 0 | Refills: 0 | Status: DISCONTINUED | OUTPATIENT
Start: 2017-08-09 | End: 2017-08-11

## 2017-08-09 RX ADMIN — Medication 650 MILLIGRAM(S): at 07:15

## 2017-08-09 RX ADMIN — HYDROMORPHONE HYDROCHLORIDE 8 MILLIGRAM(S): 2 INJECTION INTRAMUSCULAR; INTRAVENOUS; SUBCUTANEOUS at 14:20

## 2017-08-09 RX ADMIN — SENNA PLUS 2 TABLET(S): 8.6 TABLET ORAL at 21:07

## 2017-08-09 RX ADMIN — FENTANYL CITRATE 1 PATCH: 50 INJECTION INTRAVENOUS at 23:08

## 2017-08-09 RX ADMIN — Medication 100 MILLIGRAM(S): at 13:36

## 2017-08-09 RX ADMIN — ONDANSETRON 4 MILLIGRAM(S): 8 TABLET, FILM COATED ORAL at 07:15

## 2017-08-09 RX ADMIN — ENOXAPARIN SODIUM 40 MILLIGRAM(S): 100 INJECTION SUBCUTANEOUS at 13:36

## 2017-08-09 RX ADMIN — ONDANSETRON 4 MILLIGRAM(S): 8 TABLET, FILM COATED ORAL at 21:06

## 2017-08-09 RX ADMIN — Medication 30 MILLILITER(S): at 00:40

## 2017-08-09 RX ADMIN — HYDROMORPHONE HYDROCHLORIDE 8 MILLIGRAM(S): 2 INJECTION INTRAMUSCULAR; INTRAVENOUS; SUBCUTANEOUS at 11:15

## 2017-08-09 RX ADMIN — HYDROMORPHONE HYDROCHLORIDE 8 MILLIGRAM(S): 2 INJECTION INTRAMUSCULAR; INTRAVENOUS; SUBCUTANEOUS at 18:44

## 2017-08-09 RX ADMIN — ONDANSETRON 4 MILLIGRAM(S): 8 TABLET, FILM COATED ORAL at 15:55

## 2017-08-09 RX ADMIN — MUPIROCIN 1 APPLICATION(S): 20 OINTMENT TOPICAL at 06:33

## 2017-08-09 RX ADMIN — Medication 100 MILLIGRAM(S): at 21:07

## 2017-08-09 RX ADMIN — Medication 30 MILLILITER(S): at 17:47

## 2017-08-09 RX ADMIN — MUPIROCIN 1 APPLICATION(S): 20 OINTMENT TOPICAL at 17:47

## 2017-08-09 RX ADMIN — HYDROMORPHONE HYDROCHLORIDE 8 MILLIGRAM(S): 2 INJECTION INTRAMUSCULAR; INTRAVENOUS; SUBCUTANEOUS at 10:21

## 2017-08-09 RX ADMIN — Medication 30 MILLILITER(S): at 06:32

## 2017-08-09 RX ADMIN — Medication 30 MILLILITER(S): at 13:36

## 2017-08-09 RX ADMIN — HYDROMORPHONE HYDROCHLORIDE 8 MILLIGRAM(S): 2 INJECTION INTRAMUSCULAR; INTRAVENOUS; SUBCUTANEOUS at 13:36

## 2017-08-09 RX ADMIN — Medication 650 MILLIGRAM(S): at 17:48

## 2017-08-09 RX ADMIN — Medication 100 MILLIGRAM(S): at 06:32

## 2017-08-09 RX ADMIN — HYDROMORPHONE HYDROCHLORIDE 8 MILLIGRAM(S): 2 INJECTION INTRAMUSCULAR; INTRAVENOUS; SUBCUTANEOUS at 17:46

## 2017-08-09 NOTE — PROGRESS NOTE ADULT - PROBLEM SELECTOR PLAN 1
- May be 2/2 choledocholithiasis as well as worsening peritoneal carcinomatosis   - Pain control PRN  - Bowel regimen / mirlax changed to prn  = if patient gets nauseas then peg to gravity  - maalox to peg site  - bactroban to retention suture iritation

## 2017-08-09 NOTE — PROGRESS NOTE ADULT - SUBJECTIVE AND OBJECTIVE BOX
Patient is a 46y old  Female who presents with a chief complaint of abdominal pain (01 Aug 2017 11:40)    HPI: Pain controlled today. Continues to have good ostomy output. Eating.    Vital Signs Last 24 Hrs  T(C): 36.6 (09 Aug 2017 13:24), Max: 37 (08 Aug 2017 21:13)  T(F): 97.9 (09 Aug 2017 13:24), Max: 98.6 (08 Aug 2017 21:13)  HR: 74 (09 Aug 2017 13:24) (72 - 81)  BP: 109/64 (09 Aug 2017 13:24) (102/68 - 126/79)  BP(mean): --  RR: 18 (09 Aug 2017 13:24) (16 - 20)  SpO2: 98% (09 Aug 2017 13:24) (97% - 99%)                          9.6    7.7   )-----------( 314      ( 09 Aug 2017 08:53 )             30.3     08-09    138  |  98  |  11  ----------------------------<  82  3.9   |  26  |  0.51    Ca    9.0      09 Aug 2017 08:52      MEDICATIONS  (STANDING):  docusate sodium 100 milliGRAM(s) Oral three times a day  enoxaparin Injectable 40 milliGRAM(s) SubCutaneous daily  senna 2 Tablet(s) Oral at bedtime  aluminum hydroxide/magnesium hydroxide/simethicone Suspension 30 milliLiter(s) Enteral Tube every 6 hours  mupirocin 2% Ointment 1 Application(s) Topical two times a day  fentaNYL   Patch 100 MICROgram(s)/Hr 1 Patch Transdermal every 72 hours  fentaNYL   Patch  25 MICROgram(s)/Hr 1 Patch Transdermal every 72 hours  ondansetron Injectable 4 milliGRAM(s) IV Push every 8 hours    MEDICATIONS  (PRN):  acetaminophen   Tablet 650 milliGRAM(s) Oral every 6 hours PRN pain  naloxone Injectable 0.1 milliGRAM(s) IV Push every 3 minutes PRN For ANY of the following changes in patient status:  A. RR LESS THAN 10 breaths per minute, B. Oxygen saturation LESS THAN 90%, C. Sedation score of 6  ondansetron Injectable 4 milliGRAM(s) IV Push every 6 hours PRN Nausea  bisacodyl 5 milliGRAM(s) Oral every 12 hours PRN Constipation  HYDROmorphone   Tablet 8 milliGRAM(s) Oral every 3 hours PRN Breakthrough pain

## 2017-08-09 NOTE — PROGRESS NOTE ADULT - PROBLEM SELECTOR PLAN 3
Patient goals are towards getting disease modifying Rx if possible. However, at this point she is looking for being able to eat better and maybe improving her functionality so her body may be able to better handle disease modifying Rxs. She does understand her illness is progressing and that by her waiting for her body to be in a better state, her illness may get worse.

## 2017-08-09 NOTE — PROGRESS NOTE ADULT - PROBLEM SELECTOR PLAN 2
- (+) BM on 8/7   -Continue bowel regimen as per GI. Agree with DC Miralax once patient already had a BM and she is having worsening nausea.   -Will recommend Zofran 4 mg IV ATC q 8 PRN x 3 days and then continuing PRN - (+) BM on 8/7   -Continue bowel regimen as per GI. Agree with DC Miralax once patient already had a BM and she is having worsening nausea.   -Do not give Relistor.

## 2017-08-09 NOTE — PROGRESS NOTE ADULT - PROBLEM SELECTOR PLAN 4
Nutritional support. Liberalize diet.  Dietary is already following up. Zofran ATC and PRN.   DC Miralax.

## 2017-08-09 NOTE — PROGRESS NOTE ADULT - SUBJECTIVE AND OBJECTIVE BOX
INTERVAL HPI/OVERNIGHT EVENTS    Resting in bed comfortably  miralax making her nauseaous   still having bm        MEDICATIONS  (STANDING):  dextrose 5% + sodium chloride 0.9%. 1000 milliLiter(s) (75 mL/Hr) IV Continuous <Continuous>  fentaNYL   Patch  50 MICROgram(s)/Hr 1 Patch Transdermal every 72 hours  docusate sodium 100 milliGRAM(s) Oral three times a day  enoxaparin Injectable 40 milliGRAM(s) SubCutaneous daily  HYDROmorphone PCA (1 mG/mL) 30 milliLiter(s) PCA Continuous PCA Continuous  senna 2 Tablet(s) Oral at bedtime  polyethylene glycol 3350 17 Gram(s) Oral daily    MEDICATIONS  (PRN):  acetaminophen   Tablet 650 milliGRAM(s) Oral every 6 hours PRN pain  naloxone Injectable 0.1 milliGRAM(s) IV Push every 3 minutes PRN For ANY of the following changes in patient status:  A. RR LESS THAN 10 breaths per minute, B. Oxygen saturation LESS THAN 90%, C. Sedation score of 6  ondansetron Injectable 4 milliGRAM(s) IV Push every 6 hours PRN Nausea  HYDROmorphone PCA (1 mG/mL) Rescue Clinician Bolus 2 milliGRAM(s) IV Push every 1 hour PRN Severe Pain (7 - 10)  bisacodyl 5 milliGRAM(s) Oral every 12 hours PRN Constipation    Allergies    penicillin (Rash)    Intolerances: ---     ROS:   General:  No loss, no fevers, chills, night sweats, +fatigue,   Eyes:  Good vision, no reported pain  ENT:  No sore throat, pain, runny nose, dysphagia  CV:  No pain, palpitations, hypo/hypertension  Resp:  No dyspnea, cough, tachypnea, wheezing  GI:  no pain, no nausea, No vomiting, No diarrhea, +constipation, no weight loss, No fever, No pruritis, No rectal bleeding, No tarry stools, No dysphagia,  :  No pain, bleeding, incontinence, nocturia  Muscle:  No pain, +weakness  Neuro:  No weakness, tingling, memory problems  Psych:  No fatigue, insomnia, mood problems, depression  Endocrine:  No polyuria, polydipsia, cold/heat intolerance  Heme:  No petechiae, ecchymosis, easy bruisability  Skin:  No rash, tattoos, scars, edema      PHYSICAL EXAM:   Vital Signs:   Vital Signs Last 24 Hrs  T(C): 36.8 (04 Aug 2017 10:18), Max: 37 (04 Aug 2017 02:00)  T(F): 98.2 (04 Aug 2017 10:18), Max: 98.6 (04 Aug 2017 02:00)  HR: 67 (04 Aug 2017 10:18) (65 - 70)  BP: 123/69 (04 Aug 2017 10:18) (100/67 - 133/68)  BP(mean): --  RR: 18 (04 Aug 2017 10:18) (17 - 18)  SpO2: 97% (04 Aug 2017 10:18) (97% - 99%)  Daily     Daily     GENERAL:  Resting in bed comfortably in NAD   HEENT:  NC/AT,  conjunctivae clear and pink, no thyromegaly, nodules, adenopathy, no JVD, sclera -anicteric  CHEST:  Full & symmetric excursion, no increased effort, breath sounds clear  HEART:  Regular rhythm, S1, S2, no murmur/rub/S3/S4, no abdominal bruit, no edema  ABDOMEN:  Soft, mildly tender to palpation generalized; non-distended, normoactive bowel sounds,  no masses ,no hepato-splenomegaly, no signs of chronic liver disease, PEG c/d/i  EXTEREMITIES:  no cyanosis ,clubbing or edema  SKIN:  No rash/erythema/ecchymoses/petechiae/wounds/abscess/warm/dry  NEURO:  Alert, oriented, no asterixis, no tremor, no encephalopathy      LABS:                        9.6    5.88  )-----------( 306      ( 04 Aug 2017 08:40 )             31.6     08-04    138  |  99  |  3<L>  ----------------------------<  85  4.1   |  27  |  0.39<L>    Ca    8.8      04 Aug 2017 08:58  Phos  4.0     08-03    TPro  6.1  /  Alb  3.1<L>  /  TBili  0.3  /  DBili  x   /  AST  18  /  ALT  8<L>  /  AlkPhos  68  08-04        RADIOLOGY & ADDITIONAL TESTS:

## 2017-08-09 NOTE — PROGRESS NOTE ADULT - SUBJECTIVE AND OBJECTIVE BOX
46F with h/o ovarian cancer s/p GRETCHEN with acute cholecystitis s/p drain s/p ERCP August 2nd with removal of choledocholithiasis presented to IR for drain evaluation.       Allergies:penicillin (Rash)      PAST MEDICAL & SURGICAL HISTORY:  Ovarian cancer: dx in dec 2016 (started as fibroid --&gt; large cancerous tumor)  Colostomy in place: dec 2016 (placed during hysterectomy in dec 2016 at [facility])  H/O abdominal hysterectomy: dec 2016        Pertinent labs:                      9.6    7.7   )-----------( 314      ( 09 Aug 2017 08:53 )             30.3   08-09    138  |  98  |  11  ----------------------------<  82  3.9   |  26  |  0.51    Ca    9.0      09 Aug 2017 08:52        Consent: Procedure/risks/ Benefits explained. Informed consent obtained. Pt verbalizes understanding. 46F with h/o ovarian cancer s/p GRETCHEN with acute cholecystitis s/p drain 6/9 s/p ERCP August 2nd with removal of choledocholithiasis presented to IR for drain evaluation and possible capping.       Allergies:penicillin (Rash)      PAST MEDICAL & SURGICAL HISTORY:  Ovarian cancer: dx in dec 2016 (started as fibroid --&gt; large cancerous tumor)  Colostomy in place: dec 2016 (placed during hysterectomy in dec 2016 at [facility])  H/O abdominal hysterectomy: dec 2016        Pertinent labs:                      9.6    7.7   )-----------( 314      ( 09 Aug 2017 08:53 )             30.3   08-09    138  |  98  |  11  ----------------------------<  82  3.9   |  26  |  0.51    Ca    9.0      09 Aug 2017 08:52        Consent: Procedure/risks/ Benefits explained. Informed consent obtained. Pt verbalizes understanding.

## 2017-08-09 NOTE — PROGRESS NOTE ADULT - PROBLEM SELECTOR PLAN 2
- Choledocholithiasis noted on last IR tube check of perc drain / mild biliary dilatation on CT ; Also w/ worsening peritoneal carcinomatosis  - S/P ERCP for choledocholithiasis removal  - Monitor lfts   - Soft diet as tolerated  - cap trial per IR

## 2017-08-09 NOTE — PROGRESS NOTE ADULT - SUBJECTIVE AND OBJECTIVE BOX
Interventional Radiology Brief- Operative Note    Procedure: Mariaa tube check    Operators: Jesus Ware    Anesthesia (type): none    Contrast: 14 cc    EBL:none    Findings/Follow up Plan of Care: debris in GB. Patent cystic and CBD. Capping trial. Extra bag given in case of fever, chills, pain.    Specimens Removed:none    Implants:none    Complications:none    Condition/Disposition:stable, to room.    Please call Interventional Radiology x [phone] with any questions, concerns, or issues.

## 2017-08-09 NOTE — PROGRESS NOTE ADULT - PROBLEM SELECTOR PLAN 1
Patient with very significant bulk of disease, however all still confined to the peritoneal cavity.  Her pain is likely more due to the cancer than biliary stones. She has delayed getting chemotherapy up until this time but had expressed interest in being treated 2 days ago.  Although her PS has deteriorated due to disease progression there is still a role for palliative chemotherapy, as her ovarian cancer is causing a lot of these problems and has the potential to be a very chemo-sensitive disease.   Discussed carboplatin-paclitaxel with patient and that we were planning on starting her today if she agreed, but patient initially showed reluctance due to her poor appetite. She said she wants to go home first and do some "nutritional things" to get better before ever starting therapy, which she wants to get with Dr. Lane as an outpatient. Of note, she has said this in the past and skipped appointments with Dr. Lane, and her fiance has indicated it was due to "Copper nutrient" treatments they were trying first. Discussed with patient that her disease is likely to get worse and cause more problems before she improves if she's not treated soon, and that most of her symptoms of weight loss, pain, and decreased appetite are due to the cancer.  Patient still refusing at this time. Will discuss with primary team and return later in the day to re-visit discussion.

## 2017-08-09 NOTE — PROGRESS NOTE ADULT - PROBLEM SELECTOR PLAN 2
Currently transitioning to PO dilaudid with Zofran for nausea as per palliative care recommendations.   Likely 2/2 peritoneal disease.

## 2017-08-09 NOTE — PROGRESS NOTE ADULT - SUBJECTIVE AND OBJECTIVE BOX
INTERVAL HPI/OVERNIGHT EVENTS:  Patient S&E at bedside. No o/n events, pt reports that her pain is improving however nausea from the PO pain medications and requesting IV. Says her appetite isn't good because of the hospital food.     VITAL SIGNS:  T(F): 97.6 (08-09-17 @ 10:05)  HR: 76 (08-09-17 @ 10:05)  BP: 110/61 (08-09-17 @ 10:05)  RR: 18 (08-09-17 @ 10:05)  SpO2: 99% (08-09-17 @ 10:05)  Wt(kg): --    PHYSICAL EXAM:    Constitutional: NAD, cachectic  Eyes: EOMI, sclera non-icteric  Neck: supple, no masses, no JVD  Respiratory: CTA b/l, good air entry b/l  Cardiovascular: RRR, no M/R/G  Gastrointestinal: soft, ostomies intact, mild tenderness  Extremities: no c/c/e  Neurological: AAOx3      MEDICATIONS  (STANDING):  docusate sodium 100 milliGRAM(s) Oral three times a day  enoxaparin Injectable 40 milliGRAM(s) SubCutaneous daily  senna 2 Tablet(s) Oral at bedtime  aluminum hydroxide/magnesium hydroxide/simethicone Suspension 30 milliLiter(s) Enteral Tube every 6 hours  mupirocin 2% Ointment 1 Application(s) Topical two times a day  fentaNYL   Patch 100 MICROgram(s)/Hr 1 Patch Transdermal every 72 hours  fentaNYL   Patch  25 MICROgram(s)/Hr 1 Patch Transdermal every 72 hours    MEDICATIONS  (PRN):  acetaminophen   Tablet 650 milliGRAM(s) Oral every 6 hours PRN pain  naloxone Injectable 0.1 milliGRAM(s) IV Push every 3 minutes PRN For ANY of the following changes in patient status:  A. RR LESS THAN 10 breaths per minute, B. Oxygen saturation LESS THAN 90%, C. Sedation score of 6  ondansetron Injectable 4 milliGRAM(s) IV Push every 6 hours PRN Nausea  bisacodyl 5 milliGRAM(s) Oral every 12 hours PRN Constipation  HYDROmorphone   Tablet 8 milliGRAM(s) Oral every 3 hours PRN Breakthrough pain      Allergies    penicillin (Rash)    Intolerances        LABS:                        9.6    7.7   )-----------( 314      ( 09 Aug 2017 08:53 )             30.3     08-09    138  |  98  |  11  ----------------------------<  82  3.9   |  26  |  0.51    Ca    9.0      09 Aug 2017 08:52            RADIOLOGY & ADDITIONAL TESTS:  Studies reviewed.

## 2017-08-09 NOTE — PROGRESS NOTE ADULT - SUBJECTIVE AND OBJECTIVE BOX
Pt seen and examined.  Ostomy functioning well now.  No nausea or vomiting.  Pain reasonably well controlled.  Denies fevers or chills.    AVSS  NAD  soft NT ND  ostomy with liquid stool

## 2017-08-09 NOTE — PROGRESS NOTE ADULT - SUBJECTIVE AND OBJECTIVE BOX
INTERVAL HPI/OVERNIGHT EVENTS: f/U for pain and constipation. Patient indicates pain is improving. However, she indicates nausea but denies vomiting. No BM today. She tony  Allergies    penicillin (Rash)    Intolerances        ADVANCE DIRECTIVES:  [ ] YES [x ] NO    DNR: [ ] YES [x ] NO      Date Completed:                    MOLST [ ] YES [x ] NO   Date Completed:       PRESENT SYMPTOMS:   SOURCE:  [x ] Patient   [ ] Family   [ ] Team     Pain: Improving. Patient used 16 mg PO Dilaudid over 24 hours. Pain is described as "soreness", all over her abdomen but mainly over her right lower and mid abdomen. Pain can be up to 10/10 but going to 2/10 with Dilaudid. Pain increases with abdominal palpation.     Dyspnea:  [ ] YES [x ] NO  Anxiety:  [ ] YES [x ] NO  Fatigue: [x ] YES [ ] NO  Nausea: [ ] YES [x ] NO  Loss of Appetite:[ x] YES [ ] NO  Constipation [x ] YES   [ ] No. Last BM several days ago.     OTHER SYMPTOMS:  [ x] All other ROS negative. She indicates x 1 episode of non bilious, non bloody vomiting this AM. However, she denies any current nausea.    [ ] Unable to obtain due to poor mentation      MEDICATIONS  (STANDING):  docusate sodium 100 milliGRAM(s) Oral three times a day  enoxaparin Injectable 40 milliGRAM(s) SubCutaneous daily  senna 2 Tablet(s) Oral at bedtime  aluminum hydroxide/magnesium hydroxide/simethicone Suspension 30 milliLiter(s) Enteral Tube every 6 hours  mupirocin 2% Ointment 1 Application(s) Topical two times a day  fentaNYL   Patch 100 MICROgram(s)/Hr 1 Patch Transdermal every 72 hours  fentaNYL   Patch  25 MICROgram(s)/Hr 1 Patch Transdermal every 72 hours    MEDICATIONS  (PRN):  acetaminophen   Tablet 650 milliGRAM(s) Oral every 6 hours PRN pain  naloxone Injectable 0.1 milliGRAM(s) IV Push every 3 minutes PRN For ANY of the following changes in patient status:  A. RR LESS THAN 10 breaths per minute, B. Oxygen saturation LESS THAN 90%, C. Sedation score of 6  ondansetron Injectable 4 milliGRAM(s) IV Push every 6 hours PRN Nausea  bisacodyl 5 milliGRAM(s) Oral every 12 hours PRN Constipation  HYDROmorphone   Tablet 8 milliGRAM(s) Oral every 3 hours PRN Breakthrough pain    Karnofsky Performance Score/Palliative Performance Status Version 2:40-50  %  Protein Calorie Malnutrition:  [ ] Mild   [ ] Moderate   [ ] Severe     Physical Exam:    Vital Signs Last 24 Hrs  T(C): 36.4 (09 Aug 2017 10:05), Max: 37 (08 Aug 2017 14:09)  T(F): 97.6 (09 Aug 2017 10:05), Max: 98.6 (08 Aug 2017 14:09)  HR: 76 (09 Aug 2017 10:05) (72 - 81)  BP: 110/61 (09 Aug 2017 10:05) (102/68 - 126/79)  BP(mean): --  RR: 18 (09 Aug 2017 10:05) (16 - 20)  SpO2: 99% (09 Aug 2017 10:05) (97% - 99%)    General: [x ] Alert,  A&O x 3   [ ] lethargic   [ ] Agitated   [x ] Cachexia   HEENT: [x ] Normal   [ ] Dry mouth   [ ] ET Tube    [ ] Trach   Lungs: [x ] Normal   [ ] Tachypnea   [ ] Audible excessive secretions   Cardiovascular:  [x ] Normal    [ ] Tachycardia   [ ] Bradycardia   Abdomen: [ ] Normal   [ ] Distended   [ x] Generalized Tender; however, abdomen appears soft   [x] Bowel Sounds x 4   [ ]PEG   [ ] NGT   Last BM:  8/7   Genitourinary:  [x ] Normal   [ ] Incontinent   [ ] Oliguria/Anuria   [ ] Lynn  Musculoskeletal:  [ ] Normal   [x ] Generalized weakness  [ ] Bedbound   Neurological: [x ] Normal   [ ] Cognitive impairment     Skin: Multiple abdominal scars s/p sx.       LABS:    RADIOLOGY & ADDITIONAL STUDIES: INTERVAL HPI/OVERNIGHT EVENTS: f/U for pain and constipation. Patient indicates pain is improving. However, she indicates nausea but denies vomiting. No BM today.  Allergies    penicillin (Rash)    Intolerances        ADVANCE DIRECTIVES:  [ ] YES [x ] NO    DNR: [ ] YES [x ] NO      Date Completed:                    MOLST [ ] YES [x ] NO   Date Completed:       PRESENT SYMPTOMS:   SOURCE:  [x ] Patient   [ ] Family   [ ] Team     Pain: Improving. Patient used 16 mg PO Dilaudid over 24 hours. Pain is described as "soreness", all over her abdomen but mainly over her right lower and mid abdomen. Pain can be up to 10/10 but going to 2/10 with Dilaudid. Pain increases with abdominal palpation.     Dyspnea:  [ ] YES [x ] NO  Anxiety:  [ ] YES [x ] NO  Fatigue: [x ] YES [ ] NO  Nausea: [ ] YES [x ] NO  Loss of Appetite:[ x] YES [ ] NO  Constipation [x ] YES   [ ] No. Last BM several days ago.     OTHER SYMPTOMS:  [ x] All other ROS negative. She indicates x 1 episode of non bilious, non bloody vomiting this AM. However, she denies any current nausea.    [ ] Unable to obtain due to poor mentation      MEDICATIONS  (STANDING):  docusate sodium 100 milliGRAM(s) Oral three times a day  enoxaparin Injectable 40 milliGRAM(s) SubCutaneous daily  senna 2 Tablet(s) Oral at bedtime  aluminum hydroxide/magnesium hydroxide/simethicone Suspension 30 milliLiter(s) Enteral Tube every 6 hours  mupirocin 2% Ointment 1 Application(s) Topical two times a day  fentaNYL   Patch 100 MICROgram(s)/Hr 1 Patch Transdermal every 72 hours  fentaNYL   Patch  25 MICROgram(s)/Hr 1 Patch Transdermal every 72 hours    MEDICATIONS  (PRN):  acetaminophen   Tablet 650 milliGRAM(s) Oral every 6 hours PRN pain  naloxone Injectable 0.1 milliGRAM(s) IV Push every 3 minutes PRN For ANY of the following changes in patient status:  A. RR LESS THAN 10 breaths per minute, B. Oxygen saturation LESS THAN 90%, C. Sedation score of 6  ondansetron Injectable 4 milliGRAM(s) IV Push every 6 hours PRN Nausea  bisacodyl 5 milliGRAM(s) Oral every 12 hours PRN Constipation  HYDROmorphone   Tablet 8 milliGRAM(s) Oral every 3 hours PRN Breakthrough pain    Karnofsky Performance Score/Palliative Performance Status Version 2:40-50  %  Protein Calorie Malnutrition:  [ ] Mild   [ ] Moderate   [ ] Severe     Physical Exam:    Vital Signs Last 24 Hrs  T(C): 36.4 (09 Aug 2017 10:05), Max: 37 (08 Aug 2017 14:09)  T(F): 97.6 (09 Aug 2017 10:05), Max: 98.6 (08 Aug 2017 14:09)  HR: 76 (09 Aug 2017 10:05) (72 - 81)  BP: 110/61 (09 Aug 2017 10:05) (102/68 - 126/79)  BP(mean): --  RR: 18 (09 Aug 2017 10:05) (16 - 20)  SpO2: 99% (09 Aug 2017 10:05) (97% - 99%)    General: [x ] Alert,  A&O x 3   [ ] lethargic   [ ] Agitated   [x ] Cachexia   HEENT: [x ] Normal   [ ] Dry mouth   [ ] ET Tube    [ ] Trach   Lungs: [x ] Normal   [ ] Tachypnea   [ ] Audible excessive secretions   Cardiovascular:  [x ] Normal    [ ] Tachycardia   [ ] Bradycardia   Abdomen: [ ] Normal   [ ] Distended   [ x] Generalized Tender; however, abdomen appears soft   [x] Bowel Sounds x 4   [ ]PEG   [ ] NGT   Last BM:  8/7   Genitourinary:  [x ] Normal   [ ] Incontinent   [ ] Oliguria/Anuria   [ ] Lynn  Musculoskeletal:  [ ] Normal   [x ] Generalized weakness  [ ] Bedbound   Neurological: [x ] Normal   [ ] Cognitive impairment     Skin: Multiple abdominal scars s/p sx.       LABS:                        9.6    7.7   )-----------( 314      ( 09 Aug 2017 08:53 )             30.3   08-09    138  |  98  |  11  ----------------------------<  82  3.9   |  26  |  0.51    Ca    9.0      09 Aug 2017 08:52    RADIOLOGY & ADDITIONAL STUDIES:  No new studies

## 2017-08-10 ENCOUNTER — TRANSCRIPTION ENCOUNTER (OUTPATIENT)
Age: 47
End: 2017-08-10

## 2017-08-10 PROCEDURE — 99233 SBSQ HOSP IP/OBS HIGH 50: CPT

## 2017-08-10 RX ORDER — FENTANYL CITRATE 50 UG/ML
1 INJECTION INTRAVENOUS
Qty: 0 | Refills: 0 | COMMUNITY

## 2017-08-10 RX ORDER — MUPIROCIN 20 MG/G
1 OINTMENT TOPICAL
Qty: 0 | Refills: 0 | COMMUNITY
Start: 2017-08-10

## 2017-08-10 RX ORDER — HYDROMORPHONE HYDROCHLORIDE 2 MG/ML
4 INJECTION INTRAMUSCULAR; INTRAVENOUS; SUBCUTANEOUS
Qty: 240 | Refills: 0 | OUTPATIENT
Start: 2017-08-10 | End: 2017-08-20

## 2017-08-10 RX ORDER — SENNA PLUS 8.6 MG/1
2 TABLET ORAL
Qty: 0 | Refills: 0 | COMMUNITY
Start: 2017-08-10

## 2017-08-10 RX ORDER — FENTANYL CITRATE 50 UG/ML
1 INJECTION INTRAVENOUS
Qty: 0 | Refills: 0 | Status: DISCONTINUED | OUTPATIENT
Start: 2017-08-10 | End: 2017-08-11

## 2017-08-10 RX ORDER — HYDROMORPHONE HYDROCHLORIDE 2 MG/ML
3 INJECTION INTRAMUSCULAR; INTRAVENOUS; SUBCUTANEOUS
Qty: 0 | Refills: 0 | COMMUNITY

## 2017-08-10 RX ORDER — FENTANYL CITRATE 50 UG/ML
1 INJECTION INTRAVENOUS
Qty: 10 | Refills: 0 | OUTPATIENT
Start: 2017-08-10

## 2017-08-10 RX ADMIN — Medication 30 MILLILITER(S): at 05:55

## 2017-08-10 RX ADMIN — HYDROMORPHONE HYDROCHLORIDE 8 MILLIGRAM(S): 2 INJECTION INTRAMUSCULAR; INTRAVENOUS; SUBCUTANEOUS at 18:14

## 2017-08-10 RX ADMIN — Medication 100 MILLIGRAM(S): at 14:47

## 2017-08-10 RX ADMIN — Medication 30 MILLILITER(S): at 00:33

## 2017-08-10 RX ADMIN — HYDROMORPHONE HYDROCHLORIDE 8 MILLIGRAM(S): 2 INJECTION INTRAMUSCULAR; INTRAVENOUS; SUBCUTANEOUS at 17:46

## 2017-08-10 RX ADMIN — FENTANYL CITRATE 1 PATCH: 50 INJECTION INTRAVENOUS at 17:46

## 2017-08-10 RX ADMIN — HYDROMORPHONE HYDROCHLORIDE 8 MILLIGRAM(S): 2 INJECTION INTRAMUSCULAR; INTRAVENOUS; SUBCUTANEOUS at 12:18

## 2017-08-10 RX ADMIN — ONDANSETRON 4 MILLIGRAM(S): 8 TABLET, FILM COATED ORAL at 20:44

## 2017-08-10 RX ADMIN — MUPIROCIN 1 APPLICATION(S): 20 OINTMENT TOPICAL at 05:56

## 2017-08-10 RX ADMIN — FENTANYL CITRATE 1 PATCH: 50 INJECTION INTRAVENOUS at 20:36

## 2017-08-10 RX ADMIN — Medication 100 MILLIGRAM(S): at 22:08

## 2017-08-10 RX ADMIN — HYDROMORPHONE HYDROCHLORIDE 8 MILLIGRAM(S): 2 INJECTION INTRAMUSCULAR; INTRAVENOUS; SUBCUTANEOUS at 22:09

## 2017-08-10 RX ADMIN — HYDROMORPHONE HYDROCHLORIDE 8 MILLIGRAM(S): 2 INJECTION INTRAMUSCULAR; INTRAVENOUS; SUBCUTANEOUS at 12:50

## 2017-08-10 RX ADMIN — Medication 650 MILLIGRAM(S): at 17:26

## 2017-08-10 RX ADMIN — Medication 30 MILLILITER(S): at 12:18

## 2017-08-10 RX ADMIN — ONDANSETRON 4 MILLIGRAM(S): 8 TABLET, FILM COATED ORAL at 14:47

## 2017-08-10 RX ADMIN — SENNA PLUS 2 TABLET(S): 8.6 TABLET ORAL at 22:08

## 2017-08-10 RX ADMIN — ONDANSETRON 4 MILLIGRAM(S): 8 TABLET, FILM COATED ORAL at 12:18

## 2017-08-10 RX ADMIN — MUPIROCIN 1 APPLICATION(S): 20 OINTMENT TOPICAL at 17:27

## 2017-08-10 RX ADMIN — ONDANSETRON 4 MILLIGRAM(S): 8 TABLET, FILM COATED ORAL at 05:56

## 2017-08-10 RX ADMIN — Medication 100 MILLIGRAM(S): at 05:55

## 2017-08-10 RX ADMIN — ONDANSETRON 4 MILLIGRAM(S): 8 TABLET, FILM COATED ORAL at 22:08

## 2017-08-10 RX ADMIN — Medication 30 MILLILITER(S): at 17:27

## 2017-08-10 RX ADMIN — ENOXAPARIN SODIUM 40 MILLIGRAM(S): 100 INJECTION SUBCUTANEOUS at 12:18

## 2017-08-10 RX ADMIN — Medication 650 MILLIGRAM(S): at 05:58

## 2017-08-10 NOTE — DISCHARGE NOTE ADULT - PLAN OF CARE
resolution of inflammation, decreased pain at site. Follow-up with your primary care physician in 1 week.  Follow-up regarding capped ubaldo drain. Follow-up with your primary oncologist in 1 week. Follow-up with Palliative doctor , Dr. Salgado on Monday, August 14th at 4pm. Continue with bowel regimen.  Follow-up with your primary care phsyician .  Follow-up with surgeon Dr. Mixon . Continue with colostomy care.  Follow-up with surgeon Dr. Mixon. Maintain adequate nutrition ,hydration, and rest. Pain control Continue pain medications.

## 2017-08-10 NOTE — DISCHARGE NOTE ADULT - CARE PLAN
Principal Discharge DX:	Cholecystitis, acute  Secondary Diagnosis:	Choledocholithiasis  Secondary Diagnosis:	Ovarian cancer  Secondary Diagnosis:	Cholelithiasis Principal Discharge DX:	Cholecystitis, acute  Goal:	resolution of inflammation, decreased pain at site.  Instructions for follow-up, activity and diet:	Follow-up with your primary care physician in 1 week.  Follow-up regarding capped ubaldo drain.  Secondary Diagnosis:	Choledocholithiasis  Instructions for follow-up, activity and diet:	Follow-up with your primary care physician in 1 week.  Follow-up regarding capped ubaldo drain.  Secondary Diagnosis:	Ovarian cancer  Instructions for follow-up, activity and diet:	Follow-up with your primary oncologist in 1 week.  Secondary Diagnosis:	Generalized abdominal pain  Instructions for follow-up, activity and diet:	Follow-up with Palliative doctor , Dr. Salgado on Monday, August 14th at 4pm.  Secondary Diagnosis:	Constipation, unspecified constipation type  Instructions for follow-up, activity and diet:	Continue with bowel regimen.  Follow-up with your primary care phsyician .  Follow-up with surgeon Dr. Mixon .  Secondary Diagnosis:	Colostomy in place  Instructions for follow-up, activity and diet:	Continue with colostomy care.  Follow-up with surgeon Dr. Mixon.  Secondary Diagnosis:	Cachexia  Instructions for follow-up, activity and diet:	Maintain adequate nutrition ,hydration, and rest. Principal Discharge DX:	Neoplasm related pain  Goal:	Pain control  Instructions for follow-up, activity and diet:	Continue pain medications.  Secondary Diagnosis:	Ovarian cancer  Instructions for follow-up, activity and diet:	Follow-up with your primary care physician in 1 week.  Follow-up regarding capped ubaldo drain.  Secondary Diagnosis:	Generalized abdominal pain  Instructions for follow-up, activity and diet:	Follow-up with your primary oncologist in 1 week.  Secondary Diagnosis:	Constipation, unspecified constipation type  Instructions for follow-up, activity and diet:	Follow-up with Palliative doctor , Dr. Salgado on Monday, August 14th at 4pm.  Secondary Diagnosis:	Colostomy in place  Instructions for follow-up, activity and diet:	Continue with bowel regimen.  Follow-up with your primary care phsyician .  Follow-up with surgeon Dr. Mixon .  Secondary Diagnosis:	Cachexia  Instructions for follow-up, activity and diet:	Continue with colostomy care.  Follow-up with surgeon Dr. Mixon.  Instructions for follow-up, activity and diet:	Maintain adequate nutrition ,hydration, and rest.

## 2017-08-10 NOTE — PROGRESS NOTE ADULT - SUBJECTIVE AND OBJECTIVE BOX
Interventional Radiology Follow- Up Note      This is a 46y Female h/o ovarian cancer s/p GRETCHEN, colostomy (2016), lesser sac collection s/p evacuation by EUS and later IR drain placement (6/12/17) , acute cholecystitis s/p percutaneous cholecystostomy drain placement by IR (6/9/17). Pt was admitted on 8/1/17 with abdominal pain and ws found to have stone in CBD (s/p ERCP with removal of stone). Pt is currently s/p ubaldo tube check and capping with Dr. Ware on 8/9/17 in IR.    Patient seen and examined @ bedside. Reports some RLQ pain that resolves with medication. Other wise offers no further complaints at this time. Denies fever, chills, nausea or vomiting.     PAST MEDICAL & SURGICAL HISTORY:  Ovarian cancer: dx in dec 2016 (started as fibroid --&gt; large cancerous tumor)  Colostomy in place: dec 2016 (placed during hysterectomy in dec 2016 at [facility])  H/O abdominal hysterectomy: dec 2016      Allergies: penicillin (Rash)      LABS:                        9.6    7.7   )-----------( 314      ( 09 Aug 2017 08:53 )             30.3     08-09    138  |  98  |  11  ----------------------------<  82  3.9   |  26  |  0.51    Ca    9.0      09 Aug 2017 08:52      Vitals: T(F): 98.1 (08-10-17 @ 09:15), Max: 98.5 (08-10-17 @ 00:34)  HR: 83 (08-10-17 @ 09:15) (72 - 83)  BP: 111/68 (08-10-17 @ 09:15) (96/67 - 115/76)  RR: 18 (08-10-17 @ 09:15) (18 - 18)  SpO2: 98% (08-10-17 @ 09:15) (96% - 99%)    PHYSICAL EXAM:  Gen: NAD, A&Ox3  Abd: ND, soft, NTTP, ubaldo tube intact and capped, dressing c/d/i.     A/P: 46y Female s/p cholecystostomy evaluation and capping in IR by Dr. Ware  -Educated pt on cholecystostomy care, discussed should she develop fever, chills, pain, leakage at site to connect drain to drainage bag. gave f/u info   -d/c planning per primary team   -flush drain per doctor orders  -trend vitals, labs  -will discuss with IR attending     Please call IR at extension [phone] with any questions, concerns, or issues regarding above.

## 2017-08-10 NOTE — DISCHARGE NOTE ADULT - CARE PROVIDERS DIRECT ADDRESSES
,[email or web address]

## 2017-08-10 NOTE — DISCHARGE NOTE ADULT - MEDICATION SUMMARY - MEDICATIONS TO TAKE
I will START or STAY ON the medications listed below when I get home from the hospital:    fentaNYL 50 mcg/hr transdermal film, extended release  -- 1 patch by transdermal patch every 72 hours MDD:150mcg/hr  -- Indication: For Abdominal pain related to cancer    Duragesic-100 transdermal film, extended release  -- 1 patch by transdermal patch every 72 hours MDD:150 mcg/hr  -- Indication: For Abdominal pain related to cancer    HYDROmorphone 2 mg oral tablet  -- 4 tab(s) by mouth every 4 hours, As Needed for pain MDD:6 doses  -- Indication: For Abdominal pain related to cancer    Milk of Magnesia 8% oral suspension  -- 15 milliliter(s) by mouth once a day, As Needed  -- Indication: For indigestion, heartburn    Zofran ODT 4 mg oral tablet, disintegrating  -- 1 tab(s) by mouth 3 times a day, As Needed nausea  -- Indication: For Nausea    mupirocin 2% topical ointment  -- 1 application on skin 2 times a day  -- Indication: For wound care    bisacodyl 5 mg oral delayed release tablet  -- 1 tab(s) by mouth every 12 hours, As needed, Constipation  -- Indication: For Constipation, unspecified constipation type    senna oral tablet  -- 2 tab(s) by mouth once a day (at bedtime)  -- Indication: For Constipation, unspecified constipation type    docusate sodium 100 mg oral capsule  -- 1 cap(s) by mouth 3 times a day  -- Indication: For Constipation, unspecified constipation type I will START or STAY ON the medications listed below when I get home from the hospital:    fentaNYL 50 mcg/hr transdermal film, extended release  -- 1 patch by transdermal patch every 72 hours MDD:150mcg/hr  -- Indication: For Abdominal pain related to cancer    Duragesic-100 transdermal film, extended release  -- 1 patch by transdermal patch every 72 hours MDD:150 mcg/hr  -- Indication: For Abdominal pain related to cancer    HYDROmorphone 2 mg oral tablet  -- 4 tab(s) by mouth every 4 hours, As Needed for pain MDD:6 doses  -- Indication: For Abdominal pain related to cancer    Milk of Magnesia 8% oral suspension  -- 15 milliliter(s) by mouth once a day, As Needed  -- Indication: For indigestion    Zofran ODT 4 mg oral tablet, disintegrating  -- 1 tab(s) by mouth 3 times a day, As Needed nausea  -- Indication: For Nausea    mupirocin 2% topical ointment  -- 1 application on skin 2 times a day  -- Indication: For wound care     bisacodyl 5 mg oral delayed release tablet  -- 1 tab(s) by mouth every 12 hours, As needed, Constipation  -- Indication: For Constipation, unspecified constipation type    docusate sodium 100 mg oral capsule  -- 1 cap(s) by mouth 3 times a day  -- Indication: For Constipation, unspecified constipation type    senna oral tablet  -- 2 tab(s) by mouth once a day (at bedtime)  -- Indication: For Constipation, unspecified constipation type I will START or STAY ON the medications listed below when I get home from the hospital:    fentaNYL 50 mcg/hr transdermal film, extended release  -- 1 patch by transdermal patch every 72 hours MDD:150mcg/hr  -- Indication: For Abdominal pain related to cancer    Duragesic-100 transdermal film, extended release  -- 1 patch by transdermal patch every 72 hours MDD:150 mcg/hr  -- Indication: For Abdominal pain related to cancer    HYDROmorphone 2 mg oral tablet  -- 4 tab(s) by mouth every 4 hours, As Needed for pain MDD:6 doses  -- Indication: For Abdominal pain related to cancer    Milk of Magnesia 8% oral suspension  -- 15 milliliter(s) by mouth once a day, As Needed  -- Indication: For indigestion    Zofran ODT 4 mg oral tablet, disintegrating  -- 1 tab(s) by mouth 3 times a day, As Needed nausea  -- Indication: For Nausea    mupirocin 2% topical ointment  -- 1 application on skin 2 times a day  -- Indication: For wound care    bisacodyl 5 mg oral delayed release tablet  -- 1 tab(s) by mouth every 12 hours, As needed, Constipation  -- Indication: For Constipation, unspecified constipation type    docusate sodium 100 mg oral capsule  -- 1 cap(s) by mouth 3 times a day  -- Indication: For Constipation, unspecified constipation type    senna oral tablet  -- 2 tab(s) by mouth once a day (at bedtime)  -- Indication: For Constipation, unspecified constipation type I will START or STAY ON the medications listed below when I get home from the hospital:    fentaNYL 50 mcg/hr transdermal film, extended release  -- 1 patch by transdermal patch every 72 hours MDD:150mcg/hr  -- Indication: For Abdominal pain related to cancer    Duragesic-100 transdermal film, extended release  -- 1 patch by transdermal patch every 72 hours MDD:150 mcg/hr  -- Indication: For Abdominal pain related to cancer    HYDROmorphone 2 mg oral tablet  -- 4 tab(s) by mouth every 4 hours, As Needed for pain MDD:6 doses  -- Indication: For Abdominal pain related to cancer    aluminum hydroxide-magnesium hydroxide 200 mg-200 mg/5 mL oral suspension  -- 30 milliliter(s) by gastrostomy tube every 6 hours  -- Indication: For wound care    Milk of Magnesia 8% oral suspension  -- 15 milliliter(s) by mouth once a day, As Needed  -- Indication: For indigestion    Zofran ODT 4 mg oral tablet, disintegrating  -- 1 tab(s) by mouth 3 times a day, As Needed nausea  -- Indication: For Nausea    mupirocin 2% topical ointment  -- 1 application on skin 2 times a day  -- Indication: For wound care    bisacodyl 5 mg oral delayed release tablet  -- 1 tab(s) by mouth every 12 hours, As needed, Constipation  -- Indication: For Constipation, unspecified constipation type    docusate sodium 100 mg oral capsule  -- 1 cap(s) by mouth 3 times a day  -- Indication: For Constipation, unspecified constipation type    senna oral tablet  -- 2 tab(s) by mouth once a day (at bedtime)  -- Indication: For Constipation, unspecified constipation type    lactulose 10 g/15 mL oral syrup  -- 15 milliliter(s) by gastrostomy tube once a day as needed for constipation  -- Indication: For Constipation, unspecified constipation type

## 2017-08-10 NOTE — PROGRESS NOTE ADULT - SUBJECTIVE AND OBJECTIVE BOX
Patient is a 46y old  Female who presents with a chief complaint of abdominal pain (10 Aug 2017 11:37)    HPI: Biliary drain capped yesterday. Pain controlled. Good colostomy output.    Vital Signs Last 24 Hrs  T(C): 36.9 (10 Aug 2017 13:25), Max: 36.9 (10 Aug 2017 00:34)  T(F): 98.5 (10 Aug 2017 13:25), Max: 98.5 (10 Aug 2017 00:34)  HR: 77 (10 Aug 2017 13:25) (72 - 83)  BP: 109/69 (10 Aug 2017 13:25) (96/67 - 115/76)  BP(mean): --  RR: 18 (10 Aug 2017 13:25) (18 - 18)  SpO2: 95% (10 Aug 2017 13:25) (95% - 99%)                          9.6    7.7   )-----------( 314      ( 09 Aug 2017 08:53 )             30.3     08-09    138  |  98  |  11  ----------------------------<  82  3.9   |  26  |  0.51    Ca    9.0      09 Aug 2017 08:52      MEDICATIONS  (STANDING):  docusate sodium 100 milliGRAM(s) Oral three times a day  enoxaparin Injectable 40 milliGRAM(s) SubCutaneous daily  senna 2 Tablet(s) Oral at bedtime  aluminum hydroxide/magnesium hydroxide/simethicone Suspension 30 milliLiter(s) Enteral Tube every 6 hours  mupirocin 2% Ointment 1 Application(s) Topical two times a day  fentaNYL   Patch 100 MICROgram(s)/Hr 1 Patch Transdermal every 72 hours  ondansetron Injectable 4 milliGRAM(s) IV Push every 8 hours  fentaNYL   Patch  50 MICROgram(s)/Hr 1 Patch Transdermal every 72 hours    MEDICATIONS  (PRN):  acetaminophen   Tablet 650 milliGRAM(s) Oral every 6 hours PRN pain  naloxone Injectable 0.1 milliGRAM(s) IV Push every 3 minutes PRN For ANY of the following changes in patient status:  A. RR LESS THAN 10 breaths per minute, B. Oxygen saturation LESS THAN 90%, C. Sedation score of 6  ondansetron Injectable 4 milliGRAM(s) IV Push every 6 hours PRN Nausea  bisacodyl 5 milliGRAM(s) Oral every 12 hours PRN Constipation  HYDROmorphone   Tablet 8 milliGRAM(s) Oral every 3 hours PRN Breakthrough pain

## 2017-08-10 NOTE — DISCHARGE NOTE ADULT - PROVIDER TOKENS
TOKEN:'[id number]',TOKEN:'[id number]' TOKEN:'[id number]:'[id number]:'[id number]'

## 2017-08-10 NOTE — DISCHARGE NOTE ADULT - SECONDARY DIAGNOSIS.
Choledocholithiasis Ovarian cancer Cholelithiasis Constipation, unspecified constipation type Colostomy in place Cachexia Generalized abdominal pain

## 2017-08-10 NOTE — PROGRESS NOTE ADULT - PROBLEM SELECTOR PLAN 4
Tube capped by IR yesterday. Spoke with Dr. Ware- to continue capping and follow up as an outpatient next week. Monitor for pain at site, fevers.

## 2017-08-10 NOTE — PROGRESS NOTE ADULT - PROBLEM SELECTOR PLAN 2
- Choledocholithiasis noted on last IR tube check of perc drain / mild biliary dilatation on CT ;   - S/P ERCP for choledocholithiasis removal  - s/p tube check yesterday  - Soft diet as tolerated  - cap trial per IR

## 2017-08-10 NOTE — DISCHARGE NOTE ADULT - MEDICATION SUMMARY - MEDICATIONS TO CHANGE
I will SWITCH the dose or number of times a day I take the medications listed below when I get home from the hospital:    fentaNYL 50 mcg/hr transdermal film, extended release  -- 1 patch by transdermal patch every 72 hours MDD:1    HYDROmorphone 2 mg oral tablet  -- 3 tab(s) by mouth every 4 hours, As needed, breakthrough pain MDD:6

## 2017-08-10 NOTE — CHART NOTE - NSCHARTNOTEFT_GEN_A_CORE
Pt seen for malnutrition follow up. Pt with metastatic ovarian cancer admitted with abdominal pain, S/P cholecystostomy tube ERCP with removal of CBD stone, neoplastic pain-followed by palliative care.    Source: Patient [x ]    Family [ ]     other [ ]    Diet : Soft diet with prosource 2 x daily       Patient reports [ ] nausea  [ ] vomiting [ ] diarrhea [ ] constipation  [ ]chewing problems [ ] swallowing issues  [ ] other: No N+V, pt with improved ostomy output, output noted today     PO intake:  < 50% [ ] 50-75% [ ]   % [ ]  other : Pt reports fair appetite, noted breakfast today untouched, pt states she has been eating a small amount from her trays and supplemented by foods from home and cafeteria. Pt states she is taking prosource.       Current Weight: No new wt  % Weight Change    Pertinent Medications: MEDICATIONS  (STANDING):  docusate sodium 100 milliGRAM(s) Oral three times a day  enoxaparin Injectable 40 milliGRAM(s) SubCutaneous daily  senna 2 Tablet(s) Oral at bedtime  aluminum hydroxide/magnesium hydroxide/simethicone Suspension 30 milliLiter(s) Enteral Tube every 6 hours  mupirocin 2% Ointment 1 Application(s) Topical two times a day  fentaNYL   Patch 100 MICROgram(s)/Hr 1 Patch Transdermal every 72 hours  ondansetron Injectable 4 milliGRAM(s) IV Push every 8 hours  fentaNYL   Patch  50 MICROgram(s)/Hr 1 Patch Transdermal every 72 hours    MEDICATIONS  (PRN):  acetaminophen   Tablet 650 milliGRAM(s) Oral every 6 hours PRN pain  naloxone Injectable 0.1 milliGRAM(s) IV Push every 3 minutes PRN For ANY of the following changes in patient status:  A. RR LESS THAN 10 breaths per minute, B. Oxygen saturation LESS THAN 90%, C. Sedation score of 6  ondansetron Injectable 4 milliGRAM(s) IV Push every 6 hours PRN Nausea  bisacodyl 5 milliGRAM(s) Oral every 12 hours PRN Constipation  HYDROmorphone   Tablet 8 milliGRAM(s) Oral every 3 hours PRN Breakthrough pain    Pertinent Labs:  08-09 Na138 mmol/L Glu 82 mg/dL K+ 3.9 mmol/L Cr  0.51 mg/dL BUN 11 mg/dL Phos n/a   Alb n/a   PAB n/a         Skin: No edema, skin free of pressure injury     Estimated Needs:   [x ] no change since previous assessment  [ ] recalculated:       Previous Nutrition Diagnosis:      [ x] Malnutrition (severe)        Nutrition Diagnosis is [x ] ongoing  [ ] resolved [ ] not applicable          New Nutrition Diagnosis: [ x] not applicable       Interventions:     Recommend    [ ] Change Diet To:    [ ] Nutrition Supplement    [ ] Nutrition Support    [ ] Other:     1. Continue soft diet with prosource 2 x daily  2. Continue to encourage po intake and obtain/honor food preferences as able       Monitoring and Evaluation:     [x ] PO intake [ x] Tolerance to diet prescription [x ] weights [x ] follow up per protocol    [ ] other:

## 2017-08-10 NOTE — PROGRESS NOTE ADULT - PROBLEM SELECTOR PLAN 1
Fentanyl was increased to 150 mcg/h q 72 h and will continue Dilaudid PO 8 mg q 3 PRN   Naloxone PRN  Please get the patient to follow up with Dr Salgado in the Palliative Care OP clinic   Holistic Nurse

## 2017-08-10 NOTE — PROGRESS NOTE ADULT - PROBLEM SELECTOR PLAN 1
- Pain control PRN  - she feels as if her laxatives are making her nauseous, I recommended relistor every other day as its not oral, she is willing to try it, will d/w primary team  - maalox to peg site  - bactroban to retention suture iritation

## 2017-08-10 NOTE — DISCHARGE NOTE ADULT - CARE PROVIDER_API CALL
Clara Najera), [facility] Medicine; Internal Medicine  [address] [address]  Phone: [phone]  Fax: [phone]    Kaila Lane), Hematology; Internal Medicine; Medical Oncology  [address] [place] [address]  Phone: [phone]  Fax: [phone] Clara Najera), [facility] Medicine; Internal Medicine  [address] [address]  Phone: [phone]  Fax: [phone]    Kaila Lane), Hematology; Internal Medicine; Medical Oncology  [address] [place] [address]  Phone: [phone]  Fax: [phone]    Papi Mixon), Surgery; Surgical Oncology  [address] [place] [address]  Phone: [phone]  Fax: [phone]

## 2017-08-10 NOTE — PROGRESS NOTE ADULT - PROBLEM SELECTOR PROBLEM 5
Palliative care encounter
Cachexia
Drug-induced constipation
Palliative care encounter
Cachexia
Malignant neoplasm of ovary, unspecified laterality
Prophylactic measure

## 2017-08-10 NOTE — DISCHARGE NOTE ADULT - HOSPITAL COURSE
to be completed by attending physician 46F with metastatic ovarian cancer s/p GRETCHEN, colostomy (2016), lesser sac collection s/p evacuation by EUS and later IR drain placement (6/12/17) , acute cholecystitis s/p percutaneous cholecystostomy drain placement by IR (6/9/17) pw  generalized abdominal pain x 1 day. Last drain check was  7/19/17 and that time she was found to have a CBD stone.    Admitted to medicine, seen by GI Dr. Lopez. Had ERCP with sphincterotomy and stone removal for choledocholithiasis.     Had persistent severe pain, started on a dilaudid PCA by palliative care. Fentanyl patch increased. Gradually weaned to po pain meds.     Severe opioid induced constipation- no BM for 2 weeks. Given aggressive bowel regimen with good output.     Biliary drain capped by IR- to f/u outpatient for removal.     Seen by Oncology- pt has not followed up for chemotherapy- inpatient chemotherapy offered but pt requested to f/u as an outpatient.    D/simin on above meds.     Diagnoses: Pain due to metastases; Ovarian cancer; Peritoneal metastases; Severe opioid induced constipation; Nausea and vomiting; Colostomy status; Severe protein calorie malnutrition; Dehydration 46F with metastatic ovarian cancer s/p GRETCHEN, colostomy (2016), lesser sac collection s/p evacuation by EUS and later IR drain placement (6/12/17), acute cholecystitis s/p percutaneous cholecystostomy drain placement by IR (6/9/17) pw  generalized abdominal pain x 1 day. Last drain check was 7/19/17 and that time she was found to have a CBD stone.    Admitted to medicine. CT a/p revealed worsening peritoneal carcinomatoses, biliary ductal dilatation.     Seen by GI Dr. Vasquez. Had ERCP with sphincterotomy and stone removal for choledocholithiasis.     Had persistent severe pain due to mets, started on a dilaudid PCA by palliative care. Fentanyl patch increased. Gradually weaned to po pain meds.     Severe opioid induced constipation- no BM for 2 weeks. Given aggressive bowel regimen with good output.     Biliary drain capped by IR- to f/u outpatient for removal.     Seen by Oncology- pt has not followed up for chemotherapy- inpatient chemotherapy offered but pt requested to f/u as an outpatient.    D/simin on above meds.     Diagnoses: Pain due to metastases; Ovarian cancer; Peritoneal metastases; Severe opioid induced constipation; Choledocholithiasis; Nausea and vomiting; Colostomy status; Biliary drain status; Severe protein calorie malnutrition; Dehydration 46F with metastatic ovarian cancer s/p GRETCHEN, colostomy (2016), lesser sac collection s/p evacuation by EUS and later IR drain placement (6/12/17), acute cholecystitis s/p percutaneous cholecystostomy drain placement by IR (6/9/17) pw  generalized abdominal pain x 1 day. Last drain check was 7/19/17 and that time she was found to have a CBD stone.    Admitted to medicine. CT a/p revealed worsening peritoneal carcinomatoses, biliary ductal dilatation.     Seen by GI Dr. Vasquez. Had ERCP with sphincterotomy and stone removal for choledocholithiasis.     Had persistent severe pain due to mets, started on a dilaudid PCA by palliative care. Severe opioid induced constipation- no BM for 2 weeks. Given aggressive bowel regimen with good output. Fentanyl patch increased. Gradually weaned to po pain meds.     Biliary drain capped by IR- to f/u outpatient for removal.     Seen by Oncology- pt has not followed up for chemotherapy- inpatient chemotherapy offered but pt refused requested to f/u as an outpatient.    D/simin on above meds.     Diagnoses: Pain due to metastases; Ovarian cancer; Peritoneal metastases; Severe opioid induced constipation; Choledocholithiasis; Nausea and vomiting; Colostomy status; Biliary drain status; Severe protein calorie malnutrition; Dehydration

## 2017-08-10 NOTE — PROGRESS NOTE ADULT - SUBJECTIVE AND OBJECTIVE BOX
INTERVAL HPI/OVERNIGHT EVENTS: f/U for pain and constipation. Patient indicates pain is improving. Denies nausea. She also denies sedation. (+) BM today.   Allergies    penicillin (Rash)    Intolerances        ADVANCE DIRECTIVES:  [ ] YES [x ] NO    DNR: [ ] YES [x ] NO      Date Completed:                    MOLST [ ] YES [x ] NO   Date Completed:       PRESENT SYMPTOMS:   SOURCE:  [x ] Patient   [ ] Family   [ ] Team     Pain: Improving. Currently her pain is Mild. Patient used 24 mg PO Dilaudid over 24 hours and then her Fentanyl was increased to 150 mcg/h q 72 h. Pain is described as "soreness", all over her abdomen but mainly over her right lower and mid abdomen. Pain can be up to 10/10 but going to 2/10 with Dilaudid. Pain increases with abdominal palpation.     Dyspnea:  [ ] YES [x ] NO  Anxiety:  [ ] YES [x ] NO  Fatigue: [x ] YES [ ] NO  Nausea: [ ] YES [x ] NO  Loss of Appetite:[ x] YES [ ] NO  Constipation [x ] YES   [ ] No. Last BM several days ago.     OTHER SYMPTOMS:  [ x] All other ROS negative.    [ ] Unable to obtain due to poor mentation    MEDICATIONS  (STANDING):  docusate sodium 100 milliGRAM(s) Oral three times a day  enoxaparin Injectable 40 milliGRAM(s) SubCutaneous daily  senna 2 Tablet(s) Oral at bedtime  aluminum hydroxide/magnesium hydroxide/simethicone Suspension 30 milliLiter(s) Enteral Tube every 6 hours  mupirocin 2% Ointment 1 Application(s) Topical two times a day  fentaNYL   Patch 100 MICROgram(s)/Hr 1 Patch Transdermal every 72 hours  ondansetron Injectable 4 milliGRAM(s) IV Push every 8 hours  fentaNYL   Patch  50 MICROgram(s)/Hr 1 Patch Transdermal every 72 hours    MEDICATIONS  (PRN):  acetaminophen   Tablet 650 milliGRAM(s) Oral every 6 hours PRN pain  naloxone Injectable 0.1 milliGRAM(s) IV Push every 3 minutes PRN For ANY of the following changes in patient status:  A. RR LESS THAN 10 breaths per minute, B. Oxygen saturation LESS THAN 90%, C. Sedation score of 6  ondansetron Injectable 4 milliGRAM(s) IV Push every 6 hours PRN Nausea  bisacodyl 5 milliGRAM(s) Oral every 12 hours PRN Constipation  HYDROmorphone   Tablet 8 milliGRAM(s) Oral every 3 hours PRN Breakthrough pain  HYDROmorphone   Tablet 8 milliGRAM(s) Oral every 3 hours PRN Breakthrough pain    Karnofsky Performance Score/Palliative Performance Status Version 2:40-50  %  Protein Calorie Malnutrition:  [ ] Mild   [ ] Moderate   [ ] Severe     Physical Exam:    Vital Signs Last 24 Hrs  T(C): 36.4 (09 Aug 2017 10:05), Max: 37 (08 Aug 2017 14:09)  T(F): 97.6 (09 Aug 2017 10:05), Max: 98.6 (08 Aug 2017 14:09)  HR: 76 (09 Aug 2017 10:05) (72 - 81)  BP: 110/61 (09 Aug 2017 10:05) (102/68 - 126/79)  BP(mean): --  RR: 18 (09 Aug 2017 10:05) (16 - 20)  SpO2: 99% (09 Aug 2017 10:05) (97% - 99%)    General: [x ] Alert,  A&O x 3   [ ] lethargic   [ ] Agitated   [x ] Cachexia   HEENT: [x ] Normal   [ ] Dry mouth   [ ] ET Tube    [ ] Trach   Lungs: [x ] Normal   [ ] Tachypnea   [ ] Audible excessive secretions   Cardiovascular:  [x ] Normal    [ ] Tachycardia   [ ] Bradycardia   Abdomen: [ ] Normal   [ ] Distended   [ x] Generalized Tender; however, abdomen appears soft   [x] Bowel Sounds x 4   [ ]PEG   [ ] NGT   Last BM:  8/10   Genitourinary:  [x ] Normal   [ ] Incontinent   [ ] Oliguria/Anuria   [ ] Lynn  Musculoskeletal:  [ ] Normal   [x ] Generalized weakness  [ ] Bedbound   Neurological: [x ] Normal   [ ] Cognitive impairment     Skin: Multiple abdominal scars s/p sx.       LABS:                                   9.6    7.7   )-----------( 314      ( 09 Aug 2017 08:53 )             30.3   08-09    138  |  98  |  11  ----------------------------<  82  3.9   |  26  |  0.51    Ca    9.0      09 Aug 2017 08:52        RADIOLOGY & ADDITIONAL STUDIES:  No new studies

## 2017-08-10 NOTE — PROGRESS NOTE ADULT - SUBJECTIVE AND OBJECTIVE BOX
INTERVAL HPI/OVERNIGHT EVENTS    resting in bed  tolerating PO somewhat  feels like laxatives making her nauseous         MEDICATIONS  (STANDING):  dextrose 5% + sodium chloride 0.9%. 1000 milliLiter(s) (75 mL/Hr) IV Continuous <Continuous>  fentaNYL   Patch  50 MICROgram(s)/Hr 1 Patch Transdermal every 72 hours  docusate sodium 100 milliGRAM(s) Oral three times a day  enoxaparin Injectable 40 milliGRAM(s) SubCutaneous daily  HYDROmorphone PCA (1 mG/mL) 30 milliLiter(s) PCA Continuous PCA Continuous  senna 2 Tablet(s) Oral at bedtime  polyethylene glycol 3350 17 Gram(s) Oral daily    MEDICATIONS  (PRN):  acetaminophen   Tablet 650 milliGRAM(s) Oral every 6 hours PRN pain  naloxone Injectable 0.1 milliGRAM(s) IV Push every 3 minutes PRN For ANY of the following changes in patient status:  A. RR LESS THAN 10 breaths per minute, B. Oxygen saturation LESS THAN 90%, C. Sedation score of 6  ondansetron Injectable 4 milliGRAM(s) IV Push every 6 hours PRN Nausea  HYDROmorphone PCA (1 mG/mL) Rescue Clinician Bolus 2 milliGRAM(s) IV Push every 1 hour PRN Severe Pain (7 - 10)  bisacodyl 5 milliGRAM(s) Oral every 12 hours PRN Constipation    Allergies    penicillin (Rash)    Intolerances: ---     ROS:   General:  No loss, no fevers, chills, night sweats, +fatigue,   Eyes:  Good vision, no reported pain  ENT:  No sore throat, pain, runny nose, dysphagia  CV:  No pain, palpitations, hypo/hypertension  Resp:  No dyspnea, cough, tachypnea, wheezing  GI:  no pain, no nausea, No vomiting, No diarrhea, +constipation, no weight loss, No fever, No pruritis, No rectal bleeding, No tarry stools, No dysphagia,  :  No pain, bleeding, incontinence, nocturia  Muscle:  No pain, +weakness  Neuro:  No weakness, tingling, memory problems  Psych:  No fatigue, insomnia, mood problems, depression  Endocrine:  No polyuria, polydipsia, cold/heat intolerance  Heme:  No petechiae, ecchymosis, easy bruisability  Skin:  No rash, tattoos, scars, edema      PHYSICAL EXAM:   Vital Signs:   Vital Signs Last 24 Hrs  T(C): 36.8 (04 Aug 2017 10:18), Max: 37 (04 Aug 2017 02:00)  T(F): 98.2 (04 Aug 2017 10:18), Max: 98.6 (04 Aug 2017 02:00)  HR: 67 (04 Aug 2017 10:18) (65 - 70)  BP: 123/69 (04 Aug 2017 10:18) (100/67 - 133/68)  BP(mean): --  RR: 18 (04 Aug 2017 10:18) (17 - 18)  SpO2: 97% (04 Aug 2017 10:18) (97% - 99%)  Daily     Daily     GENERAL:  Resting in bed comfortably in NAD   HEENT:  NC/AT,  conjunctivae clear and pink, no thyromegaly, nodules, adenopathy, no JVD, sclera -anicteric  CHEST:  Full & symmetric excursion, no increased effort, breath sounds clear  HEART:  Regular rhythm, S1, S2, no murmur/rub/S3/S4, no abdominal bruit, no edema  ABDOMEN:  Soft, mildly tender to palpation generalized; non-distended, normoactive bowel sounds,  no masses ,no hepato-splenomegaly, no signs of chronic liver disease, PEG c/d/i  EXTEREMITIES:  no cyanosis ,clubbing or edema  SKIN:  No rash/erythema/ecchymoses/petechiae/wounds/abscess/warm/dry  NEURO:  Alert, oriented, no asterixis, no tremor, no encephalopathy      LABS:                        9.6    5.88  )-----------( 306      ( 04 Aug 2017 08:40 )             31.6     08-04    138  |  99  |  3<L>  ----------------------------<  85  4.1   |  27  |  0.39<L>    Ca    8.8      04 Aug 2017 08:58  Phos  4.0     08-03    TPro  6.1  /  Alb  3.1<L>  /  TBili  0.3  /  DBili  x   /  AST  18  /  ALT  8<L>  /  AlkPhos  68  08-04        RADIOLOGY & ADDITIONAL TESTS:

## 2017-08-10 NOTE — CHART NOTE - NSCHARTNOTEFT_GEN_A_CORE
Called by RN that patient found with only 50 mcg fentanyl patch on patient that the 100mcg was removed today according. Patient is to have a total of 150 micrograms.    Discussed with pharmacy.   Plan  D/c active fentanyl 100 mcg  reorder fentanyl 100mg    Will endorse to primary team

## 2017-08-10 NOTE — DISCHARGE NOTE ADULT - PATIENT PORTAL LINK FT
“You can access the [employer] Patient Portal, offered by [facility], by registering with the following website: [email or web address]”

## 2017-08-11 VITALS
HEART RATE: 76 BPM | DIASTOLIC BLOOD PRESSURE: 75 MMHG | TEMPERATURE: 97 F | OXYGEN SATURATION: 98 % | SYSTOLIC BLOOD PRESSURE: 119 MMHG | RESPIRATION RATE: 18 BRPM

## 2017-08-11 PROCEDURE — 80053 COMPREHEN METABOLIC PANEL: CPT

## 2017-08-11 PROCEDURE — 87040 BLOOD CULTURE FOR BACTERIA: CPT

## 2017-08-11 PROCEDURE — 47531 INJECTION FOR CHOLANGIOGRAM: CPT

## 2017-08-11 PROCEDURE — 96375 TX/PRO/DX INJ NEW DRUG ADDON: CPT

## 2017-08-11 PROCEDURE — 97116 GAIT TRAINING THERAPY: CPT

## 2017-08-11 PROCEDURE — 80048 BASIC METABOLIC PNL TOTAL CA: CPT

## 2017-08-11 PROCEDURE — 74177 CT ABD & PELVIS W/CONTRAST: CPT

## 2017-08-11 PROCEDURE — 84450 TRANSFERASE (AST) (SGOT): CPT

## 2017-08-11 PROCEDURE — 99239 HOSP IP/OBS DSCHRG MGMT >30: CPT

## 2017-08-11 PROCEDURE — 85027 COMPLETE CBC AUTOMATED: CPT

## 2017-08-11 PROCEDURE — 96374 THER/PROPH/DIAG INJ IV PUSH: CPT | Mod: XU

## 2017-08-11 PROCEDURE — 97530 THERAPEUTIC ACTIVITIES: CPT

## 2017-08-11 PROCEDURE — 96376 TX/PRO/DX INJ SAME DRUG ADON: CPT

## 2017-08-11 PROCEDURE — 84100 ASSAY OF PHOSPHORUS: CPT

## 2017-08-11 PROCEDURE — 85610 PROTHROMBIN TIME: CPT

## 2017-08-11 PROCEDURE — 97161 PT EVAL LOW COMPLEX 20 MIN: CPT

## 2017-08-11 PROCEDURE — 99285 EMERGENCY DEPT VISIT HI MDM: CPT | Mod: 25

## 2017-08-11 PROCEDURE — C1769: CPT

## 2017-08-11 PROCEDURE — 82247 BILIRUBIN TOTAL: CPT

## 2017-08-11 PROCEDURE — 84460 ALANINE AMINO (ALT) (SGPT): CPT

## 2017-08-11 PROCEDURE — 85730 THROMBOPLASTIN TIME PARTIAL: CPT

## 2017-08-11 RX ORDER — DOCUSATE SODIUM 100 MG
1 CAPSULE ORAL
Qty: 90 | Refills: 0 | OUTPATIENT
Start: 2017-08-11 | End: 2017-09-10

## 2017-08-11 RX ORDER — ONDANSETRON 8 MG/1
1 TABLET, FILM COATED ORAL
Qty: 90 | Refills: 0 | OUTPATIENT
Start: 2017-08-11 | End: 2017-09-10

## 2017-08-11 RX ORDER — MAGNESIUM HYDROXIDE 400 MG/1
15 TABLET, CHEWABLE ORAL
Qty: 450 | Refills: 0 | OUTPATIENT
Start: 2017-08-11 | End: 2017-09-10

## 2017-08-11 RX ORDER — LACTULOSE 10 G/15ML
15 SOLUTION ORAL
Qty: 450 | Refills: 0 | OUTPATIENT
Start: 2017-08-11 | End: 2017-09-10

## 2017-08-11 RX ORDER — SENNA PLUS 8.6 MG/1
2 TABLET ORAL
Qty: 60 | Refills: 0 | OUTPATIENT
Start: 2017-08-11 | End: 2017-09-10

## 2017-08-11 RX ORDER — FENTANYL CITRATE 50 UG/ML
1 INJECTION INTRAVENOUS
Qty: 0 | Refills: 0 | Status: DISCONTINUED | OUTPATIENT
Start: 2017-08-11 | End: 2017-08-11

## 2017-08-11 RX ORDER — ONDANSETRON 8 MG/1
1 TABLET, FILM COATED ORAL
Qty: 0 | Refills: 0 | COMMUNITY

## 2017-08-11 RX ORDER — MUPIROCIN 20 MG/G
1 OINTMENT TOPICAL
Qty: 1 | Refills: 0 | OUTPATIENT
Start: 2017-08-11 | End: 2017-09-10

## 2017-08-11 RX ORDER — HYDROMORPHONE HYDROCHLORIDE 2 MG/ML
4 INJECTION INTRAMUSCULAR; INTRAVENOUS; SUBCUTANEOUS
Qty: 240 | Refills: 0 | OUTPATIENT
Start: 2017-08-11 | End: 2017-08-21

## 2017-08-11 RX ORDER — FENTANYL CITRATE 50 UG/ML
1 INJECTION INTRAVENOUS
Qty: 10 | Refills: 0 | OUTPATIENT
Start: 2017-08-11

## 2017-08-11 RX ORDER — MAGNESIUM HYDROXIDE 400 MG/1
15 TABLET, CHEWABLE ORAL
Qty: 0 | Refills: 0 | COMMUNITY

## 2017-08-11 RX ADMIN — Medication 30 MILLILITER(S): at 00:50

## 2017-08-11 RX ADMIN — HYDROMORPHONE HYDROCHLORIDE 8 MILLIGRAM(S): 2 INJECTION INTRAMUSCULAR; INTRAVENOUS; SUBCUTANEOUS at 15:45

## 2017-08-11 RX ADMIN — HYDROMORPHONE HYDROCHLORIDE 8 MILLIGRAM(S): 2 INJECTION INTRAMUSCULAR; INTRAVENOUS; SUBCUTANEOUS at 10:25

## 2017-08-11 RX ADMIN — Medication 650 MILLIGRAM(S): at 14:01

## 2017-08-11 RX ADMIN — ONDANSETRON 4 MILLIGRAM(S): 8 TABLET, FILM COATED ORAL at 06:39

## 2017-08-11 RX ADMIN — HYDROMORPHONE HYDROCHLORIDE 8 MILLIGRAM(S): 2 INJECTION INTRAMUSCULAR; INTRAVENOUS; SUBCUTANEOUS at 15:28

## 2017-08-11 RX ADMIN — HYDROMORPHONE HYDROCHLORIDE 8 MILLIGRAM(S): 2 INJECTION INTRAMUSCULAR; INTRAVENOUS; SUBCUTANEOUS at 09:55

## 2017-08-11 RX ADMIN — Medication 100 MILLIGRAM(S): at 14:01

## 2017-08-11 RX ADMIN — Medication 650 MILLIGRAM(S): at 06:42

## 2017-08-11 RX ADMIN — FENTANYL CITRATE 1 PATCH: 50 INJECTION INTRAVENOUS at 11:54

## 2017-08-11 RX ADMIN — MUPIROCIN 1 APPLICATION(S): 20 OINTMENT TOPICAL at 06:40

## 2017-08-11 RX ADMIN — ONDANSETRON 4 MILLIGRAM(S): 8 TABLET, FILM COATED ORAL at 12:14

## 2017-08-11 RX ADMIN — Medication 30 MILLILITER(S): at 06:40

## 2017-08-11 RX ADMIN — Medication 30 MILLILITER(S): at 11:54

## 2017-08-11 RX ADMIN — ENOXAPARIN SODIUM 40 MILLIGRAM(S): 100 INJECTION SUBCUTANEOUS at 11:54

## 2017-08-11 RX ADMIN — Medication 100 MILLIGRAM(S): at 06:39

## 2017-08-11 RX ADMIN — FENTANYL CITRATE 1 PATCH: 50 INJECTION INTRAVENOUS at 11:53

## 2017-08-11 NOTE — PROGRESS NOTE ADULT - NEUROLOGICAL
Alert & oriented; no sensory, motor or coordination deficits, normal reflexes

## 2017-08-11 NOTE — PROGRESS NOTE ADULT - PROVIDER SPECIALTY LIST ADULT
Gastroenterology
Heme/Onc
Hospitalist
Intervent Radiology
Palliative Care
Surgery
Hospitalist
Palliative Care
Surgery
Gastroenterology
Heme/Onc

## 2017-08-11 NOTE — PROGRESS NOTE ADULT - GASTROINTESTINAL COMMENTS
Retention sutures in place, colostomy, venting PEG, biliary drain capped.
Soft. Colostomy, venting PEG and cholecystostomy tube in place.
Colostomy, retention sutures
Colostomy in place

## 2017-08-11 NOTE — PROGRESS NOTE ADULT - CARDIOVASCULAR
Regular rate & rhythm, normal S1, S2; no murmurs, gallops or rubs; no S3, S4

## 2017-08-11 NOTE — PROGRESS NOTE ADULT - CONSTITUTIONAL
Well-developed, well nourished

## 2017-08-11 NOTE — PROGRESS NOTE ADULT - SUBJECTIVE AND OBJECTIVE BOX
Patient is a 46y old  Female who presents with a chief complaint of abdominal pain (10 Aug 2017 11:37)    HPI: Pain controlled. Good ostomy output. C/o fatigue as did not sleep well last night.     Vital Signs Last 24 Hrs  T(C): 36.5 (11 Aug 2017 10:10), Max: 37.1 (10 Aug 2017 22:31)  T(F): 97.7 (11 Aug 2017 10:10), Max: 98.7 (10 Aug 2017 22:31)  HR: 68 (11 Aug 2017 10:10) (59 - 75)  BP: 108/68 (11 Aug 2017 10:10) (106/72 - 118/80)  BP(mean): --  RR: 18 (11 Aug 2017 10:10) (18 - 18)  SpO2: 98% (11 Aug 2017 10:10) (97% - 98%)    MEDICATIONS  (STANDING):  docusate sodium 100 milliGRAM(s) Oral three times a day  enoxaparin Injectable 40 milliGRAM(s) SubCutaneous daily  senna 2 Tablet(s) Oral at bedtime  aluminum hydroxide/magnesium hydroxide/simethicone Suspension 30 milliLiter(s) Enteral Tube every 6 hours  mupirocin 2% Ointment 1 Application(s) Topical two times a day  ondansetron Injectable 4 milliGRAM(s) IV Push every 8 hours  fentaNYL   Patch  50 MICROgram(s)/Hr 1 Patch Transdermal every 72 hours  fentaNYL   Patch 100 MICROgram(s)/Hr 1 Patch Transdermal every 72 hours    MEDICATIONS  (PRN):  acetaminophen   Tablet 650 milliGRAM(s) Oral every 6 hours PRN pain  naloxone Injectable 0.1 milliGRAM(s) IV Push every 3 minutes PRN For ANY of the following changes in patient status:  A. RR LESS THAN 10 breaths per minute, B. Oxygen saturation LESS THAN 90%, C. Sedation score of 6  ondansetron Injectable 4 milliGRAM(s) IV Push every 6 hours PRN Nausea  bisacodyl 5 milliGRAM(s) Oral every 12 hours PRN Constipation  HYDROmorphone   Tablet 8 milliGRAM(s) Oral every 3 hours PRN Breakthrough pain

## 2017-08-11 NOTE — PROGRESS NOTE ADULT - ASSESSMENT
45 y/o F with history of ovarian cancer s/p GRETCHEN-BSO with tumor debulking and colon resection, complicated post-op course with poor wound healing and recent hospitalization for cholangitis with IR perc ubaldo tube in place and malignant SBO s/p surgical, with known CBD stone presented with worsening abdominal pain now s/p ERCP with stone retrieval 8/2 with persistent abdominal pain.
47yo female PMH ovarian cancer s/p GRETCHEN, colostomy (2016), lesser sac collection s/p evacuation by EUS and later IR drain placement (6/12/17) , acute cholecystitis s/p percutaneous cholecystostomy drain placement by IR (6/9/17) with drain check (last 7/19/17) found to have a CBD stone during drain check, also presenting with generalized abdominal pain x 1 day
47yo female PMH ovarian cancer s/p GRETCHEN, colostomy (2016), lesser sac collection s/p evacuation by EUS and later IR drain placement (6/12/17) , acute cholecystitis s/p percutaneous cholecystostomy drain placement by IR (6/9/17) with drain check (last 7/19/17) found to have a CBD stone during drain check, also presenting with generalized abdominal pain x 1 day
45 y/o F with history of ovarian cancer s/p GRETCHEN-BSO with tumor debulking and colon resection, complicated post-op course with poor wound healing and recent hospitalization for cholangitis with IR perc ubaldo tube in place and malignant SBO s/p surgical, with known CBD stone presented with worsening abdominal pain now s/p ERCP with stone retrieval 8/2 with persistent abdominal pain.
45yo female PMH ovarian cancer s/p GRETCHEN, colostomy (2016), lesser sac collection s/p evacuation by EUS and later IR drain placement (6/12/17) , acute cholecystitis s/p percutaneous cholecystostomy drain placement by IR (6/9/17) with drain check (last 7/19/17) found to have a CBD stone during drain check, also presenting with generalized abdominal pain x 1 day
45yo female PMH ovarian cancer s/p GRETCHEN, colostomy (2016), lesser sac collection s/p evacuation by EUS and later IR drain placement (6/12/17) , acute cholecystitis s/p percutaneous cholecystostomy drain placement by IR (6/9/17) with drain check (last 7/19/17) found to have a CBD stone during drain check, also presenting with generalized abdominal pain x 1 day
45yo female w ovarian cancer s/p GRETCHEN, colostomy (2016), lesser sac collection s/p evacuation by EUS and later IR drain placement (6/12/17) , acute cholecystitis s/p percutaneous cholecystostomy drain placement by IR (6/9/17) with drain check (last 7/19/17) found to have a CBD stone during drain check aw abdominal pain. S/p ERCP and stone removal. Persistent pain due to mets- now improving
46 F with metastatic ovarian cancer, neoplasm pain, constipation, choledocholithiasis
46 F with metastatic ovarian caner, neoplasm pain, constipation.
46 year old woman with metastatic ovarian ca s/p exlap CORDELL for malignant SBO, with cholecystostomy tube s/p ERCP removal of CBD stone    Plan:  1) Consideration of chemotherapy  2) IR to perform tube study?
46 year old woman with metastatic ovarian ca s/p exlap CORDELL for malignant SBO, with cholecystostomy tube s/p ERCP removal of CBD stone    Plan:  1) For IR tube check today ? possible capping  2) D/C planning for tomorrow
46F with h/o ovarian cancer s/p GRETCHEN, colostomy (2016), lesser sac collection s/p evacuation by EUS and later IR drain placement (6/12/17) , acute cholecystitis s/p percutaneous cholecystostomy drain placement by IR (6/9/17) who presents with generalized abdominal pain x 1 day not alleviated with home pain medication.  Physical exam is notable for diffuse abdominal pain. Labs are negative for leukocytosis. Abd CT done 6/29/17 shows worsening peritoneal carcinomatosis and mild prominence of the common bile duct up to 6 mm. Patient is S/P ERCP and pain has improved on pain medications managed by Palliative care team
46F with h/o ovarian cancer s/p GRETCHEN, colostomy (2016), lesser sac collection s/p evacuation by EUS and later IR drain placement (6/12/17) , acute cholecystitis s/p percutaneous cholecystostomy drain placement by IR (6/9/17) who presents with generalized abdominal pain x 1 day not alleviated with home pain medication.  Physical exam is notable for diffuse abdominal pain. Labs are negative for leukocytosis. Abd CT done 6/29/17 shows worsening peritoneal carcinomatosis and mild prominence of the common bile duct up to 6 mm. Patient is S/P ERCP and pain has improved on pain medications managed by Palliative care team.  Today with nausea and no vomiting.
46F with h/o ovarian cancer s/p GRETCHEN, colostomy (2016), lesser sac collection s/p evacuation by EUS and later IR drain placement (6/12/17) , acute cholecystitis s/p percutaneous cholecystostomy drain placement by IR (6/9/17) who presents with generalized abdominal pain x 1 day not alleviated with home pain medication.  Physical exam is notable for diffuse abdominal pain. Labs are negative for leukocytosis. Abd CT done 6/29/17 shows worsening peritoneal carcinomatosis and mild prominence of the common bile duct up to 6 mm. Patient is awaiting for ERCP to be done today
47yo female PMH ovarian cancer s/p GRETCHEN, colostomy (2016), lesser sac collection s/p evacuation by EUS and later IR drain placement (6/12/17) , acute cholecystitis s/p percutaneous cholecystostomy drain placement by IR (6/9/17) with drain check (last 7/19/17) found to have a CBD stone during drain check aw abdominal pain. S/p ERCP and stone removal.
47yo female PMH ovarian cancer s/p GRETCHEN, colostomy (2016), lesser sac collection s/p evacuation by EUS and later IR drain placement (6/12/17) , acute cholecystitis s/p percutaneous cholecystostomy drain placement by IR (6/9/17) with drain check (last 7/19/17) found to have a CBD stone during drain check, also presenting with generalized abdominal pain x 1 day
47yo female w ovarian cancer s/p GRETCHEN, colostomy (2016), lesser sac collection s/p evacuation by EUS and later IR drain placement (6/12/17) , acute cholecystitis s/p percutaneous cholecystostomy drain placement by IR (6/9/17) with drain check (last 7/19/17) found to have a CBD stone during drain check aw abdominal pain. S/p ERCP and stone removal. Persistent pain due to mets- now improving
47yo female w ovarian cancer s/p GRETCHEN, colostomy (2016), lesser sac collection s/p evacuation by EUS and later IR drain placement (6/12/17) , acute cholecystitis s/p percutaneous cholecystostomy drain placement by IR (6/9/17) with drain check (last 7/19/17) found to have a CBD stone during drain check aw abdominal pain. S/p ERCP and stone removal. Persistent pain due to mets.
47yo female w ovarian cancer s/p GRETCHEN, colostomy (2016), lesser sac collection s/p evacuation by EUS and later IR drain placement (6/12/17) aw abdominal pain- S/p ERCP and stone removal. Persistent pain due to mets- now improving.
Patient is a 45 y/o F with h/o ovarian cancer s/p GRETCHEN, colostomy (2016), lesser sac collection s/p evacuation by EUS and later IR drain placement (6/12/17) , acute cholecystitis s/p percutaneous cholecystostomy drain placement by IR (6/9/17) who presents with generalized abdominal pain in the setting of choledocholithiasis, constipation, and worsening peritoneal carcinomatosis.  Patient is s/p ERC.
Patient is a 45 y/o F with h/o ovarian cancer s/p GRETCHEN, colostomy (2016), lesser sac collection s/p evacuation by EUS and later IR drain placement (6/12/17) , acute cholecystitis s/p percutaneous cholecystostomy drain placement by IR (6/9/17) who presents with generalized abdominal pain x 1 day. Palliative consulted for pain management.
Patient is a 47 y/o F with h/o ovarian cancer s/p GRETCHEN, colostomy (2016), lesser sac collection s/p evacuation by EUS and later IR drain placement (6/12/17) , acute cholecystitis s/p percutaneous cholecystostomy drain placement by IR (6/9/17) who presents with generalized abdominal pain in the setting of choledocholithiasis, constipation, and worsening peritoneal carcinomatosis.  Patient is s/p ERC.
Patient is a 47 y/o F with h/o ovarian cancer s/p GRETCHEN, colostomy (2016), lesser sac collection s/p evacuation by EUS and later IR drain placement (6/12/17) , acute cholecystitis s/p percutaneous cholecystostomy drain placement by IR (6/9/17) who presents with generalized abdominal pain in the setting of choledocholithiasis, constipation, and worsening peritoneal carcinomatosis.  Patient is s/p ERC.   Current c/o Mild to Moderate pain
Patient is a 47 y/o F with h/o ovarian cancer s/p GRETCHEN, colostomy (2016), lesser sac collection s/p evacuation by EUS and later IR drain placement (6/12/17) , acute cholecystitis s/p percutaneous cholecystostomy drain placement by IR (6/9/17) who presents with generalized abdominal pain x 1 day. Palliative consulted for pain management.
Patient is a 47 y/o F with h/o ovarian cancer s/p GRETCHEN, colostomy (2016), lesser sac collection s/p evacuation by EUS and later IR drain placement (6/12/17) , acute cholecystitis s/p percutaneous cholecystostomy drain placement by IR (6/9/17) who presents with generalized abdominal pain in the setting of choledocholithiasis, constipation, and worsening peritoneal carcinomatosis.  Patient is s/p ERC.   Current c/o Mild to Moderate pain and persistent constipation.
46 F with metastatic ovarian cancer, neoplasm pain, constipation, choledocholithiasis.

## 2017-08-11 NOTE — PROGRESS NOTE ADULT - RS GEN PE MLT RESP DETAILS PC
breath sounds equal/airway patent
good air movement/breath sounds equal/airway patent
good air movement/airway patent/breath sounds equal
breath sounds equal/airway patent/good air movement

## 2017-08-11 NOTE — PROGRESS NOTE ADULT - ATTENDING COMMENTS
Dr. Goldberg had a lengthy discussion with the patient and we followed up together.  At this time she seems to be back in a denial mode and while she says she wants treatment for her cancer she is focused on nutritional approaches.  She has been encouraged to follow up as an outpatient and we will have our team call her to help schedule as needed.  Prognosis very poor with no treatment.
D/c planning if continues to improve
Will sign off once symptoms are controlled and GOC are defined. Please call us PRN
Luke planning for tomorrow. Opiate prescriptions sent to [facility] today. Follow up appointment to be made with Dr. Salgado for pain control.
Discharge home today. Went over follow up and instructions with pt and her randy Nino. Dc time 50 minutes

## 2017-08-11 NOTE — PROGRESS NOTE ADULT - PSYCHIATRIC
detailed exam
Affect and characteristics of appearance, verbalizations, behaviors are appropriate
Affect and characteristics of appearance, verbalizations, behaviors are appropriate

## 2017-08-15 ENCOUNTER — APPOINTMENT (OUTPATIENT)
Dept: SURGICAL ONCOLOGY | Facility: CLINIC | Age: 47
End: 2017-08-15
Payer: COMMERCIAL

## 2017-08-15 VITALS
HEART RATE: 91 BPM | WEIGHT: 98 LBS | DIASTOLIC BLOOD PRESSURE: 78 MMHG | TEMPERATURE: 98.2 F | BODY MASS INDEX: 16.33 KG/M2 | SYSTOLIC BLOOD PRESSURE: 104 MMHG | RESPIRATION RATE: 15 BRPM | OXYGEN SATURATION: 98 % | HEIGHT: 65 IN

## 2017-08-15 PROCEDURE — 99024 POSTOP FOLLOW-UP VISIT: CPT

## 2017-08-17 ENCOUNTER — OUTPATIENT (OUTPATIENT)
Dept: OUTPATIENT SERVICES | Facility: HOSPITAL | Age: 47
LOS: 1 days | End: 2017-08-17
Payer: COMMERCIAL

## 2017-08-17 DIAGNOSIS — K65.1 PERITONEAL ABSCESS: ICD-10-CM

## 2017-08-17 DIAGNOSIS — Z90.710 ACQUIRED ABSENCE OF BOTH CERVIX AND UTERUS: Chronic | ICD-10-CM

## 2017-08-17 DIAGNOSIS — Z93.3 COLOSTOMY STATUS: Chronic | ICD-10-CM

## 2017-08-17 DIAGNOSIS — Z00.00 ENCOUNTER FOR GENERAL ADULT MEDICAL EXAMINATION WITHOUT ABNORMAL FINDINGS: ICD-10-CM

## 2017-08-17 PROCEDURE — 47537 REMOVAL BILIARY DRG CATH: CPT

## 2017-08-17 PROCEDURE — C1769: CPT

## 2017-08-22 DIAGNOSIS — K81.0 ACUTE CHOLECYSTITIS: ICD-10-CM

## 2017-08-22 DIAGNOSIS — Z43.4 ENCOUNTER FOR ATTENTION TO OTHER ARTIFICIAL OPENINGS OF DIGESTIVE TRACT: ICD-10-CM

## 2017-08-24 ENCOUNTER — OUTPATIENT (OUTPATIENT)
Dept: OUTPATIENT SERVICES | Facility: HOSPITAL | Age: 47
LOS: 1 days | Discharge: HOME | End: 2017-08-24

## 2017-08-24 DIAGNOSIS — C56.9 MALIGNANT NEOPLASM OF UNSPECIFIED OVARY: ICD-10-CM

## 2017-08-24 DIAGNOSIS — Z93.3 COLOSTOMY STATUS: Chronic | ICD-10-CM

## 2017-08-24 DIAGNOSIS — Z90.710 ACQUIRED ABSENCE OF BOTH CERVIX AND UTERUS: Chronic | ICD-10-CM

## 2017-08-25 ENCOUNTER — APPOINTMENT (OUTPATIENT)
Dept: GERIATRICS | Facility: CLINIC | Age: 47
End: 2017-08-25
Payer: COMMERCIAL

## 2017-08-25 VITALS
SYSTOLIC BLOOD PRESSURE: 109 MMHG | DIASTOLIC BLOOD PRESSURE: 71 MMHG | WEIGHT: 98 LBS | TEMPERATURE: 99.1 F | OXYGEN SATURATION: 98 % | BODY MASS INDEX: 16.31 KG/M2 | HEART RATE: 84 BPM

## 2017-08-25 PROCEDURE — 99215 OFFICE O/P EST HI 40 MIN: CPT

## 2017-08-30 ENCOUNTER — APPOINTMENT (OUTPATIENT)
Dept: HEMATOLOGY ONCOLOGY | Facility: CLINIC | Age: 47
End: 2017-08-30
Payer: COMMERCIAL

## 2017-08-30 VITALS
WEIGHT: 97.22 LBS | HEIGHT: 66 IN | BODY MASS INDEX: 15.62 KG/M2 | RESPIRATION RATE: 16 BRPM | TEMPERATURE: 99 F | SYSTOLIC BLOOD PRESSURE: 104 MMHG | HEART RATE: 80 BPM | OXYGEN SATURATION: 98 % | DIASTOLIC BLOOD PRESSURE: 71 MMHG

## 2017-08-30 DIAGNOSIS — Z85.43 PERSONAL HISTORY OF MALIGNANT NEOPLASM OF OVARY: ICD-10-CM

## 2017-08-30 PROCEDURE — 99215 OFFICE O/P EST HI 40 MIN: CPT

## 2017-08-30 RX ORDER — LACTULOSE 10 G/15ML
10 SOLUTION ORAL EVERY 6 HOURS
Qty: 1800 | Refills: 0 | Status: DISCONTINUED | COMMUNITY
Start: 2017-07-28 | End: 2017-08-30

## 2017-09-05 PROBLEM — Z85.43 HISTORY OF OVARIAN CANCER: Status: RESOLVED | Noted: 2017-09-05 | Resolved: 2017-09-05

## 2017-09-15 ENCOUNTER — APPOINTMENT (OUTPATIENT)
Dept: GERIATRICS | Facility: CLINIC | Age: 47
End: 2017-09-15
Payer: COMMERCIAL

## 2017-09-15 DIAGNOSIS — Z51.5 ENCOUNTER FOR PALLIATIVE CARE: ICD-10-CM

## 2017-09-15 PROCEDURE — 99215 OFFICE O/P EST HI 40 MIN: CPT

## 2017-09-17 PROBLEM — Z51.5 ENCOUNTER FOR PALLIATIVE CARE: Status: ACTIVE | Noted: 2017-07-13

## 2017-09-28 ENCOUNTER — RX RENEWAL (OUTPATIENT)
Age: 47
End: 2017-09-28

## 2017-10-03 ENCOUNTER — APPOINTMENT (OUTPATIENT)
Dept: SURGICAL ONCOLOGY | Facility: CLINIC | Age: 47
End: 2017-10-03
Payer: COMMERCIAL

## 2017-10-03 VITALS
WEIGHT: 96 LBS | TEMPERATURE: 98.2 F | HEART RATE: 78 BPM | HEIGHT: 66 IN | RESPIRATION RATE: 15 BRPM | OXYGEN SATURATION: 98 % | DIASTOLIC BLOOD PRESSURE: 74 MMHG | BODY MASS INDEX: 15.43 KG/M2 | SYSTOLIC BLOOD PRESSURE: 109 MMHG

## 2017-10-03 PROCEDURE — 99214 OFFICE O/P EST MOD 30 MIN: CPT

## 2017-10-04 ENCOUNTER — OUTPATIENT (OUTPATIENT)
Dept: OUTPATIENT SERVICES | Facility: HOSPITAL | Age: 47
LOS: 1 days | Discharge: HOME | End: 2017-10-04

## 2017-10-04 DIAGNOSIS — Z90.710 ACQUIRED ABSENCE OF BOTH CERVIX AND UTERUS: Chronic | ICD-10-CM

## 2017-10-04 DIAGNOSIS — Z93.3 COLOSTOMY STATUS: Chronic | ICD-10-CM

## 2017-10-04 DIAGNOSIS — C56.9 MALIGNANT NEOPLASM OF UNSPECIFIED OVARY: ICD-10-CM

## 2017-10-06 ENCOUNTER — APPOINTMENT (OUTPATIENT)
Dept: GERIATRICS | Facility: CLINIC | Age: 47
End: 2017-10-06
Payer: COMMERCIAL

## 2017-10-13 ENCOUNTER — APPOINTMENT (OUTPATIENT)
Dept: HEMATOLOGY ONCOLOGY | Facility: CLINIC | Age: 47
End: 2017-10-13

## 2017-10-13 RX ORDER — FENTANYL 100 UG/H
100 PATCH, EXTENDED RELEASE TRANSDERMAL
Qty: 10 | Refills: 0 | Status: DISCONTINUED | COMMUNITY
Start: 2017-08-30 | End: 2017-10-13

## 2017-10-13 RX ORDER — FENTANYL 50 UG/H
50 PATCH, EXTENDED RELEASE TRANSDERMAL
Qty: 10 | Refills: 0 | Status: DISCONTINUED | COMMUNITY
Start: 2017-07-10 | End: 2017-10-13

## 2017-10-20 ENCOUNTER — APPOINTMENT (OUTPATIENT)
Dept: GERIATRICS | Facility: CLINIC | Age: 47
End: 2017-10-20
Payer: COMMERCIAL

## 2017-10-20 VITALS
TEMPERATURE: 99.1 F | HEART RATE: 76 BPM | BODY MASS INDEX: 15.83 KG/M2 | OXYGEN SATURATION: 97 % | WEIGHT: 98.1 LBS | DIASTOLIC BLOOD PRESSURE: 79 MMHG | RESPIRATION RATE: 16 BRPM | SYSTOLIC BLOOD PRESSURE: 111 MMHG

## 2017-10-20 PROCEDURE — 99215 OFFICE O/P EST HI 40 MIN: CPT

## 2017-11-14 ENCOUNTER — APPOINTMENT (OUTPATIENT)
Dept: SURGICAL ONCOLOGY | Facility: CLINIC | Age: 47
End: 2017-11-14

## 2017-11-20 ENCOUNTER — APPOINTMENT (OUTPATIENT)
Dept: GERIATRICS | Facility: CLINIC | Age: 47
End: 2017-11-20
Payer: COMMERCIAL

## 2017-11-20 VITALS
RESPIRATION RATE: 16 BRPM | DIASTOLIC BLOOD PRESSURE: 71 MMHG | HEART RATE: 86 BPM | TEMPERATURE: 98.7 F | WEIGHT: 95.68 LBS | SYSTOLIC BLOOD PRESSURE: 104 MMHG | OXYGEN SATURATION: 99 % | BODY MASS INDEX: 15.44 KG/M2

## 2017-11-20 PROCEDURE — 99215 OFFICE O/P EST HI 40 MIN: CPT

## 2017-12-15 ENCOUNTER — APPOINTMENT (OUTPATIENT)
Dept: GERIATRICS | Facility: CLINIC | Age: 47
End: 2017-12-15

## 2017-12-20 ENCOUNTER — INPATIENT (INPATIENT)
Facility: HOSPITAL | Age: 47
LOS: 11 days | Discharge: HOME | DRG: 374 | End: 2018-01-01
Attending: INTERNAL MEDICINE | Admitting: HOSPITALIST
Payer: COMMERCIAL

## 2017-12-20 ENCOUNTER — APPOINTMENT (OUTPATIENT)
Dept: GERIATRICS | Facility: CLINIC | Age: 47
End: 2017-12-20
Payer: COMMERCIAL

## 2017-12-20 VITALS
SYSTOLIC BLOOD PRESSURE: 100 MMHG | BODY MASS INDEX: 14.99 KG/M2 | WEIGHT: 93.25 LBS | TEMPERATURE: 100.1 F | OXYGEN SATURATION: 96 % | DIASTOLIC BLOOD PRESSURE: 60 MMHG | HEIGHT: 66 IN | HEART RATE: 106 BPM | RESPIRATION RATE: 17 BRPM

## 2017-12-20 VITALS
TEMPERATURE: 100 F | OXYGEN SATURATION: 98 % | SYSTOLIC BLOOD PRESSURE: 111 MMHG | HEART RATE: 100 BPM | DIASTOLIC BLOOD PRESSURE: 77 MMHG

## 2017-12-20 DIAGNOSIS — R11.0 NAUSEA: ICD-10-CM

## 2017-12-20 DIAGNOSIS — C56.9 MALIGNANT NEOPLASM OF UNSPECIFIED OVARY: ICD-10-CM

## 2017-12-20 DIAGNOSIS — Z51.5 ENCOUNTER FOR PALLIATIVE CARE: ICD-10-CM

## 2017-12-20 DIAGNOSIS — R10.9 UNSPECIFIED ABDOMINAL PAIN: ICD-10-CM

## 2017-12-20 DIAGNOSIS — Z93.3 COLOSTOMY STATUS: Chronic | ICD-10-CM

## 2017-12-20 DIAGNOSIS — R50.9 FEVER, UNSPECIFIED: ICD-10-CM

## 2017-12-20 DIAGNOSIS — Z90.710 ACQUIRED ABSENCE OF BOTH CERVIX AND UTERUS: Chronic | ICD-10-CM

## 2017-12-20 DIAGNOSIS — K59.00 CONSTIPATION, UNSPECIFIED: ICD-10-CM

## 2017-12-20 DIAGNOSIS — G89.3 NEOPLASM RELATED PAIN (ACUTE) (CHRONIC): ICD-10-CM

## 2017-12-20 LAB
ALBUMIN SERPL ELPH-MCNC: 3.9 G/DL — SIGNIFICANT CHANGE UP (ref 3.3–5)
ALP SERPL-CCNC: 44 U/L — SIGNIFICANT CHANGE UP (ref 40–120)
ALT FLD-CCNC: <5 U/L RC — LOW (ref 10–45)
ANION GAP SERPL CALC-SCNC: 13 MMOL/L — SIGNIFICANT CHANGE UP (ref 5–17)
APTT BLD: 28 SEC — SIGNIFICANT CHANGE UP (ref 27.5–37.4)
AST SERPL-CCNC: 22 U/L — SIGNIFICANT CHANGE UP (ref 10–40)
BASOPHILS # BLD AUTO: 0.1 K/UL — SIGNIFICANT CHANGE UP (ref 0–0.2)
BASOPHILS NFR BLD AUTO: 0.9 % — SIGNIFICANT CHANGE UP (ref 0–2)
BILIRUB SERPL-MCNC: 0.3 MG/DL — SIGNIFICANT CHANGE UP (ref 0.2–1.2)
BUN SERPL-MCNC: 17 MG/DL — SIGNIFICANT CHANGE UP (ref 7–23)
CALCIUM SERPL-MCNC: 8.8 MG/DL — SIGNIFICANT CHANGE UP (ref 8.4–10.5)
CHLORIDE SERPL-SCNC: 97 MMOL/L — SIGNIFICANT CHANGE UP (ref 96–108)
CO2 SERPL-SCNC: 26 MMOL/L — SIGNIFICANT CHANGE UP (ref 22–31)
CREAT SERPL-MCNC: 0.6 MG/DL — SIGNIFICANT CHANGE UP (ref 0.5–1.3)
EOSINOPHIL # BLD AUTO: 0 K/UL — SIGNIFICANT CHANGE UP (ref 0–0.5)
EOSINOPHIL NFR BLD AUTO: 0.1 % — SIGNIFICANT CHANGE UP (ref 0–6)
GLUCOSE SERPL-MCNC: 82 MG/DL — SIGNIFICANT CHANGE UP (ref 70–99)
HCT VFR BLD CALC: 25.7 % — LOW (ref 34.5–45)
HGB BLD-MCNC: 8.2 G/DL — LOW (ref 11.5–15.5)
INR BLD: 1.06 RATIO — SIGNIFICANT CHANGE UP (ref 0.88–1.16)
LIDOCAIN IGE QN: 14 U/L — SIGNIFICANT CHANGE UP (ref 7–60)
LYMPHOCYTES # BLD AUTO: 1 K/UL — SIGNIFICANT CHANGE UP (ref 1–3.3)
LYMPHOCYTES # BLD AUTO: 13.7 % — SIGNIFICANT CHANGE UP (ref 13–44)
MCHC RBC-ENTMCNC: 29.7 PG — SIGNIFICANT CHANGE UP (ref 27–34)
MCHC RBC-ENTMCNC: 32 GM/DL — SIGNIFICANT CHANGE UP (ref 32–36)
MCV RBC AUTO: 92.9 FL — SIGNIFICANT CHANGE UP (ref 80–100)
MONOCYTES # BLD AUTO: 0.4 K/UL — SIGNIFICANT CHANGE UP (ref 0–0.9)
MONOCYTES NFR BLD AUTO: 5.7 % — SIGNIFICANT CHANGE UP (ref 2–14)
NEUTROPHILS # BLD AUTO: 5.6 K/UL — SIGNIFICANT CHANGE UP (ref 1.8–7.4)
NEUTROPHILS NFR BLD AUTO: 79.6 % — HIGH (ref 43–77)
PLATELET # BLD AUTO: 350 K/UL — SIGNIFICANT CHANGE UP (ref 150–400)
POTASSIUM SERPL-MCNC: 4.5 MMOL/L — SIGNIFICANT CHANGE UP (ref 3.5–5.3)
POTASSIUM SERPL-SCNC: 4.5 MMOL/L — SIGNIFICANT CHANGE UP (ref 3.5–5.3)
PROT SERPL-MCNC: 6.7 G/DL — SIGNIFICANT CHANGE UP (ref 6–8.3)
PROTHROM AB SERPL-ACNC: 11.6 SEC — SIGNIFICANT CHANGE UP (ref 9.8–12.7)
RBC # BLD: 2.76 M/UL — LOW (ref 3.8–5.2)
RBC # FLD: 14.9 % — HIGH (ref 10.3–14.5)
SODIUM SERPL-SCNC: 136 MMOL/L — SIGNIFICANT CHANGE UP (ref 135–145)
WBC # BLD: 7.1 K/UL — SIGNIFICANT CHANGE UP (ref 3.8–10.5)
WBC # FLD AUTO: 7.1 K/UL — SIGNIFICANT CHANGE UP (ref 3.8–10.5)

## 2017-12-20 PROCEDURE — 99215 OFFICE O/P EST HI 40 MIN: CPT | Mod: 25

## 2017-12-20 PROCEDURE — 99285 EMERGENCY DEPT VISIT HI MDM: CPT | Mod: 25

## 2017-12-20 PROCEDURE — 71010: CPT | Mod: 26

## 2017-12-20 PROCEDURE — 99354: CPT

## 2017-12-20 PROCEDURE — 93010 ELECTROCARDIOGRAM REPORT: CPT | Mod: 59

## 2017-12-20 PROCEDURE — 74177 CT ABD & PELVIS W/CONTRAST: CPT | Mod: 26

## 2017-12-20 PROCEDURE — 99223 1ST HOSP IP/OBS HIGH 75: CPT | Mod: GC

## 2017-12-20 RX ORDER — CEFEPIME 1 G/1
1000 INJECTION, POWDER, FOR SOLUTION INTRAMUSCULAR; INTRAVENOUS EVERY 12 HOURS
Qty: 0 | Refills: 0 | Status: DISCONTINUED | OUTPATIENT
Start: 2017-12-20 | End: 2017-12-21

## 2017-12-20 RX ORDER — HYDROMORPHONE HYDROCHLORIDE 2 MG/ML
1 INJECTION INTRAMUSCULAR; INTRAVENOUS; SUBCUTANEOUS
Qty: 0 | Refills: 0 | Status: DISCONTINUED | OUTPATIENT
Start: 2017-12-20 | End: 2017-12-20

## 2017-12-20 RX ORDER — VANCOMYCIN HCL 1 G
1000 VIAL (EA) INTRAVENOUS ONCE
Qty: 0 | Refills: 0 | Status: COMPLETED | OUTPATIENT
Start: 2017-12-20 | End: 2017-12-20

## 2017-12-20 RX ORDER — SODIUM CHLORIDE 9 MG/ML
3 INJECTION INTRAMUSCULAR; INTRAVENOUS; SUBCUTANEOUS ONCE
Qty: 0 | Refills: 0 | Status: COMPLETED | OUTPATIENT
Start: 2017-12-20 | End: 2017-12-20

## 2017-12-20 RX ORDER — HYDROMORPHONE HYDROCHLORIDE 2 MG/ML
1 INJECTION INTRAMUSCULAR; INTRAVENOUS; SUBCUTANEOUS ONCE
Qty: 0 | Refills: 0 | Status: DISCONTINUED | OUTPATIENT
Start: 2017-12-20 | End: 2017-12-20

## 2017-12-20 RX ORDER — HYDROMORPHONE HYDROCHLORIDE 2 MG/ML
2 INJECTION INTRAMUSCULAR; INTRAVENOUS; SUBCUTANEOUS ONCE
Qty: 0 | Refills: 0 | Status: DISCONTINUED | OUTPATIENT
Start: 2017-12-20 | End: 2017-12-20

## 2017-12-20 RX ORDER — ONDANSETRON 8 MG/1
4 TABLET, FILM COATED ORAL EVERY 6 HOURS
Qty: 0 | Refills: 0 | Status: DISCONTINUED | OUTPATIENT
Start: 2017-12-20 | End: 2017-12-21

## 2017-12-20 RX ORDER — SODIUM CHLORIDE 9 MG/ML
1000 INJECTION INTRAMUSCULAR; INTRAVENOUS; SUBCUTANEOUS ONCE
Qty: 0 | Refills: 0 | Status: COMPLETED | OUTPATIENT
Start: 2017-12-20 | End: 2017-12-20

## 2017-12-20 RX ORDER — HYDROMORPHONE HYDROCHLORIDE 2 MG/ML
1 INJECTION INTRAMUSCULAR; INTRAVENOUS; SUBCUTANEOUS
Qty: 0 | Refills: 0 | Status: DISCONTINUED | OUTPATIENT
Start: 2017-12-20 | End: 2017-12-24

## 2017-12-20 RX ORDER — NALOXONE HYDROCHLORIDE 4 MG/.1ML
0.1 SPRAY NASAL
Qty: 0 | Refills: 0 | Status: DISCONTINUED | OUTPATIENT
Start: 2017-12-20 | End: 2018-01-01

## 2017-12-20 RX ORDER — HYDROMORPHONE HYDROCHLORIDE 2 MG/ML
30 INJECTION INTRAMUSCULAR; INTRAVENOUS; SUBCUTANEOUS
Qty: 0 | Refills: 0 | Status: DISCONTINUED | OUTPATIENT
Start: 2017-12-20 | End: 2017-12-22

## 2017-12-20 RX ORDER — HYDROMORPHONE HYDROCHLORIDE 2 MG/ML
1.5 INJECTION INTRAMUSCULAR; INTRAVENOUS; SUBCUTANEOUS
Qty: 0 | Refills: 0 | Status: DISCONTINUED | OUTPATIENT
Start: 2017-12-20 | End: 2017-12-21

## 2017-12-20 RX ORDER — HYDROMORPHONE HYDROCHLORIDE 2 MG/1
2 TABLET ORAL
Refills: 0 | Status: DISCONTINUED | COMMUNITY
End: 2017-12-20

## 2017-12-20 RX ORDER — SODIUM CHLORIDE 9 MG/ML
1000 INJECTION INTRAMUSCULAR; INTRAVENOUS; SUBCUTANEOUS ONCE
Qty: 0 | Refills: 0 | Status: COMPLETED | OUTPATIENT
Start: 2017-12-20 | End: 2017-12-21

## 2017-12-20 RX ADMIN — HYDROMORPHONE HYDROCHLORIDE 1 MILLIGRAM(S): 2 INJECTION INTRAMUSCULAR; INTRAVENOUS; SUBCUTANEOUS at 16:48

## 2017-12-20 RX ADMIN — HYDROMORPHONE HYDROCHLORIDE 1 MILLIGRAM(S): 2 INJECTION INTRAMUSCULAR; INTRAVENOUS; SUBCUTANEOUS at 22:20

## 2017-12-20 RX ADMIN — SODIUM CHLORIDE 3 MILLILITER(S): 9 INJECTION INTRAMUSCULAR; INTRAVENOUS; SUBCUTANEOUS at 16:00

## 2017-12-20 RX ADMIN — HYDROMORPHONE HYDROCHLORIDE 30 MILLILITER(S): 2 INJECTION INTRAMUSCULAR; INTRAVENOUS; SUBCUTANEOUS at 23:51

## 2017-12-20 RX ADMIN — CEFEPIME 100 MILLIGRAM(S): 1 INJECTION, POWDER, FOR SOLUTION INTRAMUSCULAR; INTRAVENOUS at 19:21

## 2017-12-20 RX ADMIN — Medication 250 MILLIGRAM(S): at 22:20

## 2017-12-20 NOTE — ED ADULT NURSE NOTE - OBJECTIVE STATEMENT
47 year old female presents to ED via EMS complaining of fevers. History of metastatic ovarian CA, colostomy. Had port placed on Thursday not sure what type of port and does not have card. (Dr Velasquez aware and states OK to place PIV vs Port until we can determine type). States she has had fevers x several days unrelieved by tylenol at home. Gets fevers frequently however typically easily managed at home with tylenol. On PO dilaudid at home for chronic abdominal pain. Denies HA, CP, SOB. Patient undressed and placed into gown, call bell in hand and side rails up with bed in lowest position for safety. blanket provided. Comfort and safety provided.

## 2017-12-20 NOTE — CONSULT NOTE ADULT - PROBLEM SELECTOR RECOMMENDATION 4
Patient is full code. Main support is patient's randy Buenrostro. Will continue to follow. Zofran PRN

## 2017-12-20 NOTE — ED PROVIDER NOTE - CARE PLAN
Principal Discharge DX:	Fever, unspecified fever cause  Secondary Diagnosis:	Malignant neoplasm of ovary, unspecified laterality  Secondary Diagnosis:	Generalized abdominal pain

## 2017-12-20 NOTE — ED PROVIDER NOTE - OBJECTIVE STATEMENT
47F on hydromorphone, with PMHx of ovarian CA, PSHx of colostomy, hysterectomy, presents to the ED with complaints of intermittent fever x1 week, associated with abdominal and back pain, Tmax 101F. Pt was being seen by pain management doctor to change medication from Fentanyl to Oxycodone and directed pt to ED to be seen by Dr. Jones, with concern for sepsis due to fever. 47F on hydromorphone, with PMHx of ovarian CA, PSHx of colostomy, hysterectomy, presents to the ED with complaints of intermittent fever x1 week, associated with abdominal and back pain, Tmax 101F. Pt was being seen by pain management doctor to transition medication from Fentanyl to Oxycodone. While at the office, pt was directed to [facility] ED to be seen by Dr. Jones, with concern for sepsis due to fever. 47F on hydromorphone, with PMHx of ovarian CA, PSHx of colostomy, hysterectomy, presents to the ED with complaints of intermittent fever x1 week, associated with abdominal and back pain, Tmax 101F. Pt was being seen by pain management doctor to transition medication from Fentanyl to Oxycodone. While at the office, pt was directed to [facility] ED to be seen by Dr. Jones, with concern for sepsis due to fever.    Dr. Leonidas Johnson (Onc./PMD) #[phone]  Dr. Yan Falcon (PMD)

## 2017-12-20 NOTE — ED PROVIDER NOTE - SKIN, MLM
Skin normal color for race, warm, dry and intact. No evidence of rash. No signs of infection around port site.

## 2017-12-20 NOTE — CONSULT NOTE ADULT - PROBLEM SELECTOR RECOMMENDATION 6
Patient is full code. Main support is patient's randy Buenrostro. Will continue to follow.  Will re-vinny in AM for PCU services.

## 2017-12-20 NOTE — CONSULT NOTE ADULT - PROBLEM SELECTOR RECOMMENDATION 9
Patient with stage IV ovarian CA. Oncologist - Dr. Wright (outpatient) with holistic approach. On high dose Vitamin C and other supplements. So far no chemotherapy tx. First seen by Palliative Care on July '17. Port placed last week for planned chemotherapy. Currently presenting with fever and severe abdominal pain. R/o sepsis, r/o obstruction. Pending full w/u. Patient with stage IV ovarian CA. Oncologist - Dr. Wright (outpatient) with holistic approach. On weekly IV laetrile (B17) with Vitamin C infusions, along with PO Laetrile daily. Plan was to wait for blood test results on which chemotherapeutic drug will be initiated. Port placed last week.  Currently presenting with fever and severe abdominal pain. R/o sepsis, r/o obstruction. Pending full w/u.  First seen by Palliative Care on July '17. Severe abdominal pain, worsened by movement, unable to palpate abdomen as patient is in severe pain. Prior home regimen consisted of Fentanyl patch 125mcg/hr and Dilaudid PO 8mg Q4hrs PRN that the patient taking every 4hrs. Previously on methadone, per patient did not tolerate methadone well.   Currently would recommend stopping Fentanyl patch and starting Dilaudid PCA pump at 0.2mg/hr, breakthrough of 0.5mg Q15min and clinician bolus of 1mg.   Will leave order of Dilaudid 1.5mg IV q2hrs PRN until the PCA is started for the patient. PLEASE DISCONTINUE DILAUDID 1.5mg Q2HR PRN ONCE PCA IS STARTED.  Would recommend CT abdomen to evaluate for possible bowel obstruction given history of severe abdominal pain and decrease ostomy output.

## 2017-12-20 NOTE — CONSULT NOTE ADULT - PROBLEM SELECTOR RECOMMENDATION 2
Severe abdominal pain, worsened by movement, unable to palpate abdomen as patient is in severe pain. Prior home regimen consisted of Fentanyl patch 125mcg/hr and Dilaudid PO 8mg Q4hrs PRN. Patient taking every 4hrs. Previously on methadone, per patient did not tolerate methadone well.   Currently would recommend to start Dilaudid PCA pump at 0.2mg/hr, breakthrough of 0.5mg Q15min and CAB of 1mg.   Will leave order of Dilaudid 1.5mg IV q2hrs PRN until the PCA is started for the patient. PLEASE DISCONTINUE DILAUDID 1.5mg Q2HR PRN ONCE PCA IS STARTED.  Would recommend CT abdomen to evaluate for possible bowel obstruction given history of severe abdominal pain and decrease ostomy output. Patient with stage IV ovarian CA. Oncologist - Dr. Wright (outpatient) with holistic approach. On weekly IV laetrile (B17) with Vitamin C infusions, along with PO Laetrile daily. Plan was to wait for blood test results on which chemotherapeutic drug will be initiated. Port placed last week.  Currently presenting with fever and severe abdominal pain. R/o sepsis, r/o obstruction. Pending full w/u.  First seen by Palliative Care on July '17.

## 2017-12-20 NOTE — CONSULT NOTE ADULT - ASSESSMENT
47 year old Female with Medical History of ovarian CA, PSHx of colostomy, hysterectomy. Found to have fever with severe abdominal pain. Being followed by Palliative for symptom management. Pending full workup.

## 2017-12-20 NOTE — ED PROVIDER NOTE - PSH
Colostomy in place  dec 2016 (placed during hysterectomy in dec 2016 at [facility] [place])  H/O abdominal hysterectomy  dec 2016

## 2017-12-20 NOTE — CONSULT NOTE ADULT - SUBJECTIVE AND OBJECTIVE BOX
HPI: 47 year old Female with Medical History of ovarian CA, PSHx of colostomy, hysterectomy. Comes to the ED after being referred from [facility] by Dr. Salgado for findings of fever (101F) and severe abdominal pain not relieved by home medications. She indicates that she started with intermittent fevers since last week after her port was placed to start chemotherapy. She also has noticed increase in abdominal pain, before abdominal pain was controlled with home regimen, currently she has not felt relief with her home regimen. She also admits to having decreased output from ostomy for the past couple of days.     Following patient for symptom management - pain control, as continuity of care with outpatient Supportive Oncology.     PERTINENT PM/SXH:   Ovarian cancer    Colostomy in place  H/O abdominal hysterectomy    SOCIAL HISTORY:   Significant other/partner:  [x ] YES  [ ] NO               Children:  [ ] YES  [x ] NO                   Nondenominational/Spirituality:  Substance hx:  [ ] YES   [x ] NO                   Tobacco hx:  [ ] YES  [x ] NO                       Alcohol hx: [ ] YES  [x] NO         Home Opioid hx:  [x ] YES  [ ] NO   Living Situation: [x ] Home  [ ] Long term care  [ ] Rehab [ ] Other    FAMILY HISTORY:  No pertinent family history in first degree relatives    [ x] Family history non-contributory     BASELINE (I)ADLs (prior to admission):  [place]: [ ] total  [x ] moderate [ ] dependent    ADVANCE DIRECTIVES:    Full code  MOLST  [ ] YES [x ] NO                      [ ] Completed  Health Care Proxy [ ] YES  [ ] NO   [ ] Completed  Living Will  [ ] YES [x ] NO             [ x] Surrogate  [ ] HCP  [ ] Guardian: Trung Buenrostro Phone#: [phone]    Allergies    penicillin (Rash)    Intolerances        MEDICATIONS  (STANDING):  HYDROmorphone  Injectable 1 milliGRAM(s) IV Push Once  HYDROmorphone  Injectable 1 milliGRAM(s) IV Push Once  HYDROmorphone PCA (1 mG/mL) 30 milliLiter(s) PCA Continuous PCA Continuous  sodium chloride 0.9% Bolus 1000 milliLiter(s) IV Bolus once  sodium chloride 0.9% lock flush 3 milliLiter(s) IV Push once    MEDICATIONS  (PRN):  HYDROmorphone  Injectable 1.5 milliGRAM(s) IV Push every 2 hours PRN Pain  HYDROmorphone PCA (5 mG/mL) Rescue Clinician Bolus 1 milliGRAM(s) IV Push every 2 hours PRN Severe Pain (7 - 10)  naloxone Injectable 0.1 milliGRAM(s) IV Push every 3 minutes PRN For ANY of the following changes in patient status:  A. RR LESS THAN 10 breaths per minute, B. Oxygen saturation LESS THAN 90%, C. Sedation score of 6  ondansetron Injectable 4 milliGRAM(s) IV Push every 6 hours PRN Nausea      PRESENT SYMPTOMS:  Source: [x ] Patient   [ ] Family   [ ] Team     Pain:                        [ ] No [x ] Yes             [ ] Mild [ ] Moderate [x ] Severe    Onset - Days  Location - Diffuse abdominal pain.  Duration - Days  Character -   Alleviating/Aggravating - Worsening with movement or pressure on the abdomen.  Radiation -   Timing - Constant    Dyspnea:                [ x] No [ ] Yes             [ ] Mild [ ] Moderate [ ] Severe    Anxiety:                  [ x] No [ ] Yes             [ ] Mild [ ] Moderate [ ] Severe    Fatigue:                  [x ] No [ ] Yes             [ ] Mild [ ] Moderate [ ] Severe    Nausea:                  [x ] No [ ] Yes             [ ] Mild [ ] Moderate [ ] Severe    Loss of appetite:   [ ] No [x ] Yes             [x ] Mild [ ] Moderate [ ] Severe    Constipation:        [ ] No [x ] Yes             [x ] Mild [ ] Moderate [ ] Severe --> decrease output from ostomy for the past couple of days.    Other Symptoms:  [x ] All other review of systems negative   [ ] Unable to obtain due to poor mentation     Karnofsky Performance Score/Palliative Performance Status Version 2: 50%    PHYSICAL EXAM:  Vital Signs Last 24 Hrs  T(C): 37.7 (20 Dec 2017 15:37), Max: 37.7 (20 Dec 2017 15:37)  T(F): 99.9 (20 Dec 2017 15:37), Max: 99.9 (20 Dec 2017 15:37)  HR: 100 (20 Dec 2017 15:37) (100 - 100)  BP: 111/77 (20 Dec 2017 15:37) (111/77 - 111/77)  BP(mean): --  RR: --  SpO2: 98% (20 Dec 2017 15:37) (98% - 98%) I&O's Summary    General:  [x ] Alert  [ ] Oriented x      [ ] Lethargic  [ ] Agitated   [x ] Cachexia   [ ] Unarousable  [x ] Verbal  [ ] Non-Verbal    HEENT:  [ ] Normal   [x ] Dry mouth   [ ] ET Tube    [ ] Trach  [ ] Oral lesions    Lungs:   [x ] Clear [ ] Tachypnea  [ ] Audible excessive secretions   [ ] Rhonchi        [ ] Right [ ] Left [ ] Bilateral  [ ] Crackles        [ ] Right [ ] Left [ ] Bilateral  [ ] Wheezing     [ ] Right [ ] Left [ ] Bilateral    Cardiovascular:  [x ] Regular [ ] Irregular [ ] Tachycardia   [ ] Bradycardia  [ ] Murmur [ ] Other    Abdomen: [ ] Soft  [ ] Distended   [x ] +BS - Hyperactive  [ ] Non tender [ ] Tender  [ ]PEG   [ ]OGT/ NGT   Last BM:     Hyperactive BS, abdomen severely tender, unable to palpate due to severe pain, diffuse.  Stool seen in ostomy bag.    Genitourinary: [x ] Normal [ ] Incontinent   [ ] Oliguria/Anuria   [ ] Lynn    Musculoskeletal:  [ ] Normal   [x ] Weakness  [ ] Bedbound/Wheelchair bound [ ] Edema    Neurological: [x ] No focal deficits  [ ] Cognitive impairment  [ ] Dysphagia [ ] Dysarthria [ ] Paresis [ ] Other     Skin: [x ] Normal   [ ] Pressure ulcer(s)                  [ ] Rash    LABS:    Shock: No [ ] Septic [ ] Cardiogenic [ ] Neurologic [ ] Hypovolemic  Vasopressors x None  Inotrophs x None    Protein Calorie Malnutrition: [ ] Mild [ ] Moderate [x ] Severe    Oral Intake: [ ] Unable/mouth care only [ ] Minimal [ ] Moderate [x ] Full Capability  Diet: [ ] NPO [ ] Tube feeds [ ] TPN [x ] Other     RADIOLOGY & ADDITIONAL STUDIES: No imaging available at the time.    REFERRALS:   [ ] Chaplaincy  [ ] Hospice  [ ] Child Life  [ ] Social Work  [ ] Case management [ ] Holistic Therapy HPI: 47 year old Female with Medical History of ovarian CA, PSHx of colostomy, hysterectomy. Comes to the ED after being referred from [facility] by Dr. Salgado for findings of fever (101F) and severe abdominal pain not relieved by home medications. She indicates that she started with intermittent fevers since last week after her port was placed to start chemotherapy. She also has noticed increase in abdominal pain, before abdominal pain was controlled with home regimen, currently she has not felt relief with her home regimen. She also admits to having decreased output from ostomy for the past couple of days.     Following patient for symptom management - pain control, as continuity of care with outpatient Supportive Oncology.     PERTINENT PM/SXH:   Ovarian cancer    Colostomy in place  H/O abdominal hysterectomy    SOCIAL HISTORY:   Significant other/partner:  [x ] YES  [ ] NO               Children:  [ ] YES  [x ] NO                   Caodaism/Spirituality:  Substance hx:  [ ] YES   [x ] NO                   Tobacco hx:  [ ] YES  [x ] NO                       Alcohol hx: [ ] YES  [x] NO         Home Opioid hx:  [x ] YES  [ ] NO   Living Situation: [x ] Home  [ ] Long term care  [ ] Rehab [ ] Other    FAMILY HISTORY:  No pertinent family history in first degree relatives    [ x] Family history non-contributory     BASELINE (I)ADLs (prior to admission):  [place] [ ] total  [x ] moderate [ ] dependent    ADVANCE DIRECTIVES:    Full code  MOLST  [ ] YES [x ] NO                      [ ] Completed  Health Care Proxy [ ] YES  [ ] NO   [ ] Completed  Living Will  [ ] YES [x ] NO             [ x] Surrogate  [ ] HCP  [ ] Guardian: Trung Buenrostro ([employer]) Phone#: [phone]    Allergies    penicillin (Rash)    Intolerances        MEDICATIONS  (STANDING):  HYDROmorphone  Injectable 1 milliGRAM(s) IV Push Once  HYDROmorphone  Injectable 1 milliGRAM(s) IV Push Once  HYDROmorphone PCA (1 mG/mL) 30 milliLiter(s) PCA Continuous PCA Continuous  sodium chloride 0.9% Bolus 1000 milliLiter(s) IV Bolus once  sodium chloride 0.9% lock flush 3 milliLiter(s) IV Push once    MEDICATIONS  (PRN):  HYDROmorphone  Injectable 1.5 milliGRAM(s) IV Push every 2 hours PRN Pain  HYDROmorphone PCA (5 mG/mL) Rescue Clinician Bolus 1 milliGRAM(s) IV Push every 2 hours PRN Severe Pain (7 - 10)  naloxone Injectable 0.1 milliGRAM(s) IV Push every 3 minutes PRN For ANY of the following changes in patient status:  A. RR LESS THAN 10 breaths per minute, B. Oxygen saturation LESS THAN 90%, C. Sedation score of 6  ondansetron Injectable 4 milliGRAM(s) IV Push every 6 hours PRN Nausea      PRESENT SYMPTOMS:  Source: [x ] Patient   [ ] Family   [ ] Team     Pain:                        [ ] No [x ] Yes             [ ] Mild [ ] Moderate [x ] Severe    Onset - Days  Location - Diffuse abdominal pain.  Duration - Days  Character -   Alleviating/Aggravating - Worsening with movement or pressure on the abdomen.  Radiation -   Timing - Constant    Dyspnea:                [ x] No [ ] Yes             [ ] Mild [ ] Moderate [ ] Severe    Anxiety:                  [ x] No [ ] Yes             [ ] Mild [ ] Moderate [ ] Severe    Fatigue:                  [x ] No [ ] Yes             [ ] Mild [ ] Moderate [ ] Severe    Nausea:                  [x ] No [ ] Yes             [ ] Mild [ ] Moderate [ ] Severe    Loss of appetite:   [ ] No [x ] Yes             [x ] Mild [ ] Moderate [ ] Severe    Constipation:        [ ] No [x ] Yes             [x ] Mild [ ] Moderate [ ] Severe --> decrease output from ostomy for the past couple of days.    Other Symptoms:  [x ] All other review of systems negative   [ ] Unable to obtain due to poor mentation     Karnofsky Performance Score/Palliative Performance Status Version 2: 50%    PHYSICAL EXAM:  Vital Signs Last 24 Hrs  T(C): 37.7 (20 Dec 2017 15:37), Max: 37.7 (20 Dec 2017 15:37)  T(F): 99.9 (20 Dec 2017 15:37), Max: 99.9 (20 Dec 2017 15:37)  HR: 100 (20 Dec 2017 15:37) (100 - 100)  BP: 111/77 (20 Dec 2017 15:37) (111/77 - 111/77)  BP(mean): --  RR: --  SpO2: 98% (20 Dec 2017 15:37) (98% - 98%) I&O's Summary    General:  [x ] Alert  [ ] Oriented x      [ ] Lethargic  [ ] Agitated   [x ] Cachexia   [ ] Unarousable  [x ] Verbal  [ ] Non-Verbal    HEENT:  [ ] Normal   [x ] Dry mouth   [ ] ET Tube    [ ] Trach  [ ] Oral lesions    Lungs:   [x ] Clear [ ] Tachypnea  [ ] Audible excessive secretions   [ ] Rhonchi        [ ] Right [ ] Left [ ] Bilateral  [ ] Crackles        [ ] Right [ ] Left [ ] Bilateral  [ ] Wheezing     [ ] Right [ ] Left [ ] Bilateral    Cardiovascular:  [x ] Regular [ ] Irregular [ ] Tachycardia   [ ] Bradycardia  [ ] Murmur [ ] Other    Abdomen: [ ] Soft  [ ] Distended   [x ] +BS - Hyperactive  [ ] Non tender [ ] Tender  [ ]PEG   [ ]OGT/ NGT   Last BM:     Hyperactive BS, abdomen severely tender, unable to palpate due to severe pain, diffuse.  Stool seen in ostomy bag.    Genitourinary: [x ] Normal [ ] Incontinent   [ ] Oliguria/Anuria   [ ] Lynn    Musculoskeletal:  [ ] Normal   [x ] Weakness  [ ] Bedbound/Wheelchair bound [ ] Edema    Neurological: [x ] No focal deficits  [ ] Cognitive impairment  [ ] Dysphagia [ ] Dysarthria [ ] Paresis [ ] Other     Skin: [x ] Normal   [ ] Pressure ulcer(s)                  [ ] Rash    LABS:    Shock: No [ ] Septic [ ] Cardiogenic [ ] Neurologic [ ] Hypovolemic  Vasopressors x None  Inotrophs x None    Protein Calorie Malnutrition: [ ] Mild [ ] Moderate [x ] Severe    Oral Intake: [ ] Unable/mouth care only [ ] Minimal [ ] Moderate [x ] Full Capability  Diet: [ ] NPO [ ] Tube feeds [ ] TPN [x ] Other     RADIOLOGY & ADDITIONAL STUDIES: No imaging available at the time.    REFERRALS:   [ ] Chaplaincy  [ ] Hospice  [ ] Child Life  [ ] Social Work  [ ] Case management [ ] Holistic Therapy HPI: 47 year old Female with Medical History of Ovarian cancer (dx 12/2016) s/p GRETCHEN BSO, bowel resection with colostomy. She has not received chemotherapy to date due to problems with poor wound healing. She is s/p 1 month hospital stay for acute cholecystitis, bowel obstruction. On 6/21 she underwent ex-lap, extensive lysis of adhesions, reduction of internal hernia, drainage of intraabdominal abscess, placement of G tube, placement of amador drain in abscess cavity. Is s/p ERCP at [facility] by Dr. Susu Vasquez, had CBD stone removed. Biliary drain was subsequently removed.  Comes to the ED after being referred from [facility] by Dr. Salgado for findings of fever (101F) and severe abdominal pain not relieved by home medications. She indicates that she started with intermittent fevers since last week after her port was placed to start chemotherapy. She also has noticed increase in abdominal pain, before abdominal pain was controlled with home regimen, currently she has not felt relief with her home regimen. She also admits to having decreased output from ostomy for the past couple of days.     Following patient for symptom management - pain control, as continuity of care with outpatient Supportive Oncology.     PERTINENT PM/SXH:   Ovarian cancer    Colostomy in place  H/O abdominal hysterectomy    SOCIAL HISTORY:   Significant other/partner:  [x ] YES  [ ] NO               Children:  [ ] YES  [x ] NO                   Quaker/Spirituality:  Substance hx:  [ ] YES   [x ] NO                   Tobacco hx:  [ ] YES  [x ] NO                       Alcohol hx: [ ] YES  [x] NO         Home Opioid hx:  [x ] YES  [ ] NO   Living Situation: [x ] Home  [ ] Long term care  [ ] Rehab [ ] Other    FAMILY HISTORY:  No pertinent family history in first degree relatives    [ x] Family history non-contributory     BASELINE (I)ADLs (prior to admission):  [place]: [ ] total  [x ] moderate [ ] dependent    ADVANCE DIRECTIVES:    Full code  MOLST  [ ] YES [x ] NO                      [ ] Completed  Health Care Proxy [ ] YES  [ ] NO   [ ] Completed  Living Will  [ ] YES [x ] NO             [ x] Surrogate  [ ] HCP  [ ] Guardian: Trung Buenrostro ([employer]) Phone#: [phone]    Allergies    penicillin (Rash)    Intolerances        MEDICATIONS  (STANDING):  HYDROmorphone  Injectable 1 milliGRAM(s) IV Push Once  HYDROmorphone  Injectable 1 milliGRAM(s) IV Push Once  HYDROmorphone PCA (1 mG/mL) 30 milliLiter(s) PCA Continuous PCA Continuous  sodium chloride 0.9% Bolus 1000 milliLiter(s) IV Bolus once  sodium chloride 0.9% lock flush 3 milliLiter(s) IV Push once    MEDICATIONS  (PRN):  HYDROmorphone  Injectable 1.5 milliGRAM(s) IV Push every 2 hours PRN Pain  HYDROmorphone PCA (5 mG/mL) Rescue Clinician Bolus 1 milliGRAM(s) IV Push every 2 hours PRN Severe Pain (7 - 10)  naloxone Injectable 0.1 milliGRAM(s) IV Push every 3 minutes PRN For ANY of the following changes in patient status:  A. RR LESS THAN 10 breaths per minute, B. Oxygen saturation LESS THAN 90%, C. Sedation score of 6  ondansetron Injectable 4 milliGRAM(s) IV Push every 6 hours PRN Nausea      PRESENT SYMPTOMS:  Source: [x ] Patient   [ ] Family   [ ] Team     Pain:                        [ ] No [x ] Yes             [ ] Mild [ ] Moderate [x ] Severe    Onset - Days  Location - Diffuse abdominal pain.  Duration - Days  Character -   Alleviating/Aggravating - Worsening with movement or pressure on the abdomen.  Radiation -   Timing - Constant    Dyspnea:                [ x] No [ ] Yes             [ ] Mild [ ] Moderate [ ] Severe    Anxiety:                  [ x] No [ ] Yes             [ ] Mild [ ] Moderate [ ] Severe    Fatigue:                  [x ] No [ ] Yes             [ ] Mild [ ] Moderate [ ] Severe    Nausea:                  [x ] No [ ] Yes             [ ] Mild [ ] Moderate [ ] Severe    Loss of appetite:   [ ] No [x ] Yes             [x ] Mild [ ] Moderate [ ] Severe    Constipation:        [ ] No [x ] Yes             [x ] Mild [ ] Moderate [ ] Severe --> decrease output from ostomy for the past couple of days.    Other Symptoms:  [x ] All other review of systems negative   [ ] Unable to obtain due to poor mentation     Karnofsky Performance Score/Palliative Performance Status Version 2: 50%    PHYSICAL EXAM:  Vital Signs Last 24 Hrs  T(C): 37.7 (20 Dec 2017 15:37), Max: 37.7 (20 Dec 2017 15:37)  T(F): 99.9 (20 Dec 2017 15:37), Max: 99.9 (20 Dec 2017 15:37)  HR: 100 (20 Dec 2017 15:37) (100 - 100)  BP: 111/77 (20 Dec 2017 15:37) (111/77 - 111/77)  BP(mean): --  RR: --  SpO2: 98% (20 Dec 2017 15:37) (98% - 98%) I&O's Summary    General:  [x ] Alert  [ ] Oriented x      [ ] Lethargic  [ ] Agitated   [x ] Cachexia   [ ] Unarousable  [x ] Verbal  [ ] Non-Verbal    HEENT:  [ ] Normal   [x ] Dry mouth   [ ] ET Tube    [ ] Trach  [ ] Oral lesions    Lungs:   [x ] Clear [ ] Tachypnea  [ ] Audible excessive secretions   [ ] Rhonchi        [ ] Right [ ] Left [ ] Bilateral  [ ] Crackles        [ ] Right [ ] Left [ ] Bilateral  [ ] Wheezing     [ ] Right [ ] Left [ ] Bilateral    Cardiovascular:  [x ] Regular [ ] Irregular [ ] Tachycardia   [ ] Bradycardia  [ ] Murmur [ ] Other    Abdomen: [ ] Soft  [ ] Distended   [x ] +BS - Hyperactive  [ ] Non tender [ ] Tender  [ ]PEG   [ ]OGT/ NGT   Last BM:     Hyperactive BS, abdomen severely tender, unable to palpate due to severe pain, diffuse.  Stool seen in ostomy bag.    Genitourinary: [x ] Normal [ ] Incontinent   [ ] Oliguria/Anuria   [ ] Lynn    Musculoskeletal:  [ ] Normal   [x ] Weakness  [ ] Bedbound/Wheelchair bound [ ] Edema    Neurological: [x ] No focal deficits  [ ] Cognitive impairment  [ ] Dysphagia [ ] Dysarthria [ ] Paresis [ ] Other     Skin: [x ] Normal   [ ] Pressure ulcer(s)                  [ ] Rash    LABS:    Shock: No [ ] Septic [ ] Cardiogenic [ ] Neurologic [ ] Hypovolemic  Vasopressors x None  Inotrophs x None    Protein Calorie Malnutrition: [ ] Mild [ ] Moderate [x ] Severe    Oral Intake: [ ] Unable/mouth care only [ ] Minimal [ ] Moderate [x ] Full Capability  Diet: [ ] NPO [ ] Tube feeds [ ] TPN [x ] Other     RADIOLOGY & ADDITIONAL STUDIES: No imaging available at the time.    REFERRALS:   [ ] Chaplaincy  [ ] Hospice  [ ] Child Life  [ ] Social Work  [ ] Case management [ ] Holistic Therapy HPI: 47 year old Female with Medical History of Ovarian cancer (dx 12/2016) s/p GRETCHEN BSO, bowel resection with colostomy. She has not received chemotherapy to date due to problems with poor wound healing. She is s/p 1 month hospital stay for acute cholecystitis that complicated with  bowel obstruction and abdominal abscess. On 6/21 she underwent ex-lap, extensive lysis of adhesions, reduction of internal hernia, drainage of intraabdominal abscess, placement of G tube, placement of amador drain in abscess cavity. She is also s/p ERCP at [facility] by Dr. Susu Vasquez, had CBD stone removed. Biliary drain was subsequently removed.  Comes to the ED after being referred from [facility] by Dr. Salgado for findings of fever (101F) and severe abdominal pain not relieved by home medications. She indicates that she started with intermittent fevers since last week after her port was placed to start chemotherapy. She also has noticed increase in abdominal pain which was previously controlled with home regimen. She also admits having decreased output from her ostomy bag for the past couple of days.     Following patient for symptom management - pain control, as continuity of care with outpatient Supportive Oncology.     PERTINENT PM/SXH:   Ovarian cancer    Colostomy in place  H/O abdominal hysterectomy    SOCIAL HISTORY:   Significant other/partner:  [x ] YES  [ ] NO               Children:  [ ] YES  [x ] NO                   Adventism/Spirituality:   Substance hx:  [ ] YES   [x ] NO                   Tobacco hx:  [ ] YES  [x ] NO                       Alcohol hx: [ ] YES  [x] NO         Home Opioid hx:  [x ] YES  [ ] NO  Fentanyl 125 mcg/hr patch and Dilaudid 8 mg PO q 3-4h (during the last few days, she was using it up to 6-7 time a day)   Living Situation: [x ] Home  [ ] Long term care  [ ] Rehab [ ] Other    FAMILY HISTORY:  No pertinent family history in first degree relatives    [ x] Family history non-contributory     BASELINE (I)ADLs (prior to admission):  [place]: [ ] total  [x ] moderate [ ] dependent    ADVANCE DIRECTIVES:    Full code  MOLST  [ ] YES [x ] NO                      [ ] Completed  Health Care Proxy [ ] YES  [ ] NO   [ ] Completed  Living Will  [ ] YES [x ] NO             [ x] Surrogate  [ ] HCP  [ ] Guardian: Trung Buenrostro ([employer]) Phone#: [phone]    Allergies    penicillin (Rash)    Intolerances        MEDICATIONS  (STANDING):  HYDROmorphone  Injectable 1 milliGRAM(s) IV Push Once  HYDROmorphone  Injectable 1 milliGRAM(s) IV Push Once  HYDROmorphone PCA (1 mG/mL) 30 milliLiter(s) PCA Continuous PCA Continuous  sodium chloride 0.9% Bolus 1000 milliLiter(s) IV Bolus once  sodium chloride 0.9% lock flush 3 milliLiter(s) IV Push once    MEDICATIONS  (PRN):  HYDROmorphone  Injectable 1.5 milliGRAM(s) IV Push every 2 hours PRN Pain  HYDROmorphone PCA (5 mG/mL) Rescue Clinician Bolus 1 milliGRAM(s) IV Push every 2 hours PRN Severe Pain (7 - 10)  naloxone Injectable 0.1 milliGRAM(s) IV Push every 3 minutes PRN For ANY of the following changes in patient status:  A. RR LESS THAN 10 breaths per minute, B. Oxygen saturation LESS THAN 90%, C. Sedation score of 6  ondansetron Injectable 4 milliGRAM(s) IV Push every 6 hours PRN Nausea      PRESENT SYMPTOMS:  Source: [x ] Patient   [ ] Family   [ ] Team     Pain:                        [ ] No [x ] Yes             [ ] Mild [ ] Moderate [x ] Severe    Onset - Days  Location - Diffuse abdominal pain.  Duration - Days  Character -   Alleviating/Aggravating - Worsening with movement or pressure on the abdomen.  Radiation -   Timing - Constant    Dyspnea:                [ x] No [ ] Yes             [ ] Mild [ ] Moderate [ ] Severe    Anxiety:                  [ x] No [ ] Yes             [ ] Mild [ ] Moderate [ ] Severe    Fatigue:                  [x ] No [ ] Yes             [ ] Mild [ ] Moderate [ ] Severe    Nausea:                  [  ] No [x ] Yes             [ x] Mild [ ] Moderate [ ] Severe    Loss of appetite:   [ ] No [x ] Yes             [x ] Mild [ ] Moderate [ ] Severe    Constipation:        [ ] No [x ] Yes             [x ] Mild [ ] Moderate [ ] Severe --> decrease output from ostomy for the past couple of days.    Other Symptoms:  [x ] All other review of systems negative   [ ] Unable to obtain due to poor mentation     Karnofsky Performance Score/Palliative Performance Status Version 2: 50%    PHYSICAL EXAM:  Vital Signs Last 24 Hrs  T(C): 37.7 (20 Dec 2017 15:37), Max: 37.7 (20 Dec 2017 15:37)  T(F): 99.9 (20 Dec 2017 15:37), Max: 99.9 (20 Dec 2017 15:37)  HR: 100 (20 Dec 2017 15:37) (100 - 100)  BP: 111/77 (20 Dec 2017 15:37) (111/77 - 111/77)  BP(mean): --  RR: --  SpO2: 98% (20 Dec 2017 15:37) (98% - 98%) I&O's Summary    General:  [x ] Alert  [ ] Oriented x      [ ] Lethargic  [ ] Agitated   [x ] Cachexia   [ ] Unarousable  [x ] Verbal  [ ] Non-Verbal    HEENT:  [ ] Normal   [x ] Dry mouth   [ ] ET Tube    [ ] Trach  [ ] Oral lesions    Lungs:   [x ] Clear [ ] Tachypnea  [ ] Audible excessive secretions   [ ] Rhonchi        [ ] Right [ ] Left [ ] Bilateral  [ ] Crackles        [ ] Right [ ] Left [ ] Bilateral  [ ] Wheezing     [ ] Right [ ] Left [ ] Bilateral    Cardiovascular:  [x ] Regular [ ] Irregular [ ] Tachycardia   [ ] Bradycardia  [ ] Murmur [ ] Other    Abdomen: [ ] Soft  [ ] Distended   [x ] +BS - Hyperactive  [ ] Non tender [x ] abdomen severely tender, unable to palpate due to severe pain, diffuse.  [ ]PEG   [ ]OGT/ NGT   Last BM: patient has stools in ostomy bag but she indicates having a low stool output       Genitourinary: [x ] Normal [ ] Incontinent   [ ] Oliguria/Anuria   [ ] Lynn    Musculoskeletal:  [ ] Normal   [x ] Weakness  [ ] Bedbound/Wheelchair bound [ ] Edema    Neurological: [x ] No focal deficits  [ ] Cognitive impairment  [ ] Dysphagia [ ] Dysarthria [ ] Paresis [ ] Other     Skin: [ x] Scars over mid abdominal region due to prior surgeries    [ ] Pressure ulcer(s)                  [ ] Rash    LABS:  No labs were available at the time of this assessment   Shock: No [ ] Septic [ ] Cardiogenic [ ] Neurologic [ ] Hypovolemic  Vasopressors x None  Inotrophs x None    Protein Calorie Malnutrition: [ ] Mild [ ] Moderate [x ] Severe    Oral Intake: [ ] Unable/mouth care only [ ] Minimal [ ] Moderate [x ] Full Capability  Diet: [ ] NPO [ ] Tube feeds [ ] TPN [x ] Other     RADIOLOGY & ADDITIONAL STUDIES: No imaging available at the time.    REFERRALS:   [ ] Chaplaincy  [ ] Hospice  [ ] Child Life  [ ] Social Work  [ ] Case management [ ] Holistic Therapy

## 2017-12-20 NOTE — CONSULT NOTE ADULT - PROBLEM SELECTOR RECOMMENDATION 5
If there is not bowel obstruction on CT then start bowel regimen  Senna 2 tabs ATC. Colace ATC. Miralax q d PRN

## 2017-12-21 DIAGNOSIS — R10.9 UNSPECIFIED ABDOMINAL PAIN: ICD-10-CM

## 2017-12-21 DIAGNOSIS — R11.0 NAUSEA: ICD-10-CM

## 2017-12-21 DIAGNOSIS — K59.03 DRUG INDUCED CONSTIPATION: ICD-10-CM

## 2017-12-21 DIAGNOSIS — Z29.9 ENCOUNTER FOR PROPHYLACTIC MEASURES, UNSPECIFIED: ICD-10-CM

## 2017-12-21 DIAGNOSIS — C56.9 MALIGNANT NEOPLASM OF UNSPECIFIED OVARY: ICD-10-CM

## 2017-12-21 DIAGNOSIS — D64.89 OTHER SPECIFIED ANEMIAS: ICD-10-CM

## 2017-12-21 DIAGNOSIS — K59.01 SLOW TRANSIT CONSTIPATION: ICD-10-CM

## 2017-12-21 DIAGNOSIS — R50.9 FEVER, UNSPECIFIED: ICD-10-CM

## 2017-12-21 DIAGNOSIS — R10.84 GENERALIZED ABDOMINAL PAIN: ICD-10-CM

## 2017-12-21 LAB
ANION GAP SERPL CALC-SCNC: 10 MMOL/L — SIGNIFICANT CHANGE UP (ref 5–17)
APPEARANCE UR: CLEAR — SIGNIFICANT CHANGE UP
BILIRUB UR-MCNC: NEGATIVE — SIGNIFICANT CHANGE UP
BLD GP AB SCN SERPL QL: NEGATIVE — SIGNIFICANT CHANGE UP
BUN SERPL-MCNC: 15 MG/DL — SIGNIFICANT CHANGE UP (ref 7–23)
CALCIUM SERPL-MCNC: 8.3 MG/DL — LOW (ref 8.4–10.5)
CHLORIDE SERPL-SCNC: 99 MMOL/L — SIGNIFICANT CHANGE UP (ref 96–108)
CO2 SERPL-SCNC: 27 MMOL/L — SIGNIFICANT CHANGE UP (ref 22–31)
COLOR SPEC: YELLOW — SIGNIFICANT CHANGE UP
CREAT SERPL-MCNC: 0.82 MG/DL — SIGNIFICANT CHANGE UP (ref 0.5–1.3)
DIFF PNL FLD: NEGATIVE — SIGNIFICANT CHANGE UP
GLUCOSE SERPL-MCNC: 89 MG/DL — SIGNIFICANT CHANGE UP (ref 70–99)
GLUCOSE UR QL: NEGATIVE — SIGNIFICANT CHANGE UP
HCT VFR BLD CALC: 23.2 % — LOW (ref 34.5–45)
HGB BLD-MCNC: 7.2 G/DL — LOW (ref 11.5–15.5)
KETONES UR-MCNC: ABNORMAL
LACTATE SERPL-SCNC: 1.3 MMOL/L — SIGNIFICANT CHANGE UP (ref 0.7–2)
LEUKOCYTE ESTERASE UR-ACNC: NEGATIVE — SIGNIFICANT CHANGE UP
MAGNESIUM SERPL-MCNC: 2.3 MG/DL — SIGNIFICANT CHANGE UP (ref 1.6–2.6)
MCHC RBC-ENTMCNC: 29.1 PG — SIGNIFICANT CHANGE UP (ref 27–34)
MCHC RBC-ENTMCNC: 31.1 GM/DL — LOW (ref 32–36)
MCV RBC AUTO: 93.5 FL — SIGNIFICANT CHANGE UP (ref 80–100)
NITRITE UR-MCNC: NEGATIVE — SIGNIFICANT CHANGE UP
PH UR: 6.5 — SIGNIFICANT CHANGE UP (ref 5–8)
PHOSPHATE SERPL-MCNC: 3.4 MG/DL — SIGNIFICANT CHANGE UP (ref 2.5–4.5)
PLATELET # BLD AUTO: 312 K/UL — SIGNIFICANT CHANGE UP (ref 150–400)
POTASSIUM SERPL-MCNC: 4.5 MMOL/L — SIGNIFICANT CHANGE UP (ref 3.5–5.3)
POTASSIUM SERPL-SCNC: 4.5 MMOL/L — SIGNIFICANT CHANGE UP (ref 3.5–5.3)
PROCALCITONIN SERPL-MCNC: 0.18 NG/ML — HIGH (ref 0–0.04)
PROT UR-MCNC: 30 MG/DL
RBC # BLD: 2.48 M/UL — LOW (ref 3.8–5.2)
RBC # FLD: 15 % — HIGH (ref 10.3–14.5)
RH IG SCN BLD-IMP: POSITIVE — SIGNIFICANT CHANGE UP
SODIUM SERPL-SCNC: 136 MMOL/L — SIGNIFICANT CHANGE UP (ref 135–145)
SP GR SPEC: >1.03 — HIGH (ref 1.01–1.02)
UROBILINOGEN FLD QL: NEGATIVE — SIGNIFICANT CHANGE UP
WBC # BLD: 6.8 K/UL — SIGNIFICANT CHANGE UP (ref 3.8–10.5)
WBC # FLD AUTO: 6.8 K/UL — SIGNIFICANT CHANGE UP (ref 3.8–10.5)

## 2017-12-21 PROCEDURE — 99223 1ST HOSP IP/OBS HIGH 75: CPT | Mod: AI,GC

## 2017-12-21 PROCEDURE — 12345: CPT | Mod: GC,NC

## 2017-12-21 PROCEDURE — 99233 SBSQ HOSP IP/OBS HIGH 50: CPT

## 2017-12-21 RX ORDER — SENNA PLUS 8.6 MG/1
15 TABLET ORAL AT BEDTIME
Qty: 0 | Refills: 0 | Status: DISCONTINUED | OUTPATIENT
Start: 2017-12-21 | End: 2017-12-28

## 2017-12-21 RX ORDER — SODIUM CHLORIDE 9 MG/ML
250 INJECTION INTRAMUSCULAR; INTRAVENOUS; SUBCUTANEOUS ONCE
Qty: 0 | Refills: 0 | Status: COMPLETED | OUTPATIENT
Start: 2017-12-21 | End: 2017-12-21

## 2017-12-21 RX ORDER — ONDANSETRON 8 MG/1
8 TABLET, FILM COATED ORAL EVERY 8 HOURS
Qty: 0 | Refills: 0 | Status: DISCONTINUED | OUTPATIENT
Start: 2017-12-21 | End: 2018-01-01

## 2017-12-21 RX ORDER — SODIUM CHLORIDE 9 MG/ML
1000 INJECTION INTRAMUSCULAR; INTRAVENOUS; SUBCUTANEOUS
Qty: 0 | Refills: 0 | Status: DISCONTINUED | OUTPATIENT
Start: 2017-12-21 | End: 2017-12-22

## 2017-12-21 RX ORDER — DOCUSATE SODIUM 100 MG
100 CAPSULE ORAL THREE TIMES A DAY
Qty: 0 | Refills: 0 | Status: DISCONTINUED | OUTPATIENT
Start: 2017-12-21 | End: 2018-01-01

## 2017-12-21 RX ORDER — ENOXAPARIN SODIUM 100 MG/ML
40 INJECTION SUBCUTANEOUS DAILY
Qty: 0 | Refills: 0 | Status: DISCONTINUED | OUTPATIENT
Start: 2017-12-21 | End: 2018-01-01

## 2017-12-21 RX ORDER — SENNA PLUS 8.6 MG/1
2 TABLET ORAL AT BEDTIME
Qty: 0 | Refills: 0 | Status: DISCONTINUED | OUTPATIENT
Start: 2017-12-21 | End: 2017-12-21

## 2017-12-21 RX ADMIN — SODIUM CHLORIDE 500 MILLILITER(S): 9 INJECTION INTRAMUSCULAR; INTRAVENOUS; SUBCUTANEOUS at 00:05

## 2017-12-21 RX ADMIN — SENNA PLUS 15 MILLILITER(S): 8.6 TABLET ORAL at 21:13

## 2017-12-21 RX ADMIN — SODIUM CHLORIDE 1000 MILLILITER(S): 9 INJECTION INTRAMUSCULAR; INTRAVENOUS; SUBCUTANEOUS at 00:06

## 2017-12-21 RX ADMIN — Medication 100 MILLIGRAM(S): at 12:40

## 2017-12-21 RX ADMIN — SODIUM CHLORIDE 1000 MILLILITER(S): 9 INJECTION INTRAMUSCULAR; INTRAVENOUS; SUBCUTANEOUS at 03:12

## 2017-12-21 RX ADMIN — Medication 100 MILLIGRAM(S): at 05:53

## 2017-12-21 RX ADMIN — ENOXAPARIN SODIUM 40 MILLIGRAM(S): 100 INJECTION SUBCUTANEOUS at 12:40

## 2017-12-21 RX ADMIN — Medication 100 MILLIGRAM(S): at 21:18

## 2017-12-21 RX ADMIN — SODIUM CHLORIDE 75 MILLILITER(S): 9 INJECTION INTRAMUSCULAR; INTRAVENOUS; SUBCUTANEOUS at 03:12

## 2017-12-21 RX ADMIN — Medication 5 MILLIGRAM(S): at 21:16

## 2017-12-21 NOTE — H&P ADULT - PSH
Colostomy in place  dec 2016 (placed during hysterectomy in dec 2016 at [facility])  H/O abdominal hysterectomy  dec 2016

## 2017-12-21 NOTE — PROGRESS NOTE ADULT - PROBLEM SELECTOR PLAN 2
Patient with stage IV ovarian CA.   The patient and her proxy have decided for a Holistic approach.   It will be important to communicate the patient and her HCP about new CT findings so they can then discuss it with her Oncologist - Dr. Wright

## 2017-12-21 NOTE — PROGRESS NOTE ADULT - PROBLEM SELECTOR PLAN 1
Improving. 2/2 to worsening CA and constipation.   Continue Dilaudid PCA 0.2 mg/h, 0.5 mg q 15' demand dosage (DD), and 1 mg q 1 clinician bolus (CB). Tomorrow, will likely transition to Oxycontin 20 mg q 8 ATC and continuing Dilaudid PCA 0.5 mg q 15' DD only. On Saturday, if tolerating Oxycontin, will likely DC PCA and continue Dilaudid 1mg IV q 2 PRN. Improving. 2/2 to worsening CA and constipation.   Continue Dilaudid PCA 0.2 mg/h, 0.5 mg q 15' demand dosage (DD), and 1 mg q 1 clinician bolus (CB). Tomorrow, will likely transition to Oxycontin 20 mg q 8 ATC and continuing Dilaudid PCA 0.5 mg q 15' DD only. On Saturday, if tolerating Oxycontin, will likely DC PCA and continue Dilaudid 1mg IV q 2 PRN.  Holistic nurse referral.

## 2017-12-21 NOTE — PROGRESS NOTE ADULT - SUBJECTIVE AND OBJECTIVE BOX
CHAYA PALACIO  47y  Female      Patient is a 47y old  Female who presents with a chief complaint of Abd pain ,fever (21 Dec 2017 00:48)      INTERVAL HPI/OVERNIGHT EVENTS:        REVIEW OF SYSTEMS:  CONSTITUTIONAL: No fever, weight loss, or fatigue  EYES: No eye pain, visual disturbances, or discharge  ENMT:  No difficulty hearing, tinnitus, vertigo; No sinus or throat pain  NECK: No pain or stiffness  BREASTS: No pain, masses, or nipple discharge  RESPIRATORY: No cough, wheezing, chills or hemoptysis; No shortness of breath  CARDIOVASCULAR: No chest pain, palpitations, dizziness, or leg swelling  GASTROINTESTINAL: No abdominal or epigastric pain. No nausea, vomiting, or hematemesis; No diarrhea or constipation. No melena or hematochezia.  GENITOURINARY: No dysuria, frequency, hematuria, or incontinence  NEUROLOGICAL: No headaches, memory loss, loss of strength, numbness, or tremors  SKIN: No itching, burning, rashes, or lesions   LYMPH NODES: No enlarged glands  ENDOCRINE: No heat or cold intolerance; No hair loss  MUSCULOSKELETAL: No joint pain or swelling; No muscle, back, or extremity pain  PSYCHIATRIC: No depression, anxiety, mood swings, or difficulty sleeping  HEME/LYMPH: No easy bruising, or bleeding gums  ALLERY AND IMMUNOLOGIC: No hives or eczema    T(C): 37.4 (12-21-17 @ 08:00), Max: 37.7 (12-20-17 @ 15:37)  HR: 94 (12-21-17 @ 08:00) (59 - 102)  BP: 94/65 (12-21-17 @ 08:00) (86/59 - 115/78)  RR: 18 (12-21-17 @ 08:00) (16 - 18)  SpO2: 98% (12-21-17 @ 08:00) (97% - 100%)  Wt(kg): --Vital Signs Last 24 Hrs  T(C): 37.4 (21 Dec 2017 08:00), Max: 37.7 (20 Dec 2017 15:37)  T(F): 99.3 (21 Dec 2017 08:00), Max: 99.9 (20 Dec 2017 15:37)  HR: 94 (21 Dec 2017 08:00) (59 - 102)  BP: 94/65 (21 Dec 2017 08:00) (86/59 - 115/78)  BP(mean): --  RR: 18 (21 Dec 2017 08:00) (16 - 18)  SpO2: 98% (21 Dec 2017 08:00) (97% - 100%)    PHYSICAL EXAM:  GENERAL: NAD, well-groomed, well-developed  HEAD:  Atraumatic, Normocephalic  EYES: EOMI, PERRLA, conjunctiva and sclera clear  ENMT: No tonsillar erythema, exudates, or enlargement; Moist mucous membranes, Good dentition, No lesions  NECK: Supple, No JVD, Normal thyroid  NERVOUS SYSTEM:  Alert & Oriented X3, Good concentration; Motor Strength 5/5 B/L upper and lower extremities; DTRs 2+ intact and symmetric  CHEST/LUNG: Clear to percussion bilaterally; No rales, rhonchi, wheezing, or rubs  HEART: Regular rate and rhythm; No murmurs, rubs, or gallops  ABDOMEN: Soft, Nontender, Nondistended; Bowel sounds present  EXTREMITIES:  2+ Peripheral Pulses, No clubbing, cyanosis, or edema  LYMPH: No lymphadenopathy noted  SKIN: No rashes or lesions    Consultant(s) Notes Reviewed:  [x ] YES  [ ] NO  Care Discussed with Consultants/Other Providers [ x] YES  [ ] NO    LABS:                        7.2    6.8   )-----------( 312      ( 21 Dec 2017 09:22 )             23.2     12-21    136  |  99  |  15  ----------------------------<  89  4.5   |  27  |  0.82    Ca    8.3<L>      21 Dec 2017 09:22  Phos  3.4     12-21  Mg     2.3     12-21    TPro  6.7  /  Alb  3.9  /  TBili  0.3  /  DBili  x   /  AST  22  /  ALT  <5<L>  /  AlkPhos  44  12-20    PT/INR - ( 20 Dec 2017 17:06 )   PT: 11.6 sec;   INR: 1.06 ratio         PTT - ( 20 Dec 2017 17:06 )  PTT:28.0 sec  Urinalysis Basic - ( 21 Dec 2017 03:30 )    Color: Yellow / Appearance: Clear / SG: >1.030 / pH: x  Gluc: x / Ketone: Trace  / Bili: Negative / Urobili: Negative   Blood: x / Protein: 30 mg/dL / Nitrite: Negative   Leuk Esterase: Negative / RBC: 0-2 /HPF / WBC 2-5 /HPF   Sq Epi: x / Non Sq Epi: Occasional /HPF / Bacteria: x    CAPILLARY BLOOD GLUCOSE    Urinalysis Basic - ( 21 Dec 2017 03:30 )    Color: Yellow / Appearance: Clear / SG: >1.030 / pH: x  Gluc: x / Ketone: Trace  / Bili: Negative / Urobili: Negative   Blood: x / Protein: 30 mg/dL / Nitrite: Negative   Leuk Esterase: Negative / RBC: 0-2 /HPF / WBC 2-5 /HPF   Sq Epi: x / Non Sq Epi: Occasional /HPF / Bacteria: x    RADIOLOGY & ADDITIONAL TESTS:    < from: CT Abdomen and Pelvis w/ Oral Cont and w/ IV Cont (12.20.17 @ 19:44) >  EXAM:  CT ABDOMEN AND PELVIS OC IC                            PROCEDURE DATE:  12/20/2017            INTERPRETATION:  CLINICAL INFORMATION: Ovarian cancer, abdominal pain.    COMPARISON: CT abdomen pelvis 8/1/2017.    PROCEDURE:   CT of the Abdomenand Pelvis was performed with intravenous contrast.   Intravenous contrast: 90 ml Omnipaque 350. 10 ml discarded.  Oral contrast: positive contrast was administered.  Sagittal and coronal reformats were performed.    FINDINGS:    LOWER CHEST: Trace bilateral pleural effusions. Partially imaged central   venous catheter in the SVC.    LIVER:   Hepatic hypodensities.  BILE DUCTS: Mild intrahepatic biliary ductal dilatation. New central and   left hepatic pneumobilia.  GALLBLADDER: Interval removal of a cholecystostomy tube.  SPLEEN: Within normal limits.  PANCREAS: Within normal limits.  ADRENALS: Within normal limits.  KIDNEYS/URETERS: Symmetric enhancement. Moderate bilateral   hydronephrosis, increased since the prior exam..    BLADDER: Within normal limits.  REPRODUCTIVE ORGANS: Status post hysterectomy.    BOWEL/PERITONEUM: Left lower quadrant colostomy. Interval removal of a   PEG tube. Worsening peritoneal carcinomatosis with increased size of   peritoneal implants in the lesser sac,along the greater curvature of the   stomach, lara hepatis, perisplenic and pelvic region. New implants are   noted in the falciform ligament. A previously referenced peritoneal   implant in the left lower quadrant measures 5.6 x 3.0 cm (2; 69),   previously 5.1 x 2.9 cm. No bowel obstruction. Large amount of stool seen   throughout the colon. Moderate loculated ascites, increased since   8/1/2017.  VESSELS:  Within normal limits.  RETROPERITONEUM: Enlarged retroperitoneal lymph nodes. A reference lymph   node in the measures and 2.7 x 1.3 cm (2; 59).    ABDOMINAL WALL: Anasarca.  BONES: Within normal limits.    IMPRESSION:     Worsening peritoneal carcinomatosis.    No bowel obstruction.    Mild intrahepatic ductal dilatation and pneumobilia, status post   cholecystostomy tube removal.    Moderate hydronephrosis is new from the prior study.    New left hepatic hypodensities suspicious for metastases.    ROMAN RIGGS M.D., RADIOLOGY RESIDENT  This document has been electronically signed.  LAILA GARCIA M.D., ATTENDING RADIOLOGIST  This document has been electronically signed. Dec 20 2017  8:30PM HAKEEMCHAYA  47y  Female  Patient is a 47y old  Female who presents with a chief complaint of Abd pain ,fever (21 Dec 2017 00:48)    INTERVAL HPI/OVERNIGHT EVENTS:    No overnight events. Pt afebrile. Continues to endorse abdominal pain, well controlled on PCA. Endorses R sided CP at the site of mediport placement. No N/V, SOB. No LE pain.    MEDICATIONS  (STANDING):  docusate sodium 100 milliGRAM(s) Oral three times a day  enoxaparin Injectable 40 milliGRAM(s) SubCutaneous daily  HYDROmorphone PCA (1 mG/mL) 30 milliLiter(s) PCA Continuous PCA Continuous  senna 2 Tablet(s) Oral at bedtime  sodium chloride 0.9%. 1000 milliLiter(s) (75 mL/Hr) IV Continuous <Continuous>    MEDICATIONS  (PRN):  HYDROmorphone PCA (1 mG/mL) Rescue Clinician Bolus 1 milliGRAM(s) IV Push every 2 hours PRN Severe Pain (7 - 10)  naloxone Injectable 0.1 milliGRAM(s) IV Push every 3 minutes PRN For ANY of the following changes in patient status:  A. RR LESS THAN 10 breaths per minute, B. Oxygen saturation LESS THAN 90%, C. Sedation score of 6  ondansetron Injectable 8 milliGRAM(s) IV Push every 8 hours PRN Nausea and/or Vomiting    T(C): 37.4 (12-21-17 @ 08:00), Max: 37.7 (12-20-17 @ 15:37)  HR: 94 (12-21-17 @ 08:00) (59 - 102)  BP: 94/65 (12-21-17 @ 08:00) (86/59 - 115/78)  RR: 18 (12-21-17 @ 08:00) (16 - 18)  SpO2: 98% (12-21-17 @ 08:00) (97% - 100%)  Wt(kg): --Vital Signs Last 24 Hrs  T(C): 37.4 (21 Dec 2017 08:00), Max: 37.7 (20 Dec 2017 15:37)  T(F): 99.3 (21 Dec 2017 08:00), Max: 99.9 (20 Dec 2017 15:37)  HR: 94 (21 Dec 2017 08:00) (59 - 102)  BP: 94/65 (21 Dec 2017 08:00) (86/59 - 115/78)  BP(mean): --  RR: 18 (21 Dec 2017 08:00) (16 - 18)  SpO2: 98% (21 Dec 2017 08:00) (97% - 100%)    PHYSICAL EXAM:    General: NAD, sitting comfortably in bed  Neurology: A&Ox3, nonfocal  Head:  Normocephalic, atraumatic  ENT:  Mucosa moist, no ulcerations  Neck:  Supple, no sinuses or palpable masses  Lymphatic:  No palpable cervical, supraclavicular adenopathy  Respiratory: CTA B/L, no wheezing or crackles  CV: RRR, S1S2, no murmur  Abdominal: pt refusing abdominal exam, colostomy in place with semiformed, brown stool, multiple adominal scars  Extremities: No edema, + peripheral pulses, FROM all 4 extremity  Skin: R chest mediport in place, C/D/I, no erythema or edema    LABS:                        7.2    6.8   )-----------( 312      ( 21 Dec 2017 09:22 )             23.2     12-21    136  |  99  |  15  ----------------------------<  89  4.5   |  27  |  0.82    Ca    8.3<L>      21 Dec 2017 09:22  Phos  3.4     12-21  Mg     2.3     12-21    TPro  6.7  /  Alb  3.9  /  TBili  0.3  /  DBili  x   /  AST  22  /  ALT  <5<L>  /  AlkPhos  44  12-20    PT/INR - ( 20 Dec 2017 17:06 )   PT: 11.6 sec;   INR: 1.06 ratio      PTT - ( 20 Dec 2017 17:06 )  PTT:28.0 sec  Urinalysis Basic - ( 21 Dec 2017 03:30 )    Color: Yellow / Appearance: Clear / SG: >1.030 / pH: x  Gluc: x / Ketone: Trace  / Bili: Negative / Urobili: Negative   Blood: x / Protein: 30 mg/dL / Nitrite: Negative   Leuk Esterase: Negative / RBC: 0-2 /HPF / WBC 2-5 /HPF   Sq Epi: x / Non Sq Epi: Occasional /HPF / Bacteria: x    CAPILLARY BLOOD GLUCOSE    Urinalysis Basic - ( 21 Dec 2017 03:30 )    Color: Yellow / Appearance: Clear / SG: >1.030 / pH: x  Gluc: x / Ketone: Trace  / Bili: Negative / Urobili: Negative   Blood: x / Protein: 30 mg/dL / Nitrite: Negative   Leuk Esterase: Negative / RBC: 0-2 /HPF / WBC 2-5 /HPF   Sq Epi: x / Non Sq Epi: Occasional /HPF / Bacteria: x    RADIOLOGY & ADDITIONAL TESTS:    CT Abdomen and Pelvis w/ Oral Cont and w/ IV Cont (12.20.17 @ 19:44) >  EXAM:  CT ABDOMEN AND PELVIS OC IC                          PROCEDURE DATE:  12/20/2017      INTERPRETATION:  CLINICAL INFORMATIO: Ovarian cancer, abdominal pain.    COMPARISON: CT abdomen pelvis 8/1/2017.    PROCEDURE:   CT of the Abdomenand Pelvis was performed with intravenous contrast.   Intravenous contrast: 90 ml Omnipaque 350. 10 ml discarded.  Oral contrast: positive contrast was administered.  Sagittal and coronal reformats were performed.    FINDINGS:    LOWER CHEST: Trace bilateral pleural effusions. Partially imaged central   venous catheter in the SVC.    LIVER:   Hepatic hypodensities.  BILE DUCTS: Mild intrahepatic biliary ductal dilatation. New central and   left hepatic pneumobilia.  GALLBLADDER: Interval removal of a cholecystostomy tube.  SPLEEN: Within normal limits.  PANCREAS: Within normal limits.  ADRENALS: Within normal limits.  KIDNEYS/URETERS: Symmetric enhancement. Moderate bilateral   hydronephrosis, increased since the prior exam..    BLADDER: Within normal limits.  REPRODUCTIVE ORGANS: Status post hysterectomy.    BOWEL/PERITONEUM: Left lower quadrant colostomy. Interval removal of a   PEG tube. Worsening peritoneal carcinomatosis with increased size of   peritoneal implants in the lesser sac,along the greater curvature of the   stomach, lara hepatis, perisplenic and pelvic region. New implants are   noted in the falciform ligament. A previously referenced peritoneal   implant in the left lower quadrant measures 5.6 x 3.0 cm (2; 69),   previously 5.1 x 2.9 cm. No bowel obstruction. Large amount of stool seen   throughout the colon. Moderate loculated ascites, increased since   8/1/2017.  VESSELS:  Within normal limits.  RETROPERITONEUM: Enlarged retroperitoneal lymph nodes. A reference lymph   node in the measures and 2.7 x 1.3 cm (2; 59).    ABDOMINAL WALL: Anasarca.  BONES: Within normal limits.    IMPRESSION:     Worsening peritoneal carcinomatosis.    No bowel obstruction.    Mild intrahepatic ductal dilatation and pneumobilia, status post   cholecystostomy tube removal.    Moderate hydronephrosis is new from the prior study.    New left hepatic hypodensities suspicious for metastases.    ROMAN RIGGS M.D., RADIOLOGY RESIDENT  This document has been electronically signed.  LAILA GARCIA M.D., ATTENDING RADIOLOGIST  This document has been electronically signed. Dec 20 2017  8:30PM

## 2017-12-21 NOTE — PROGRESS NOTE ADULT - PROBLEM SELECTOR PLAN 6
HCP form completed.   Patient and her HCP goals are towards continuing a Holistic approach and hoping the patient's life expectancy may be prolonged or maybe "cured." They do not want to follow up with [facility] or house Oncology.  16' spent on ACP

## 2017-12-21 NOTE — DIETITIAN INITIAL EVALUATION ADULT. - PERTINENT MEDS FT
normal saline solution, colace, zofran, senna, dilaudid, zofran normal saline solution at 75 ml, colace, zofran, senna, dilaudid, zofran

## 2017-12-21 NOTE — DIETITIAN INITIAL EVALUATION ADULT. - NUTRITION INTERVENTION
Collaboration and Referral of Nutrition Care/Nutrition Education/Meals and Snack/Medical Food Supplements

## 2017-12-21 NOTE — DIETITIAN INITIAL EVALUATION ADULT. - OTHER INFO
Pt seen for BMI <19. Pt reports fair po intake PTA and in house due to early satiety from bloating. Pt reports consuming ~50% of meals both PTA and in house. Pt reports  pounds (since discharge from last admission). Past RD notes from June 2017 reports wt. of 125 pounds and August 2017 with wt. of 103.3 pounds. Pt reports Ensure and other like supplements "bloat" her and therefore declines. Pt agreed to try Promote 2x daily. Pt reports she is unable to tolerate cold foods/beverages as they cause bloating. Pt reports chronic episodes of diarrhea/constipation, currently experiencing diarrhea +BM last night (12/20). Pt on bowel regimen as needed, and discussed stool bulking food sources to help with diarrhea. Denies nausea/vomiting. Denies chewing/ swallowing difficulty. Home supplements/vitamins: Protein shakes. Pt seen for BMI <19. Pt reports fair po intake PTA and in house due to early satiety from bloating. Pt reports consuming ~50% of meals both PTA and in house. Pt reports  pounds (since discharge from last admission). Past RD notes from June 2017 reports wt. of 125 pounds and August 2017 with wt. of 103.3 pounds. Nutrition Focused Physical Exam performed with patients consent with RD present, revealing severe subcutaneous fat loss in the orbital region, and severe muscle loss in the temple, patellar, anterior thigh, clavicle and acromion bone regions. Pt reports Ensure and other like supplements "bloat" her and therefore declines. Pt agreed to try Promote 2x daily. Pt reports she is unable to tolerate cold foods/beverages as they cause bloating. Pt reports chronic episodes of diarrhea/constipation, currently experiencing diarrhea +BM last night (12/20). Pt on bowel regimen as needed, and discussed stool bulking food sources to help with diarrhea. Denies nausea/vomiting. Denies chewing/ swallowing difficulty. Home supplements/vitamins: Protein shakes.

## 2017-12-21 NOTE — PROGRESS NOTE ADULT - PROBLEM SELECTOR PLAN 3
- CT AP shows worsening of carcinomatosis.  - f/u outpt oncologist office  - f/u palliative care recs - CT AP shows worsening of carcinomatosis.  - plan for chemoRx once active infection r/o  - f/u outpt oncologist office  - f/u palliative care recs

## 2017-12-21 NOTE — H&P ADULT - HISTORY OF PRESENT ILLNESS
47 female PMH metastatic ovarian cancer (diagnosed 2016)  s/p GRETCHEN, colostomy s/p SBO (2016), lesser sac collection s/p evacuation by EUS and later IR drain placement (6/12/17) , acute cholecystitis s/p percutaneous cholecystostomy and ERCP for choledocholithiasis removal (08/2016) who presents with fever and worsening abdominal pain. The patient states that her fever began on Thursday after she had a powerport implanted on her R chest for future administration of chemotherapy. The patient states the fever was initially responsive to tylenol but when she follwoed up with her PCP who heard her complain of worsening abdominal pain and fevers, decided it would be best if she came to the hospital. Endorses persistent nausea which is unchanged. Denies any CP, diarrhea, vomiting, rhinorrhea, dysuria. Describes abd pain as diffuse and generalized through her whole abdomen. States she just had a BM, non bloody.    Vital Signs Last 24 Hrs  T(C): 37.2 (20 Dec 2017 22:20), Max: 37.7 (20 Dec 2017 15:37)  T(F): 99 (20 Dec 2017 22:20), Max: 99.9 (20 Dec 2017 15:37)  HR: 59 (20 Dec 2017 22:20) (59 - 102)  BP: 86/59 (20 Dec 2017 22:20) (86/59 - 115/78)  BP(mean): --  RR: 18 (20 Dec 2017 22:20) (16 - 18)  SpO2: 100% (20 Dec 2017 22:20) (97% - 100%)    In the ED, had a CT AP which shows worsening of her disease. Palliative has seen the patient and set up a PCA pump. CXR clear. BCx received UA UCx pending.

## 2017-12-21 NOTE — DIETITIAN INITIAL EVALUATION ADULT. - ORAL INTAKE PTA
Pt reports eating 3 full meals a day, consuming ~50% of each meal. Usual intake: breakfast: milk, oatmeal, avocado. lunch: brown rice, stir fried vegetables and meat (Asian cuisine). dinner: usually some sort of take-out. Pt unable to provide more detail on type of take-out. Pt reports consuming lots of fruits for snack./fair fair/Pt reports eating 3 meals a day, consuming ~50% of each meal. Usual intake: breakfast: milk, oatmeal, avocado. lunch: brown rice, stir fried vegetables and meat (Asian cuisine). dinner: usually some sort of take-out. Pt unable to provide more detail on type of take-out. Pt reports consuming lots of fruits for snack.

## 2017-12-21 NOTE — PROGRESS NOTE ADULT - SUBJECTIVE AND OBJECTIVE BOX
HPI: 47 year old Female with Medical History of Ovarian cancer (dx 12/2016) on homeopathic therapy and " planning on non conventional disease modifying Rx," s/p GRETCHEN BSO, bowel resection with colostomy. She is s/p 1 month hospital stay for acute cholecystitis that complicated with  bowel obstruction and abdominal abscess. On 6/21 she underwent ex-lap, extensive lysis of adhesions, reduction of internal hernia, drainage of intraabdominal abscess, placement of G tube, placement of amador drain in abscess cavity. She is also s/p ERCP at [facility] by Dr. Susu Vasquez, had CBD stone removed. Biliary drain was subsequently removed. She came to the ED after being referred from [facility] by Dr. Salgado for findings for fever and severe abdominal pain not relieved by home medications. She indicates that she started with intermittent fevers since last week after her port was placed to start chemotherapy. She also noticed increase in abdominal pain which was previously controlled with home regimen. She admitted having decreased output from her ostomy bag for the past couple of days.     Today, the patient's pain is better controlled and she was seen walking on the hallway with minimal assistance. She denied any nausea.     Following patient for symptom management - pain control, as continuity of care with outpatient Supportive Oncology.     PERTINENT PM/SXH:   Ovarian cancer    Colostomy in place  H/O abdominal hysterectomy    SOCIAL HISTORY:   Significant other/partner:  [x ] YES  [ ] NO               Children:  [ ] YES  [x ] NO                   Rastafarian/Spirituality:   Substance hx:  [ ] YES   [x ] NO                   Tobacco hx:  [ ] YES  [x ] NO                       Alcohol hx: [ ] YES  [x] NO         Home Opioid hx:  [x ] YES  [ ] NO  Fentanyl 125 mcg/hr patch and Dilaudid 8 mg PO q 3-4h (during the last few days, she was using it up to 6-7 time a day)   Living Situation: [x ] Home  [ ] Long term care  [ ] Rehab [ ] Other    FAMILY HISTORY:  No pertinent family history in first degree relatives    [ x] Family history non-contributory     BASELINE (I)ADLs (prior to admission):  [place]: [ ] total  [x ] moderate [ ] dependent    ADVANCE DIRECTIVES:    Full code  MOLST  [ ] YES [x ] NO                      [ ] Completed  Health Care Proxy [ x] YES  [ ] NO   [x ] Completed (see below)   Living Will  [ ] YES [x ] NO             [ ] Surrogate  [x ] HCP  [ ] Guardian: Trung Buenrostro ([facility]) Phone#: [phone]    Allergies    penicillin (Rash)    Intolerances        MEDICATIONS  (STANDING):  docusate sodium 100 milliGRAM(s) Oral three times a day  enoxaparin Injectable 40 milliGRAM(s) SubCutaneous daily  HYDROmorphone PCA (1 mG/mL) 30 milliLiter(s) PCA Continuous PCA Continuous  senna 2 Tablet(s) Oral at bedtime  sodium chloride 0.9%. 1000 milliLiter(s) (75 mL/Hr) IV Continuous <Continuous>    MEDICATIONS  (PRN):  HYDROmorphone PCA (1 mG/mL) Rescue Clinician Bolus 1 milliGRAM(s) IV Push every 2 hours PRN Severe Pain (7 - 10)  naloxone Injectable 0.1 milliGRAM(s) IV Push every 3 minutes PRN For ANY of the following changes in patient status:  A. RR LESS THAN 10 breaths per minute, B. Oxygen saturation LESS THAN 90%, C. Sedation score of 6  ondansetron Injectable 8 milliGRAM(s) IV Push every 8 hours PRN Nausea and/or Vomiting        PRESENT SYMPTOMS:  Source: [x ] Patient   [ ] Family   [ ] Team     Pain:                        [ ] No [x ] Yes             [ ] Mild [x ] Moderate to Severe    Onset - Days  Location - Diffuse abdominal pain.  Duration - Days  Character - Not able to indicate   Alleviating/Aggravating - Worsening with movement or pressure on the abdomen.  Radiation - None   Timing - Constant    Dyspnea:                [ x] No [ ] Yes             [ ] Mild [ ] Moderate [ ] Severe    Anxiety:                  [ x] No [ ] Yes             [ ] Mild [ ] Moderate [ ] Severe    Fatigue:                  [x ] No [ ] Yes             [ ] Mild [ ] Moderate [ ] Severe    Nausea:                  [ x ] No [ ] Yes             [ ] Mild [ ] Moderate [ ] Severe    Loss of appetite:   [ ] No [x ] Yes             [x ] Mild [ ] Moderate [ ] Severe    Constipation:        [ ] No [x ] Yes             [x ] Mild [ ] Moderate [ ] Severe --> decrease output from ostomy for the past couple of days. However, improving today.     Other Symptoms:  [x ] All other review of systems negative   [ ] Unable to obtain due to poor mentation     Karnofsky Performance Score/Palliative Performance Status Version 2: 50%    PHYSICAL EXAM:  Vital Signs Last 24 Hrs  T(C): 37.7 (20 Dec 2017 15:37), Max: 37.7 (20 Dec 2017 15:37)  T(F): 99.9 (20 Dec 2017 15:37), Max: 99.9 (20 Dec 2017 15:37)  HR: 100 (20 Dec 2017 15:37) (100 - 100)  BP: 111/77 (20 Dec 2017 15:37) (111/77 - 111/77)  BP(mean): --  RR: --  SpO2: 98% (20 Dec 2017 15:37) (98% - 98%) I&O's Summary    General:  [x ] Alert  [ ] Oriented x      [ ] Lethargic  [ ] Agitated   [x ] Cachexia   [ ] Unarousable  [x ] Verbal  [ ] Non-Verbal    HEENT:  [ ] Normal   [x ] Dry mouth   [ ] ET Tube    [ ] Trach  [ ] Oral lesions    Lungs:   [x ] Clear [ ] Tachypnea  [ ] Audible excessive secretions   [ ] Rhonchi        [ ] Right [ ] Left [ ] Bilateral  [ ] Crackles        [ ] Right [ ] Left [ ] Bilateral  [ ] Wheezing     [ ] Right [ ] Left [ ] Bilateral    Cardiovascular:  [x ] Regular [ ] Irregular [ ] Tachycardia   [ ] Bradycardia  [ ] Murmur [ ] Other    Abdomen: [ ] Soft  [ ] Distended   [x ] +BS - Hyperactive  [ ] Non tender [x ] abdomen severely tender, unable to palpate due to severe pain, diffuse.  [ ]PEG   [ ]OGT/ NGT   Last BM: (+) ostomy output       Genitourinary: [x ] Normal [ ] Incontinent   [ ] Oliguria/Anuria   [ ] Lynn    Musculoskeletal:  [ ] Normal   [x ] Weakness  [ ] Bedbound/Wheelchair bound [ ] Edema    Neurological: [x ] No focal deficits  [ ] Cognitive impairment  [ ] Dysphagia [ ] Dysarthria [ ] Paresis [ ] Other     Skin: [ x] Scars over mid abdominal region due to prior surgeries    [ ] Pressure ulcer(s)                  [ ] Rash    LABS:                        7.2    6.8   )-----------( 312      ( 21 Dec 2017 09:22 )             23.2   12-21    136  |  99  |  15  ----------------------------<  89  4.5   |  27  |  0.82    Ca    8.3<L>      21 Dec 2017 09:22  Phos  3.4     12-21  Mg     2.3     12-21    TPro  6.7  /  Alb  3.9  /  TBili  0.3  /  DBili  x   /  AST  22  /  ALT  <5<L>  /  AlkPhos  44  12-20    Shock: No [ ] Septic [ ] Cardiogenic [ ] Neurologic [ ] Hypovolemic  Vasopressors x None  Inotrophs x None    Protein Calorie Malnutrition: [ ] Mild [ ] Moderate [x ] Severe    Oral Intake: [ ] Unable/mouth care only [ ] Minimal [ ] Moderate [x ] Full Capability  Diet: [ ] NPO [ ] Tube feeds [ ] TPN [x ] Other     RADIOLOGY & ADDITIONAL STUDIES:  < from: CT Abdomen and Pelvis w/ Oral Cont and w/ IV Cont (12.20.17 @ 19:44) >  IMPRESSION:     Worsening peritoneal carcinomatosis.    No bowel obstruction.    Mild intrahepatic ductal dilatation and pneumobilia, status post   cholecystostomy tube removal.    Moderate hydronephrosis is new from the prior study.    New left hepatic hypodensities suspicious for metastases.    < end of copied text >      REFERRALS:   [ ] Chaplaincy  [ ] Hospice  [ ] Child Life  [ ] Social Work  [ ] Case management [ ] Holistic Therapy

## 2017-12-21 NOTE — PROGRESS NOTE ADULT - PROBLEM SELECTOR PLAN 2
- likely in the setting of worsening tumor load as evidenced by worsening of carcinomatosis.  - c/w PCA pump for pain  - c/w bowel regimen, may escalate to miralax but pt having stool in colostomy

## 2017-12-21 NOTE — PROGRESS NOTE ADULT - PROBLEM SELECTOR PLAN 6
- unclear etiology, however likely 2/2 anemia of chronic disease  - Hb at 7.2 this AM, previously at 9-10 in 8/17  - no signs of active bleeding  - cont to monitor, f/u iron panel in AM - c/w lovenox for DVT ppx

## 2017-12-21 NOTE — H&P ADULT - NSHPSOCIALHISTORY_GEN_ALL_CORE
Engaged to randy  Has a bunny in the home, in good health.  Denies smoking, EtOH  Immigrated from [place] in 1998  Former acupuncturist

## 2017-12-21 NOTE — DIETITIAN INITIAL EVALUATION ADULT. - PROBLEM SELECTOR PLAN 1
No evidence of fever here. No leukocytosis. No evidence of infection on CXR. No signs of erythema at powerport.  F/u Power Port  Tylenol PRN.  Patient not meeting sepsis criteria, and fever is possibly secondary to tumor burden, however given patient's extensive abdominal history and possibly immunocompromised state in the setting of cancer, could be infectious. Ravi lmontior off abx for now and monitor sepsis criteria.  F.u BCx UA UCx.  ck lactate and procalcitonin  monitor port site for s/s infection

## 2017-12-21 NOTE — PROGRESS NOTE ADULT - PROBLEM SELECTOR PLAN 1
- likely in the setting of increase tumor burden, no signs of infection, no fever/leukocytosis, lactate wnl, CXR clear, mediport site non-infectious  - f/u BCx, UCx  - c/w tylenol  - will monitor off antibiotics for now given no signs of infection

## 2017-12-21 NOTE — H&P ADULT - PROBLEM SELECTOR PLAN 2
Likely in the setting of worsening tumor load as evidenced by worsening of carcinomatosis.  Continue with PCA pump.  Bowel regimen

## 2017-12-21 NOTE — DIETITIAN INITIAL EVALUATION ADULT. - NS AS NUTRI INTERV ED CONTENT
discussed the idea of small frequent meals, stool bulking food sources, and the idea of energy dense foods and snacks to maximize calories./Purpose of the nutrition education/Recommended modifications

## 2017-12-21 NOTE — DIETITIAN INITIAL EVALUATION ADULT. - SIGNS/SYMPTOMS
Poor po intake, progressive significant wt. loss, BMI 15.7 Poor po intake, 22% wt. loss in 6 months, BMI 15.7

## 2017-12-21 NOTE — H&P ADULT - NSHPLABSRESULTS_GEN_ALL_CORE
8.2    7.1   )-----------( 350      ( 20 Dec 2017 17:06 )             25.7     12-20    136  |  97  |  17  ----------------------------<  82  4.5   |  26  |  0.60    Ca    8.8      20 Dec 2017 17:06    TPro  6.7  /  Alb  3.9  /  TBili  0.3  /  DBili  x   /  AST  22  /  ALT  <5<L>  /  AlkPhos  44  12-20    CXR clear    < from: CT Abdomen and Pelvis w/ Oral Cont and w/ IV Cont (12.20.17 @ 19:44) >    MPRESSION:     Worsening peritoneal carcinomatosis.    No bowel obstruction.    Mild intrahepatic ductal dilatation and pneumobilia, status post   cholecystostomy tube removal.    Moderate hydronephrosis is new from the prior study.    New left hepatic hypodensities suspicious for metastases.    < end of copied text > 8.2    7.1   )-----------( 350      ( 20 Dec 2017 17:06 )             25.7     12-20    136  |  97  |  17  ----------------------------<  82  4.5   |  26  |  0.60    Ca    8.8      20 Dec 2017 17:06    TPro  6.7  /  Alb  3.9  /  TBili  0.3  /  DBili  x   /  AST  22  /  ALT  <5<L>  /  AlkPhos  44  12-20    CXR clear    < from: CT Abdomen and Pelvis w/ Oral Cont and w/ IV Cont (12.20.17 @ 19:44) >    MPRESSION:     Worsening peritoneal carcinomatosis.    No bowel obstruction.    Mild intrahepatic ductal dilatation and pneumobilia, status post   cholecystostomy tube removal.    Moderate hydronephrosis is new from the prior study.    New left hepatic hypodensities suspicious for metastases.    < end of copied text >  labs/studies/ekg reviewed personally by me  EKG sinus, nl intervals

## 2017-12-21 NOTE — DIETITIAN INITIAL EVALUATION ADULT. - NS AS NUTRI INTERV MEALS SNACK
General/healthful diet/1) Continue regular diet 2) Encourage intake 3) Provide energy dense snacks/foods as requested

## 2017-12-21 NOTE — H&P ADULT - NSHPPHYSICALEXAM_GEN_ALL_CORE
Vital Signs Last 24 Hrs  T(C): 37.2 (20 Dec 2017 22:20), Max: 37.7 (20 Dec 2017 15:37)  T(F): 99 (20 Dec 2017 22:20), Max: 99.9 (20 Dec 2017 15:37)  HR: 59 (20 Dec 2017 22:20) (59 - 102)  BP: 86/59 (20 Dec 2017 22:20) (86/59 - 115/78)  BP(mean): --  RR: 18 (20 Dec 2017 22:20) (16 - 18)  SpO2: 100% (20 Dec 2017 22:20) (97% - 100%)      PHYSICAL EXAM:    GENERAL: Comfortable, no acute distress   HEAD:  Normocephalic, atraumatic  EYES: EOMI, PERRLA  HEENT: Moist mucous membranes  NECK: Supple, No JVD  NERVOUS SYSTEM:  CN 2-12 intact b/l. Alert & Oriented X4. Motor Strength 5/5 B/L upper and lower extremities. Sensation intact B/L  CHEST/LUNG: Clear to auscultation bilaterally  HEART: Regular rate and rhythm, no murmur  ABDOMEN: Diffusely tender. Patient requests limited examination.  EXTREMITIES:   No clubbing, cyanosis, or edema  MUSCULOSKELETAL: No muscle tenderness, no joint tenderness  SKIN: warm and dry, no rash. No evidence of infection over the power port.

## 2017-12-21 NOTE — H&P ADULT - PROBLEM SELECTOR PLAN 1
No evidence of fever here. No leukocytosis. No evidence of infection on CXR. No signs of erythema at powerport.  F/u Power Port  Tylenol PRN.  No acute indication for antibiotics at this time, as cancer is likely secondary to tumor burden, however given patient's extensive abdominal history and possibly immunocompromised state in the setting of cancer, can empirically treat with Vanc, Cefepime and Clindamycin.  F.u BCx UA UCx. No evidence of fever here. No leukocytosis. No evidence of infection on CXR. No signs of erythema at powerport.  F/u Power Port  Tylenol PRN.  Patient not meeting sepsis criteria, and fever is possibly secondary to tumor burden, however given patient's extensive abdominal history and possibly immunocompromised state in the setting of cancer, can empirically treat with Vanc, Cefepime and Clindamycin.  F.u BCx UA UCx. No evidence of fever here. No leukocytosis. No evidence of infection on CXR. No signs of erythema at powerport.  F/u Power Port  Tylenol PRN.  Patient not meeting sepsis criteria, and fever is possibly secondary to tumor burden, however given patient's extensive abdominal history and possibly immunocompromised state in the setting of cancer, could be infectious. Ravi lmontior off abx for now and monitor sepsis criteria.  F.u BCx UA UCx. No evidence of fever here. No leukocytosis. No evidence of infection on CXR. No signs of erythema at powerport.  F/u Power Port  Tylenol PRN.  Patient not meeting sepsis criteria, and fever is possibly secondary to tumor burden, however given patient's extensive abdominal history and possibly immunocompromised state in the setting of cancer, could be infectious. Ravi lmontior off abx for now and monitor sepsis criteria.  F.u BCx UA UCx.  ck lactate and procalcitonin  monitor port site for s/s infection

## 2017-12-21 NOTE — PROGRESS NOTE ADULT - PROBLEM SELECTOR PLAN 5
- c/w lovenox for DVT ppx - unclear etiology, however likely 2/2 anemia of chronic disease  - Hb at 7.2 this AM, previously at 9-10 in 8/17  - no signs of active bleeding  - cont to monitor, f/u iron panel in AM

## 2017-12-21 NOTE — H&P ADULT - NSHPREVIEWOFSYSTEMS_GEN_ALL_CORE
REVIEW OF SYSTEMS    CONSTITUTIONAL: +F, nausea Denies vd chills  HEENT:  Eyes:  Denies visual loss, blurred vision, double vision or scleral icterus. Ears, Nose, Throat:  Denies hearing loss, sneezing, congestion, runny nose or sore throat.  SKIN:  Denies rash or itching.  CARDIOVASCULAR:  Denies chest pain, AKI  RESPIRATORY:  Denies shortness of breath, cough or sputum.  GASTROINTESTINAL:  See HPI  GENITOURINARY:  Denies any hematuria, dysuria  NEUROLOGICAL:  Denies headache, dizziness, syncope, paralysis, ataxia, numbness or tingling in the extremities. No change in bowel or bladder control.  MUSCULOSKELETAL:  Denies muscle, back pain, joint pain or stiffness.  HEMATOLOGIC:  Denies anemia, bleeding or bruising.  LYMPHATICS:  Denies enlarged nodes.   PSYCHIATRIC:  Denies history of depression or anxiety.  ENDOCRINOLOGIC:  Denies reports of sweating, cold or heat intolerance. No polyuria or polydipsia.  ALLERGIES:  Denies history of asthma, hives, eczema or rhinitis.

## 2017-12-21 NOTE — DIETITIAN INITIAL EVALUATION ADULT. - ENERGY NEEDS
ht: 66 inches wt: 97.4 pounds IBW: 130 pounds 75%IBW  BMI: 15.7  skin: intact   edema: none noted  other pertinent information: 48 yo F with PMH of metastatic ovarian cancer s/p GRETCHEN, colostomy s/p SBO, acute cholecystitis s/p percutaneous cholecystomy + ERCP for choledocholithiasis removal (8/2016), admitted with fever and worsening abdominal pain. Found with worsening of disease, followed by Palliative.

## 2017-12-21 NOTE — CHART NOTE - NSCHARTNOTEFT_GEN_A_CORE
Upon Nutritional Assessment by the Registered Dietitian your patient was determined to meet criteria / has evidence of the following diagnosis/diagnoses:          [ ]  Mild Protein Calorie Malnutrition        [ ]  Moderate Protein Calorie Malnutrition        [x] Severe Protein Calorie Malnutrition        [ ] Unspecified Protein Calorie Malnutrition        [x ] Underweight / BMI <19        [ ] Morbid Obesity / BMI > 40      Findings as based on:  [x ] Comprehensive nutrition assessment   [x ] Nutrition Focused Physical Exam: severe subcutaneous fat loss in orbital region, severe muscle loss in patellar, temple, acromion and clavicle bone regions   [ x] Other:   1) Poor po intake  2) 22% wt. loss in 6 months   3) BMI 15.7      Nutrition Plan/Recommendations:    1) Encourage po intake  2) Recommending Promote 2x daily         PROVIDER Section:     By signing this assessment you are acknowledging and agree with the diagnosis/diagnoses assigned by the Registered Dietitian    Comments:

## 2017-12-21 NOTE — H&P ADULT - ASSESSMENT
47 female PMH metastatic ovarian cancer (diagnosed 2016)  s/p GRETCHEN, colostomy s/p SBO (2016), lesser sac collection s/p evacuation by EUS and later IR drain placement (6/12/17) , acute cholecystitis s/p percutaneous cholecystostomy and ERCP for choledocholithiasis removal (08/2016) who presents with fever and worsening abdominal pain.

## 2017-12-22 LAB
CULTURE RESULTS: NO GROWTH — SIGNIFICANT CHANGE UP
FERRITIN SERPL-MCNC: 145 NG/ML — SIGNIFICANT CHANGE UP (ref 15–150)
IRON SATN MFR SERPL: 15 % — SIGNIFICANT CHANGE UP (ref 14–50)
IRON SATN MFR SERPL: 28 UG/DL — LOW (ref 30–160)
OB PNL STL IA: POSITIVE
RBC # BLD: 2.52 M/UL — LOW (ref 3.8–5.2)
RETICS #: 75.1 K/UL — SIGNIFICANT CHANGE UP (ref 25–125)
RETICS/RBC NFR: 3 % — HIGH (ref 0.5–2.5)
SPECIMEN SOURCE: SIGNIFICANT CHANGE UP
TIBC SERPL-MCNC: 191 UG/DL — LOW (ref 220–430)
UIBC SERPL-MCNC: 163 UG/DL — SIGNIFICANT CHANGE UP (ref 110–370)

## 2017-12-22 PROCEDURE — 99233 SBSQ HOSP IP/OBS HIGH 50: CPT | Mod: GC

## 2017-12-22 RX ORDER — OXYCODONE HYDROCHLORIDE 5 MG/1
20 TABLET ORAL EVERY 8 HOURS
Qty: 0 | Refills: 0 | Status: DISCONTINUED | OUTPATIENT
Start: 2017-12-22 | End: 2017-12-25

## 2017-12-22 RX ORDER — HYDROMORPHONE HYDROCHLORIDE 2 MG/ML
30 INJECTION INTRAMUSCULAR; INTRAVENOUS; SUBCUTANEOUS
Qty: 0 | Refills: 0 | Status: DISCONTINUED | OUTPATIENT
Start: 2017-12-22 | End: 2017-12-27

## 2017-12-22 RX ADMIN — HYDROMORPHONE HYDROCHLORIDE 30 MILLILITER(S): 2 INJECTION INTRAMUSCULAR; INTRAVENOUS; SUBCUTANEOUS at 16:01

## 2017-12-22 RX ADMIN — OXYCODONE HYDROCHLORIDE 20 MILLIGRAM(S): 5 TABLET ORAL at 22:20

## 2017-12-22 RX ADMIN — SODIUM CHLORIDE 75 MILLILITER(S): 9 INJECTION INTRAMUSCULAR; INTRAVENOUS; SUBCUTANEOUS at 06:30

## 2017-12-22 RX ADMIN — SENNA PLUS 15 MILLILITER(S): 8.6 TABLET ORAL at 21:36

## 2017-12-22 RX ADMIN — OXYCODONE HYDROCHLORIDE 20 MILLIGRAM(S): 5 TABLET ORAL at 21:35

## 2017-12-22 RX ADMIN — Medication 100 MILLIGRAM(S): at 15:13

## 2017-12-22 RX ADMIN — Medication 100 MILLIGRAM(S): at 06:13

## 2017-12-22 RX ADMIN — Medication 100 MILLIGRAM(S): at 21:35

## 2017-12-22 RX ADMIN — ENOXAPARIN SODIUM 40 MILLIGRAM(S): 100 INJECTION SUBCUTANEOUS at 11:39

## 2017-12-22 RX ADMIN — HYDROMORPHONE HYDROCHLORIDE 30 MILLILITER(S): 2 INJECTION INTRAMUSCULAR; INTRAVENOUS; SUBCUTANEOUS at 05:54

## 2017-12-22 NOTE — PROGRESS NOTE ADULT - PROBLEM SELECTOR PLAN 3
- CT AP shows worsening of carcinomatosis.  - plan for treatment once active infection r/o and pain regimen is appropriate  - f/u outpt oncologist office  - f/u palliative care recs

## 2017-12-22 NOTE — PROGRESS NOTE ADULT - SUBJECTIVE AND OBJECTIVE BOX
HPI: 47 year old Female with Medical History of Ovarian cancer (dx 12/2016) on homeopathic therapy and " planning on non conventional disease modifying Rx," s/p GRETCHEN BSO, bowel resection with colostomy. She is s/p 1 month hospital stay for acute cholecystitis that complicated with  bowel obstruction and abdominal abscess. On 6/21 she underwent ex-lap, extensive lysis of adhesions, reduction of internal hernia, drainage of intraabdominal abscess, placement of G tube, placement of amador drain in abscess cavity. She is also s/p ERCP at [facility] by Dr. Susu Vasquez, had CBD stone removed. Biliary drain was subsequently removed. She came to the ED after being referred from [facility] by Dr. Salgado for findings for fever and severe abdominal pain not relieved by home medications. She indicates that she started with intermittent fevers since last week after her port was placed to start chemotherapy. She also noticed increase in abdominal pain which was previously controlled with home regimen. She admitted having decreased output from her ostomy bag for the past couple of days.     Today, the patient's pain is better controlled and she was seen walking on the hallway with minimal assistance. She denied any nausea.     Following patient for symptom management - pain control, as continuity of care with outpatient Supportive Oncology.     PERTINENT PM/SXH:   Ovarian cancer    Colostomy in place  H/O abdominal hysterectomy    SOCIAL HISTORY:   Significant other/partner:  [x ] YES  [ ] NO               Children:  [ ] YES  [x ] NO                   Faith/Spirituality:   Substance hx:  [ ] YES   [x ] NO                   Tobacco hx:  [ ] YES  [x ] NO                       Alcohol hx: [ ] YES  [x] NO         Home Opioid hx:  [x ] YES  [ ] NO  Fentanyl 125 mcg/hr patch and Dilaudid 8 mg PO q 3-4h (during the last few days, she was using it up to 6-7 time a day)   Living Situation: [x ] Home  [ ] Long term care  [ ] Rehab [ ] Other    FAMILY HISTORY:  No pertinent family history in first degree relatives    [ x] Family history non-contributory     BASELINE (I)ADLs (prior to admission):  [place]: [ ] total  [x ] moderate [ ] dependent    ADVANCE DIRECTIVES:    Full code  MOLST  [ ] YES [x ] NO                      [ ] Completed  Health Care Proxy [ x] YES  [ ] NO   [x ] Completed (see below)   Living Will  [ ] YES [x ] NO             [ ] Surrogate  [x ] HCP  [ ] Guardian: Trung Buenrostro ([facility]) Phone#: [phone]    Allergies    penicillin (Rash)    Intolerances    MEDICATIONS  (STANDING):  docusate sodium 100 milliGRAM(s) Oral three times a day  enoxaparin Injectable 40 milliGRAM(s) SubCutaneous daily  HYDROmorphone PCA (1 mG/mL) 30 milliLiter(s) PCA Continuous PCA Continuous  senna Syrup 15 milliLiter(s) Oral at bedtime    MEDICATIONS  (PRN):  bisacodyl 5 milliGRAM(s) Oral every 12 hours PRN Constipation  HYDROmorphone PCA (1 mG/mL) Rescue Clinician Bolus 1 milliGRAM(s) IV Push every 2 hours PRN Severe Pain (7 - 10)  naloxone Injectable 0.1 milliGRAM(s) IV Push every 3 minutes PRN For ANY of the following changes in patient status:  A. RR LESS THAN 10 breaths per minute, B. Oxygen saturation LESS THAN 90%, C. Sedation score of 6  ondansetron Injectable 8 milliGRAM(s) IV Push every 8 hours PRN Nausea and/or Vomiting        PRESENT SYMPTOMS:  Source: [x ] Patient   [ ] Family   [ ] Team     Pain:                        [ ] No [x ] Yes             [ ] Mild [x ] Moderate to Severe    Onset - Days  Location - Diffuse abdominal pain.  Duration - Days  Character - Not able to indicate   Alleviating/Aggravating - Worsening with movement or pressure on the abdomen.  Radiation - None   Timing - Constant    Dyspnea:                [ x] No [ ] Yes             [ ] Mild [ ] Moderate [ ] Severe    Anxiety:                  [ x] No [ ] Yes             [ ] Mild [ ] Moderate [ ] Severe    Fatigue:                  [x ] No [ ] Yes             [ ] Mild [ ] Moderate [ ] Severe    Nausea:                  [ x ] No [ ] Yes             [ ] Mild [ ] Moderate [ ] Severe    Loss of appetite:   [ ] No [x ] Yes             [x ] Mild [ ] Moderate [ ] Severe    Constipation:        [ ] No [x ] Yes             [x ] Mild [ ] Moderate [ ] Severe --> decrease output from ostomy for the past couple of days. However, improving today.     Other Symptoms:  [x ] All other review of systems negative   [ ] Unable to obtain due to poor mentation     Karnofsky Performance Score/Palliative Performance Status Version 2: 50%    PHYSICAL EXAM:  Vital Signs Last 24 Hrs  T(C): 36.9 (22 Dec 2017 08:29), Max: 37.3 (21 Dec 2017 16:30)  T(F): 98.4 (22 Dec 2017 08:29), Max: 99.1 (21 Dec 2017 16:30)  HR: 82 (22 Dec 2017 08:29) (81 - 89)  BP: 103/69 (22 Dec 2017 08:29) (91/62 - 106/72)  BP(mean): --  RR: 16 (22 Dec 2017 08:29) (16 - 18)  SpO2: 99% (22 Dec 2017 08:29) (98% - 100%)    General:  [x ] Alert  [ ] Oriented x      [ ] Lethargic  [ ] Agitated   [x ] Cachexia   [ ] Unarousable  [x ] Verbal  [ ] Non-Verbal    HEENT:  [ ] Normal   [x ] Dry mouth   [ ] ET Tube    [ ] Trach  [ ] Oral lesions    Lungs:   [x ] Clear [ ] Tachypnea  [ ] Audible excessive secretions   [ ] Rhonchi        [ ] Right [ ] Left [ ] Bilateral  [ ] Crackles        [ ] Right [ ] Left [ ] Bilateral  [ ] Wheezing     [ ] Right [ ] Left [ ] Bilateral    Cardiovascular:  [x ] Regular [ ] Irregular [ ] Tachycardia   [ ] Bradycardia  [ ] Murmur [ ] Other    Abdomen: [ ] Soft  [ ] Distended   [x ] +BS - Hyperactive  [ ] Non tender [x ] abdomen severely tender, unable to palpate due to severe pain, diffuse.  [ ]PEG   [ ]OGT/ NGT   Last BM: (+) ostomy output       Genitourinary: [x ] Normal [ ] Incontinent   [ ] Oliguria/Anuria   [ ] [place]    Musculoskeletal:  [ ] Normal   [x ] Weakness  [ ] Bedbound/Wheelchair bound [ ] Edema    Neurological: [x ] No focal deficits  [ ] Cognitive impairment  [ ] Dysphagia [ ] Dysarthria [ ] Paresis [ ] Other     Skin: [ x] Scars over mid abdominal region due to prior surgeries    [ ] Pressure ulcer(s)                  [ ] Rash    LABS:                                   7.2    6.8   )-----------( 312      ( 21 Dec 2017 09:22 )             23.2   12-21    136  |  99  |  15  ----------------------------<  89  4.5   |  27  |  0.82    Ca    8.3<L>      21 Dec 2017 09:22  Phos  3.4     12-21  Mg     2.3     12-21    TPro  6.7  /  Alb  3.9  /  TBili  0.3  /  DBili  x   /  AST  22  /  ALT  <5<L>  /  AlkPhos  44  12-20      Shock: No [ ] Septic [ ] Cardiogenic [ ] Neurologic [ ] Hypovolemic  Vasopressors x None  Inotrophs x None    Protein Calorie Malnutrition: [ ] Mild [ ] Moderate [x ] Severe    Oral Intake: [ ] Unable/mouth care only [ ] Minimal [ ] Moderate [x ] Full Capability  Diet: [ ] NPO [ ] Tube feeds [ ] TPN [x ] Other     RADIOLOGY & ADDITIONAL STUDIES:  < from: CT Abdomen and Pelvis w/ Oral Cont and w/ IV Cont (12.20.17 @ 19:44) >  IMPRESSION:     Worsening peritoneal carcinomatosis.    No bowel obstruction.    Mild intrahepatic ductal dilatation and pneumobilia, status post   cholecystostomy tube removal.    Moderate hydronephrosis is new from the prior study.    New left hepatic hypodensities suspicious for metastases.    < end of copied text >      REFERRALS:   [ ] Chaplaincy  [ ] Hospice  [ ] Child Life  [ ] Social Work  [ ] Case management [ ] Holistic Therapy HPI: 47 year old Female with Medical History of Ovarian cancer (dx 12/2016) on homeopathic therapy and " planning on non conventional disease modifying Rx," s/p GRETCHEN BSO, bowel resection with colostomy. She is s/p 1 month hospital stay for acute cholecystitis that complicated with  bowel obstruction and abdominal abscess. On 6/21 she underwent ex-lap, extensive lysis of adhesions, reduction of internal hernia, drainage of intraabdominal abscess, placement of G tube, placement of amador drain in abscess cavity. She is also s/p ERCP at [facility] by Dr. Susu Vasquez, had CBD stone removed. Biliary drain was subsequently removed. She came to the ED after being referred from [facility] by Dr. Salgado for findings for fever and severe abdominal pain not relieved by home medications. She indicates that she started with intermittent fevers since last week after her port was placed to start chemotherapy. She also noticed increase in abdominal pain which was previously controlled with home regimen. She admitted having decreased output from her ostomy bag for the past couple of days.     Today, the patient's pain is better controlled, rested well from 11p-530a, states si ambulating to abthroom without difficulty. Has not had bowel movement but would like to not advance bowel regimen: "want to take ti slow."    Following patient for symptom management - pain control, as continuity of care with outpatient Supportive Oncology.     PERTINENT PM/SXH:   Ovarian cancer    Colostomy in place  H/O abdominal hysterectomy    SOCIAL HISTORY:   Significant other/partner:  [x ] YES  [ ] NO               Children:  [ ] YES  [x ] NO                   Confucianism/Spirituality:   Substance hx:  [ ] YES   [x ] NO                   Tobacco hx:  [ ] YES  [x ] NO                       Alcohol hx: [ ] YES  [x] NO         Home Opioid hx:  [x ] YES  [ ] NO  Fentanyl 125 mcg/hr patch and Dilaudid 8 mg PO q 3-4h (during the last few days, she was using it up to 6-7 time a day)   Living Situation: [x ] Home  [ ] Long term care  [ ] Rehab [ ] Other    FAMILY HISTORY:  No pertinent family history in first degree relatives    [ x] Family history non-contributory     BASELINE (I)ADLs (prior to admission):  [place]: [ ] total  [x ] moderate [ ] dependent    ADVANCE DIRECTIVES:    Full code  MOLST  [ ] YES [x ] NO                      [ ] Completed  Health Care Proxy [ x] YES  [ ] NO   [x ] Completed (see below)   Living Will  [ ] YES [x ] NO             [ ] Surrogate  [x ] HCP  [ ] Guardian: Trung Buenrostro ([facility]) Phone#: [phone]    Allergies    penicillin (Rash)    Intolerances    MEDICATIONS  (STANDING):  docusate sodium 100 milliGRAM(s) Oral three times a day  enoxaparin Injectable 40 milliGRAM(s) SubCutaneous daily  HYDROmorphone PCA (1 mG/mL) 30 milliLiter(s) PCA Continuous PCA Continuous  senna Syrup 15 milliLiter(s) Oral at bedtime    MEDICATIONS  (PRN):  bisacodyl 5 milliGRAM(s) Oral every 12 hours PRN Constipation  HYDROmorphone PCA (1 mG/mL) Rescue Clinician Bolus 1 milliGRAM(s) IV Push every 2 hours PRN Severe Pain (7 - 10)  naloxone Injectable 0.1 milliGRAM(s) IV Push every 3 minutes PRN For ANY of the following changes in patient status:  A. RR LESS THAN 10 breaths per minute, B. Oxygen saturation LESS THAN 90%, C. Sedation score of 6  ondansetron Injectable 8 milliGRAM(s) IV Push every 8 hours PRN Nausea and/or Vomiting        PRESENT SYMPTOMS:  Source: [x ] Patient   [ ] Family   [ ] Team     Pain:                        [ ] No [x ] Yes             [ ] Mild [x ] Moderate to Severe    Onset - Days  Location - Diffuse abdominal pain.  Duration - Days  Character - Not able to indicate   Alleviating/Aggravating - Worsening with movement or pressure on the abdomen.  Radiation - None   Timing - Constant    Dyspnea:                [ x] No [ ] Yes             [ ] Mild [ ] Moderate [ ] Severe    Anxiety:                  [ x] No [ ] Yes             [ ] Mild [ ] Moderate [ ] Severe    Fatigue:                  [x ] No [ ] Yes             [ ] Mild [ ] Moderate [ ] Severe    Nausea:                  [ x ] No [ ] Yes             [ ] Mild [ ] Moderate [ ] Severe    Loss of appetite:   [ ] No [x ] Yes             [x ] Mild [ ] Moderate [ ] Severe    Constipation:        [ ] No [x ] Yes             [x ] Mild [ ] Moderate [ ] Severe --> decrease output from ostomy for the past couple of days. However, improving today.     Other Symptoms:  [x ] All other review of systems negative   [ ] Unable to obtain due to poor mentation     Karnofsky Performance Score/Palliative Performance Status Version 2: 50%    PHYSICAL EXAM:  Vital Signs Last 24 Hrs  T(C): 36.9 (22 Dec 2017 08:29), Max: 37.3 (21 Dec 2017 16:30)  T(F): 98.4 (22 Dec 2017 08:29), Max: 99.1 (21 Dec 2017 16:30)  HR: 82 (22 Dec 2017 08:29) (81 - 89)  BP: 103/69 (22 Dec 2017 08:29) (91/62 - 106/72)  BP(mean): --  RR: 16 (22 Dec 2017 08:29) (16 - 18)  SpO2: 99% (22 Dec 2017 08:29) (98% - 100%)    General: DECLINED PHYSICAL EXAM TODAY "I don't wwant to be examined, I feel fine right now"  [x ] Alert  [ ] Oriented x      [ ] Lethargic  [ ] Agitated   [x ] Cachexia   [ ] Unarousable  [x ] Verbal  [ ] Non-Verbal        LABS:                                   7.2    6.8   )-----------( 312      ( 21 Dec 2017 09:22 )             23.2   12-21    136  |  99  |  15  ----------------------------<  89  4.5   |  27  |  0.82    Ca    8.3<L>      21 Dec 2017 09:22  Phos  3.4     12-21  Mg     2.3     12-21    TPro  6.7  /  Alb  3.9  /  TBili  0.3  /  DBili  x   /  AST  22  /  ALT  <5<L>  /  AlkPhos  44  12-20      Shock: No [ ] Septic [ ] Cardiogenic [ ] Neurologic [ ] Hypovolemic  Vasopressors x None  Inotrophs x None    Protein Calorie Malnutrition: [ ] Mild [ ] Moderate [x ] Severe    Oral Intake: [ ] Unable/mouth care only [ ] Minimal [ ] Moderate [x ] Full Capability  Diet: [ ] NPO [ ] Tube feeds [ ] TPN [x ] Other     RADIOLOGY & ADDITIONAL STUDIES:  < from: CT Abdomen and Pelvis w/ Oral Cont and w/ IV Cont (12.20.17 @ 19:44) >  IMPRESSION:     Worsening peritoneal carcinomatosis.    No bowel obstruction.    Mild intrahepatic ductal dilatation and pneumobilia, status post   cholecystostomy tube removal.    Moderate hydronephrosis is new from the prior study.    New left hepatic hypodensities suspicious for metastases.    < end of copied text >      REFERRALS:   [ ] Chaplaincy  [ ] Hospice  [ ] Child Life  [ ] Social Work  [ ] Case management [ ] Holistic Therapy HPI: 47 year old Female with Medical History of Ovarian cancer (dx 12/2016) on homeopathic therapy and " planning on non conventional disease modifying Rx," s/p GRETCHEN BSO, bowel resection with colostomy. She is s/p 1 month hospital stay for acute cholecystitis that complicated with  bowel obstruction and abdominal abscess. On 6/21 she underwent ex-lap, extensive lysis of adhesions, reduction of internal hernia, drainage of intraabdominal abscess, placement of G tube, placement of amador drain in abscess cavity. She is also s/p ERCP at [facility] by Dr. Susu Vasquez, had CBD stone removed. Biliary drain was subsequently removed. She came to the ED after being referred from [facility] by Dr. Salgado for findings for fever and severe abdominal pain not relieved by home medications. She indicates that she started with intermittent fevers since last week after her port was placed to start chemotherapy. She also noticed increase in abdominal pain which was previously controlled with home regimen. She admitted having decreased output from her ostomy bag for the past couple of days.     Today, the patient's pain is better controlled, rested well from 11p-530a, states si ambulating to abthroom without difficulty. Has not had bowel movement but would like to not advance bowel regimen: "want to take it slow. nothing aggressive for constipation"    Following patient for symptom management - pain control, as continuity of care with outpatient Supportive Oncology.     PERTINENT PM/SXH:   Ovarian cancer    Colostomy in place  H/O abdominal hysterectomy    SOCIAL HISTORY:   Significant other/partner:  [x ] YES  [ ] NO               Children:  [ ] YES  [x ] NO                   Mormonism/Spirituality:   Substance hx:  [ ] YES   [x ] NO                   Tobacco hx:  [ ] YES  [x ] NO                       Alcohol hx: [ ] YES  [x] NO         Home Opioid hx:  [x ] YES  [ ] NO  Fentanyl 125 mcg/hr patch and Dilaudid 8 mg PO q 3-4h (during the last few days, she was using it up to 6-7 time a day)   Living Situation: [x ] Home  [ ] Long term care  [ ] Rehab [ ] Other    FAMILY HISTORY:  No pertinent family history in first degree relatives    [ x] Family history non-contributory     BASELINE (I)ADLs (prior to admission):  [place]: [ ] total  [x ] moderate [ ] dependent    ADVANCE DIRECTIVES:    Full code  MOLST  [ ] YES [x ] NO                      [ ] Completed  Health Care Proxy [ x] YES  [ ] NO   [x ] Completed (see below)   Living Will  [ ] YES [x ] NO             [ ] Surrogate  [x ] HCP  [ ] Guardian: Trung Buenrostro ([facility]) Phone#: [phone]    Allergies    penicillin (Rash)    Intolerances    MEDICATIONS  (STANDING):  docusate sodium 100 milliGRAM(s) Oral three times a day  enoxaparin Injectable 40 milliGRAM(s) SubCutaneous daily  HYDROmorphone PCA (1 mG/mL) 30 milliLiter(s) PCA Continuous PCA Continuous  senna Syrup 15 milliLiter(s) Oral at bedtime    MEDICATIONS  (PRN):  bisacodyl 5 milliGRAM(s) Oral every 12 hours PRN Constipation  HYDROmorphone PCA (1 mG/mL) Rescue Clinician Bolus 1 milliGRAM(s) IV Push every 2 hours PRN Severe Pain (7 - 10)  naloxone Injectable 0.1 milliGRAM(s) IV Push every 3 minutes PRN For ANY of the following changes in patient status:  A. RR LESS THAN 10 breaths per minute, B. Oxygen saturation LESS THAN 90%, C. Sedation score of 6  ondansetron Injectable 8 milliGRAM(s) IV Push every 8 hours PRN Nausea and/or Vomiting        PRESENT SYMPTOMS:  Source: [x ] Patient   [ ] Family   [ ] Team     Pain:                        [ ] No [x ] Yes             [ ] Mild [x ] Moderate to Severe    Onset - Days  Location - Diffuse abdominal pain.  Duration - Days  Character - Not able to indicate   Alleviating/Aggravating - Worsening with movement or pressure on the abdomen.  Radiation - None   Timing - Constant    Dyspnea:                [ x] No [ ] Yes             [ ] Mild [ ] Moderate [ ] Severe    Anxiety:                  [ x] No [ ] Yes             [ ] Mild [ ] Moderate [ ] Severe    Fatigue:                  [x ] No [ ] Yes             [ ] Mild [ ] Moderate [ ] Severe    Nausea:                  [ x ] No [ ] Yes             [ ] Mild [ ] Moderate [ ] Severe    Loss of appetite:   [ ] No [x ] Yes             [x ] Mild [ ] Moderate [ ] Severe    Constipation:        [ ] No [x ] Yes             [x ] Mild [ ] Moderate [ ] Severe --> decrease output from ostomy for the past couple of days. However, improving today.     Other Symptoms:  [x ] All other review of systems negative   [ ] Unable to obtain due to poor mentation     Karnofsky Performance Score/Palliative Performance Status Version 2: 50%    PHYSICAL EXAM:  Vital Signs Last 24 Hrs  T(C): 36.9 (22 Dec 2017 08:29), Max: 37.3 (21 Dec 2017 16:30)  T(F): 98.4 (22 Dec 2017 08:29), Max: 99.1 (21 Dec 2017 16:30)  HR: 82 (22 Dec 2017 08:29) (81 - 89)  BP: 103/69 (22 Dec 2017 08:29) (91/62 - 106/72)  BP(mean): --  RR: 16 (22 Dec 2017 08:29) (16 - 18)  SpO2: 99% (22 Dec 2017 08:29) (98% - 100%)    General: DECLINED PHYSICAL EXAM TODAY "I don't wwant to be examined, I feel fine right now"  [x ] Alert  [ ] Oriented x      [ ] Lethargic  [ ] Agitated   [x ] Cachexia   [ ] Unarousable  [x ] Verbal  [ ] Non-Verbal        LABS:                                   7.2    6.8   )-----------( 312      ( 21 Dec 2017 09:22 )             23.2   12-21    136  |  99  |  15  ----------------------------<  89  4.5   |  27  |  0.82    Ca    8.3<L>      21 Dec 2017 09:22  Phos  3.4     12-21  Mg     2.3     12-21    TPro  6.7  /  Alb  3.9  /  TBili  0.3  /  DBili  x   /  AST  22  /  ALT  <5<L>  /  AlkPhos  44  12-20      Shock: No [ ] Septic [ ] Cardiogenic [ ] Neurologic [ ] Hypovolemic  Vasopressors x None  Inotrophs x None    Protein Calorie Malnutrition: [ ] Mild [ ] Moderate [x ] Severe    Oral Intake: [ ] Unable/mouth care only [ ] Minimal [ ] Moderate [x ] Full Capability  Diet: [ ] NPO [ ] Tube feeds [ ] TPN [x ] Other     RADIOLOGY & ADDITIONAL STUDIES:  < from: CT Abdomen and Pelvis w/ Oral Cont and w/ IV Cont (12.20.17 @ 19:44) >  IMPRESSION:     Worsening peritoneal carcinomatosis.    No bowel obstruction.    Mild intrahepatic ductal dilatation and pneumobilia, status post   cholecystostomy tube removal.    Moderate hydronephrosis is new from the prior study.    New left hepatic hypodensities suspicious for metastases.    < end of copied text >      REFERRALS:   [ ] Chaplaincy  [ ] Hospice  [ ] Child Life  [ ] Social Work  [ ] Case management [ ] Holistic Therapy HPI: 47 year old Female with Medical History of Ovarian cancer (dx 12/2016) on homeopathic therapy and " planning on non conventional disease modifying Rx," s/p GRETCHEN BSO, bowel resection with colostomy. She is s/p 1 month hospital stay for acute cholecystitis that complicated with  bowel obstruction and abdominal abscess. On 6/21 she underwent ex-lap, extensive lysis of adhesions, reduction of internal hernia, drainage of intraabdominal abscess, placement of G tube, placement of amador drain in abscess cavity. She is also s/p ERCP at [facility] by Dr. Susu Vasquez, had CBD stone removed. Biliary drain was subsequently removed. She came to the ED after being referred from [facility] by Dr. Salgado for findings for fever and severe abdominal pain not relieved by home medications. She indicates that she started with intermittent fevers since last week after her port was placed to start chemotherapy. She also noticed increase in abdominal pain which was previously controlled with home regimen. She admitted having decreased output from her ostomy bag for the past couple of days.     Today, the patient's pain is better controlled, rested well from 11p-530a, states she is ambulating to bathroom without difficulty. Has not had bowel movement but would like to not advance bowel regimen: "want to take it slow. nothing aggressive for constipation"    Following patient for symptom management - pain control, as continuity of care with outpatient Supportive Oncology.     PERTINENT PM/SXH:   Ovarian cancer    Colostomy in place  H/O abdominal hysterectomy    SOCIAL HISTORY:   Significant other/partner:  [x ] YES  [ ] NO               Children:  [ ] YES  [x ] NO                   Yarsani/Spirituality:   Substance hx:  [ ] YES   [x ] NO                   Tobacco hx:  [ ] YES  [x ] NO                       Alcohol hx: [ ] YES  [x] NO         Home Opioid hx:  [x ] YES  [ ] NO  Fentanyl 125 mcg/hr patch and Dilaudid 8 mg PO q 3-4h (during the last few days, she was using it up to 6-7 time a day)   Living Situation: [x ] Home  [ ] Long term care  [ ] Rehab [ ] Other    FAMILY HISTORY:  No pertinent family history in first degree relatives    [ x] Family history non-contributory     BASELINE (I)ADLs (prior to admission):  [place]: [ ] total  [x ] moderate [ ] dependent    ADVANCE DIRECTIVES:    Full code  MOLST  [ ] YES [x ] NO                      [ ] Completed  Health Care Proxy [ x] YES  [ ] NO   [x ] Completed (see below)   Living Will  [ ] YES [x ] NO             [ ] Surrogate  [x ] HCP  [ ] Guardian: Trung Buenrostro ([facility]) Phone#: [phone]    Allergies    penicillin (Rash)    Intolerances    MEDICATIONS  (STANDING):  docusate sodium 100 milliGRAM(s) Oral three times a day  enoxaparin Injectable 40 milliGRAM(s) SubCutaneous daily  HYDROmorphone PCA (1 mG/mL) 30 milliLiter(s) PCA Continuous PCA Continuous  senna Syrup 15 milliLiter(s) Oral at bedtime    MEDICATIONS  (PRN):  bisacodyl 5 milliGRAM(s) Oral every 12 hours PRN Constipation  HYDROmorphone PCA (1 mG/mL) Rescue Clinician Bolus 1 milliGRAM(s) IV Push every 2 hours PRN Severe Pain (7 - 10)  naloxone Injectable 0.1 milliGRAM(s) IV Push every 3 minutes PRN For ANY of the following changes in patient status:  A. RR LESS THAN 10 breaths per minute, B. Oxygen saturation LESS THAN 90%, C. Sedation score of 6  ondansetron Injectable 8 milliGRAM(s) IV Push every 8 hours PRN Nausea and/or Vomiting        PRESENT SYMPTOMS:  Source: [x ] Patient   [ ] Family   [ ] Team     Pain:                        [ ] No [x ] Yes             [ ] Mild [x ] Moderate to Severe    Onset - Days  Location - Diffuse abdominal pain.  Duration - Days  Character - Not able to indicate   Alleviating/Aggravating - Worsening with movement or pressure on the abdomen.  Radiation - None   Timing - Constant    Dyspnea:                [ x] No [ ] Yes             [ ] Mild [ ] Moderate [ ] Severe    Anxiety:                  [ x] No [ ] Yes             [ ] Mild [ ] Moderate [ ] Severe    Fatigue:                  [x ] No [ ] Yes             [ ] Mild [ ] Moderate [ ] Severe    Nausea:                  [ x ] No [ ] Yes             [ ] Mild [ ] Moderate [ ] Severe    Loss of appetite:   [ ] No [x ] Yes             [x ] Mild [ ] Moderate [ ] Severe    Constipation:        [ ] No [x ] Yes             [x ] Mild [ ] Moderate [ ] Severe --> decrease output from ostomy for the past couple of days. However, improving today.     Other Symptoms:  [x ] All other review of systems negative   [ ] Unable to obtain due to poor mentation     Karnofsky Performance Score/Palliative Performance Status Version 2: 50%    PHYSICAL EXAM:  Vital Signs Last 24 Hrs  T(C): 36.9 (22 Dec 2017 08:29), Max: 37.3 (21 Dec 2017 16:30)  T(F): 98.4 (22 Dec 2017 08:29), Max: 99.1 (21 Dec 2017 16:30)  HR: 82 (22 Dec 2017 08:29) (81 - 89)  BP: 103/69 (22 Dec 2017 08:29) (91/62 - 106/72)  BP(mean): --  RR: 16 (22 Dec 2017 08:29) (16 - 18)  SpO2: 99% (22 Dec 2017 08:29) (98% - 100%)    General: DECLINED PHYSICAL EXAM TODAY "I don't wwant to be examined, I feel fine right now"  [x ] Alert  [ ] Oriented x      [ ] Lethargic  [ ] Agitated   [x ] Cachexia   [ ] Unarousable  [x ] Verbal  [ ] Non-Verbal        LABS:                                   7.2    6.8   )-----------( 312      ( 21 Dec 2017 09:22 )             23.2   12-21    136  |  99  |  15  ----------------------------<  89  4.5   |  27  |  0.82    Ca    8.3<L>      21 Dec 2017 09:22  Phos  3.4     12-21  Mg     2.3     12-21    TPro  6.7  /  Alb  3.9  /  TBili  0.3  /  DBili  x   /  AST  22  /  ALT  <5<L>  /  AlkPhos  44  12-20      Shock: No [ ] Septic [ ] Cardiogenic [ ] Neurologic [ ] Hypovolemic  Vasopressors x None  Inotrophs x None    Protein Calorie Malnutrition: [ ] Mild [ ] Moderate [x ] Severe    Oral Intake: [ ] Unable/mouth care only [ ] Minimal [ ] Moderate [x ] Full Capability  Diet: [ ] NPO [ ] Tube feeds [ ] TPN [x ] Other     RADIOLOGY & ADDITIONAL STUDIES:  < from: CT Abdomen and Pelvis w/ Oral Cont and w/ IV Cont (12.20.17 @ 19:44) >  IMPRESSION:     Worsening peritoneal carcinomatosis.    No bowel obstruction.    Mild intrahepatic ductal dilatation and pneumobilia, status post   cholecystostomy tube removal.    Moderate hydronephrosis is new from the prior study.    New left hepatic hypodensities suspicious for metastases.    < end of copied text >      REFERRALS:   [ ] Chaplaincy  [ ] Hospice  [ ] Child Life  [ ] Social Work  [ ] Case management [ ] Holistic Therapy

## 2017-12-22 NOTE — CHART NOTE - NSCHARTNOTEFT_GEN_A_CORE
BRIEF MALNUTRITION FOLLOW UP NOTE     Pt with ovarian cancer, undergoing homeopathic treatment. Presented c abdominal pain and fever.Pt seen by palliative care yesterday, goals are towards continuing a Holistic approach and hoping the patient's life expectancy may be prolonged or maybe "cured." They do not want to follow up with [facility] or [facility].      Pt asleep in bed. Breakfast not delivered yet, will receive promote shakes with breakfast. Per chart + BM yesterday, Pt with colostomy.     No new Wt to address.    Pertinent Medications: MEDICATIONS  (STANDING):  docusate sodium 100 milliGRAM(s) Oral three times a day  enoxaparin Injectable 40 milliGRAM(s) SubCutaneous daily  HYDROmorphone PCA (1 mG/mL) 30 milliLiter(s) PCA Continuous PCA Continuous  senna Syrup 15 milliLiter(s) Oral at bedtime  sodium chloride 0.9%. 1000 milliLiter(s) (75 mL/Hr) IV Continuous <Continuous>    MEDICATIONS  (PRN):  bisacodyl 5 milliGRAM(s) Oral every 12 hours PRN Constipation  HYDROmorphone PCA (1 mG/mL) Rescue Clinician Bolus 1 milliGRAM(s) IV Push every 2 hours PRN Severe Pain (7 - 10)  naloxone Injectable 0.1 milliGRAM(s) IV Push every 3 minutes PRN For ANY of the following changes in patient status:  A. RR LESS THAN 10 breaths per minute, B. Oxygen saturation LESS THAN 90%, C. Sedation score of 6  ondansetron Injectable 8 milliGRAM(s) IV Push every 8 hours PRN Nausea and/or Vomiting    Pertinent Labs:  12-21 Na136 mmol/L Glu 89 mg/dL K+ 4.5 mmol/L Cr  0.82 mg/dL BUN 15 mg/dL Phos 3.4 mg/dL Alb n/a   PAB n/a       Skin-pale, intact. Edema: none noted.    Severe malnutrition continues.   Continue to provide food preferences within diet restrictions as feasible   Continue to encourage good po intake at meals   Provide 2 Promote shakes daily  Monitor po intake, Wt, labs, GI tolerance, skin integrity

## 2017-12-22 NOTE — PROGRESS NOTE ADULT - PROBLEM SELECTOR PLAN 5
Will increase Senna to 15 ml hs. Will continue Colace 100 mg tid. Will add Dulcolax PRN, no BM still as of 12/22 On thursday increased Senna to 15 ml hs, continue Colace 100 mg tid, Dulcolax PRN, no BM still as of 12/22, declines On thursday increased Senna to 15 ml hs, continue Colace 100 mg tid, Dulcolax PRN, no BM still as of 12/22, declines escalating medications.

## 2017-12-22 NOTE — PROGRESS NOTE ADULT - PROBLEM SELECTOR PLAN 5
- unclear etiology, however likely 2/2 anemia of chronic disease  - Hb at 7.2, previously at 9-10 in 8/17  - no signs of active bleeding,  - cont to monitor H&H, f/u iron panel, FOBT

## 2017-12-22 NOTE — PROGRESS NOTE ADULT - PROBLEM SELECTOR PLAN 2
- likely in the setting of worsening tumor load as evidenced by worsening of carcinomatosis.  - c/w PCA pump for pain  - f/u palliative recs for transition to PO pain medications  - c/w bowel regimen, may escalate to miralax if necessary

## 2017-12-22 NOTE — PROGRESS NOTE ADULT - PROBLEM SELECTOR PLAN 1
Improving. 2/2 to worsening CA and constipation.   Continue Dilaudid PCA 0.2 mg/h, 0.5 mg q 15' demand dosage (DD), and 1 mg q 1 clinician bolus (CB). to discuss transition to Oxycontin 20 mg q 8 ATC and continuing Dilaudid PCA 0.5 mg q 15' DD only. *Pending agreement of attending * On Saturday, if tolerating Oxycontin, will likely DC PCA and continue Dilaudid 1mg IV q 2 PRN.  Holistic nurse referral. Improving. 2/2 to worsening CA and constipation.   Transition to Oxycontin 20 mg q 8 ATC and continuing Dilaudid PCA 0.5 mg q 15' DD only. On Saturday, if tolerating Oxycontin, will likely DC PCA and continue Dilaudid 1mg IV q 2 PRN.  Holistic nurse referral. Improving. 2/2 to worsening CA and constipation.   Transition to Oxycontin 20 mg q 8 ATC and continuing Dilaudid PCA 0.5 mg q 15' DD only. On Saturday, if tolerating Oxycontin, will likely DC PCA and continue Dilaudid 1mg IV q 2 PRN. Patient in agreement with plan.   Holistic nurse referral.

## 2017-12-22 NOTE — PROGRESS NOTE ADULT - PROBLEM SELECTOR PLAN 1
- likely in the setting of increase tumor burden, no signs of infection, no fever/leukocytosis, lactate wnl, CXR clear, mediport site non-infectious  - UA negative, BCx NGTD  - c/w tylenol  - will monitor off antibiotics for now given no signs of infection

## 2017-12-22 NOTE — PROGRESS NOTE ADULT - SUBJECTIVE AND OBJECTIVE BOX
HAKEEMCHAYA  47y  Female  Patient is a 47y old  Female who presents with a chief complaint of Abd pain ,fever (21 Dec 2017 00:48)    INTERVAL HPI/OVERNIGHT EVENTS:    No overnight events. Pt explains that pain is well controlled. No CP, N/V, fevers/chills, SOB. Pt tolerates some PO. Wants increased promote to 4x/day.    MEDICATIONS  (STANDING):  docusate sodium 100 milliGRAM(s) Oral three times a day  enoxaparin Injectable 40 milliGRAM(s) SubCutaneous daily  HYDROmorphone PCA (1 mG/mL) 30 milliLiter(s) PCA Continuous PCA Continuous  senna Syrup 15 milliLiter(s) Oral at bedtime    MEDICATIONS  (PRN):  bisacodyl 5 milliGRAM(s) Oral every 12 hours PRN Constipation  HYDROmorphone PCA (1 mG/mL) Rescue Clinician Bolus 1 milliGRAM(s) IV Push every 2 hours PRN Severe Pain (7 - 10)  naloxone Injectable 0.1 milliGRAM(s) IV Push every 3 minutes PRN For ANY of the following changes in patient status:  A. RR LESS THAN 10 breaths per minute, B. Oxygen saturation LESS THAN 90%, C. Sedation score of 6  ondansetron Injectable 8 milliGRAM(s) IV Push every 8 hours PRN Nausea and/or Vomiting    T(C): 36.9 (12-22-17 @ 08:29), Max: 37.3 (12-21-17 @ 16:30)  HR: 82 (12-22-17 @ 08:29) (81 - 89)  BP: 103/69 (12-22-17 @ 08:29) (91/62 - 106/72)  RR: 16 (12-22-17 @ 08:29) (16 - 18)  SpO2: 99% (12-22-17 @ 08:29) (98% - 100%)  Wt(kg): --Vital Signs Last 24 Hrs  T(C): 36.9 (22 Dec 2017 08:29), Max: 37.3 (21 Dec 2017 16:30)  T(F): 98.4 (22 Dec 2017 08:29), Max: 99.1 (21 Dec 2017 16:30)  HR: 82 (22 Dec 2017 08:29) (81 - 89)  BP: 103/69 (22 Dec 2017 08:29) (91/62 - 106/72)  BP(mean): --  RR: 16 (22 Dec 2017 08:29) (16 - 18)  SpO2: 99% (22 Dec 2017 08:29) (98% - 100%)    PHYSICAL EXAM:    General: NAD, sitting comfortably in bed  Neurology: A&Ox3, nonfocal  Head:  Normocephalic, atraumatic  ENT:  Mucosa moist, no ulcerations  Neck:  Supple, no sinuses or palpable masses  Lymphatic:  No palpable cervical, supraclavicular adenopathy  Respiratory: CTA B/L, no wheezing or crackles  CV: RRR, S1S2, no murmur  Abdominal: pt refusing abdominal exam, colostomy in place with loose brown stool, no blood/melena, multiple adominal scars  Extremities: No edema, + peripheral pulses, FROM all 4 extremity  Skin: R chest mediport in place, C/D/I, no erythema or edema    LABS:                        7.2    6.8   )-----------( 312      ( 21 Dec 2017 09:22 )             23.2     12-21    136  |  99  |  15  ----------------------------<  89  4.5   |  27  |  0.82    Ca    8.3<L>      21 Dec 2017 09:22  Phos  3.4     12-21  Mg     2.3     12-21    TPro  6.7  /  Alb  3.9  /  TBili  0.3  /  DBili  x   /  AST  22  /  ALT  <5<L>  /  AlkPhos  44  12-20    PT/INR - ( 20 Dec 2017 17:06 )   PT: 11.6 sec;   INR: 1.06 ratio      PTT - ( 20 Dec 2017 17:06 )  PTT:28.0 sec  Urinalysis Basic - ( 21 Dec 2017 03:30 )    Color: Yellow / Appearance: Clear / SG: >1.030 / pH: x  Gluc: x / Ketone: Trace  / Bili: Negative / Urobili: Negative   Blood: x / Protein: 30 mg/dL / Nitrite: Negative   Leuk Esterase: Negative / RBC: 0-2 /HPF / WBC 2-5 /HPF   Sq Epi: x / Non Sq Epi: Occasional /HPF / Bacteria: x    Urinalysis Basic - ( 21 Dec 2017 03:30 )    Color: Yellow / Appearance: Clear / SG: >1.030 / pH: x  Gluc: x / Ketone: Trace  / Bili: Negative / Urobili: Negative   Blood: x / Protein: 30 mg/dL / Nitrite: Negative   Leuk Esterase: Negative / RBC: 0-2 /HPF / WBC 2-5 /HPF   Sq Epi: x / Non Sq Epi: Occasional /HPF / Bacteria: x    RADIOLOGY & ADDITIONAL TESTS: HAKEEMCHAYA  47y  Female  Patient is a 47y old  Female who presents with a chief complaint of Abd pain ,fever (21 Dec 2017 00:48)    INTERVAL HPI/OVERNIGHT EVENTS:    No overnight events. Pt explains that pain is well controlled. No CP, N/V, fevers/chills, SOB. Pt tolerates some PO. Wants increased promote to 4x/day.    MEDICATIONS  (STANDING):  docusate sodium 100 milliGRAM(s) Oral three times a day  enoxaparin Injectable 40 milliGRAM(s) SubCutaneous daily  HYDROmorphone PCA (1 mG/mL) 30 milliLiter(s) PCA Continuous PCA Continuous  senna Syrup 15 milliLiter(s) Oral at bedtime    MEDICATIONS  (PRN):  bisacodyl 5 milliGRAM(s) Oral every 12 hours PRN Constipation  HYDROmorphone PCA (1 mG/mL) Rescue Clinician Bolus 1 milliGRAM(s) IV Push every 2 hours PRN Severe Pain (7 - 10)  naloxone Injectable 0.1 milliGRAM(s) IV Push every 3 minutes PRN For ANY of the following changes in patient status:  A. RR LESS THAN 10 breaths per minute, B. Oxygen saturation LESS THAN 90%, C. Sedation score of 6  ondansetron Injectable 8 milliGRAM(s) IV Push every 8 hours PRN Nausea and/or Vomiting    T(C): 36.9 (12-22-17 @ 08:29), Max: 37.3 (12-21-17 @ 16:30)  HR: 82 (12-22-17 @ 08:29) (81 - 89)  BP: 103/69 (12-22-17 @ 08:29) (91/62 - 106/72)  RR: 16 (12-22-17 @ 08:29) (16 - 18)  SpO2: 99% (12-22-17 @ 08:29) (98% - 100%)  Wt(kg): --Vital Signs Last 24 Hrs  T(C): 36.9 (22 Dec 2017 08:29), Max: 37.3 (21 Dec 2017 16:30)  T(F): 98.4 (22 Dec 2017 08:29), Max: 99.1 (21 Dec 2017 16:30)  HR: 82 (22 Dec 2017 08:29) (81 - 89)  BP: 103/69 (22 Dec 2017 08:29) (91/62 - 106/72)  BP(mean): --  RR: 16 (22 Dec 2017 08:29) (16 - 18)  SpO2: 99% (22 Dec 2017 08:29) (98% - 100%)    PHYSICAL EXAM:    General: NAD, sitting comfortably in bed  Neurology: A&Ox3, nonfocal  Head:  Normocephalic, atraumatic  ENT:  Mucosa moist, no ulcerations  Neck:  Supple, no sinuses or palpable masses  Lymphatic:  No palpable cervical, supraclavicular adenopathy  Respiratory: CTA B/L, no wheezing or crackles  CV: RRR, S1S2, no murmur  Abdominal: pt refusing abdominal exam, colostomy in place with loose brown stool, no blood/melena, multiple adominal scars  Extremities: No edema, + peripheral pulses, FROM all 4 extremity  Skin: R chest mediport in place, C/D/I, no erythema or edema    LABS:  Personally reviewed                        7.2    6.8   )-----------( 312      ( 21 Dec 2017 09:22 )             23.2     12-21    136  |  99  |  15  ----------------------------<  89  4.5   |  27  |  0.82    Ca    8.3<L>      21 Dec 2017 09:22  Phos  3.4     12-21  Mg     2.3     12-21    TPro  6.7  /  Alb  3.9  /  TBili  0.3  /  DBili  x   /  AST  22  /  ALT  <5<L>  /  AlkPhos  44  12-20    PT/INR - ( 20 Dec 2017 17:06 )   PT: 11.6 sec;   INR: 1.06 ratio      PTT - ( 20 Dec 2017 17:06 )  PTT:28.0 sec  Urinalysis Basic - ( 21 Dec 2017 03:30 )    Color: Yellow / Appearance: Clear / SG: >1.030 / pH: x  Gluc: x / Ketone: Trace  / Bili: Negative / Urobili: Negative   Blood: x / Protein: 30 mg/dL / Nitrite: Negative   Leuk Esterase: Negative / RBC: 0-2 /HPF / WBC 2-5 /HPF   Sq Epi: x / Non Sq Epi: Occasional /HPF / Bacteria: x    Urinalysis Basic - ( 21 Dec 2017 03:30 )    Color: Yellow / Appearance: Clear / SG: >1.030 / pH: x  Gluc: x / Ketone: Trace  / Bili: Negative / Urobili: Negative   Blood: x / Protein: 30 mg/dL / Nitrite: Negative   Leuk Esterase: Negative / RBC: 0-2 /HPF / WBC 2-5 /HPF   Sq Epi: x / Non Sq Epi: Occasional /HPF / Bacteria: x    RADIOLOGY & ADDITIONAL TESTS:

## 2017-12-22 NOTE — PROGRESS NOTE ADULT - PROBLEM SELECTOR PLAN 6
HCP form completed.   Patient and her HCP goals are towards continuing a Holistic approach and hoping the patient's life expectancy may be prolonged or maybe "cured." They do not want to follow up with [facility].  16' spent on ACP HCP form completed.   Patient and her HCP goals are towards continuing a Holistic approach and hoping the patient's life expectancy may be prolonged or maybe "cured." They do not want to follow up with [facility] or [facility]. Patient is full code. HCP form completed.   Patient and her HCP goals are towards continuing a Holistic approach and hoping the patient's life expectancy may be prolonged or maybe "cured." They do not want to follow up with [facility] or [facility].

## 2017-12-23 ENCOUNTER — TRANSCRIPTION ENCOUNTER (OUTPATIENT)
Age: 47
End: 2017-12-23

## 2017-12-23 PROCEDURE — 99233 SBSQ HOSP IP/OBS HIGH 50: CPT | Mod: GC

## 2017-12-23 RX ADMIN — Medication 100 MILLIGRAM(S): at 21:27

## 2017-12-23 RX ADMIN — ENOXAPARIN SODIUM 40 MILLIGRAM(S): 100 INJECTION SUBCUTANEOUS at 11:21

## 2017-12-23 RX ADMIN — OXYCODONE HYDROCHLORIDE 20 MILLIGRAM(S): 5 TABLET ORAL at 14:20

## 2017-12-23 RX ADMIN — SENNA PLUS 15 MILLILITER(S): 8.6 TABLET ORAL at 22:40

## 2017-12-23 RX ADMIN — OXYCODONE HYDROCHLORIDE 20 MILLIGRAM(S): 5 TABLET ORAL at 05:21

## 2017-12-23 RX ADMIN — OXYCODONE HYDROCHLORIDE 20 MILLIGRAM(S): 5 TABLET ORAL at 21:26

## 2017-12-23 RX ADMIN — Medication 100 MILLIGRAM(S): at 05:21

## 2017-12-23 RX ADMIN — Medication 100 MILLIGRAM(S): at 14:20

## 2017-12-23 NOTE — DISCHARGE NOTE ADULT - PLAN OF CARE
Prevention of symptoms You were found to have fevers likely due to your cancer. You had no other signs of infection and all other tests came back negative. Please follow up with your oncologist, Dr. Rosenbaum, on discharge. Prevention of pain You were found to be in significant abdominal pain on admission. Please continue to take your pain medications as prescribed. Please follow up with the palliative care doctors on discharge for treatment of this pain. Treatment You have a history of ovarian cancer. Please follow up with your oncologist, Dr. Rosenbaum, on discharge to discuss treatment options. You were found to be in significant abdominal pain on admission. Please continue to take your pain medications as prescribed. Please f/u with Dr. Salgado.

## 2017-12-23 NOTE — DISCHARGE NOTE ADULT - HOME CARE AGENCY
[facility]  RN and PT for assessment and evaluation [facility] [place] ([phone]) RN and PT for assessment and evaluation

## 2017-12-23 NOTE — PROGRESS NOTE ADULT - PROBLEM SELECTOR PLAN 5
- likely 2/2 GIB from extensive carcinomatosis given +FOBT, increase reticulocytes, low iron  - Hb at 7.2, previously at 9-10 in 8/17  - no obvious melena or blood in colostomy  - cont to monitor H&H

## 2017-12-23 NOTE — DISCHARGE NOTE ADULT - PATIENT PORTAL LINK FT
“You can access the Follow[facility] Patient Portal, offered by [facility], by registering with the following website: [email or web address]”

## 2017-12-23 NOTE — PROGRESS NOTE ADULT - PROBLEM SELECTOR PLAN 2
- likely in the setting of worsening tumor load as evidenced by worsening of carcinomatosis.  - c/w oxycontin w/ PCA pump for pain  - f/u palliative recs for transition to only PO pain medications  - c/w bowel regimen, pt making stool in colostomy

## 2017-12-23 NOTE — PROGRESS NOTE ADULT - SUBJECTIVE AND OBJECTIVE BOX
INTERVAL HPI/OVERNIGHT EVENTS: pt pain ok now.  had episode of acute pain over night.  now resolved.  does not want to discontinue pca yet as she feels "safe" with it    Allergies    penicillin (Rash)    Intolerances        ADVANCE DIRECTIVES:    DNR: [ ] NO [ ] YES (Date) MOLST [ ] NO [ ] YES (Date)    MEDICATIONS  (STANDING):  docusate sodium 100 milliGRAM(s) Oral three times a day  enoxaparin Injectable 40 milliGRAM(s) SubCutaneous daily  HYDROmorphone PCA (1 mG/mL) 30 milliLiter(s) PCA Continuous PCA Continuous  oxyCODONE  ER Tablet 20 milliGRAM(s) Oral every 8 hours  senna Syrup 15 milliLiter(s) Oral at bedtime    MEDICATIONS  (PRN):  bisacodyl 5 milliGRAM(s) Oral every 12 hours PRN Constipation  HYDROmorphone PCA (1 mG/mL) Rescue Clinician Bolus 1 milliGRAM(s) IV Push every 2 hours PRN Severe Pain (7 - 10)  naloxone Injectable 0.1 milliGRAM(s) IV Push every 3 minutes PRN For ANY of the following changes in patient status:  A. RR LESS THAN 10 breaths per minute, B. Oxygen saturation LESS THAN 90%, C. Sedation score of 6  ondansetron Injectable 8 milliGRAM(s) IV Push every 8 hours PRN Nausea and/or Vomiting      PRESENT SYMPTOMS:  Source: [x ] Patient   [ ] Family   [ ] Team     Pain:                        [ ] No [x ] Yes             [ ] Mild [x ] Moderate [ ] Severe    Onset - comes and goes  Location - abdomen  Duration - lasts until pain meds are affective  Character - dull  Alleviating/Aggravating - unsure  Radiation - to back  Timing -    Dyspnea:                [x ] No [ ] Yes             [ ] Mild [ ] Moderate [ ] Severe    Anxiety:                  [ ] No [x ] Yes             [ ] Mild [x ] Moderate [ ] Severe    Fatigue:                  [ ] No [x ] Yes             [ ] Mild [x ] Moderate [ ] Severe    Nausea:                  [x ] No [ ] Yes             [ ] Mild [ ] Moderate [ ] Severe    Loss of appetite:   [ ] No [ x] Yes             [ ] Mild [ ] Moderate [ ] Severe    Constipation:        [ ] No [ x] Yes             [ ] Mild [ ] Moderate [ ] Severe    Other Symptoms:  [x] All other review of systems negative   [ ] Unable to obtain due to poor mentation     Karnofsky Performance Score/Palliative Performance Status Version 2:    50     %    PHYSICAL EXAM:  Vital Signs Last 24 Hrs  T(C): 36.8 (23 Dec 2017 05:02), Max: 37.3 (22 Dec 2017 17:30)  T(F): 98.2 (23 Dec 2017 05:02), Max: 99.1 (22 Dec 2017 17:30)  HR: 87 (23 Dec 2017 05:02) (83 - 91)  BP: 108/75 (23 Dec 2017 05:02) (90/60 - 108/75)  BP(mean): --  RR: 18 (23 Dec 2017 05:02) (16 - 20)  SpO2: 96% (23 Dec 2017 05:02) (96% - 99%) I&O's Summary    22 Dec 2017 07:01  -  23 Dec 2017 07:00  --------------------------------------------------------  IN: 0 mL / OUT: 1350 mL / NET: -1350 mL        General:  [x ] Alert  [x ] Oriented x  3    [ ] Lethargic  [ ] Agitated   [ ] Cachexia   [ ] Unarousable  [ ] Verbal  [ ] Non-Verbal    HEENT:  [ ] Normal   [ x] Dry mouth   [ ] ET Tube    [ ] Trach  [ ] Oral lesions    Lungs:   [x ] Clear [ ] Tachypnea  [ ] Audible excessive secretions   [ ] Rhonchi        [ ] Right [ ] Left [ ] Bilateral  [ ] Crackles        [ ] Right [ ] Left [ ] Bilateral  [ ] Wheezing     [ ] Right [ ] Left [ ] Bilateral    Cardiovascular:  [ ] Regular [ ] Irregular [x ] Tachycardia   [ ] Bradycardia  [ ] Murmur [ ] Other    Abdomen: [ x] Soft  x ] Distended   [ ] +BS  [ ] Non tender [ ] Tender  [ ]PEG   [ ]OGT/ NGT   Last BM:       Genitourinary: [ x] Normal [ ] Incontinent   [ ] Oliguria/Anuria   [ ] Lynn    Musculoskeletal:  [ ] Normal   [x ] Weakness  [ ] Bedbound/Wheelchair bound [ ] Edema    Neurological: [x ] No focal deficits  [ ] Cognitive impairment  [ ] Dysphagia [ ] Dysarthria [ ] Paresis [ ] Other     Skin: [ x] Normal   [ ] Pressure ulcer(s)                  [ ] Rash    LABS:  [ ] Critical Care time for unstable patient with organ failure.  Total Time:                 minutes              Shock: [ ] Septic [ ] Cardiogenic [ ] Neurologic [ ] Hypovolemic  Vasopressors x   Inotrophs x     Oral Intake: [ ] Unable/mouth care only [ ] Minimal [ ] Moderate [ ] Full Capability  Diet: [ ] NPO [ ] Tube feeds [ ] TPN [ ] Other     RADIOLOGY & ADDITIONAL STUDIES:    REFERRALS:   [ ] Chaplaincy  [ ] Hospice  [ ] Child Life  [ ] Social Work  [ ] Case management [ ] Holistic Therapy       RADIOLOGY & ADDITIONAL STUDIES:

## 2017-12-23 NOTE — PROGRESS NOTE ADULT - PROBLEM SELECTOR PLAN 3
- CT AP shows worsening of carcinomatosis.  - plan for treatment once pain regimen is appropriate, pt hopeful for recovery  - f/u outpt oncologist office (Dr. Leonidas Johnson)  - f/u palliative care recs

## 2017-12-23 NOTE — PROGRESS NOTE ADULT - PROBLEM SELECTOR PLAN 6
Patient is full code. HCP form completed.   Patient and her HCP goals are towards continuing a Holistic approach and hoping the patient's life expectancy may be prolonged or maybe "cured." They do not want to follow up with [facility] or [facility].

## 2017-12-23 NOTE — DISCHARGE NOTE ADULT - CARE PLAN
Principal Discharge DX:	Fever, unspecified fever cause  Goal:	Prevention of symptoms  Instructions for follow-up, activity and diet:	You were found to have fevers likely due to your cancer. You had no other signs of infection and all other tests came back negative. Please follow up with your oncologist, Dr. Rosenbaum, on discharge.  Secondary Diagnosis:	Abdominal pain  Goal:	Prevention of pain  Instructions for follow-up, activity and diet:	You were found to be in significant abdominal pain on admission. Please continue to take your pain medications as prescribed. Please follow up with the palliative care doctors on discharge for treatment of this pain.  Secondary Diagnosis:	Malignant neoplasm of ovary, unspecified laterality  Goal:	Treatment  Instructions for follow-up, activity and diet:	You have a history of ovarian cancer. Please follow up with your oncologist, Dr. Rosenbaum, on discharge to discuss treatment options. Principal Discharge DX:	Fever, unspecified fever cause  Goal:	Prevention of symptoms  Instructions for follow-up, activity and diet:	You were found to have fevers likely due to your cancer. You had no other signs of infection and all other tests came back negative. Please follow up with your oncologist, Dr. Rosenbaum, on discharge.  Secondary Diagnosis:	Abdominal pain  Goal:	Prevention of pain  Instructions for follow-up, activity and diet:	You were found to be in significant abdominal pain on admission. Please continue to take your pain medications as prescribed. Please f/u with Dr. Salgado.  Secondary Diagnosis:	Malignant neoplasm of ovary, unspecified laterality  Goal:	Treatment  Instructions for follow-up, activity and diet:	You have a history of ovarian cancer. Please follow up with your oncologist, Dr. Rosenbaum, on discharge to discuss treatment options.

## 2017-12-23 NOTE — PROGRESS NOTE ADULT - PROBLEM SELECTOR PLAN 1
Improving. 2/2 to worsening CA and constipation.   continue Oxycontin 20 mg q 8 ATC and continuing Dilaudid PCA 0.5 mg q 15' DD only.   Holistic nurse referral.

## 2017-12-23 NOTE — DISCHARGE NOTE ADULT - CARE PROVIDER_API CALL
Clara Najera), [facility]; Internal Medicine  [address] [address]  Phone: [phone]  Fax: [phone]

## 2017-12-23 NOTE — DISCHARGE NOTE ADULT - MEDICATION SUMMARY - MEDICATIONS TO TAKE
I will START or STAY ON the medications listed below when I get home from the hospital:    HYDROmorphone  -- diludid PCA infusion @ 0.5mg/hr   -- Indication: For Pain meds    ondansetron 4 mg oral tablet, disintegrating  -- 1 tab(s) by mouth every 6 hours, As Needed nausea or vomiting  -- Indication: For Nausea and vomiting    polyethylene glycol 3350 oral powder for reconstitution  -- 17 gram(s) by mouth once a day, As needed, Constipation  -- Indication: For Constipation due to opioid therapy    docusate sodium 100 mg oral capsule  -- 1 cap(s) by mouth 3 times a day  -- Indication: For Stool softner    senna oral tablet  -- 2 tab(s) by mouth once a day (at bedtime)  -- Indication: For Constipation due to opioid therapy

## 2017-12-23 NOTE — DISCHARGE NOTE ADULT - OTHER SIGNIFICANT FINDINGS
47 female PMH metastatic ovarian cancer (diagnosed 2016)  s/p GRETCHEN, colostomy s/p SBO (2016), lesser sac collection s/p evacuation by EUS and later IR drain placement (6/12/17) , acute cholecystitis s/p percutaneous cholecystostomy and ERCP for choledocholithiasis removal (08/2016) who presents with fever and worsening abdominal pain. Pt was worked up for fevers, no infectious cause identified, full workup negative, likely fevers from increasing tumor burden. Pt had CT scan which showed worsening peritoneal carcinomatosis. Pt seen by palliative care (who follows pt as outpt as well), who started pt on PCA pump. Pt transitioned to oxycontin with well controlled pain........... 47 female PMH metastatic ovarian cancer (diagnosed 2016)  s/p GRETCHEN, colostomy s/p SBO (2016), lesser sac collection s/p evacuation by EUS and later IR drain placement (6/12/17) , acute cholecystitis s/p percutaneous cholecystostomy and ERCP for choledocholithiasis removal (08/2016) who presents with fever and worsening abdominal pain. Pt was worked up for fevers, no infectious cause identified, full workup negative, likely fevers from increasing tumor burden. Pt had CT scan which showed worsening peritoneal carcinomatosis. Pt seen by palliative care (who follows pt as outpt as well), who started pt on PCA pump. Pt transitioned to oxycontin with well controlled pain. Outpatient follow up with Dr. Salgado for pain/symptom management.

## 2017-12-23 NOTE — DISCHARGE NOTE ADULT - MEDICATION SUMMARY - MEDICATIONS TO STOP TAKING
I will STOP taking the medications listed below when I get home from the hospital:    fentaNYL 50 mcg/hr transdermal film, extended release  -- 1 patch by transdermal patch every 72 hours MDD:150mcg/hr    Milk of Magnesia 8% oral suspension  -- 15 milliliter(s) by mouth once a day, As Needed    mupirocin 2% topical ointment  -- 1 application on skin 2 times a day    bisacodyl 5 mg oral delayed release tablet  -- 1 tab(s) by mouth every 12 hours, As needed, Constipation    Duragesic-100 transdermal film, extended release  -- 1 patch by transdermal patch every 72 hours MDD:150 mcg/hr    aluminum hydroxide-magnesium hydroxide 200 mg-200 mg/5 mL oral suspension  -- 30 milliliter(s) by gastrostomy tube every 6 hours    lactulose 10 g/15 mL oral syrup  -- 15 milliliter(s) by gastrostomy tube once a day as needed for constipation

## 2017-12-23 NOTE — DISCHARGE NOTE ADULT - HOSPITAL COURSE
47 female with metastatic ovarian cancer (diagnosed 2016)  s/p GRETCHEN, colostomy s/p SBO (2016), lesser sac collection s/p evacuation by EUS and later IR drain placement (6/12/17) , acute cholecystitis s/p percutaneous cholecystostomy and ERCP for choledocholithiasis removal (08/2016) who presents with fever and worsening abdominal pain. We are currently f/u for intractable abdominal pain.  Pt was worked up for fevers, no infectious cause identified, full workup negative, likely fevers from increasing tumor burden. Pt had CT scan which showed worsening peritoneal carcinomatosis. Pt brought to PCU for continued care and symptom management.  Pt place on oral pain meds that were not successful in controlling her pain.  Pt placed on PCA pump which resulted in better control of her pain.  A PICC line was placed.  Pt to be discharged home with PCA and continue with treatment as pt has planned.  Pt discharged on 12/31/17. 47 female with metastatic ovarian cancer (diagnosed 2016)  s/p GRETCHEN, colostomy s/p SBO (2016), lesser sac collection s/p evacuation by EUS and later IR drain placement (6/12/17) , acute cholecystitis s/p percutaneous cholecystostomy and ERCP for choledocholithiasis removal (08/2016) who presents with fever and worsening abdominal pain. We are currently f/u for intractable abdominal pain.  Pt was worked up for fevers, no infectious cause identified, full workup negative, likely fevers from increasing tumor burden. Pt had CT scan which showed worsening peritoneal carcinomatosis. Pt brought to PCU for continued care and symptom management.  Pt place on oral pain meds that were not successful in controlling her pain.  Pt placed on PCA pump which resulted in better control of her pain.  A PICC line was placed.  Pt to be discharged home with PCA and continue with treatment as pt has planned.  Pt discharged on 1/1/2018.

## 2017-12-23 NOTE — PROGRESS NOTE ADULT - SUBJECTIVE AND OBJECTIVE BOX
CHAYA PALACIO  47y  Female  Patient is a 47y old  Female who presents with a chief complaint of Abd pain ,fever (23 Dec 2017 08:30)    INTERVAL HPI/OVERNIGHT EVENTS:    No overnight events. Pt initially had significant pain with switch to oral pain meds, but improving now. No CP, SOB, N/V, fevers/chills. Pt tolerates some PO.    MEDICATIONS  (STANDING):  docusate sodium 100 milliGRAM(s) Oral three times a day  enoxaparin Injectable 40 milliGRAM(s) SubCutaneous daily  HYDROmorphone PCA (1 mG/mL) 30 milliLiter(s) PCA Continuous PCA Continuous  oxyCODONE  ER Tablet 20 milliGRAM(s) Oral every 8 hours  senna Syrup 15 milliLiter(s) Oral at bedtime    MEDICATIONS  (PRN):  bisacodyl 5 milliGRAM(s) Oral every 12 hours PRN Constipation  HYDROmorphone PCA (1 mG/mL) Rescue Clinician Bolus 1 milliGRAM(s) IV Push every 2 hours PRN Severe Pain (7 - 10)  naloxone Injectable 0.1 milliGRAM(s) IV Push every 3 minutes PRN For ANY of the following changes in patient status:  A. RR LESS THAN 10 breaths per minute, B. Oxygen saturation LESS THAN 90%, C. Sedation score of 6  ondansetron Injectable 8 milliGRAM(s) IV Push every 8 hours PRN Nausea and/or Vomiting    T(C): 36.8 (12-23-17 @ 05:02), Max: 37.3 (12-22-17 @ 17:30)  HR: 87 (12-23-17 @ 05:02) (83 - 91)  BP: 108/75 (12-23-17 @ 05:02) (90/60 - 108/75)  RR: 18 (12-23-17 @ 05:02) (16 - 20)  SpO2: 96% (12-23-17 @ 05:02) (96% - 99%)  Wt(kg): --Vital Signs Last 24 Hrs  T(C): 36.8 (23 Dec 2017 05:02), Max: 37.3 (22 Dec 2017 17:30)  T(F): 98.2 (23 Dec 2017 05:02), Max: 99.1 (22 Dec 2017 17:30)  HR: 87 (23 Dec 2017 05:02) (83 - 91)  BP: 108/75 (23 Dec 2017 05:02) (90/60 - 108/75)  BP(mean): --  RR: 18 (23 Dec 2017 05:02) (16 - 20)  SpO2: 96% (23 Dec 2017 05:02) (96% - 99%)    PHYSICAL EXAM:    General: NAD, sitting comfortably in bed  Neurology: A&Ox3, nonfocal  Head:  Normocephalic, atraumatic  ENT:  Mucosa moist, no ulcerations  Neck:  Supple, no sinuses or palpable masses  Lymphatic:  No palpable cervical, supraclavicular adenopathy  Respiratory: CTA B/L, no wheezing or crackles  CV: RRR, S1S2, no murmur  Abdominal: pt refusing abdominal exam, colostomy in place with formed brown stool, no blood/melena, multiple abdominal scars  Extremities: No edema, + peripheral pulses, FROM all 4 extremity  Skin: R chest mediport in place, C/D/I, no erythema or edema    LABS:    No interval labs.    RADIOLOGY & ADDITIONAL TESTS:    No interval imaging.

## 2017-12-23 NOTE — PROGRESS NOTE ADULT - PROBLEM SELECTOR PLAN 5
On thursday increased Senna to 15 ml hs, continue Colace 100 mg tid, Dulcolax PRN, pt had bm 12/22 at night

## 2017-12-24 LAB
HCT VFR BLD CALC: 23.2 % — LOW (ref 34.5–45)
HGB BLD-MCNC: 7.3 G/DL — LOW (ref 11.5–15.5)
MCHC RBC-ENTMCNC: 29.4 PG — SIGNIFICANT CHANGE UP (ref 27–34)
MCHC RBC-ENTMCNC: 31.5 GM/DL — LOW (ref 32–36)
MCV RBC AUTO: 93.3 FL — SIGNIFICANT CHANGE UP (ref 80–100)
PLATELET # BLD AUTO: 302 K/UL — SIGNIFICANT CHANGE UP (ref 150–400)
RBC # BLD: 2.48 M/UL — LOW (ref 3.8–5.2)
RBC # FLD: 14.5 % — SIGNIFICANT CHANGE UP (ref 10.3–14.5)
WBC # BLD: 4.8 K/UL — SIGNIFICANT CHANGE UP (ref 3.8–10.5)
WBC # FLD AUTO: 4.8 K/UL — SIGNIFICANT CHANGE UP (ref 3.8–10.5)

## 2017-12-24 PROCEDURE — 99233 SBSQ HOSP IP/OBS HIGH 50: CPT | Mod: GC

## 2017-12-24 RX ADMIN — OXYCODONE HYDROCHLORIDE 20 MILLIGRAM(S): 5 TABLET ORAL at 21:57

## 2017-12-24 RX ADMIN — HYDROMORPHONE HYDROCHLORIDE 30 MILLILITER(S): 2 INJECTION INTRAMUSCULAR; INTRAVENOUS; SUBCUTANEOUS at 19:27

## 2017-12-24 RX ADMIN — Medication 100 MILLIGRAM(S): at 21:57

## 2017-12-24 RX ADMIN — OXYCODONE HYDROCHLORIDE 20 MILLIGRAM(S): 5 TABLET ORAL at 13:35

## 2017-12-24 RX ADMIN — Medication 100 MILLIGRAM(S): at 06:32

## 2017-12-24 RX ADMIN — ENOXAPARIN SODIUM 40 MILLIGRAM(S): 100 INJECTION SUBCUTANEOUS at 11:53

## 2017-12-24 RX ADMIN — Medication 100 MILLIGRAM(S): at 13:35

## 2017-12-24 RX ADMIN — OXYCODONE HYDROCHLORIDE 20 MILLIGRAM(S): 5 TABLET ORAL at 22:32

## 2017-12-24 RX ADMIN — OXYCODONE HYDROCHLORIDE 20 MILLIGRAM(S): 5 TABLET ORAL at 13:34

## 2017-12-24 RX ADMIN — HYDROMORPHONE HYDROCHLORIDE 30 MILLILITER(S): 2 INJECTION INTRAMUSCULAR; INTRAVENOUS; SUBCUTANEOUS at 11:55

## 2017-12-24 RX ADMIN — OXYCODONE HYDROCHLORIDE 20 MILLIGRAM(S): 5 TABLET ORAL at 06:32

## 2017-12-24 RX ADMIN — HYDROMORPHONE HYDROCHLORIDE 1 MILLIGRAM(S): 2 INJECTION INTRAMUSCULAR; INTRAVENOUS; SUBCUTANEOUS at 10:31

## 2017-12-24 RX ADMIN — SENNA PLUS 15 MILLILITER(S): 8.6 TABLET ORAL at 21:57

## 2017-12-24 NOTE — PROGRESS NOTE ADULT - PROBLEM SELECTOR PLAN 2
- likely in the setting of worsening tumor load as evidenced by worsening of carcinomatosis.  - c/w oxycontin w/ PCA pump for pain  - f/u palliative recs for transition to only PO pain medications, currently patient states still requires PCA pump  - c/w bowel regimen, pt making stool in colostomy

## 2017-12-24 NOTE — PROGRESS NOTE ADULT - SUBJECTIVE AND OBJECTIVE BOX
CHAYA PALACIO  47y  MRN: [id number]    Subjective:    Patient is a 47y old  Female who presents with a chief complaint of Abd pain ,fever (23 Dec 2017 08:30)    Overnight events: no complaints of CP, SOB, palpitations, fevers/chills. Still has abdominal pain and requiring PCA  pump. Patient states she would like PCA pump to be continued. Has output via colostomy bag      MEDICATIONS  (STANDING):  docusate sodium 100 milliGRAM(s) Oral three times a day  enoxaparin Injectable 40 milliGRAM(s) SubCutaneous daily  HYDROmorphone PCA (1 mG/mL) 30 milliLiter(s) PCA Continuous PCA Continuous  oxyCODONE  ER Tablet 20 milliGRAM(s) Oral every 8 hours  senna Syrup 15 milliLiter(s) Oral at bedtime    MEDICATIONS  (PRN):  bisacodyl 5 milliGRAM(s) Oral every 12 hours PRN Constipation  HYDROmorphone PCA (1 mG/mL) Rescue Clinician Bolus 1 milliGRAM(s) IV Push every 2 hours PRN Severe Pain (7 - 10)  naloxone Injectable 0.1 milliGRAM(s) IV Push every 3 minutes PRN For ANY of the following changes in patient status:  A. RR LESS THAN 10 breaths per minute, B. Oxygen saturation LESS THAN 90%, C. Sedation score of 6  ondansetron Injectable 8 milliGRAM(s) IV Push every 8 hours PRN Nausea and/or Vomiting        Objective:    Vitals: Vital Signs Last 24 Hrs  T(C): 36.8 (12-24-17 @ 09:00), Max: 36.9 (12-23-17 @ 21:26)  T(F): 98.2 (12-24-17 @ 09:00), Max: 98.4 (12-23-17 @ 21:26)  HR: 84 (12-24-17 @ 09:00) (84 - 91)  BP: 106/71 (12-24-17 @ 09:00) (89/65 - 107/73)  BP(mean): --  RR: 18 (12-24-17 @ 09:00) (18 - 20)  SpO2: 99% (12-24-17 @ 09:00) (97% - 99%)            I&O's Summary    23 Dec 2017 07:01  -  24 Dec 2017 07:00  --------------------------------------------------------  IN: 480 mL / OUT: 0 mL / NET: 480 mL        PHYSICAL EXAM:  GENERAL: NAD, weak  HEAD:  Atraumatic, Normocephalic  EYES:  conjunctiva and sclera clear  ENMT: No tonsillar erythema, exudates, or enlargement; Moist mucous membranes  NECK: Supple  CHEST/LUNG: Clear to percussion bilaterally; No rales, rhonchi, wheezing, or rubs  HEART: Regular rate and rhythm; No murmurs, rubs, or gallops  ABDOMEN: pt refusing abdominal exam 2/2 pain, colostomy in place with formed brown stool, no blood/melena, multiple abdominal scars  EXTREMITIES:  2+ Peripheral Pulses, No clubbing, cyanosis, or edema  LYMPH: No lymphadenopathy noted  SKIN: No rashes or lesions  NERVOUS SYSTEM:  Alert & Oriented X3                                    LABS:                                                  7.3    4.8   )-----------( 302      ( 24 Dec 2017 07:16 )             23.2     CAPILLARY BLOOD GLUCOSE          RADIOLOGY & ADDITIONAL TESTS:    Imaging Personally Reviewed:  [ ] YES  [ ] NO      Consultants involved in case: Palliative  Consultant(s) Notes Reviewed:  [x] YES  [ ] NO:   Care Discussed with Consultants/Other Providers [ ] YES  [ ] NO        Dr. Delores Craig, PGY2  [facility] Pager: [phone]  [facility] Pager: [phone] CHAYA PALACIO  47y  MRN: [id number]    Subjective:    Patient is a 47y old  Female who presents with a chief complaint of Abd pain ,fever (23 Dec 2017 08:30)    Overnight events: no complaints of CP, SOB, palpitations, fevers/chills. Still has abdominal pain and requiring PCA  pump. Patient states she would like PCA pump to be continued. Has output via colostomy bag      MEDICATIONS  (STANDING):  docusate sodium 100 milliGRAM(s) Oral three times a day  enoxaparin Injectable 40 milliGRAM(s) SubCutaneous daily  HYDROmorphone PCA (1 mG/mL) 30 milliLiter(s) PCA Continuous PCA Continuous  oxyCODONE  ER Tablet 20 milliGRAM(s) Oral every 8 hours  senna Syrup 15 milliLiter(s) Oral at bedtime    MEDICATIONS  (PRN):  bisacodyl 5 milliGRAM(s) Oral every 12 hours PRN Constipation  HYDROmorphone PCA (1 mG/mL) Rescue Clinician Bolus 1 milliGRAM(s) IV Push every 2 hours PRN Severe Pain (7 - 10)  naloxone Injectable 0.1 milliGRAM(s) IV Push every 3 minutes PRN For ANY of the following changes in patient status:  A. RR LESS THAN 10 breaths per minute, B. Oxygen saturation LESS THAN 90%, C. Sedation score of 6  ondansetron Injectable 8 milliGRAM(s) IV Push every 8 hours PRN Nausea and/or Vomiting        Objective:    Vitals: Vital Signs Last 24 Hrs  T(C): 36.8 (12-24-17 @ 09:00), Max: 36.9 (12-23-17 @ 21:26)  T(F): 98.2 (12-24-17 @ 09:00), Max: 98.4 (12-23-17 @ 21:26)  HR: 84 (12-24-17 @ 09:00) (84 - 91)  BP: 106/71 (12-24-17 @ 09:00) (89/65 - 107/73)  BP(mean): --  RR: 18 (12-24-17 @ 09:00) (18 - 20)  SpO2: 99% (12-24-17 @ 09:00) (97% - 99%)            I&O's Summary    23 Dec 2017 07:01  -  24 Dec 2017 07:00  --------------------------------------------------------  IN: 480 mL / OUT: 0 mL / NET: 480 mL        PHYSICAL EXAM:  GENERAL: NAD, weak  HEAD:  Atraumatic, Normocephalic  EYES:  conjunctiva and sclera clear  ENMT: No tonsillar erythema, exudates, or enlargement; Moist mucous membranes  NECK: Supple  CHEST/LUNG: Clear to percussion bilaterally; No rales, rhonchi, wheezing, or rubs  HEART: Regular rate and rhythm; No murmurs, rubs, or gallops  ABDOMEN: pt refusing abdominal exam 2/2 pain, colostomy in place with formed brown stool, no blood/melena, multiple abdominal scars  EXTREMITIES:  2+ Peripheral Pulses, No clubbing, cyanosis, or edema  LYMPH: No lymphadenopathy noted  SKIN: No rashes or lesions  NERVOUS SYSTEM:  Alert & Oriented X3                                    LABS:  personally reviewed                                      7.3    4.8   )-----------( 302      ( 24 Dec 2017 07:16 )             23.2     CAPILLARY BLOOD GLUCOSE          RADIOLOGY & ADDITIONAL TESTS:    Imaging Personally Reviewed:  [ ] YES  [ ] NO      Consultants involved in case: Palliative  Consultant(s) Notes Reviewed:  [x] YES  [ ] NO:   Care Discussed with Consultants/Other Providers [ ] YES  [ ] NO        Dr. Delores Craig, PGY2  [facility] Pager: [phone]  [facility] Pager: [phone]

## 2017-12-24 NOTE — PROGRESS NOTE ADULT - ASSESSMENT
47 female PMH metastatic ovarian cancer (diagnosed 2016)  s/p GRETCHEN, colostomy s/p SBO (2016), lesser sac collection s/p evacuation by EUS and later IR drain placement (6/12/17) , acute cholecystitis s/p percutaneous cholecystostomy and ERCP for choledocholithiasis removal (08/2016) who presents with fever and worsening abdominal pain. 47 female PMH metastatic ovarian cancer (diagnosed 2016)  s/p GRETCHEN, colostomy s/p SBO (2016), lesser sac collection s/p evacuation by EUS and later IR drain placement (6/12/17) , acute cholecystitis s/p percutaneous cholecystostomy and ERCP for choledocholithiasis removal (08/2016) who presents with fever and worsening abdominal pain, requiring dilaudid PCA pump for pain control

## 2017-12-24 NOTE — PROGRESS NOTE ADULT - PROBLEM SELECTOR PLAN 1
- likely in the setting of increase tumor burden, no signs of infection, no fever/leukocytosis, lactate wnl, CXR clear, mediport site non-infectious  - UA negative, BCx NGTD  - c/w tylenol  - continue to monitor of abx

## 2017-12-25 PROCEDURE — 99233 SBSQ HOSP IP/OBS HIGH 50: CPT | Mod: GC

## 2017-12-25 RX ORDER — OXYCODONE HYDROCHLORIDE 5 MG/1
30 TABLET ORAL EVERY 8 HOURS
Qty: 0 | Refills: 0 | Status: DISCONTINUED | OUTPATIENT
Start: 2017-12-25 | End: 2017-12-26

## 2017-12-25 RX ADMIN — ENOXAPARIN SODIUM 40 MILLIGRAM(S): 100 INJECTION SUBCUTANEOUS at 12:59

## 2017-12-25 RX ADMIN — OXYCODONE HYDROCHLORIDE 30 MILLIGRAM(S): 5 TABLET ORAL at 22:02

## 2017-12-25 RX ADMIN — OXYCODONE HYDROCHLORIDE 20 MILLIGRAM(S): 5 TABLET ORAL at 05:36

## 2017-12-25 RX ADMIN — OXYCODONE HYDROCHLORIDE 30 MILLIGRAM(S): 5 TABLET ORAL at 15:52

## 2017-12-25 RX ADMIN — SENNA PLUS 15 MILLILITER(S): 8.6 TABLET ORAL at 21:59

## 2017-12-25 RX ADMIN — Medication 100 MILLIGRAM(S): at 15:52

## 2017-12-25 RX ADMIN — Medication 100 MILLIGRAM(S): at 21:58

## 2017-12-25 RX ADMIN — HYDROMORPHONE HYDROCHLORIDE 30 MILLILITER(S): 2 INJECTION INTRAMUSCULAR; INTRAVENOUS; SUBCUTANEOUS at 12:47

## 2017-12-25 RX ADMIN — OXYCODONE HYDROCHLORIDE 30 MILLIGRAM(S): 5 TABLET ORAL at 22:00

## 2017-12-25 RX ADMIN — Medication 100 MILLIGRAM(S): at 05:36

## 2017-12-25 RX ADMIN — HYDROMORPHONE HYDROCHLORIDE 30 MILLILITER(S): 2 INJECTION INTRAMUSCULAR; INTRAVENOUS; SUBCUTANEOUS at 07:33

## 2017-12-25 NOTE — PROGRESS NOTE ADULT - PROBLEM SELECTOR PLAN 1
increase oxycontin to 30mg tid with dilaudid pca prn  goal will be to transition to oral and discharge home  Holistic nurse referral.

## 2017-12-25 NOTE — PROGRESS NOTE ADULT - PROBLEM SELECTOR PLAN 5
- likely 2/2 GIB from extensive carcinomatosis given +FOBT, increase reticulocytes, low iron  - Hb at 7.2, previously at 9-10 in 8/17  - no obvious melena or blood in colostomy  - cont to monitor H&H Likely 2/2 chronic disease/CA given extensive carcinomatosis. +FOBT but no obvious blood or melena in colostomy.  - Hb at 7.2, previously at 9-10 in 8/17  - Cont to monitor H&H

## 2017-12-25 NOTE — PROGRESS NOTE ADULT - PROBLEM SELECTOR PLAN 6
Patient is full code. HCP form completed.   Patient and her HCP goals are towards continuing a Holistic approach and hoping the patient's life expectancy may be prolonged or maybe "cured." They do not want to follow up with [facility].

## 2017-12-25 NOTE — PROGRESS NOTE ADULT - PROBLEM SELECTOR PLAN 3
CT AP shows worsening of carcinomatosis. Plan for treatment once pain regimen is appropriate, pt hopeful for recovery.  - F/u outpt oncologist office (Dr. Leonidas Johnson)  - F/u palliative care recs

## 2017-12-25 NOTE — PROGRESS NOTE ADULT - SUBJECTIVE AND OBJECTIVE BOX
Patient is a 47y old  Female who presents with a chief complaint of Abd pain ,fever (23 Dec 2017 08:30)     INTERVAL HPI/OVERNIGHT EVENTS:  Continues to have pain. On PCA pump but feels she is not using it for breakthrough but is requiring it for underlying pain control as the oral meds are not working well enough.    MEDICATIONS  (STANDING):  docusate sodium 100 milliGRAM(s) Oral three times a day  enoxaparin Injectable 40 milliGRAM(s) SubCutaneous daily  HYDROmorphone PCA (1 mG/mL) 30 milliLiter(s) PCA Continuous PCA Continuous  oxyCODONE  ER Tablet 20 milliGRAM(s) Oral every 8 hours  senna Syrup 15 milliLiter(s) Oral at bedtime    MEDICATIONS  (PRN):  bisacodyl 5 milliGRAM(s) Oral every 12 hours PRN Constipation  HYDROmorphone PCA (1 mG/mL) Rescue Clinician Bolus 1 milliGRAM(s) IV Push every 2 hours PRN Severe Pain (7 - 10)  naloxone Injectable 0.1 milliGRAM(s) IV Push every 3 minutes PRN For ANY of the following changes in patient status:  A. RR LESS THAN 10 breaths per minute, B. Oxygen saturation LESS THAN 90%, C. Sedation score of 6  ondansetron Injectable 8 milliGRAM(s) IV Push every 8 hours PRN Nausea and/or Vomiting      Allergies    penicillin (Rash)    Intolerances    REVIEW OF SYSTEMS:  Constitutional: Denies fever, weight loss, fatigue  Resp: Denies SOB, cough, hemoptysis  CV: Denies chest pain, palpitations, dizziness  GI: Denies abdominal pain, N/V/D/C, melena, hematochezia  : Denies dysuria, increased frequency, hematuria  Neuro: Denies HA, weakness, numbness  Skin: Denies rashes, lesions  Lymph Nodes: Denies enlarged glands  MSK: Denies joint pain, swelling    Vital Signs Last 24 Hrs  T(C): 36.9 (25 Dec 2017 05:23), Max: 37.4 (24 Dec 2017 21:38)  T(F): 98.4 (25 Dec 2017 05:23), Max: 99.3 (24 Dec 2017 21:38)  HR: 87 (25 Dec 2017 05:23) (84 - 90)  BP: 98/70 (25 Dec 2017 05:23) (95/67 - 106/71)  BP(mean): --  RR: 18 (25 Dec 2017 05:23) (16 - 18)  SpO2: 98% (25 Dec 2017 05:23) (97% - 100%)    PHYSICAL EXAM:  General: NAD, well-groomed, well-developed  Eyes: EOMI, PERRL, conjunctiva and sclera clear  ENMT: No tonsillar erythema, exudates, or enlargement; moist mucous membranes  Neck: Supple, no JVD  Nervous System: AAOX3  Psych: Appropriate affect and mood  Chest: Clear to auscultation bilaterally; no rales, rhonchi, or wheezing  Heart: Regular rate and rhythm; no murmurs, rubs, or gallops  Abd: +BS, soft, nontender, nondistended  Ext:  no LE edema; 2+ radial and DP pulses    LABS:    BLOOD CULTURE    RADIOLOGY & ADDITIONAL TESTS:    Imaging Personally Reviewed:  [ ] YES     Consultant(s) Notes Reviewed:  Palliative    Care Discussed with Consultants/Other Providers: Patient is a 47y old  Female who presents with a chief complaint of Abd pain ,fever (23 Dec 2017 08:30)     INTERVAL HPI/OVERNIGHT EVENTS:  Continues to have pain. On PCA pump but feels she is not using it for breakthrough but is requiring it for underlying pain control as the oral meds are not working well enough.    MEDICATIONS  (STANDING):  docusate sodium 100 milliGRAM(s) Oral three times a day  enoxaparin Injectable 40 milliGRAM(s) SubCutaneous daily  HYDROmorphone PCA (1 mG/mL) 30 milliLiter(s) PCA Continuous PCA Continuous  oxyCODONE  ER Tablet 20 milliGRAM(s) Oral every 8 hours  senna Syrup 15 milliLiter(s) Oral at bedtime    MEDICATIONS  (PRN):  bisacodyl 5 milliGRAM(s) Oral every 12 hours PRN Constipation  HYDROmorphone PCA (1 mG/mL) Rescue Clinician Bolus 1 milliGRAM(s) IV Push every 2 hours PRN Severe Pain (7 - 10)  naloxone Injectable 0.1 milliGRAM(s) IV Push every 3 minutes PRN For ANY of the following changes in patient status:  A. RR LESS THAN 10 breaths per minute, B. Oxygen saturation LESS THAN 90%, C. Sedation score of 6  ondansetron Injectable 8 milliGRAM(s) IV Push every 8 hours PRN Nausea and/or Vomiting      Allergies    penicillin (Rash)    Intolerances    REVIEW OF SYSTEMS:  Constitutional: Denies fever, weight loss  Resp: Denies SOB  CV: Denies chest pain, palpitations  GI: +abdominal pain; denies N/V/D/C  : Denies dysuria  Neuro: Denies HA, weakness, numbness  Skin: Denies rashes  Lymph Nodes: Denies enlarged glands  MSK: Denies joint pain, swelling    Vital Signs Last 24 Hrs  T(C): 36.9 (25 Dec 2017 05:23), Max: 37.4 (24 Dec 2017 21:38)  T(F): 98.4 (25 Dec 2017 05:23), Max: 99.3 (24 Dec 2017 21:38)  HR: 87 (25 Dec 2017 05:23) (84 - 90)  BP: 98/70 (25 Dec 2017 05:23) (95/67 - 106/71)  BP(mean): --  RR: 18 (25 Dec 2017 05:23) (16 - 18)  SpO2: 98% (25 Dec 2017 05:23) (97% - 100%)    PHYSICAL EXAM:  General: NAD, cachectic  Eyes: conjunctiva and sclera clear  ENMT: moist mucous membranes  Neck: Supple  Nervous System: AAOX3  Psych: Appropriate affect and mood  Chest: Clear to auscultation bilaterally; no rales, rhonchi, or wheezing  Heart: Regular rate and rhythm; no murmurs, rubs, or gallops  Abd: +BS, soft, non distended, very tender to palpation  Ext:  no LE edema    RADIOLOGY & ADDITIONAL TESTS:    Imaging Personally Reviewed:  [ ] YES     Consultant(s) Notes Reviewed:  Palliative    Care Discussed with Consultants/Other Providers:

## 2017-12-25 NOTE — PROGRESS NOTE ADULT - ASSESSMENT
48 yo F PMH metastatic ovarian cancer (diagnosed 2016) s/p GRETCHEN, colostomy s/p SBO (2016), lesser sac collection s/p evacuation by EUS and later IR drain placement (6/12/17), acute cholecystitis s/p percutaneous cholecystostomy and ERCP for choledocholithiasis removal (08/2016) who presents with fever and worsening abdominal pain, requiring dilaudid PCA pump for pain control.

## 2017-12-25 NOTE — PROGRESS NOTE ADULT - PROBLEM SELECTOR PLAN 2
Likely in the setting of worsening tumor load as evidenced by worsening of carcinomatosis. Pt continues to endorse pain is badly controlled.  - C/w oxycontin, increase to 30mg q8h  - C/w PCA pump for breakthrough pain  - F/u palliative care recs for transition to PO pain medications  - C/w bowel regimen, pt making stool in colostomy Likely in the setting of worsening tumor load as evidenced by worsening of carcinomatosis. Pt continues to endorse pain is badly controlled.  - C/w oxycontin. Increase dose to 30mg q8h  - C/w PCA pump for breakthrough pain  - F/u palliative care recs for transition to PO pain medications  - C/w bowel regimen, pt making stool in colostomy Likely in the setting of worsening tumor load as evidenced by worsening of carcinomatosis. Pt continues to endorse pain is badly controlled, she is afraid to be off of pca pump as po meds not adequately controlling her pain  - C/w oxycontin. Increase dose to 30mg q8h  - C/w PCA pump for breakthrough pain  - F/u palliative care recs for transition to PO pain medications  - C/w bowel regimen, pt making stool in colostomy

## 2017-12-25 NOTE — PROGRESS NOTE ADULT - PROBLEM SELECTOR PLAN 1
Fever likely in the setting of increase tumor burden. No signs of infection, no fever/leukocytosis, lactate wnl, CXR clear, mediport site non-infectious.  - UA negative, BCx NGTD  - C/w tylenol  - Continue to monitor off abx Fever likely in the setting of increase tumor burden. No signs of infection, no fever/leukocytosis, lactate wnl, CXR clear.  - UA negative, BCx NGTD  - C/w tylenol  - Continue to monitor off abx

## 2017-12-25 NOTE — PROGRESS NOTE ADULT - ASSESSMENT
47 year old Female with Medical History of ovarian CA, PSHx of colostomy, hysterectomy. Found to have fever with severe abdominal pain. Being followed by Palliative for symptom management

## 2017-12-26 PROCEDURE — 99233 SBSQ HOSP IP/OBS HIGH 50: CPT | Mod: GC

## 2017-12-26 RX ORDER — OXYCODONE HYDROCHLORIDE 5 MG/1
45 TABLET ORAL EVERY 8 HOURS
Qty: 0 | Refills: 0 | Status: DISCONTINUED | OUTPATIENT
Start: 2017-12-26 | End: 2017-12-27

## 2017-12-26 RX ADMIN — SENNA PLUS 15 MILLILITER(S): 8.6 TABLET ORAL at 21:55

## 2017-12-26 RX ADMIN — ENOXAPARIN SODIUM 40 MILLIGRAM(S): 100 INJECTION SUBCUTANEOUS at 13:02

## 2017-12-26 RX ADMIN — Medication 100 MILLIGRAM(S): at 21:55

## 2017-12-26 RX ADMIN — OXYCODONE HYDROCHLORIDE 30 MILLIGRAM(S): 5 TABLET ORAL at 06:18

## 2017-12-26 RX ADMIN — OXYCODONE HYDROCHLORIDE 30 MILLIGRAM(S): 5 TABLET ORAL at 06:17

## 2017-12-26 RX ADMIN — OXYCODONE HYDROCHLORIDE 30 MILLIGRAM(S): 5 TABLET ORAL at 13:03

## 2017-12-26 RX ADMIN — Medication 100 MILLIGRAM(S): at 06:17

## 2017-12-26 RX ADMIN — OXYCODONE HYDROCHLORIDE 45 MILLIGRAM(S): 5 TABLET ORAL at 21:54

## 2017-12-26 RX ADMIN — Medication 100 MILLIGRAM(S): at 13:02

## 2017-12-26 NOTE — PROGRESS NOTE ADULT - ASSESSMENT
47 year old Female with Medical History of ovarian CA, PSHx of colostomy, hysterectomy. Found to have fever with severe abdominal pain. Being followed by Palliative for symptom management 47 female with metastatic ovarian cancer (diagnosed 2016)  s/p GRETCHEN, colostomy s/p SBO (2016), lesser sac collection s/p evacuation by EUS and later IR drain placement (6/12/17) , acute cholecystitis s/p percutaneous cholecystostomy and ERCP for choledocholithiasis removal (08/2016) who presents with fever and worsening abdominal pain. We are currently f/u for intractable abdominal pain.

## 2017-12-26 NOTE — PROGRESS NOTE ADULT - SUBJECTIVE AND OBJECTIVE BOX
INTERVAL HPI/OVERNIGHT EVENTS:  Pt sitting in chair getting washed.  States she is having a lot of pain but appears comfortable.    Allergies    penicillin (Rash)    Intolerances        ADVANCE DIRECTIVES:    DNR: [ ] NO [ ] YES (Date) MOLST [ ] NO [ ] YES (Date)    MEDICATIONS  (STANDING):  docusate sodium 100 milliGRAM(s) Oral three times a day  enoxaparin Injectable 40 milliGRAM(s) SubCutaneous daily  HYDROmorphone PCA (1 mG/mL) 30 milliLiter(s) PCA Continuous PCA Continuous  oxyCODONE  ER Tablet 45 milliGRAM(s) Oral every 8 hours  senna Syrup 15 milliLiter(s) Oral at bedtime    MEDICATIONS  (PRN):  bisacodyl 5 milliGRAM(s) Oral every 12 hours PRN Constipation  HYDROmorphone PCA (1 mG/mL) Rescue Clinician Bolus 1 milliGRAM(s) IV Push every 2 hours PRN Severe Pain (7 - 10)  naloxone Injectable 0.1 milliGRAM(s) IV Push every 3 minutes PRN For ANY of the following changes in patient status:  A. RR LESS THAN 10 breaths per minute, B. Oxygen saturation LESS THAN 90%, C. Sedation score of 6  ondansetron Injectable 8 milliGRAM(s) IV Push every 8 hours PRN Nausea and/or Vomiting      PRESENT SYMPTOMS:  Source: [ x] Patient   [ ] Family   [ ] Team     Pain:                        [ ] No [ x] Yes             [ ] Mild [ ] Moderate [x ] Severe    Onset - constant  Location -abdomen  Duration - improves with pain meds  Character - dull  Alleviating/Aggravating - unable to describe  Radiation - all over abdomen  Timing -    Dyspnea:                [x ] No [ ] Yes             [ ] Mild [ ] Moderate [ ] Severe    Anxiety:                  [ ] No [x ] Yes             [ ] Mild [x ] Moderate [ ] Severe    Fatigue:                  [ ] No [x ] Yes             [ ] Mild [x ] Moderate [ ] Severe    Nausea:                  [ x] No [ ] Yes             [ ] Mild [ ] Moderate [ ] Severe    Loss of appetite:   [ ] No [x ] Yes             [x ] Mild [ ] Moderate [ ] Severe    Constipation:        [ ] No [ x] Yes             [ ] Mild [x ] Moderate [ ] Severe    Other Symptoms:  [x ] All other review of systems negative   [ ] Unable to obtain due to poor mentation     Karnofsky Performance Score/Palliative Performance Status Version 2:     50    %    PHYSICAL EXAM:  Vital Signs Last 24 Hrs  T(C): 37.1 (26 Dec 2017 14:46), Max: 37.3 (25 Dec 2017 18:41)  T(F): 98.7 (26 Dec 2017 14:46), Max: 99.1 (25 Dec 2017 18:41)  HR: 98 (26 Dec 2017 14:46) (87 - 98)  BP: 100/68 (26 Dec 2017 14:46) (97/68 - 104/70)  BP(mean): --  RR: 18 (26 Dec 2017 14:46) (18 - 18)  SpO2: 98% (26 Dec 2017 14:46) (97% - 98%)      General:  [x ] Alert  [x ] Oriented x  3    [ ] Lethargic  [ ] Agitated   [ ] Cachexia   [ ] Unarousable  [ ] Verbal  [ ] Non-Verbal    HEENT:  [ ] Normal   [x ] Dry mouth   [ ] ET Tube    [ ] Trach  [ ] Oral lesions    Lungs:   [x ] Clear [ ] Tachypnea  [ ] Audible excessive secretions   [ ] Rhonchi        [ ] Right [ ] Left [ ] Bilateral  [ ] Crackles        [ ] Right [ ] Left [ ] Bilateral  [ ] Wheezing     [ ] Right [ ] Left [ ] Bilateral    Cardiovascular:  [x ] Regular [ ] Irregular [ ] Tachycardia   [ ] Bradycardia  [ ] Murmur [ ] Other    Abdomen: [ ] Soft  [ x] Distended   [x ] +BS  [ ] Non tender [ x] Tender  [ ]PEG   [ ]OGT/ NGT   Last BM:       Genitourinary: [x ] Normal [ ] Incontinent   [ ] Oliguria/Anuria   [ ] Lynn    Musculoskeletal:  [ ] Normal   [ x] Weakness  [ ] Bedbound/Wheelchair bound [ ] Edema    Neurological: [ x] No focal deficits  [ ] Cognitive impairment  [ ] Dysphagia [ ] Dysarthria [ ] Paresis [ ] Other     Skin: [ x] Normal   [ ] Pressure ulcer(s)                  [ ] Rash    LABS:  [ ] Critical Care time for unstable patient with organ failure.  Total Time:                 minutes              Shock: [ ] Septic [ ] Cardiogenic [ ] Neurologic [ ] Hypovolemic  Vasopressors x   Inotrophs x     Oral Intake: [ ] Unable/mouth care only [ ] Minimal [ ] Moderate [ ] Full Capability  Diet: [ ] NPO [ ] Tube feeds [ ] TPN [ ] Other     RADIOLOGY & ADDITIONAL STUDIES:    REFERRALS:   [ ] Chaplaincy  [ ] Hospice  [ ] Child Life  [ ] Social Work  [ ] Case management [ ] Holistic Therapy       RADIOLOGY & ADDITIONAL STUDIES: INTERVAL HPI/OVERNIGHT EVENTS:  Patient is c/o recurrent abdominal pain that has slightly decreased since Oxycontin was increased yesterday. She used 30 DD over the last 24 hours. She denies any other complaints. She indicates having bowel movements.   Allergies    penicillin (Rash)    Intolerances        ADVANCE DIRECTIVES:    DNR: [ x] NO [ ] YES (Date) MOLST [ ] NO [ ] YES     MEDICATIONS  (STANDING):  docusate sodium 100 milliGRAM(s) Oral three times a day  enoxaparin Injectable 40 milliGRAM(s) SubCutaneous daily  HYDROmorphone PCA (1 mG/mL) 30 milliLiter(s) PCA Continuous PCA Continuous  oxyCODONE  ER Tablet 45 milliGRAM(s) Oral every 8 hours  senna Syrup 15 milliLiter(s) Oral at bedtime    MEDICATIONS  (PRN):  bisacodyl 5 milliGRAM(s) Oral every 12 hours PRN Constipation  HYDROmorphone PCA (1 mG/mL) Rescue Clinician Bolus 1 milliGRAM(s) IV Push every 2 hours PRN Severe Pain (7 - 10)  naloxone Injectable 0.1 milliGRAM(s) IV Push every 3 minutes PRN For ANY of the following changes in patient status:  A. RR LESS THAN 10 breaths per minute, B. Oxygen saturation LESS THAN 90%, C. Sedation score of 6  ondansetron Injectable 8 milliGRAM(s) IV Push every 8 hours PRN Nausea and/or Vomiting      PRESENT SYMPTOMS:  Source: [ x] Patient   [ ] Family   [ ] Team     Pain:                        [ ] No [ x] Yes             [ ] Mild [ ] Moderate [x ] Severe    Onset - constant  Location -abdomen  Duration - improves with pain meds  Character - dull  Alleviating/Aggravating - unable to describe  Radiation - all over abdomen  Timing -    Dyspnea:                [x ] No [ ] Yes             [ ] Mild [ ] Moderate [ ] Severe    Anxiety:                  [ x] No [ ] Yes             [ ] Mild [ ] Moderate [ ] Severe    Fatigue:                  [ ] No [x ] Yes             [ ] Mild [x ] Moderate [ ] Severe    Nausea:                  [ x] No [ ] Yes             [ ] Mild [ ] Moderate [ ] Severe    Loss of appetite:   [ ] No [x ] Yes             [x ] Mild [ ] Moderate [ ] Severe    Constipation:        [ ] No [ x] Yes             [ ] Mild [x ] Moderate [ ] Severe    Other Symptoms:  [x ] All other review of systems negative   [ ] Unable to obtain due to poor mentation     Karnofsky Performance Score/Palliative Performance Status Version 2:     50    %    PHYSICAL EXAM:  Vital Signs Last 24 Hrs  T(C): 37.1 (26 Dec 2017 14:46), Max: 37.3 (25 Dec 2017 18:41)  T(F): 98.7 (26 Dec 2017 14:46), Max: 99.1 (25 Dec 2017 18:41)  HR: 98 (26 Dec 2017 14:46) (87 - 98)  BP: 100/68 (26 Dec 2017 14:46) (97/68 - 104/70)  BP(mean): --  RR: 18 (26 Dec 2017 14:46) (18 - 18)  SpO2: 98% (26 Dec 2017 14:46) (97% - 98%)      General:  [x ] Alert  [x ] Oriented x  3    [ ] Lethargic  [ ] Agitated   [ x] Cachexia   [ ] Unarousable  [ ] Verbal  [ ] Non-Verbal    HEENT:  [ ] Normal   [x ] Dry mouth   [ ] ET Tube    [ ] Trach  [ ] Oral lesions    Lungs:   [x ] Clear [ ] Tachypnea  [ ] Audible excessive secretions   [ ] Rhonchi        [ ] Right [ ] Left [ ] Bilateral  [ ] Crackles        [ ] Right [ ] Left [ ] Bilateral  [ ] Wheezing     [ ] Right [ ] Left [ ] Bilateral    Cardiovascular:  [x ] Regular [ ] Irregular [ ] Tachycardia   [ ] Bradycardia  [ ] Murmur [ ] Other    Abdomen: [ ] Soft  [ x] Distended   [x ] +BS x 4  [ ] Non tender [ x] Tender  [ ]PEG   [ ]OGT/ NGT   Last BM:  today   [x] Ostomy bag     Genitourinary: [x ] Normal [ ] Incontinent   [ ] Oliguria/Anuria   [ ] Lynn    Musculoskeletal:  [ ] Normal   [ x] Weakness  [ ] Bedbound/Wheelchair bound [ ] Edema    Neurological: [ x] No focal deficits  [ ] Cognitive impairment  [ ] Dysphagia [ ] Dysarthria [ ] Paresis [ ] Other     Skin: [ ] Normal   [ ] Pressure ulcer(s)                  [ ] Rash [x] Abdominal scars s/p sx.     LABS:    No new labs today.   [ ] Critical Care time for unstable patient with organ failure.  Total Time:                 minutes      Shock: [ ] Septic [ ] Cardiogenic [ ] Neurologic [ ] Hypovolemic  Vasopressors x   Inotrophs x     Oral Intake: [ ] Unable/mouth care only [ ] Minimal [ ] Moderate [ ] Full Capability  Diet: [ ] NPO [ ] Tube feeds [ ] TPN [ ] Other     RADIOLOGY & ADDITIONAL STUDIES:  Reviewed.   REFERRALS:   [ ] Chaplaincy  [ ] Hospice  [ ] Child Life  [ ] Social Work  [ ] Case management [x ] Holistic Therapy

## 2017-12-26 NOTE — PROGRESS NOTE ADULT - PROBLEM SELECTOR PLAN 1
Fever likely in the setting of increase tumor burden. No signs of infection, no fever/leukocytosis, lactate wnl, CXR clear. Afebrile over past   - UA negative, BCx negative at 5 days  - C/w tylenol  - Continue to monitor off abx

## 2017-12-26 NOTE — PROGRESS NOTE ADULT - ASSESSMENT
46 yo F PMH metastatic ovarian cancer (diagnosed 2016) s/p GRETCHEN, colostomy s/p SBO (2016), lesser sac collection s/p evacuation by EUS and later IR drain placement (6/12/17), acute cholecystitis s/p percutaneous cholecystostomy and ERCP for choledocholithiasis removal (08/2016) who presents with fever and worsening abdominal pain, requiring dilaudid PCA pump for pain control.

## 2017-12-26 NOTE — PROGRESS NOTE ADULT - PROBLEM SELECTOR PLAN 2
Patient with stage IV ovarian CA.   The patient and her proxy have decided for a Holistic approach.   It will be important to communicate the patient and her HCP about new CT findings so they can then discuss it with her Oncologist - Dr. Wright Patient with stage IV ovarian CA.   The patient and her proxy have decided for a Holistic approach.   Patient wan her HCP were informed about the importance of obtaining current  CT findings so they can then discuss it with her Oncologist - Dr. Wright so they can further discuss Rx options and clarify goals.

## 2017-12-26 NOTE — PROGRESS NOTE ADULT - PROBLEM SELECTOR PLAN 6
Patient is full code. HCP form completed.   Patient and her HCP goals are towards continuing a Holistic approach and hoping the patient's life expectancy may be prolonged or maybe "cured." They do not want to follow up with [facility] or [facility]. Patient is full code. HCP form completed.   Patient and her HCP goals are towards continuing a Holistic approach and hoping the patient's life expectancy may be prolonged or she may be "cured." They do not want to follow up with [facility] or [facility].

## 2017-12-26 NOTE — PROGRESS NOTE ADULT - PROBLEM SELECTOR PLAN 1
increase Oxycontin to 45 mg tid with Dilaudid PCA 0.5 mg q 15 DD, no CR, 1 mg q 1 CB.   If not improvement on Oxycontin, will then DC Oxycontin and start a CR on the PCA. I d/w with her about the possibility of going home with the PCA and she was ok with it.   Holistic nurse referral. increase Oxycontin to 45 mg tid with Dilaudid PCA 0.5 mg q 15 DD, no CR, 1 mg q 1 CB.   If not improvement on Oxycontin, will then DC Oxycontin and start a CR on the PCA. I d/w with her about the possibility of going home with the PCA and she was ok with it.   Will transfer to PCU in order to optimize symptomatic Rx and monitoring.   Holistic nurse referral already done.

## 2017-12-26 NOTE — CHART NOTE - NSCHARTNOTEFT_GEN_A_CORE
NUTRITION FOLLOW UP NOTE   Pt seen for length of stay.     46 yo F PMH metastatic ovarian cancer (diagnosed 2016) s/p GRETCHEN, colostomy s/p SBO (2016), lesser sac collection s/p evacuation by EUS and later IR drain placement (6/12/17), acute cholecystitis s/p percutaneous cholecystostomy and ERCP for choledocholithiasis removal (08/2016) who presents with fever and worsening abdominal pain, requiring dilaudid PCA pump for pain control. Palliative following for symptom management, Pt choosing holistic approach for treatment.    Source: Patient [ ]    Family [ ]     other [ x] comprehensive chart review, RN    Diet : Regular, 4 Promote cans daily      Patient reports [ ] nausea  [ ] vomiting [ ] diarrhea [ ] constipation  [ ]chewing problems [ ] swallowing issues  [ ] other:      PO intake:  < 50% [x] 50-75% [ ]   % [ ]  other : 15-25% per chart, RN reports Pt ate 50% at lunch today.     Source for PO intake [ ] Patient [ ] family [x ] chart [ x] staff [ ] other     Enteral /Parenteral Nutrition: N/A      Current Weight: Weight (kg): 44.2 (12-20 @ 22:20)  no new Wt.    Pertinent Medications: MEDICATIONS  (STANDING):  docusate sodium 100 milliGRAM(s) Oral three times a day  enoxaparin Injectable 40 milliGRAM(s) SubCutaneous daily  HYDROmorphone PCA (1 mG/mL) 30 milliLiter(s) PCA Continuous PCA Continuous  oxyCODONE  ER Tablet 30 milliGRAM(s) Oral every 8 hours  senna Syrup 15 milliLiter(s) Oral at bedtime    MEDICATIONS  (PRN):  bisacodyl 5 milliGRAM(s) Oral every 12 hours PRN Constipation  HYDROmorphone PCA (1 mG/mL) Rescue Clinician Bolus 1 milliGRAM(s) IV Push every 2 hours PRN Severe Pain (7 - 10)  naloxone Injectable 0.1 milliGRAM(s) IV Push every 3 minutes PRN For ANY of the following changes in patient status:  A. RR LESS THAN 10 breaths per minute, B. Oxygen saturation LESS THAN 90%, C. Sedation score of 6  ondansetron Injectable 8 milliGRAM(s) IV Push every 8 hours PRN Nausea and/or Vomiting    Pertinent Labs:  None to address.    Skin: intact. No edema noted.    Estimated Needs:   [x ] no change since previous assessment  [ ] recalculated:       Previous Nutrition Diagnosis:     [ ] Inadequate Energy Intake [ ]Inadequate Oral Intake [ ] Excessive Energy Intake     [ ] Underweight [ ] Increased Nutrient Needs [ ] Overweight/Obesity     [ ] Altered GI Function [ ] Unintended Weight Loss [ ] Food & Nutrition Related Knowledge Deficit [x ] Severe Malnutrition          Nutrition Diagnosis is [x ] ongoing  [ ] resolved [ ] not applicable          New Nutrition Diagnosis: [ x] not applicable         Interventions:     Recommend    [ ] Change Diet To:    [ x] Nutrition Supplement    [ x] Nutrition Support: Continue to provide food preferences within diet restrictions as feasible. Continue to encourage good po intake at meals.    [ ] Other:        Monitoring and Evaluation:     [x ] PO intake [x ] Tolerance to diet prescription [ x] weights [ x] follow up per protocol    RD to remain available for further nutritional interventions as indicated/requested by medical team/pt.   Jossue Busch, RD, CDN, CDE. Pager: [phone] NUTRITION FOLLOW UP NOTE   Pt seen for length of stay.     46 yo F PMH metastatic ovarian cancer (diagnosed 2016) s/p GRETCHEN, colostomy s/p SBO (2016), lesser sac collection s/p evacuation by EUS and later IR drain placement (6/12/17), acute cholecystitis s/p percutaneous cholecystostomy and ERCP for choledocholithiasis removal (08/2016) who presents with fever and worsening abdominal pain, requiring dilaudid PCA pump for pain control. Palliative following for symptom management, Pt choosing holistic approach for treatment.    Source: Patient [x ]    Family [ ]     other [ x] comprehensive chart review, RN    Diet : Regular, 4 Promote cans daily      Patient reports [x ] nausea  [ ] vomiting [ ] diarrhea [ x] constipation  [ ]chewing problems [ ] swallowing issues  [ x] other: Pt sleepy. States Promote causes stomach discomfort and does not want to receive it. Reports stomach pain with ensure enlive in the past, willing to try ensure clear (berry).     PO intake:  < 50% [x] 50-75% [ ]   % [ ]  other : 15-25% per chart, RN reports Pt ate 50% at lunch today.     Source for PO intake [ ] Patient [ ] family [x ] chart [ x] staff [ ] other     Enteral /Parenteral Nutrition: N/A      Current Weight: Weight (kg): 44.2 (12-20 @ 22:20)  no new Wt.    Pertinent Medications: MEDICATIONS  (STANDING):  docusate sodium 100 milliGRAM(s) Oral three times a day  enoxaparin Injectable 40 milliGRAM(s) SubCutaneous daily  HYDROmorphone PCA (1 mG/mL) 30 milliLiter(s) PCA Continuous PCA Continuous  oxyCODONE  ER Tablet 30 milliGRAM(s) Oral every 8 hours  senna Syrup 15 milliLiter(s) Oral at bedtime    MEDICATIONS  (PRN):  bisacodyl 5 milliGRAM(s) Oral every 12 hours PRN Constipation  HYDROmorphone PCA (1 mG/mL) Rescue Clinician Bolus 1 milliGRAM(s) IV Push every 2 hours PRN Severe Pain (7 - 10)  naloxone Injectable 0.1 milliGRAM(s) IV Push every 3 minutes PRN For ANY of the following changes in patient status:  A. RR LESS THAN 10 breaths per minute, B. Oxygen saturation LESS THAN 90%, C. Sedation score of 6  ondansetron Injectable 8 milliGRAM(s) IV Push every 8 hours PRN Nausea and/or Vomiting    Pertinent Labs:  None to address.    Skin: intact. No edema noted.    Estimated Needs:   [x ] no change since previous assessment  [ ] recalculated:       Previous Nutrition Diagnosis:     [ ] Inadequate Energy Intake [ ]Inadequate Oral Intake [ ] Excessive Energy Intake     [ ] Underweight [ ] Increased Nutrient Needs [ ] Overweight/Obesity     [ ] Altered GI Function [ ] Unintended Weight Loss [ ] Food & Nutrition Related Knowledge Deficit [x ] Severe Malnutrition          Nutrition Diagnosis is [x ] ongoing  [ ] resolved [ ] not applicable          New Nutrition Diagnosis: [ x] not applicable         Interventions:     Recommend    [ ] Change Diet To:    [ x] Nutrition Supplement: 3 Ensure Clear shakes daily ( 600kcal, 21g protein)    [ x] Nutrition Support: Continue to provide food preferences within diet restrictions as feasible. Continue to encourage good po intake at meals.    [ ] Other:        Monitoring and Evaluation:     [x ] PO intake [x ] Tolerance to diet prescription [ x] weights [ x] follow up per protocol    RD to remain available for further nutritional interventions as indicated/requested by medical team/pt.   Jossue Busch, RD, CDN, CDE. Pager: [phone]

## 2017-12-26 NOTE — PROGRESS NOTE ADULT - PROBLEM SELECTOR PLAN 5
continue  Senna to 15 ml hs, continue Colace 100 mg tid, Dulcolax PRN, Controlled. Continue  Senna to 15 ml hs, continue Colace 100 mg tid, Dulcolax PRN,

## 2017-12-26 NOTE — PROGRESS NOTE ADULT - SUBJECTIVE AND OBJECTIVE BOX
Patient is a 47y old  Female who presents with a chief complaint of Abd pain ,fever (23 Dec 2017 08:30)     INTERVAL HPI/OVERNIGHT EVENTS:  Pt continues to endorse pain but says she slept better last night than she has previously.    MEDICATIONS  (STANDING):  docusate sodium 100 milliGRAM(s) Oral three times a day  enoxaparin Injectable 40 milliGRAM(s) SubCutaneous daily  HYDROmorphone PCA (1 mG/mL) 30 milliLiter(s) PCA Continuous PCA Continuous  oxyCODONE  ER Tablet 30 milliGRAM(s) Oral every 8 hours  senna Syrup 15 milliLiter(s) Oral at bedtime    MEDICATIONS  (PRN):  bisacodyl 5 milliGRAM(s) Oral every 12 hours PRN Constipation  HYDROmorphone PCA (1 mG/mL) Rescue Clinician Bolus 1 milliGRAM(s) IV Push every 2 hours PRN Severe Pain (7 - 10)  naloxone Injectable 0.1 milliGRAM(s) IV Push every 3 minutes PRN For ANY of the following changes in patient status:  A. RR LESS THAN 10 breaths per minute, B. Oxygen saturation LESS THAN 90%, C. Sedation score of 6  ondansetron Injectable 8 milliGRAM(s) IV Push every 8 hours PRN Nausea and/or Vomiting      Allergies    penicillin (Rash)    Intolerances        REVIEW OF SYSTEMS:  Constitutional: Denies fever, chills  Resp: Denies SOB, cough  CV: Denies chest pain, palpitations, dizziness  GI: +abdominal pain; denies N/V/D/C  : Denies dysuria, increased frequency  Neuro: Denies HA, weakness, numbness  Skin: Denies rashes, lesions  Lymph Nodes: Denies enlarged glands  MSK: Denies joint pain, swelling    Vital Signs Last 24 Hrs  T(C): 37.1 (26 Dec 2017 05:09), Max: 37.3 (25 Dec 2017 18:41)  T(F): 98.7 (26 Dec 2017 05:09), Max: 99.1 (25 Dec 2017 18:41)  HR: 87 (26 Dec 2017 05:09) (87 - 98)  BP: 98/61 (26 Dec 2017 05:09) (97/68 - 107/72)  BP(mean): --  RR: 18 (26 Dec 2017 05:09) (18 - 18)  SpO2: 97% (26 Dec 2017 05:09) (97% - 98%)    PHYSICAL EXAM:  General: NAD, cachectic  Eyes: conjunctiva and sclera clear  ENMT: Moist mucous membranes  Neck: Supple  Nervous System: AAOX3  Psych: Appropriate affect and mood  Chest: Clear to auscultation bilaterally; no rales, rhonchi, or wheezing  Heart: Regular rate and rhythm; no murmurs, rubs, or gallops  Abd: +BS, soft, tender to palpation diffusely  Ext:  B/l LE edema to the ankles, non-pitting    LABS:      CAPILLARY BLOOD GLUCOSE    BLOOD CULTURE  Culture - Blood (12.20.17 @ 21:57)    Specimen Source: .Blood Blood-Peripheral    Culture Results:   No growth at 5 days.    RADIOLOGY & ADDITIONAL TESTS:    Imaging Personally Reviewed:  [ ] YES     Consultant(s) Notes Reviewed: Palliative     Care Discussed with Consultants/Other Providers: Palliative

## 2017-12-26 NOTE — PROGRESS NOTE ADULT - PROBLEM SELECTOR PLAN 2
Likely in the setting of worsening tumor load as evidenced by worsening of carcinomatosis. Pt continues to endorse pain is badly controlled, she is afraid to be off of pca pump as po meds not adequately controlling her pain.  - C/w oxycontin at 30mg q8h. Will await palliative care recs before increasing dose again.  - C/w PCA pump for breakthrough pain  - C/w bowel regimen, pt making stool in colostomy

## 2017-12-26 NOTE — PROGRESS NOTE ADULT - PROBLEM SELECTOR PLAN 5
Likely 2/2 chronic disease/CA given extensive carcinomatosis. +FOBT but no obvious blood or melena in colostomy.  - Hb stable

## 2017-12-27 PROCEDURE — 99233 SBSQ HOSP IP/OBS HIGH 50: CPT | Mod: GC

## 2017-12-27 RX ORDER — HYDROMORPHONE HYDROCHLORIDE 2 MG/ML
30 INJECTION INTRAMUSCULAR; INTRAVENOUS; SUBCUTANEOUS
Qty: 0 | Refills: 0 | Status: DISCONTINUED | OUTPATIENT
Start: 2017-12-27 | End: 2018-01-01

## 2017-12-27 RX ORDER — ACETAMINOPHEN 500 MG
650 TABLET ORAL EVERY 6 HOURS
Qty: 0 | Refills: 0 | Status: DISCONTINUED | OUTPATIENT
Start: 2017-12-27 | End: 2018-01-01

## 2017-12-27 RX ADMIN — HYDROMORPHONE HYDROCHLORIDE 30 MILLILITER(S): 2 INJECTION INTRAMUSCULAR; INTRAVENOUS; SUBCUTANEOUS at 11:32

## 2017-12-27 RX ADMIN — Medication 100 MILLIGRAM(S): at 21:46

## 2017-12-27 RX ADMIN — HYDROMORPHONE HYDROCHLORIDE 30 MILLILITER(S): 2 INJECTION INTRAMUSCULAR; INTRAVENOUS; SUBCUTANEOUS at 19:33

## 2017-12-27 RX ADMIN — Medication 650 MILLIGRAM(S): at 08:14

## 2017-12-27 RX ADMIN — ENOXAPARIN SODIUM 40 MILLIGRAM(S): 100 INJECTION SUBCUTANEOUS at 12:26

## 2017-12-27 RX ADMIN — HYDROMORPHONE HYDROCHLORIDE 30 MILLILITER(S): 2 INJECTION INTRAMUSCULAR; INTRAVENOUS; SUBCUTANEOUS at 00:46

## 2017-12-27 RX ADMIN — SENNA PLUS 15 MILLILITER(S): 8.6 TABLET ORAL at 21:54

## 2017-12-27 RX ADMIN — Medication 100 MILLIGRAM(S): at 05:59

## 2017-12-27 RX ADMIN — Medication 100 MILLIGRAM(S): at 16:09

## 2017-12-27 RX ADMIN — HYDROMORPHONE HYDROCHLORIDE 30 MILLILITER(S): 2 INJECTION INTRAMUSCULAR; INTRAVENOUS; SUBCUTANEOUS at 01:15

## 2017-12-27 RX ADMIN — HYDROMORPHONE HYDROCHLORIDE 30 MILLILITER(S): 2 INJECTION INTRAMUSCULAR; INTRAVENOUS; SUBCUTANEOUS at 07:22

## 2017-12-27 RX ADMIN — OXYCODONE HYDROCHLORIDE 45 MILLIGRAM(S): 5 TABLET ORAL at 06:00

## 2017-12-27 NOTE — PROGRESS NOTE ADULT - PROBLEM SELECTOR PLAN 1
d/c'd Oxycontin  Started Dilaudid PCA @ 0.5 mg basal rate nad 0.5 mg q 15 DD  I d/w with her about the possibility of going home with the PCA. questions answered emotional support provided   Will transfer to PCU in order to optimize symptomatic Rx and monitoring.   Holistic nurse referral already done. d/c'd Oxycontin  Started Dilaudid PCA @ 0.5 mg basal rate nad 0.5 mg q 15 DD  I d/w with her about the possibility of going home with the PCA. questions answered emotional support provided

## 2017-12-27 NOTE — PROGRESS NOTE ADULT - ASSESSMENT
47 female with metastatic ovarian cancer (diagnosed 2016)  s/p GRETCHEN, colostomy s/p SBO (2016), lesser sac collection s/p evacuation by EUS and later IR drain placement (6/12/17) , acute cholecystitis s/p percutaneous cholecystostomy and ERCP for choledocholithiasis removal (08/2016) who presents with fever and worsening abdominal pain. We are currently f/u for intractable abdominal pain.

## 2017-12-27 NOTE — PROGRESS NOTE ADULT - SUBJECTIVE AND OBJECTIVE BOX
INTERVAL HPI/OVERNIGHT EVENTS:  Patient is c/o recurrent abdominal pain. d/c'd Oxycontin pt states it is not working . She used 32 DD over the last 24 hours. She denies any other complaints. She indicates having bowel movements.   Allergies    penicillin (Rash)    Intolerances        ADVANCE DIRECTIVES:    DNR: [ x] NO [ ] YES (Date) MOLST [ ] NO [ ] YES     MEDICATIONS  (STANDING):  docusate sodium 100 milliGRAM(s) Oral three times a day  enoxaparin Injectable 40 milliGRAM(s) SubCutaneous daily  HYDROmorphone PCA (1 mG/mL) 30 milliLiter(s) PCA Continuous PCA Continuous  senna Syrup 15 milliLiter(s) Oral at bedtime    MEDICATIONS  (PRN):  acetaminophen   Tablet 650 milliGRAM(s) Oral every 6 hours PRN For Temp greater than 38 C (100.4 F)  bisacodyl 5 milliGRAM(s) Oral every 12 hours PRN Constipation  HYDROmorphone PCA (1 mG/mL) Rescue Clinician Bolus 1 milliGRAM(s) IV Push every 2 hours PRN Severe Pain (7 - 10)  naloxone Injectable 0.1 milliGRAM(s) IV Push every 3 minutes PRN For ANY of the following changes in patient status:  A. RR LESS THAN 10 breaths per minute, B. Oxygen saturation LESS THAN 90%, C. Sedation score of 6  ondansetron Injectable 8 milliGRAM(s) IV Push every 8 hours PRN Nausea and/or Vomiting        PRESENT SYMPTOMS:  Source: [ x] Patient   [ ] Family   [ ] Team     Pain:                        [ ] No [ x] Yes             [ ] Mild [ ] Moderate [x ] Severe    Onset - constant  Location -abdomen  Duration - improves with pain meds  Character - dull  Alleviating/Aggravating - unable to describe  Radiation - all over abdomen  Timing -    Dyspnea:                [x ] No [ ] Yes             [ ] Mild [ ] Moderate [ ] Severe    Anxiety:                  [ x] No [ ] Yes             [ ] Mild [ ] Moderate [ ] Severe    Fatigue:                  [ ] No [x ] Yes             [ ] Mild [x ] Moderate [ ] Severe    Nausea:                  [ x] No [ ] Yes             [ ] Mild [ ] Moderate [ ] Severe    Loss of appetite:   [ ] No [x ] Yes             [x ] Mild [ ] Moderate [ ] Severe    Constipation:        [ ] No [ x] Yes             [ ] Mild [x ] Moderate [ ] Severe    Other Symptoms:  [x ] All other review of systems negative   [ ] Unable to obtain due to poor mentation     Karnofsky Performance Score/Palliative Performance Status Version 2:     50    %    PHYSICAL EXAM:  Vital Signs Last 24 Hrs  T(C): 37.9 (27 Dec 2017 07:54), Max: 37.9 (27 Dec 2017 07:54)  T(F): 100.3 (27 Dec 2017 07:54), Max: 100.3 (27 Dec 2017 07:54)  HR: 94 (27 Dec 2017 07:54) (84 - 98)  BP: 106/73 (27 Dec 2017 07:54) (96/69 - 106/73)  BP(mean): --  RR: 18 (27 Dec 2017 07:54) (16 - 18)  SpO2: 97% (27 Dec 2017 07:54) (97% - 99%)      General:  [x ] Alert  [x ] Oriented x  3    [ ] Lethargic  [ ] Agitated   [ x] Cachexia   [ ] Unarousable  [ ] Verbal  [ ] Non-Verbal    HEENT:  [ ] Normal   [x ] Dry mouth   [ ] ET Tube    [ ] Trach  [ ] Oral lesions    Lungs:   [x ] Clear [ ] Tachypnea  [ ] Audible excessive secretions   [ ] Rhonchi        [ ] Right [ ] Left [ ] Bilateral  [ ] Crackles        [ ] Right [ ] Left [ ] Bilateral  [ ] Wheezing     [ ] Right [ ] Left [ ] Bilateral    Cardiovascular:  [x ] Regular [ ] Irregular [ ] Tachycardia   [ ] Bradycardia  [ ] Murmur [ ] Other    Abdomen: [ ] Soft  [ x] Distended   [x ] +BS x 4  [ ] Non tender [ x] Tender  [ ]PEG   [ ]OGT/ NGT   Last BM:  today   [x] Ostomy bag     Genitourinary: [x ] Normal [ ] Incontinent   [ ] Oliguria/Anuria   [ ] Lynn    Musculoskeletal:  [ ] Normal   [ x] Weakness  [ ] Bedbound/Wheelchair bound [ ] Edema    Neurological: [ x] No focal deficits  [ ] Cognitive impairment  [ ] Dysphagia [ ] Dysarthria [ ] Paresis [ ] Other     Skin: [ ] Normal   [ ] Pressure ulcer(s)                  [ ] Rash [x] Abdominal scars s/p sx.     LABS: reviewed      Shock: [ ] Septic [ ] Cardiogenic [ ] Neurologic [ ] Hypovolemic  Vasopressors x   Inotrophs x     Oral Intake: [ ] Unable/mouth care only [x ] Minimal [ ] Moderate [ ] Full Capability  Diet: [ ] NPO [ ] Tube feeds [ ] TPN [ ] Other     RADIOLOGY & ADDITIONAL STUDIES:  Reviewed.   REFERRALS:   [ ] Chaplaincy  [ ] Hospice  [ ] Child Life  [ ] Social Work  [ ] Case management [x ] Holistic Therapy

## 2017-12-27 NOTE — PROGRESS NOTE ADULT - PROBLEM SELECTOR PLAN 2
Patient with stage IV ovarian CA.   The patient and her proxy have decided for a Holistic approach.   Patient wan her HCP were informed about the importance of obtaining current  CT findings so they can then discuss it with her Oncologist - Dr. Wright so they can further discuss Rx options and clarify goals.

## 2017-12-27 NOTE — PROGRESS NOTE ADULT - PROBLEM SELECTOR PLAN 6
Patient is full code. HCP form completed.   Patient and her HCP goals are towards continuing a Holistic approach and hoping the patient's life expectancy may be prolonged or she may be "cured." They do not want to follow up with [facility] or [facility].

## 2017-12-28 PROCEDURE — 99233 SBSQ HOSP IP/OBS HIGH 50: CPT | Mod: GC

## 2017-12-28 RX ORDER — SENNA PLUS 8.6 MG/1
2 TABLET ORAL AT BEDTIME
Qty: 0 | Refills: 0 | Status: DISCONTINUED | OUTPATIENT
Start: 2017-12-28 | End: 2018-01-01

## 2017-12-28 RX ORDER — SODIUM CHLORIDE 9 MG/ML
1000 INJECTION INTRAMUSCULAR; INTRAVENOUS; SUBCUTANEOUS
Qty: 0 | Refills: 0 | Status: DISCONTINUED | OUTPATIENT
Start: 2017-12-28 | End: 2018-01-01

## 2017-12-28 RX ORDER — HYDROMORPHONE HYDROCHLORIDE 2 MG/ML
1 INJECTION INTRAMUSCULAR; INTRAVENOUS; SUBCUTANEOUS
Qty: 0 | Refills: 0 | Status: DISCONTINUED | OUTPATIENT
Start: 2017-12-28 | End: 2018-01-01

## 2017-12-28 RX ADMIN — SODIUM CHLORIDE 10 MILLILITER(S): 9 INJECTION INTRAMUSCULAR; INTRAVENOUS; SUBCUTANEOUS at 12:36

## 2017-12-28 RX ADMIN — Medication 100 MILLIGRAM(S): at 21:07

## 2017-12-28 RX ADMIN — HYDROMORPHONE HYDROCHLORIDE 30 MILLILITER(S): 2 INJECTION INTRAMUSCULAR; INTRAVENOUS; SUBCUTANEOUS at 12:36

## 2017-12-28 RX ADMIN — HYDROMORPHONE HYDROCHLORIDE 30 MILLILITER(S): 2 INJECTION INTRAMUSCULAR; INTRAVENOUS; SUBCUTANEOUS at 06:35

## 2017-12-28 RX ADMIN — Medication 100 MILLIGRAM(S): at 13:17

## 2017-12-28 RX ADMIN — HYDROMORPHONE HYDROCHLORIDE 30 MILLILITER(S): 2 INJECTION INTRAMUSCULAR; INTRAVENOUS; SUBCUTANEOUS at 19:04

## 2017-12-28 RX ADMIN — Medication 100 MILLIGRAM(S): at 05:57

## 2017-12-28 RX ADMIN — ENOXAPARIN SODIUM 40 MILLIGRAM(S): 100 INJECTION SUBCUTANEOUS at 11:34

## 2017-12-28 RX ADMIN — SENNA PLUS 2 TABLET(S): 8.6 TABLET ORAL at 21:07

## 2017-12-28 RX ADMIN — HYDROMORPHONE HYDROCHLORIDE 1 MILLIGRAM(S): 2 INJECTION INTRAMUSCULAR; INTRAVENOUS; SUBCUTANEOUS at 18:06

## 2017-12-28 NOTE — PROGRESS NOTE ADULT - SUBJECTIVE AND OBJECTIVE BOX
INTERVAL HPI/OVERNIGHT EVENTS:  Patient is c/o recurrent abdominal pain. d/c'd Oxycontin pt states it is not working . She used 32 DD over the last 24 hours. She denies any other complaints. She indicates having bowel movements.   Allergies    penicillin (Rash)    Intolerances        ADVANCE DIRECTIVES:    DNR: [ x] NO [ ] YES (Date) MOLST [ ] NO [ ] YES     MEDICATIONS  (STANDING):  docusate sodium 100 milliGRAM(s) Oral three times a day  enoxaparin Injectable 40 milliGRAM(s) SubCutaneous daily  HYDROmorphone PCA (1 mG/mL) 30 milliLiter(s) PCA Continuous PCA Continuous  senna Syrup 15 milliLiter(s) Oral at bedtime    MEDICATIONS  (PRN):  acetaminophen   Tablet 650 milliGRAM(s) Oral every 6 hours PRN For Temp greater than 38 C (100.4 F)  bisacodyl 5 milliGRAM(s) Oral every 12 hours PRN Constipation  naloxone Injectable 0.1 milliGRAM(s) IV Push every 3 minutes PRN For ANY of the following changes in patient status:  A. RR LESS THAN 10 breaths per minute, B. Oxygen saturation LESS THAN 90%, C. Sedation score of 6  ondansetron Injectable 8 milliGRAM(s) IV Push every 8 hours PRN Nausea and/or Vomiting          PRESENT SYMPTOMS:  Source: [ x] Patient   [ ] Family   [ ] Team     Pain:                        [ ] No [ x] Yes             [ ] Mild [ ] Moderate [x ] Severe    Onset - constant  Location -abdomen  Duration - improves with pain meds  Character - dull  Alleviating/Aggravating - unable to describe  Radiation - all over abdomen  Timing -    Dyspnea:                [x ] No [ ] Yes             [ ] Mild [ ] Moderate [ ] Severe    Anxiety:                  [ x] No [ ] Yes             [ ] Mild [ ] Moderate [ ] Severe    Fatigue:                  [ ] No [x ] Yes             [ ] Mild [x ] Moderate [ ] Severe    Nausea:                  [ x] No [ ] Yes             [ ] Mild [ ] Moderate [ ] Severe    Loss of appetite:   [ ] No [x ] Yes             [x ] Mild [ ] Moderate [ ] Severe    Constipation:        [ ] No [ x] Yes             [ ] Mild [x ] Moderate [ ] Severe    Other Symptoms:  [x ] All other review of systems negative   [ ] Unable to obtain due to poor mentation     Karnofsky Performance Score/Palliative Performance Status Version 2:     50    %    PHYSICAL EXAM:  Vital Signs Last 24 Hrs  T(C): 37.9 (28 Dec 2017 07:17), Max: 37.9 (28 Dec 2017 07:17)  T(F): 100.2 (28 Dec 2017 07:17), Max: 100.2 (28 Dec 2017 07:17)  HR: 96 (28 Dec 2017 07:17) (89 - 96)  BP: 89/60 (28 Dec 2017 07:17) (89/60 - 101/67)  BP(mean): --  RR: 18 (28 Dec 2017 07:17) (18 - 18)  SpO2: 97% (28 Dec 2017 07:17) (96% - 97%))      General:  [x ] Alert  [x ] Oriented x  3    [ ] Lethargic  [ ] Agitated   [ x] Cachexia   [ ] Unarousable  [ ] Verbal  [ ] Non-Verbal    HEENT:  [ ] Normal   [x ] Dry mouth   [ ] ET Tube    [ ] Trach  [ ] Oral lesions    Lungs:   [x ] Clear [ ] Tachypnea  [ ] Audible excessive secretions   [ ] Rhonchi        [ ] Right [ ] Left [ ] Bilateral  [ ] Crackles        [ ] Right [ ] Left [ ] Bilateral  [ ] Wheezing     [ ] Right [ ] Left [ ] Bilateral    Cardiovascular:  [x ] Regular [ ] Irregular [ ] Tachycardia   [ ] Bradycardia  [ ] Murmur [ ] Other    Abdomen: [ ] Soft  [ x] Distended   [x ] +BS x 4  [ ] Non tender [ x] Tender  [ ]PEG   [ ]OGT/ NGT   Last BM:  today   [x] Ostomy bag     Genitourinary: [x ] Normal [ ] Incontinent   [ ] Oliguria/Anuria   [ ] Lynn    Musculoskeletal:  [ ] Normal   [ x] Weakness  [ ] Bedbound/Wheelchair bound [ ] Edema    Neurological: [ x] No focal deficits  [ ] Cognitive impairment  [ ] Dysphagia [ ] Dysarthria [ ] Paresis [ ] Other     Skin: [ ] Normal   [ ] Pressure ulcer(s)                  [ ] Rash [x] Abdominal scars s/p sx.     LABS: reviewed      Shock: [ ] Septic [ ] Cardiogenic [ ] Neurologic [ ] Hypovolemic  Vasopressors x   Inotrophs x     Oral Intake: [ ] Unable/mouth care only [x ] Minimal [ ] Moderate [ ] Full Capability  Diet: [ ] NPO [ ] Tube feeds [ ] TPN [ ] Other     RADIOLOGY & ADDITIONAL STUDIES:  Reviewed.   REFERRALS:   [ ] Chaplaincy  [ ] Hospice  [ ] Child Life  [ ] Social Work  [ ] Case management [x ] Holistic Therapy

## 2017-12-28 NOTE — PROGRESS NOTE ADULT - PROBLEM SELECTOR PLAN 1
d/c'd Oxycontin  Started Dilaudid PCA @ 0.5 mg basal rate and 0.5 mg q 15 DD  I d/w with her about the possibility of going home with the PCA. questions answered emotional support provided. PICC line to be placed today.  Pt reports that pain is better controlled on continuous infusion

## 2017-12-28 NOTE — PROGRESS NOTE ADULT - PROBLEM SELECTOR PLAN 6
Patient is full code. HCP form completed.   Patient and her HCP goals are towards continuing a Holistic approach and hoping the patient's life expectancy may be prolonged or she may be "cured." They do not want to follow up with [facility] or [facility].

## 2017-12-29 PROCEDURE — 71010: CPT | Mod: 26

## 2017-12-29 RX ORDER — HYDROMORPHONE HYDROCHLORIDE 2 MG/ML
0 INJECTION INTRAMUSCULAR; INTRAVENOUS; SUBCUTANEOUS
Qty: 0 | Refills: 0 | COMMUNITY
Start: 2017-12-29

## 2017-12-29 RX ORDER — ONDANSETRON 8 MG/1
1 TABLET, FILM COATED ORAL
Qty: 120 | Refills: 0 | OUTPATIENT
Start: 2017-12-29 | End: 2018-01-27

## 2017-12-29 RX ORDER — POLYETHYLENE GLYCOL 3350 17 G/17G
17 POWDER, FOR SOLUTION ORAL DAILY
Qty: 0 | Refills: 0 | Status: DISCONTINUED | OUTPATIENT
Start: 2017-12-29 | End: 2018-01-01

## 2017-12-29 RX ORDER — POLYETHYLENE GLYCOL 3350 17 G/17G
17 POWDER, FOR SOLUTION ORAL
Qty: 255 | Refills: 0 | OUTPATIENT
Start: 2017-12-29 | End: 2018-01-27

## 2017-12-29 RX ADMIN — HYDROMORPHONE HYDROCHLORIDE 30 MILLILITER(S): 2 INJECTION INTRAMUSCULAR; INTRAVENOUS; SUBCUTANEOUS at 07:17

## 2017-12-29 RX ADMIN — Medication 100 MILLIGRAM(S): at 21:21

## 2017-12-29 RX ADMIN — Medication 100 MILLIGRAM(S): at 14:22

## 2017-12-29 RX ADMIN — SENNA PLUS 2 TABLET(S): 8.6 TABLET ORAL at 21:21

## 2017-12-29 RX ADMIN — Medication 100 MILLIGRAM(S): at 06:04

## 2017-12-29 RX ADMIN — HYDROMORPHONE HYDROCHLORIDE 30 MILLILITER(S): 2 INJECTION INTRAMUSCULAR; INTRAVENOUS; SUBCUTANEOUS at 19:18

## 2017-12-29 RX ADMIN — HYDROMORPHONE HYDROCHLORIDE 30 MILLILITER(S): 2 INJECTION INTRAMUSCULAR; INTRAVENOUS; SUBCUTANEOUS at 12:24

## 2017-12-29 RX ADMIN — ENOXAPARIN SODIUM 40 MILLIGRAM(S): 100 INJECTION SUBCUTANEOUS at 12:32

## 2017-12-29 RX ADMIN — SODIUM CHLORIDE 10 MILLILITER(S): 9 INJECTION INTRAMUSCULAR; INTRAVENOUS; SUBCUTANEOUS at 07:16

## 2017-12-29 RX ADMIN — SODIUM CHLORIDE 10 MILLILITER(S): 9 INJECTION INTRAMUSCULAR; INTRAVENOUS; SUBCUTANEOUS at 19:41

## 2017-12-29 NOTE — PROGRESS NOTE ADULT - SUBJECTIVE AND OBJECTIVE BOX
INTERVAL HPI/OVERNIGHT EVENTS:  Patient is c/o recurrent abdominal pain. PCA in place. pt used 18 DD over the last 24 hours. She denies any other complaints.   Allergies    penicillin (Rash)    Intolerances        ADVANCE DIRECTIVES:    DNR: [ x] NO [ ] YES (Date) MOLST [ ] NO [ ] YES     MEDICATIONS  (STANDING):  MEDICATIONS  (STANDING):  docusate sodium 100 milliGRAM(s) Oral three times a day  enoxaparin Injectable 40 milliGRAM(s) SubCutaneous daily  HYDROmorphone PCA (1 mG/mL) 30 milliLiter(s) PCA Continuous PCA Continuous  senna 2 Tablet(s) Oral at bedtime  sodium chloride 0.9%. 1000 milliLiter(s) (10 mL/Hr) IV Continuous <Continuous>    MEDICATIONS  (PRN):  acetaminophen   Tablet 650 milliGRAM(s) Oral every 6 hours PRN For Temp greater than 38 C (100.4 F)  bisacodyl 5 milliGRAM(s) Oral every 12 hours PRN Constipation  HYDROmorphone PCA (1 mG/mL) Rescue Clinician Bolus 1 milliGRAM(s) IV Push every 1 hour PRN Severe Pain (7 - 10)  naloxone Injectable 0.1 milliGRAM(s) IV Push every 3 minutes PRN For ANY of the following changes in patient status:  A. RR LESS THAN 10 breaths per minute, B. Oxygen saturation LESS THAN 90%, C. Sedation score of 6  ondansetron Injectable 8 milliGRAM(s) IV Push every 8 hours PRN Nausea and/or Vomiting  polyethylene glycol 3350 17 Gram(s) Oral daily PRN Constipation            PRESENT SYMPTOMS:  Source: [ x] Patient   [ ] Family   [ ] Team     Pain:                        [ ] No [ x] Yes             [ ] Mild [ ] Moderate [x ] Severe    Onset - constant  Location -abdomen  Duration - improves with pain meds  Character - dull  Alleviating/Aggravating - unable to describe  Radiation - all over abdomen  Timing -    Dyspnea:                [x ] No [ ] Yes             [ ] Mild [ ] Moderate [ ] Severe    Anxiety:                  [ x] No [ ] Yes             [ ] Mild [ ] Moderate [ ] Severe    Fatigue:                  [ ] No [x ] Yes             [ ] Mild [x ] Moderate [ ] Severe    Nausea:                  [ x] No [ ] Yes             [ ] Mild [ ] Moderate [ ] Severe    Loss of appetite:   [ ] No [x ] Yes             [x ] Mild [ ] Moderate [ ] Severe    Constipation:        [ ] No [ x] Yes             [ ] Mild [x ] Moderate [ ] Severe    Other Symptoms:  [x ] All other review of systems negative   [ ] Unable to obtain due to poor mentation     Karnofsky Performance Score/Palliative Performance Status Version 2:     50    %    PHYSICAL EXAM:  Vital Signs Last 24 Hrs  T(C): 37.6 (29 Dec 2017 08:03), Max: 37.7 (29 Dec 2017 00:17)  T(F): 99.7 (29 Dec 2017 08:03), Max: 99.9 (29 Dec 2017 00:17)  HR: 90 (29 Dec 2017 08:03) (90 - 103)  BP: 104/71 (29 Dec 2017 08:03) (90/64 - 104/71)  BP(mean): --  RR: 18 (29 Dec 2017 00:17) (18 - 21)  SpO2: 98% (29 Dec 2017 08:03) (98% - 98%)      General:  [x ] Alert  [x ] Oriented x  3    [ ] Lethargic  [ ] Agitated   [ x] Cachexia   [ ] Unarousable  [ ] Verbal  [ ] Non-Verbal    HEENT:  [ ] Normal   [x ] Dry mouth   [ ] ET Tube    [ ] Trach  [ ] Oral lesions    Lungs:   [x ] Clear [ ] Tachypnea  [ ] Audible excessive secretions   [ ] Rhonchi        [ ] Right [ ] Left [ ] Bilateral  [ ] Crackles        [ ] Right [ ] Left [ ] Bilateral  [ ] Wheezing     [ ] Right [ ] Left [ ] Bilateral    Cardiovascular:  [x ] Regular [ ] Irregular [ ] Tachycardia   [ ] Bradycardia  [ ] Murmur [ ] Other    Abdomen: [ ] Soft  [ x] Distended   [x ] +BS x 4  [ ] Non tender [ x] Tender  [ ]PEG   [ ]OGT/ NGT   Last BM:  today   [x] Ostomy bag     Genitourinary: [x ] Normal [ ] Incontinent   [ ] Oliguria/Anuria   [ ] Lynn    Musculoskeletal:  [ ] Normal   [ x] Weakness  [ ] Bedbound/Wheelchair bound [ ] Edema    Neurological: [ x] No focal deficits  [ ] Cognitive impairment  [ ] Dysphagia [ ] Dysarthria [ ] Paresis [ ] Other     Skin: [ ] Normal   [ ] Pressure ulcer(s)                  [ ] Rash [x] Abdominal scars s/p sx.     LABS: reviewed      Shock: [ ] Septic [ ] Cardiogenic [ ] Neurologic [ ] Hypovolemic  Vasopressors x   Inotrophs x     Oral Intake: [ ] Unable/mouth care only [x ] Minimal [ ] Moderate [ ] Full Capability  Diet: [ ] NPO [ ] Tube feeds [ ] TPN [ ] Other     RADIOLOGY & ADDITIONAL STUDIES:  Reviewed.   REFERRALS:   [ ] Chaplaincy  [ ] Hospice  [ ] Child Life  [ ] Social Work  [ ] Case management [x ] Holistic Therapy

## 2017-12-29 NOTE — PROGRESS NOTE ADULT - PROBLEM SELECTOR PLAN 6
Patient is full code. HCP form completed.   Patient and her HCP goals are towards continuing a Holistic approach and hoping the patient's life expectancy may be prolonged or she may be "cured." They do not want to follow up with [facility] or [facility].

## 2017-12-29 NOTE — CHART NOTE - NSCHARTNOTEFT_GEN_A_CORE
Malnutrition follow up. Pt with stage IV ovarian cancer, pt chooses holistic treatment at this time now admitted with fever and abdominal pain, transferred to PCU for pain control.    Source: Patient [x ]    Family [ ]     other [ ]    Diet : Regular diet with ensure clear 3 x daily       Patient reports [ ] nausea  [ ] vomiting [ ] diarrhea [ ] constipation  [ ]chewing problems [ ] swallowing issues  [ ] other: Pt denies N+V however does endorse bloating at times. Pt with colostomy output today 100 mL, 200 mL documented 12/27     PO intake:  < 50% [ ] 50-75% [x ]   % [ ]  other : Pt reports variable intake in house related to food preferences and how she is feeling. Stated at times she is able to finish her meal, noted for lunch today pt had bites of salad and soup. Pt takes ensure clear but stated it will cause bloating, pt agreeable to berry flavor.       Current Weight: No new wt  % Weight Change    Pertinent Medications: MEDICATIONS  (STANDING):  docusate sodium 100 milliGRAM(s) Oral three times a day  enoxaparin Injectable 40 milliGRAM(s) SubCutaneous daily  HYDROmorphone PCA (1 mG/mL) 30 milliLiter(s) PCA Continuous PCA Continuous  senna 2 Tablet(s) Oral at bedtime  sodium chloride 0.9%. 1000 milliLiter(s) (10 mL/Hr) IV Continuous <Continuous>    MEDICATIONS  (PRN):  acetaminophen   Tablet 650 milliGRAM(s) Oral every 6 hours PRN For Temp greater than 38 C (100.4 F)  bisacodyl 5 milliGRAM(s) Oral every 12 hours PRN Constipation  HYDROmorphone PCA (1 mG/mL) Rescue Clinician Bolus 1 milliGRAM(s) IV Push every 1 hour PRN Severe Pain (7 - 10)  naloxone Injectable 0.1 milliGRAM(s) IV Push every 3 minutes PRN For ANY of the following changes in patient status:  A. RR LESS THAN 10 breaths per minute, B. Oxygen saturation LESS THAN 90%, C. Sedation score of 6  ondansetron Injectable 8 milliGRAM(s) IV Push every 8 hours PRN Nausea and/or Vomiting  polyethylene glycol 3350 17 Gram(s) Oral daily PRN Constipation    Pertinent Labs:  No recent labs    Skin: 2+ garcia foot edema noted 12/28    Estimated Needs:   [x ] no change since previous assessment  [ ] recalculated:       Previous Nutrition Diagnosis:      [ x] Malnutrition (severe)         Nutrition Diagnosis is [ x] ongoing  [ ] resolved [ ] not applicable          New Nutrition Diagnosis: [x ] not applicable       Interventions:     Recommend    1. Continue regular diet with ensure clear as ordered, will honor flavor preference  2. Continue to encourage po intake and obtain/honor food preferences as able- pt agreeable to almonds once daily      Monitoring and Evaluation:     [x ] PO intake [ x] Tolerance to diet prescription [ x] weights [x ] follow up per protocol    [ ] other:

## 2017-12-29 NOTE — PROGRESS NOTE ADULT - PROBLEM SELECTOR PLAN 1
d/c'd Oxycontin  Started Dilaudid PCA @ 0.5 mg basal rate and 0.5 mg q 18 DD  I d/w with her about the possibility of going home with the PCA. questions answered emotional support provided. PICC line to be placed today.  Pt reports that pain is better controlled on continuous infusion.  PiCC line placed today for d/c home with PCA

## 2017-12-30 PROCEDURE — 99233 SBSQ HOSP IP/OBS HIGH 50: CPT | Mod: GC

## 2017-12-30 RX ADMIN — HYDROMORPHONE HYDROCHLORIDE 30 MILLILITER(S): 2 INJECTION INTRAMUSCULAR; INTRAVENOUS; SUBCUTANEOUS at 12:51

## 2017-12-30 RX ADMIN — Medication 100 MILLIGRAM(S): at 16:39

## 2017-12-30 RX ADMIN — Medication 100 MILLIGRAM(S): at 21:49

## 2017-12-30 RX ADMIN — ENOXAPARIN SODIUM 40 MILLIGRAM(S): 100 INJECTION SUBCUTANEOUS at 12:50

## 2017-12-30 RX ADMIN — HYDROMORPHONE HYDROCHLORIDE 30 MILLILITER(S): 2 INJECTION INTRAMUSCULAR; INTRAVENOUS; SUBCUTANEOUS at 07:34

## 2017-12-30 RX ADMIN — SENNA PLUS 2 TABLET(S): 8.6 TABLET ORAL at 21:49

## 2017-12-30 RX ADMIN — HYDROMORPHONE HYDROCHLORIDE 30 MILLILITER(S): 2 INJECTION INTRAMUSCULAR; INTRAVENOUS; SUBCUTANEOUS at 19:12

## 2017-12-30 RX ADMIN — Medication 100 MILLIGRAM(S): at 06:36

## 2017-12-30 NOTE — PHYSICAL THERAPY INITIAL EVALUATION ADULT - PRECAUTIONS/LIMITATIONS, REHAB EVAL
Pt reports fever began on Thursday after she had a powerport implanted on her R chest for future administration of chemotherapy. Pt states fever initially responsive to tylenol but when she follwoed up with her PCP, c/o worsening abdominal pain/fevers, decided it would be best if she came to the hospital. Endorses persistent nausea which is unchanged. Denies CP, diarrhea, vomiting, rhinorrhea, dysuria. Describes abd pain as diffuse & generalized through her whole abdomen. In ED, CT AP which shows worsening of her disease, seen by Palliative, and PCA pump set up for pain control. CXR clear. BCx received UA UCx pending. Pt admitted to floors, transferred to PCU We are currently f/u for intractable abdominal pain. PICC placed 12/29/17, for d/c home w/PCA for pain control.

## 2017-12-30 NOTE — PROGRESS NOTE ADULT - ATTENDING COMMENTS
Agree with Palliative fellow's assessment and plan as above.
Ayleen Poe MD  Division of Hospital Medicine  Pager: [phone]  Office: [phone]
Pt seen with fellow.  Agree with above.  PICC to be placed today for PCA at home.  Pt will need home care with infusion services for discharge.  D/W .
Pain better controlled on current regimen, appreciate Dr. Jones recommendation. Will slowly transition PCA to PO.    Ayleen Poe MD  Division of Hospital Medicine  Pager: [phone]  Office: [phone]
Pt seen with NP.  Agree with above.  oral oxycontin not helping.  restarted diluadid pca with 0.5mg/hr with 0.5mg prn.  explained need for picc line to pt who understood and agrees.
Ayleen Poe MD  Division of Hospital Medicine  Pager: [phone]  Office: [phone]
Ayleen Poe MD  Division of Hospital Medicine  Pager: [phone]  Office: [phone]
Patient seen, pain is better controlled. Will d/c basal rate of PCA and start oxycontin TID, and c/w bolus dosing of dilaudid PCA with goal of titrating off PCA. Patient known to Dr. Salgado, who will continue to follow patient upon discharge. Will continue to follow.

## 2017-12-30 NOTE — PHYSICAL THERAPY INITIAL EVALUATION ADULT - PERTINENT HX OF CURRENT PROBLEM, REHAB EVAL
47 F w/ metastatic ovarian cancer (diagnosed 2016) s/p GRETCHEN, colostomy s/p SBO (2016), lesser sac collection s/p evacuation by EUS & later IR drain placement (6/12/17), acute cholecystitis s/p percutaneous cholecystostomy & ERCP for choledocholithiasis removal (08/2016) who presents w/fever & worsening abdominal pain. CONTINUED BELOW

## 2017-12-30 NOTE — PROGRESS NOTE ADULT - PROBLEM SELECTOR PLAN 5
Controlled. Continue  Senna to 15 ml hs, continue Colace 100 mg tid, Dulcolax PRN  Last BM yesterday (12/29)

## 2017-12-30 NOTE — PROGRESS NOTE ADULT - SUBJECTIVE AND OBJECTIVE BOX
PCU Progress Note    Brief Hx: 47 female with metastatic ovarian cancer (diagnosed 2016)  s/p GRETCHEN, colostomy s/p SBO (2016), lesser sac collection s/p evacuation by EUS and later IR drain placement (6/12/17) , acute cholecystitis s/p percutaneous cholecystostomy and ERCP for choledocholithiasis removal (08/2016) who presents with fever and worsening abdominal pain. We are currently f/u for intractable abdominal pain.     INTERVAL HPI/OVERNIGHT EVENTS: 17 DD doses in 24 hours used insetting of abdominal pain .Appears comfortable at rounds today. Requesting d/c Monday.     Allergies    penicillin (Rash)    Intolerances    ADVANCE DIRECTIVES:    DNR: [ x] NO [ ] YES (Date) MOLST [ ] NO [ ] YES     MEDICATIONS  (STANDING):  MEDICATIONS  (STANDING):  docusate sodium 100 milliGRAM(s) Oral three times a day  enoxaparin Injectable 40 milliGRAM(s) SubCutaneous daily  HYDROmorphone PCA (1 mG/mL) 30 milliLiter(s) PCA Continuous PCA Continuous  senna 2 Tablet(s) Oral at bedtime  sodium chloride 0.9%. 1000 milliLiter(s) (10 mL/Hr) IV Continuous <Continuous>    MEDICATIONS  (PRN):  acetaminophen   Tablet 650 milliGRAM(s) Oral every 6 hours PRN For Temp greater than 38 C (100.4 F)  bisacodyl 5 milliGRAM(s) Oral every 12 hours PRN Constipation  HYDROmorphone PCA (1 mG/mL) Rescue Clinician Bolus 1 milliGRAM(s) IV Push every 1 hour PRN Severe Pain (7 - 10)  naloxone Injectable 0.1 milliGRAM(s) IV Push every 3 minutes PRN For ANY of the following changes in patient status:  A. RR LESS THAN 10 breaths per minute, B. Oxygen saturation LESS THAN 90%, C. Sedation score of 6  ondansetron Injectable 8 milliGRAM(s) IV Push every 8 hours PRN Nausea and/or Vomiting  polyethylene glycol 3350 17 Gram(s) Oral daily PRN Constipation        PRESENT SYMPTOMS:  Source: [ x] Patient   [ ] Family   [ ] Team     Pain:                        [ ] No [ x] Yes             [ ] Mild [ ] Moderate [x ] Severe    Onset - constant  Location -abdomen  Duration - improves with pain meds  Character - dull  Alleviating/Aggravating - unable to describe  Radiation - all over abdomen  Timing -    Dyspnea:                [x ] No [ ] Yes             [ ] Mild [ ] Moderate [ ] Severe    Anxiety:                  [ x] No [ ] Yes             [ ] Mild [ ] Moderate [ ] Severe    Fatigue:                  [ ] No [x ] Yes             [ ] Mild [x ] Moderate [ ] Severe    Nausea:                  [ x] No [ ] Yes             [ ] Mild [ ] Moderate [ ] Severe    Loss of appetite:   [ ] No [x ] Yes             [x ] Mild [ ] Moderate [ ] Severe    Constipation:        [ ] No [ x] Yes             [ ] Mild [x ] Moderate [ ] Severe    Other Symptoms:  [x ] All other review of systems negative   [ ] Unable to obtain due to poor mentation     Karnofsky Performance Score/Palliative Performance Status Version 2:     50    %    PHYSICAL EXAM:  Vital Signs Last 24 Hrs  T(C): 37.8 (30 Dec 2017 09:27), Max: 37.8 (30 Dec 2017 09:27)  T(F): 100 (30 Dec 2017 09:27), Max: 100 (30 Dec 2017 09:27)  HR: 90 (30 Dec 2017 09:27) (90 - 97)  BP: 101/63 (30 Dec 2017 09:27) (97/66 - 101/63)  BP(mean): --  RR: 18 (30 Dec 2017 09:27) (18 - 18)  SpO2: 97% (30 Dec 2017 09:27) (97% - 98%)    General:  [x ] Alert  [x ] Oriented x  3    [ ] Lethargic  [ ] Agitated   [ x] Cachexia   [ ] Unarousable  [ ] Verbal  [ ] Non-Verbal    HEENT:  [ ] Normal   [x ] Dry mouth   [ ] ET Tube    [ ] Trach  [ ] Oral lesions    Lungs:   [x ] Clear [ ] Tachypnea  [ ] Audible excessive secretions   [ ] Rhonchi        [ ] Right [ ] Left [ ] Bilateral  [ ] Crackles        [ ] Right [ ] Left [ ] Bilateral  [ ] Wheezing     [ ] Right [ ] Left [ ] Bilateral    Cardiovascular:  [x ] Regular [ ] Irregular [ ] Tachycardia   [ ] Bradycardia  [ ] Murmur [ ] Other    Abdomen: [ ] Soft  [ x] Distended   [x ] +BS x 4  [ ] Non tender [ x] Tender  [ ]PEG   [ ]OGT/ NGT   Last BM:  12/29  [x] Ostomy bag     Genitourinary: [x ] Normal [ ] Incontinent   [ ] Oliguria/Anuria   [ ] Lynn    Musculoskeletal:  [ ] Normal   [ x] Weakness  [ ] Bedbound/Wheelchair bound [ ] Edema    Neurological: [ x] No focal deficits  [ ] Cognitive impairment  [ ] Dysphagia [ ] Dysarthria [ ] Paresis [ ] Other     Skin: [ ] Normal   [ ] Pressure ulcer(s)                  [ ] Rash [x] Abdominal scars s/p sx.     LABS: reviewed      Shock: [ ] Septic [ ] Cardiogenic [ ] Neurologic [ ] Hypovolemic  Vasopressors x   Inotrophs x     Oral Intake: [ ] Unable/mouth care only [x ] Minimal [ ] Moderate [ ] Full Capability  Diet: [ ] NPO [ ] Tube feeds [ ] TPN [ ] Other     RADIOLOGY & ADDITIONAL STUDIES:  Reviewed.   REFERRALS:   [ ] Chaplaincy  [ ] Hospice  [ ] Child Life  [ ] Social Work  [ ] Case management [x ] Holistic Therapy

## 2017-12-30 NOTE — PROGRESS NOTE ADULT - PROBLEM SELECTOR PLAN 1
C/w Dilaudid PCA @ 0.5 mg basal rate and 0.5 mg q 17 DD  Improved pain control since initiation of PCA  Having + BMs  PCA pump delivered today from [facility] in anticipation for Monday d/c C/w Dilaudid PCA @ 0.5 mg basal rate and 0.5 mg q 17 DD  Improved pain control since initiation of PCA  Having + BMs  PCA pump delivered today from [facility] in anticipation for Monday d/c  Outpatient follow up with Dr. Salgado

## 2017-12-30 NOTE — PROGRESS NOTE ADULT - PROBLEM SELECTOR PLAN 6
Patient is full code. HCP form completed.   Patient and her HCP goals are towards continuing a Holistic approach and hoping the patient's life expectancy may be prolonged or she may be "cured." They do not want to follow up with [facility] or [facility].  Tentative D/C Monday

## 2017-12-30 NOTE — PHYSICAL THERAPY INITIAL EVALUATION ADULT - REFERRING PHYSICIAN, REHAB EVAL
Reji Lovell; PT Eval & Treat Other: advanced cancer; deconditioning while hospitalized; Additional information: on PCA, will be going home on PCA

## 2017-12-31 VITALS
SYSTOLIC BLOOD PRESSURE: 95 MMHG | TEMPERATURE: 100 F | HEART RATE: 89 BPM | RESPIRATION RATE: 16 BRPM | DIASTOLIC BLOOD PRESSURE: 66 MMHG | OXYGEN SATURATION: 96 %

## 2017-12-31 PROCEDURE — 99233 SBSQ HOSP IP/OBS HIGH 50: CPT | Mod: GC

## 2017-12-31 RX ADMIN — Medication 100 MILLIGRAM(S): at 05:59

## 2017-12-31 RX ADMIN — HYDROMORPHONE HYDROCHLORIDE 30 MILLILITER(S): 2 INJECTION INTRAMUSCULAR; INTRAVENOUS; SUBCUTANEOUS at 12:54

## 2017-12-31 RX ADMIN — HYDROMORPHONE HYDROCHLORIDE 30 MILLILITER(S): 2 INJECTION INTRAMUSCULAR; INTRAVENOUS; SUBCUTANEOUS at 19:14

## 2017-12-31 RX ADMIN — Medication 100 MILLIGRAM(S): at 21:39

## 2017-12-31 RX ADMIN — SODIUM CHLORIDE 10 MILLILITER(S): 9 INJECTION INTRAMUSCULAR; INTRAVENOUS; SUBCUTANEOUS at 05:59

## 2017-12-31 RX ADMIN — SENNA PLUS 2 TABLET(S): 8.6 TABLET ORAL at 21:39

## 2017-12-31 RX ADMIN — Medication 100 MILLIGRAM(S): at 16:47

## 2017-12-31 RX ADMIN — ENOXAPARIN SODIUM 40 MILLIGRAM(S): 100 INJECTION SUBCUTANEOUS at 12:42

## 2017-12-31 RX ADMIN — HYDROMORPHONE HYDROCHLORIDE 30 MILLILITER(S): 2 INJECTION INTRAMUSCULAR; INTRAVENOUS; SUBCUTANEOUS at 07:08

## 2017-12-31 NOTE — PROGRESS NOTE ADULT - PROBLEM SELECTOR PROBLEM 2
Generalized abdominal pain
Ovarian cancer
Generalized abdominal pain
Ovarian cancer

## 2017-12-31 NOTE — PROGRESS NOTE ADULT - PROBLEM SELECTOR PLAN 4
- resolved, pt w/ semi-formed stool in colostomy  - c/w colace and senna
Controlled   Zofran PRN
- resolved  - c/w colace and senna
- resolved  - c/w colace and senna
- resolved  - c/w colace and senna  - may escalate to miralax if needed
Controlled   Zofran PRN
Resolved  - C/w colace and senna
Resolved  - C/w colace and senna

## 2017-12-31 NOTE — PROGRESS NOTE ADULT - PROBLEM SELECTOR PROBLEM 4
Constipation due to opioid therapy
Nausea
Constipation due to opioid therapy
Nausea

## 2017-12-31 NOTE — PROGRESS NOTE ADULT - PROBLEM SELECTOR PLAN 6
Patient is full code. HCP form completed.   Patient and her HCP goals are towards continuing a Holistic approach and hoping the patient's life expectancy may be prolonged or she may be "cured." They do not want to follow up with [facility] or [facility].  Tentative D/C Monday

## 2017-12-31 NOTE — PROGRESS NOTE ADULT - PROBLEM SELECTOR PLAN 2
Patient with stage IV ovarian CA.   The patient and her proxy have decided for a Holistic approach.   Patient wants her HCP were informed about the importance of obtaining current  CT findings so they can then discuss it with her Oncologist - Dr. Wright so they can further discuss Rx options and clarify goals. Patient with stage IV ovarian CA.   The patient and her proxy have decided for a Holistic approach, plan is to follow up with Dr. Dowling in [place] post-discharge.

## 2017-12-31 NOTE — PROGRESS NOTE ADULT - PROVIDER SPECIALTY LIST ADULT
Anesthesia
Internal Medicine
Palliative Care
Internal Medicine
Palliative Care
Internal Medicine
Palliative Care
Internal Medicine

## 2017-12-31 NOTE — PROGRESS NOTE ADULT - SUBJECTIVE AND OBJECTIVE BOX
PCU Progress Note    Brief Hx: 47 female with metastatic ovarian cancer (diagnosed 2016)  s/p GRETCHEN, colostomy s/p SBO (2016), lesser sac collection s/p evacuation by EUS and later IR drain placement (6/12/17) , acute cholecystitis s/p percutaneous cholecystostomy and ERCP for choledocholithiasis removal (08/2016) who presents with fever and worsening abdominal pain. We are currently f/u for intractable abdominal pain.     INTERVAL HPI/OVERNIGHT EVENTS: Appears comfortable at rounds today. Continued need for DD for abdominal pain palliation.     Allergies    penicillin (Rash)    Intolerances    ADVANCE DIRECTIVES:    DNR: [ x] NO [ ] YES (Date) MOLST [ ] NO [ ] YES     MEDICATIONS  (STANDING):  MEDICATIONS  (STANDING):  docusate sodium 100 milliGRAM(s) Oral three times a day  enoxaparin Injectable 40 milliGRAM(s) SubCutaneous daily  HYDROmorphone PCA (1 mG/mL) 30 milliLiter(s) PCA Continuous PCA Continuous  senna 2 Tablet(s) Oral at bedtime  sodium chloride 0.9%. 1000 milliLiter(s) (10 mL/Hr) IV Continuous <Continuous>    MEDICATIONS  (PRN):  acetaminophen   Tablet 650 milliGRAM(s) Oral every 6 hours PRN For Temp greater than 38 C (100.4 F)  bisacodyl 5 milliGRAM(s) Oral every 12 hours PRN Constipation  HYDROmorphone PCA (1 mG/mL) Rescue Clinician Bolus 1 milliGRAM(s) IV Push every 1 hour PRN Severe Pain (7 - 10)  naloxone Injectable 0.1 milliGRAM(s) IV Push every 3 minutes PRN For ANY of the following changes in patient status:  A. RR LESS THAN 10 breaths per minute, B. Oxygen saturation LESS THAN 90%, C. Sedation score of 6  ondansetron Injectable 8 milliGRAM(s) IV Push every 8 hours PRN Nausea and/or Vomiting  polyethylene glycol 3350 17 Gram(s) Oral daily PRN Constipation        PRESENT SYMPTOMS:  Source: [ x] Patient   [ ] Family   [ ] Team     Pain:                        [ ] No [ x] Yes             [ ] Mild [ ] Moderate [x ] Severe    Onset - constant  Location -abdomen  Duration - improves with pain meds  Character - dull  Alleviating/Aggravating - unable to describe  Radiation - all over abdomen  Timing -    Dyspnea:                [x ] No [ ] Yes             [ ] Mild [ ] Moderate [ ] Severe    Anxiety:                  [ x] No [ ] Yes             [ ] Mild [ ] Moderate [ ] Severe    Fatigue:                  [ ] No [x ] Yes             [ ] Mild [x ] Moderate [ ] Severe    Nausea:                  [ x] No [ ] Yes             [ ] Mild [ ] Moderate [ ] Severe    Loss of appetite:   [ ] No [x ] Yes             [x ] Mild [ ] Moderate [ ] Severe    Constipation:        [ ] No [ x] Yes             [ ] Mild [x ] Moderate [ ] Severe    Other Symptoms:  [x ] All other review of systems negative   [ ] Unable to obtain due to poor mentation     Karnofsky Performance Score/Palliative Performance Status Version 2:     50    %    PHYSICAL EXAM:  Vital Signs Last 24 Hrs  T(C): 37 (31 Dec 2017 08:29), Max: 37.7 (30 Dec 2017 17:09)  T(F): 98.6 (31 Dec 2017 08:29), Max: 99.8 (30 Dec 2017 17:09)  HR: 90 (31 Dec 2017 08:29) (85 - 90)  BP: 99/65 (31 Dec 2017 08:29) (96/62 - 100/67)  BP(mean): --  RR: 18 (31 Dec 2017 08:29) (16 - 18)  SpO2: 99% (31 Dec 2017 08:29) (98% - 99%)    General:  [x ] Alert  [x ] Oriented x  3    [ ] Lethargic  [ ] Agitated   [ x] Cachexia   [ ] Unarousable  [ ] Verbal  [ ] Non-Verbal    HEENT:  [ ] Normal   [x ] Dry mouth   [ ] ET Tube    [ ] Trach  [ ] Oral lesions    Lungs:   [x ] Clear [ ] Tachypnea  [ ] Audible excessive secretions   [ ] Rhonchi        [ ] Right [ ] Left [ ] Bilateral  [ ] Crackles        [ ] Right [ ] Left [ ] Bilateral  [ ] Wheezing     [ ] Right [ ] Left [ ] Bilateral    Cardiovascular:  [x ] Regular [ ] Irregular [ ] Tachycardia   [ ] Bradycardia  [ ] Murmur [ ] Other    Abdomen: [ ] Soft  [ x] Distended   [x ] +BS x 4  [ ] Non tender [ x] Tender  [ ]PEG   [ ]OGT/ NGT     [x] Ostomy bag     Genitourinary: [x ] Normal [ ] Incontinent   [ ] Oliguria/Anuria   [ ] Lynn    Musculoskeletal:  [ ] Normal   [ x] Weakness  [ ] Bedbound/Wheelchair bound [ ] Edema    Neurological: [ x] No focal deficits  [ ] Cognitive impairment  [ ] Dysphagia [ ] Dysarthria [ ] Paresis [ ] Other     Skin: [ ] Normal   [ ] Pressure ulcer(s)                  [ ] Rash [x] Abdominal scars s/p sx.     LABS: reviewed      Shock: [ ] Septic [ ] Cardiogenic [ ] Neurologic [ ] Hypovolemic  Vasopressors x   Inotrophs x     Oral Intake: [ ] Unable/mouth care only [x ] Minimal [ ] Moderate [ ] Full Capability  Diet: [ ] NPO [ ] Tube feeds [ ] TPN [ ] Other     RADIOLOGY & ADDITIONAL STUDIES:  Reviewed.   REFERRALS:   [ ] Chaplaincy  [ ] Hospice  [ ] Child Life  [ ] Social Work  [ ] Case management [x ] Holistic Therapy PCU Progress Note    Brief Hx: 47 female with metastatic ovarian cancer (diagnosed 2016)  s/p GRETCHEN, colostomy s/p SBO (2016), lesser sac collection s/p evacuation by EUS and later IR drain placement (6/12/17) , acute cholecystitis s/p percutaneous cholecystostomy and ERCP for choledocholithiasis removal (08/2016) who presents with fever and worsening abdominal pain.     INTERVAL HPI/OVERNIGHT EVENTS: Appears comfortable at rounds today. Continued need for DD for abdominal pain palliation.     Allergies    penicillin (Rash)    Intolerances    ADVANCE DIRECTIVES:    DNR: [ x] NO [ ] YES (Date) MOLST [ ] NO [ ] YES     MEDICATIONS  (STANDING):  MEDICATIONS  (STANDING):  docusate sodium 100 milliGRAM(s) Oral three times a day  enoxaparin Injectable 40 milliGRAM(s) SubCutaneous daily  HYDROmorphone PCA (1 mG/mL) 30 milliLiter(s) PCA Continuous PCA Continuous  senna 2 Tablet(s) Oral at bedtime  sodium chloride 0.9%. 1000 milliLiter(s) (10 mL/Hr) IV Continuous <Continuous>    MEDICATIONS  (PRN):  acetaminophen   Tablet 650 milliGRAM(s) Oral every 6 hours PRN For Temp greater than 38 C (100.4 F)  bisacodyl 5 milliGRAM(s) Oral every 12 hours PRN Constipation  HYDROmorphone PCA (1 mG/mL) Rescue Clinician Bolus 1 milliGRAM(s) IV Push every 1 hour PRN Severe Pain (7 - 10)  naloxone Injectable 0.1 milliGRAM(s) IV Push every 3 minutes PRN For ANY of the following changes in patient status:  A. RR LESS THAN 10 breaths per minute, B. Oxygen saturation LESS THAN 90%, C. Sedation score of 6  ondansetron Injectable 8 milliGRAM(s) IV Push every 8 hours PRN Nausea and/or Vomiting  polyethylene glycol 3350 17 Gram(s) Oral daily PRN Constipation        PRESENT SYMPTOMS:  Source: [ x] Patient   [ ] Family   [ ] Team     Pain:                        [ ] No [ x] Yes             [ ] Mild [ ] Moderate [x ] Severe    Onset - constant  Location -abdomen  Duration - improves with pain meds  Character - dull  Alleviating/Aggravating - unable to describe  Radiation - all over abdomen  Timing -    Dyspnea:                [x ] No [ ] Yes             [ ] Mild [ ] Moderate [ ] Severe    Anxiety:                  [ x] No [ ] Yes             [ ] Mild [ ] Moderate [ ] Severe    Fatigue:                  [ ] No [x ] Yes             [ ] Mild [x ] Moderate [ ] Severe    Nausea:                  [ x] No [ ] Yes             [ ] Mild [ ] Moderate [ ] Severe    Loss of appetite:   [ ] No [x ] Yes             [x ] Mild [ ] Moderate [ ] Severe    Constipation:        [ ] No [ x] Yes             [ ] Mild [x ] Moderate [ ] Severe    Other Symptoms:  [x ] All other review of systems negative   [ ] Unable to obtain due to poor mentation     Karnofsky Performance Score/Palliative Performance Status Version 2:     50    %    PHYSICAL EXAM:  Vital Signs Last 24 Hrs  T(C): 37 (31 Dec 2017 08:29), Max: 37.7 (30 Dec 2017 17:09)  T(F): 98.6 (31 Dec 2017 08:29), Max: 99.8 (30 Dec 2017 17:09)  HR: 90 (31 Dec 2017 08:29) (85 - 90)  BP: 99/65 (31 Dec 2017 08:29) (96/62 - 100/67)  BP(mean): --  RR: 18 (31 Dec 2017 08:29) (16 - 18)  SpO2: 99% (31 Dec 2017 08:29) (98% - 99%)    General:  [x ] Alert  [x ] Oriented x  3    [ ] Lethargic  [ ] Agitated   [ x] Cachexia   [ ] Unarousable  [ ] Verbal  [ ] Non-Verbal    HEENT:  [ ] Normal   [x ] Dry mouth   [ ] ET Tube    [ ] Trach  [ ] Oral lesions    Lungs:   [x ] Clear [ ] Tachypnea  [ ] Audible excessive secretions   [ ] Rhonchi        [ ] Right [ ] Left [ ] Bilateral  [ ] Crackles        [ ] Right [ ] Left [ ] Bilateral  [ ] Wheezing     [ ] Right [ ] Left [ ] Bilateral    Cardiovascular:  [x ] Regular [ ] Irregular [ ] Tachycardia   [ ] Bradycardia  [ ] Murmur [ ] Other    Abdomen: [ ] Soft  [ x] Distended   [x ] +BS x 4  [ ] Non tender [ x] Tender  [ ]PEG   [ ]OGT/ NGT     [x] Ostomy bag     Genitourinary: [x ] Normal [ ] Incontinent   [ ] Oliguria/Anuria   [ ] Lynn    Musculoskeletal:  [ ] Normal   [ x] Weakness  [ ] Bedbound/Wheelchair bound [ ] Edema    Neurological: [ x] No focal deficits  [ ] Cognitive impairment  [ ] Dysphagia [ ] Dysarthria [ ] Paresis [ ] Other     Skin: [ ] Normal   [ ] Pressure ulcer(s)                  [ ] Rash [x] Abdominal scars s/p sx.     LABS: reviewed      Shock: [ ] Septic [ ] Cardiogenic [ ] Neurologic [ ] Hypovolemic  Vasopressors x   Inotrophs x     Oral Intake: [ ] Unable/mouth care only [x ] Minimal [ ] Moderate [ ] Full Capability  Diet: [ ] NPO [ ] Tube feeds [ ] TPN [ ] Other     RADIOLOGY & ADDITIONAL STUDIES:  Reviewed.   REFERRALS:   [ ] Chaplaincy  [ ] Hospice  [ ] Child Life  [ ] Social Work  [ ] Case management [x ] Holistic Therapy

## 2017-12-31 NOTE — PROGRESS NOTE ADULT - PROBLEM SELECTOR PLAN 1
C/w Dilaudid PCA @ 0.5 mg basal rate and 0.5 mg q 15 DD  Having + BMs  PCA pump delivered today from [facility] in anticipation for Monday d/c  Outpatient follow up with Dr. Salgado

## 2017-12-31 NOTE — PROGRESS NOTE ADULT - PROBLEM SELECTOR PROBLEM 3
Malignant neoplasm of ovary, unspecified laterality
Fever
Malignant neoplasm of ovary, unspecified laterality
Malignant neoplasm of ovary, unspecified laterality
Fever
Malignant neoplasm of ovary, unspecified laterality

## 2018-01-01 PROCEDURE — 80053 COMPREHEN METABOLIC PANEL: CPT

## 2018-01-01 PROCEDURE — 71045 X-RAY EXAM CHEST 1 VIEW: CPT

## 2018-01-01 PROCEDURE — 80048 BASIC METABOLIC PNL TOTAL CA: CPT

## 2018-01-01 PROCEDURE — 85730 THROMBOPLASTIN TIME PARTIAL: CPT

## 2018-01-01 PROCEDURE — 99285 EMERGENCY DEPT VISIT HI MDM: CPT | Mod: 25

## 2018-01-01 PROCEDURE — 87086 URINE CULTURE/COLONY COUNT: CPT

## 2018-01-01 PROCEDURE — 83735 ASSAY OF MAGNESIUM: CPT

## 2018-01-01 PROCEDURE — 86900 BLOOD TYPING SEROLOGIC ABO: CPT

## 2018-01-01 PROCEDURE — 82728 ASSAY OF FERRITIN: CPT

## 2018-01-01 PROCEDURE — 86850 RBC ANTIBODY SCREEN: CPT

## 2018-01-01 PROCEDURE — 83690 ASSAY OF LIPASE: CPT

## 2018-01-01 PROCEDURE — 82274 ASSAY TEST FOR BLOOD FECAL: CPT

## 2018-01-01 PROCEDURE — 86901 BLOOD TYPING SEROLOGIC RH(D): CPT

## 2018-01-01 PROCEDURE — 85610 PROTHROMBIN TIME: CPT

## 2018-01-01 PROCEDURE — 85027 COMPLETE CBC AUTOMATED: CPT

## 2018-01-01 PROCEDURE — 97161 PT EVAL LOW COMPLEX 20 MIN: CPT

## 2018-01-01 PROCEDURE — 36569 INSJ PICC 5 YR+ W/O IMAGING: CPT

## 2018-01-01 PROCEDURE — 84145 PROCALCITONIN (PCT): CPT

## 2018-01-01 PROCEDURE — C1751: CPT

## 2018-01-01 PROCEDURE — 96375 TX/PRO/DX INJ NEW DRUG ADDON: CPT

## 2018-01-01 PROCEDURE — 96374 THER/PROPH/DIAG INJ IV PUSH: CPT | Mod: XU

## 2018-01-01 PROCEDURE — 99239 HOSP IP/OBS DSCHRG MGMT >30: CPT

## 2018-01-01 PROCEDURE — 93005 ELECTROCARDIOGRAM TRACING: CPT

## 2018-01-01 PROCEDURE — 83550 IRON BINDING TEST: CPT

## 2018-01-01 PROCEDURE — 84100 ASSAY OF PHOSPHORUS: CPT

## 2018-01-01 PROCEDURE — 74177 CT ABD & PELVIS W/CONTRAST: CPT

## 2018-01-01 PROCEDURE — 87040 BLOOD CULTURE FOR BACTERIA: CPT

## 2018-01-01 PROCEDURE — 81001 URINALYSIS AUTO W/SCOPE: CPT

## 2018-01-01 PROCEDURE — 83605 ASSAY OF LACTIC ACID: CPT

## 2018-01-01 PROCEDURE — 85045 AUTOMATED RETICULOCYTE COUNT: CPT

## 2018-01-01 RX ORDER — HYDROMORPHONE HYDROCHLORIDE 2 MG/ML
30 INJECTION INTRAMUSCULAR; INTRAVENOUS; SUBCUTANEOUS
Qty: 0 | Refills: 0 | Status: DISCONTINUED | OUTPATIENT
Start: 2018-01-01 | End: 2018-01-01

## 2018-01-01 RX ADMIN — Medication 100 MILLIGRAM(S): at 06:09

## 2018-01-01 RX ADMIN — HYDROMORPHONE HYDROCHLORIDE 30 MILLILITER(S): 2 INJECTION INTRAMUSCULAR; INTRAVENOUS; SUBCUTANEOUS at 07:08

## 2018-01-01 RX ADMIN — SODIUM CHLORIDE 10 MILLILITER(S): 9 INJECTION INTRAMUSCULAR; INTRAVENOUS; SUBCUTANEOUS at 06:09

## 2018-01-09 ENCOUNTER — APPOINTMENT (OUTPATIENT)
Dept: GERIATRICS | Facility: CLINIC | Age: 48
End: 2018-01-09

## 2018-01-23 ENCOUNTER — INPATIENT (INPATIENT)
Facility: HOSPITAL | Age: 48
LOS: 19 days | Discharge: NURSING, REHAB OR LONG-TERM CARE | DRG: 871 | End: 2018-02-12
Attending: INTERNAL MEDICINE | Admitting: HOSPITALIST
Payer: COMMERCIAL

## 2018-01-23 VITALS
OXYGEN SATURATION: 96 % | SYSTOLIC BLOOD PRESSURE: 112 MMHG | DIASTOLIC BLOOD PRESSURE: 75 MMHG | RESPIRATION RATE: 16 BRPM | HEART RATE: 112 BPM

## 2018-01-23 DIAGNOSIS — Z90.710 ACQUIRED ABSENCE OF BOTH CERVIX AND UTERUS: Chronic | ICD-10-CM

## 2018-01-23 DIAGNOSIS — Z93.3 COLOSTOMY STATUS: Chronic | ICD-10-CM

## 2018-01-23 DIAGNOSIS — R10.9 UNSPECIFIED ABDOMINAL PAIN: ICD-10-CM

## 2018-01-23 LAB
ALBUMIN SERPL ELPH-MCNC: 3 G/DL — LOW (ref 3.3–5)
ALP SERPL-CCNC: 268 U/L — HIGH (ref 40–120)
ALT FLD-CCNC: 42 U/L RC — SIGNIFICANT CHANGE UP (ref 10–45)
ANION GAP SERPL CALC-SCNC: 15 MMOL/L — SIGNIFICANT CHANGE UP (ref 5–17)
ANISOCYTOSIS BLD QL: SLIGHT — SIGNIFICANT CHANGE UP
AST SERPL-CCNC: 66 U/L — HIGH (ref 10–40)
BASE EXCESS BLDV CALC-SCNC: 9.8 MMOL/L — HIGH (ref -2–2)
BASOPHILS # BLD AUTO: 0 K/UL — SIGNIFICANT CHANGE UP (ref 0–0.2)
BASOPHILS NFR BLD AUTO: 0 % — SIGNIFICANT CHANGE UP (ref 0–2)
BILIRUB SERPL-MCNC: 0.4 MG/DL — SIGNIFICANT CHANGE UP (ref 0.2–1.2)
BUN SERPL-MCNC: 43 MG/DL — HIGH (ref 7–23)
CA-I SERPL-SCNC: 1.15 MMOL/L — SIGNIFICANT CHANGE UP (ref 1.12–1.3)
CALCIUM SERPL-MCNC: 8.7 MG/DL — SIGNIFICANT CHANGE UP (ref 8.4–10.5)
CHLORIDE BLDV-SCNC: 99 MMOL/L — SIGNIFICANT CHANGE UP (ref 96–108)
CHLORIDE SERPL-SCNC: 97 MMOL/L — SIGNIFICANT CHANGE UP (ref 96–108)
CO2 BLDV-SCNC: 36 MMOL/L — HIGH (ref 22–30)
CO2 SERPL-SCNC: 29 MMOL/L — SIGNIFICANT CHANGE UP (ref 22–31)
CREAT SERPL-MCNC: 0.71 MG/DL — SIGNIFICANT CHANGE UP (ref 0.5–1.3)
DACRYOCYTES BLD QL SMEAR: SLIGHT — SIGNIFICANT CHANGE UP
ELLIPTOCYTES BLD QL SMEAR: SLIGHT — SIGNIFICANT CHANGE UP
EOSINOPHIL # BLD AUTO: 0 K/UL — SIGNIFICANT CHANGE UP (ref 0–0.5)
EOSINOPHIL NFR BLD AUTO: 0.2 % — SIGNIFICANT CHANGE UP (ref 0–6)
GAS PNL BLDV: 139 MMOL/L — SIGNIFICANT CHANGE UP (ref 136–145)
GAS PNL BLDV: SIGNIFICANT CHANGE UP
GAS PNL BLDV: SIGNIFICANT CHANGE UP
GLUCOSE BLDV-MCNC: 56 MG/DL — LOW (ref 70–99)
GLUCOSE SERPL-MCNC: 53 MG/DL — LOW (ref 70–99)
HCG SERPL-ACNC: <2 MIU/ML — LOW (ref 5–24)
HCO3 BLDV-SCNC: 35 MMOL/L — HIGH (ref 21–29)
HCT VFR BLD CALC: 25.6 % — LOW (ref 34.5–45)
HCT VFR BLDA CALC: 25 % — LOW (ref 39–50)
HGB BLD CALC-MCNC: 7.9 G/DL — LOW (ref 11.5–15.5)
HGB BLD-MCNC: 8.4 G/DL — LOW (ref 11.5–15.5)
HYPOCHROMIA BLD QL: SLIGHT — SIGNIFICANT CHANGE UP
LACTATE BLDV-MCNC: 1.6 MMOL/L — SIGNIFICANT CHANGE UP (ref 0.7–2)
LYMPHOCYTES # BLD AUTO: 0.4 K/UL — LOW (ref 1–3.3)
LYMPHOCYTES # BLD AUTO: 14.1 % — SIGNIFICANT CHANGE UP (ref 13–44)
MACROCYTES BLD QL: SLIGHT — SIGNIFICANT CHANGE UP
MCHC RBC-ENTMCNC: 31 PG — SIGNIFICANT CHANGE UP (ref 27–34)
MCHC RBC-ENTMCNC: 32.8 GM/DL — SIGNIFICANT CHANGE UP (ref 32–36)
MCV RBC AUTO: 94.6 FL — SIGNIFICANT CHANGE UP (ref 80–100)
MICROCYTES BLD QL: SLIGHT — SIGNIFICANT CHANGE UP
MONOCYTES # BLD AUTO: 0.3 K/UL — SIGNIFICANT CHANGE UP (ref 0–0.9)
MONOCYTES NFR BLD AUTO: 8.7 % — SIGNIFICANT CHANGE UP (ref 2–14)
NEUTROPHILS # BLD AUTO: 2.5 K/UL — SIGNIFICANT CHANGE UP (ref 1.8–7.4)
NEUTROPHILS NFR BLD AUTO: 77.1 % — HIGH (ref 43–77)
OVALOCYTES BLD QL SMEAR: SLIGHT — SIGNIFICANT CHANGE UP
PCO2 BLDV: 51 MMHG — HIGH (ref 35–50)
PH BLDV: 7.45 — SIGNIFICANT CHANGE UP (ref 7.35–7.45)
PLAT MORPH BLD: NORMAL — SIGNIFICANT CHANGE UP
PLATELET # BLD AUTO: 378 K/UL — SIGNIFICANT CHANGE UP (ref 150–400)
PO2 BLDV: 43 MMHG — SIGNIFICANT CHANGE UP (ref 25–45)
POIKILOCYTOSIS BLD QL AUTO: SLIGHT — SIGNIFICANT CHANGE UP
POTASSIUM BLDV-SCNC: 4.1 MMOL/L — SIGNIFICANT CHANGE UP (ref 3.5–5)
POTASSIUM SERPL-MCNC: 4.5 MMOL/L — SIGNIFICANT CHANGE UP (ref 3.5–5.3)
POTASSIUM SERPL-SCNC: 4.5 MMOL/L — SIGNIFICANT CHANGE UP (ref 3.5–5.3)
PROT SERPL-MCNC: 5.7 G/DL — LOW (ref 6–8.3)
RAPID RVP RESULT: SIGNIFICANT CHANGE UP
RBC # BLD: 2.71 M/UL — LOW (ref 3.8–5.2)
RBC # FLD: 14.7 % — HIGH (ref 10.3–14.5)
RBC BLD AUTO: ABNORMAL
SAO2 % BLDV: 73 % — SIGNIFICANT CHANGE UP (ref 67–88)
SCHISTOCYTES BLD QL AUTO: SLIGHT — SIGNIFICANT CHANGE UP
SODIUM SERPL-SCNC: 141 MMOL/L — SIGNIFICANT CHANGE UP (ref 135–145)
TARGETS BLD QL SMEAR: SLIGHT — SIGNIFICANT CHANGE UP
WBC # BLD: 3.2 K/UL — LOW (ref 3.8–10.5)
WBC # FLD AUTO: 3.2 K/UL — LOW (ref 3.8–10.5)

## 2018-01-23 PROCEDURE — 99285 EMERGENCY DEPT VISIT HI MDM: CPT

## 2018-01-23 PROCEDURE — 71045 X-RAY EXAM CHEST 1 VIEW: CPT | Mod: 26

## 2018-01-23 PROCEDURE — 74018 RADEX ABDOMEN 1 VIEW: CPT | Mod: 26,59

## 2018-01-23 RX ORDER — ACETAMINOPHEN 500 MG
1000 TABLET ORAL ONCE
Qty: 0 | Refills: 0 | Status: COMPLETED | OUTPATIENT
Start: 2018-01-23 | End: 2018-01-23

## 2018-01-23 RX ORDER — SODIUM CHLORIDE 9 MG/ML
1000 INJECTION INTRAMUSCULAR; INTRAVENOUS; SUBCUTANEOUS ONCE
Qty: 0 | Refills: 0 | Status: COMPLETED | OUTPATIENT
Start: 2018-01-23 | End: 2018-01-23

## 2018-01-23 RX ADMIN — SODIUM CHLORIDE 1000 MILLILITER(S): 9 INJECTION INTRAMUSCULAR; INTRAVENOUS; SUBCUTANEOUS at 18:31

## 2018-01-23 RX ADMIN — Medication 400 MILLIGRAM(S): at 18:31

## 2018-01-23 NOTE — ED PROVIDER NOTE - PHYSICAL EXAMINATION
General: lethargic and sleepy, waning in and out of consciousness  Neurology: A&Ox3, nonfocal  Head:  Normocephalic, atraumatic  ENT:  Mucosa moist, no ulcerations  Neck:  Supple, no sinuses or palpable masses  Lymphatic:  No palpable cervical, supraclavicular adenopathy  Respiratory: CTA B/L  CV: +tachycardia with regular rhythm, S1S2, no murmur  Abdominal: +BS, stoma with mild herniation, +gas but no stool in colostomy   MSK: +bilateral LE edema, L > R, + peripheral pulses

## 2018-01-23 NOTE — ED PROVIDER NOTE - MEDICAL DECISION MAKING DETAILS
Pt is a 47 y/o F w/ hx metastatic ovarian cancer w/ carcinomatosis s/p GRETCHEN, SBO s/p bowel resection and colostomy placement, acute ubaldo s/p perc cholecystostomy, p/w abd pain, constipation with minimal ostomy output and fevers in the setting of recently starting chemotherapy.

## 2018-01-23 NOTE — ED ADULT NURSE NOTE - OBJECTIVE STATEMENT
Pt presents to the ED brought in by EMS for constipation since sunday. Pt reports passing increased gas with minimal amount of stool. Pt AXOX3 with  at the bedside aiding with history. Pt color appears normal for race. Pt reports ovarian Ca stage 3 receiving chemo, last dose last weds. Pt states she has intermittent headache with more lethargy and fatigue recently. Pt reports severe abdominal pain on palpation/movement.  Pt reports intermittent fevers for the last two or 3 weeks, fluctuating throughout the day. Pt has dilaudid pump in place, denies pain at this time. Pt has power port for chemo. Pt has colostomy bag in place. Pt denies chest pain. Pt presents to the ED brought in by EMS for constipation since sunday. Pt reports passing increased gas with minimal amount of stool. Pt AXOX3 with  at the bedside aiding with history. Pt color appears normal for race. Pt reports ovarian Ca stage 3 receiving chemo, last dose last weds. Pt states she has intermittent headache with more lethargy and fatigue recently. Pt reports severe abdominal pain on palpation/movement, refuses palpation stating she is "extremely ticklish".  Pt reports intermittent fevers for the last two or 3 weeks, fluctuating throughout the day. Pt has dilaudid pump in place, denies pain at this time. Pt has power port for chemo, not accessed at this time. Pt has colostomy bag in place on lower Right quadrant, stoma appears protruding from abdominal wall, MD aware. Pt denies chest pain, kell shortness of breath. Pt had low saturation on transport to hospital , was placed on 2 LNC for comfort by EMS. Pt presents to the ED brought in by EMS for constipation since sunday. Pt reports passing increased gas with minimal amount of stool. Pt AXOX3 with  at the bedside aiding with history. Pt color appears normal for race. Pt reports ovarian Ca stage 3 receiving chemo, last dose last weds. Pt states she has intermittent headache with more lethargy and fatigue recently. Pt reports severe abdominal pain on palpation/movement, refuses palpation stating she is "extremely ticklish".  Pt reports intermittent fevers for the last two or 3 weeks, fluctuating throughout the day. Pt has dilaudid pump in place to PICC line, denies pain at this time. Pt has power port for chemo, not accessed at this time. Pt has colostomy bag in place on lower Right quadrant, stoma appears protruding from abdominal wall, MD aware. Pt denies chest pain, kell shortness of breath. Pt had low saturation on transport to hospital , was placed on 2 LNC for comfort by EMS.

## 2018-01-23 NOTE — ED PROVIDER NOTE - OBJECTIVE STATEMENT
Pt is a 49 y/o F w/ hx metastatic ovarian cancer w/ carcinomatosis s/p GRETCHEN, SBO s/p bowel resection and colostomy placement, acute ubaldo s/p perc cholecystostomy, p/w abd pain and constipation. Pt explains that since discharge from hospital in late decemeber, pt started chemotherapy w/ Dr Johnson. Initially doing well but now abd pain increased in severity, has only had gas and small amount of stool in colostomy, intermittent fevers of 102F max. Headaches associated with the fevers. Pt also more lethargic since starting chemotherapy, often waning out of consciousness. No SOB, CP, N/V, dysuria. Explains that she is eating well and tolerates PO. Pt has a L PICC for PCA pump for pain control.

## 2018-01-23 NOTE — ED PROVIDER NOTE - PSH
Colostomy in place  dec 2016 (placed during hysterectomy in dec 2016 at [facility])  H/O abdominal hysterectomy  dec 2016

## 2018-01-23 NOTE — ED PROVIDER NOTE - ATTENDING CONTRIBUTION TO CARE
Patient with ovarian CA, peritoneal carcinomatosis presenting with fevers.  On chemotherapy.  Minimal stool in ileostomy, still passing gas.  Reporting appetite OK, no nausea/vomiting.  Having worsening lethargy with chemotherapy.    On exam patient cachectic and chronically ill appearing, febrile, tachycardic.  Regular tachycardia, lungs clear.  Patient refusing abdominal exam ("Its too ticklish, I can't handle it"), LLQ ostomy with approx 1-2cm of prolapsed ostomy, but ostomy pink appearing, gas present in ostomy bag but no stool, able to ascultate bowel sounds in bilateral lower quadrants.    Fever on chemo with metastatic ovarian cancer, will check labs/cultures, CXR, RVP, CT A/P to evaluate for SBO/ partial SBO, likely admission for further care pending culture results.

## 2018-01-23 NOTE — ED PROVIDER NOTE - PROGRESS NOTE DETAILS
Attending MD Heart: CT results reviewed: Small bowel obstruction with a relative transition point seen in the mid small bowel in the midabdomen, images 70-73, in an area of peritoneal carcinomatosis and loculated ascites. No evidence of ischemia. Hydronephrosis, anasarca, peritoneal carcinomatosis are grossly stable from the prior exam.  Surgery consulted.  Hospitalist made aware. Attending MD Heart: Spoke with Dr. Squires about surgery consult.  Surgery will follow up with Dr. Squires after seeing patient.

## 2018-01-23 NOTE — ED PROVIDER NOTE - NS ED ROS FT
REVIEW OF SYSTEMS:  CONSTITUTIONAL: +weakness/lethargy +fevers/chills  EYES/ENT: No visual changes;  No vertigo or throat pain   NECK: No pain or stiffness  RESPIRATORY: No cough, wheezing, hemoptysis; No shortness of breath  CARDIOVASCULAR: No chest pain or palpitations  GASTROINTESTINAL: +diffuse abd pain, +constipation. No nausea, vomiting, or hematemesis; No melena or hematochezia.  GENITOURINARY: No dysuria, frequency or hematuria  NEUROLOGICAL: No numbness or weakness  SKIN: No itching, burning, rashes, or lesions   All other review of systems is negative unless indicated above.

## 2018-01-24 DIAGNOSIS — R54 AGE-RELATED PHYSICAL DEBILITY: ICD-10-CM

## 2018-01-24 DIAGNOSIS — R10.9 UNSPECIFIED ABDOMINAL PAIN: ICD-10-CM

## 2018-01-24 DIAGNOSIS — K56.609 UNSPECIFIED INTESTINAL OBSTRUCTION, UNSPECIFIED AS TO PARTIAL VERSUS COMPLETE OBSTRUCTION: ICD-10-CM

## 2018-01-24 DIAGNOSIS — R50.9 FEVER, UNSPECIFIED: ICD-10-CM

## 2018-01-24 DIAGNOSIS — A41.9 SEPSIS, UNSPECIFIED ORGANISM: ICD-10-CM

## 2018-01-24 DIAGNOSIS — Z00.00 ENCOUNTER FOR GENERAL ADULT MEDICAL EXAMINATION WITHOUT ABNORMAL FINDINGS: ICD-10-CM

## 2018-01-24 DIAGNOSIS — R11.0 NAUSEA: ICD-10-CM

## 2018-01-24 DIAGNOSIS — N39.0 URINARY TRACT INFECTION, SITE NOT SPECIFIED: ICD-10-CM

## 2018-01-24 DIAGNOSIS — C56.9 MALIGNANT NEOPLASM OF UNSPECIFIED OVARY: ICD-10-CM

## 2018-01-24 DIAGNOSIS — Z51.5 ENCOUNTER FOR PALLIATIVE CARE: ICD-10-CM

## 2018-01-24 LAB
ALBUMIN SERPL ELPH-MCNC: 3.2 G/DL — LOW (ref 3.3–5)
ALP SERPL-CCNC: 1221 U/L — HIGH (ref 40–120)
ALT FLD-CCNC: 256 U/L RC — HIGH (ref 10–45)
ANION GAP SERPL CALC-SCNC: 15 MMOL/L — SIGNIFICANT CHANGE UP (ref 5–17)
APPEARANCE UR: CLEAR — SIGNIFICANT CHANGE UP
APTT BLD: 27.5 SEC — SIGNIFICANT CHANGE UP (ref 27.5–37.4)
AST SERPL-CCNC: 1788 U/L — HIGH (ref 10–40)
BASE EXCESS BLDV CALC-SCNC: 6.7 MMOL/L — HIGH (ref -2–2)
BASOPHILS # BLD AUTO: 0 K/UL — SIGNIFICANT CHANGE UP (ref 0–0.2)
BILIRUB SERPL-MCNC: 0.6 MG/DL — SIGNIFICANT CHANGE UP (ref 0.2–1.2)
BILIRUB UR-MCNC: NEGATIVE — SIGNIFICANT CHANGE UP
BLD GP AB SCN SERPL QL: NEGATIVE — SIGNIFICANT CHANGE UP
BUN SERPL-MCNC: 52 MG/DL — HIGH (ref 7–23)
CA-I SERPL-SCNC: 1.1 MMOL/L — LOW (ref 1.12–1.3)
CALCIUM SERPL-MCNC: 8.2 MG/DL — LOW (ref 8.4–10.5)
CHLORIDE BLDV-SCNC: 102 MMOL/L — SIGNIFICANT CHANGE UP (ref 96–108)
CHLORIDE SERPL-SCNC: 99 MMOL/L — SIGNIFICANT CHANGE UP (ref 96–108)
CO2 BLDV-SCNC: 33 MMOL/L — HIGH (ref 22–30)
CO2 SERPL-SCNC: 27 MMOL/L — SIGNIFICANT CHANGE UP (ref 22–31)
COLOR SPEC: YELLOW — SIGNIFICANT CHANGE UP
CREAT SERPL-MCNC: 0.73 MG/DL — SIGNIFICANT CHANGE UP (ref 0.5–1.3)
DIFF PNL FLD: ABNORMAL
EOSINOPHIL # BLD AUTO: 0 K/UL — SIGNIFICANT CHANGE UP (ref 0–0.5)
EPI CELLS # UR: SIGNIFICANT CHANGE UP /HPF
GAS PNL BLDV: 137 MMOL/L — SIGNIFICANT CHANGE UP (ref 136–145)
GAS PNL BLDV: SIGNIFICANT CHANGE UP
GAS PNL BLDV: SIGNIFICANT CHANGE UP
GLUCOSE BLDV-MCNC: 48 MG/DL — LOW (ref 70–99)
GLUCOSE SERPL-MCNC: 79 MG/DL — SIGNIFICANT CHANGE UP (ref 70–99)
GLUCOSE UR QL: NEGATIVE — SIGNIFICANT CHANGE UP
GRAM STN FLD: SIGNIFICANT CHANGE UP
HCO3 BLDV-SCNC: 31 MMOL/L — HIGH (ref 21–29)
HCT VFR BLD CALC: 22.8 % — LOW (ref 34.5–45)
HCT VFR BLDA CALC: 20 % — CRITICAL LOW (ref 39–50)
HGB BLD CALC-MCNC: 6.5 G/DL — CRITICAL LOW (ref 11.5–15.5)
HGB BLD-MCNC: 7.3 G/DL — LOW (ref 11.5–15.5)
HOROWITZ INDEX BLDV+IHG-RTO: 28 — SIGNIFICANT CHANGE UP
INR BLD: 1.47 RATIO — HIGH (ref 0.88–1.16)
KETONES UR-MCNC: NEGATIVE — SIGNIFICANT CHANGE UP
LACTATE BLDV-MCNC: 1.9 MMOL/L — SIGNIFICANT CHANGE UP (ref 0.7–2)
LEUKOCYTE ESTERASE UR-ACNC: ABNORMAL
LYMPHOCYTES # BLD AUTO: 0.6 K/UL — LOW (ref 1–3.3)
LYMPHOCYTES # BLD AUTO: 12 % — LOW (ref 13–44)
MAGNESIUM SERPL-MCNC: 2.4 MG/DL — SIGNIFICANT CHANGE UP (ref 1.6–2.6)
MCHC RBC-ENTMCNC: 30.3 PG — SIGNIFICANT CHANGE UP (ref 27–34)
MCHC RBC-ENTMCNC: 32.2 GM/DL — SIGNIFICANT CHANGE UP (ref 32–36)
MCV RBC AUTO: 94.1 FL — SIGNIFICANT CHANGE UP (ref 80–100)
MONOCYTES # BLD AUTO: 0.3 K/UL — SIGNIFICANT CHANGE UP (ref 0–0.9)
MONOCYTES NFR BLD AUTO: 6 % — SIGNIFICANT CHANGE UP (ref 2–14)
NEUTROPHILS # BLD AUTO: 3.9 K/UL — SIGNIFICANT CHANGE UP (ref 1.8–7.4)
NEUTROPHILS NFR BLD AUTO: 76 % — SIGNIFICANT CHANGE UP (ref 43–77)
NITRITE UR-MCNC: NEGATIVE — SIGNIFICANT CHANGE UP
OTHER CELLS CSF MANUAL: 7 ML/DL — LOW (ref 18–22)
PCO2 BLDV: 48 MMHG — SIGNIFICANT CHANGE UP (ref 35–50)
PH BLDV: 7.43 — SIGNIFICANT CHANGE UP (ref 7.35–7.45)
PH UR: 6 — SIGNIFICANT CHANGE UP (ref 5–8)
PHOSPHATE SERPL-MCNC: 4.8 MG/DL — HIGH (ref 2.5–4.5)
PLATELET # BLD AUTO: 332 K/UL — SIGNIFICANT CHANGE UP (ref 150–400)
PO2 BLDV: 47 MMHG — HIGH (ref 25–45)
POTASSIUM BLDV-SCNC: 3.4 MMOL/L — LOW (ref 3.5–5)
POTASSIUM SERPL-MCNC: 3.7 MMOL/L — SIGNIFICANT CHANGE UP (ref 3.5–5.3)
POTASSIUM SERPL-SCNC: 3.7 MMOL/L — SIGNIFICANT CHANGE UP (ref 3.5–5.3)
PROT SERPL-MCNC: 5.5 G/DL — LOW (ref 6–8.3)
PROT UR-MCNC: SIGNIFICANT CHANGE UP
PROTHROM AB SERPL-ACNC: 16.2 SEC — HIGH (ref 9.8–12.7)
RBC # BLD: 2.42 M/UL — LOW (ref 3.8–5.2)
RBC # FLD: 14.8 % — HIGH (ref 10.3–14.5)
RBC CASTS # UR COMP ASSIST: ABNORMAL /HPF (ref 0–2)
RH IG SCN BLD-IMP: POSITIVE — SIGNIFICANT CHANGE UP
SAO2 % BLDV: 76 % — SIGNIFICANT CHANGE UP (ref 67–88)
SODIUM SERPL-SCNC: 141 MMOL/L — SIGNIFICANT CHANGE UP (ref 135–145)
SP GR SPEC: 1.02 — SIGNIFICANT CHANGE UP (ref 1.01–1.02)
SPECIMEN SOURCE: SIGNIFICANT CHANGE UP
UROBILINOGEN FLD QL: NEGATIVE — SIGNIFICANT CHANGE UP
WBC # BLD: 4.7 K/UL — SIGNIFICANT CHANGE UP (ref 3.8–10.5)
WBC # FLD AUTO: 4.7 K/UL — SIGNIFICANT CHANGE UP (ref 3.8–10.5)
WBC UR QL: >50 /HPF (ref 0–5)

## 2018-01-24 PROCEDURE — 99232 SBSQ HOSP IP/OBS MODERATE 35: CPT

## 2018-01-24 PROCEDURE — 99223 1ST HOSP IP/OBS HIGH 75: CPT | Mod: GC

## 2018-01-24 PROCEDURE — 74177 CT ABD & PELVIS W/CONTRAST: CPT | Mod: 26

## 2018-01-24 PROCEDURE — 71045 X-RAY EXAM CHEST 1 VIEW: CPT | Mod: 26

## 2018-01-24 PROCEDURE — 49406 IMAGE CATH FLUID PERI/RETRO: CPT

## 2018-01-24 PROCEDURE — 99291 CRITICAL CARE FIRST HOUR: CPT

## 2018-01-24 RX ORDER — ONDANSETRON 8 MG/1
4 TABLET, FILM COATED ORAL EVERY 8 HOURS
Qty: 0 | Refills: 0 | Status: DISCONTINUED | OUTPATIENT
Start: 2018-01-24 | End: 2018-02-12

## 2018-01-24 RX ORDER — CEFTRIAXONE 500 MG/1
1 INJECTION, POWDER, FOR SOLUTION INTRAMUSCULAR; INTRAVENOUS ONCE
Qty: 0 | Refills: 0 | Status: COMPLETED | OUTPATIENT
Start: 2018-01-24 | End: 2018-01-24

## 2018-01-24 RX ORDER — VANCOMYCIN HCL 1 G
1000 VIAL (EA) INTRAVENOUS ONCE
Qty: 0 | Refills: 0 | Status: COMPLETED | OUTPATIENT
Start: 2018-01-24 | End: 2018-01-24

## 2018-01-24 RX ORDER — ACETAMINOPHEN 500 MG
650 TABLET ORAL EVERY 6 HOURS
Qty: 0 | Refills: 0 | Status: DISCONTINUED | OUTPATIENT
Start: 2018-01-24 | End: 2018-01-24

## 2018-01-24 RX ORDER — MEROPENEM 1 G/30ML
500 INJECTION INTRAVENOUS ONCE
Qty: 0 | Refills: 0 | Status: COMPLETED | OUTPATIENT
Start: 2018-01-24 | End: 2018-01-24

## 2018-01-24 RX ORDER — HYDROMORPHONE HYDROCHLORIDE 2 MG/ML
30 INJECTION INTRAMUSCULAR; INTRAVENOUS; SUBCUTANEOUS
Qty: 0 | Refills: 0 | Status: DISCONTINUED | OUTPATIENT
Start: 2018-01-24 | End: 2018-02-01

## 2018-01-24 RX ORDER — MEROPENEM 1 G/30ML
500 INJECTION INTRAVENOUS EVERY 8 HOURS
Qty: 0 | Refills: 0 | Status: DISCONTINUED | OUTPATIENT
Start: 2018-01-24 | End: 2018-01-26

## 2018-01-24 RX ORDER — AZTREONAM 2 G
1000 VIAL (EA) INJECTION ONCE
Qty: 0 | Refills: 0 | Status: COMPLETED | OUTPATIENT
Start: 2018-01-24 | End: 2018-01-24

## 2018-01-24 RX ORDER — SODIUM CHLORIDE 9 MG/ML
1000 INJECTION, SOLUTION INTRAVENOUS
Qty: 0 | Refills: 0 | Status: DISCONTINUED | OUTPATIENT
Start: 2018-01-24 | End: 2018-01-25

## 2018-01-24 RX ORDER — SODIUM CHLORIDE 9 MG/ML
1000 INJECTION INTRAMUSCULAR; INTRAVENOUS; SUBCUTANEOUS ONCE
Qty: 0 | Refills: 0 | Status: COMPLETED | OUTPATIENT
Start: 2018-01-24 | End: 2018-01-24

## 2018-01-24 RX ORDER — VANCOMYCIN HCL 1 G
750 VIAL (EA) INTRAVENOUS EVERY 12 HOURS
Qty: 0 | Refills: 0 | Status: DISCONTINUED | OUTPATIENT
Start: 2018-01-24 | End: 2018-01-26

## 2018-01-24 RX ORDER — ENOXAPARIN SODIUM 100 MG/ML
40 INJECTION SUBCUTANEOUS EVERY 24 HOURS
Qty: 0 | Refills: 0 | Status: DISCONTINUED | OUTPATIENT
Start: 2018-01-24 | End: 2018-01-24

## 2018-01-24 RX ORDER — VANCOMYCIN HCL 1 G
750 VIAL (EA) INTRAVENOUS EVERY 12 HOURS
Qty: 0 | Refills: 0 | Status: DISCONTINUED | OUTPATIENT
Start: 2018-01-24 | End: 2018-01-24

## 2018-01-24 RX ORDER — ONDANSETRON 8 MG/1
4 TABLET, FILM COATED ORAL EVERY 6 HOURS
Qty: 0 | Refills: 0 | Status: DISCONTINUED | OUTPATIENT
Start: 2018-01-24 | End: 2018-01-24

## 2018-01-24 RX ORDER — SODIUM CHLORIDE 9 MG/ML
1000 INJECTION, SOLUTION INTRAVENOUS
Qty: 0 | Refills: 0 | Status: DISCONTINUED | OUTPATIENT
Start: 2018-01-24 | End: 2018-01-24

## 2018-01-24 RX ORDER — SODIUM CHLORIDE 9 MG/ML
1000 INJECTION INTRAMUSCULAR; INTRAVENOUS; SUBCUTANEOUS
Qty: 0 | Refills: 0 | Status: DISCONTINUED | OUTPATIENT
Start: 2018-01-24 | End: 2018-01-24

## 2018-01-24 RX ORDER — NALOXONE HYDROCHLORIDE 4 MG/.1ML
0.1 SPRAY NASAL
Qty: 0 | Refills: 0 | Status: DISCONTINUED | OUTPATIENT
Start: 2018-01-24 | End: 2018-02-12

## 2018-01-24 RX ORDER — MEROPENEM 1 G/30ML
INJECTION INTRAVENOUS
Qty: 0 | Refills: 0 | Status: DISCONTINUED | OUTPATIENT
Start: 2018-01-24 | End: 2018-01-26

## 2018-01-24 RX ORDER — CEFTRIAXONE 500 MG/1
INJECTION, POWDER, FOR SOLUTION INTRAMUSCULAR; INTRAVENOUS
Qty: 0 | Refills: 0 | Status: DISCONTINUED | OUTPATIENT
Start: 2018-01-24 | End: 2018-01-24

## 2018-01-24 RX ORDER — HYDROMORPHONE HYDROCHLORIDE 2 MG/ML
1 INJECTION INTRAMUSCULAR; INTRAVENOUS; SUBCUTANEOUS
Qty: 0 | Refills: 0 | Status: DISCONTINUED | OUTPATIENT
Start: 2018-01-24 | End: 2018-02-01

## 2018-01-24 RX ADMIN — CEFTRIAXONE 100 GRAM(S): 500 INJECTION, POWDER, FOR SOLUTION INTRAMUSCULAR; INTRAVENOUS at 07:59

## 2018-01-24 RX ADMIN — Medication 150 MILLIGRAM(S): at 22:33

## 2018-01-24 RX ADMIN — Medication 650 MILLIGRAM(S): at 06:25

## 2018-01-24 RX ADMIN — HYDROMORPHONE HYDROCHLORIDE 30 MILLILITER(S): 2 INJECTION INTRAMUSCULAR; INTRAVENOUS; SUBCUTANEOUS at 16:45

## 2018-01-24 RX ADMIN — MEROPENEM 100 MILLIGRAM(S): 1 INJECTION INTRAVENOUS at 18:38

## 2018-01-24 RX ADMIN — SODIUM CHLORIDE 50 MILLILITER(S): 9 INJECTION, SOLUTION INTRAVENOUS at 22:35

## 2018-01-24 RX ADMIN — MEROPENEM 100 MILLIGRAM(S): 1 INJECTION INTRAVENOUS at 22:34

## 2018-01-24 RX ADMIN — Medication 250 MILLIGRAM(S): at 09:12

## 2018-01-24 RX ADMIN — SODIUM CHLORIDE 75 MILLILITER(S): 9 INJECTION, SOLUTION INTRAVENOUS at 07:59

## 2018-01-24 RX ADMIN — SODIUM CHLORIDE 1000 MILLILITER(S): 9 INJECTION INTRAMUSCULAR; INTRAVENOUS; SUBCUTANEOUS at 09:00

## 2018-01-24 RX ADMIN — Medication 50 MILLIGRAM(S): at 11:50

## 2018-01-24 RX ADMIN — HYDROMORPHONE HYDROCHLORIDE 30 MILLILITER(S): 2 INJECTION INTRAMUSCULAR; INTRAVENOUS; SUBCUTANEOUS at 19:10

## 2018-01-24 NOTE — H&P ADULT - PROBLEM SELECTOR PLAN 3
Stage IV ovarian CA, currently on chemotherapy  - CT performed today not showing any change in tumor burden  - In setting of advanced disease, unclear if significant benefit of chemotherapy  -If SBO can be resolved, patient to continue to follow with her oncologist for further management  - Disease c/b severe pain, on dilaudid PCA, which has provided relief  - Have placed palliative c/s for further assistance with pain management Febrile, tachycardic and leukopenic  - Presumed source is UTI, however also evidence of ascites, possibly SBP  - Will f/u BCx, UCx  - Treating as above Febrile, tachycardic and leukopenic  - Presumed source is UTI, however also evidence of ascites, possibly SBP  - Will f/u BCx, UCx  - Treating as above  - c/w IVF

## 2018-01-24 NOTE — H&P ADULT - NSHPLABSRESULTS_GEN_ALL_CORE
LABS:                        8.4    3.2   )-----------( 378      ( 23 Jan 2018 18:38 )             25.6     23 Jan 2018 18:38    141    |  97     |  43     ----------------------------<  53     4.5     |  29     |  0.71     Ca    8.7        23 Jan 2018 18:38    TPro  5.7    /  Alb  3.0    /  TBili  0.4    /  DBili  x      /  AST  66     /  ALT  42     /  AlkPhos  268    23 Jan 2018 18:38      CAPILLARY BLOOD GLUCOSE        BLOOD CULTURE    RADIOLOGY & ADDITIONAL TESTS:    Imaging Personally Reviewed:  [X] YES   < from: CT Abdomen and Pelvis w/ Oral Cont and w/ IV Cont (01.24.18 @ 01:40) >        INTERPRETATION:  Small bowel obstruction with a relative transition point   seen in the mid small bowel in the midabdomen, images 70-73, in an area   of peritoneal carcinomatosis and loculated ascites. No evidence of   ischemia. Hydronephrosis, anasarca, peritoneal carcinomatosis are grossly   stable from the prior exam.    < end of copied text > LABS reviewed:                        8.4    3.2   )-----------( 378      ( 23 Jan 2018 18:38 )             25.6     23 Jan 2018 18:38    141    |  97     |  43     ----------------------------<  53     4.5     |  29     |  0.71     Ca    8.7        23 Jan 2018 18:38    TPro  5.7    /  Alb  3.0    /  TBili  0.4    /  DBili  x      /  AST  66     /  ALT  42     /  AlkPhos  268    23 Jan 2018 18:38      CAPILLARY BLOOD GLUCOSE        BLOOD CULTURE    RADIOLOGY & ADDITIONAL TESTS:    Imaging Personally Reviewed:  [X] YES   CT Abdomen and Pelvis w/ Oral Cont and w/ IV Cont      INTERPRETATION:  Small bowel obstruction with a relative transition point   seen in the mid small bowel in the midabdomen, images 70-73, in an area   of peritoneal carcinomatosis and loculated ascites. No evidence of   ischemia. Hydronephrosis, anasarca, peritoneal carcinomatosis are grossly   stable from the prior exam. LABS reviewed:                        8.4    3.2   )-----------( 378      ( 23 Jan 2018 18:38 )             25.6     23 Jan 2018 18:38    141    |  97     |  43     ----------------------------<  53     4.5     |  29     |  0.71     Ca    8.7        23 Jan 2018 18:38    TPro  5.7    /  Alb  3.0    /  TBili  0.4    /  DBili  x      /  AST  66     /  ALT  42     /  AlkPhos  268    23 Jan 2018 18:38      CAPILLARY BLOOD GLUCOSE        BLOOD CULTURE    RADIOLOGY & ADDITIONAL TESTS:    Imaging Personally Reviewed:  [X] YES   CT Abdomen and Pelvis w/ Oral Cont and w/ IV Cont reviewed      INTERPRETATION:  Small bowel obstruction with a relative transition point   seen in the mid small bowel in the midabdomen, images 70-73, in an area   of peritoneal carcinomatosis and loculated ascites. No evidence of   ischemia. Hydronephrosis, anasarca, peritoneal carcinomatosis are grossly   stable from the prior exam.

## 2018-01-24 NOTE — H&P ADULT - HISTORY OF PRESENT ILLNESS
Patient is a 47F with metastatic ovarian CA (s/p TAHBSO, colostomy, previously seeking holistic care 12/2017 however has since established with new oncologist for chemo) p/w abdominal pain. History obtained by spouse at bedside. Per him, patient acutely had an increase in pain. Since this time she has had minimal to no ostomy output, worsening abdominal distention, and nausea, without vomiting. Due to progression of symptoms they presented to ED today. Per , patient has not had cough, chills, night sweats.     Patient has Stage IV ovarian CA c/b chronic pain for which she is on a Dilaudid pump at home. Per , she has been sedated on her current regimen, however without the Rx, she will usually complain of pain. At this time, patient able to follow simple commands and denies acute pain.    ED Course   Vitals   Temp 102.7               /75    RR 16 [id number]% on 4L  Patient received 1L NS, Ofirmev, had CT abdomen, and had surgery C/s placed Patient is a 47F with metastatic ovarian CA (s/p TAHBSO, colostomy, previously seeking holistic care 12/2017 however has since established with new oncologist for chemo) p/w abdominal pain. History obtained by spouse at bedside. Per him, patient acutely had an increase in pain. Since this time she has had minimal to no ostomy output, worsening abdominal distention, and nausea, without vomiting. Due to progression of symptoms they presented to ED today. Per , patient has not had cough, chills, night sweats. Patient recently started on chemotherapy, and abdominal pain has been getting worse.  She has been having intermittent fever.  She has been becoming more and more lethargic.   noticed that there is bowel herniation through the colostomy.     Patient has Stage IV ovarian CA c/b chronic pain for which she is on a Dilaudid pump at home. Per , she has been sedated on her current regimen, however without the Rx, she will usually complain of pain. At this time, patient able to follow simple commands and denies acute pain.    ED Course   Vitals   Temp 102.7               /75    RR 16 DQS185% on 4L  Patient received 1L NS, Ofirmev, had CT abdomen, and had surgery C/s placed

## 2018-01-24 NOTE — PROGRESS NOTE ADULT - SUBJECTIVE AND OBJECTIVE BOX
Interventional Radiology Brief- Operative Note    Procedure: CT guided drainage of right abdominal fluid collection/Gallbladder    Operators: Keyla    Anesthesia (type): 2% lidocaine local. Monitored anesthesia care    Contrast: none    EBL: none    Findings/Follow up Plan of Care: CT guided drainage of GB and pericholecystic fluid collection performed. 10 Fr drain placed. Appx 55cc yellow/light brown cloudy fluid aspirated. Drain placed to HARSHIL. Pt tolerated procedure without difficulty. Full report to follow.    Specimens Removed: sample collected for microbiology    Implants: drain	    Complications: none    Condition/Disposition: stable / sicu    Please call Interventional Radiology x [phone] with any questions, concerns, or issues.

## 2018-01-24 NOTE — H&P ADULT - NSHPPHYSICALEXAM_GEN_ALL_CORE
Vital Signs Last 24 Hrs  T(C): 39.1 (24 Jan 2018 06:04), Max: 39.3 (23 Jan 2018 18:07)  T(F): 102.4 (24 Jan 2018 06:04), Max: 102.7 (23 Jan 2018 18:07)  HR: 115 (24 Jan 2018 06:04) (99 - 115)  BP: 121/82 (24 Jan 2018 06:04) (103/73 - 121/82)  BP(mean): --  RR: 14 (24 Jan 2018 06:04) (14 - 18)  SpO2: 100% (24 Jan 2018 06:04) (96% - 100%)    PHYSICAL EXAM:  GENERAL: NAD, Emaciated   HEAD:  Atraumatic, Normocephalic  EYES: EOMI, PERRLA, conjunctiva and sclera clear  ENMT: No tonsillar erythema, exudates, or enlargement; Dry mucous membranes,  NECK: Supple, No JVD  NERVOUS SYSTEM: AOX1, motor and sensation grossly intact in b/l UE and b/l LE  PSYCHIATRIC: Sedated, States mood is "okay"  CHEST/LUNG: Clear to auscultation bilaterally; No rales, rhonchi, wheezing, or rubs  HEART: Regular rate and rhythm; No murmurs, rubs, or gallops. No LE edema  ABDOMEN: Distended, diffusely tender  EXTREMITIES:  2+ Peripheral Pulses, No clubbing, cyanosis  SKIN: L PICC insertion CDI without erythema. Abd ostomy with herniating bowel Vital Signs Last 24 Hrs  T(C): 39.1 (24 Jan 2018 06:04), Max: 39.3 (23 Jan 2018 18:07)  T(F): 102.4 (24 Jan 2018 06:04), Max: 102.7 (23 Jan 2018 18:07)  HR: 115 (24 Jan 2018 06:04) (99 - 115)  BP: 121/82 (24 Jan 2018 06:04) (103/73 - 121/82)  BP(mean): --  RR: 14 (24 Jan 2018 06:04) (14 - 18)  SpO2: 100% (24 Jan 2018 06:04) (96% - 100%)    PHYSICAL EXAM:  GENERAL: NAD, Emaciated   HEAD:  Atraumatic, Normocephalic  EYES: EOMI, PERRLA, conjunctiva and sclera clear  ENMT: No tonsillar erythema, exudates, or enlargement; Dry mucous membranes,  NECK: Supple, No JVD  NERVOUS SYSTEM: AOX1, motor and sensation grossly intact in b/l UE and b/l LE  PSYCHIATRIC: Sedated, States mood is "okay"  CHEST/LUNG: Clear to auscultation bilaterally; No rales, rhonchi, wheezing, or rubs  HEART: Regular rate and rhythm; No murmurs, rubs, or gallops. No LE edema  ABDOMEN: Distended, diffusely tender; unable to complete abdominal exam as patient is uncooperative due to pain  EXTREMITIES:  2+ Peripheral Pulses, No clubbing, cyanosis  SKIN: L PICC insertion CDI without erythema. Abd ostomy with herniating bowel Vital Signs Last 24 Hrs  T(C): 39.1 (24 Jan 2018 06:04), Max: 39.3 (23 Jan 2018 18:07)  T(F): 102.4 (24 Jan 2018 06:04), Max: 102.7 (23 Jan 2018 18:07)  HR: 115 (24 Jan 2018 06:04) (99 - 115)  BP: 121/82 (24 Jan 2018 06:04) (103/73 - 121/82)  BP(mean): --  RR: 14 (24 Jan 2018 06:04) (14 - 18)  SpO2: 100% (24 Jan 2018 06:04) (96% - 100%)    PHYSICAL EXAM:  GENERAL: NAD, Emaciated   HEAD:  Atraumatic, Normocephalic  EYES: EOMI, PERRLA, conjunctiva and sclera clear  ENMT: No tonsillar erythema, exudates, or enlargement; Dry mucous membranes,  NECK: Supple, No JVD  NERVOUS SYSTEM: AOX1-2, motor and sensation grossly intact in b/l UE and b/l LE  PSYCHIATRIC: Sedated, States mood is "okay"  CHEST/LUNG: Clear to auscultation bilaterally; No rales, rhonchi, wheezing, or rubs  HEART: Regular rate and rhythm; No murmurs, rubs, or gallops. No LE edema  ABDOMEN: Distended, diffusely tender; unable to complete abdominal exam as patient is uncooperative due to pain  EXTREMITIES:  2+ Peripheral Pulses, No clubbing, cyanosis  SKIN: L PICC insertion CDI without erythema. Abd ostomy with herniating bowel

## 2018-01-24 NOTE — CONSULT NOTE ADULT - ASSESSMENT
CHAYA PALACIO is a 47y Female with h/o stage IV ovarian cancer with liver mets s/p GRETCHEN-BSO and colostomy in 2016.  Pt is on home Dilaudid PCA, followed by palliative care and plans for chemotherapy.  Pt admitted presented to ED on 1/23 for abdominal pain and fever.  CTAP shows SBO with transition point in pelvis, extensive peritoneal carcinomatosis, perforated gallbladder with collection containing fluid and air.  RRT was called on patient in ED for hypoglycemia to 29, improved to 200s after amp of D50.  Plan for IR drainage of gallbladder perforation.  Goals of care discussion.     Neuro: chronic pain  -Reconsult palliative for Dilaudid PCA    Resp: likely malignant pleural effusion  -am CXR  -O2 prn, incentive spirometry     CV: hypotension, asymptomatic at this time  -Transfuse 1 unit PRBC, f/u repeat lactate  -Repeat bedside ultrasound post-transfusion    GI: SBO, peritoneal carcinomatosis, perforated GB with fluid collection  -NPO  -Protonix  -IR for drainage    /Renal:   -D5LR @ 75mL/hr   -Replete electrolytes as needed    Heme:   -Transfuse 1 unit PRBC  -SCDs  -Holding chemical VTE prophylaxis     ID: gallbladder perforation   -Ceftriaxone  -f/u blood cultures     Endo: hypoglycemic  -Monitor fingersticks    Lines:  -LUE PICC  -R CW Mediport    Dispo: Full code, plan for palliative consult and GOC discussion with patient and HCP ().  Case discussed with Dr. Deleon.    Gely Barnett, PA-C #[id number]

## 2018-01-24 NOTE — CHART NOTE - NSCHARTNOTEFT_GEN_A_CORE
Patient seen with plan to place NGT to suction. Explained to patient value of suction to prevent complications, such as bowel perforation, as well as explained benefit of improvement of abdominal pain. Patient states that she does not want this, stating that she has had one in past and that discomfort with tube in place was unbearable. Explained alternative therapies, which could include bowel rest, as well as possible options per surgery. Stated highly unlikely that surgery would be an option and that bowel rest would not resolve concern of perforation. However also stated that could not guarantee that NGT would resolve obstruction as well. Patient states understanding, requesting for no procedure or intervention at this time. Requested that patient notify RN should her symptoms change or if she decides to go forth with NGT.    TILA, PGY-2

## 2018-01-24 NOTE — CONSULT NOTE ADULT - ASSESSMENT
Patient is a 46 yo F with metastatic ovarian CA (s/p TAHBSO, s/p colostomy, with holistic care approach, started chemo on 12/2017 carboplatin/taxol every Wednesday, due for tx today). Admitted to MICU under the impression of malignant SBO, sepsis thought to be 2/2 UTI, hypoglycemia. Palliative Care consult placed for pain management and f/u GOC.

## 2018-01-24 NOTE — ED ADULT NURSE REASSESSMENT NOTE - NS ED NURSE REASSESS COMMENT FT1
Pt returned from CT, resting in stretcher comfortably.  at the bedside. Pt has oral temp of 100.2, MD Heart made aware, advised Surgery team will return call to follow up and cover patient.

## 2018-01-24 NOTE — ED ADULT NURSE REASSESSMENT NOTE - NS ED NURSE REASSESS COMMENT FT1
primary RN paged hospitalist requesting medications for pt. rising temp (100.2 at present) and Hospitalist BULL Squires stated, "can you ask your doctors to put in the orders because I'm not comfortable ordering medications on a pt I havent seen." primary RN paged hospitalist requesting medications for pt. rising temp (100.2 at present) and Hospitalist BULL Squires stated, "can you ask your doctors to put in the orders because I'm not comfortable ordering medications on a pt I havent seen." ER MD Julia Heart made aware.

## 2018-01-24 NOTE — CONSULT NOTE ADULT - PROBLEM SELECTOR RECOMMENDATION 5
Controlled at the time. Would continue with zofran 4mg Q6hrs PRN. Consider atc if nausea becomes uncontrolled.

## 2018-01-24 NOTE — H&P ADULT - NSHPREVIEWOFSYSTEMS_GEN_ALL_CORE
CONSTITUTIONAL: +fever, +weight loss, +fatigue  EYES: No eye pain, visual disturbances, or discharge  ENMT:  No difficulty hearing, tinnitus, vertigo; No sinus or throat pain  RESPIRATORY: No cough, wheezing, chills or hemoptysis; No shortness of breath  CARDIOVASCULAR: No chest pain, palpitations, dizziness, or leg swelling  GASTROINTESTINAL: +abdominal pain. +nausea, no vomiting, or hematemesis; +constipation. No melena or hematochezia.  GENITOURINARY: No dysuria, frequency, hematuria, or incontinence  NEUROLOGICAL: No headaches, loss of strength, numbness, or tremors  SKIN: No itching, burning, rashes, or lesions   LYMPH NODES: No enlarged glands  ENDOCRINE: No heat or cold intolerance; No polydipsia or polyuria  MUSCULOSKELETAL: No joint pain or swelling;     HEME/LYMPH: No easy bruising, or bleeding gums  ALLERGY AND IMMUNOLOGIC: No hives or eczema

## 2018-01-24 NOTE — H&P ADULT - PSH
Colostomy in place  dec 2016 (placed during hysterectomy in dec 2016 at [facility])  H/O abdominal hysterectomy  dec 2016

## 2018-01-24 NOTE — CONSULT NOTE ADULT - PROBLEM SELECTOR RECOMMENDATION 6
Patient is full code, partner is HCP Trung Buenrostro (copy in Alpha tab on admission 12/20/17), pending further discussions for GOC.

## 2018-01-24 NOTE — H&P ADULT - PROBLEM SELECTOR PLAN 2
Patient with known high fevers 2/2 malignancy as seen on prior admissions  - Hemodynamically stable at this time  - Although UA positive, patient denies dysuria, hesitancy, frequency  - BCx in ED sent, will f/u   - Continue to monitor off Abx, however if evidence of infection, or hypotension will initiate Abx Positive UA for, high fevers this admission and currently on chemotherapy  - Will send UCx  - Will treat with ceftriaxone  - Daily CBC, will f/u BCx

## 2018-01-24 NOTE — CHART NOTE - NSCHARTNOTEFT_GEN_A_CORE
BLUE SURGERY POST-PROCEDURE CHECK:    S: Patient seen and examined. She denies any nausea/vomiting. She continues to endorse abdominal pain that she pushes her PCA for.    O:  Vital Signs Last 24 Hrs  T(C): 37.2 (24 Jan 2018 19:00), Max: 39.4 (24 Jan 2018 07:48)  T(F): 98.9 (24 Jan 2018 19:00), Max: 103 (24 Jan 2018 07:48)  HR: 89 (24 Jan 2018 21:30) (89 - 121)  BP: 98/65 (24 Jan 2018 21:30) (87/61 - 121/82)  BP(mean): 76 (24 Jan 2018 21:30) (67 - 79)  RR: 13 (24 Jan 2018 21:30) (10 - 31)  SpO2: 100% (24 Jan 2018 21:30) (97% - 100%)    I&O's Detail    24 Jan 2018 07:01  -  24 Jan 2018 22:13  --------------------------------------------------------  IN:    dextrose 5% + lactated ringers.: 75 mL    dextrose 5% + lactated ringers.: 250 mL  Total IN: 325 mL    OUT:    Voided: 2 mL  Total OUT: 2 mL    Total NET: 323 mL    MEDICATIONS  (STANDING):  dextrose 5% + lactated ringers. 1000 milliLiter(s) (50 mL/Hr) IV Continuous <Continuous>  HYDROmorphone PCA (1 mG/mL) 30 milliLiter(s) PCA Continuous PCA Continuous  meropenem  IVPB      meropenem  IVPB 500 milliGRAM(s) IV Intermittent every 8 hours  vancomycin  IVPB 750 milliGRAM(s) IV Intermittent every 12 hours    MEDICATIONS  (PRN):  HYDROmorphone PCA (1 mG/mL) Rescue Clinician Bolus 1 milliGRAM(s) IV Push every 1 hour PRN for Pain Scale GREATER THAN 6  naloxone Injectable 0.1 milliGRAM(s) IV Push every 3 minutes PRN For ANY of the following changes in patient status:  A. RR LESS THAN 10 breaths per minute, B. Oxygen saturation LESS THAN 90%, C. Sedation score of 6  ondansetron Injectable 4 milliGRAM(s) IV Push every 8 hours PRN Nausea and/or Vomiting                        7.3    4.7   )-----------( 332      ( 24 Jan 2018 11:09 )             22.8     01-24    141  |  99  |  52<H>  ----------------------------<  79  3.7   |  27  |  0.73    Ca    8.2<L>      24 Jan 2018 11:09  Phos  4.8     01-24  Mg     2.4     01-24    TPro  5.5<L>  /  Alb  3.2<L>  /  TBili  0.6  /  DBili  x   /  AST  1788<H>  /  ALT  256<H>  /  AlkPhos  1221<H>  01-24    Physical Exam:  Gen: Laying in bed, NAD, sleepy  Abd: peritoneal, 10F drain in RUQ with bilious/purulent fluid, previous surgical scar in midline, ostomy productive of flatus and stool    Lines:   IV: patent, in place.     A: 47F s/p placement of a 10F drain in her gallbladder and yaa-cholecystic fluid with immediate drainage of 55cc.    P:  - Meropenem/Vanc empiric antibiotics  - PCA for pain control  - NPO  - Monitor drain output  - f/u drain culture  - Appreciate care of SICU

## 2018-01-24 NOTE — CONSULT NOTE ADULT - SUBJECTIVE AND OBJECTIVE BOX
HISTORY OF PRESENT ILLNESS:  CHAYA PALACIO is a 47y Female with h/o stage IV ovarian cancer with liver mets s/p GRETCHEN-BSO and colostomy in 2016.  Pt is on home Dilaudid PCA, followed by palliative care and plans for chemotherapy.  Pt admitted presented to ED on 1/23 for abdominal pain and fever.  CTAP shows SBO with transition point in pelvis, extensive peritoneal carcinomatosis, perforated gallbladder with collection containing fluid and air.  RRT was called on patient in ED for hypoglycemia to 29, improved to 200s after amp of D50.  Pt admits to fever, "feeling tired", and nausea.  Pt denies HA, CP, SOB, abd pain, vomiting.     Bedside ultrasound shows adequate LV and RV contractility, no obvious valvular abnormalities, small pericardial effusion.  IVC demonstrates significant respiratory variation, exam limited secondary to abdominal pain.     PAST MEDICAL HISTORY: Stage IV Ovarian cancer with liver mets s/p GRETCHEN-BSO 2016  Colostomy 2016      PAST SURGICAL HISTORY: Colostomy 2016  GRETCHEN-BSO 2016      FAMILY HISTORY: No pertinent family history in first degree relatives    SOCIAL HISTORY: Lives at home with     CODE STATUS: Full code    HOME MEDICATIONS: Dilaudid PCA, Colace, Zofran, Miralax, Senna     ALLERGIES: penicillin (Rash)    VITAL SIGNS:  ICU Vital Signs Last 24 Hrs  T(C): 37.9 (24 Jan 2018 12:45), Max: 39.4 (24 Jan 2018 07:48)  T(F): 100.2 (24 Jan 2018 12:45), Max: 103 (24 Jan 2018 07:48)  HR: 95 (24 Jan 2018 13:30) (95 - 121)  BP: 89/57 (24 Jan 2018 13:30) (87/61 - 121/82)  BP(mean): 67 (24 Jan 2018 13:30) (67 - 74)  ABP: --  ABP(mean): --  RR: 31 (24 Jan 2018 13:30) (14 - 31)  SpO2: 98% (24 Jan 2018 13:30) (96% - 100%)      NEURO  Exam: Drowsy but easily arousable.  A&O x 3, but intermittently forgetful, redirectable.   HYDROmorphone PCA (1 mG/mL) 30 milliLiter(s) PCA Continuous PCA Continuous  HYDROmorphone PCA (1 mG/mL) Rescue Clinician Bolus 1 milliGRAM(s) IV Push every 1 hour PRN for Pain Scale GREATER THAN 6  ondansetron Injectable 4 milliGRAM(s) IV Push every 8 hours PRN Nausea and/or Vomiting      RESPIRATORY  Mechanical Ventilation:   Exam: Clear to auscultation b/l with decreased breath sounds at right base.       CARDIOVASCULAR  VBG - ( 24 Jan 2018 13:32 )  pH: 7.43  /  pCO2: 48    /  pO2: 47    / HCO3: 31    / Base Excess: 6.7   /  SaO2: 76     Lactate: 1.9    Exam: RRR. +S1, +S2.   Cardiac Rhythm: Sinus       GI/NUTRITION  Exam: +midline incision well-healed. mildly distended, diffuse abdominal pain to light touch. +guarding.   Diet: NPO       GENITOURINARY/RENAL  dextrose 5% + lactated ringers. 1000 milliLiter(s) IV Continuous <Continuous>      01-24 @ 07:01  -  01-24 @ 13:55  --------------------------------------------------------  IN:    dextrose 5% + lactated ringers.: 75 mL  Total IN: 75 mL    OUT:  Total OUT: 0 mL    Total NET: 75 mL      Weight (kg): 48 (01-24 @ 13:02)  01-24    141  |  99  |  52<H>  ----------------------------<  79  3.7   |  27  |  0.73    Ca    8.2<L>      24 Jan 2018 11:09  Phos  4.8     01-24  Mg     2.4     01-24    TPro  5.5<L>  /  Alb  3.2<L>  /  TBili  0.6  /  DBili  x   /  AST  1788<H>  /  ALT  256<H>  /  AlkPhos  1221<H>  01-24    [ ] Lynn catheter, indication: urine output monitoring in critically ill patient    HEMATOLOGIC  [ ] VTE Prophylaxis: SCDs                           7.3    4.7   )-----------( 332      ( 24 Jan 2018 11:09 )             22.8       Transfusion: [ ] PRBC	[ ] Platelets	[ ] FFP	[ ] Cryoprecipitate      INFECTIOUS DISEASES  cefTRIAXone   IVPB        RECENT CULTURES:      ENDOCRINE    CAPILLARY BLOOD GLUCOSE      POCT Blood Glucose.: 82 mg/dL (24 Jan 2018 11:20)  POCT Blood Glucose.: 204 mg/dL (24 Jan 2018 08:38)  POCT Blood Glucose.: 37 mg/dL (24 Jan 2018 08:27)  POCT Blood Glucose.: 29 mg/dL (24 Jan 2018 08:25)      PATIENT CARE ACCESS DEVICES:  [ ] Peripheral IV  [ ] Central Venous Line	[ ] R	[ ] L	[ ] IJ	[ ] Fem	[ ] SC	Placed:   [ ] Arterial Line		[ ] R	[ ] L	[ ] Fem	[ ] Rad	[ ] Ax	Placed:   [x ] PICC:  LUE					[ x] Mediport: R CW   [ ] Urinary Catheter, Date Placed:   [x] Necessity of urinary, arterial, and venous catheters discussed    OTHER MEDICATIONS: naloxone Injectable 0.1 milliGRAM(s) IV Push every 3 minutes PRN      IMAGING STUDIES:  < from: CT Abdomen and Pelvis w/ Oral Cont and w/ IV Cont (01.24.18 @ 01:40) >  IMPRESSION:    Worsening peritoneal carcinomatosis. Small bowel obstruction with   transition point in the pelvis on the basis of peritoneal disease.    Gallbladder perforation with right upper quadrant collection.    A few droplets of air in the deep pelvis donot appear within the bowel   lumen raising concern for bowel perforation.    < end of copied text >

## 2018-01-24 NOTE — CONSULT NOTE ADULT - PROBLEM SELECTOR RECOMMENDATION 4
Patient with progressive decline in function, and expected to continue to decline. Pending further discussions with patient about disease trajectory and further treatment.

## 2018-01-24 NOTE — CONSULT NOTE ADULT - PROBLEM SELECTOR RECOMMENDATION 9
Stage IV ovarian CA. stage IV ovarian CA. Oncologist - Dr. Wright (outpatient) with holistic approach. Previously on weekly IV laetrile (B17) with Vitamin C infusions, along with PO Laetrile daily. Currently on weekly chemotherapeutic agents. Will f/u with oncologist and will set up a meeting with patient, partner and if possible oncologist to assess disease trajectory and what to expect.

## 2018-01-24 NOTE — CHART NOTE - NSCHARTNOTEFT_GEN_A_CORE
47F with metastatic ovarian CA (s/p TAHBSO, colostomy, previously seeking holistic care 12/2017 however has since established with new oncologist for chemo) p/w abdominal pain a/w SBO secondary to ovarian cancer. Pt became altered, with eyes rolling back. RRT was called. Her FSG was noted to be 26. She was given one amp of d50 and IVF bolus for soft BP. Pt's metal satatus back to baseline per her . Pt is also on PCA pump for pain control. No signs of respiratory distress. EKG shows sinus tachycardia without acute ST, T waves changes. Pt was also found to be febrile to 103 F. abx was broadened to aztreonam from CTX. CT abd showed worsening peritoneal carcinomatosis. Small bowel obstruction with transition point in the pelvis on the basis of peritoneal disease. Gallbladder perforation with right upper quadrant collection. Discussed with surgical service. Pt will be transferred to surgery for management of perforated gallbladder and SBO.      Arash BURRELL, PGY 3  [phone]

## 2018-01-24 NOTE — CONSULT NOTE ADULT - SUBJECTIVE AND OBJECTIVE BOX
HPI:  Patient is a 48 yo F with metastatic ovarian CA (s/p TAHBSO, s/p colostomy, with holistic care approach, started chemo on 2017 carboplatin/taxol every Wednesday, due for tx today) p/w abdominal pain. Per partner and patient pain had increased, with minimal to no ostomy output, worsening abdominal distention, and nausea, without vomiting. Due to progression of symptoms they presented to ED today. Per , patient has not had any other associated symptoms, denies any cough, chills, night sweats. Patient recently started on chemotherapy, and abdominal pain has been getting worse. Admits to having intermittent fever, becoming more and more lethargic. Partner noticed bowel herniation through the ostomy as well.    Patient has Stage IV ovarian CA c/b chronic pain for which she is on a Dilaudid PCA at home [drip at 0.5mg/hr, rescues of 0.5mg, lockout of e/15min]. Had RRT called in ED due to episode of altered mental status, CBG found to be at 29mg/dl, after glucose replacement MS improved. Per partner patient had been experiencing spells of dizziness and confusion that he had attributed to the pain medication, but now understands that she probably was having bouts of hypoglycemia. He indicates that her diet is low/restricted on carbohydrates and complex sugars, more protein as they were following recs from holistic medicine "sugar feeds the cancer".    Consult for palliative placed for pain management, and GOC. Patient seen and examined at bedside, indicates that at the moment her pain is well controlled with the PCA's current regimen, denies any further nausea/vomiting. CT done in the ED showing increase in peritoneal carcinomatosis with Small bowel obstruction with transition point in the   pelvis on the basis of extensive peritoneal disease. Given her hypoglycemia and suspected sepsis will be admitted to MICU for further monitoring.     Discussed with patient's partner, who is her HCP, about how patient's trajectory is showing us that she is declining, and that regardless of the treatment being given, she is extremely frail and her disease is progressing to a point where we expect her not to recover/cure from. He shows understanding and indicates that the patient has a difficult time grasping the fact that she is dying, she wants to continue with chemotherapy and does not want to discuss anything related to death, he attempted to talk to her about a will,  arrangements, and she would refuse to talk to him.     PERTINENT PM/SXH:   Ovarian cancer    Colostomy in place  H/O abdominal hysterectomy    SOCIAL HISTORY:   Significant other/partner:  [ x] YES  [ ] NO               Children:  [ ] YES  [x ] NO                   Latter day/Spirituality:  Substance hx:  [ ] YES   [x ] NO                   Tobacco hx:  [ ] YES  [x ] NO                       Alcohol hx: [ ] YES  [x ] NO         Home Opioid hx:  [x ] YES  [ ] NO   Living Situation: [x ] Home  [ ] Long term care  [ ] Rehab [ ] Other    FAMILY HISTORY:  No pertinent family history in first degree relatives    [ x] Family history non-contributory     BASELINE (I)ADLs (prior to admission):  [place]: [ ] total  [ x] moderate [ ] dependent    ADVANCE DIRECTIVES:    Full code  MOLST  [ ] YES [x ] NO                      [ ] Completed  Health Care Proxy [ x] YES  [ ] NO   [ ] Completed  Living Will  [ ] YES [x ] NO             [ ] Surrogate  [x ] HCP  [ ] Guardian:                                                                  Phone#:    Allergies    penicillin (Rash)    Intolerances    MEDICATIONS  (STANDING):  cefTRIAXone   IVPB      dextrose 5% + lactated ringers. 1000 milliLiter(s) (75 mL/Hr) IV Continuous <Continuous>  HYDROmorphone PCA (1 mG/mL) 30 milliLiter(s) PCA Continuous PCA Continuous    MEDICATIONS  (PRN):  acetaminophen   Tablet 650 milliGRAM(s) Oral every 6 hours PRN For Temp greater than 38 C (100.4 F)  HYDROmorphone PCA (1 mG/mL) Rescue Clinician Bolus 1 milliGRAM(s) IV Push every 1 hour PRN for Pain Scale GREATER THAN 6  naloxone Injectable 0.1 milliGRAM(s) IV Push every 3 minutes PRN For ANY of the following changes in patient status:  A. RR LESS THAN 10 breaths per minute, B. Oxygen saturation LESS THAN 90%, C. Sedation score of 6  ondansetron Injectable 4 milliGRAM(s) IV Push every 8 hours PRN Nausea and/or Vomiting    PRESENT SYMPTOMS:  Source: [ x] Patient   [ ] Family   [ ] Team     Pain:                        [ ] No [x ] Yes             [ ] Mild [ ] Moderate [x ] Severe --> now controlled on PCA pump    Onset - Days  Location - Abdomen, diffuse   Duration - Constant  Character - 8/10  Alleviating/Aggravating - Alleviated with dilaudid, aggravated by palpating abdomen, moving.  Radiation - None  Timing - Constant    Dyspnea:                [x ] No [ ] Yes             [ ] Mild [ ] Moderate [ ] Severe    Anxiety:                  [x ] No [ ] Yes             [ ] Mild [ ] Moderate [ ] Severe    Fatigue:                  [ ] No [ x] Yes             [ ] Mild [x ] Moderate [ ] Severe    Nausea:                  [ ] No [x ] Yes             [ ] Mild [x ] Moderate [ ] Severe --> now well controlled, no vomiting.    Loss of appetite:   [ ] No [x ] Yes             [ ] Mild [x ] Moderate [ ] Severe    Constipation:        [ ] No [x ] Yes             [ ] Mild [ ] Moderate [x ] Severe    Other Symptoms:  [x ] All other review of systems negative   [ ] Unable to obtain due to poor mentation     Karnofsky Performance Score/Palliative Performance Status Version 2: 40%    PHYSICAL EXAM:  Vital Signs Last 24 Hrs  T(C): 37.2 (2018 12:25), Max: 39.4 (2018 07:48)  T(F): 99 (2018 12:25), Max: 103 (2018 07:48)  HR: 106 (2018 12:25) (99 - 121)  BP: 97/66 (2018 12:25) (97/66 - 121/82)  BP(mean): --  RR: 18 (2018 12:25) (14 - 18)  SpO2: 98% (2018 12:25) (96% - 100%) I&O's Summary    General:  [x ] Alert  [ ] Oriented x      [ ] Lethargic  [ ] Agitated   [x ] Cachexia   [ ] Unarousable  [x ] Verbal  [ ] Non-Verbal    HEENT:  [ ] Normal   [x ] Dry mouth   [ ] ET Tube    [ ] Trach  [ ] Oral lesions    Lungs:   [x ] Clear [ ] Tachypnea  [ ] Audible excessive secretions   [ ] Rhonchi        [ ] Right [ ] Left [ ] Bilateral  [ ] Crackles        [ ] Right [ ] Left [ ] Bilateral  [ ] Wheezing     [ ] Right [ ] Left [ ] Bilateral    Cardiovascular:  [x ] Regular [ ] Irregular [ ] Tachycardia   [ ] Bradycardia  [ ] Murmur [ ] Other    Abdomen: [ ] Soft  [x ] Distended   [x] +BS: hypoactive  [ ] Non tender [ x] Tender  [ ]PEG   [ ]OGT/ NGT   Last BM: Ostomy, low output.  Ostomy appears protruding.      Genitourinary: [x ] Normal [ ] Incontinent   [ ] Oliguria/Anuria   [ ] Lynn    Musculoskeletal:  [ ] Normal   [x ] Weakness  [ ] Bedbound/Wheelchair bound [ ] Edema    Neurological: [ x] No focal deficits  [ ] Cognitive impairment  [ ] Dysphagia [ ] Dysarthria [ ] Paresis [ ] Other     Skin: [x ] Normal   [ ] Pressure ulcer(s)                  [ ] Rash    LABS:                        7.3    4.7   )-----------( 332      ( 2018 11:09 )             22.8         141  |  99  |  52<H>  ----------------------------<  79  3.7   |  27  |  0.73    Ca    8.2<L>      2018 11:09  Phos  4.8       Mg     2.4         TPro  5.5<L>  /  Alb  3.2<L>  /  TBili  0.6  /  DBili  x   /  AST  1788<H>  /  ALT  256<H>  /  AlkPhos  1221<H>      Urinalysis Basic - ( 2018 01:08 )    Color: Yellow / Appearance: Clear / S.017 / pH: x  Gluc: x / Ketone: Negative  / Bili: Negative / Urobili: Negative   Blood: x / Protein: Trace / Nitrite: Negative   Leuk Esterase: Large / RBC: 2-5 /HPF / WBC >50 /HPF   Sq Epi: x / Non Sq Epi: Occasional /HPF / Bacteria: x    Shock: No [ ] Septic [ ] Cardiogenic [ ] Neurologic [ ] Hypovolemic  Vasopressors x None  Inotrophs x None    Protein Calorie Malnutrition: [ ] Mild [ ] Moderate [x ] Severe    Oral Intake: [x ] Unable/mouth care only [ ] Minimal [ ] Moderate [ ] Full Capability  Diet: [x ] NPO [ ] Tube feeds [ ] TPN [ ] Other     RADIOLOGY & ADDITIONAL STUDIES:     < from: CT Abdomen and Pelvis w/ Oral Cont and w/ IV Cont (18 @ 01:40) >    Worsening peritoneal carcinomatosis. Small bowel obstruction with   transition point in the pelvis on the basis of peritoneal disease.    Gallbladder perforation with right upper quadrant collection.    A few droplets of air in the deep pelvis donot appear within the bowel   lumen raising concern for bowel perforation.    REFERRALS:   [ ] Chaplaincy  [ ] Hospice  [ ] Child Life  [ ] Social Work  [ ] Case management [ ] Holistic Therapy HPI:  Patient is a 48 yo F with metastatic ovarian CA (s/p TAHBSO, s/p colostomy, with holistic care approach, started chemo on 2017 carboplatin/taxol every Wednesday, due for tx today) p/w abdominal pain. Per partner and patient pain had increased, with minimal to no ostomy output, worsening abdominal distention, and nausea, without vomiting. Due to progression of symptoms they presented to ED today. Per , patient has not had any other associated symptoms, denies any cough, chills, night sweats. Patient recently started on chemotherapy, and abdominal pain has been getting worse. Admits to having intermittent fever, becoming more and more lethargic. Partner noticed bowel herniation through the ostomy as well.    Patient has Stage IV ovarian CA c/b chronic pain for which she is on a Dilaudid PCA at home [drip at 0.5mg/hr, rescues of 0.5mg, lockout of e/15min]. Had RRT called in ED due to episode of altered mental status, CBG found to be at 29mg/dl, after glucose replacement MS improved. Per partner patient had been experiencing spells of dizziness and confusion that he had attributed to the pain medication, but now understands that she probably was having bouts of hypoglycemia. He indicates that her diet is low/restricted on carbohydrates and complex sugars, more protein as they were following recs from holistic medicine "sugar feeds the cancer".    Consult for palliative placed for pain management, and GOC. Patient seen and examined at bedside, indicates that at the moment her pain is well controlled with the PCA's current regimen, denies any further nausea/vomiting. CT done in the ED showing increase in peritoneal carcinomatosis with Small bowel obstruction with transition point in the   pelvis on the basis of extensive peritoneal disease. Given her hypoglycemia and suspected sepsis will be admitted to SICU for further monitoring.     Discussed with patient's partner, who is her HCP, about how patient's trajectory is showing us that she is declining, and that regardless of the treatment being given, she is extremely frail and her disease is progressing to a point where we expect her not to recover/cure from. He shows understanding and indicates that the patient has a difficult time grasping the fact that she is dying, she wants to continue with chemotherapy and does not want to discuss anything related to death, he attempted to talk to her about a will,  arrangements, and she would refuse to talk to him.     PERTINENT PM/SXH:   Ovarian cancer    Colostomy in place  H/O abdominal hysterectomy    SOCIAL HISTORY:   Significant other/partner:  [ x] YES  [ ] NO               Children:  [ ] YES  [x ] NO                   Evangelical/Spirituality:  Substance hx:  [ ] YES   [x ] NO                   Tobacco hx:  [ ] YES  [x ] NO                       Alcohol hx: [ ] YES  [x ] NO         Home Opioid hx:  [x ] YES  [ ] NO   Living Situation: [x ] Home  [ ] Long term care  [ ] Rehab [ ] Other    FAMILY HISTORY:  No pertinent family history in first degree relatives    [ x] Family history non-contributory     BASELINE (I)ADLs (prior to admission):  [place]: [ ] total  [ x] moderate [ ] dependent    ADVANCE DIRECTIVES:    Full code  MOLST  [ ] YES [x ] NO                      [ ] Completed  Health Care Proxy [ x] YES  [ ] NO   [ ] Completed  Living Will  [ ] YES [x ] NO             [ ] Surrogate  [x ] HCP  [ ] Guardian:  Copy in EMR alpha tab in 17 admission  Trung Buenrostro  Phone#: [phone]    Allergies    penicillin (Rash)    Intolerances    MEDICATIONS  (STANDING):  cefTRIAXone   IVPB      dextrose 5% + lactated ringers. 1000 milliLiter(s) (75 mL/Hr) IV Continuous <Continuous>  HYDROmorphone PCA (1 mG/mL) 30 milliLiter(s) PCA Continuous PCA Continuous    MEDICATIONS  (PRN):  acetaminophen   Tablet 650 milliGRAM(s) Oral every 6 hours PRN For Temp greater than 38 C (100.4 F)  HYDROmorphone PCA (1 mG/mL) Rescue Clinician Bolus 1 milliGRAM(s) IV Push every 1 hour PRN for Pain Scale GREATER THAN 6  naloxone Injectable 0.1 milliGRAM(s) IV Push every 3 minutes PRN For ANY of the following changes in patient status:  A. RR LESS THAN 10 breaths per minute, B. Oxygen saturation LESS THAN 90%, C. Sedation score of 6  ondansetron Injectable 4 milliGRAM(s) IV Push every 8 hours PRN Nausea and/or Vomiting    PRESENT SYMPTOMS:  Source: [ x] Patient   [ ] Family   [ ] Team     Pain:                        [ ] No [x ] Yes             [ ] Mild [ ] Moderate [x ] Severe --> now controlled on PCA pump    Onset - Days  Location - Abdomen, diffuse   Duration - Constant  Character - 8/10  Alleviating/Aggravating - Alleviated with dilaudid, aggravated by palpating abdomen, moving.  Radiation - None  Timing - Constant    Dyspnea:                [x ] No [ ] Yes             [ ] Mild [ ] Moderate [ ] Severe    Anxiety:                  [x ] No [ ] Yes             [ ] Mild [ ] Moderate [ ] Severe    Fatigue:                  [ ] No [ x] Yes             [ ] Mild [x ] Moderate [ ] Severe    Nausea:                  [ ] No [x ] Yes             [ ] Mild [x ] Moderate [ ] Severe --> now well controlled, no vomiting.    Loss of appetite:   [ ] No [x ] Yes             [ ] Mild [x ] Moderate [ ] Severe    Constipation:        [ ] No [x ] Yes             [ ] Mild [ ] Moderate [x ] Severe    Other Symptoms:  [x ] All other review of systems negative   [ ] Unable to obtain due to poor mentation     Karnofsky Performance Score/Palliative Performance Status Version 2: 40%    PHYSICAL EXAM:  Vital Signs Last 24 Hrs  T(C): 37.2 (2018 12:25), Max: 39.4 (2018 07:48)  T(F): 99 (2018 12:25), Max: 103 (2018 07:48)  HR: 106 (2018 12:25) (99 - 121)  BP: 97/66 (2018 12:25) (97/66 - 121/82)  BP(mean): --  RR: 18 (2018 12:25) (14 - 18)  SpO2: 98% (2018 12:25) (96% - 100%) I&O's Summary    General:  [x ] Alert  [ ] Oriented x      [ ] Lethargic  [ ] Agitated   [x ] Cachexia   [ ] Unarousable  [x ] Verbal  [ ] Non-Verbal    HEENT:  [ ] Normal   [x ] Dry mouth   [ ] ET Tube    [ ] Trach  [ ] Oral lesions    Lungs:   [x ] Clear [ ] Tachypnea  [ ] Audible excessive secretions   [ ] Rhonchi        [ ] Right [ ] Left [ ] Bilateral  [ ] Crackles        [ ] Right [ ] Left [ ] Bilateral  [ ] Wheezing     [ ] Right [ ] Left [ ] Bilateral    Cardiovascular:  [x ] Regular [ ] Irregular [ ] Tachycardia   [ ] Bradycardia  [ ] Murmur [ ] Other    Abdomen: [ ] Soft  [x ] Distended   [x] +BS: hypoactive  [ ] Non tender [ x] Tender  [ ]PEG   [ ]OGT/ NGT   Last BM: Ostomy, low output.  Ostomy appears protruding.      Genitourinary: [x ] Normal [ ] Incontinent   [ ] Oliguria/Anuria   [ ] Lynn    Musculoskeletal:  [ ] Normal   [x ] Weakness  [ ] Bedbound/Wheelchair bound [ ] Edema    Neurological: [ x] No focal deficits  [ ] Cognitive impairment  [ ] Dysphagia [ ] Dysarthria [ ] Paresis [ ] Other     Skin: [x ] Normal   [ ] Pressure ulcer(s)                  [ ] Rash    LABS:                        7.3    4.7   )-----------( 332      ( 2018 11:09 )             22.8         141  |  99  |  52<H>  ----------------------------<  79  3.7   |  27  |  0.73    Ca    8.2<L>      2018 11:09  Phos  4.8       Mg     2.4         TPro  5.5<L>  /  Alb  3.2<L>  /  TBili  0.6  /  DBili  x   /  AST  1788<H>  /  ALT  256<H>  /  AlkPhos  1221<H>      Urinalysis Basic - ( 2018 01:08 )    Color: Yellow / Appearance: Clear / S.017 / pH: x  Gluc: x / Ketone: Negative  / Bili: Negative / Urobili: Negative   Blood: x / Protein: Trace / Nitrite: Negative   Leuk Esterase: Large / RBC: 2-5 /HPF / WBC >50 /HPF   Sq Epi: x / Non Sq Epi: Occasional /HPF / Bacteria: x    Shock: No [ ] Septic [ ] Cardiogenic [ ] Neurologic [ ] Hypovolemic  Vasopressors x None  Inotrophs x None    Protein Calorie Malnutrition: [ ] Mild [ ] Moderate [x ] Severe    Oral Intake: [x ] Unable/mouth care only [ ] Minimal [ ] Moderate [ ] Full Capability  Diet: [x ] NPO [ ] Tube feeds [ ] TPN [ ] Other     RADIOLOGY & ADDITIONAL STUDIES:     < from: CT Abdomen and Pelvis w/ Oral Cont and w/ IV Cont (18 @ 01:40) >    Worsening peritoneal carcinomatosis. Small bowel obstruction with   transition point in the pelvis on the basis of peritoneal disease.    Gallbladder perforation with right upper quadrant collection.    A few droplets of air in the deep pelvis donot appear within the bowel   lumen raising concern for bowel perforation.    REFERRALS:   [ ] Chaplaincy  [ ] Hospice  [ ] Child Life  [ ] Social Work  [ ] Case management [ ] Holistic Therapy

## 2018-01-24 NOTE — H&P ADULT - ASSESSMENT
Patient is a 47F with metastatic ovarian CA (s/p TAHBSO, colostomy, previously seeking holistic care 12/2017 however has since established with new oncologist for chemo) p/w abdominal pain.

## 2018-01-24 NOTE — H&P ADULT - PROBLEM SELECTOR PLAN 4
DVT ppx - Lovenox  Diet - NPO Stage IV ovarian CA, currently on chemotherapy  - CT performed today not showing any change in tumor burden  - In setting of advanced disease, unclear if significant benefit of chemotherapy  -If SBO can be resolved, patient to continue to follow with her oncologist for further management  - Disease c/b severe pain, on dilaudid PCA, which has provided relief  - Have placed palliative c/s for further assistance with pain management

## 2018-01-24 NOTE — PROGRESS NOTE ADULT - SUBJECTIVE AND OBJECTIVE BOX
Interventional Radiology Pre-Procedure Note    This is a 47y Female with perforated gallbladder presenting for drainage    Procedure:    Diagnosis/Indication: Patient is a 47y old  Female who presents with a chief complaint of Abdominal Pain (24 Jan 2018 06:17)      PAST MEDICAL & SURGICAL HISTORY:  Ovarian cancer: dx in dec 2016 (started as fibroid --&gt; large cancerous tumor)  Colostomy in place: dec 2016 (placed during hysterectomy in dec 2016 at [facility])  H/O abdominal hysterectomy: dec 2016       Female    Allergies: penicillin (Rash)      LABS:  CBC Full  -  ( 24 Jan 2018 11:09 )  WBC Count : 4.7 K/uL  Hemoglobin : 7.3 g/dL  Hematocrit : 22.8 %  Platelet Count - Automated : 332 K/uL  Mean Cell Volume : 94.1 fl  Mean Cell Hemoglobin : 30.3 pg  Mean Cell Hemoglobin Concentration : 32.2 gm/dL  Auto Neutrophil # : 3.9 K/uL  Auto Lymphocyte # : 0.6 K/uL  Auto Monocyte # : 0.3 K/uL  Auto Eosinophil # : 0.0 K/uL  Auto Basophil # : 0.0 K/uL  Auto Neutrophil % : 76.0 %  Auto Lymphocyte % : 12.0 %  Auto Monocyte % : 6.0 %  Auto Eosinophil % : x  Auto Basophil % : x    01-24    141  |  99  |  52<H>  ----------------------------<  79  3.7   |  27  |  0.73    Ca    8.2<L>      24 Jan 2018 11:09  Phos  4.8     01-24  Mg     2.4     01-24    TPro  5.5<L>  /  Alb  3.2<L>  /  TBili  0.6  /  DBili  x   /  AST  1788<H>  /  ALT  256<H>  /  AlkPhos  1221<H>  01-24      A/P Case discussed with SICU staff, Pt and her . She presents for drainage.   Procedure/ risks/ benefits were explained, informed consent obtained from patient and her  who verbalize understanding.

## 2018-01-24 NOTE — H&P ADULT - PROBLEM SELECTOR PLAN 1
Malignant SBO as seen on CT, with transition in proximity of cancer burden  - Patient with severe pain, currently managed with her home Dilaudid regimen  - NPO  - Surgery consulted in ED, will f/u for possible intervention  - c/w Zofran for nausea Malignant SBO as seen on CT, with transition in proximity of cancer burden  - Patient with severe pain, currently managed with her home Dilaudid regimen  - NPO  - Surgery consulted in ED, will f/u for possible intervention  - patient refusing NG tube placement for decompression  - c/w Zofran for nausea  - c/w IVF

## 2018-01-24 NOTE — CONSULT NOTE ADULT - ATTENDING COMMENTS
Patient presenting with chief complaint of sepsis and perforated gallbladder/abdominal pain.    Transfer to surgical service.  IR drainage.  Supportive care.  Poor surgical candidate.

## 2018-01-24 NOTE — CONSULT NOTE ADULT - ATTENDING COMMENTS
Dr. Deleon (Attending Physician)  Stage IV Ovarian CA with peritoneal and liver mets s/p bishop/bso with colostomy now with previous cholecystostomy tube now pw cholecystitis with collection and SBO with air in pelvis concerning for perforation.  Abd exam peritoneal. Patient has a very poor prognosis and is not a good surgical candidate with her disease.  Will send to IR for drainage of fluid collection.  Pain control with PCA at home will restart in SICU after pall care consult.  Needs goals of care conversation.  Malignant R pleural effusion.  Hypotension is likely normal for patient's cachexia.  Start vanc/casimiro sepsis and abd. collections. Dr. Deleon (Attending Physician)  Stage IV Ovarian CA with peritoneal and liver mets s/p bishop/bso with colostomy now with previous cholecystostomy tube now pw cholecystitis with collection and SBO with air in pelvis concerning for perforation.  Abd exam peritoneal. Patient has a very poor prognosis and is not a good surgical candidate with her disease.  Will send to IR for drainage of fluid collection.  Pain control with PCA at home will restart in SICU after pall care consult.  Needs goals of care conversation.  Malignant R pleural effusion.  Hypotension is likely normal for patient's cachexia.  Start vanc/casimiro sepsis and abd. collections.    This patient was critically ill with one or more vital organ systems at a high probability of imminent or life threatening deterioration. Complex decision making was required to assess and treat single or multiple vital organ system failure and/or prevent further life threatening deterioration of the patient’s condition.  All recent labwork and imaging was reviewed.  Total Critical Care Time 40

## 2018-01-24 NOTE — CONSULT NOTE ADULT - PROBLEM SELECTOR RECOMMENDATION 2
Already NPO, NG to suction if tolerated. Would start octreotide once hypoglycemia is controlled as octreotide can have s/e of hypoglycemia. No steroids as patient with has active infection. Only pain management at the time.

## 2018-01-24 NOTE — CONSULT NOTE ADULT - ASSESSMENT
Assessment: The patient is a 47F with metastatic ovarian CA s/p TAHBSO, colostomy who p/w increasing abdominal pain and ct imaging showing contained collection in the gallbladder fossa    Abdominal pain w/gallbladder fossa   - IR drainage of fluid collection w/ Dr. Ramires  - holding Lovenox prior to IR procedure  - NPO/IVFs  - continue abx  - NGT was vehemently refused by patient; will have higher threshold for placing NGT as patient's nausea has slightly resolved and stomach is not overly distended on CT scan. If patient becomes nauseated and has emesis, would place NGT  - SICU consulted for intensive care  - admit to Dr. Papi Mixon and Blue team  - plan d/w Dr. Jovan Ho [facility] PGY2  [id number]

## 2018-01-24 NOTE — CONSULT NOTE ADULT - SUBJECTIVE AND OBJECTIVE BOX
CC: Malignant SBO, now contained collection gallbladder   HPI: The patient is a 47-year-old female with known metastatic ovarian CA s/p GRETCHEN-BSO, colostomy, on dilaudid pain pump, who had previously sought out holistic care 2017 who presents with new onset abdominal and ct imaging showing a stable SBO and a collection in her gallbladder fossa. History obtained by spouse and patient at bedside. The patient presented without ostomy output, who has since had copious gas and minimal stool output in the ostomy.  Patient continues to be nauseated, although difficult to differentiate if the nausea is new, as she is nauseated at baseline 2/2 her chemotherapy. Rapid response called on the patient 2/2 hypoglycemia 29 and 37; she received an amp of dextrose and her POCT responded appropriately to 204 and she is mentating back at her baseline of which she presented. She is tachycardic to 120 and febrile to 103. IR was called to drain the gallbladder fossa, which will be drained at 3pm. The SICU was consulted for continue intensive care, given the patient's clinical milieu .         PAST MEDICAL & SURGICAL HISTORY:  Ovarian cancer: dx in dec 2016 (started as fibroid --&gt; large cancerous tumor)  Colostomy in place: dec 2016 (placed during hysterectomy in dec 2016 at [facility])  H/O abdominal hysterectomy: dec 2016      SUBJECTIVE/ROS:  [ ] A ten-point review of systems was otherwise negative except as noted.  [x] Due to altered mental status/intubation, subjective information were not able to be obtained from the patient. History was obtained, to the extent possible, from review of the chart and collateral sources of information and     MEDICATIONS  (STANDING):  aztreonam  IVPB 1000 milliGRAM(s) IV Intermittent once  cefTRIAXone   IVPB      dextrose 5% + sodium chloride 0.45%. 1000 milliLiter(s) (75 mL/Hr) IV Continuous <Continuous>  enoxaparin Injectable 40 milliGRAM(s) SubCutaneous every 24 hours    MEDICATIONS  (PRN):  acetaminophen   Tablet 650 milliGRAM(s) Oral every 6 hours PRN For Temp greater than 38 C (100.4 F)  ondansetron Injectable 4 milliGRAM(s) IV Push every 8 hours PRN Nausea and/or Vomiting    Allergies    penicillin (Rash)    Intolerances        SOCIAL HISTORY:  Occupation:  Smoking Hx: denies  Etoh Hx: denies  IVDA Hx: denies    FAMILY HISTORY:  No pertinent family history in first degree relatives    - unless noted, no significant family hx with Mother, Father, Siblings      Objective:   Vital Signs Last 24 Hrs  T(C): 39.4 (2018 07:48), Max: 39.4 (2018 07:48)  T(F): 103 (2018 07:48), Max: 103 (2018 07:48)  HR: 121 (2018 07:48) (99 - 121)  BP: 115/82 (2018 07:48) (103/73 - 121/82)  BP(mean): --  RR: 17 (2018 07:48) (14 - 18)  SpO2: 100% (2018 07:48) (96% - 100%)    Physical Exam:   General: NAD, Emaciated   HEENT:  Atraumatic, Normocephalic, EOMI, PERRLA  Neck: Supple, No JVD  Neuri: AOX1-2, motor and sensation grossly intact in b/l UE and b/l LE  Psych: Sedated  Chest: Clear to auscultation bilaterally; No rales, rhonchi, wheezing, or rubs  CV: Sinus tachycardia to 120; no murmurs or rubs  Abdomen: diffusely tender, no rebonding or guarding; colostomy with gas and liquid stool; ostomy with small prolapsed bowel, looks pink and viable  Extremeties:  2+ Peripheral Pulses, No clubbing, cyanosis  Skin: L PICC insertion CDI without erythema.      LABS:                        8.4    3.2   )-----------( 378      ( 2018 18:38 )             25.6         141  |  97  |  43<H>  ----------------------------<  53<L>  4.5   |  29  |  0.71    Ca    8.7      2018 18:38    TPro  5.7<L>  /  Alb  3.0<L>  /  TBili  0.4  /  DBili  x   /  AST  66<H>  /  ALT  42  /  AlkPhos  268<H>        Urinalysis Basic - ( 2018 01:08 )    Color: Yellow / Appearance: Clear / S.017 / pH: x  Gluc: x / Ketone: Negative  / Bili: Negative / Urobili: Negative   Blood: x / Protein: Trace / Nitrite: Negative   Leuk Esterase: Large / RBC: 2-5 /HPF / WBC >50 /HPF   Sq Epi: x / Non Sq Epi: Occasional /HPF / Bacteria: x        RADIOLOGY & ADDITIONAL STUDIES:

## 2018-01-25 ENCOUNTER — APPOINTMENT (OUTPATIENT)
Dept: GERIATRICS | Facility: CLINIC | Age: 48
End: 2018-01-25

## 2018-01-25 DIAGNOSIS — R10.11 RIGHT UPPER QUADRANT PAIN: ICD-10-CM

## 2018-01-25 LAB
ALBUMIN SERPL ELPH-MCNC: 3 G/DL — LOW (ref 3.3–5)
ALP SERPL-CCNC: 853 U/L — HIGH (ref 40–120)
ALT FLD-CCNC: 160 U/L RC — HIGH (ref 10–45)
ANION GAP SERPL CALC-SCNC: 14 MMOL/L — SIGNIFICANT CHANGE UP (ref 5–17)
APTT BLD: 28.6 SEC — SIGNIFICANT CHANGE UP (ref 27.5–37.4)
AST SERPL-CCNC: 425 U/L — HIGH (ref 10–40)
BILIRUB DIRECT SERPL-MCNC: 0.1 MG/DL — SIGNIFICANT CHANGE UP (ref 0–0.2)
BILIRUB INDIRECT FLD-MCNC: 0.3 MG/DL — SIGNIFICANT CHANGE UP (ref 0.2–1)
BILIRUB SERPL-MCNC: 0.4 MG/DL — SIGNIFICANT CHANGE UP (ref 0.2–1.2)
BUN SERPL-MCNC: 51 MG/DL — HIGH (ref 7–23)
CA-I BLD-SCNC: 1.09 MMOL/L — LOW (ref 1.12–1.3)
CALCIUM SERPL-MCNC: 8.2 MG/DL — LOW (ref 8.4–10.5)
CHLORIDE SERPL-SCNC: 101 MMOL/L — SIGNIFICANT CHANGE UP (ref 96–108)
CO2 SERPL-SCNC: 30 MMOL/L — SIGNIFICANT CHANGE UP (ref 22–31)
CREAT SERPL-MCNC: 0.66 MG/DL — SIGNIFICANT CHANGE UP (ref 0.5–1.3)
GLUCOSE SERPL-MCNC: 22 MG/DL — CRITICAL LOW (ref 70–99)
HCT VFR BLD CALC: 23.4 % — LOW (ref 34.5–45)
HGB BLD-MCNC: 7.5 G/DL — LOW (ref 11.5–15.5)
INR BLD: 1.6 RATIO — HIGH (ref 0.88–1.16)
MAGNESIUM SERPL-MCNC: 2.2 MG/DL — SIGNIFICANT CHANGE UP (ref 1.6–2.6)
MCHC RBC-ENTMCNC: 30.9 PG — SIGNIFICANT CHANGE UP (ref 27–34)
MCHC RBC-ENTMCNC: 32.3 GM/DL — SIGNIFICANT CHANGE UP (ref 32–36)
MCV RBC AUTO: 95.6 FL — SIGNIFICANT CHANGE UP (ref 80–100)
PHOSPHATE SERPL-MCNC: 4.9 MG/DL — HIGH (ref 2.5–4.5)
PLATELET # BLD AUTO: 244 K/UL — SIGNIFICANT CHANGE UP (ref 150–400)
POTASSIUM SERPL-MCNC: 3.2 MMOL/L — LOW (ref 3.5–5.3)
POTASSIUM SERPL-SCNC: 3.2 MMOL/L — LOW (ref 3.5–5.3)
PROT SERPL-MCNC: 5.2 G/DL — LOW (ref 6–8.3)
PROTHROM AB SERPL-ACNC: 17.6 SEC — HIGH (ref 9.8–12.7)
RBC # BLD: 2.44 M/UL — LOW (ref 3.8–5.2)
RBC # FLD: 14.8 % — HIGH (ref 10.3–14.5)
SODIUM SERPL-SCNC: 145 MMOL/L — SIGNIFICANT CHANGE UP (ref 135–145)
WBC # BLD: 3.4 K/UL — LOW (ref 3.8–10.5)
WBC # FLD AUTO: 3.4 K/UL — LOW (ref 3.8–10.5)

## 2018-01-25 PROCEDURE — 99497 ADVNCD CARE PLAN 30 MIN: CPT | Mod: 25

## 2018-01-25 PROCEDURE — 99291 CRITICAL CARE FIRST HOUR: CPT

## 2018-01-25 PROCEDURE — 99231 SBSQ HOSP IP/OBS SF/LOW 25: CPT

## 2018-01-25 PROCEDURE — 99233 SBSQ HOSP IP/OBS HIGH 50: CPT

## 2018-01-25 PROCEDURE — 99232 SBSQ HOSP IP/OBS MODERATE 35: CPT

## 2018-01-25 PROCEDURE — 71045 X-RAY EXAM CHEST 1 VIEW: CPT | Mod: 26

## 2018-01-25 RX ORDER — POTASSIUM CHLORIDE 20 MEQ
10 PACKET (EA) ORAL
Qty: 0 | Refills: 0 | Status: COMPLETED | OUTPATIENT
Start: 2018-01-25 | End: 2018-01-25

## 2018-01-25 RX ORDER — FLUCONAZOLE 150 MG/1
TABLET ORAL
Qty: 0 | Refills: 0 | Status: DISCONTINUED | OUTPATIENT
Start: 2018-01-25 | End: 2018-02-09

## 2018-01-25 RX ORDER — CALCIUM GLUCONATE 100 MG/ML
2 VIAL (ML) INTRAVENOUS ONCE
Qty: 0 | Refills: 0 | Status: COMPLETED | OUTPATIENT
Start: 2018-01-25 | End: 2018-01-25

## 2018-01-25 RX ORDER — FLUCONAZOLE 150 MG/1
200 TABLET ORAL EVERY 24 HOURS
Qty: 0 | Refills: 0 | Status: DISCONTINUED | OUTPATIENT
Start: 2018-01-26 | End: 2018-02-09

## 2018-01-25 RX ORDER — SODIUM CHLORIDE 9 MG/ML
1000 INJECTION, SOLUTION INTRAVENOUS
Qty: 0 | Refills: 0 | Status: DISCONTINUED | OUTPATIENT
Start: 2018-01-25 | End: 2018-02-12

## 2018-01-25 RX ORDER — FLUCONAZOLE 150 MG/1
400 TABLET ORAL ONCE
Qty: 0 | Refills: 0 | Status: COMPLETED | OUTPATIENT
Start: 2018-01-25 | End: 2018-01-25

## 2018-01-25 RX ORDER — ENOXAPARIN SODIUM 100 MG/ML
30 INJECTION SUBCUTANEOUS DAILY
Qty: 0 | Refills: 0 | Status: DISCONTINUED | OUTPATIENT
Start: 2018-01-25 | End: 2018-02-12

## 2018-01-25 RX ORDER — DEXTROSE 50 % IN WATER 50 %
50 SYRINGE (ML) INTRAVENOUS ONCE
Qty: 0 | Refills: 0 | Status: COMPLETED | OUTPATIENT
Start: 2018-01-25 | End: 2018-01-25

## 2018-01-25 RX ORDER — ENOXAPARIN SODIUM 100 MG/ML
40 INJECTION SUBCUTANEOUS DAILY
Qty: 0 | Refills: 0 | Status: DISCONTINUED | OUTPATIENT
Start: 2018-01-25 | End: 2018-01-25

## 2018-01-25 RX ADMIN — MEROPENEM 100 MILLIGRAM(S): 1 INJECTION INTRAVENOUS at 05:36

## 2018-01-25 RX ADMIN — Medication 150 MILLIGRAM(S): at 17:43

## 2018-01-25 RX ADMIN — Medication 100 MILLIEQUIVALENT(S): at 07:00

## 2018-01-25 RX ADMIN — HYDROMORPHONE HYDROCHLORIDE 30 MILLILITER(S): 2 INJECTION INTRAMUSCULAR; INTRAVENOUS; SUBCUTANEOUS at 07:07

## 2018-01-25 RX ADMIN — FLUCONAZOLE 100 MILLIGRAM(S): 150 TABLET ORAL at 17:06

## 2018-01-25 RX ADMIN — Medication 50 MILLILITER(S): at 05:00

## 2018-01-25 RX ADMIN — MEROPENEM 100 MILLIGRAM(S): 1 INJECTION INTRAVENOUS at 22:10

## 2018-01-25 RX ADMIN — Medication 100 MILLIEQUIVALENT(S): at 06:17

## 2018-01-25 RX ADMIN — Medication 100 MILLIEQUIVALENT(S): at 05:42

## 2018-01-25 RX ADMIN — SODIUM CHLORIDE 50 MILLILITER(S): 9 INJECTION, SOLUTION INTRAVENOUS at 06:16

## 2018-01-25 RX ADMIN — Medication 400 GRAM(S): at 05:04

## 2018-01-25 RX ADMIN — MEROPENEM 100 MILLIGRAM(S): 1 INJECTION INTRAVENOUS at 13:25

## 2018-01-25 RX ADMIN — Medication 150 MILLIGRAM(S): at 05:35

## 2018-01-25 NOTE — PROGRESS NOTE ADULT - SUBJECTIVE AND OBJECTIVE BOX
HPI:  Patient is a 46 yo F with metastatic ovarian CA (s/p TAHBSO, s/p colostomy, with holistic care approach, started chemo on 2017 carboplatin/taxol every Wednesday, due for tx today) p/w abdominal pain. Per partner and patient pain had increased, with minimal to no ostomy output, worsening abdominal distention, and nausea, without vomiting. Due to progression of symptoms they presented to ED today. Per , patient has not had any other associated symptoms, denies any cough, chills, night sweats. Patient recently started on chemotherapy, and abdominal pain has been getting worse. Admits to having intermittent fever, becoming more and more lethargic. Partner noticed bowel herniation through the ostomy as well.    Patient has Stage IV ovarian CA c/b chronic pain for which she is on a Dilaudid PCA at home [drip at 0.5mg/hr, rescues of 0.5mg, lockout of e/15min]. Had RRT called in ED due to episode of altered mental status, CBG found to be at 29mg/dl, after glucose replacement MS improved. Per partner patient had been experiencing spells of dizziness and confusion that he had attributed to the pain medication, but now understands that she probably was having bouts of hypoglycemia. He indicates that her diet is low/restricted on carbohydrates and complex sugars, more protein as they were following recs from holistic medicine "sugar feeds the cancer".    Consult for palliative placed for pain management, and GOC. Patient seen and examined at bedside, indicates that at the moment her pain is well controlled with the PCA's current regimen, denies any further nausea/vomiting. CT done in the ED showing increase in peritoneal carcinomatosis with Small bowel obstruction with transition point in the   pelvis on the basis of extensive peritoneal disease. Given her hypoglycemia and suspected sepsis will be admitted to SICU for further monitoring.     Discussed with patient's partner, who is her HCP, about how patient's trajectory is showing us that she is declining, and that regardless of the treatment being given, she is extremely frail and her disease is progressing to a point where we expect her not to recover/cure from. He shows understanding and indicates that the patient has a difficult time grasping the fact that she is dying, she wants to continue with chemotherapy and does not want to discuss anything related to death, he attempted to talk to her about a will,  arrangements, and she would refuse to talk to him.     PERTINENT PM/SXH:   Ovarian cancer    Colostomy in place  H/O abdominal hysterectomy    SOCIAL HISTORY:   Significant other/partner:  [ x] YES  [ ] NO               Children:  [ ] YES  [x ] NO                   Latter-day/Spirituality:  Substance hx:  [ ] YES   [x ] NO                   Tobacco hx:  [ ] YES  [x ] NO                       Alcohol hx: [ ] YES  [x ] NO         Home Opioid hx:  [x ] YES  [ ] NO   Living Situation: [x ] Home  [ ] Long term care  [ ] Rehab [ ] Other    FAMILY HISTORY:  No pertinent family history in first degree relatives    [ x] Family history non-contributory     BASELINE (I)ADLs (prior to admission):  [place]: [ ] total  [ x] moderate [ ] dependent    ADVANCE DIRECTIVES:    Full code  MOLST  [ ] YES [x ] NO                      [ ] Completed  Health Care Proxy [ x] YES  [ ] NO   [ ] Completed  Living Will  [ ] YES [x ] NO             [ ] Surrogate  [x ] HCP  [ ] Guardian:  Copy in EMR alpha tab in 17 admission  Trung Buenrostro  Phone#: [phone]    Allergies    penicillin (Rash)    Intolerances    MEDICATIONS  (STANDING):  dextrose 10% + sodium chloride 0.45% 1000 milliLiter(s) (50 mL/Hr) IV Continuous <Continuous>  enoxaparin Injectable 30 milliGRAM(s) SubCutaneous daily  fluconAZOLE IVPB      HYDROmorphone PCA (1 mG/mL) 30 milliLiter(s) PCA Continuous PCA Continuous  meropenem  IVPB      meropenem  IVPB 500 milliGRAM(s) IV Intermittent every 8 hours  vancomycin  IVPB 750 milliGRAM(s) IV Intermittent every 12 hours    MEDICATIONS  (PRN):  HYDROmorphone PCA (1 mG/mL) Rescue Clinician Bolus 1 milliGRAM(s) IV Push every 1 hour PRN for Pain Scale GREATER THAN 6  naloxone Injectable 0.1 milliGRAM(s) IV Push every 3 minutes PRN For ANY of the following changes in patient status:  A. RR LESS THAN 10 breaths per minute, B. Oxygen saturation LESS THAN 90%, C. Sedation score of 6  ondansetron Injectable 4 milliGRAM(s) IV Push every 8 hours PRN Nausea and/or Vomiting      PRESENT SYMPTOMS:  Source: [ x] Patient   [ ] Family   [ ] Team     Pain:                        [ ] No [x ] Yes             [ ] Mild [ ] Moderate [x ] Severe --> now controlled on PCA pump    Onset - Days  Location - Abdomen, diffuse   Duration - Constant  Character - 8/10  Alleviating/Aggravating - Alleviated with dilaudid, aggravated by palpating abdomen, moving.  Radiation - None  Timing - Constant    Dyspnea:                [x ] No [ ] Yes             [ ] Mild [ ] Moderate [ ] Severe    Anxiety:                  [x ] No [ ] Yes             [ ] Mild [ ] Moderate [ ] Severe    Fatigue:                  [ ] No [ x] Yes             [ ] Mild [x ] Moderate [ ] Severe    Nausea:                  [ ] No [x ] Yes             [ ] Mild [x ] Moderate [ ] Severe --> now well controlled, no vomiting.    Loss of appetite:   [ ] No [x ] Yes             [ ] Mild [x ] Moderate [ ] Severe    Constipation:        [ ] No [x ] Yes             [ ] Mild [ ] Moderate [x ] Severe    Other Symptoms:  [x ] All other review of systems negative   [ ] Unable to obtain due to poor mentation     Karnofsky Performance Score/Palliative Performance Status Version 2: 40%    PHYSICAL EXAM:  Vital Signs Last 24 Hrs  T(C): 36.9 (2018 15:00), Max: 37.2 (2018 19:00)  T(F): 98.4 (2018 15:00), Max: 98.9 (2018 19:00)  HR: 71 (2018 18:00) (70 - 98)  BP: 91/59 (2018 18:00) (82/55 - 108/71)  BP(mean): 70 (2018 18:00) (64 - 84)  RR: 22 (2018 18:00) (8 - 26)  SpO2: 100% (2018 18:00) (97% - 100%)    General:  [x ] Alert  [ ] Oriented x      [ ] Lethargic  [ ] Agitated   [x ] Cachexia   [ ] Unarousable  [x ] Verbal  [ ] Non-Verbal    HEENT:  [ ] Normal   [x ] Dry mouth   [ ] ET Tube    [ ] Trach  [ ] Oral lesions    Lungs:   [x ] Clear [ ] Tachypnea  [ ] Audible excessive secretions   [ ] Rhonchi        [ ] Right [ ] Left [ ] Bilateral  [ ] Crackles        [ ] Right [ ] Left [ ] Bilateral  [ ] Wheezing     [ ] Right [ ] Left [ ] Bilateral    Cardiovascular:  [x ] Regular [ ] Irregular [ ] Tachycardia   [ ] Bradycardia  [ ] Murmur [ ] Other    Abdomen: [ ] Soft  [x ] Distended   [x] +BS: hypoactive  [ ] Non tender [ x] Tender  [ ]PEG   [ ]OGT/ NGT   Last BM: Ostomy, low output.  Ostomy appears protruding.      Genitourinary: [x ] Normal [ ] Incontinent   [ ] Oliguria/Anuria   [ ] Lynn    Musculoskeletal:  [ ] Normal   [x ] Weakness  [ ] Bedbound/Wheelchair bound [ ] Edema    Neurological: [ x] No focal deficits  [ ] Cognitive impairment  [ ] Dysphagia [ ] Dysarthria [ ] Paresis [ ] Other     Skin: [x ] Normal   [ ] Pressure ulcer(s)                  [ ] Rash    LABS:                                          7.5    3.4   )-----------( 244      ( 2018 03:40 )             23.4       145  |  101  |  51<H>  ----------------------------<  22<LL>  3.2<L>   |  30  |  0.66    Ca    8.2<L>      2018 03:40  Phos  4.9       Mg     2.2         TPro  5.2<L>  /  Alb  3.0<L>  /  TBili  0.4  /  DBili  0.1  /  AST  425<H>  /  ALT  160<H>  /  AlkPhos  853<H>        Shock: No [ ] Septic [ ] Cardiogenic [ ] Neurologic [ ] Hypovolemic  Vasopressors x None  Inotrophs x None    Protein Calorie Malnutrition: [ ] Mild [ ] Moderate [x ] Severe    Oral Intake: [x ] Unable/mouth care only [ ] Minimal [ ] Moderate [ ] Full Capability  Diet: [x ] NPO [ ] Tube feeds [ ] TPN [ ] Other     RADIOLOGY & ADDITIONAL STUDIES:     < from: CT Abdomen and Pelvis w/ Oral Cont and w/ IV Cont (18 @ 01:40) >    Worsening peritoneal carcinomatosis. Small bowel obstruction with   transition point in the pelvis on the basis of peritoneal disease.    Gallbladder perforation with right upper quadrant collection.    A few droplets of air in the deep pelvis donot appear within the bowel   lumen raising concern for bowel perforation.    REFERRALS:   [ ] Chaplaincy  [ ] Hospice  [ ] Child Life  [ ] Social Work  [ ] Case management [ ] Holistic Therapy HPI:  Patient is a 48 yo F with metastatic ovarian CA (s/p TAHBSO, s/p colostomy, with holistic care approach, started chemo on 12/2017 carboplatin/taxol every Wednesday) p/w abdominal pain and  minimal to no ostomy output, worsening abdominal distention, and nausea, without vomiting. Patient was found to have a GB perforation, sepsis, small bowel perforation, and worsening peritoneal carcinomatosis. We are following for GOC and symptomatic Rx.     PERTINENT PM/SXH:   Ovarian cancer    Colostomy in place  H/O abdominal hysterectomy    SOCIAL HISTORY:   Significant other/partner:  [ x] YES  [ ] NO               Children:  [ ] YES  [x ] NO                   Anabaptist/Spirituality:  Substance hx:  [ ] YES   [x ] NO                   Tobacco hx:  [ ] YES  [x ] NO                       Alcohol hx: [ ] YES  [x ] NO         Home Opioid hx:  [x ] YES  [ ] NO   Living Situation: [x ] Home  [ ] Long term care  [ ] Rehab [ ] Other    FAMILY HISTORY:  No pertinent family history in first degree relatives    [ x] Family history non-contributory     BASELINE (I)ADLs (prior to admission):  [facility]: [ ] total  [ x] moderate [ ] dependent    ADVANCE DIRECTIVES:    Full code  MOLST  [ ] YES [x ] NO                      [ ] Completed  Health Care Proxy [ x] YES  [ ] NO   [ ] Completed  Living Will  [ ] YES [x ] NO             [ ] Surrogate  [x ] HCP  [ ] Guardian:  Copy in EMR alpha tab in 12/20/17 admission  Trung Buenrostro  Phone#: [phone]    Allergies    penicillin (Rash)    Intolerances    MEDICATIONS  (STANDING):  dextrose 10% + sodium chloride 0.45% 1000 milliLiter(s) (50 mL/Hr) IV Continuous <Continuous>  enoxaparin Injectable 30 milliGRAM(s) SubCutaneous daily  fluconAZOLE IVPB      HYDROmorphone PCA (1 mG/mL) 30 milliLiter(s) PCA Continuous PCA Continuous  meropenem  IVPB      meropenem  IVPB 500 milliGRAM(s) IV Intermittent every 8 hours  vancomycin  IVPB 750 milliGRAM(s) IV Intermittent every 12 hours    MEDICATIONS  (PRN):  HYDROmorphone PCA (1 mG/mL) Rescue Clinician Bolus 1 milliGRAM(s) IV Push every 1 hour PRN for Pain Scale GREATER THAN 6  naloxone Injectable 0.1 milliGRAM(s) IV Push every 3 minutes PRN For ANY of the following changes in patient status:  A. RR LESS THAN 10 breaths per minute, B. Oxygen saturation LESS THAN 90%, C. Sedation score of 6  ondansetron Injectable 4 milliGRAM(s) IV Push every 8 hours PRN Nausea and/or Vomiting      PRESENT SYMPTOMS:  Source: [ x] Patient   [ ] Family   [ ] Team     Pain:                        [ ] No [x ] Yes             [ ] Mild [x ] Moderate to Severe --> now controlled on PCA pump    Onset - Days  Location - Abdomen, diffuse   Duration - Constant  Character - 6-8  Alleviating/Aggravating - Alleviated with Dilaudid aggravated by palpating abdomen, moving.  Radiation - None  Timing - Constant    Dyspnea:                [x ] No [ ] Yes             [ ] Mild [ ] Moderate [ ] Severe    Anxiety:                  [x ] No [ ] Yes             [ ] Mild [ ] Moderate [ ] Severe    Fatigue:                  [ ] No [ x] Yes             [ ] Mild [x ] Moderate [ ] Severe    Nausea:                  [ ] No [x ] Yes             [ ] Mild [x ] Moderate [ ] Severe --> now well controlled, no vomiting.    Loss of appetite:   [ ] No [x ] Yes             [ ] Mild [x ] Moderate [ ] Severe    Constipation:        [ ] No [x ] Yes             [ ] Mild [ ] Moderate [x ] Severe    Other Symptoms:  [x ] All other review of systems negative   [ ] Unable to obtain due to poor mentation     Karnofsky Performance Score/Palliative Performance Status Version 2: 40%    PHYSICAL EXAM:  Vital Signs Last 24 Hrs  T(C): 36.9 (25 Jan 2018 15:00), Max: 37.2 (24 Jan 2018 19:00)  T(F): 98.4 (25 Jan 2018 15:00), Max: 98.9 (24 Jan 2018 19:00)  HR: 71 (25 Jan 2018 18:00) (70 - 98)  BP: 91/59 (25 Jan 2018 18:00) (82/55 - 108/71)  BP(mean): 70 (25 Jan 2018 18:00) (64 - 84)  RR: 22 (25 Jan 2018 18:00) (8 - 26)  SpO2: 100% (25 Jan 2018 18:00) (97% - 100%)    General:  [x ] Alert  [ ] Oriented x      [ ] Lethargic  [ ] Agitated   [x ] Cachexia   [ ] Unarousable  [x ] Verbal  [ ] Non-Verbal    HEENT:  [ ] Normal   [x ] Dry mouth   [ ] ET Tube    [ ] Trach  [ ] Oral lesions    Lungs:   [x ] Clear [ ] Tachypnea  [ ] Audible excessive secretions   [ ] Rhonchi        [ ] Right [ ] Left [ ] Bilateral  [ ] Crackles        [ ] Right [ ] Left [ ] Bilateral  [ ] Wheezing     [ ] Right [ ] Left [ ] Bilateral    Cardiovascular:  [x ] Regular [ ] Irregular [ ] Tachycardia   [ ] Bradycardia  [ ] Murmur [ ] Other    Abdomen: [ ] Soft  [x ] Distended   [x] +BS: hypoactive  [ ] Non tender [ x] Tender on generalized palpation  [ ]PEG   [ ]OGT/ NGT   Last BM: Ostomy, low output.  Ostomy appears protruding.      Genitourinary: [x ] Normal [ ] Incontinent   [ ] Oliguria/Anuria   [ ] Lynn    Musculoskeletal:  [ ] Normal   [x ] Weakness  [ ] Bedbound/Wheelchair bound [ ] Edema    Neurological: [ x] No focal deficits  [ ] Cognitive impairment  [ ] Dysphagia [ ] Dysarthria [ ] Paresis [ ] Other     Skin: [x ] Normal   [ ] Pressure ulcer(s)                  [ ] Rash    LABS:                                          7.5    3.4   )-----------( 244      ( 25 Jan 2018 03:40 )             23.4   01-25    145  |  101  |  51<H>  ----------------------------<  22<LL>  3.2<L>   |  30  |  0.66    Ca    8.2<L>      25 Jan 2018 03:40  Phos  4.9     01-25  Mg     2.2     01-25    TPro  5.2<L>  /  Alb  3.0<L>  /  TBili  0.4  /  DBili  0.1  /  AST  425<H>  /  ALT  160<H>  /  AlkPhos  853<H>  01-25      Shock: No [ ] Septic [ ] Cardiogenic [ ] Neurologic [ ] Hypovolemic  Vasopressors x None  Inotrophs x None    Protein Calorie Malnutrition: [ ] Mild [ ] Moderate [x ] Severe    Oral Intake: [x ] Unable/mouth care only [ ] Minimal [ ] Moderate [ ] Full Capability  Diet: [x ] NPO [ ] Tube feeds [ ] TPN [ ] Other     RADIOLOGY & ADDITIONAL STUDIES:     < from: CT Abdomen and Pelvis w/ Oral Cont and w/ IV Cont (01.24.18 @ 01:40) >    Worsening peritoneal carcinomatosis. Small bowel obstruction with   transition point in the pelvis on the basis of peritoneal disease.    Gallbladder perforation with right upper quadrant collection.    A few droplets of air in the deep pelvis donot appear within the bowel   lumen raising concern for bowel perforation.    REFERRALS:   [ ] Chaplaincy  [ ] Hospice  [ ] Child Life  [ ] Social Work  [ ] Case management [ ] Holistic Therapy

## 2018-01-25 NOTE — DIETITIAN INITIAL EVALUATION ADULT. - NS FNS REASON FOR WEIGHT CHANG
catabolic illness with metastatic ovarian cancer, malignant SBO, on chemo therapy/altered GI function (specify)

## 2018-01-25 NOTE — DIETITIAN INITIAL EVALUATION ADULT. - ORAL INTAKE PTA
Pt noted with h/o po intake <75% of estimated needs. Per prior RD notes, pt eats Chinese cuisine, avoids foods that irritate colostomy. Pt noted with h/o malnutrition./poor poor/Pt noted with h/o po intake <75% of estimated needs. Per prior RD notes, pt eats Chinese cuisine, avoids foods that irritate colostomy. Per chart, pt has been following a low sugar/CHO holistic diet. Pt noted with h/o severe malnutrition.

## 2018-01-25 NOTE — CONSULT NOTE ADULT - SUBJECTIVE AND OBJECTIVE BOX
[facility]  Carter Genao PA-C  [address] [place] [address] [phone]      Chief Complaint:  Patient is a 47y old  Female who presents with a chief complaint of Abdominal Pain (2018 06:17)      HPI: 47F known to us previously with cbd stone s/p ercp, pt known to have ovarian ca with peritoneal metastasis presents now with abdominal pain found to have malignant sbo with perforated gb s/p ir drainage    Allergies:  penicillin (Rash)      Medications:  dextrose 10% + sodium chloride 0.45% 1000 milliLiter(s) IV Continuous <Continuous>  fluconAZOLE IVPB 400 milliGRAM(s) IV Intermittent once  fluconAZOLE IVPB      HYDROmorphone PCA (1 mG/mL) 30 milliLiter(s) PCA Continuous PCA Continuous  HYDROmorphone PCA (1 mG/mL) Rescue Clinician Bolus 1 milliGRAM(s) IV Push every 1 hour PRN  meropenem  IVPB      meropenem  IVPB 500 milliGRAM(s) IV Intermittent every 8 hours  naloxone Injectable 0.1 milliGRAM(s) IV Push every 3 minutes PRN  ondansetron Injectable 4 milliGRAM(s) IV Push every 8 hours PRN  vancomycin  IVPB 750 milliGRAM(s) IV Intermittent every 12 hours      PMHX/PSHX:  Ovarian cancer  Colostomy in place  H/O abdominal hysterectomy      Family history:  No pertinent family history in first degree relatives      Social History:     ROS:     General:  No wt loss, fevers, chills, night sweats, fatigue,   Eyes:  Good vision, no reported pain  ENT:  No sore throat, pain, runny nose, dysphagia  CV:  No pain, palpitations, hypo/hypertension  Resp:  No dyspnea, cough, tachypnea, wheezing  GI:  + pain, No nausea, No vomiting, No diarrhea, No constipation, No weight loss, No fever, No pruritis, No rectal bleeding, No tarry stools, No dysphagia,  :  No pain, bleeding, incontinence, nocturia  Muscle:  No pain, weakness  Neuro:  No weakness, tingling, memory problems  Psych:  No fatigue, insomnia, mood problems, depression  Endocrine:  No polyuria, polydipsia, cold/heat intolerance  Heme:  No petechiae, ecchymosis, easy bruisability  Skin:  No rash, tattoos, scars, edema      PHYSICAL EXAM:   Vital Signs:  Vital Signs Last 24 Hrs  T(C): 36.3 (2018 11:00), Max: 37.2 (2018 19:00)  T(F): 97.3 (2018 11:00), Max: 98.9 (2018 19:00)  HR: 74 (2018 15:00) (70 - 98)  BP: 85/63 (2018 15:00) (82/55 - 108/71)  BP(mean): 70 (2018 15:00) (64 - 84)  RR: 18 (2018 15:) (8 - 26)  SpO2: 100% (2018 15:00) (97% - 100%)  Daily     Daily Weight in k.7 (2018 10:44)    GENERAL:  Appears stated age, well-groomed, well-nourished, no distress  HEENT:  NC/AT,  conjunctivae clear and pink, no thyromegaly, nodules, adenopathy, no JVD, sclera -anicteric  CHEST:  Full & symmetric excursion, no increased effort, breath sounds clear  HEART:  Regular rhythm, S1, S2, no murmur/rub/S3/S4, no abdominal bruit, no edema  ABDOMEN:  Soft, non-tender, + distended, normoactive bowel sounds, + ostomy  EXTEREMITIES:  no cyanosis,clubbing or edema  SKIN:  No rash/erythema/ecchymoses/petechiae/wounds/abscess/warm/dry  NEURO:  Alert, oriented, no asterixis, no tremor, no encephalopathy    LABS:                        7.5    3.4   )-----------( 244      ( 2018 03:40 )             23.4     01-25    145  |  101  |  51<H>  ----------------------------<  22<LL>  3.2<L>   |  30  |  0.66    Ca    8.2<L>      2018 03:40  Phos  4.9       Mg     2.2     25    TPro  5.2<L>  /  Alb  3.0<L>  /  TBili  0.4  /  DBili  0.1  /  AST  425<H>  /  ALT  160<H>  /  AlkPhos  853<H>      LIVER FUNCTIONS - ( 2018 03:40 )  Alb: 3.0 g/dL / Pro: 5.2 g/dL / ALK PHOS: 853 U/L / ALT: 160 U/L RC / AST: 425 U/L / GGT: x           PT/INR - ( 2018 03:40 )   PT: 17.6 sec;   INR: 1.60 ratio         PTT - ( 2018 03:40 )  PTT:28.6 sec  Urinalysis Basic - ( 2018 01:08 )    Color: Yellow / Appearance: Clear / S.017 / pH: x  Gluc: x / Ketone: Negative  / Bili: Negative / Urobili: Negative   Blood: x / Protein: Trace / Nitrite: Negative   Leuk Esterase: Large / RBC: 2-5 /HPF / WBC >50 /HPF   Sq Epi: x / Non Sq Epi: Occasional /HPF / Bacteria: x          Imaging:

## 2018-01-25 NOTE — CHART NOTE - NSCHARTNOTEFT_GEN_A_CORE
Upon Nutritional Assessment by the Registered Dietitian your patient was determined to meet criteria / has evidence of the following diagnosis/diagnoses:          [ ]  Mild Protein Calorie Malnutrition        [ ]  Moderate Protein Calorie Malnutrition        [X ] Severe Protein Calorie Malnutrition        [ ] Unspecified Protein Calorie Malnutrition        [X ] Underweight / BMI <19        [ ] Morbid Obesity / BMI > 40      Findings as based on:  [ X] Comprehensive nutrition assessment   [ ] Nutrition Focused Physical Exam  [ X] Other: pt with h/o 17% wt loss in 7 months, chronic po intake <75% of needs, catabolic illness, BMI 17.2      Nutrition Plan/Recommendations:    1. Current medical condition precludes nutrition intervention at this time  2. Pending medical course, monitor ability to advance po diet with oral supplements as tolerated  3. Pending medical course, monitor need for EN. If warranted, recommend Vital1.2, initiate at 10ml/hr, increase as tolerated to goal rate 50ml/hr x 24 hours to provide 1200 ml formula, 1440cal/day, 90 Gm protein/day, 973ml free water; meets 31cal/Kg and 1.9Gm/Kg protein per dosing wt 46.7Kg  4. RD will remain available to honor pt/ family wishes regarding plan of care.         PROVIDER Section:     By signing this assessment you are acknowledging and agree with the diagnosis/diagnoses assigned by the Registered Dietitian    Comments:

## 2018-01-25 NOTE — DIETITIAN INITIAL EVALUATION ADULT. - PROBLEM SELECTOR PLAN 2
Positive UA for, high fevers this admission and currently on chemotherapy  - Will send UCx  - Will treat with ceftriaxone  - Daily CBC, will f/u BCx

## 2018-01-25 NOTE — DIETITIAN INITIAL EVALUATION ADULT. - FACTORS AFF FOOD INTAKE
Pt currently NPO, noted with malignant SBO and worsening abdominal pain since initiation of chemo. No colostomy output. Pt currently NPO, noted with malignant SBO and worsening abdominal pain since initiation of chemo. No colostomy output. Per chart, pt's  reported bowel herniation visible through colostomy. Pt currently NPO; per team, plan to keep pt NPO until a plan is in place. Pt noted with malignant SBO and worsening abdominal pain since initiation of chemo. No colostomy output. Per chart, pt's  reported bowel herniation visible through colostomy.

## 2018-01-25 NOTE — DIETITIAN INITIAL EVALUATION ADULT. - PROBLEM SELECTOR PLAN 3
Febrile, tachycardic and leukopenic  - Presumed source is UTI, however also evidence of ascites, possibly SBP  - Will f/u BCx, UCx  - Treating as above  - c/w IVF

## 2018-01-25 NOTE — DIETITIAN INITIAL EVALUATION ADULT. - PROBLEM SELECTOR PLAN 4
Stage IV ovarian CA, currently on chemotherapy  - CT performed today not showing any change in tumor burden  - In setting of advanced disease, unclear if significant benefit of chemotherapy  -If SBO can be resolved, patient to continue to follow with her oncologist for further management  - Disease c/b severe pain, on dilaudid PCA, which has provided relief  - Have placed palliative c/s for further assistance with pain management

## 2018-01-25 NOTE — DIETITIAN INITIAL EVALUATION ADULT. - PHYSICAL APPEARANCE
underweight/BMI 17.2Kg/m2. Pt noted with anorexia/cachexia per chart, and h/o malnutrition. Pt currently asleep and fully covered; RD will follow up with Nutrition Focused Physical Assessment.

## 2018-01-25 NOTE — DIETITIAN INITIAL EVALUATION ADULT. - SIGNS/SYMPTOMS
pt noted c h/o 17% wt loss in 7 months, chronic po intake <75% of needs, signs of catabolic illness pt with h/o 17% wt loss in 7 months, chronic po intake <75% of needs, catabolic illness, BMI 17.2

## 2018-01-25 NOTE — DIETITIAN INITIAL EVALUATION ADULT. - NS AS NUTRI INTERV ENTERAL NUTRITION
Pending medical course, monitor need for EN. If warranted, recommend Vital1.2, initiate at 10ml/hr, increase as tolerated to goal rate 50ml/hr x 24 hours to provide 1200 ml formula, 1440cal/day, 90 Gm protein/day, 973ml free water; meets ryan/Kg and xxGm/Kg protein per IBW xx Kg Pending medical course, monitor need for EN. If warranted, recommend Vital1.2, initiate at 10ml/hr, increase as tolerated to goal rate 50ml/hr x 24 hours to provide 1200 ml formula, 1440cal/day, 90 Gm protein/day, 973ml free water; meets 31cal/Kg and 1.9Gm/Kg protein per dosing wt 46.7Kg

## 2018-01-25 NOTE — DIETITIAN INITIAL EVALUATION ADULT. - PERTINENT LABORATORY DATA
Fingersticks x 24 hours: 24 - 146; episode of hypoglycemia noted. potassium 3.2, ionized Ca 1.09, alk phos 853, ,

## 2018-01-25 NOTE — PROGRESS NOTE ADULT - PROBLEM SELECTOR PLAN 3
Controlled.   Continue Dialudid PCA 0.5 mg/h, 0.5 mg q 15' DD, and 1 mg q 1 CB  s/p CT guided drainage of GB/ pericholecystic fluid collection.  Abx as per SICU team.   Holistic Nurse referral done

## 2018-01-25 NOTE — DIETITIAN INITIAL EVALUATION ADULT. - ETIOLOGY
stage IV ovarian cancer with liver mets, peritoneal carcinomatosis, perforated gallbladder, malignant SBO

## 2018-01-25 NOTE — DIETITIAN INITIAL EVALUATION ADULT. - PROBLEM SELECTOR PLAN 1
Malignant SBO as seen on CT, with transition in proximity of cancer burden  - Patient with severe pain, currently managed with her home Dilaudid regimen  - NPO  - Surgery consulted in ED, will f/u for possible intervention  - patient refusing NG tube placement for decompression  - c/w Zofran for nausea  - c/w IVF

## 2018-01-25 NOTE — PROGRESS NOTE ADULT - ASSESSMENT
Patient is a 48 yo F with metastatic ovarian CA (s/p TAHBSO, s/p colostomy, with holistic care approach, started chemo on 12/2017 carboplatin/taxol every Wednesday, due for tx today). Admitted to MICU under the impression of malignant SBO, sepsis thought to be 2/2 UTI, hypoglycemia. Palliative Care consult placed for pain management and f/u GOC. Patient is a 46 yo F with metastatic ovarian CA (s/p TAHBSO, s/p colostomy, with holistic care approach, started chemo on 12/2017 carboplatin/taxol every Wednesday, due for tx today). Admitted to MICU under the impression of malignant SBO, sepsis, GB perforation, hypoglycemia, and worsening metastatic disease. Palliative Care consult placed for pain management and GOC.

## 2018-01-25 NOTE — DIETITIAN INITIAL EVALUATION ADULT. - NUTRITION INTERVENTION
Meals and Snack/Enteral Nutrition Enteral Nutrition/Collaboration and Referral of Nutrition Care/Meals and Snack

## 2018-01-25 NOTE — DIETITIAN INITIAL EVALUATION ADULT. - OTHER INFO
Nutrition Consult for BMI <18 received and appreciated. Per chart, pt is a "47y Female with h/o stage IV ovarian cancer with liver mets s/p GRETCHEN-BSO and colostomy in 2016.  Pt is on home Dilaudid PCA, followed by palliative care and plans for chemotherapy.  Pt admitted presented to ED on 1/23 for abdominal pain and fever.  CTAP shows SBO with transition point in pelvis, extensive peritoneal carcinomatosis, perforated gallbladder with collection containing fluid and air.  RRT was called on patient in ED for hypoglycemia to 29, improved to 200s after amp of D50.  Pt is now s/p IR drainage of right abdominal fluid collection/gallbladder on 1/24." Nutrition Consult for BMI <18 received and appreciated. Per chart, pt is a "47y Female with h/o stage IV ovarian cancer with liver mets s/p GRETCHEN-BSO and colostomy in 2016.  Pt is on home Dilaudid PCA, followed by palliative care and plans for chemotherapy.  Pt admitted presented to ED on 1/23 for abdominal pain and fever.  CTAP shows SBO with transition point in pelvis, extensive peritoneal carcinomatosis, perforated gallbladder with collection containing fluid and air.  RRT was called on patient in ED for hypoglycemia to 29, improved to 200s after amp of D50.  Pt is now s/p IR drainage of right abdominal fluid collection/gallbladder on 1/24." Per chart, Palliative is following; team plans to revisit goals of care discussion.

## 2018-01-25 NOTE — PROGRESS NOTE ADULT - ASSESSMENT
ASSESSMENT: 47y Female with h/o stage IV ovarian cancer with liver mets s/p GRETCHEN-BSO and colostomy in 2016.  Pt is on home Dilaudid PCA, followed by palliative care and plans for chemotherapy.  Pt admitted presented to ED on 1/23 for abdominal pain and fever.  CTAP shows SBO with transition point in pelvis, extensive peritoneal carcinomatosis, perforated gallbladder with collection containing fluid and air.  RRT was called on patient in ED for hypoglycemia to 29, improved to 200s after amp of D50.  Pt is now s/p IR drainage of right abdominal fluid collection/gallbladder on 1/24.     PLAN:  Neuro: acute on chronic pain  - Continue Dilaudid PCA    Resp: right malignant pleural effusion  -AM CXR  - Incentive spirometry, OOB to prevent atelectasis    CV: hypotension, resolved   - Continue D5L@ 50ml/hr  - Monitor vital signs    GI: SBO, peritoneal carcinomatosis, perforated GB with fluid collection s/p IR drainage 1/24  -NPO  -Protonix    /Renal: no acute issues  -D5LR @ 50mL/hr   -Replete electrolytes as needed    Heme: anemia  -Trend CBC, transfuse as needed  -SCDs  -Holding chemical VTE prophylaxis     ID: gallbladder perforation s/p IR drainage 1/24  -Continue meropenem and vancomycin   -F/u blood cultures, reculture if febrile    Endo: h/o hypoglycemia, resolved  -Monitor glucose on BMP      Dispo: Full code, palliative care following, redisccuss goals of care with patient and HCP ().     -Keiry Chavis PA-C  [id number]

## 2018-01-25 NOTE — PROGRESS NOTE ADULT - SUBJECTIVE AND OBJECTIVE BOX
24 hr: Pt underwent IR drainage of right abdominal fluid collection/gallbladder.  Approximately 55cc of yellow/light brown cloudy fluid aspirated, cultures sent. Antibiotics broadened to meropenem and vancomycin. Pt started on dilaudid PCA for pain. HISTORY  47y Female with h/o stage IV ovarian cancer with liver mets s/p GRETCHEN-BSO and colostomy in 2016.  Pt is on home Dilaudid PCA, followed by palliative care and plans for chemotherapy.  Pt admitted presented to ED on 1/23 for abdominal pain and fever.  CTAP shows SBO with transition point in pelvis, extensive peritoneal carcinomatosis, perforated gallbladder with collection containing fluid and air.  RRT was called on patient in ED for hypoglycemia to 29, improved to 200s after amp of D50.  Pt admits to fever, "feeling tired", and nausea.  Pt denies HA, CP, SOB, abd pain, vomiting. Pt was taken to IR on 1/24 for drainage of RUQ fluid collection/gallbladder with 60 ml pus drained.       24 HOUR EVENTS: Pt underwent IR drainage of right abdominal fluid collection/gallbladder.  Approximately 55cc of yellow/light brown cloudy fluid aspirated, cultures sent. Antibiotics broadened to meropenem and vancomycin. Pt started on dilaudid PCA for pain. Overnight, pt acutely hypoglycemic to 22 on BMP, confirmed with fingerstick 24. Pt given 1 amp D50 with increase in blood glucose to 146. She was started on D10 + 1/2 NS + K @ 50.      SUBJECTIVE/ROS: Pt denies lightheadedness, dizziness, SOB, weakness, chest pain.  [x ] A ten-point review of systems was otherwise negative except as noted.  [ ] Due to altered mental status/intubation, subjective information were not able to be obtained from the patient. History was obtained, to the extent possible, from review of the chart and collateral sources of information.      NEURO  RASS:  0   GCS:     CAM ICU:  Exam: awake, alert and oriented x3, follows commands  Meds: HYDROmorphone PCA (1 mG/mL) 30 milliLiter(s) PCA Continuous PCA Continuous  HYDROmorphone PCA (1 mG/mL) Rescue Clinician Bolus 1 milliGRAM(s) IV Push every 1 hour PRN for Pain Scale GREATER THAN 6  ondansetron Injectable 4 milliGRAM(s) IV Push every 8 hours PRN Nausea and/or Vomiting    [x] Adequacy of sedation and pain control has been assessed and adjusted      RESPIRATORY  RR: 44 (01-25-18 @ 04:30) (10 - 44)  SpO2: 100% (01-25-18 @ 04:30) (97% - 100%)  Exam: unlabored, clear to auscultation bilaterally  [n/a ] Extubation Readiness Assessed  Meds: x      CARDIOVASCULAR  HR: 90 (01-25-18 @ 04:30) (83 - 121)  BP: 105/66 (01-25-18 @ 04:00) (87/61 - 121/82)  BP(mean): 81 (01-25-18 @ 04:00) (67 - 84)  VBG - ( 24 Jan 2018 13:32 )  pH: 7.43  /  pCO2: 48    /  pO2: 47    / HCO3: 31    / Base Excess: 6.7   /  SaO2: 76     Lactate: 1.9        Exam: regular rate and rhythm  Cardiac Rhythm: sinus  Perfusion     [x ]Adequate   [ ]Inadequate  Mentation   [x ]Normal       [ ]Reduced  Extremities  [x ]Warm         [ ]Cool  Volume Status [ ]Hypervolemic [ x]Euvolemic [ ]Hypovolemic  Meds: x      GI/NUTRITION  Exam: tender to palpation, voluntary guarding, midline incision well-healed, IR drain bilious with some murky fluid, colsotomy pink and viable  Diet: NPO  Meds:  x    GENITOURINARY  I&O's Detail    01-24 @ 07:01  -  01-25 @ 05:38  --------------------------------------------------------  IN:    dextrose 5% + lactated ringers.: 75 mL    dextrose 5% + lactated ringers.: 650 mL    IV PiggyBack: 200 mL  Total IN: 925 mL    OUT:    Bulb: 200 mL    Voided: 304 mL  Total OUT: 504 mL    Total NET: 421 mL        Weight (kg): 48 (01-24 @ 13:02)  01-25    145  |  101  |  51<H>  ----------------------------<  22<LL>  3.2<L>   |  30  |  0.66    Ca    8.2<L>      25 Jan 2018 03:40  Phos  4.9     01-25  Mg     2.2     01-25    TPro  5.2<L>  /  Alb  3.0<L>  /  TBili  0.4  /  DBili  0.1  /  AST  425<H>  /  ALT  160<H>  /  AlkPhos  853<H>  01-25    [n/a ] Lynn catheter, indication: N/A  Meds: dextrose 10% + sodium chloride 0.45% 1000 milliLiter(s) IV Continuous <Continuous>  potassium chloride  10 mEq/100 mL IVPB 10 milliEquivalent(s) IV Intermittent every 1 hour        HEMATOLOGIC  Meds: x  [x] VTE Prophylaxis                        7.5    3.4   )-----------( 244      ( 25 Jan 2018 03:40 )             23.4     PT/INR - ( 25 Jan 2018 03:40 )   PT: 17.6 sec;   INR: 1.60 ratio         PTT - ( 25 Jan 2018 03:40 )  PTT:28.6 sec  Transfusion     [ ] PRBC   [ ] Platelets   [ ] FFP   [ ] Cryoprecipitate      INFECTIOUS DISEASES  T(C): 36.3 (01-25-18 @ 03:00), Max: 39.4 (01-24-18 @ 07:48)  WBC Count: 3.4 K/uL (01-25 @ 03:40)  WBC Count: 4.7 K/uL (01-24 @ 11:09)    Recent Cultures:  Specimen Source: Abdominal Fl Abdominal Fluid, 01-24 @ 21:39; Results --; Gram Stain:   Moderate polymorphonuclear leukocytes per low power field  Moderate Gram Negative Rods per oil power field; Organism: --  Specimen Source: .Blood Blood-Venous, 01-23 @ 23:27; Results   No growth to date.; Gram Stain: --; Organism: --    Meds: meropenem  IVPB      meropenem  IVPB 500 milliGRAM(s) IV Intermittent every 8 hours  vancomycin  IVPB 750 milliGRAM(s) IV Intermittent every 12 hours        ENDOCRINE  Capillary Blood Glucose  CAPILLARY BLOOD GLUCOSE  POCT Blood Glucose.: 146 mg/dL (25 Jan 2018 05:27)  POCT Blood Glucose.: 24 mg/dL (25 Jan 2018 04:52)  POCT Blood Glucose.: 82 mg/dL (24 Jan 2018 11:20)  POCT Blood Glucose.: 204 mg/dL (24 Jan 2018 08:38)  POCT Blood Glucose.: 37 mg/dL (24 Jan 2018 08:27)  POCT Blood Glucose.: 29 mg/dL (24 Jan 2018 08:25)  Meds: x      ACCESS DEVICES:  [x ] Peripheral IV  [ ] Central Venous Line	[ ] R	[ ] L	[ ] IJ	[ ] Fem	[ ] SC	Placed:   [ ] Arterial Line		[ ] R	[ ] L	[ ] Fem	[ ] Rad	[ ] Ax	Placed:   [ ] PICC:					[ ] Mediport  [ ] Urinary Catheter, Date Placed:   [x ] Necessity of urinary, arterial, and venous catheters discussed    OTHER MEDICATIONS:  naloxone Injectable 0.1 milliGRAM(s) IV Push every 3 minutes PRN      CODE STATUS:     IMAGING:

## 2018-01-25 NOTE — DIETITIAN INITIAL EVALUATION ADULT. - ENERGY NEEDS
ht: 5 feet 6 inches, wt: 103 pounds, BMI: 17.2 Kg/m2, IBW: 130 pounds (+/- 10%), 79% IBW. Edema: 1+ generalized, L/R leg (01/24); Skin: no pressure injuries.

## 2018-01-25 NOTE — PROGRESS NOTE ADULT - PROBLEM SELECTOR PLAN 6
Patient's GOC are toward trying to overcome her acute issues and trying to go back to her Oncologist to further discuss disease modifying Rx; however, she understands her illness is advanced and that it is important to plan based on her poor prognosis. Patient's GOC are toward trying to overcome her acute issues and trying to go back to her Oncologist to further discuss disease modifying Rx; however, she understands her illness is advanced and that it is important to plan based on her poor prognosis.   20' were spent in ACP

## 2018-01-25 NOTE — DIETITIAN INITIAL EVALUATION ADULT. - NS FNS WEIGHT CHANGE REASON
Pt noted with h/o weight loss since June 2017. Weight history: stated  pounds; 6/12/17 admit 125 pounds; 8/4/17 admit 103 pounds; 12/21/17 admit 97 pounds. Current wt 103 pounds./unintentional

## 2018-01-25 NOTE — PROGRESS NOTE ADULT - SUBJECTIVE AND OBJECTIVE BOX
[facility] TEAM SURGERY DAILY PROGRESS NOTE:    S: Patient seen and examined. She remains somnolent. She denies any nausea/vomiting. She continues to endorse abdominal pain that she pushes her PCA for.    O:  Vital Signs Last 24 Hrs  T(C): 36.6 (24 Jan 2018 23:00), Max: 39.4 (24 Jan 2018 07:48)  T(F): 97.8 (24 Jan 2018 23:00), Max: 103 (24 Jan 2018 07:48)  HR: 88 (25 Jan 2018 00:00) (85 - 121)  BP: 103/66 (25 Jan 2018 00:00) (87/61 - 121/82)  BP(mean): 79 (25 Jan 2018 00:00) (67 - 81)  RR: 12 (25 Jan 2018 00:00) (10 - 31)  SpO2: 100% (25 Jan 2018 00:00) (97% - 100%)    I&O's Detail    24 Jan 2018 07:01  -  25 Jan 2018 00:50  --------------------------------------------------------  IN:    dextrose 5% + lactated ringers.: 75 mL    dextrose 5% + lactated ringers.: 450 mL    IV PiggyBack: 200 mL  Total IN: 725 mL    OUT:    Bulb: 80 mL    Voided: 3 mL  Total OUT: 83 mL    Total NET: 642 mL    MEDICATIONS  (STANDING):  dextrose 5% + lactated ringers. 1000 milliLiter(s) (50 mL/Hr) IV Continuous <Continuous>  HYDROmorphone PCA (1 mG/mL) 30 milliLiter(s) PCA Continuous PCA Continuous  meropenem  IVPB      meropenem  IVPB 500 milliGRAM(s) IV Intermittent every 8 hours  vancomycin  IVPB 750 milliGRAM(s) IV Intermittent every 12 hours    MEDICATIONS  (PRN):  HYDROmorphone PCA (1 mG/mL) Rescue Clinician Bolus 1 milliGRAM(s) IV Push every 1 hour PRN for Pain Scale GREATER THAN 6  naloxone Injectable 0.1 milliGRAM(s) IV Push every 3 minutes PRN For ANY of the following changes in patient status:  A. RR LESS THAN 10 breaths per minute, B. Oxygen saturation LESS THAN 90%, C. Sedation score of 6  ondansetron Injectable 4 milliGRAM(s) IV Push every 8 hours PRN Nausea and/or Vomiting                        7.3    4.7   )-----------( 332      ( 24 Jan 2018 11:09 )             22.8     01-24    141  |  99  |  52<H>  ----------------------------<  79  3.7   |  27  |  0.73    Ca    8.2<L>      24 Jan 2018 11:09  Phos  4.8     01-24  Mg     2.4     01-24    TPro  5.5<L>  /  Alb  3.2<L>  /  TBili  0.6  /  DBili  x   /  AST  1788<H>  /  ALT  256<H>  /  AlkPhos  1221<H>  01-24    Physical Exam:  Gen: Laying in bed, NAD, sleepy  Abd: peritoneal, 10F drain in RUQ with bilious/purulent fluid, previous surgical scar in midline, ostomy productive of flatus and stool    Lines:   IV: patent, in place.     < from: CT Abdomen and Pelvis w/ Oral Cont and w/ IV Cont (01.24.18 @ 01:40) >  IMPRESSION:    Worsening peritoneal carcinomatosis. Small bowel obstruction with   transition point in the pelvis on the basis of peritoneal disease.    Gallbladder perforation with right upper quadrant collection.    A few droplets of air in the deep pelvis donot appear within the bowel   lumen raising concern for bowel perforation.    < end of copied text >

## 2018-01-26 LAB
-  AMIKACIN: SIGNIFICANT CHANGE UP
-  AMPICILLIN/SULBACTAM: SIGNIFICANT CHANGE UP
-  AMPICILLIN: SIGNIFICANT CHANGE UP
-  AZTREONAM: SIGNIFICANT CHANGE UP
-  CEFAZOLIN: SIGNIFICANT CHANGE UP
-  CEFEPIME: SIGNIFICANT CHANGE UP
-  CEFOXITIN: SIGNIFICANT CHANGE UP
-  CEFTAZIDIME: SIGNIFICANT CHANGE UP
-  CEFTRIAXONE: SIGNIFICANT CHANGE UP
-  CIPROFLOXACIN: SIGNIFICANT CHANGE UP
-  ERTAPENEM: SIGNIFICANT CHANGE UP
-  GENTAMICIN: SIGNIFICANT CHANGE UP
-  IMIPENEM: SIGNIFICANT CHANGE UP
-  LEVOFLOXACIN: SIGNIFICANT CHANGE UP
-  MEROPENEM: SIGNIFICANT CHANGE UP
-  PIPERACILLIN/TAZOBACTAM: SIGNIFICANT CHANGE UP
-  TOBRAMYCIN: SIGNIFICANT CHANGE UP
-  TRIMETHOPRIM/SULFAMETHOXAZOLE: SIGNIFICANT CHANGE UP
ALBUMIN SERPL ELPH-MCNC: 2.8 G/DL — LOW (ref 3.3–5)
ALP SERPL-CCNC: 653 U/L — HIGH (ref 40–120)
ALT FLD-CCNC: 107 U/L — HIGH (ref 10–45)
ANION GAP SERPL CALC-SCNC: 13 MMOL/L — SIGNIFICANT CHANGE UP (ref 5–17)
APTT BLD: 31.4 SEC — SIGNIFICANT CHANGE UP (ref 27.5–37.4)
AST SERPL-CCNC: 162 U/L — HIGH (ref 10–40)
BILIRUB SERPL-MCNC: 0.4 MG/DL — SIGNIFICANT CHANGE UP (ref 0.2–1.2)
BUN SERPL-MCNC: 42 MG/DL — HIGH (ref 7–23)
CA-I BLD-SCNC: 1.14 MMOL/L — SIGNIFICANT CHANGE UP (ref 1.12–1.3)
CALCIUM SERPL-MCNC: 8.3 MG/DL — LOW (ref 8.4–10.5)
CHLORIDE SERPL-SCNC: 99 MMOL/L — SIGNIFICANT CHANGE UP (ref 96–108)
CO2 SERPL-SCNC: 33 MMOL/L — HIGH (ref 22–31)
CREAT SERPL-MCNC: 0.54 MG/DL — SIGNIFICANT CHANGE UP (ref 0.5–1.3)
CULTURE RESULTS: NO GROWTH — SIGNIFICANT CHANGE UP
GLUCOSE SERPL-MCNC: 82 MG/DL — SIGNIFICANT CHANGE UP (ref 70–99)
HCT VFR BLD CALC: 22.7 % — LOW (ref 34.5–45)
HGB BLD-MCNC: 7.1 G/DL — LOW (ref 11.5–15.5)
INR BLD: 1.55 RATIO — HIGH (ref 0.88–1.16)
MAGNESIUM SERPL-MCNC: 1.8 MG/DL — SIGNIFICANT CHANGE UP (ref 1.6–2.6)
MCHC RBC-ENTMCNC: 27.7 PG — SIGNIFICANT CHANGE UP (ref 27–34)
MCHC RBC-ENTMCNC: 31.3 GM/DL — LOW (ref 32–36)
MCV RBC AUTO: 88.7 FL — SIGNIFICANT CHANGE UP (ref 80–100)
METHOD TYPE: SIGNIFICANT CHANGE UP
PHOSPHATE SERPL-MCNC: 3.6 MG/DL — SIGNIFICANT CHANGE UP (ref 2.5–4.5)
PLATELET # BLD AUTO: 255 K/UL — SIGNIFICANT CHANGE UP (ref 150–400)
POTASSIUM SERPL-MCNC: 3 MMOL/L — LOW (ref 3.5–5.3)
POTASSIUM SERPL-SCNC: 3 MMOL/L — LOW (ref 3.5–5.3)
PROT SERPL-MCNC: 5.2 G/DL — LOW (ref 6–8.3)
PROTHROM AB SERPL-ACNC: 17.7 SEC — HIGH (ref 10–13.1)
RBC # BLD: 2.56 M/UL — LOW (ref 3.8–5.2)
RBC # FLD: 16.1 % — HIGH (ref 10.3–14.5)
SODIUM SERPL-SCNC: 145 MMOL/L — SIGNIFICANT CHANGE UP (ref 135–145)
SPECIMEN SOURCE: SIGNIFICANT CHANGE UP
VANCOMYCIN TROUGH SERPL-MCNC: 21.1 UG/ML — HIGH (ref 10–20)
WBC # BLD: 5.96 K/UL — SIGNIFICANT CHANGE UP (ref 3.8–10.5)
WBC # FLD AUTO: 5.96 K/UL — SIGNIFICANT CHANGE UP (ref 3.8–10.5)

## 2018-01-26 PROCEDURE — 71045 X-RAY EXAM CHEST 1 VIEW: CPT | Mod: 26

## 2018-01-26 PROCEDURE — 99254 IP/OBS CNSLTJ NEW/EST MOD 60: CPT

## 2018-01-26 PROCEDURE — 99233 SBSQ HOSP IP/OBS HIGH 50: CPT

## 2018-01-26 RX ORDER — MAGNESIUM SULFATE 500 MG/ML
2 VIAL (ML) INJECTION ONCE
Qty: 0 | Refills: 0 | Status: COMPLETED | OUTPATIENT
Start: 2018-01-26 | End: 2018-01-26

## 2018-01-26 RX ORDER — ERTAPENEM SODIUM 1 G/1
1000 INJECTION, POWDER, LYOPHILIZED, FOR SOLUTION INTRAMUSCULAR; INTRAVENOUS EVERY 24 HOURS
Qty: 0 | Refills: 0 | Status: DISCONTINUED | OUTPATIENT
Start: 2018-01-26 | End: 2018-01-26

## 2018-01-26 RX ORDER — POTASSIUM CHLORIDE 20 MEQ
10 PACKET (EA) ORAL
Qty: 0 | Refills: 0 | Status: COMPLETED | OUTPATIENT
Start: 2018-01-26 | End: 2018-01-26

## 2018-01-26 RX ORDER — ERTAPENEM SODIUM 1 G/1
1000 INJECTION, POWDER, LYOPHILIZED, FOR SOLUTION INTRAMUSCULAR; INTRAVENOUS EVERY 24 HOURS
Qty: 0 | Refills: 0 | Status: COMPLETED | OUTPATIENT
Start: 2018-01-26 | End: 2018-02-08

## 2018-01-26 RX ORDER — VANCOMYCIN HCL 1 G
750 VIAL (EA) INTRAVENOUS EVERY 24 HOURS
Qty: 0 | Refills: 0 | Status: DISCONTINUED | OUTPATIENT
Start: 2018-01-26 | End: 2018-01-26

## 2018-01-26 RX ORDER — POTASSIUM CHLORIDE 20 MEQ
10 PACKET (EA) ORAL
Qty: 0 | Refills: 0 | Status: DISCONTINUED | OUTPATIENT
Start: 2018-01-26 | End: 2018-01-26

## 2018-01-26 RX ORDER — POTASSIUM CHLORIDE 20 MEQ
20 PACKET (EA) ORAL
Qty: 0 | Refills: 0 | Status: COMPLETED | OUTPATIENT
Start: 2018-01-26 | End: 2018-01-26

## 2018-01-26 RX ADMIN — Medication 100 MILLIEQUIVALENT(S): at 18:37

## 2018-01-26 RX ADMIN — HYDROMORPHONE HYDROCHLORIDE 30 MILLILITER(S): 2 INJECTION INTRAMUSCULAR; INTRAVENOUS; SUBCUTANEOUS at 07:28

## 2018-01-26 RX ADMIN — ERTAPENEM SODIUM 120 MILLIGRAM(S): 1 INJECTION, POWDER, LYOPHILIZED, FOR SOLUTION INTRAMUSCULAR; INTRAVENOUS at 14:30

## 2018-01-26 RX ADMIN — ENOXAPARIN SODIUM 30 MILLIGRAM(S): 100 INJECTION SUBCUTANEOUS at 14:29

## 2018-01-26 RX ADMIN — Medication 100 MILLIEQUIVALENT(S): at 11:20

## 2018-01-26 RX ADMIN — MEROPENEM 100 MILLIGRAM(S): 1 INJECTION INTRAVENOUS at 05:31

## 2018-01-26 RX ADMIN — HYDROMORPHONE HYDROCHLORIDE 30 MILLILITER(S): 2 INJECTION INTRAMUSCULAR; INTRAVENOUS; SUBCUTANEOUS at 02:47

## 2018-01-26 RX ADMIN — HYDROMORPHONE HYDROCHLORIDE 30 MILLILITER(S): 2 INJECTION INTRAMUSCULAR; INTRAVENOUS; SUBCUTANEOUS at 19:20

## 2018-01-26 RX ADMIN — SODIUM CHLORIDE 50 MILLILITER(S): 9 INJECTION, SOLUTION INTRAVENOUS at 18:08

## 2018-01-26 RX ADMIN — Medication 20 MILLIEQUIVALENT(S): at 11:20

## 2018-01-26 RX ADMIN — ONDANSETRON 4 MILLIGRAM(S): 8 TABLET, FILM COATED ORAL at 09:38

## 2018-01-26 RX ADMIN — FLUCONAZOLE 50 MILLIGRAM(S): 150 TABLET ORAL at 18:40

## 2018-01-26 RX ADMIN — Medication 20 MILLIEQUIVALENT(S): at 14:15

## 2018-01-26 RX ADMIN — Medication 50 GRAM(S): at 11:20

## 2018-01-26 RX ADMIN — Medication 100 MILLIEQUIVALENT(S): at 14:30

## 2018-01-26 NOTE — CONSULT NOTE ADULT - ASSESSMENT
47F with metastatic ovarian CA (s/p TAHBSO, colostomy, previously seeking holistic care 12/2017 however has since established with new oncologist for chemo) p/w abdominal pain.  Now wuth bowel obstruction  GB perforation  Seen and followed by surgery  s/p IR drainage  SIRS,sepsis resolving  PCN allergy-but remote and tolerating meropenem  Biliary cultures with E Coli and yeast  Final sensis pending  Also s/p recent chemo 47F with metastatic ovarian CA (s/p TAHBSO, colostomy, previously seeking holistic care 12/2017 however has since established with new oncologist for chemo) p/w abdominal pain.  Now with bowel obstruction  previously s/p colostomy,wright  ?caput around stoma per team   GB perforation  Seen and followed by surgery  s/p IR drainage  SIRS,sepsis resolving  PCN allergy-but remote and tolerating meropenem  Biliary cultures with E Coli and yeast  Final sensis pending  Also s/p recent chemo  Poor surgical candidate -no intervention for now per surgery  Still with fevers  No other foci  Blood  cx negative    Rec:  1) Further surgical plan ,if any,per surgery team.Monitor abdominal exam and course closely  2) Follow biliary cultures  3) Follow blood Cx  4) Follow ID sensi of isolates  5) Continue Invanz and fluconazole for now  6) Monitor closely for any s/s other nosocomial focus  Will tailor plan for ID issues  per course,results.Will defer to primary team on management of other issues.  Prognosis guarded  Case d/w surgical team  Infectious Diseases Service will cover over weekend.  Please call [phone] if issues

## 2018-01-26 NOTE — PROGRESS NOTE ADULT - ASSESSMENT
Patient is a 46 yo F with metastatic ovarian CA (s/p TAHBSO, s/p colostomy, with holistic care approach, started chemo on 12/2017 carboplatin/taxol every Wednesday, due for tx today). Admitted to MICU under the impression of malignant SBO, sepsis, GB perforation, hypoglycemia, and worsening metastatic disease. Palliative Care consult placed for pain management and GOC.

## 2018-01-26 NOTE — CHART NOTE - NSCHARTNOTEFT_GEN_A_CORE
GENERAL SURGERY FLOOR TRANSFER NOTE      SUBJECTIVE:  47y Female transferred to floor from SICU. Pt doing well, no complaints.    OBJECTIVE:    T(C): 36.3 (01-26-18 @ 00:54), Max: 36.9 (01-25-18 @ 15:00)  HR: 66 (01-26-18 @ 00:54) (65 - 91)  BP: 91/60 (01-26-18 @ 00:54) (82/55 - 108/71)  RR: 18 (01-26-18 @ 00:54) (8 - 26)  SpO2: 94% (01-26-18 @ 00:54) (94% - 100%)  Wt(kg): --      I&O's Summary    24 Jan 2018 07:01  -  25 Jan 2018 07:00  --------------------------------------------------------  IN: 1475 mL / OUT: 566 mL / NET: 909 mL    25 Jan 2018 07:01  -  26 Jan 2018 01:53  --------------------------------------------------------  IN: 950 mL / OUT: 280 mL / NET: 670 mL                              7.5    3.4   )-----------( 244      ( 25 Jan 2018 03:40 )             23.4       01-25    145  |  101  |  51<H>  ----------------------------<  22<LL>  3.2<L>   |  30  |  0.66    Ca    8.2<L>      25 Jan 2018 03:40  Phos  4.9     01-25  Mg     2.2     01-25    TPro  5.2<L>  /  Alb  3.0<L>  /  TBili  0.4  /  DBili  0.1  /  AST  425<H>  /  ALT  160<H>  /  AlkPhos  853<H>  01-25      MEDICATIONS  (STANDING):  dextrose 10% + sodium chloride 0.45% 1000 milliLiter(s) (50 mL/Hr) IV Continuous <Continuous>  enoxaparin Injectable 30 milliGRAM(s) SubCutaneous daily  fluconAZOLE IVPB      fluconAZOLE IVPB 200 milliGRAM(s) IV Intermittent every 24 hours  HYDROmorphone PCA (1 mG/mL) 30 milliLiter(s) PCA Continuous PCA Continuous  meropenem  IVPB      meropenem  IVPB 500 milliGRAM(s) IV Intermittent every 8 hours  vancomycin  IVPB 750 milliGRAM(s) IV Intermittent every 12 hours    MEDICATIONS  (PRN):  HYDROmorphone PCA (1 mG/mL) Rescue Clinician Bolus 1 milliGRAM(s) IV Push every 1 hour PRN for Pain Scale GREATER THAN 6  naloxone Injectable 0.1 milliGRAM(s) IV Push every 3 minutes PRN For ANY of the following changes in patient status:  A. RR LESS THAN 10 breaths per minute, B. Oxygen saturation LESS THAN 90%, C. Sedation score of 6  ondansetron Injectable 4 milliGRAM(s) IV Push every 8 hours PRN Nausea and/or Vomiting        PHYSICAL EXAM:    Gen: Resting in bed, NAD, alert and oriented.   Resp: airway patent, respirations unlabored, no increased WOB  Abd: soft, NT/ND

## 2018-01-26 NOTE — CHART NOTE - NSCHARTNOTEFT_GEN_A_CORE
Pt seen for malnutrition follow up, as per departmental protocol. Subjective diet/weight history obtained. See Initial Nutrition Assessment 01/25/18.    Hospital Course: Per chart, pt is a "47y Female with h/o stage IV ovarian cancer with liver mets s/p GRETCHEN-BSO and colostomy in 2016.  Pt is on home Dilaudid PCA, followed by palliative care and plans for chemotherapy.  Pt admitted presented to ED on 1/23 for abdominal pain and fever.  CTAP shows SBO with transition point in pelvis, extensive peritoneal carcinomatosis, perforated gallbladder with collection containing fluid and air.  RRT was called on patient in ED for hypoglycemia to 29, improved to 200s after amp of D50.  Pt is now s/p IR drainage of right abdominal fluid collection/gallbladder on 1/24." Per chart, Palliative is following for pain management and GOC; poor prognosis noted.    Pt reports she follows a low sugar diet at home under direction of her MD. Pt makes protein shakes from water mixed with plant-based protein powder. Pt states her weight has been stable after prior severe weight loss (confirmed by chart review; see Initial Nutrition Assessment). Pt is very thin and catabolic appearing; noted with muscle severe depletion of temples and biceps/triceps; noted with overall depletion of subcutaneous fat.    Encouraged pt to increase overall protein and calorie intake. Pt is amenable to Magic Cup but declined all other offers of oral supplements.    Source: Patient [X ]    Family [ ]     other [ ]    Diet : advanced to Regular (01/26)    Patient reports good tolerance to po diet with no nausea or vomiting. Colostomy output = 50ml.     PO intake:  < 50% [ ] 50-75% [X ]   % [ ]  other : Pt observed eating cake. Pt reports she ate "only carbs for breakfast" and salad for lunch.     Source for PO intake [X ] Patient [ ] family [ ] chart [ ] staff [ ] other     Enteral /Parenteral Nutrition: n/a      Current Weight: Weight (kg): 46.7Kg (01/25), 48Kg (01/24 dosing)  Edema: 1+ R/L foot/ankle    Pertinent Medications: MEDICATIONS  (STANDING):  dextrose 10% + sodium chloride 0.45% 1000 milliLiter(s) (50 mL/Hr) IV Continuous <Continuous>  enoxaparin Injectable 30 milliGRAM(s) SubCutaneous daily  ertapenem  IVPB 1000 milliGRAM(s) IV Intermittent every 24 hours  fluconAZOLE IVPB      fluconAZOLE IVPB 200 milliGRAM(s) IV Intermittent every 24 hours  HYDROmorphone PCA (1 mG/mL) 30 milliLiter(s) PCA Continuous PCA Continuous  potassium chloride  10 mEq/100 mL IVPB 10 milliEquivalent(s) IV Intermittent every 1 hour    MEDICATIONS  (PRN):  HYDROmorphone PCA (1 mG/mL) Rescue Clinician Bolus 1 milliGRAM(s) IV Push every 1 hour PRN for Pain Scale GREATER THAN 6  naloxone Injectable 0.1 milliGRAM(s) IV Push every 3 minutes PRN For ANY of the following changes in patient status:  A. RR LESS THAN 10 breaths per minute, B. Oxygen saturation LESS THAN 90%, C. Sedation score of 6  ondansetron Injectable 4 milliGRAM(s) IV Push every 8 hours PRN Nausea and/or Vomiting    Pertinent Labs:  01-26 Na145 mmol/L Glu 82 mg/dL K+ 3.0 mmol/L<L> Cr  0.54 mg/dL BUN 42 mg/dL<H> Phos 3.6 mg/dL Alb 2.8 g/dL<L>  Fingersticks (01/25): 24-146mg/dL  Fingersticks (01/26): 88-177mg/dL    Skin: DTI sacrum    Estimated Needs:   [X ] no change since previous assessment  [ ] recalculated:     Previous Nutrition Diagnosis:      [X ] Malnutrition; severe    Nutrition Diagnosis is [ X] ongoing; being addressed with po diet and Magic Cup     New Nutrition Diagnosis: [X    Recommend    Recommend:  1) Continue Regular diet; encourage intake of high protein, nutrient dense foods . RD will add Magic Cup to meal trays. Pt declined all other oral supplements offered.   2) Monitor weight, lab values, skin, po intake and GI tolerance  3) RD will remain available to honor pt/ family wishes regarding plan of care.     Monitoring and Evaluation:   Follow up per protocol  RD to remain available for further nutritional interventions as indicated.   Krysten Armstrong, MS RD [facility], #[phone].

## 2018-01-26 NOTE — CONSULT NOTE ADULT - SUBJECTIVE AND OBJECTIVE BOX
Patient is a 47y old  Female who presents with a chief complaint of Abdominal Pain (24 Jan 2018 06:17)    From HPI" HPI:  Patient is a 47F with metastatic ovarian CA (s/p TAHBSO, colostomy, previously seeking holistic care 12/2017 however has since established with new oncologist for chemo) p/w abdominal pain. History obtained by spouse at bedside. Per him, patient acutely had an increase in pain. Since this time she has had minimal to no ostomy output, worsening abdominal distention, and nausea, without vomiting. Due to progression of symptoms they presented to ED today. Per , patient has not had cough, chills, night sweats. Patient recently started on chemotherapy, and abdominal pain has been getting worse.  She has been having intermittent fever.  She has been becoming more and more lethargic.   noticed that there is bowel herniation through the colostomy.     Patient has Stage IV ovarian CA c/b chronic pain for which she is on a Dilaudid pump at home. Per , she has been sedated on her current regimen, however without the Rx, she will usually complain of pain. At this time, patient able to follow simple commands and denies acute pain.    ED Course   Vitals   Temp 102.7               /75    RR 16 [id number]% on 4L  Patient received 1L NS, Ofirmev, had CT abdomen, and had surgery C/s placed (24 Jan 2018 06:17)  "    Above verified-agree with above unless noted below.  CT abd s/o perforated Gall bladder  Had RRT  Was in SICU  Had IR cholecystostomy   Stabilized  Started on vanco and meropenem      ID consulted     PAST MEDICAL & SURGICAL HISTORY:  Ovarian cancer: dx in dec 2016 (started as fibroid --&gt; large cancerous tumor)  Colostomy in place: dec 2016 (placed during hysterectomy in dec 2016 at [facility])  H/O abdominal hysterectomy: dec 2016      Social history:   denies   Smoking,    ETOH,      IVDU       FAMILY HISTORY:  No pertinent family history in first degree relatives  - Reviewed,Non contributory     REVIEW OF SYSTEMS  General:	Denies any malaise fatigue or chills. Fevers absent    Skin:No rash  	  Ophthalmologic:Denies any visual complaints,discharge redness or photophobia  	  ENMT:No nasal discharge,headache,sinus congestion or throat pain.No dental complaints    Respiratory and Thorax:No cough,sputum or chest pain.Denies shortness of breath  	  Cardiovascular:	No chest pain,palpitaions or dizziness    Gastrointestinal:	NO nausea,+ + abdominal pain or diarrhea.Has colostomy     Genitourinary:	No dysuria,frequency. No flank pain    Musculoskeletal:	No joint swelling or pain.No weakness    Neurological:No confusion,diziness.No extremity weakness.No bladder or bowel incontinence	    Psychiatric:No delusions or hallucinations	    Hematology/Lymphatics:	No LN swelling.No gum bleeding     Endocrine:	No recent weight gain or loss.No abnormal heat/cold intolerance    Allergic/Immunologic:	No hives or rash   Allergies    penicillin (Rash)    Intolerances        Antimicrobials:    ertapenem  IVPB 1000 milliGRAM(s) IV Intermittent every 24 hours(recd meropenem and vanco doses earlier )  fluconAZOLE IVPB      fluconAZOLE IVPB 200 milliGRAM(s) IV Intermittent every 24 hours    Other medications reviewed      Vital Signs Last 24 Hrs  T(C): 36.5 (26 Jan 2018 10:00), Max: 36.9 (25 Jan 2018 15:00)  T(F): 97.7 (26 Jan 2018 10:00), Max: 98.4 (25 Jan 2018 15:00)  HR: 82 (26 Jan 2018 10:00) (65 - 87)  BP: 89/69 (26 Jan 2018 10:00) (85/63 - 107/71)  BP(mean): 70 (25 Jan 2018 18:00) (68 - 72)  RR: 17 (26 Jan 2018 10:00) (12 - 26)  SpO2: 100% (26 Jan 2018 10:00) (94% - 100%)    PHYSICAL EXAM:Pleasant patient in no acute distress.      Constitutional:Comfortable.Awake and alert  ++ cachexia     Eyes:PERRL EOMI.NO discharge or conjunctival injection    R Sc mediport no erythema or tenderness    ENMT:No sinus tenderness.No thrush.No pharyngeal exudate or erythema.Fair dental hygiene    Neck:Supple,No LN,no JVD      Respiratory:Good air entry bilaterally,CTA    Cardiovascular:S1 S2 wnl, No murmurs,rub or gallops    Gastrointestinal:Soft BS(+) + mild diffuse  tenderness colostomy with colostomy ,No rebound or guarding  RUQ drain with bilious discharge     Genitourinary:No CVA tendereness   Lynn         Extremities:No cyanosis,clubbing or edema.    Vascular:peripheral pulses felt    Neurological:AAO X 3,No grossly focal deficits    Skin:No rash     Lymph Nodes:No palpable LNs    Musculoskeletal:No joint swelling or LOM    Psychiatric:Affect normal.          Labs:                            7.1    5.96  )-----------( 255      ( 26 Jan 2018 07:33 )             22.7     WBC Count: 5.96 (01-26-18 @ 07:33)  WBC Count: 3.4 (01-25-18 @ 03:40)  WBC Count: 4.7 (01-24-18 @ 11:09)  WBC Count: 3.2 (01-23-18 @ 18:38)      01-26    145  |  99  |  42<H>  ----------------------------<  82  3.0<L>   |  33<H>  |  0.54    Ca    8.3<L>      26 Jan 2018 07:33  Phos  3.6     01-26  Mg     1.8     01-26    TPro  5.2<L>  /  Alb  2.8<L>  /  TBili  0.4  /  DBili  x   /  AST  162<H>  /  ALT  107<H>  /  AlkPhos  653<H>  01-26            Culture - Urine (collected 25 Jan 2018 08:20)  Source: .Urine Catheterized  Final Report (26 Jan 2018 11:45):    No growth    Culture - Body Fluid with Gram Stain (collected 24 Jan 2018 21:39)  Source: Abdominal Fl Abdominal Fluid  Gram Stain (24 Jan 2018 23:56):    Moderate polymorphonuclear leukocytes per low power field    Moderate Gram Negative Rods per oil power field  Preliminary Report (25 Jan 2018 15:53):    Numerous Escherichia coli    Rare Yeast    Culture - Blood (collected 23 Jan 2018 23:27)  Source: .Blood Blood  Preliminary Report (25 Jan 2018 01:02):    No growth to date.    Culture - Blood (collected 23 Jan 2018 23:27)  Source: .Blood Blood-Venous  Preliminary Report (25 Jan 2018 01:02):    No growth to date.      < from: Xray Chest 1 View AP -PORTABLE-Routine (01.25.18 @ 07:09) >  Impression:    The heart is normal in size. Right pleural effusion which remain   unchanged when compared to previous study done January 24, 2018. A small   left pleural effusion cannot be ruled out entirely. A MediPort is seen on   the right and tip in superior vena cava. A central line is seen on the   left as well and the tip is in superior vena cava. No pneumothorax.      < end of copied text >    Vancomycin Level, Trough: 21.1 ug/mL (01-26-18 @ 06:10)    < from: IR Procedure (01.24.18 @ 15:38) >    Impression:   Successful CT guided percutaneous drainage of thegallbladder and   pericholecystic fluid with a 10 Vietnamese pigtail drainage catheter yielded   approximately 55 cc of yellow/light brown cloudy fluid. Sample of fluid   sent for microbiological analysis. Catheter left to drainage bag.      < end of copied text >    < from: CT Abdomen and Pelvis w/ Oral Cont and w/ IV Cont (01.24.18 @ 01:40) >    IMPRESSION:    Worsening peritoneal carcinomatosis. Small bowel obstruction with   transition point in the pelvis on the basis of peritoneal disease.    Gallbladder perforation with right upper quadrant collection.    A few droplets of air in the deep pelvis donot appear within the bowel   lumen raising concern for bowel perforation.    Additions to the preliminary report were discussed with RAFYF Gray by Dr. Whiting 1/24/2018 at 8:55 AM      < end of copied text >

## 2018-01-26 NOTE — PROGRESS NOTE ADULT - PROBLEM SELECTOR PLAN 6
D/W patient and her  (Brooklynn) about the patient's need for extra help at home. She actually has help from 9 am to 9 pm. She will need 24 hours home care and the patient will further look into resources to support that.  will continue to follow up. Will advise to see if the patient is eligible for PRUCOL.

## 2018-01-26 NOTE — PROGRESS NOTE ADULT - SUBJECTIVE AND OBJECTIVE BOX
[facility]  Carter Genao PA-C  [address] [place] [address] [phone]      INTERVAL HPI/OVERNIGHT EVENTS:    transferred to surgical floor  pain control  stool in ostomy  started on diet    MEDICATIONS  (STANDING):  dextrose 10% + sodium chloride 0.45% 1000 milliLiter(s) (50 mL/Hr) IV Continuous <Continuous>  enoxaparin Injectable 30 milliGRAM(s) SubCutaneous daily  fluconAZOLE IVPB      fluconAZOLE IVPB 200 milliGRAM(s) IV Intermittent every 24 hours  HYDROmorphone PCA (1 mG/mL) 30 milliLiter(s) PCA Continuous PCA Continuous  magnesium sulfate  IVPB 2 Gram(s) IV Intermittent once  meropenem  IVPB      meropenem  IVPB 500 milliGRAM(s) IV Intermittent every 8 hours  potassium chloride    Tablet ER 20 milliEquivalent(s) Oral every 2 hours  potassium chloride  10 mEq/100 mL IVPB 10 milliEquivalent(s) IV Intermittent every 1 hour  vancomycin  IVPB 750 milliGRAM(s) IV Intermittent every 24 hours    MEDICATIONS  (PRN):  HYDROmorphone PCA (1 mG/mL) Rescue Clinician Bolus 1 milliGRAM(s) IV Push every 1 hour PRN for Pain Scale GREATER THAN 6  naloxone Injectable 0.1 milliGRAM(s) IV Push every 3 minutes PRN For ANY of the following changes in patient status:  A. RR LESS THAN 10 breaths per minute, B. Oxygen saturation LESS THAN 90%, C. Sedation score of 6  ondansetron Injectable 4 milliGRAM(s) IV Push every 8 hours PRN Nausea and/or Vomiting      Allergies    penicillin (Rash)    Intolerances        ROS:   General:  No wt loss, fevers, chills, night sweats, fatigue,   Eyes:  Good vision, no reported pain  ENT:  No sore throat, pain, runny nose, dysphagia  CV:  No pain, palpitations, hypo/hypertension  Resp:  No dyspnea, cough, tachypnea, wheezing  GI:  + pain, No nausea, No vomiting, No diarrhea, No constipation, No weight loss, No fever, No pruritis, No rectal bleeding, No tarry stools, No dysphagia,  :  No pain, bleeding, incontinence, nocturia  Muscle:  No pain, weakness  Neuro:  No weakness, tingling, memory problems  Psych:  No fatigue, insomnia, mood problems, depression  Endocrine:  No polyuria, polydipsia, cold/heat intolerance  Heme:  No petechiae, ecchymosis, easy bruisability  Skin:  No rash, tattoos, scars, edema      PHYSICAL EXAM:   Vital Signs:  Vital Signs Last 24 Hrs  T(C): 36.5 (26 Jan 2018 10:00), Max: 36.9 (25 Jan 2018 15:00)  T(F): 97.7 (26 Jan 2018 10:00), Max: 98.4 (25 Jan 2018 15:00)  HR: 82 (26 Jan 2018 10:00) (65 - 87)  BP: 89/69 (26 Jan 2018 10:00) (85/56 - 107/71)  BP(mean): 70 (25 Jan 2018 18:00) (66 - 72)  RR: 17 (26 Jan 2018 10:00) (12 - 26)  SpO2: 100% (26 Jan 2018 10:00) (94% - 100%)  Daily     Daily     GENERAL: frail  HEENT:  NC/AT,    CHEST:  Full & symmetric excursion  HEART:  Regular rhythm, S1, S2,  ABDOMEN:  Soft, drain in place, stoma noted with stool   EXTEREMITIES:  +edema  SKIN:  No rash  NEURO:  Alert       LABS:                        7.1    5.96  )-----------( 255      ( 26 Jan 2018 07:33 )             22.7     01-26    145  |  99  |  42<H>  ----------------------------<  82  3.0<L>   |  33<H>  |  0.54    Ca    8.3<L>      26 Jan 2018 07:33  Phos  3.6     01-26  Mg     1.8     01-26    TPro  5.2<L>  /  Alb  2.8<L>  /  TBili  0.4  /  DBili  x   /  AST  162<H>  /  ALT  107<H>  /  AlkPhos  653<H>  01-26    PT/INR - ( 26 Jan 2018 07:33 )   PT: 17.7 sec;   INR: 1.55 ratio         PTT - ( 26 Jan 2018 07:33 )  PTT:31.4 sec      RADIOLOGY & ADDITIONAL TESTS:

## 2018-01-26 NOTE — PROGRESS NOTE ADULT - PROBLEM SELECTOR PLAN 3
Controlled.   Continue Dialudid PCA 0.5 mg/h, 0.5 mg q 15' DD, and 1 mg q 1 CB  s/p CT guided drainage of GB/ pericholecystic fluid collection.  Abx as per Sx   Holistic Nurse referral done

## 2018-01-26 NOTE — PROGRESS NOTE ADULT - SUBJECTIVE AND OBJECTIVE BOX
HPI:  Patient is a 46 yo F with metastatic ovarian CA (s/p TAHBSO, s/p colostomy, with holistic care approach, started chemo on 12/2017 carboplatin/taxol every Wednesday) p/w abdominal pain and  minimal to no ostomy output, worsening abdominal distention, and nausea, without vomiting. Patient was found to have a GB perforation, sepsis, small bowel perforation, and worsening peritoneal carcinomatosis. We are following for GOC and symptomatic Rx.     PERTINENT PM/SXH:   Ovarian cancer    Colostomy in place  H/O abdominal hysterectomy    SOCIAL HISTORY:   Significant other/partner:  [ x] YES  [ ] NO               Children:  [ ] YES  [x ] NO                   Buddhism/Spirituality:  Substance hx:  [ ] YES   [x ] NO                   Tobacco hx:  [ ] YES  [x ] NO                       Alcohol hx: [ ] YES  [x ] NO         Home Opioid hx:  [x ] YES  [ ] NO   Living Situation: [x ] Home  [ ] Long term care  [ ] Rehab [ ] Other    FAMILY HISTORY:  No pertinent family history in first degree relatives    [ x] Family history non-contributory     BASELINE (I)ADLs (prior to admission):  [place]: [ ] total  [ x] moderate [ ] dependent    ADVANCE DIRECTIVES:    Full code  MOLST  [ ] YES [x ] NO                      [ ] Completed  Health Care Proxy [ x] YES  [ ] NO   [ ] Completed  Living Will  [ ] YES [x ] NO             [ ] Surrogate  [x ] HCP  [ ] Guardian:  Copy in EMR alpha tab in 12/20/17 admission  Trung Buenrostro  Phone#: [phone]    Allergies    penicillin (Rash)    Intolerances    MEDICATIONS  (STANDING):  dextrose 10% + sodium chloride 0.45% 1000 milliLiter(s) (50 mL/Hr) IV Continuous <Continuous>  enoxaparin Injectable 30 milliGRAM(s) SubCutaneous daily  ertapenem  IVPB 1000 milliGRAM(s) IV Intermittent every 24 hours  fluconAZOLE IVPB      fluconAZOLE IVPB 200 milliGRAM(s) IV Intermittent every 24 hours  HYDROmorphone PCA (1 mG/mL) 30 milliLiter(s) PCA Continuous PCA Continuous  potassium chloride  10 mEq/100 mL IVPB 10 milliEquivalent(s) IV Intermittent every 1 hour    MEDICATIONS  (PRN):  HYDROmorphone PCA (1 mG/mL) Rescue Clinician Bolus 1 milliGRAM(s) IV Push every 1 hour PRN for Pain Scale GREATER THAN 6  naloxone Injectable 0.1 milliGRAM(s) IV Push every 3 minutes PRN For ANY of the following changes in patient status:  A. RR LESS THAN 10 breaths per minute, B. Oxygen saturation LESS THAN 90%, C. Sedation score of 6  ondansetron Injectable 4 milliGRAM(s) IV Push every 8 hours PRN Nausea and/or Vomiting      PRESENT SYMPTOMS:  Source: [ x] Patient   [ ] Family   [ ] Team     Pain:                        [ ] No [x ] Yes             [ ] Mild [x ] Moderate ---> now controlled on PCA pump    Onset - Days  Location - Abdomen, diffuse   Duration - Constant  Character - Non specific   Alleviating/Aggravating - Alleviated with Dilaudid aggravated by palpating abdomen, moving.  Radiation - None  Timing - Constant    Dyspnea:                [x ] No [ ] Yes             [ ] Mild [ ] Moderate [ ] Severe    Anxiety:                  [x ] No [ ] Yes             [ ] Mild [ ] Moderate [ ] Severe    Fatigue:                  [ ] No [ x] Yes             [ ] Mild [x ] Moderate [ ] Severe    Nausea:                  [ ] No [x ] Yes             [ ] Mild [x ] Moderate [ ] Severe --> now well controlled, no vomiting.    Loss of appetite:   [ ] No [x ] Yes             [ ] Mild [x ] Moderate [ ] Severe    Constipation:        [ ] No [x ] Yes             [ ] Mild [ ] Moderate [x ] Severe    Other Symptoms:  [x ] All other review of systems negative   [ ] Unable to obtain due to poor mentation     Karnofsky Performance Score/Palliative Performance Status Version 2: 40%    PHYSICAL EXAM:  Vital Signs Last 24 Hrs  T(C): 36.4 (26 Jan 2018 14:10), Max: 36.5 (26 Jan 2018 10:00)  T(F): 97.5 (26 Jan 2018 14:10), Max: 97.7 (26 Jan 2018 10:00)  HR: 78 (26 Jan 2018 14:10) (65 - 87)  BP: 96/60 (26 Jan 2018 14:10) (88/57 - 107/71)  BP(mean): 70 (25 Jan 2018 18:00) (68 - 70)  RR: 16 (26 Jan 2018 14:10) (16 - 26)  SpO2: 95% (26 Jan 2018 14:10) (94% - 100%)    General:  [x ] Alert  [ ] Oriented x      [ ] Lethargic  [ ] Agitated   [x ] Cachexia   [ ] Unarousable  [x ] Verbal  [ ] Non-Verbal    HEENT:  [ ] Normal   [x ] Dry mouth   [ ] ET Tube    [ ] Trach  [ ] Oral lesions    Lungs:   [x ] Clear [ ] Tachypnea  [ ] Audible excessive secretions   [ ] Rhonchi        [ ] Right [ ] Left [ ] Bilateral  [ ] Crackles        [ ] Right [ ] Left [ ] Bilateral  [ ] Wheezing     [ ] Right [ ] Left [ ] Bilateral    Cardiovascular:  [x ] Regular [ ] Irregular [ ] Tachycardia   [ ] Bradycardia  [ ] Murmur [ ] Other    Abdomen: [ ] Soft  [x ] Distended   [x] +BS: hypoactive  [ ] Non tender [ x] Tender on generalized palpation  [ ]PEG   [ ]OGT/ NGT   Last BM: Ostomy, low output.  Ostomy appears protruding.    PCT drain     Genitourinary: [x ] Normal [ ] Incontinent   [ ] Oliguria/Anuria   [ ] Lynn    Musculoskeletal:  [ ] Normal   [x ] Weakness  [ ] Bedbound/Wheelchair bound [ ] Edema    Neurological: [ x] No focal deficits  [ ] Cognitive impairment  [ ] Dysphagia [ ] Dysarthria [ ] Paresis [ ] Other     Skin: [x ] Normal   [ ] Pressure ulcer(s)                  [ ] Rash    LABS:                                                        7.1    5.96  )-----------( 255      ( 26 Jan 2018 07:33 )             22.7   01-26    145  |  99  |  42<H>  ----------------------------<  82  3.0<L>   |  33<H>  |  0.54    Ca    8.3<L>      26 Jan 2018 07:33  Phos  3.6     01-26  Mg     1.8     01-26    TPro  5.2<L>  /  Alb  2.8<L>  /  TBili  0.4  /  DBili  x   /  AST  162<H>  /  ALT  107<H>  /  AlkPhos  653<H>  01-26        Shock: No [ ] Septic [ ] Cardiogenic [ ] Neurologic [ ] Hypovolemic  Vasopressors x None  Inotrophs x None    Protein Calorie Malnutrition: [ ] Mild [ ] Moderate [x ] Severe    Oral Intake: [x ] Unable/mouth care only [ ] Minimal [ ] Moderate [ ] Full Capability  Diet: [x ] NPO [ ] Tube feeds [ ] TPN [ ] Other     RADIOLOGY & ADDITIONAL STUDIES:     < from: CT Abdomen and Pelvis w/ Oral Cont and w/ IV Cont (01.24.18 @ 01:40) >    Worsening peritoneal carcinomatosis. Small bowel obstruction with   transition point in the pelvis on the basis of peritoneal disease.    Gallbladder perforation with right upper quadrant collection.    A few droplets of air in the deep pelvis donot appear within the bowel   lumen raising concern for bowel perforation.    REFERRALS:   [ ] Chaplaincy  [ ] Hospice  [ ] Child Life  [ ] Social Work  [ ] Case management [ ] Holistic Therapy

## 2018-01-26 NOTE — CHART NOTE - NSCHARTNOTEFT_GEN_A_CORE
Spoke with patient's oncologist Dr. Johnson to update him on patient's status.  He is now aware of her disease progression noted on CT scan and gallbladder perforation treated with percutaneous cholecystostomy.  We discussed involving house oncology in her care.  As there is no acute intervention and as she is well known to him, we agree that she can follow up with him as an outpatient for discussion regarding further options for treatment.      GERRY Estrada MD PGY3

## 2018-01-27 LAB
ALBUMIN SERPL ELPH-MCNC: 2.9 G/DL — LOW (ref 3.3–5)
ALP SERPL-CCNC: 511 U/L — HIGH (ref 40–120)
ALT FLD-CCNC: 80 U/L — HIGH (ref 10–45)
ANION GAP SERPL CALC-SCNC: 16 MMOL/L — SIGNIFICANT CHANGE UP (ref 5–17)
AST SERPL-CCNC: 90 U/L — HIGH (ref 10–40)
BILIRUB SERPL-MCNC: 0.3 MG/DL — SIGNIFICANT CHANGE UP (ref 0.2–1.2)
BUN SERPL-MCNC: 40 MG/DL — HIGH (ref 7–23)
CALCIUM SERPL-MCNC: 8.2 MG/DL — LOW (ref 8.4–10.5)
CHLORIDE SERPL-SCNC: 97 MMOL/L — SIGNIFICANT CHANGE UP (ref 96–108)
CO2 SERPL-SCNC: 31 MMOL/L — SIGNIFICANT CHANGE UP (ref 22–31)
CREAT SERPL-MCNC: 0.48 MG/DL — LOW (ref 0.5–1.3)
GLUCOSE SERPL-MCNC: 106 MG/DL — HIGH (ref 70–99)
HCT VFR BLD CALC: 21.4 % — LOW (ref 34.5–45)
HGB BLD-MCNC: 6.4 G/DL — CRITICAL LOW (ref 11.5–15.5)
MAGNESIUM SERPL-MCNC: 1.8 MG/DL — SIGNIFICANT CHANGE UP (ref 1.6–2.6)
MCHC RBC-ENTMCNC: 28.1 PG — SIGNIFICANT CHANGE UP (ref 27–34)
MCHC RBC-ENTMCNC: 29.9 GM/DL — LOW (ref 32–36)
MCV RBC AUTO: 93.9 FL — SIGNIFICANT CHANGE UP (ref 80–100)
PHOSPHATE SERPL-MCNC: 2.3 MG/DL — LOW (ref 2.5–4.5)
PLATELET # BLD AUTO: 196 K/UL — SIGNIFICANT CHANGE UP (ref 150–400)
POTASSIUM SERPL-MCNC: 2.9 MMOL/L — CRITICAL LOW (ref 3.5–5.3)
POTASSIUM SERPL-SCNC: 2.9 MMOL/L — CRITICAL LOW (ref 3.5–5.3)
PROT SERPL-MCNC: 4.8 G/DL — LOW (ref 6–8.3)
RBC # BLD: 2.28 M/UL — LOW (ref 3.8–5.2)
RBC # FLD: 15.5 % — HIGH (ref 10.3–14.5)
SODIUM SERPL-SCNC: 144 MMOL/L — SIGNIFICANT CHANGE UP (ref 135–145)
WBC # BLD: 6.34 K/UL — SIGNIFICANT CHANGE UP (ref 3.8–10.5)
WBC # FLD AUTO: 6.34 K/UL — SIGNIFICANT CHANGE UP (ref 3.8–10.5)

## 2018-01-27 PROCEDURE — 99232 SBSQ HOSP IP/OBS MODERATE 35: CPT

## 2018-01-27 RX ORDER — MAGNESIUM SULFATE 500 MG/ML
1 VIAL (ML) INJECTION ONCE
Qty: 0 | Refills: 0 | Status: COMPLETED | OUTPATIENT
Start: 2018-01-27 | End: 2018-01-27

## 2018-01-27 RX ORDER — SODIUM,POTASSIUM PHOSPHATES 278-250MG
1 POWDER IN PACKET (EA) ORAL
Qty: 0 | Refills: 0 | Status: DISCONTINUED | OUTPATIENT
Start: 2018-01-27 | End: 2018-02-12

## 2018-01-27 RX ORDER — SENNA PLUS 8.6 MG/1
2 TABLET ORAL AT BEDTIME
Qty: 0 | Refills: 0 | Status: DISCONTINUED | OUTPATIENT
Start: 2018-01-27 | End: 2018-02-12

## 2018-01-27 RX ORDER — POTASSIUM CHLORIDE 20 MEQ
20 PACKET (EA) ORAL
Qty: 0 | Refills: 0 | Status: COMPLETED | OUTPATIENT
Start: 2018-01-27 | End: 2018-01-27

## 2018-01-27 RX ORDER — DOCUSATE SODIUM 100 MG
100 CAPSULE ORAL THREE TIMES A DAY
Qty: 0 | Refills: 0 | Status: DISCONTINUED | OUTPATIENT
Start: 2018-01-27 | End: 2018-02-12

## 2018-01-27 RX ADMIN — HYDROMORPHONE HYDROCHLORIDE 30 MILLILITER(S): 2 INJECTION INTRAMUSCULAR; INTRAVENOUS; SUBCUTANEOUS at 07:16

## 2018-01-27 RX ADMIN — ENOXAPARIN SODIUM 30 MILLIGRAM(S): 100 INJECTION SUBCUTANEOUS at 11:20

## 2018-01-27 RX ADMIN — ERTAPENEM SODIUM 120 MILLIGRAM(S): 1 INJECTION, POWDER, LYOPHILIZED, FOR SOLUTION INTRAMUSCULAR; INTRAVENOUS at 13:43

## 2018-01-27 RX ADMIN — Medication 1 TABLET(S): at 23:20

## 2018-01-27 RX ADMIN — SENNA PLUS 2 TABLET(S): 8.6 TABLET ORAL at 23:20

## 2018-01-27 RX ADMIN — HYDROMORPHONE HYDROCHLORIDE 30 MILLILITER(S): 2 INJECTION INTRAMUSCULAR; INTRAVENOUS; SUBCUTANEOUS at 02:48

## 2018-01-27 RX ADMIN — Medication 1 TABLET(S): at 17:38

## 2018-01-27 RX ADMIN — FLUCONAZOLE 50 MILLIGRAM(S): 150 TABLET ORAL at 16:41

## 2018-01-27 RX ADMIN — Medication 100 MILLIGRAM(S): at 23:20

## 2018-01-27 RX ADMIN — Medication 20 MILLIEQUIVALENT(S): at 13:43

## 2018-01-27 RX ADMIN — Medication 100 GRAM(S): at 23:20

## 2018-01-27 RX ADMIN — Medication 20 MILLIEQUIVALENT(S): at 16:41

## 2018-01-27 RX ADMIN — HYDROMORPHONE HYDROCHLORIDE 30 MILLILITER(S): 2 INJECTION INTRAMUSCULAR; INTRAVENOUS; SUBCUTANEOUS at 19:14

## 2018-01-27 RX ADMIN — Medication 20 MILLIEQUIVALENT(S): at 17:38

## 2018-01-27 RX ADMIN — Medication 1 TABLET(S): at 13:43

## 2018-01-27 RX ADMIN — SODIUM CHLORIDE 50 MILLILITER(S): 9 INJECTION, SOLUTION INTRAVENOUS at 23:20

## 2018-01-27 NOTE — PROGRESS NOTE ADULT - SUBJECTIVE AND OBJECTIVE BOX
BLUE TEAM SURGERY DAILY PROGRESS NOTE:    S: Patient seen and examined. Her drain culture has grown out E. coli ESBL, which is sensitive to the Invanz she is receiving. Her ostomy is positive for flatus and bowel movements. She is tolerating an oral diet.    O:  Vital Signs Last 24 Hrs  T(C): 36.3 (27 Jan 2018 06:57), Max: 36.5 (26 Jan 2018 10:00)  T(F): 97.4 (27 Jan 2018 06:57), Max: 97.7 (26 Jan 2018 10:00)  HR: 73 (27 Jan 2018 06:57) (72 - 82)  BP: 96/62 (27 Jan 2018 06:57) (89/69 - 103/70)  BP(mean): --  RR: 18 (27 Jan 2018 06:57) (16 - 18)  SpO2: 97% (27 Jan 2018 06:57) (95% - 100%)    I&O's Detail    26 Jan 2018 07:01  -  27 Jan 2018 07:00  --------------------------------------------------------  IN:    dextrose 10% + sodium chloride 0.45%: 600 mL    IV PiggyBack: 100 mL    Oral Fluid: 840 mL    Solution: 100 mL  Total IN: 1640 mL    OUT:    Bulb: 95 mL  Total OUT: 95 mL    Total NET: 1545 mL    MEDICATIONS  (STANDING):  dextrose 10% + sodium chloride 0.45% 1000 milliLiter(s) (50 mL/Hr) IV Continuous <Continuous>  enoxaparin Injectable 30 milliGRAM(s) SubCutaneous daily  ertapenem  IVPB 1000 milliGRAM(s) IV Intermittent every 24 hours  fluconAZOLE IVPB      fluconAZOLE IVPB 200 milliGRAM(s) IV Intermittent every 24 hours  HYDROmorphone PCA (1 mG/mL) 30 milliLiter(s) PCA Continuous PCA Continuous    MEDICATIONS  (PRN):  HYDROmorphone PCA (1 mG/mL) Rescue Clinician Bolus 1 milliGRAM(s) IV Push every 1 hour PRN for Pain Scale GREATER THAN 6  naloxone Injectable 0.1 milliGRAM(s) IV Push every 3 minutes PRN For ANY of the following changes in patient status:  A. RR LESS THAN 10 breaths per minute, B. Oxygen saturation LESS THAN 90%, C. Sedation score of 6  ondansetron Injectable 4 milliGRAM(s) IV Push every 8 hours PRN Nausea and/or Vomiting                        7.1    5.96  )-----------( 255      ( 26 Jan 2018 07:33 )             22.7     01-26    145  |  99  |  42<H>  ----------------------------<  82  3.0<L>   |  33<H>  |  0.54    Ca    8.3<L>      26 Jan 2018 07:33  Phos  3.6     01-26  Mg     1.8     01-26    TPro  5.2<L>  /  Alb  2.8<L>  /  TBili  0.4  /  DBili  x   /  AST  162<H>  /  ALT  107<H>  /  AlkPhos  653<H>  01-26    Physical Exam:  Gen: Laying in bed, NAD, alert, cachectic, PCA running  Abd: soft, non-distended, diffusely tender, ostomy productive of flatus and stool, drain with purulent/bilious fluid    Lines:   IV: patent, in place.

## 2018-01-27 NOTE — PROGRESS NOTE ADULT - SUBJECTIVE AND OBJECTIVE BOX
INTERVAL HPI/OVERNIGHT EVENTS:  No acute overnight events noted  Tolerating diet, c/o dry mouth  No stool in ostomy at present  On Dilaudid PCA    MEDICATIONS  (STANDING):  dextrose 10% + sodium chloride 0.45% 1000 milliLiter(s) (50 mL/Hr) IV Continuous <Continuous>  enoxaparin Injectable 30 milliGRAM(s) SubCutaneous daily  ertapenem  IVPB 1000 milliGRAM(s) IV Intermittent every 24 hours  fluconAZOLE IVPB      fluconAZOLE IVPB 200 milliGRAM(s) IV Intermittent every 24 hours  HYDROmorphone PCA (1 mG/mL) 30 milliLiter(s) PCA Continuous PCA Continuous    MEDICATIONS  (PRN):  HYDROmorphone PCA (1 mG/mL) Rescue Clinician Bolus 1 milliGRAM(s) IV Push every 1 hour PRN for Pain Scale GREATER THAN 6  naloxone Injectable 0.1 milliGRAM(s) IV Push every 3 minutes PRN For ANY of the following changes in patient status:  A. RR LESS THAN 10 breaths per minute, B. Oxygen saturation LESS THAN 90%, C. Sedation score of 6  ondansetron Injectable 4 milliGRAM(s) IV Push every 8 hours PRN Nausea and/or Vomiting      Allergies    penicillin (Rash)    Intolerances      General:  No wt loss, fevers, chills, night sweats, fatigue,   Eyes:  Good vision, no reported pain  ENT:  No sore throat, pain, runny nose, dysphagia  CV:  No pain, palpitations, hypo/hypertension  Resp:  No dyspnea, cough, tachypnea, wheezing  GI:  No pain, No nausea, No vomiting, No diarrhea, No constipation, No weight loss, No fever, No pruritis, No rectal bleeding, No tarry stools, No dysphagia,  :  No pain, bleeding, incontinence, nocturia  Muscle:  No pain, weakness  Neuro:  No weakness, tingling, memory problems  Psych:  No fatigue, insomnia, mood problems, depression  Endocrine:  No polyuria, polydipsia, cold/heat intolerance  Heme:  No petechiae, ecchymosis, easy bruisability  Skin:  No rash, tattoos, scars, edema      PHYSICAL EXAM:   Vital Signs:  Vital Signs Last 24 Hrs  T(C): 36.3 (27 Jan 2018 06:57), Max: 36.5 (26 Jan 2018 10:00)  T(F): 97.4 (27 Jan 2018 06:57), Max: 97.7 (26 Jan 2018 10:00)  HR: 73 (27 Jan 2018 06:57) (72 - 82)  BP: 96/62 (27 Jan 2018 06:57) (89/69 - 103/70)  BP(mean): --  RR: 18 (27 Jan 2018 06:57) (16 - 18)  SpO2: 97% (27 Jan 2018 06:57) (95% - 100%)  Daily     Daily I&O's Summary    26 Jan 2018 07:01  -  27 Jan 2018 07:00  --------------------------------------------------------  IN: 1640 mL / OUT: 95 mL / NET: 1545 mL        GENERAL:  Cachectic  HEENT:  NC/AT,  conjunctivae clear and pink, no thyromegaly, nodules, adenopathy, no JVD, sclera -anicteric  CHEST:  Full & symmetric excursion, no increased effort, breath sounds clear  HEART:  Regular rhythm, S1, S2, no murmur/rub/S3/S4, no abdominal bruit, no edema  ABDOMEN:  + HARSHIL drain, + Ostomy, + bowel sounds  EXTEREMITIES:  no cyanosis,clubbing or edema  SKIN:  No rash/erythema/ecchymoses/petechiae/wounds/abscess/warm/dry  NEURO:  Alert, oriented, no asterixis, no tremor, no encephalopathy      LABS:                        7.1    5.96  )-----------( 255      ( 26 Jan 2018 07:33 )             22.7     01-26    145  |  99  |  42<H>  ----------------------------<  82  3.0<L>   |  33<H>  |  0.54    Ca    8.3<L>      26 Jan 2018 07:33  Phos  3.6     01-26  Mg     1.8     01-26    TPro  5.2<L>  /  Alb  2.8<L>  /  TBili  0.4  /  DBili  x   /  AST  162<H>  /  ALT  107<H>  /  AlkPhos  653<H>  01-26    PT/INR - ( 26 Jan 2018 07:33 )   PT: 17.7 sec;   INR: 1.55 ratio       PTT - ( 26 Jan 2018 07:33 )  PTT:31.4 sec      RADIOLOGY & ADDITIONAL TESTS:      Assessment and Plan:    Problem/Plan - 1:  ·  Problem: Right upper quadrant abdominal pain.  Plan: diet per surgery  pain control  monitor GI function  monitor drain outputs   continue with broad spectrum antibiotics  f/u labwork  overall prognosis poor.

## 2018-01-27 NOTE — PROGRESS NOTE ADULT - SUBJECTIVE AND OBJECTIVE BOX
47y old  Female who presents with a chief complaint of Abdominal Pain      Interval history:  Afebrile, no cough or SOB, abdominal pain still there, constipation.       Antimicrobials:    ertapenem  IVPB 1000 milliGRAM(s) IV Intermittent every 24 hours     fluconAZOLE IVPB 200 milliGRAM(s) IV Intermittent every 24 hours    REVIEW OF SYSTEMS:    No chest pain or palpitations  No N/V  No dysuria or frequency  No rash.     Vital Signs Last 24 Hrs  T(C): 36.6 (01-27-18 @ 09:42), Max: 36.6 (01-27-18 @ 09:42)  T(F): 97.8 (01-27-18 @ 09:42), Max: 97.8 (01-27-18 @ 09:42)  HR: 90 (01-27-18 @ 09:42) (72 - 90)  BP: 96/67 (01-27-18 @ 09:42) (94/62 - 103/70)  RR: 18 (01-27-18 @ 09:42) (16 - 18)  SpO2: 96% (01-27-18 @ 09:42) (95% - 97%)    PHYSICAL EXAM:  Patient in no acute distress. Alert, awake.  No icterus, no oral ulcers.  Cardiovascular: S1S2 normal.  Lungs: decreased entry at bases B/L.   Gastrointestinal: Surgical incision well healed, rt sided drain, lt sided ostomy.   Extremities: no edema.  + port                         6.4    6.34  )-----------( 196      ( 27 Jan 2018 09:02 )             21.4   01-27    144  |  97  |  40<H>  ----------------------------<  106<H>  2.9<LL>   |  31  |  0.48<L>    Ca    8.2<L>      27 Jan 2018 09:09  Phos  2.3     01-27  Mg     1.8     01-27    TPro  4.8<L>  /  Alb  2.9<L>  /  TBili  0.3  /  DBili  x   /  AST  90<H>  /  ALT  80<H>  /  AlkPhos  511<H>  01-27      LIVER FUNCTIONS - ( 27 Jan 2018 09:09 )  Alb: 2.9 g/dL / Pro: 4.8 g/dL / ALK PHOS: 511 U/L / ALT: 80 U/L / AST: 90 U/L / GGT: x               Culture - Urine (collected 25 Jan 2018 08:20)  Source: .Urine Catheterized  Final Report (26 Jan 2018 11:45):    No growth    Culture - Body Fluid with Gram Stain (collected 24 Jan 2018 21:39)  Source: Abdominal Fl Abdominal Fluid    Numerous Escherichia coli ESBL    Rare Candida albicans

## 2018-01-27 NOTE — PROGRESS NOTE ADULT - ASSESSMENT
A: 47F with metastatic ovarian cancer and gallbladder perforation s/p placement of a 10F drain in her gallbladder and yaa-cholecystic fluid with immediate drainage of 55cc. Hemodynamically stable on floor.    P:  - Drain care per IR, flush 5cc NS forward flush only daily  - Invanz/Diflucan coverage  - PCA for pain control  - Regular diet  - Monitor drain output  - Plan discussed with Dr. Frausto A: 47F with metastatic ovarian cancer and gallbladder perforation s/p placement of a 10F drain in her gallbladder and yaa-cholecystic fluid with immediate drainage of 55cc. Hemodynamically stable on floor.    P:  - Drain care per IR, flush 5cc NS forward flush only daily  - Invanz/Diflucan coverage  - PCA for pain control  - Regular diet  - Monitor drain output  - Plan discussed with Dr. Frausto    DAF

## 2018-01-27 NOTE — PROGRESS NOTE ADULT - ASSESSMENT
47F with metastatic ovarian CA (s/p TAHBSO, colostomy, previously seeking holistic care 12/2017 however has since established with new oncologist for chemo) p/w abdominal pain found to have bowel obstruction, GB perforation s/p  IR drainage, Biliary cultures with ESBL E Coli and candida albicans.   ?caput around stoma per team   PCN allergy-but remote and tolerating abx well.   Blood  cx negative, No fever or leucocytosis.     Rec:  Continue with  Invanz and fluconazole for now  Follow prelim blood Cx  Monitor closely for any s/s other nosocomial focus  prognosis guarded

## 2018-01-28 LAB
ANION GAP SERPL CALC-SCNC: 12 MMOL/L — SIGNIFICANT CHANGE UP (ref 5–17)
BLD GP AB SCN SERPL QL: NEGATIVE — SIGNIFICANT CHANGE UP
BUN SERPL-MCNC: 25 MG/DL — HIGH (ref 7–23)
CALCIUM SERPL-MCNC: 8.2 MG/DL — LOW (ref 8.4–10.5)
CHLORIDE SERPL-SCNC: 99 MMOL/L — SIGNIFICANT CHANGE UP (ref 96–108)
CO2 SERPL-SCNC: 30 MMOL/L — SIGNIFICANT CHANGE UP (ref 22–31)
CREAT SERPL-MCNC: 0.37 MG/DL — LOW (ref 0.5–1.3)
GLUCOSE SERPL-MCNC: 54 MG/DL — LOW (ref 70–99)
HCT VFR BLD CALC: 29.7 % — LOW (ref 34.5–45)
HGB BLD-MCNC: 8.8 G/DL — LOW (ref 11.5–15.5)
MAGNESIUM SERPL-MCNC: 1.9 MG/DL — SIGNIFICANT CHANGE UP (ref 1.6–2.6)
MCHC RBC-ENTMCNC: 26.6 PG — LOW (ref 27–34)
MCHC RBC-ENTMCNC: 29.6 GM/DL — LOW (ref 32–36)
MCV RBC AUTO: 89.7 FL — SIGNIFICANT CHANGE UP (ref 80–100)
PHOSPHATE SERPL-MCNC: 1.4 MG/DL — LOW (ref 2.5–4.5)
PLATELET # BLD AUTO: 159 K/UL — SIGNIFICANT CHANGE UP (ref 150–400)
POTASSIUM SERPL-MCNC: 4.2 MMOL/L — SIGNIFICANT CHANGE UP (ref 3.5–5.3)
POTASSIUM SERPL-SCNC: 4.2 MMOL/L — SIGNIFICANT CHANGE UP (ref 3.5–5.3)
RBC # BLD: 3.31 M/UL — LOW (ref 3.8–5.2)
RBC # FLD: 16.7 % — HIGH (ref 10.3–14.5)
RH IG SCN BLD-IMP: POSITIVE — SIGNIFICANT CHANGE UP
SODIUM SERPL-SCNC: 141 MMOL/L — SIGNIFICANT CHANGE UP (ref 135–145)
WBC # BLD: 6.45 K/UL — SIGNIFICANT CHANGE UP (ref 3.8–10.5)
WBC # FLD AUTO: 6.45 K/UL — SIGNIFICANT CHANGE UP (ref 3.8–10.5)

## 2018-01-28 RX ADMIN — ERTAPENEM SODIUM 120 MILLIGRAM(S): 1 INJECTION, POWDER, LYOPHILIZED, FOR SOLUTION INTRAMUSCULAR; INTRAVENOUS at 13:47

## 2018-01-28 RX ADMIN — Medication 1 TABLET(S): at 21:23

## 2018-01-28 RX ADMIN — SENNA PLUS 2 TABLET(S): 8.6 TABLET ORAL at 21:23

## 2018-01-28 RX ADMIN — Medication 1 TABLET(S): at 11:42

## 2018-01-28 RX ADMIN — Medication 1 TABLET(S): at 09:09

## 2018-01-28 RX ADMIN — Medication 1 TABLET(S): at 17:06

## 2018-01-28 RX ADMIN — SODIUM CHLORIDE 50 MILLILITER(S): 9 INJECTION, SOLUTION INTRAVENOUS at 17:07

## 2018-01-28 RX ADMIN — Medication 100 MILLIGRAM(S): at 05:33

## 2018-01-28 RX ADMIN — HYDROMORPHONE HYDROCHLORIDE 30 MILLILITER(S): 2 INJECTION INTRAMUSCULAR; INTRAVENOUS; SUBCUTANEOUS at 19:24

## 2018-01-28 RX ADMIN — Medication 100 MILLIGRAM(S): at 21:23

## 2018-01-28 RX ADMIN — Medication 100 MILLIGRAM(S): at 13:47

## 2018-01-28 RX ADMIN — ENOXAPARIN SODIUM 30 MILLIGRAM(S): 100 INJECTION SUBCUTANEOUS at 11:42

## 2018-01-28 RX ADMIN — HYDROMORPHONE HYDROCHLORIDE 30 MILLILITER(S): 2 INJECTION INTRAMUSCULAR; INTRAVENOUS; SUBCUTANEOUS at 02:40

## 2018-01-28 RX ADMIN — FLUCONAZOLE 50 MILLIGRAM(S): 150 TABLET ORAL at 15:18

## 2018-01-28 NOTE — PHYSICAL THERAPY INITIAL EVALUATION ADULT - ADDITIONAL COMMENTS
Pt live alone and has 5 hrs of HHA services. Spouse stays with pt from 3 pm to 9 pm. Pt is alone until aide comes in at 9 am. He verbalized pt was much better about 2 wks ago. She was ambulating independently.

## 2018-01-28 NOTE — PROGRESS NOTE ADULT - ASSESSMENT
A: 47F with metastatic ovarian cancer and gallbladder perforation s/p placement of a 10F drain in her gallbladder and yaa-cholecystic fluid with immediate drainage of 55cc. Hemodynamically stable on floor.    P:  - Drain care per IR, flush 5cc NS forward flush only daily  - Invanz/Diflucan coverage  - Bowel regimen  - PCA for pain control  - Regular diet  - Monitor drain output  - Plan discussed with Dr. Frausto

## 2018-01-28 NOTE — PHYSICAL THERAPY INITIAL EVALUATION ADULT - PERTINENT HX OF CURRENT PROBLEM, REHAB EVAL
47F with metastatic ovarian CA (s/p TAHBSO, colostomy, previously seeking holistic care 12/2017 however has since established with new oncologist for chemo) p/w abdominal pain.

## 2018-01-28 NOTE — PHYSICAL THERAPY INITIAL EVALUATION ADULT - DISCHARGE DISPOSITION, PT EVAL
Subacute rehab. * if pt to return home, recommend home PT for gait, balance, & endurance training and to return pt to baseline functional mobility status. Recommend rolling walker with ambulation, elevated toilet seat, commode, transport chair. pt will require Supervision/assist with mobility skills at this time./rehabilitation facility

## 2018-01-28 NOTE — PHYSICAL THERAPY INITIAL EVALUATION ADULT - IMPAIRMENTS CONTRIBUTING TO GAIT DEVIATIONS, PT EVAL
impaired postural control/decreased strength/impaired balance/decreased flexibility/narrow base of support

## 2018-01-28 NOTE — PROGRESS NOTE ADULT - SUBJECTIVE AND OBJECTIVE BOX
BLUE TEAM SURGERY DAILY PROGRESS NOTE:    S: Patient seen and examined. Her drain culture has grown out E. coli ESBL, which is sensitive to the Invanz she is receiving, although it is also sensitive to Bactrim (PO). She has adequate pain control and is tolerating an oral diet.    O:  Vital Signs Last 24 Hrs  T(C): 36.3 (28 Jan 2018 09:50), Max: 37.2 (27 Jan 2018 21:37)  T(F): 97.3 (28 Jan 2018 09:50), Max: 98.9 (27 Jan 2018 21:37)  HR: 85 (28 Jan 2018 09:50) (75 - 89)  BP: 103/70 (28 Jan 2018 09:50) (94/67 - 108/78)  BP(mean): --  RR: 18 (28 Jan 2018 09:50) (17 - 18)  SpO2: 98% (28 Jan 2018 09:50) (94% - 98%)    I&O's Detail    27 Jan 2018 07:01  -  28 Jan 2018 07:00  --------------------------------------------------------  IN:    dextrose 10% + sodium chloride 0.45%: 1150 mL    IV PiggyBack: 100 mL    Oral Fluid: 360 mL    Packed Red Blood Cells: 350 mL  Total IN: 1960 mL    OUT:    Bulb: 30 mL    Colostomy: 10 mL    Voided: 300 mL  Total OUT: 340 mL    Total NET: 1620 mL      28 Jan 2018 07:01  -  28 Jan 2018 11:33  --------------------------------------------------------  IN:    Oral Fluid: 160 mL  Total IN: 160 mL    OUT:    Bulb: 20 mL  Total OUT: 20 mL    Total NET: 140 mL    MEDICATIONS  (STANDING):  dextrose 10% + sodium chloride 0.45% 1000 milliLiter(s) (50 mL/Hr) IV Continuous <Continuous>  docusate sodium 100 milliGRAM(s) Oral three times a day  enoxaparin Injectable 30 milliGRAM(s) SubCutaneous daily  ertapenem  IVPB 1000 milliGRAM(s) IV Intermittent every 24 hours  fluconAZOLE IVPB      fluconAZOLE IVPB 200 milliGRAM(s) IV Intermittent every 24 hours  HYDROmorphone PCA (1 mG/mL) 30 milliLiter(s) PCA Continuous PCA Continuous  potassium acid phosphate/sodium acid phosphate tablet (K-PHOS No. 2) 1 Tablet(s) Oral four times a day with meals  senna 2 Tablet(s) Oral at bedtime    MEDICATIONS  (PRN):  HYDROmorphone PCA (1 mG/mL) Rescue Clinician Bolus 1 milliGRAM(s) IV Push every 1 hour PRN for Pain Scale GREATER THAN 6  naloxone Injectable 0.1 milliGRAM(s) IV Push every 3 minutes PRN For ANY of the following changes in patient status:  A. RR LESS THAN 10 breaths per minute, B. Oxygen saturation LESS THAN 90%, C. Sedation score of 6  ondansetron Injectable 4 milliGRAM(s) IV Push every 8 hours PRN Nausea and/or Vomiting    01-28    141  |  99  |  25<H>  ----------------------------<  54<L>  4.2   |  30  |  0.37<L>    Ca    8.2<L>      28 Jan 2018 08:56  Phos  1.4     01-28  Mg     1.9     01-28    TPro  4.8<L>  /  Alb  2.9<L>  /  TBili  0.3  /  DBili  x   /  AST  90<H>  /  ALT  80<H>  /  AlkPhos  511<H>  01-27    Physical Exam:  Gen: Laying in bed, NAD, alert, cachectic, PCA running  Abd: soft, non-distended, diffusely tender, ostomy productive, drain with bilious fluid    Lines:   IV: patent, in place.

## 2018-01-28 NOTE — PROGRESS NOTE ADULT - SUBJECTIVE AND OBJECTIVE BOX
INTERVAL HPI/OVERNIGHT EVENTS:    Seen and examined in bed. No acute overnight events noted. She reports improved abdominal pain.  Remains on Dilaudid PCA. Denies nausea/vomiting. Tolerating diet  Gas in ostomy; no stool      MEDICATIONS  (STANDING):  dextrose 10% + sodium chloride 0.45% 1000 milliLiter(s) (50 mL/Hr) IV Continuous <Continuous>  docusate sodium 100 milliGRAM(s) Oral three times a day  enoxaparin Injectable 30 milliGRAM(s) SubCutaneous daily  ertapenem  IVPB 1000 milliGRAM(s) IV Intermittent every 24 hours  fluconAZOLE IVPB      fluconAZOLE IVPB 200 milliGRAM(s) IV Intermittent every 24 hours  HYDROmorphone PCA (1 mG/mL) 30 milliLiter(s) PCA Continuous PCA Continuous  potassium acid phosphate/sodium acid phosphate tablet (K-PHOS No. 2) 1 Tablet(s) Oral four times a day with meals  senna 2 Tablet(s) Oral at bedtime    MEDICATIONS  (PRN):  HYDROmorphone PCA (1 mG/mL) Rescue Clinician Bolus 1 milliGRAM(s) IV Push every 1 hour PRN for Pain Scale GREATER THAN 6  naloxone Injectable 0.1 milliGRAM(s) IV Push every 3 minutes PRN For ANY of the following changes in patient status:  A. RR LESS THAN 10 breaths per minute, B. Oxygen saturation LESS THAN 90%, C. Sedation score of 6  ondansetron Injectable 4 milliGRAM(s) IV Push every 8 hours PRN Nausea and/or Vomiting      Allergies    penicillin (Rash)    Intolerances:      PHYSICAL EXAM:   Vital Signs:  Vital Signs Last 24 Hrs  T(C): 36.5 (28 Jan 2018 05:14), Max: 37.2 (27 Jan 2018 21:37)  T(F): 97.7 (28 Jan 2018 05:14), Max: 98.9 (27 Jan 2018 21:37)  HR: 80 (28 Jan 2018 05:14) (75 - 90)  BP: 103/75 (28 Jan 2018 05:14) (94/67 - 108/78)  BP(mean): --  RR: 18 (28 Jan 2018 05:14) (17 - 18)  SpO2: 96% (28 Jan 2018 05:14) (94% - 96%)  Daily     Daily I&O's Summary    27 Jan 2018 07:01  -  28 Jan 2018 07:00  --------------------------------------------------------  IN: 1960 mL / OUT: 40 mL / NET: 1920 mL        GENERAL:  Cachectic; NAD  HEENT:  NC/AT  CHEST:  Full & symmetric excursion, no increased effort, breath sounds clear  HEART:  Regular rhythm, S1, S2  ABDOMEN:  Soft, Bowel sounds present; Healed midline incision; + ostomy   EXTEREMITIES:  no cyanosis,clubbing or edema  SKIN:  No rash/erythema/ecchymoses/petechiae/wounds/abscess/warm/dry  NEURO:  Alert, oriented, no asterixis, no tremor, no encephalopathy      LABS:                        6.4    6.34  )-----------( 196      ( 27 Jan 2018 09:02 )             21.4     01-27    144  |  97  |  40<H>  ----------------------------<  106<H>  2.9<LL>   |  31  |  0.48<L>    Ca    8.2<L>      27 Jan 2018 09:09  Phos  2.3     01-27  Mg     1.8     01-27    TPro  4.8<L>  /  Alb  2.9<L>  /  TBili  0.3  /  DBili  x   /  AST  90<H>  /  ALT  80<H>  /  AlkPhos  511<H>  01-27    PT/INR - ( 26 Jan 2018 07:33 )   PT: 17.7 sec;   INR: 1.55 ratio         PTT - ( 26 Jan 2018 07:33 )  PTT:31.4 sec    amylase   lipase  RADIOLOGY & ADDITIONAL TESTS:       Problem/Plan - 1:  ·  Problem: Right upper quadrant abdominal pain.  Plan: diet per surgery  -pain control  -monitor GI function closely  -monitor drain outputs   -s/p PRBCS; f/u CBC and transfuse as needed  -check Metabolic panel daily; replete electrolytes prn  -continue with broad spectrum antibiotics    overall prognosis poor.

## 2018-01-29 LAB
ANION GAP SERPL CALC-SCNC: 10 MMOL/L — SIGNIFICANT CHANGE UP (ref 5–17)
BUN SERPL-MCNC: 17 MG/DL — SIGNIFICANT CHANGE UP (ref 7–23)
CALCIUM SERPL-MCNC: 7.6 MG/DL — LOW (ref 8.4–10.5)
CHLORIDE SERPL-SCNC: 99 MMOL/L — SIGNIFICANT CHANGE UP (ref 96–108)
CO2 SERPL-SCNC: 32 MMOL/L — HIGH (ref 22–31)
CREAT SERPL-MCNC: 0.26 MG/DL — LOW (ref 0.5–1.3)
CULTURE RESULTS: SIGNIFICANT CHANGE UP
GLUCOSE SERPL-MCNC: 70 MG/DL — SIGNIFICANT CHANGE UP (ref 70–99)
HCT VFR BLD CALC: 29.6 % — LOW (ref 34.5–45)
HGB BLD-MCNC: 8.7 G/DL — LOW (ref 11.5–15.5)
MAGNESIUM SERPL-MCNC: 1.7 MG/DL — SIGNIFICANT CHANGE UP (ref 1.6–2.6)
MCHC RBC-ENTMCNC: 26.5 PG — LOW (ref 27–34)
MCHC RBC-ENTMCNC: 29.4 GM/DL — LOW (ref 32–36)
MCV RBC AUTO: 90.2 FL — SIGNIFICANT CHANGE UP (ref 80–100)
ORGANISM # SPEC MICROSCOPIC CNT: SIGNIFICANT CHANGE UP
ORGANISM # SPEC MICROSCOPIC CNT: SIGNIFICANT CHANGE UP
PHOSPHATE SERPL-MCNC: 1.7 MG/DL — LOW (ref 2.5–4.5)
PLATELET # BLD AUTO: 142 K/UL — LOW (ref 150–400)
POTASSIUM SERPL-MCNC: 4.3 MMOL/L — SIGNIFICANT CHANGE UP (ref 3.5–5.3)
POTASSIUM SERPL-SCNC: 4.3 MMOL/L — SIGNIFICANT CHANGE UP (ref 3.5–5.3)
RBC # BLD: 3.28 M/UL — LOW (ref 3.8–5.2)
RBC # FLD: 15.9 % — HIGH (ref 10.3–14.5)
SODIUM SERPL-SCNC: 141 MMOL/L — SIGNIFICANT CHANGE UP (ref 135–145)
SPECIMEN SOURCE: SIGNIFICANT CHANGE UP
WBC # BLD: 7.98 K/UL — SIGNIFICANT CHANGE UP (ref 3.8–10.5)
WBC # FLD AUTO: 7.98 K/UL — SIGNIFICANT CHANGE UP (ref 3.8–10.5)

## 2018-01-29 PROCEDURE — 99233 SBSQ HOSP IP/OBS HIGH 50: CPT

## 2018-01-29 RX ORDER — SODIUM,POTASSIUM PHOSPHATES 278-250MG
2 POWDER IN PACKET (EA) ORAL ONCE
Qty: 0 | Refills: 0 | Status: COMPLETED | OUTPATIENT
Start: 2018-01-29 | End: 2018-01-29

## 2018-01-29 RX ADMIN — Medication 100 MILLIGRAM(S): at 15:52

## 2018-01-29 RX ADMIN — Medication 1 TABLET(S): at 21:39

## 2018-01-29 RX ADMIN — HYDROMORPHONE HYDROCHLORIDE 30 MILLILITER(S): 2 INJECTION INTRAMUSCULAR; INTRAVENOUS; SUBCUTANEOUS at 18:49

## 2018-01-29 RX ADMIN — Medication 1 TABLET(S): at 09:46

## 2018-01-29 RX ADMIN — Medication 2 TABLET(S): at 11:06

## 2018-01-29 RX ADMIN — Medication 100 MILLIGRAM(S): at 21:39

## 2018-01-29 RX ADMIN — Medication 100 MILLIGRAM(S): at 05:02

## 2018-01-29 RX ADMIN — ENOXAPARIN SODIUM 30 MILLIGRAM(S): 100 INJECTION SUBCUTANEOUS at 11:10

## 2018-01-29 RX ADMIN — HYDROMORPHONE HYDROCHLORIDE 30 MILLILITER(S): 2 INJECTION INTRAMUSCULAR; INTRAVENOUS; SUBCUTANEOUS at 02:50

## 2018-01-29 RX ADMIN — FLUCONAZOLE 50 MILLIGRAM(S): 150 TABLET ORAL at 18:02

## 2018-01-29 RX ADMIN — HYDROMORPHONE HYDROCHLORIDE 30 MILLILITER(S): 2 INJECTION INTRAMUSCULAR; INTRAVENOUS; SUBCUTANEOUS at 07:05

## 2018-01-29 RX ADMIN — SENNA PLUS 2 TABLET(S): 8.6 TABLET ORAL at 21:39

## 2018-01-29 RX ADMIN — Medication 1 TABLET(S): at 15:52

## 2018-01-29 RX ADMIN — Medication 1 TABLET(S): at 18:03

## 2018-01-29 RX ADMIN — ERTAPENEM SODIUM 120 MILLIGRAM(S): 1 INJECTION, POWDER, LYOPHILIZED, FOR SOLUTION INTRAMUSCULAR; INTRAVENOUS at 15:51

## 2018-01-29 NOTE — PROGRESS NOTE ADULT - ASSESSMENT
A: 47F with metastatic ovarian cancer and gallbladder perforation s/p placement of a 10F drain in her gallbladder and yaa-cholecystic fluid with immediate drainage of 55cc. Hemodynamically stable on floor.    P:  - Drain care per IR, flush 5cc NS forward flush only daily  - Invanz/Diflucan coverage  - Bowel regimen  - PCA for pain control  - Regular diet  - Monitor drain output

## 2018-01-29 NOTE — PROGRESS NOTE ADULT - SUBJECTIVE AND OBJECTIVE BOX
Patient is a 47y old  Female who presents with a chief complaint of Abdominal Pain (24 Jan 2018 06:17)    Being followed by ID for cholecystitis,abd perforation     Interval history:feels a little better  Still with abd pain   No other acute events      ROS:  No cough,SOB,CP  No N/V/D  No urinary complaints  No HA  No joint or limb pain  No other complaints      Antimicrobials:    ertapenem  IVPB 1000 milliGRAM(s) IV Intermittent every 24 hours  fluconAZOLE IVPB      fluconAZOLE IVPB 200 milliGRAM(s) IV Intermittent every 24 hours      other medications reviewed    Vital Signs Last 24 Hrs  T(C): 36.6 (01-29-18 @ 09:34), Max: 36.8 (01-28-18 @ 19:08)  T(F): 97.8 (01-29-18 @ 09:34), Max: 98.3 (01-28-18 @ 19:08)  HR: 75 (01-29-18 @ 09:34) (75 - 93)  BP: 100/67 (01-29-18 @ 09:34) (100/67 - 118/81)  BP(mean): --  RR: 18 (01-29-18 @ 09:34) (18 - 18)  SpO2: 97% (01-29-18 @ 09:34) (97% - 98%)    Physical Exam:    Constitutional Cachectic, comfortable,pleasant    HEENT PERRLA EOMI,No pallor or icterus    No oral exudate or erythema    Neck supple no JVD or LN    Chest Good AE,CTA    CVS RRR S1 S2 WNl No murmur or rub or gallop    Abd soft BS normal + mild diffuse  tenderness no masses  RUQ biliary drain  LLQ colostomy     Ext No cyanosis clubbing or edema    L PICC  site no erythema tenderness or discharge  R SC mediport no erythema or tenderness    Joints no swelling or LOM    CNS AAO X 3 no focal    Lab Data:                          8.7    7.98  )-----------( 142      ( 29 Jan 2018 08:58 )             29.6       01-29    141  |  99  |  17  ----------------------------<  70  4.3   |  32<H>  |  0.26<L>    Ca    7.6<L>      29 Jan 2018 08:49  Phos  1.7     01-29  Mg     1.7     01-29          Culture - Urine (collected 25 Jan 2018 08:20)  Source: .Urine Catheterized  Final Report (26 Jan 2018 11:45):    No growth    Culture - Body Fluid with Gram Stain (collected 24 Jan 2018 21:39)  Source: Abdominal Fl Abdominal Fluid  Gram Stain (24 Jan 2018 23:56):    Moderate polymorphonuclear leukocytes per low power field    Moderate Gram Negative Rods per oil power field  Preliminary Report (26 Jan 2018 20:18):    Numerous Escherichia coli ESBL    Rare Candida albicans  Organism: Escherichia coli ESBL (26 Jan 2018 18:55)  Organism: Escherichia coli ESBL (26 Jan 2018 18:55)      -  Amikacin: S <=8      -  Ampicillin: R >16      -  Ampicillin/Sulbactam: R 8/4      -  Aztreonam: R >16      -  Cefazolin: R >16      -  Cefepime: R >16      -  Cefoxitin: S <=4      -  Ceftazidime: R >16      -  Ceftriaxone: R >32      -  Ciprofloxacin: R >2      -  Ertapenem: S <=0.5      -  Gentamicin: S <=1      -  Imipenem: S <=1      -  Levofloxacin: R >4      -  Meropenem: S <=1      -  Piperacillin/Tazobactam: R <=8      -  Tobramycin: S <=2      -  Trimethoprim/Sulfamethoxazole: S <=0.5/9.5      Method Type: AILYN        Culture - Blood (01.23.18 @ 23:27)    Specimen Source: .Blood Blood    Culture Results:   No growth at 5 days.

## 2018-01-29 NOTE — CHART NOTE - NSCHARTNOTEFT_GEN_A_CORE
Source: Patient [ x]    Family [ ]     other [x ] medical record    Diet: Regular    Hospital course: 47F with metastatic ovarian cancer and gallbladder perforation s/p placement of a 10F drain in her gallbladder and yaa-cholecystic fluid with immediate drainage of 55cc. Hemodynamically stable on floor.    Pt seen for malnutrition follow. Pt reports appetite is stable but continues to have poor po intake secondary to dislike of institutionalized food. Pt eating mostly food from home. Pt amenable to Ensure Enlive- 2 per day (provides additional 700cal, 40gm protein). Pt denies GI distress at this time.        PO intake:  < 50% [ ] 50-75% [ x]   % [ ]  other : Food from home      Source for PO intake [x ] Patient [ ] family [ ] chart [ ] staff [ ] other      Current Weight: Weight (kg): 48 (01-24 @ 13:02)- no new weight       Pertinent Medications: MEDICATIONS  (STANDING):  dextrose 10% + sodium chloride 0.45% 1000 milliLiter(s) (50 mL/Hr) IV Continuous <Continuous>  docusate sodium 100 milliGRAM(s) Oral three times a day  enoxaparin Injectable 30 milliGRAM(s) SubCutaneous daily  ertapenem  IVPB 1000 milliGRAM(s) IV Intermittent every 24 hours  fluconAZOLE IVPB      fluconAZOLE IVPB 200 milliGRAM(s) IV Intermittent every 24 hours  HYDROmorphone PCA (1 mG/mL) 30 milliLiter(s) PCA Continuous PCA Continuous  potassium acid phosphate/sodium acid phosphate tablet (K-PHOS No. 2) 1 Tablet(s) Oral four times a day with meals  senna 2 Tablet(s) Oral at bedtime    MEDICATIONS  (PRN):  HYDROmorphone PCA (1 mG/mL) Rescue Clinician Bolus 1 milliGRAM(s) IV Push every 1 hour PRN for Pain Scale GREATER THAN 6  naloxone Injectable 0.1 milliGRAM(s) IV Push every 3 minutes PRN For ANY of the following changes in patient status:  A. RR LESS THAN 10 breaths per minute, B. Oxygen saturation LESS THAN 90%, C. Sedation score of 6  ondansetron Injectable 4 milliGRAM(s) IV Push every 8 hours PRN Nausea and/or Vomiting    Pertinent Labs:  Point of Care Testing BG: (1/29) 84-94, (1/28)  =, (1/28) BUN 25, Cr .37, Phos 1.4    Skin: +1 bilateral foot edema. DTI sacrum.     Estimated Needs:   [ x] no change since previous assessment  [ ] recalculated:       Previous Nutrition Diagnosis:    [x ] Malnutrition          Nutrition Diagnosis is [ x] ongoing          New Nutrition Diagnosis: [x ] not applicable        Recommend    [ ] Change Diet To:    [ x] Nutrition Supplement: Ensure Enlive- 2 per day (provides additional 700cal, 40gm protein)     [ ] Nutrition Support    [x ] Other: 1. Provide food preferences as requested by Pt/family within diet restrictions  2. Encourage PO intake during meal times        Monitoring and Evaluation:     [x ] PO intake [x ] Tolerance to diet prescription [ x] weights [x ] follow up per protocol    [ x] other: RD remains available, Jeanne Browning RD pager #[phone]

## 2018-01-29 NOTE — PROGRESS NOTE ADULT - ASSESSMENT
47F with metastatic ovarian CA (s/p TAHBSO, colostomy, previously seeking holistic care 12/2017 however has since established with new oncologist for chemo) p/w abdominal pain.  Now with bowel obstruction  previously s/p colostomy,wright  GB perforation  Seen and followed by surgery  s/p IR drainage  SIRS,sepsis resolving  PCN allergy-but remote and tolerating Invanz  Biliary cultures with ESBL  E Coli and yeast  Also s/p recent chemo  Poor surgical candidate -no intervention for now per surgery  Stable ,Blood  cx negative    Rec:  1) Further surgical plan ,if any,per surgery team.Monitor abdominal exam and course closely  2) Continue Invanz and fluconazole for now  3) Monitor closely for any s/s other nosocomial focus  Will need atleast 10-14 day course with repeat imaging   Will tailor plan for ID issues  per course,results.Will defer to primary team on management of other issues.  Prognosis guarded  Case d/w surgical team  Will Follow.  Beeper [phone]-bffq/afterhours/No response-[id number]

## 2018-01-29 NOTE — PROGRESS NOTE ADULT - SUBJECTIVE AND OBJECTIVE BOX
INTERVAL HPI/OVERNIGHT EVENTS:    Seen and examined in bed. No acute overnight events noted. She reports improved abdominal pain.  Denies nausea/vomiting. Tolerating diet  Gas in ostomy; no stool      MEDICATIONS  (STANDING):  dextrose 10% + sodium chloride 0.45% 1000 milliLiter(s) (50 mL/Hr) IV Continuous <Continuous>  docusate sodium 100 milliGRAM(s) Oral three times a day  enoxaparin Injectable 30 milliGRAM(s) SubCutaneous daily  ertapenem  IVPB 1000 milliGRAM(s) IV Intermittent every 24 hours  fluconAZOLE IVPB      fluconAZOLE IVPB 200 milliGRAM(s) IV Intermittent every 24 hours  HYDROmorphone PCA (1 mG/mL) 30 milliLiter(s) PCA Continuous PCA Continuous  potassium acid phosphate/sodium acid phosphate tablet (K-PHOS No. 2) 1 Tablet(s) Oral four times a day with meals  senna 2 Tablet(s) Oral at bedtime    MEDICATIONS  (PRN):  HYDROmorphone PCA (1 mG/mL) Rescue Clinician Bolus 1 milliGRAM(s) IV Push every 1 hour PRN for Pain Scale GREATER THAN 6  naloxone Injectable 0.1 milliGRAM(s) IV Push every 3 minutes PRN For ANY of the following changes in patient status:  A. RR LESS THAN 10 breaths per minute, B. Oxygen saturation LESS THAN 90%, C. Sedation score of 6  ondansetron Injectable 4 milliGRAM(s) IV Push every 8 hours PRN Nausea and/or Vomiting      Allergies    penicillin (Rash)    Intolerances:      PHYSICAL EXAM:   Vital Signs:  Vital Signs Last 24 Hrs  T(C): 36.5 (28 Jan 2018 05:14), Max: 37.2 (27 Jan 2018 21:37)  T(F): 97.7 (28 Jan 2018 05:14), Max: 98.9 (27 Jan 2018 21:37)  HR: 80 (28 Jan 2018 05:14) (75 - 90)  BP: 103/75 (28 Jan 2018 05:14) (94/67 - 108/78)  BP(mean): --  RR: 18 (28 Jan 2018 05:14) (17 - 18)  SpO2: 96% (28 Jan 2018 05:14) (94% - 96%)  Daily     Daily I&O's Summary    27 Jan 2018 07:01  -  28 Jan 2018 07:00  --------------------------------------------------------  IN: 1960 mL / OUT: 40 mL / NET: 1920 mL        GENERAL:  Cachectic; NAD  HEENT:  NC/AT  CHEST:  Full & symmetric excursion, no increased effort, breath sounds clear  HEART:  Regular rhythm, S1, S2  ABDOMEN:  Soft, Bowel sounds present; Healed midline incision; + ostomy   EXTEREMITIES:  no cyanosis,clubbing or edema  SKIN:  No rash/erythema/ecchymoses/petechiae/wounds/abscess/warm/dry  NEURO:  Alert, oriented, no asterixis, no tremor, no encephalopathy      LABS:                        6.4    6.34  )-----------( 196      ( 27 Jan 2018 09:02 )             21.4     01-27    144  |  97  |  40<H>  ----------------------------<  106<H>  2.9<LL>   |  31  |  0.48<L>    Ca    8.2<L>      27 Jan 2018 09:09  Phos  2.3     01-27  Mg     1.8     01-27    TPro  4.8<L>  /  Alb  2.9<L>  /  TBili  0.3  /  DBili  x   /  AST  90<H>  /  ALT  80<H>  /  AlkPhos  511<H>  01-27    PT/INR - ( 26 Jan 2018 07:33 )   PT: 17.7 sec;   INR: 1.55 ratio         PTT - ( 26 Jan 2018 07:33 )  PTT:31.4 sec    amylase   lipase  RADIOLOGY & ADDITIONAL TESTS:       Problem/Plan - 1:  ·  Problem: Right upper quadrant abdominal pain.  Plan: diet per surgery  -pain control  -monitor GI function closely  -monitor drain outputs   -s/p PRBCS; f/u CBC and transfuse as needed  -check Metabolic panel daily; replete electrolytes prn  -continue with broad spectrum antibiotics    overall prognosis poor.

## 2018-01-29 NOTE — PROGRESS NOTE ADULT - SUBJECTIVE AND OBJECTIVE BOX
BLUE TEAM SURGERY DAILY PROGRESS NOTE:    S: Patient seen and examined. She continues to attempt food intake and was instructed to be liberal in her sugar supplementation. Her drain culture has grown out E. coli ESBL, which is sensitive to the Invanz she is receiving, although it is also sensitive to Bactrim (PO), so Infectious Disease will provide input today- there are also insurance issues.    O:  Vital Signs Last 24 Hrs  T(C): 36.3 (29 Jan 2018 05:25), Max: 36.8 (28 Jan 2018 19:08)  T(F): 97.4 (29 Jan 2018 05:25), Max: 98.3 (28 Jan 2018 19:08)  HR: 86 (29 Jan 2018 05:25) (85 - 93)  BP: 118/81 (29 Jan 2018 05:25) (100/72 - 118/81)  BP(mean): --  RR: 18 (29 Jan 2018 05:25) (18 - 18)  SpO2: 97% (29 Jan 2018 05:25) (97% - 98%)    I&O's Detail    28 Jan 2018 07:01  -  29 Jan 2018 07:00  --------------------------------------------------------  IN:    dextrose 10% + sodium chloride 0.45%: 600 mL    Oral Fluid: 420 mL  Total IN: 1020 mL    OUT:    Bulb: 60 mL  Total OUT: 60 mL    Total NET: 960 mL    MEDICATIONS  (STANDING):  dextrose 10% + sodium chloride 0.45% 1000 milliLiter(s) (50 mL/Hr) IV Continuous <Continuous>  docusate sodium 100 milliGRAM(s) Oral three times a day  enoxaparin Injectable 30 milliGRAM(s) SubCutaneous daily  ertapenem  IVPB 1000 milliGRAM(s) IV Intermittent every 24 hours  fluconAZOLE IVPB      fluconAZOLE IVPB 200 milliGRAM(s) IV Intermittent every 24 hours  HYDROmorphone PCA (1 mG/mL) 30 milliLiter(s) PCA Continuous PCA Continuous  potassium acid phosphate/sodium acid phosphate tablet (K-PHOS No. 2) 1 Tablet(s) Oral four times a day with meals  senna 2 Tablet(s) Oral at bedtime    MEDICATIONS  (PRN):  HYDROmorphone PCA (1 mG/mL) Rescue Clinician Bolus 1 milliGRAM(s) IV Push every 1 hour PRN for Pain Scale GREATER THAN 6  naloxone Injectable 0.1 milliGRAM(s) IV Push every 3 minutes PRN For ANY of the following changes in patient status:  A. RR LESS THAN 10 breaths per minute, B. Oxygen saturation LESS THAN 90%, C. Sedation score of 6  ondansetron Injectable 4 milliGRAM(s) IV Push every 8 hours PRN Nausea and/or Vomiting    01-28    141  |  99  |  25<H>  ----------------------------<  54<L>  4.2   |  30  |  0.37<L>    Ca    8.2<L>      28 Jan 2018 08:56  Phos  1.4     01-28  Mg     1.9     01-28    TPro  4.8<L>  /  Alb  2.9<L>  /  TBili  0.3  /  DBili  x   /  AST  90<H>  /  ALT  80<H>  /  AlkPhos  511<H>  01-27      Physical Exam:  Gen: Laying in bed, NAD, alert, cachectic, PCA running  Abd: soft, non-distended, diffusely tender, ostomy productive with stoma protruding, drain with bilious fluid    Lines:   IV: patent, in place.

## 2018-01-30 LAB
ANION GAP SERPL CALC-SCNC: 9 MMOL/L — SIGNIFICANT CHANGE UP (ref 5–17)
BUN SERPL-MCNC: 13 MG/DL — SIGNIFICANT CHANGE UP (ref 7–23)
CALCIUM SERPL-MCNC: 7.6 MG/DL — LOW (ref 8.4–10.5)
CHLORIDE SERPL-SCNC: 102 MMOL/L — SIGNIFICANT CHANGE UP (ref 96–108)
CO2 SERPL-SCNC: 33 MMOL/L — HIGH (ref 22–31)
CREAT SERPL-MCNC: 0.27 MG/DL — LOW (ref 0.5–1.3)
GLUCOSE SERPL-MCNC: 70 MG/DL — SIGNIFICANT CHANGE UP (ref 70–99)
HCT VFR BLD CALC: 27.6 % — LOW (ref 34.5–45)
HGB BLD-MCNC: 8.6 G/DL — LOW (ref 11.5–15.5)
MAGNESIUM SERPL-MCNC: 1.7 MG/DL — SIGNIFICANT CHANGE UP (ref 1.6–2.6)
MCHC RBC-ENTMCNC: 28.3 PG — SIGNIFICANT CHANGE UP (ref 27–34)
MCHC RBC-ENTMCNC: 31.2 GM/DL — LOW (ref 32–36)
MCV RBC AUTO: 90.8 FL — SIGNIFICANT CHANGE UP (ref 80–100)
PHOSPHATE SERPL-MCNC: 2.1 MG/DL — LOW (ref 2.5–4.5)
PLATELET # BLD AUTO: 153 K/UL — SIGNIFICANT CHANGE UP (ref 150–400)
POTASSIUM SERPL-MCNC: 4.1 MMOL/L — SIGNIFICANT CHANGE UP (ref 3.5–5.3)
POTASSIUM SERPL-SCNC: 4.1 MMOL/L — SIGNIFICANT CHANGE UP (ref 3.5–5.3)
RBC # BLD: 3.04 M/UL — LOW (ref 3.8–5.2)
RBC # FLD: 15.3 % — HIGH (ref 10.3–14.5)
SODIUM SERPL-SCNC: 144 MMOL/L — SIGNIFICANT CHANGE UP (ref 135–145)
WBC # BLD: 7.14 K/UL — SIGNIFICANT CHANGE UP (ref 3.8–10.5)
WBC # FLD AUTO: 7.14 K/UL — SIGNIFICANT CHANGE UP (ref 3.8–10.5)

## 2018-01-30 PROCEDURE — 99358 PROLONG SERVICE W/O CONTACT: CPT

## 2018-01-30 PROCEDURE — 99497 ADVNCD CARE PLAN 30 MIN: CPT | Mod: 25

## 2018-01-30 PROCEDURE — 99232 SBSQ HOSP IP/OBS MODERATE 35: CPT

## 2018-01-30 RX ORDER — MAGNESIUM SULFATE 500 MG/ML
2 VIAL (ML) INJECTION ONCE
Qty: 0 | Refills: 0 | Status: COMPLETED | OUTPATIENT
Start: 2018-01-30 | End: 2018-01-30

## 2018-01-30 RX ADMIN — Medication 1 TABLET(S): at 13:31

## 2018-01-30 RX ADMIN — HYDROMORPHONE HYDROCHLORIDE 30 MILLILITER(S): 2 INJECTION INTRAMUSCULAR; INTRAVENOUS; SUBCUTANEOUS at 02:44

## 2018-01-30 RX ADMIN — FLUCONAZOLE 50 MILLIGRAM(S): 150 TABLET ORAL at 16:45

## 2018-01-30 RX ADMIN — HYDROMORPHONE HYDROCHLORIDE 30 MILLILITER(S): 2 INJECTION INTRAMUSCULAR; INTRAVENOUS; SUBCUTANEOUS at 19:18

## 2018-01-30 RX ADMIN — Medication 100 MILLIGRAM(S): at 13:31

## 2018-01-30 RX ADMIN — ERTAPENEM SODIUM 120 MILLIGRAM(S): 1 INJECTION, POWDER, LYOPHILIZED, FOR SOLUTION INTRAMUSCULAR; INTRAVENOUS at 14:31

## 2018-01-30 RX ADMIN — HYDROMORPHONE HYDROCHLORIDE 30 MILLILITER(S): 2 INJECTION INTRAMUSCULAR; INTRAVENOUS; SUBCUTANEOUS at 07:22

## 2018-01-30 RX ADMIN — Medication 1 TABLET(S): at 18:31

## 2018-01-30 RX ADMIN — Medication 1 TABLET(S): at 21:30

## 2018-01-30 RX ADMIN — SODIUM CHLORIDE 50 MILLILITER(S): 9 INJECTION, SOLUTION INTRAVENOUS at 18:00

## 2018-01-30 RX ADMIN — Medication 100 MILLIGRAM(S): at 05:07

## 2018-01-30 RX ADMIN — SENNA PLUS 2 TABLET(S): 8.6 TABLET ORAL at 21:30

## 2018-01-30 RX ADMIN — ENOXAPARIN SODIUM 30 MILLIGRAM(S): 100 INJECTION SUBCUTANEOUS at 11:17

## 2018-01-30 RX ADMIN — Medication 1 TABLET(S): at 09:29

## 2018-01-30 RX ADMIN — Medication 50 GRAM(S): at 11:17

## 2018-01-30 RX ADMIN — Medication 100 MILLIGRAM(S): at 21:30

## 2018-01-30 RX ADMIN — Medication 62.5 MILLIMOLE(S): at 11:17

## 2018-01-30 NOTE — PROGRESS NOTE ADULT - ASSESSMENT
A: 47F with metastatic ovarian cancer and gallbladder perforation s/p placement of a 10F drain in her gallbladder and yaa-cholecystic fluid with immediate drainage of 55cc. Hemodynamically stable on floor.    P:  - Drain care per IR, flush 5cc NS forward flush only daily  - Invanz/Diflucan coverage  - Bowel regimen  - PCA for pain control  - Regular diet  - Monitor drain output  - Dispo planning    Alba Pike PA-C [phone]

## 2018-01-30 NOTE — PROGRESS NOTE ADULT - ASSESSMENT
, 47F with metastatic ovarian CA (s/p TAHBSO, colostomy, previously seeking holistic care 12/2017 however has since established with new oncologist for chemo) p/w abdominal pain.  Now with bowel obstruction  previously s/p colostomy,wright  GB perforation  Seen and followed by surgery,Poor surgical candidate -no intervention for now per surgery  s/p IR drainage  SIRS,sepsis resolving  PCN allergy-but remote and tolerating Invanz  Biliary cultures with ESBL  E Coli and yeast  Also s/p recent chemo       ID -  Continue Invanz and fluconazole for now, Monitor closely for any s/s other nosocomial focus , need  10-14 day course with repeat imaging   Surg - no acute surgical intervention   GI - -pain control, monitor GI function closely, monitor drain outputs , s/p PRBCS; f/u CBC and transfuse as needed    Palliative input appreciated , cont pca   OK to transfer under medicine service for further medical care   discussed management plan in detail nausea length with pt

## 2018-01-30 NOTE — PROGRESS NOTE ADULT - SUBJECTIVE AND OBJECTIVE BOX
Patient is a 47y old  Female who presents with a chief complaint of Abdominal Pain (24 Jan 2018 06:17)    Being followed by ID for Cholecystitis,perf GB,pvarian CA    Interval history:Tolerating po   No acute events      ROS:  No cough,SOB,CP  No N/V/D./  Improved abd pain  No other complaints      Antimicrobials:    ertapenem  IVPB 1000 milliGRAM(s) IV Intermittent every 24 hours  fluconAZOLE IVPB      fluconAZOLE IVPB 200 milliGRAM(s) IV Intermittent every 24 hours    Other medications reviewed    Vital Signs Last 24 Hrs  T(C): 36.7 (01-30-18 @ 10:36), Max: 36.8 (01-29-18 @ 16:30)  T(F): 98 (01-30-18 @ 10:36), Max: 98.3 (01-29-18 @ 16:30)  HR: 95 (01-30-18 @ 10:36) (75 - 95)  BP: 103/73 (01-30-18 @ 10:36) (99/68 - 106/73)  BP(mean): --  RR: 18 (01-30-18 @ 10:36) (18 - 18)  SpO2: 97% (01-30-18 @ 10:36) (96% - 99%)    Physical Exam:        HEENT PERRLA EOMI    No oral exudate or erythema    Chest Good AE,CTA    CVS RRR S1 S2 WNl No murmur or rub or gallop    Abd soft BS normal Mild tenderness no masses,RUQ biliary drain     IV site ,mediport no erythema tenderness or discharge    CNS AAO X 3 no focal    Lab Data:                          8.6    7.14  )-----------( 153      ( 30 Jan 2018 09:07 )             27.6       01-30    144  |  102  |  13  ----------------------------<  70  4.1   |  33<H>  |  0.27<L>    Ca    7.6<L>      30 Jan 2018 09:05  Phos  2.1     01-30  Mg     1.7     01-30

## 2018-01-30 NOTE — PROGRESS NOTE ADULT - SUBJECTIVE AND OBJECTIVE BOX
Patient is a 47y old  Female who presents with a chief complaint of Abdominal Pain (24 Jan 2018 06:17)        SUBJECTIVE / OVERNIGHT EVENTS: pt denies chest pain, shortness of breath, nausea, vomiting, says i always has abdominal discomfort       MEDICATIONS  (STANDING):  dextrose 10% + sodium chloride 0.45% 1000 milliLiter(s) (50 mL/Hr) IV Continuous <Continuous>  docusate sodium 100 milliGRAM(s) Oral three times a day  enoxaparin Injectable 30 milliGRAM(s) SubCutaneous daily  ertapenem  IVPB 1000 milliGRAM(s) IV Intermittent every 24 hours  fluconAZOLE IVPB      fluconAZOLE IVPB 200 milliGRAM(s) IV Intermittent every 24 hours  HYDROmorphone PCA (1 mG/mL) 30 milliLiter(s) PCA Continuous PCA Continuous  potassium acid phosphate/sodium acid phosphate tablet (K-PHOS No. 2) 1 Tablet(s) Oral four times a day with meals  senna 2 Tablet(s) Oral at bedtime    MEDICATIONS  (PRN):  HYDROmorphone PCA (1 mG/mL) Rescue Clinician Bolus 1 milliGRAM(s) IV Push every 1 hour PRN for Pain Scale GREATER THAN 6  naloxone Injectable 0.1 milliGRAM(s) IV Push every 3 minutes PRN For ANY of the following changes in patient status:  A. RR LESS THAN 10 breaths per minute, B. Oxygen saturation LESS THAN 90%, C. Sedation score of 6  ondansetron Injectable 4 milliGRAM(s) IV Push every 8 hours PRN Nausea and/or Vomiting        CAPILLARY BLOOD GLUCOSE      POCT Blood Glucose.: 106 mg/dL (30 Jan 2018 18:08)  POCT Blood Glucose.: 109 mg/dL (30 Jan 2018 16:21)  POCT Blood Glucose.: 130 mg/dL (30 Jan 2018 13:55)  POCT Blood Glucose.: 108 mg/dL (30 Jan 2018 10:01)  POCT Blood Glucose.: 90 mg/dL (30 Jan 2018 05:37)  POCT Blood Glucose.: 89 mg/dL (30 Jan 2018 01:56)    I&O's Summary    29 Jan 2018 07:01  -  30 Jan 2018 07:00  --------------------------------------------------------  IN: 1880 mL / OUT: 2210 mL / NET: -330 mL    30 Jan 2018 07:01  -  30 Jan 2018 22:00  --------------------------------------------------------  IN: 2160 mL / OUT: 1145 mL / NET: 1015 mL    Vital Signs Last 24 Hrs  T(C): 36.5 (30 Jan 2018 21:21), Max: 36.9 (30 Jan 2018 13:56)  T(F): 97.7 (30 Jan 2018 21:21), Max: 98.4 (30 Jan 2018 13:56)  HR: 80 (30 Jan 2018 21:21) (78 - 95)  BP: 107/75 (30 Jan 2018 21:21) (99/68 - 107/75)  BP(mean): --  RR: 17 (30 Jan 2018 21:21) (17 - 18)  SpO2: 99% (30 Jan 2018 21:21) (96% - 99%)    Constitutional: No fever, fatigue  Skin: No rash.  Eyes: No recent vision problems or eye pain.  ENT: No congestion, ear pain, or sore throat.  Cardiovascular: No chest pain or palpation.  Respiratory: No cough, shortness of breath, congestion, or wheezing.  Gastrointestinal: No  nausea, vomiting, or diarrhea. bad discomfort +  Genitourinary: No dysuria.  Musculoskeletal: No joint swelling.  Neurologic: No headache.    PHYSICAL EXAM:  GENERAL: NAD  EYES: EOMI, PERRLA  NECK: Supple, No JVD  CHEST/LUNG: dec breath sounds at bases   HEART:  S1 , S2 +  ABDOMEN: mild distension+, ruq drain +  NEUROLOGY: alert awake oriented       LABS:                        8.6    7.14  )-----------( 153      ( 30 Jan 2018 09:07 )             27.6     01-30    144  |  102  |  13  ----------------------------<  70  4.1   |  33<H>  |  0.27<L>    Ca    7.6<L>      30 Jan 2018 09:05  Phos  2.1     01-30  Mg     1.7     01-30                RADIOLOGY & ADDITIONAL TESTS:    Imaging Personally Reviewed:    Consultant(s) Notes Reviewed:      Care Discussed with Consultants/Other Providers:

## 2018-01-30 NOTE — PROGRESS NOTE ADULT - ASSESSMENT
47F with metastatic ovarian CA (s/p TAHBSO, colostomy, previously seeking holistic care 12/2017 however has since established with new oncologist for chemo) p/w abdominal pain.  Now with bowel obstruction  previously s/p colostomy,wright  GB perforation  Seen and followed by surgery,Poor surgical candidate -no intervention for now per surgery  s/p IR drainage  SIRS,sepsis resolving  PCN allergy-but remote and tolerating Invanz  Biliary cultures with ESBL  E Coli and yeast  Also s/p recent chemo    Stable ,Blood  cx negative    Rec:  1) Further surgical plan ,if any,per surgery team.Monitor abdominal exam and course closely  2) Continue Invanz and fluconazole for now  3) Monitor closely for any s/s other nosocomial focus  Will need atleast 10-14 day course with repeat imaging   Will tailor plan for ID issues  per course,results.Will defer to primary team on management of other issues.  Prognosis guarded    Will Follow.  Beeper [id number]-[id number]/afterhours/No response-[id number]

## 2018-01-30 NOTE — PROGRESS NOTE ADULT - SUBJECTIVE AND OBJECTIVE BOX
BLUE SURGERY DAILY PROGRESS NOTE:       SUBJECTIVE/ROS: Patient resting comfortably in bed  Denies nausea, vomiting, chest pain, shortness of breath         MEDICATIONS  (STANDING):  dextrose 10% + sodium chloride 0.45% 1000 milliLiter(s) (50 mL/Hr) IV Continuous <Continuous>  docusate sodium 100 milliGRAM(s) Oral three times a day  enoxaparin Injectable 30 milliGRAM(s) SubCutaneous daily  ertapenem  IVPB 1000 milliGRAM(s) IV Intermittent every 24 hours  fluconAZOLE IVPB      fluconAZOLE IVPB 200 milliGRAM(s) IV Intermittent every 24 hours  HYDROmorphone PCA (1 mG/mL) 30 milliLiter(s) PCA Continuous PCA Continuous  potassium acid phosphate/sodium acid phosphate tablet (K-PHOS No. 2) 1 Tablet(s) Oral four times a day with meals  senna 2 Tablet(s) Oral at bedtime    MEDICATIONS  (PRN):  HYDROmorphone PCA (1 mG/mL) Rescue Clinician Bolus 1 milliGRAM(s) IV Push every 1 hour PRN for Pain Scale GREATER THAN 6  naloxone Injectable 0.1 milliGRAM(s) IV Push every 3 minutes PRN For ANY of the following changes in patient status:  A. RR LESS THAN 10 breaths per minute, B. Oxygen saturation LESS THAN 90%, C. Sedation score of 6  ondansetron Injectable 4 milliGRAM(s) IV Push every 8 hours PRN Nausea and/or Vomiting      OBJECTIVE:    Vital Signs Last 24 Hrs  T(C): 36.8 (30 Jan 2018 05:38), Max: 36.8 (29 Jan 2018 16:30)  T(F): 98.3 (30 Jan 2018 05:38), Max: 98.3 (29 Jan 2018 16:30)  HR: 81 (30 Jan 2018 05:38) (75 - 81)  BP: 99/68 (30 Jan 2018 05:38) (99/68 - 106/73)  BP(mean): --  RR: 18 (30 Jan 2018 05:38) (18 - 18)  SpO2: 96% (30 Jan 2018 05:38) (96% - 99%)        I&O's Detail    29 Jan 2018 07:01  -  30 Jan 2018 07:00  --------------------------------------------------------  IN:    dextrose 10% + sodium chloride 0.45%: 1200 mL    Oral Fluid: 680 mL  Total IN: 1880 mL    OUT:    Bulb: 60 mL    Colostomy: 400 mL    Voided: 1750 mL  Total OUT: 2210 mL    Total NET: -330 mL      30 Jan 2018 07:01  -  30 Jan 2018 08:45  --------------------------------------------------------  IN:  Total IN: 0 mL    OUT:    Bulb: 25 mL    Voided: 200 mL  Total OUT: 225 mL    Total NET: -225 mL          Daily     Daily     LABS:                        8.7    7.98  )-----------( 142      ( 29 Jan 2018 08:58 )             29.6     01-29    141  |  99  |  17  ----------------------------<  70  4.3   |  32<H>  |  0.26<L>    Ca    7.6<L>      29 Jan 2018 08:49  Phos  1.7     01-29  Mg     1.7     01-29      Physical Exam:  Gen: Laying in bed, NAD, alert, cachectic, PCA running  Abd: soft, non-distended, diffusely tender, ostomy productive with stoma protruding, drain with bilious fluid

## 2018-01-30 NOTE — PROGRESS NOTE ADULT - SUBJECTIVE AND OBJECTIVE BOX
INTERVAL HPI/OVERNIGHT EVENTS:    Seen and examined in bed. No acute overnight events noted.   resting in bed    MEDICATIONS  (STANDING):  dextrose 10% + sodium chloride 0.45% 1000 milliLiter(s) (50 mL/Hr) IV Continuous <Continuous>  docusate sodium 100 milliGRAM(s) Oral three times a day  enoxaparin Injectable 30 milliGRAM(s) SubCutaneous daily  ertapenem  IVPB 1000 milliGRAM(s) IV Intermittent every 24 hours  fluconAZOLE IVPB      fluconAZOLE IVPB 200 milliGRAM(s) IV Intermittent every 24 hours  HYDROmorphone PCA (1 mG/mL) 30 milliLiter(s) PCA Continuous PCA Continuous  potassium acid phosphate/sodium acid phosphate tablet (K-PHOS No. 2) 1 Tablet(s) Oral four times a day with meals  senna 2 Tablet(s) Oral at bedtime    MEDICATIONS  (PRN):  HYDROmorphone PCA (1 mG/mL) Rescue Clinician Bolus 1 milliGRAM(s) IV Push every 1 hour PRN for Pain Scale GREATER THAN 6  naloxone Injectable 0.1 milliGRAM(s) IV Push every 3 minutes PRN For ANY of the following changes in patient status:  A. RR LESS THAN 10 breaths per minute, B. Oxygen saturation LESS THAN 90%, C. Sedation score of 6  ondansetron Injectable 4 milliGRAM(s) IV Push every 8 hours PRN Nausea and/or Vomiting      Allergies    penicillin (Rash)    Intolerances:      PHYSICAL EXAM:   Vital Signs:  Vital Signs Last 24 Hrs  T(C): 36.5 (28 Jan 2018 05:14), Max: 37.2 (27 Jan 2018 21:37)  T(F): 97.7 (28 Jan 2018 05:14), Max: 98.9 (27 Jan 2018 21:37)  HR: 80 (28 Jan 2018 05:14) (75 - 90)  BP: 103/75 (28 Jan 2018 05:14) (94/67 - 108/78)  BP(mean): --  RR: 18 (28 Jan 2018 05:14) (17 - 18)  SpO2: 96% (28 Jan 2018 05:14) (94% - 96%)  Daily     Daily I&O's Summary    27 Jan 2018 07:01  -  28 Jan 2018 07:00  --------------------------------------------------------  IN: 1960 mL / OUT: 40 mL / NET: 1920 mL        GENERAL:  Cachectic; NAD  HEENT:  NC/AT  CHEST:  Full & symmetric excursion, no increased effort, breath sounds clear  HEART:  Regular rhythm, S1, S2  ABDOMEN:  Soft, Bowel sounds present; Healed midline incision; + ostomy   EXTEREMITIES:  no cyanosis,clubbing or edema  SKIN:  No rash/erythema/ecchymoses/petechiae/wounds/abscess/warm/dry  NEURO:  Alert, oriented, no asterixis, no tremor, no encephalopathy      LABS:                        6.4    6.34  )-----------( 196      ( 27 Jan 2018 09:02 )             21.4     01-27    144  |  97  |  40<H>  ----------------------------<  106<H>  2.9<LL>   |  31  |  0.48<L>    Ca    8.2<L>      27 Jan 2018 09:09  Phos  2.3     01-27  Mg     1.8     01-27    TPro  4.8<L>  /  Alb  2.9<L>  /  TBili  0.3  /  DBili  x   /  AST  90<H>  /  ALT  80<H>  /  AlkPhos  511<H>  01-27    PT/INR - ( 26 Jan 2018 07:33 )   PT: 17.7 sec;   INR: 1.55 ratio         PTT - ( 26 Jan 2018 07:33 )  PTT:31.4 sec    amylase   lipase  RADIOLOGY & ADDITIONAL TESTS:       Problem/Plan - 1:  ·  Problem: Right upper quadrant abdominal pain.  Plan: diet per surgery  -pain control  -monitor GI function closely  -monitor drain outputs   -s/p PRBCS; f/u CBC and transfuse as needed  -check Metabolic panel daily; replete electrolytes prn  -continue with broad spectrum antibiotics    overall prognosis poor.

## 2018-01-31 LAB
ANION GAP SERPL CALC-SCNC: 7 MMOL/L — SIGNIFICANT CHANGE UP (ref 5–17)
BUN SERPL-MCNC: 10 MG/DL — SIGNIFICANT CHANGE UP (ref 7–23)
CALCIUM SERPL-MCNC: 7.4 MG/DL — LOW (ref 8.4–10.5)
CHLORIDE SERPL-SCNC: 101 MMOL/L — SIGNIFICANT CHANGE UP (ref 96–108)
CO2 SERPL-SCNC: 33 MMOL/L — HIGH (ref 22–31)
CREAT SERPL-MCNC: 0.23 MG/DL — LOW (ref 0.5–1.3)
GLUCOSE SERPL-MCNC: 69 MG/DL — LOW (ref 70–99)
HCT VFR BLD CALC: 28.6 % — LOW (ref 34.5–45)
HGB BLD-MCNC: 8.7 G/DL — LOW (ref 11.5–15.5)
MAGNESIUM SERPL-MCNC: 2 MG/DL — SIGNIFICANT CHANGE UP (ref 1.6–2.6)
MCHC RBC-ENTMCNC: 28.2 PG — SIGNIFICANT CHANGE UP (ref 27–34)
MCHC RBC-ENTMCNC: 30.4 GM/DL — LOW (ref 32–36)
MCV RBC AUTO: 92.6 FL — SIGNIFICANT CHANGE UP (ref 80–100)
PHOSPHATE SERPL-MCNC: 2.4 MG/DL — LOW (ref 2.5–4.5)
PLATELET # BLD AUTO: 169 K/UL — SIGNIFICANT CHANGE UP (ref 150–400)
POTASSIUM SERPL-MCNC: 4.4 MMOL/L — SIGNIFICANT CHANGE UP (ref 3.5–5.3)
POTASSIUM SERPL-SCNC: 4.4 MMOL/L — SIGNIFICANT CHANGE UP (ref 3.5–5.3)
RBC # BLD: 3.09 M/UL — LOW (ref 3.8–5.2)
RBC # FLD: 14.8 % — HIGH (ref 10.3–14.5)
SODIUM SERPL-SCNC: 141 MMOL/L — SIGNIFICANT CHANGE UP (ref 135–145)
WBC # BLD: 6.27 K/UL — SIGNIFICANT CHANGE UP (ref 3.8–10.5)
WBC # FLD AUTO: 6.27 K/UL — SIGNIFICANT CHANGE UP (ref 3.8–10.5)

## 2018-01-31 RX ADMIN — Medication 1 TABLET(S): at 21:56

## 2018-01-31 RX ADMIN — HYDROMORPHONE HYDROCHLORIDE 30 MILLILITER(S): 2 INJECTION INTRAMUSCULAR; INTRAVENOUS; SUBCUTANEOUS at 19:33

## 2018-01-31 RX ADMIN — ERTAPENEM SODIUM 120 MILLIGRAM(S): 1 INJECTION, POWDER, LYOPHILIZED, FOR SOLUTION INTRAMUSCULAR; INTRAVENOUS at 19:37

## 2018-01-31 RX ADMIN — HYDROMORPHONE HYDROCHLORIDE 30 MILLILITER(S): 2 INJECTION INTRAMUSCULAR; INTRAVENOUS; SUBCUTANEOUS at 02:54

## 2018-01-31 RX ADMIN — ENOXAPARIN SODIUM 30 MILLIGRAM(S): 100 INJECTION SUBCUTANEOUS at 12:09

## 2018-01-31 RX ADMIN — Medication 100 MILLIGRAM(S): at 05:33

## 2018-01-31 RX ADMIN — Medication 1 TABLET(S): at 12:08

## 2018-01-31 RX ADMIN — FLUCONAZOLE 50 MILLIGRAM(S): 150 TABLET ORAL at 18:54

## 2018-01-31 RX ADMIN — Medication 1 TABLET(S): at 18:49

## 2018-01-31 RX ADMIN — HYDROMORPHONE HYDROCHLORIDE 30 MILLILITER(S): 2 INJECTION INTRAMUSCULAR; INTRAVENOUS; SUBCUTANEOUS at 07:20

## 2018-01-31 NOTE — PROGRESS NOTE ADULT - ASSESSMENT
47F with metastatic ovarian CA (s/p TAHBSO, colostomy, previously seeking holistic care 12/2017 however has since established with new oncologist for chemo) p/w abdominal pain.  Now with bowel obstruction  previously s/p colostomy,wright  GB perforation  Seen and followed by surgery,Poor surgical candidate -no intervention for now per surgery  s/p IR drainage  SIRS,sepsis resolving  PCN allergy-but remote and tolerating Invanz  Biliary cultures with ESBL  E Coli and yeast  Also s/p recent chemo    Stable ,Blood  cx negative    Rec:  1)  Continue Invanz and fluconazole for now  2) Monitor closely for any s/s other nosocomial focus  Will need atleast 10-14 day course with repeat imaging   Will tailor plan for ID issues  per course,results.Will defer to primary team on management of other issues.  Prognosis guarded-palliative on case     Will Follow.  Beeper [id number]-[id number]/afterhours/No response-[id number]

## 2018-01-31 NOTE — PROGRESS NOTE ADULT - SUBJECTIVE AND OBJECTIVE BOX
Blue surgery Progress Note     Subjective: no events overnight, tolerating diet, no n/v     PE:  NAD  abd nontender HARSHIL bilious   ostomy pink with stool/ air in bag     Vital Signs Last 24 Hrs  T(C): 36.8 (31 Jan 2018 07:14), Max: 36.9 (30 Jan 2018 13:56)  T(F): 98.2 (31 Jan 2018 07:14), Max: 98.4 (30 Jan 2018 13:56)  HR: 82 (31 Jan 2018 07:14) (80 - 95)  BP: 117/79 (31 Jan 2018 07:14) (103/73 - 117/79)  BP(mean): --  RR: 18 (31 Jan 2018 07:14) (17 - 18)  SpO2: 98% (31 Jan 2018 07:14) (97% - 99%)    I&O's Detail    30 Jan 2018 07:01  -  31 Jan 2018 07:00  --------------------------------------------------------  IN:    dextrose 10% + sodium chloride 0.45%: 1200 mL    IV PiggyBack: 50 mL    Oral Fluid: 1110 mL    Solution: 100 mL    Solution: 50 mL    Solution: 250 mL  Total IN: 2760 mL    OUT:    Bulb: 160 mL    Voided: 1450 mL  Total OUT: 1610 mL    Total NET: 1150 mL      31 Jan 2018 07:01  -  31 Jan 2018 08:41  --------------------------------------------------------  IN:  Total IN: 0 mL    OUT:    Voided: 200 mL  Total OUT: 200 mL    Total NET: -200 mL          Daily     Daily     MEDICATIONS  (STANDING):  dextrose 10% + sodium chloride 0.45% 1000 milliLiter(s) (50 mL/Hr) IV Continuous <Continuous>  docusate sodium 100 milliGRAM(s) Oral three times a day  enoxaparin Injectable 30 milliGRAM(s) SubCutaneous daily  ertapenem  IVPB 1000 milliGRAM(s) IV Intermittent every 24 hours  fluconAZOLE IVPB      fluconAZOLE IVPB 200 milliGRAM(s) IV Intermittent every 24 hours  HYDROmorphone PCA (1 mG/mL) 30 milliLiter(s) PCA Continuous PCA Continuous  potassium acid phosphate/sodium acid phosphate tablet (K-PHOS No. 2) 1 Tablet(s) Oral four times a day with meals  senna 2 Tablet(s) Oral at bedtime    MEDICATIONS  (PRN):  HYDROmorphone PCA (1 mG/mL) Rescue Clinician Bolus 1 milliGRAM(s) IV Push every 1 hour PRN for Pain Scale GREATER THAN 6  naloxone Injectable 0.1 milliGRAM(s) IV Push every 3 minutes PRN For ANY of the following changes in patient status:  A. RR LESS THAN 10 breaths per minute, B. Oxygen saturation LESS THAN 90%, C. Sedation score of 6  ondansetron Injectable 4 milliGRAM(s) IV Push every 8 hours PRN Nausea and/or Vomiting      LABS:                        8.7    6.27  )-----------( 169      ( 31 Jan 2018 07:18 )             28.6     01-30    144  |  102  |  13  ----------------------------<  70  4.1   |  33<H>  |  0.27<L>    Ca    7.6<L>      30 Jan 2018 09:05  Phos  2.1     01-30  Mg     1.7     01-30            RADIOLOGY & ADDITIONAL STUDIES:

## 2018-01-31 NOTE — PROGRESS NOTE ADULT - SUBJECTIVE AND OBJECTIVE BOX
Patient is a 47y old  Female who presents with a chief complaint of Abdominal Pain (24 Jan 2018 06:17)        SUBJECTIVE / OVERNIGHT EVENTS: pt denies chest pain, shortness of breath, nausea, vomiting, says i always has abdominal discomfort     MEDICATIONS  (STANDING):  dextrose 10% + sodium chloride 0.45% 1000 milliLiter(s) (50 mL/Hr) IV Continuous <Continuous>  docusate sodium 100 milliGRAM(s) Oral three times a day  enoxaparin Injectable 30 milliGRAM(s) SubCutaneous daily  ertapenem  IVPB 1000 milliGRAM(s) IV Intermittent every 24 hours  fluconAZOLE IVPB      fluconAZOLE IVPB 200 milliGRAM(s) IV Intermittent every 24 hours  HYDROmorphone PCA (1 mG/mL) 30 milliLiter(s) PCA Continuous PCA Continuous  potassium acid phosphate/sodium acid phosphate tablet (K-PHOS No. 2) 1 Tablet(s) Oral four times a day with meals  senna 2 Tablet(s) Oral at bedtime    MEDICATIONS  (PRN):  HYDROmorphone PCA (1 mG/mL) Rescue Clinician Bolus 1 milliGRAM(s) IV Push every 1 hour PRN for Pain Scale GREATER THAN 6  naloxone Injectable 0.1 milliGRAM(s) IV Push every 3 minutes PRN For ANY of the following changes in patient status:  A. RR LESS THAN 10 breaths per minute, B. Oxygen saturation LESS THAN 90%, C. Sedation score of 6  ondansetron Injectable 4 milliGRAM(s) IV Push every 8 hours PRN Nausea and/or Vomiting      Vital Signs Last 24 Hrs  T(C): 36.7 (31 Jan 2018 16:52), Max: 37.1 (31 Jan 2018 10:41)  T(F): 98 (31 Jan 2018 16:52), Max: 98.8 (31 Jan 2018 10:41)  HR: 89 (31 Jan 2018 16:52) (80 - 89)  BP: 138/83 (31 Jan 2018 16:52) (97/74 - 138/83)  BP(mean): --  RR: 18 (31 Jan 2018 16:52) (17 - 18)  SpO2: 97% (31 Jan 2018 16:52) (97% - 99%)    Constitutional: No fever, fatigue  Skin: No rash.  Eyes: No recent vision problems or eye pain.  ENT: No congestion, ear pain, or sore throat.  Cardiovascular: No chest pain or palpation.  Respiratory: No cough, shortness of breath, congestion, or wheezing.  Gastrointestinal: No  nausea, vomiting, or diarrhea. bad discomfort +  Genitourinary: No dysuria.  Musculoskeletal: No joint swelling.  Neurologic: No headache.    PHYSICAL EXAM:  GENERAL: NAD  EYES: EOMI, PERRLA  NECK: Supple, No JVD  CHEST/LUNG: dec breath sounds at bases   HEART:  S1 , S2 +  ABDOMEN: mild distension+, ruq drain +  NEUROLOGY: alert awake oriented     LABS:  01-31    141  |  101  |  10  ----------------------------<  69<L>  4.4   |  33<H>  |  0.23<L>    Ca    7.4<L>      31 Jan 2018 07:20  Phos  2.4     01-31  Mg     2.0     01-31      Creatinine Trend: 0.23 <--, 0.27 <--, 0.26 <--, 0.37 <--, 0.48 <--, 0.54 <--, 0.66 <--                        8.7    6.27  )-----------( 169      ( 31 Jan 2018 07:18 )             28.6     Urine Studies:

## 2018-01-31 NOTE — GOALS OF CARE CONVERSATION - PERSONAL ADVANCE DIRECTIVE - CONVERSATION DETAILS
I met with the patient and her HCP. We discussed about the patient's advanced illness and very poor prognosis and they gave verbalized understanding about it. The patient stated she understands her situation still she would like to try to come out of her acute infection and bowel obstruction and try to return to her oncologist to see if he can offer any further Rxs. However, that she is not "afraid of dying." I had a separate conversation with Trung that stated her Oncologist is willing to see her but that he is already indicating there may be a time were disease modifying Rxs may not be available any more. Trung is already thinking about  arrangement and how his life may be after she is gone. However, as long as the patient wants to continue with active Rx and as long as that Rx is offered he will support the patient with getting those Rxs. I also d/w them about support services. I explained them that once the patient is still looking for disease modifying Rx that hospice is not yet an options and that maybe hospice call may be a better fit if her insurance were to approved it. However, if house calls is not an options that then home care with advance illness will be the next step. Finally, if disease modifying Rx is not an option anymore then hospice will become available. I also indicated that with any of the options above that the patient will need 24/7 care and that beyond what the insurance may provide that they will likely need to private pay for extra HHA hours and they both understood.    and RAFFY Willis were informed.   16' were spent in ACP. 30' were spent in coordination of care. Yesterday afternoon, I met with the patient and her HCP. We discussed about the patient's advanced illness and very poor prognosis and they gave verbalized understanding about it. The patient stated she understands her situation still she would like to try to come out of her acute infection and bowel obstruction and try to return to her oncologist to see if he can offer any further Rxs. However, that she is not "afraid of dying." I had a separate conversation with Trung that stated her Oncologist is willing to see her but that he is already indicating there may be a time were disease modifying Rxs may not be available any more. Trung is already thinking about  arrangement and how his life may be after she is gone. However, as long as the patient wants to continue with active Rx and as long as that Rx is offered he will support the patient with getting those Rxs. I also d/w them about support services. I explained them that once the patient is still looking for disease modifying Rx that hospice is not yet an options and that maybe house calls may be a better fit if her insurance were to approved it. However, if house calls is not an options that then home care with advance illness will be the next step. Finally, if disease modifying Rx is not an option anymore then hospice will become available. I also indicated that with any of the options above that the patient will need 24/7 care and that beyond what the HHA hours the insurance may provide that they will likely need to private pay for extra HHA hours and they both understood.    and RAFFY Willis were informed.   16' were spent in ACP. 30' were spent in coordination of care.

## 2018-01-31 NOTE — PROGRESS NOTE ADULT - ASSESSMENT
A: 47F with metastatic ovarian cancer and gallbladder perforation s/p placement of a 10F drain in her gallbladder and yaa-cholecystic fluid with immediate drainage of 55cc. Hemodynamically stable on floor.    P  - appreciate palliative care  - Drain care per IR, flush 5cc NS forward flush only daily  - Invanz/Diflucan coverage as per ID   - Bowel regimen  - PCA for pain control  - Regular diet  - Monitor drain output  - transfer to medicine Dr. Ray     [id number]

## 2018-01-31 NOTE — PROGRESS NOTE ADULT - SUBJECTIVE AND OBJECTIVE BOX
Patient is a 47y old  Female who presents with a chief complaint of Abdominal Pain (24 Jan 2018 06:17)    Being followed by ID for SBO,GB perforation    Interval history:Still with abd pain  GOC conversation with palliative noted  No acute events      ROS:  No cough,SOB,CP  No N/V/D.  No other complaints      Antimicrobials:    ertapenem  IVPB 1000 milliGRAM(s) IV Intermittent every 24 hours  fluconAZOLE IVPB      fluconAZOLE IVPB 200 milliGRAM(s) IV Intermittent every 24 hours    Other medications reviewed    Vital Signs Last 24 Hrs  T(C): 37.1 (01-31-18 @ 10:41), Max: 37.1 (01-31-18 @ 10:41)  T(F): 98.8 (01-31-18 @ 10:41), Max: 98.8 (01-31-18 @ 10:41)  HR: 86 (01-31-18 @ 10:41) (80 - 91)  BP: 106/74 (01-31-18 @ 10:41) (104/70 - 117/79)  BP(mean): --  RR: 18 (01-31-18 @ 10:41) (17 - 18)  SpO2: 97% (01-31-18 @ 10:41) (97% - 99%)    Physical Exam:        HEENT PERRLA EOMI    No oral exudate or erythema    Chest Good AE,CTA    CVS RRR S1 S2 WNl No murmur or rub or gallop    Abd soft BS normal +  tenderness no masses,RUQ drain     IV site no erythema tenderness or discharge  R SC mediport no erythema or tenderness    CNS AAO X 3 no focal    Lab Data:                          8.7    6.27  )-----------( 169      ( 31 Jan 2018 07:18 )             28.6       01-31    141  |  101  |  10  ----------------------------<  69<L>  4.4   |  33<H>  |  0.23<L>    Ca    7.4<L>      31 Jan 2018 07:20  Phos  2.4     01-31  Mg     2.0     01-31

## 2018-02-01 LAB
GLUCOSE BLDC GLUCOMTR-MCNC: 107 MG/DL — HIGH (ref 70–99)
GLUCOSE BLDC GLUCOMTR-MCNC: 151 MG/DL — HIGH (ref 70–99)
GLUCOSE BLDC GLUCOMTR-MCNC: 96 MG/DL — SIGNIFICANT CHANGE UP (ref 70–99)

## 2018-02-01 PROCEDURE — 99232 SBSQ HOSP IP/OBS MODERATE 35: CPT

## 2018-02-01 PROCEDURE — 99231 SBSQ HOSP IP/OBS SF/LOW 25: CPT

## 2018-02-01 RX ORDER — HYDROMORPHONE HYDROCHLORIDE 2 MG/ML
30 INJECTION INTRAMUSCULAR; INTRAVENOUS; SUBCUTANEOUS
Qty: 0 | Refills: 0 | Status: DISCONTINUED | OUTPATIENT
Start: 2018-02-01 | End: 2018-02-05

## 2018-02-01 RX ORDER — HYDROMORPHONE HYDROCHLORIDE 2 MG/ML
1 INJECTION INTRAMUSCULAR; INTRAVENOUS; SUBCUTANEOUS
Qty: 0 | Refills: 0 | Status: DISCONTINUED | OUTPATIENT
Start: 2018-02-01 | End: 2018-02-05

## 2018-02-01 RX ADMIN — Medication 1 TABLET(S): at 14:43

## 2018-02-01 RX ADMIN — SENNA PLUS 2 TABLET(S): 8.6 TABLET ORAL at 21:34

## 2018-02-01 RX ADMIN — Medication 1 TABLET(S): at 18:00

## 2018-02-01 RX ADMIN — ERTAPENEM SODIUM 120 MILLIGRAM(S): 1 INJECTION, POWDER, LYOPHILIZED, FOR SOLUTION INTRAMUSCULAR; INTRAVENOUS at 14:45

## 2018-02-01 RX ADMIN — HYDROMORPHONE HYDROCHLORIDE 30 MILLILITER(S): 2 INJECTION INTRAMUSCULAR; INTRAVENOUS; SUBCUTANEOUS at 19:13

## 2018-02-01 RX ADMIN — Medication 100 MILLIGRAM(S): at 21:34

## 2018-02-01 RX ADMIN — HYDROMORPHONE HYDROCHLORIDE 30 MILLILITER(S): 2 INJECTION INTRAMUSCULAR; INTRAVENOUS; SUBCUTANEOUS at 07:14

## 2018-02-01 RX ADMIN — SODIUM CHLORIDE 50 MILLILITER(S): 9 INJECTION, SOLUTION INTRAVENOUS at 07:13

## 2018-02-01 RX ADMIN — Medication 1 TABLET(S): at 21:34

## 2018-02-01 RX ADMIN — HYDROMORPHONE HYDROCHLORIDE 30 MILLILITER(S): 2 INJECTION INTRAMUSCULAR; INTRAVENOUS; SUBCUTANEOUS at 23:51

## 2018-02-01 RX ADMIN — HYDROMORPHONE HYDROCHLORIDE 30 MILLILITER(S): 2 INJECTION INTRAMUSCULAR; INTRAVENOUS; SUBCUTANEOUS at 02:27

## 2018-02-01 RX ADMIN — FLUCONAZOLE 50 MILLIGRAM(S): 150 TABLET ORAL at 18:50

## 2018-02-01 NOTE — PROGRESS NOTE ADULT - SUBJECTIVE AND OBJECTIVE BOX
GENERAL SURGERY DAILY PROGRESS NOTE:     Subjective:  Pt. seen and examined. Slept well overnight. Pain is "tolerable." Denies nausea at the moment. Wants to get OOB later.            Objective:    MEDICATIONS  (STANDING):  dextrose 10% + sodium chloride 0.45% 1000 milliLiter(s) (50 mL/Hr) IV Continuous <Continuous>  docusate sodium 100 milliGRAM(s) Oral three times a day  enoxaparin Injectable 30 milliGRAM(s) SubCutaneous daily  ertapenem  IVPB 1000 milliGRAM(s) IV Intermittent every 24 hours  fluconAZOLE IVPB      fluconAZOLE IVPB 200 milliGRAM(s) IV Intermittent every 24 hours  HYDROmorphone PCA (1 mG/mL) 30 milliLiter(s) PCA Continuous PCA Continuous  potassium acid phosphate/sodium acid phosphate tablet (K-PHOS No. 2) 1 Tablet(s) Oral four times a day with meals  senna 2 Tablet(s) Oral at bedtime    MEDICATIONS  (PRN):  HYDROmorphone PCA (1 mG/mL) Rescue Clinician Bolus 1 milliGRAM(s) IV Push every 1 hour PRN for Pain Scale GREATER THAN 6  naloxone Injectable 0.1 milliGRAM(s) IV Push every 3 minutes PRN For ANY of the following changes in patient status:  A. RR LESS THAN 10 breaths per minute, B. Oxygen saturation LESS THAN 90%, C. Sedation score of 6  ondansetron Injectable 4 milliGRAM(s) IV Push every 8 hours PRN Nausea and/or Vomiting      Vital Signs Last 24 Hrs  T(C): 36.5 (01 Feb 2018 05:07), Max: 37.1 (31 Jan 2018 10:41)  T(F): 97.7 (01 Feb 2018 05:07), Max: 98.8 (31 Jan 2018 10:41)  HR: 84 (01 Feb 2018 05:07) (82 - 92)  BP: 100/69 (01 Feb 2018 05:07) (97/74 - 138/83)  BP(mean): --  RR: 18 (01 Feb 2018 05:07) (18 - 18)  SpO2: 96% (01 Feb 2018 05:07) (96% - 99%)    I&O's Detail    30 Jan 2018 07:01  -  31 Jan 2018 07:00  --------------------------------------------------------  IN:    dextrose 10% + sodium chloride 0.45%: 1200 mL    IV PiggyBack: 50 mL    Oral Fluid: 1110 mL    Solution: 100 mL    Solution: 50 mL    Solution: 250 mL  Total IN: 2760 mL    OUT:    Bulb: 160 mL    Voided: 1450 mL  Total OUT: 1610 mL    Total NET: 1150 mL      31 Jan 2018 07:01  -  01 Feb 2018 06:18  --------------------------------------------------------  IN:    dextrose 10% + sodium chloride 0.45%: 600 mL    Oral Fluid: 480 mL  Total IN: 1080 mL    OUT:    Bulb: 90 mL    Colostomy: 350 mL    Voided: 1725 mL  Total OUT: 2165 mL    Total NET: -1085 mL      NAD, awake and alert  Respirations nonlabored  Abdomen soft, tender  RUQ drain bilious, site with minimal leakage  Ostomy pink and viable with enteric contents    Daily     Daily     LABS:                        8.7    6.27  )-----------( 169      ( 31 Jan 2018 07:18 )             28.6     01-31    141  |  101  |  10  ----------------------------<  69<L>  4.4   |  33<H>  |  0.23<L>    Ca    7.4<L>      31 Jan 2018 07:20  Phos  2.4     01-31  Mg     2.0     01-31            RADIOLOGY & ADDITIONAL STUDIES:

## 2018-02-01 NOTE — PROGRESS NOTE ADULT - ASSESSMENT
47F with metastatic ovarian CA (s/p TAHBSO, colostomy, previously seeking holistic care 12/2017 however has since established with new oncologist for chemo) p/w abdominal pain.  Now with bowel obstruction  previously s/p colostomy,wright  GB perforation  Seen and followed by surgery,Poor surgical candidate -no intervention for now per surgery  s/p IR drainage  SIRS,sepsis resolved  PCN allergy-but remote and tolerating Invanz  Biliary cultures with ESBL  E Coli and yeast  Also s/p recent chemo  Stable ,Blood  cx negative  Repeat imaging next week  10-14 day course depending on results  Prognosis poor  Will Follow.  Beeper [id number]-[id number]/afterhours/No response-[id number]

## 2018-02-01 NOTE — CHART NOTE - NSCHARTNOTEFT_GEN_A_CORE
Source: Patient [X]    Family [ ]     other [ ]    Diet : Regular with Ensure Enlive BID (700kcals, 40g pro)      Patient reports [ ] nausea  [ ] vomiting [ ] diarrhea [ ] constipation  [ ]chewing problems [ ] swallowing issues  [X] other: chronic stomach pains     PO intake:  < 50% [X] 50-75% [ ]   % [ ]  other :     Source for PO intake [x] Patient [ ] family [ ] chart [ ] staff [ ] other    Hospital course: 47F with metastatic ovarian cancer and gallbladder perforation s/p placement of a 10F drain in her gallbladder. On PCA for pain control.     Pt seen for malnutrition follow-up. On regular diet, reports to be tolerating well. Chronic poor appetite with PO intake <50%. Pt stated she ate chicken and potatoes for dinner last night, ordered breakfast while I was in the room (cream of wheat, eggs, turkey, yogurt, milk, fruit). Reports bloating from Ensure supplement, stated she would like to try the strawberry flavor. No n/v reported, chronic stomach pains. Colostomy functioning, 1/31 output 200mL. Reinforced diet education of meeting kcal and protein needs. Pt was receptive.     Current Weight: 1/25 102.9 pounds   % Weight Change: 2% weight change since admission 1/24. Likely due to bed scale error, will continue to monitor.     Pertinent Medications: MEDICATIONS  (STANDING):  dextrose 10% + sodium chloride 0.45% 1000 milliLiter(s) (50 mL/Hr) IV Continuous <Continuous>  docusate sodium 100 milliGRAM(s) Oral three times a day  enoxaparin Injectable 30 milliGRAM(s) SubCutaneous daily  ertapenem  IVPB 1000 milliGRAM(s) IV Intermittent every 24 hours  fluconAZOLE IVPB      fluconAZOLE IVPB 200 milliGRAM(s) IV Intermittent every 24 hours  HYDROmorphone PCA (1 mG/mL) 30 milliLiter(s) PCA Continuous PCA Continuous  potassium acid phosphate/sodium acid phosphate tablet (K-PHOS No. 2) 1 Tablet(s) Oral four times a day with meals  senna 2 Tablet(s) Oral at bedtime    MEDICATIONS  (PRN):  HYDROmorphone PCA (1 mG/mL) Rescue Clinician Bolus 1 milliGRAM(s) IV Push every 1 hour PRN for Pain Scale GREATER THAN 6  naloxone Injectable 0.1 milliGRAM(s) IV Push every 3 minutes PRN For ANY of the following changes in patient status:  A. RR LESS THAN 10 breaths per minute, B. Oxygen saturation LESS THAN 90%, C. Sedation score of 6  ondansetron Injectable 4 milliGRAM(s) IV Push every 8 hours PRN Nausea and/or Vomiting    Pertinent Labs:  1/31 Creatinine .23L, Gkuc 69L, Ca+ 7.4L, Phos 2.4L    Skin: no edema noted, stage 3 pressure ulcer-sacrum    Estimated Needs:   [X] no change since previous assessment      Previous Nutrition Diagnosis:   [X] Malnutrition       Nutrition Diagnosis is [X] ongoing        New Nutrition Diagnosis: [X] not applicable      Recommend  1) Continue diet as ordered.   2) Provide food preferences as requested by pt/family within dietary restrictions.   3) Request for updated weight.   4) Encourage pt to meet >75% estimated needs through PO intake of meals and supplements.   5) RD to remain available for follow-up questions/concerns regarding diet as needed.      Monitoring and Evaluation:     [X] PO intake [X] Tolerance to diet prescription [X] weights [X] follow up per protocol    [ ] other:

## 2018-02-01 NOTE — PROGRESS NOTE ADULT - SUBJECTIVE AND OBJECTIVE BOX
Interventional Radiology Follow- Up Note    This is a 47y Female with PMH of metastatic ovarian CA, peritoneal carcinomatosis s/p hysterectomy and colostomy who presented to [facility] with persistent abdominal pain. Pt was found to have perforated GB with RUQ fluid collection for which she underwent CT guided drainage of GB/ pericholecystic fluid collection with Dr. Ramires on 1/24/18.     Pt seen and examined at bedside. offers no complaints at this time.    PAST MEDICAL & SURGICAL HISTORY:  Ovarian cancer: dx in dec 2016 (started as fibroid --&gt; large cancerous tumor)  Colostomy in place: dec 2016 (placed during hysterectomy in dec 2016 at [facility])  H/O abdominal hysterectomy: dec 2016      Allergies: penicillin (Rash)    LABS:                        8.7    6.27  )-----------( 169      ( 31 Jan 2018 07:18 )             28.6     01-31    141  |  101  |  10  ----------------------------<  69<L>  4.4   |  33<H>  |  0.23<L>    Ca    7.4<L>      31 Jan 2018 07:20  Phos  2.4     01-31  Mg     2.0     01-31        I&O's Detail    31 Jan 2018 07:01  -  01 Feb 2018 07:00  --------------------------------------------------------  IN:    dextrose 10% + sodium chloride 0.45%: 1200 mL    Oral Fluid: 480 mL  Total IN: 1680 mL    OUT:    Bulb: 90 mL    Colostomy: 350 mL    Voided: 1725 mL  Total OUT: 2165 mL    Total NET: -485 mL      01 Feb 2018 07:01  -  01 Feb 2018 16:43  --------------------------------------------------------  IN:    Oral Fluid: 340 mL  Total IN: 340 mL    OUT:    Bulb: 80 mL    Colostomy: 50 mL    Voided: 600 mL  Total OUT: 730 mL    Total NET: -390 mL    Vitals: T(F): 98 (02-01-18 @ 15:09), Max: 98.2 (02-01-18 @ 01:17)  HR: 100 (02-01-18 @ 15:09) (81 - 100)  BP: 106/78 (02-01-18 @ 15:09) (100/69 - 138/83)  RR: 16 (02-01-18 @ 15:09) (16 - 18)  SpO2: 98% (02-01-18 @ 15:09) (96% - 99%)      PHYSICAL EXAM:  General: Nontoxic, in NAD, A&O x3  Abd: ND, soft, NTTP drain intact with 110cc/24hr per chart record      A/P: 47y Female found to have perforated GB with RUQ collection s/p CT guided drainage on 1/24/18 with Dr. Ramires in IR  -continue to monitor output  -Continue abx regimen per primary team  -Continue pain control per primary team  -flush drain per doctor orders, 5cc NS forward flush only daily  -trend vitals, labs  -D/w Dr. Ramires    Please call IR at extension [phone] with any questions, concerns, or issues regarding above.

## 2018-02-01 NOTE — PROGRESS NOTE ADULT - ASSESSMENT
47F with metastatic ovarian cancer and gallbladder perforation s/p placement of a 10F drain in her gallbladder  - Drain care per IR  - Invanz/Diflucan coverage as per ID   - Bowel regimen  - PCA for pain control  - Regular diet  - Monitor drain output    WILFREDO Lomeli MD  [id number]

## 2018-02-01 NOTE — PROGRESS NOTE ADULT - SUBJECTIVE AND OBJECTIVE BOX
Patient is a 47y old  Female who presents with a chief complaint of Abdominal Pain (24 Jan 2018 06:17)    Being followed by ID for Cholecystitis GB perf    Interval history:Feels better though still with some abd pain   No acute events      ROS:  No cough,SOB,CP  No N/V/D.  No other complaints      Antimicrobials:    ertapenem  IVPB 1000 milliGRAM(s) IV Intermittent every 24 hours  fluconAZOLE IVPB      fluconAZOLE IVPB 200 milliGRAM(s) IV Intermittent every 24 hours    Other medications reviewed    Vital Signs Last 24 Hrs  T(C): 36.3 (02-01-18 @ 10:31), Max: 36.8 (02-01-18 @ 01:17)  T(F): 97.4 (02-01-18 @ 10:31), Max: 98.2 (02-01-18 @ 01:17)  HR: 81 (02-01-18 @ 10:31) (81 - 92)  BP: 106/75 (02-01-18 @ 10:31) (97/74 - 138/83)  BP(mean): --  RR: 16 (02-01-18 @ 10:31) (16 - 18)  SpO2: 98% (02-01-18 @ 10:31) (96% - 99%)    Physical Exam:        HEENT PERRLA EOMI    No oral exudate or erythema    Chest Good AE,CTA    CVS RRR S1 S2 WNl No murmur or rub or gallop    Abd soft BS normal mild  tenderness no masses  Colostomy LLQ  RUQ biliary drain no tenderness    LUE PICC  site no erythema tenderness or discharge  R mediport no erythema or tenderness    CNS AAO X 3 no focal    Lab Data:                          8.7    6.27  )-----------( 169      ( 31 Jan 2018 07:18 )             28.6       01-31    141  |  101  |  10  ----------------------------<  69<L>  4.4   |  33<H>  |  0.23<L>    Ca    7.4<L>      31 Jan 2018 07:20  Phos  2.4     01-31  Mg     2.0     01-31    Culture - Blood (01.23.18 @ 23:27)    Specimen Source: .Blood Blood    Culture Results:   No growth at 5 days.

## 2018-02-01 NOTE — PROGRESS NOTE ADULT - SUBJECTIVE AND OBJECTIVE BOX
Patient is a 47y old  Female who presents with a chief complaint of Abdominal Pain (24 Jan 2018 06:17)        SUBJECTIVE / OVERNIGHT EVENTS: pt denies chest pain, shortness of breath, nausea, vomiting    MEDICATIONS  (STANDING):  dextrose 10% + sodium chloride 0.45% 1000 milliLiter(s) (50 mL/Hr) IV Continuous <Continuous>  docusate sodium 100 milliGRAM(s) Oral three times a day  enoxaparin Injectable 30 milliGRAM(s) SubCutaneous daily  ertapenem  IVPB 1000 milliGRAM(s) IV Intermittent every 24 hours  fluconAZOLE IVPB      fluconAZOLE IVPB 200 milliGRAM(s) IV Intermittent every 24 hours  HYDROmorphone PCA (1 mG/mL) 30 milliLiter(s) PCA Continuous PCA Continuous  potassium acid phosphate/sodium acid phosphate tablet (K-PHOS No. 2) 1 Tablet(s) Oral four times a day with meals  senna 2 Tablet(s) Oral at bedtime    MEDICATIONS  (PRN):  HYDROmorphone PCA (1 mG/mL) Rescue Clinician Bolus 1 milliGRAM(s) IV Push every 1 hour PRN for Pain Scale GREATER THAN 6  naloxone Injectable 0.1 milliGRAM(s) IV Push every 3 minutes PRN For ANY of the following changes in patient status:  A. RR LESS THAN 10 breaths per minute, B. Oxygen saturation LESS THAN 90%, C. Sedation score of 6  ondansetron Injectable 4 milliGRAM(s) IV Push every 8 hours PRN Nausea and/or Vomiting      Vital Signs Last 24 Hrs  T(C): 37 (01 Feb 2018 17:21), Max: 37 (01 Feb 2018 17:21)  T(F): 98.6 (01 Feb 2018 17:21), Max: 98.6 (01 Feb 2018 17:21)  HR: 93 (01 Feb 2018 17:21) (81 - 100)  BP: 104/73 (01 Feb 2018 17:21) (100/69 - 110/76)  BP(mean): --  RR: 18 (01 Feb 2018 17:21) (16 - 18)  SpO2: 99% (01 Feb 2018 17:21) (96% - 99%)    Constitutional: No fever, fatigue  Skin: No rash.  Eyes: No recent vision problems or eye pain.  ENT: No congestion, ear pain, or sore throat.  Cardiovascular: No chest pain or palpation.  Respiratory: No cough, shortness of breath, congestion, or wheezing.  Gastrointestinal: No  nausea, vomiting, or diarrhea. bad discomfort +  Genitourinary: No dysuria.  Musculoskeletal: No joint swelling.  Neurologic: No headache.    PHYSICAL EXAM:  GENERAL: NAD  EYES: EOMI, PERRLA  NECK: Supple, No JVD  CHEST/LUNG: dec breath sounds at bases   HEART:  S1 , S2 +  ABDOMEN: mild distension+, ruq drain +  NEUROLOGY: alert awake oriented     LABS:  01-31    141  |  101  |  10  ----------------------------<  69<L>  4.4   |  33<H>  |  0.23<L>    Ca    7.4<L>      31 Jan 2018 07:20  Phos  2.4     01-31  Mg     2.0     01-31      Creatinine Trend: 0.23 <--, 0.27 <--, 0.26 <--, 0.37 <--, 0.48 <--, 0.54 <--                        8.7    6.27  )-----------( 169      ( 31 Jan 2018 07:18 )             28.6     Urine Studies:

## 2018-02-02 LAB
GLUCOSE BLDC GLUCOMTR-MCNC: 101 MG/DL — HIGH (ref 70–99)
GLUCOSE BLDC GLUCOMTR-MCNC: 116 MG/DL — HIGH (ref 70–99)
GLUCOSE BLDC GLUCOMTR-MCNC: 92 MG/DL — SIGNIFICANT CHANGE UP (ref 70–99)
GLUCOSE BLDC GLUCOMTR-MCNC: 99 MG/DL — SIGNIFICANT CHANGE UP (ref 70–99)

## 2018-02-02 PROCEDURE — 99233 SBSQ HOSP IP/OBS HIGH 50: CPT | Mod: GC

## 2018-02-02 PROCEDURE — 99232 SBSQ HOSP IP/OBS MODERATE 35: CPT

## 2018-02-02 RX ADMIN — Medication 1 TABLET(S): at 21:49

## 2018-02-02 RX ADMIN — Medication 1 TABLET(S): at 13:34

## 2018-02-02 RX ADMIN — HYDROMORPHONE HYDROCHLORIDE 30 MILLILITER(S): 2 INJECTION INTRAMUSCULAR; INTRAVENOUS; SUBCUTANEOUS at 17:12

## 2018-02-02 RX ADMIN — FLUCONAZOLE 50 MILLIGRAM(S): 150 TABLET ORAL at 16:19

## 2018-02-02 RX ADMIN — HYDROMORPHONE HYDROCHLORIDE 30 MILLILITER(S): 2 INJECTION INTRAMUSCULAR; INTRAVENOUS; SUBCUTANEOUS at 07:18

## 2018-02-02 RX ADMIN — ERTAPENEM SODIUM 120 MILLIGRAM(S): 1 INJECTION, POWDER, LYOPHILIZED, FOR SOLUTION INTRAMUSCULAR; INTRAVENOUS at 13:34

## 2018-02-02 RX ADMIN — SENNA PLUS 2 TABLET(S): 8.6 TABLET ORAL at 21:49

## 2018-02-02 RX ADMIN — Medication 1 TABLET(S): at 08:48

## 2018-02-02 RX ADMIN — ENOXAPARIN SODIUM 30 MILLIGRAM(S): 100 INJECTION SUBCUTANEOUS at 12:24

## 2018-02-02 RX ADMIN — HYDROMORPHONE HYDROCHLORIDE 30 MILLILITER(S): 2 INJECTION INTRAMUSCULAR; INTRAVENOUS; SUBCUTANEOUS at 19:20

## 2018-02-02 RX ADMIN — Medication 100 MILLIGRAM(S): at 05:25

## 2018-02-02 RX ADMIN — Medication 100 MILLIGRAM(S): at 13:34

## 2018-02-02 RX ADMIN — SODIUM CHLORIDE 50 MILLILITER(S): 9 INJECTION, SOLUTION INTRAVENOUS at 05:25

## 2018-02-02 RX ADMIN — Medication 100 MILLIGRAM(S): at 21:49

## 2018-02-02 RX ADMIN — Medication 1 TABLET(S): at 17:24

## 2018-02-02 NOTE — PROGRESS NOTE ADULT - PROBLEM SELECTOR PLAN 6
D/W patient and her  (Brooklynn) about the patient's need for extra help at home. She actually has help from 9 am to 9 pm. She will need 24 hours home care and the patient will further look into resources to support that.  will continue to follow up.

## 2018-02-02 NOTE — PROGRESS NOTE ADULT - SUBJECTIVE AND OBJECTIVE BOX
Patient is a 47y old  Female who presents with a chief complaint of Abdominal Pain (24 Jan 2018 06:17)    Being followed by ID for GB perforation,SBO    Interval history:tolerating foof  Some abd pain   No acute events      ROS:  No cough,SOB,CP  No N/V/D  No other complaints      Antimicrobials:    ertapenem  IVPB 1000 milliGRAM(s) IV Intermittent every 24 hours  fluconAZOLE IVPB      fluconAZOLE IVPB 200 milliGRAM(s) IV Intermittent every 24 hours    Other medications reviewed    Vital Signs Last 24 Hrs  T(C): 36.7 (02-02-18 @ 09:17), Max: 37 (02-01-18 @ 17:21)  T(F): 98 (02-02-18 @ 09:17), Max: 98.6 (02-01-18 @ 17:21)  HR: 94 (02-02-18 @ 09:17) (81 - 100)  BP: 106/77 (02-02-18 @ 09:17) (96/64 - 109/74)  BP(mean): --  RR: 18 (02-02-18 @ 09:17) (16 - 18)  SpO2: 99% (02-02-18 @ 09:17) (98% - 99%)    Physical Exam:        HEENT PERRLA EOMI    No oral exudate or erythema    Chest Good AE,CTA  R SC mediport no erytehma or tenderness  L PICC No erythema or tenderness    CVS RRR S1 S2 WNl No murmur or rub or gallop    Abd soft BS normal No tenderness no masses  RUQ drain , LLQ colostomy     IV site no erythema tenderness or discharge    CNS AAO X 3 no focal    Lab Data:

## 2018-02-02 NOTE — CHART NOTE - NSCHARTNOTEFT_GEN_A_CORE
Pt requires a hospital bed to achieve proper positioning and elevation not available from a standard bed. Pt has severe pain due to Stage 4 metastatic ovarian cancer.    NENA Chavez  Spectra-link: [id number]

## 2018-02-02 NOTE — PROGRESS NOTE ADULT - SUBJECTIVE AND OBJECTIVE BOX
INTERVAL HPI/OVERNIGHT EVENTS:  Pt states pain well controlled  Eating well  +BM  Allergies    penicillin (Rash)    Intolerances        Code Status:          PRESENT SYMPTOMS:   SOURCE:  [ x] Patient   [ ] Family   [ ] Team     Pain: none  Onset:      Location:          Duration:        Character:         Aggravating factors:          Relieving Factors:             Timing:         Severity:      Dyspnea [ ] YES [x ] NO - Mild [ ]  Moderate [ ]  Severe [ ]   Anxiety:  [ ] YES [x ] NO  Fatigue: [ ] YES [ x] NO  Nausea: [ ] YES [x ] NO  Loss of Appetite: [ ] YES [x ] NO  Constipation [ ] YES   [ x No     OTHER SYMPTOMS:  [x] All other ROS negative     [ ] Unable to obtain due to poor mentation    Does the patient meet criteria for Severe Protein Calorie Malnutrition?  Yes [x ]  No [ ]   PPSV less than <30% [ ]  Anasarca [ x]  Albumin <2 [x ]  Catabolic State [ ]  Poor nutritional intake [ ]  Significant weight loss [ x]     MEDICATIONS  (STANDING):  dextrose 10% + sodium chloride 0.45% 1000 milliLiter(s) (50 mL/Hr) IV Continuous <Continuous>  docusate sodium 100 milliGRAM(s) Oral three times a day  enoxaparin Injectable 30 milliGRAM(s) SubCutaneous daily  ertapenem  IVPB 1000 milliGRAM(s) IV Intermittent every 24 hours  fluconAZOLE IVPB      fluconAZOLE IVPB 200 milliGRAM(s) IV Intermittent every 24 hours  HYDROmorphone PCA (1 mG/mL) 30 milliLiter(s) PCA Continuous PCA Continuous  potassium acid phosphate/sodium acid phosphate tablet (K-PHOS No. 2) 1 Tablet(s) Oral four times a day with meals  senna 2 Tablet(s) Oral at bedtime    MEDICATIONS  (PRN):  HYDROmorphone PCA (1 mG/mL) Rescue Clinician Bolus 1 milliGRAM(s) IV Push every 1 hour PRN for Pain Scale GREATER THAN 6  naloxone Injectable 0.1 milliGRAM(s) IV Push every 3 minutes PRN For ANY of the following changes in patient status:  A. RR LESS THAN 10 breaths per minute, B. Oxygen saturation LESS THAN 90%, C. Sedation score of 6  ondansetron Injectable 4 milliGRAM(s) IV Push every 8 hours PRN Nausea and/or Vomiting      Palliative Performance Status Version 2:   40      %  ECOG -        Physical Exam:    General: [x ] Alert,  A&O x   3  [ ] lethargic   [ ] Agitated   [ ] Cachexia   HEENT: [x ] Normal   [ ] Dry mouth   [ ] ET Tube    [ ] Trach   Lungs: [x ] Clear [ ] Rhonchi  [ ] Crackles [ ] Wheezing [ ] Tachypnea  [ ] Audible excessive secretions   Cardiovascular:  [ x] Regular rate and rhythm  [ ] Irregular [ ] Tachycardia   [ ] Bradycardia   Abdomen: [x ] Soft  [x ] Distended  [ ]  [ ] +BS  [ ] Non tender [ ] Tender  [ ]PEG   [ ] NGT   Last BM:     Genitourinary:  [x ] Normal [ ] Incontinent   [ ] Oliguria/Anuria   [ ] Lynn  Musculoskeletal:  [ ] Normal   [x ] Generalized weakness  [ ] Bedbound  [ x] Edema   Neurological: [x ] No focal deficits  [ ] Cognitive impairment     Skin: [x ] Normal   [ ] Pressure ulcers     Vital Signs Last 24 Hrs  T(C): 36.7 (02 Feb 2018 09:17), Max: 37 (01 Feb 2018 17:21)  T(F): 98 (02 Feb 2018 09:17), Max: 98.6 (01 Feb 2018 17:21)  HR: 94 (02 Feb 2018 09:17) (81 - 100)  BP: 106/77 (02 Feb 2018 09:17) (96/64 - 109/74)  BP(mean): --  RR: 18 (02 Feb 2018 09:17) (16 - 18)  SpO2: 99% (02 Feb 2018 09:17) (98% - 99%)    LABS:              I&O's Summary    01 Feb 2018 07:01  -  02 Feb 2018 07:00  --------------------------------------------------------  IN: 1640 mL / OUT: 2290 mL / NET: -650 mL    02 Feb 2018 07:01  -  02 Feb 2018 12:22  --------------------------------------------------------  IN: 0 mL / OUT: 550 mL / NET: -550 mL        RADIOLOGY & ADDITIONAL STUDIES:

## 2018-02-02 NOTE — PROGRESS NOTE ADULT - SUBJECTIVE AND OBJECTIVE BOX
INTERVAL HPI/OVERNIGHT EVENTS:    Seen and examined in bed. No acute overnight events noted.   resting in bed  tolerating diet  stool in ostomy  pain controlled    MEDICATIONS  (STANDING):  dextrose 10% + sodium chloride 0.45% 1000 milliLiter(s) (50 mL/Hr) IV Continuous <Continuous>  docusate sodium 100 milliGRAM(s) Oral three times a day  enoxaparin Injectable 30 milliGRAM(s) SubCutaneous daily  ertapenem  IVPB 1000 milliGRAM(s) IV Intermittent every 24 hours  fluconAZOLE IVPB      fluconAZOLE IVPB 200 milliGRAM(s) IV Intermittent every 24 hours  HYDROmorphone PCA (1 mG/mL) 30 milliLiter(s) PCA Continuous PCA Continuous  potassium acid phosphate/sodium acid phosphate tablet (K-PHOS No. 2) 1 Tablet(s) Oral four times a day with meals  senna 2 Tablet(s) Oral at bedtime    MEDICATIONS  (PRN):  HYDROmorphone PCA (1 mG/mL) Rescue Clinician Bolus 1 milliGRAM(s) IV Push every 1 hour PRN for Pain Scale GREATER THAN 6  naloxone Injectable 0.1 milliGRAM(s) IV Push every 3 minutes PRN For ANY of the following changes in patient status:  A. RR LESS THAN 10 breaths per minute, B. Oxygen saturation LESS THAN 90%, C. Sedation score of 6  ondansetron Injectable 4 milliGRAM(s) IV Push every 8 hours PRN Nausea and/or Vomiting      Allergies    penicillin (Rash)    Intolerances:      PHYSICAL EXAM:   Vital Signs:  Vital Signs Last 24 Hrs  T(C): 36.5 (28 Jan 2018 05:14), Max: 37.2 (27 Jan 2018 21:37)  T(F): 97.7 (28 Jan 2018 05:14), Max: 98.9 (27 Jan 2018 21:37)  HR: 80 (28 Jan 2018 05:14) (75 - 90)  BP: 103/75 (28 Jan 2018 05:14) (94/67 - 108/78)  BP(mean): --  RR: 18 (28 Jan 2018 05:14) (17 - 18)  SpO2: 96% (28 Jan 2018 05:14) (94% - 96%)  Daily     Daily I&O's Summary    27 Jan 2018 07:01  -  28 Jan 2018 07:00  --------------------------------------------------------  IN: 1960 mL / OUT: 40 mL / NET: 1920 mL        GENERAL:  Cachectic; NAD  HEENT:  NC/AT  CHEST:  Full & symmetric excursion, no increased effort, breath sounds clear  HEART:  Regular rhythm, S1, S2  ABDOMEN:  Soft, Bowel sounds present; Healed midline incision; + ostomy   EXTEREMITIES:  no cyanosis,clubbing or edema  SKIN:  No rash/erythema/ecchymoses/petechiae/wounds/abscess/warm/dry  NEURO:  Alert, oriented, no asterixis, no tremor, no encephalopathy      LABS:                        6.4    6.34  )-----------( 196      ( 27 Jan 2018 09:02 )             21.4     01-27    144  |  97  |  40<H>  ----------------------------<  106<H>  2.9<LL>   |  31  |  0.48<L>    Ca    8.2<L>      27 Jan 2018 09:09  Phos  2.3     01-27  Mg     1.8     01-27    TPro  4.8<L>  /  Alb  2.9<L>  /  TBili  0.3  /  DBili  x   /  AST  90<H>  /  ALT  80<H>  /  AlkPhos  511<H>  01-27    PT/INR - ( 26 Jan 2018 07:33 )   PT: 17.7 sec;   INR: 1.55 ratio         PTT - ( 26 Jan 2018 07:33 )  PTT:31.4 sec    amylase   lipase  RADIOLOGY & ADDITIONAL TESTS:       Problem/Plan - 1:  ·  Problem: Right upper quadrant abdominal pain.  Plan: diet per surgery  -pain control  -monitor GI function closely  -monitor drain outputs   -s/p PRBCS; f/u CBC and transfuse as needed  -check Metabolic panel daily; replete electrolytes prn  -continue with broad spectrum antibiotics    overall prognosis poor.

## 2018-02-02 NOTE — PROGRESS NOTE ADULT - ASSESSMENT
47F with metastatic ovarian CA (s/p TAHBSO, colostomy, previously seeking holistic care 12/2017 however has since established with new oncologist for chemo) p/w abdominal pain.  Now with bowel obstruction  previously s/p colostomy,wright  GB perforation  Seen and followed by surgery,Poor surgical candidate -no intervention for now per surgery  s/p IR drainage  SIRS,sepsis resolved  PCN allergy-but remote and tolerating Invanz  Biliary cultures with ESBL  E Coli and yeast  Also s/p recent chemo  Stable ,Blood  cx negative  Repeat imaging next week  10-14 day course depending on results  Prognosis poor  Infectious Diseases Service will cover over weekend.  Please call [phone] if issues

## 2018-02-02 NOTE — PROGRESS NOTE ADULT - SUBJECTIVE AND OBJECTIVE BOX
Patient is a 47y old  Female who presents with a chief complaint of Abdominal Pain (24 Jan 2018 06:17)        SUBJECTIVE / OVERNIGHT EVENTS: pt denies chest pain, shortness of breath, nausea, vomiting    MEDICATIONS  (STANDING):  dextrose 10% + sodium chloride 0.45% 1000 milliLiter(s) (50 mL/Hr) IV Continuous <Continuous>  docusate sodium 100 milliGRAM(s) Oral three times a day  enoxaparin Injectable 30 milliGRAM(s) SubCutaneous daily  ertapenem  IVPB 1000 milliGRAM(s) IV Intermittent every 24 hours  fluconAZOLE IVPB      fluconAZOLE IVPB 200 milliGRAM(s) IV Intermittent every 24 hours  HYDROmorphone PCA (1 mG/mL) 30 milliLiter(s) PCA Continuous PCA Continuous  potassium acid phosphate/sodium acid phosphate tablet (K-PHOS No. 2) 1 Tablet(s) Oral four times a day with meals  senna 2 Tablet(s) Oral at bedtime    MEDICATIONS  (PRN):  HYDROmorphone PCA (1 mG/mL) Rescue Clinician Bolus 1 milliGRAM(s) IV Push every 1 hour PRN for Pain Scale GREATER THAN 6  naloxone Injectable 0.1 milliGRAM(s) IV Push every 3 minutes PRN For ANY of the following changes in patient status:  A. RR LESS THAN 10 breaths per minute, B. Oxygen saturation LESS THAN 90%, C. Sedation score of 6  ondansetron Injectable 4 milliGRAM(s) IV Push every 8 hours PRN Nausea and/or Vomiting      Vital Signs Last 24 Hrs  T(C): 37.1 (02 Feb 2018 17:49), Max: 37.1 (02 Feb 2018 17:49)  T(F): 98.8 (02 Feb 2018 17:49), Max: 98.8 (02 Feb 2018 17:49)  HR: 94 (02 Feb 2018 17:49) (81 - 96)  BP: 106/73 (02 Feb 2018 17:49) (96/64 - 109/74)  BP(mean): --  RR: 18 (02 Feb 2018 17:49) (18 - 18)  SpO2: 97% (02 Feb 2018 17:49) (97% - 99%)    Constitutional: No fever, fatigue  Skin: No rash.  Eyes: No recent vision problems or eye pain.  ENT: No congestion, ear pain, or sore throat.  Cardiovascular: No chest pain or palpation.  Respiratory: No cough, shortness of breath, congestion, or wheezing.  Gastrointestinal: No  nausea, vomiting, or diarrhea. bad discomfort +  Genitourinary: No dysuria.  Musculoskeletal: No joint swelling.  Neurologic: No headache.    PHYSICAL EXAM:  GENERAL: NAD  EYES: EOMI, PERRLA  NECK: Supple, No JVD  CHEST/LUNG: dec breath sounds at bases   HEART:  S1 , S2 +  ABDOMEN: mild distension+, ruq drain +  NEUROLOGY: alert awake oriented     LABS:        Creatinine Trend: 0.23 <--, 0.27 <--, 0.26 <--, 0.37 <--, 0.48 <--    Urine Studies:

## 2018-02-02 NOTE — PROGRESS NOTE ADULT - ASSESSMENT
, 47F with metastatic ovarian CA (s/p TAHBSO, colostomy, previously seeking holistic care 12/2017 however has since established with new oncologist for chemo) p/w abdominal pain.  Now with bowel obstruction  previously s/p colostomy,wright  GB perforation  Seen and followed by surgery,Poor surgical candidate -no intervention for now per surgery  s/p IR drainage  SIRS,sepsis resolving  PCN allergy-but remote and tolerating Invanz  Biliary cultures with ESBL  E Coli and yeast  Also s/p recent chemo       ID -  Continue Invanz and fluconazole for now, Monitor closely for any s/s other nosocomial focus , need  10-14 day course with repeat imaging   Surg - no acute surgical intervention   GI - -pain control, monitor GI function closely, monitor drain outputs , s/p PRBCS; f/u CBC and transfuse as needed    Palliative input appreciated , cont pca   discussed management plan in detail nausea length with pt

## 2018-02-03 LAB
ANION GAP SERPL CALC-SCNC: 10 MMOL/L — SIGNIFICANT CHANGE UP (ref 5–17)
BASOPHILS # BLD AUTO: 0 K/UL — SIGNIFICANT CHANGE UP (ref 0–0.2)
BASOPHILS NFR BLD AUTO: 0 % — SIGNIFICANT CHANGE UP (ref 0–2)
BUN SERPL-MCNC: 10 MG/DL — SIGNIFICANT CHANGE UP (ref 7–23)
CALCIUM SERPL-MCNC: 7.9 MG/DL — LOW (ref 8.4–10.5)
CHLORIDE SERPL-SCNC: 99 MMOL/L — SIGNIFICANT CHANGE UP (ref 96–108)
CO2 SERPL-SCNC: 30 MMOL/L — SIGNIFICANT CHANGE UP (ref 22–31)
CREAT SERPL-MCNC: 0.28 MG/DL — LOW (ref 0.5–1.3)
EOSINOPHIL # BLD AUTO: 0.01 K/UL — SIGNIFICANT CHANGE UP (ref 0–0.5)
EOSINOPHIL NFR BLD AUTO: 0.2 % — SIGNIFICANT CHANGE UP (ref 0–6)
GLUCOSE BLDC GLUCOMTR-MCNC: 102 MG/DL — HIGH (ref 70–99)
GLUCOSE BLDC GLUCOMTR-MCNC: 142 MG/DL — HIGH (ref 70–99)
GLUCOSE BLDC GLUCOMTR-MCNC: 85 MG/DL — SIGNIFICANT CHANGE UP (ref 70–99)
GLUCOSE BLDC GLUCOMTR-MCNC: 89 MG/DL — SIGNIFICANT CHANGE UP (ref 70–99)
GLUCOSE SERPL-MCNC: 63 MG/DL — LOW (ref 70–99)
HCT VFR BLD CALC: 24.9 % — LOW (ref 34.5–45)
HGB BLD-MCNC: 7.4 G/DL — LOW (ref 11.5–15.5)
IMM GRANULOCYTES NFR BLD AUTO: 0.3 % — SIGNIFICANT CHANGE UP (ref 0–1.5)
LYMPHOCYTES # BLD AUTO: 0.82 K/UL — LOW (ref 1–3.3)
LYMPHOCYTES # BLD AUTO: 13.6 % — SIGNIFICANT CHANGE UP (ref 13–44)
MCHC RBC-ENTMCNC: 26.1 PG — LOW (ref 27–34)
MCHC RBC-ENTMCNC: 29.7 GM/DL — LOW (ref 32–36)
MCV RBC AUTO: 87.7 FL — SIGNIFICANT CHANGE UP (ref 80–100)
MONOCYTES # BLD AUTO: 0.42 K/UL — SIGNIFICANT CHANGE UP (ref 0–0.9)
MONOCYTES NFR BLD AUTO: 7 % — SIGNIFICANT CHANGE UP (ref 2–14)
NEUTROPHILS # BLD AUTO: 4.74 K/UL — SIGNIFICANT CHANGE UP (ref 1.8–7.4)
NEUTROPHILS NFR BLD AUTO: 78.9 % — HIGH (ref 43–77)
PLATELET # BLD AUTO: 163 K/UL — SIGNIFICANT CHANGE UP (ref 150–400)
POTASSIUM SERPL-MCNC: 4.3 MMOL/L — SIGNIFICANT CHANGE UP (ref 3.5–5.3)
POTASSIUM SERPL-SCNC: 4.3 MMOL/L — SIGNIFICANT CHANGE UP (ref 3.5–5.3)
RBC # BLD: 2.84 M/UL — LOW (ref 3.8–5.2)
RBC # FLD: 14.1 % — SIGNIFICANT CHANGE UP (ref 10.3–14.5)
SODIUM SERPL-SCNC: 139 MMOL/L — SIGNIFICANT CHANGE UP (ref 135–145)
WBC # BLD: 6.01 K/UL — SIGNIFICANT CHANGE UP (ref 3.8–10.5)
WBC # FLD AUTO: 6.01 K/UL — SIGNIFICANT CHANGE UP (ref 3.8–10.5)

## 2018-02-03 RX ADMIN — Medication 1 TABLET(S): at 09:24

## 2018-02-03 RX ADMIN — Medication 1 TABLET(S): at 14:04

## 2018-02-03 RX ADMIN — SODIUM CHLORIDE 50 MILLILITER(S): 9 INJECTION, SOLUTION INTRAVENOUS at 17:22

## 2018-02-03 RX ADMIN — SENNA PLUS 2 TABLET(S): 8.6 TABLET ORAL at 21:12

## 2018-02-03 RX ADMIN — ERTAPENEM SODIUM 120 MILLIGRAM(S): 1 INJECTION, POWDER, LYOPHILIZED, FOR SOLUTION INTRAMUSCULAR; INTRAVENOUS at 14:05

## 2018-02-03 RX ADMIN — Medication 100 MILLIGRAM(S): at 14:04

## 2018-02-03 RX ADMIN — HYDROMORPHONE HYDROCHLORIDE 30 MILLILITER(S): 2 INJECTION INTRAMUSCULAR; INTRAVENOUS; SUBCUTANEOUS at 06:28

## 2018-02-03 RX ADMIN — Medication 1 TABLET(S): at 17:55

## 2018-02-03 RX ADMIN — Medication 1 TABLET(S): at 21:12

## 2018-02-03 RX ADMIN — HYDROMORPHONE HYDROCHLORIDE 30 MILLILITER(S): 2 INJECTION INTRAMUSCULAR; INTRAVENOUS; SUBCUTANEOUS at 20:38

## 2018-02-03 RX ADMIN — FLUCONAZOLE 50 MILLIGRAM(S): 150 TABLET ORAL at 17:18

## 2018-02-03 RX ADMIN — ENOXAPARIN SODIUM 30 MILLIGRAM(S): 100 INJECTION SUBCUTANEOUS at 13:04

## 2018-02-03 RX ADMIN — HYDROMORPHONE HYDROCHLORIDE 30 MILLILITER(S): 2 INJECTION INTRAMUSCULAR; INTRAVENOUS; SUBCUTANEOUS at 18:21

## 2018-02-03 RX ADMIN — Medication 100 MILLIGRAM(S): at 21:12

## 2018-02-03 RX ADMIN — HYDROMORPHONE HYDROCHLORIDE 30 MILLILITER(S): 2 INJECTION INTRAMUSCULAR; INTRAVENOUS; SUBCUTANEOUS at 04:17

## 2018-02-03 RX ADMIN — Medication 100 MILLIGRAM(S): at 05:47

## 2018-02-03 NOTE — PROGRESS NOTE ADULT - ASSESSMENT
, 47F with metastatic ovarian CA (s/p TAHBSO, colostomy, previously seeking holistic care 12/2017 however has since established with new oncologist for chemo) p/w abdominal pain.  Now with bowel obstruction  previously s/p colostomy,wright  GB perforation  Seen and followed by surgery,Poor surgical candidate -no intervention for now per surgery  s/p IR drainage  SIRS,sepsis resolving  PCN allergy-but remote and tolerating Invanz  Biliary cultures with ESBL  E Coli and yeast  Also s/p recent chemo       ID -  Continue Invanz and fluconazole for now, Monitor closely for any s/s other nosocomial focus , need  10-14 day course with repeat imaging , pt agreed for ct abd next week   Surg - no acute surgical intervention   GI - -pain control, monitor GI function closely, monitor drain outputs , s/p PRBCS; f/u CBC and transfuse as needed    Palliative input appreciated , cont pca   discussed management plan in detail nausea length with pt

## 2018-02-03 NOTE — PROGRESS NOTE ADULT - SUBJECTIVE AND OBJECTIVE BOX
Patient is a 47y old  Female who presents with a chief complaint of Abdominal Pain (24 Jan 2018 06:17)        SUBJECTIVE / OVERNIGHT EVENTS: pt denies chest pain, shortness of breath, nausea, vomiting    MEDICATIONS  (STANDING):  dextrose 10% + sodium chloride 0.45% 1000 milliLiter(s) (50 mL/Hr) IV Continuous <Continuous>  docusate sodium 100 milliGRAM(s) Oral three times a day  enoxaparin Injectable 30 milliGRAM(s) SubCutaneous daily  ertapenem  IVPB 1000 milliGRAM(s) IV Intermittent every 24 hours  fluconAZOLE IVPB      fluconAZOLE IVPB 200 milliGRAM(s) IV Intermittent every 24 hours  HYDROmorphone PCA (1 mG/mL) 30 milliLiter(s) PCA Continuous PCA Continuous  potassium acid phosphate/sodium acid phosphate tablet (K-PHOS No. 2) 1 Tablet(s) Oral four times a day with meals  senna 2 Tablet(s) Oral at bedtime    MEDICATIONS  (PRN):  HYDROmorphone PCA (1 mG/mL) Rescue Clinician Bolus 1 milliGRAM(s) IV Push every 1 hour PRN for Pain Scale GREATER THAN 6  naloxone Injectable 0.1 milliGRAM(s) IV Push every 3 minutes PRN For ANY of the following changes in patient status:  A. RR LESS THAN 10 breaths per minute, B. Oxygen saturation LESS THAN 90%, C. Sedation score of 6  ondansetron Injectable 4 milliGRAM(s) IV Push every 8 hours PRN Nausea and/or Vomiting      Vital Signs Last 24 Hrs  T(C): 36.8 (03 Feb 2018 13:07), Max: 37.4 (03 Feb 2018 00:20)  T(F): 98.2 (03 Feb 2018 13:07), Max: 99.4 (03 Feb 2018 00:20)  HR: 91 (03 Feb 2018 13:07) (84 - 97)  BP: 96/65 (03 Feb 2018 13:07) (93/66 - 107/73)  BP(mean): --  RR: 18 (03 Feb 2018 13:07) (18 - 18)  SpO2: 97% (03 Feb 2018 13:07) (97% - 99%)    Constitutional: No fever, fatigue  Skin: No rash.  Eyes: No recent vision problems or eye pain.  ENT: No congestion, ear pain, or sore throat.  Cardiovascular: No chest pain or palpation.  Respiratory: No cough, shortness of breath, congestion, or wheezing.  Gastrointestinal: No  nausea, vomiting, or diarrhea. bad discomfort +  Genitourinary: No dysuria.  Musculoskeletal: No joint swelling.  Neurologic: No headache.    PHYSICAL EXAM:  GENERAL: NAD  EYES: EOMI, PERRLA  NECK: Supple, No JVD  CHEST/LUNG: dec breath sounds at bases   HEART:  S1 , S2 +  ABDOMEN: mild distension+, ruq drain +  NEUROLOGY: alert awake oriented     LABS:  02-03    139  |  99  |  10  ----------------------------<  63<L>  4.3   |  30  |  0.28<L>    Ca    7.9<L>      03 Feb 2018 08:50      Creatinine Trend: 0.28 <--, 0.23 <--, 0.27 <--, 0.26 <--, 0.37 <--                        7.4    6.01  )-----------( 163      ( 03 Feb 2018 08:55 )             24.9     Urine Studies:

## 2018-02-03 NOTE — PROGRESS NOTE ADULT - ATTENDING COMMENTS
Advanced care planning was discussed with patient and family.  Advanced care planning forms were reviewed and discussed.  Risks, benefits and alternatives of gastroenterologic procedures were discussed in detail and all questions were answered.    25 minutes spent.
Luisito Berrios  Pager: [phone]. If no response or past 5 pm call [phone].
Patient presenting with chief complaint of sepsis and perforated gallbladder/abdominal pain.    s/p IR drainage - output bilious.  Supportive care.  Poor surgical candidate.
Dr. 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  Chronic Pain on PCA through PICC managed by palliative care. on vancomycin and meropenem for abd. sepsis.  MAP 60-70 appropriate considering patients cachexia and lack of other organ failure.  Good urine ouput.  Patients has a very poor prognosis from her cancer and we will have a goals of care conversation this afternoon with the family.

## 2018-02-03 NOTE — PROGRESS NOTE ADULT - SUBJECTIVE AND OBJECTIVE BOX
INTERVAL HPI/OVERNIGHT EVENTS:  HPI:  Patient is a 47F with metastatic ovarian CA (s/p TAHBSO, colostomy, previously seeking holistic care 12/2017 however has since established with new oncologist for chemo) p/w abdominal pain. History obtained by spouse at bedside. Per him, patient acutely had an increase in pain. Since this time she has had minimal to no ostomy output, worsening abdominal distention, and nausea, without vomiting. Due to progression of symptoms they presented to ED today. Per , patient has not had cough, chills, night sweats. Patient recently started on chemotherapy, and abdominal pain has been getting worse.  She has been having intermittent fever.  She has been becoming more and more lethargic.   noticed that there is bowel herniation through the colostomy.     sedated some pain some outpt minimal apetite    ED Course   Vitals   Temp 102.7               /75    RR 16 IEN258% on 4L  Patient received 1L NS, Ofirmev, had CT abdomen, and had surgery C/s placed (24 Jan 2018 06:17)    MEDICATIONS  (STANDING):  dextrose 10% + sodium chloride 0.45% 1000 milliLiter(s) (50 mL/Hr) IV Continuous <Continuous>  docusate sodium 100 milliGRAM(s) Oral three times a day  enoxaparin Injectable 30 milliGRAM(s) SubCutaneous daily  ertapenem  IVPB 1000 milliGRAM(s) IV Intermittent every 24 hours  fluconAZOLE IVPB      fluconAZOLE IVPB 200 milliGRAM(s) IV Intermittent every 24 hours  HYDROmorphone PCA (1 mG/mL) 30 milliLiter(s) PCA Continuous PCA Continuous  potassium acid phosphate/sodium acid phosphate tablet (K-PHOS No. 2) 1 Tablet(s) Oral four times a day with meals  senna 2 Tablet(s) Oral at bedtime    MEDICATIONS  (PRN):  HYDROmorphone PCA (1 mG/mL) Rescue Clinician Bolus 1 milliGRAM(s) IV Push every 1 hour PRN for Pain Scale GREATER THAN 6  naloxone Injectable 0.1 milliGRAM(s) IV Push every 3 minutes PRN For ANY of the following changes in patient status:  A. RR LESS THAN 10 breaths per minute, B. Oxygen saturation LESS THAN 90%, C. Sedation score of 6  ondansetron Injectable 4 milliGRAM(s) IV Push every 8 hours PRN Nausea and/or Vomiting      Allergies    penicillin (Rash)    Intolerances          General:  No wt loss, fevers, chills, night sweats, fatigue,   Eyes:  Good vision, no reported pain  ENT:  No sore throat, pain, runny nose, dysphagia  CV:  No pain, palpitations, hypo/hypertension  Resp:  No dyspnea, cough, tachypnea, wheezing  GI:  No pain, No nausea, No vomiting, No diarrhea, No constipation, No weight loss, No fever, No pruritis, No rectal bleeding, No tarry stools, No dysphagia,  :  No pain, bleeding, incontinence, nocturia  Muscle:  No pain, weakness  Neuro:  No weakness, tingling, memory problems  Psych:  No fatigue, insomnia, mood problems, depression  Endocrine:  No polyuria, polydipsia, cold/heat intolerance  Heme:  No petechiae, ecchymosis, easy bruisability  Skin:  No rash, tattoos, scars, edema      PHYSICAL EXAM:   Vital Signs:  Vital Signs Last 24 Hrs  T(C): 36.9 (03 Feb 2018 16:52), Max: 37.4 (03 Feb 2018 00:20)  T(F): 98.5 (03 Feb 2018 16:52), Max: 99.4 (03 Feb 2018 00:20)  HR: 99 (03 Feb 2018 16:52) (84 - 99)  BP: 102/67 (03 Feb 2018 16:52) (93/66 - 107/73)  BP(mean): --  RR: 18 (03 Feb 2018 16:52) (18 - 18)  SpO2: 97% (03 Feb 2018 16:52) (97% - 99%)  Daily     Daily I&O's Summary    02 Feb 2018 07:01  -  03 Feb 2018 07:00  --------------------------------------------------------  IN: 1470 mL / OUT: 1725 mL / NET: -255 mL    03 Feb 2018 07:01  -  03 Feb 2018 18:00  --------------------------------------------------------  IN: 630 mL / OUT: 660 mL / NET: -30 mL        GENERAL:  Appears stated age, well-groomed, well-nourished, no distress  HEENT:  NC/AT,  conjunctivae clear and pink, no thyromegaly, nodules, adenopathy, no JVD, sclera -anicteric  CHEST:  Full & symmetric excursion, no increased effort, breath sounds clear  HEART:  Regular rhythm, S1, S2, no murmur/rub/S3/S4, no abdominal bruit, no edema  ABDOMEN:  Soft, non-tender, non-distended, normoactive bowel sounds,  no masses ,no hepato-splenomegaly, no signs of chronic liver disease ostomy  EXTEREMITIES:  no cyanosis,clubbing or edema  SKIN:  No rash/erythema/ecchymoses/petechiae/wounds/abscess/warm/dry  NEURO:  Alert, oriented, no asterixis, no tremor, no encephalopathy      LABS:                        7.4    6.01  )-----------( 163      ( 03 Feb 2018 08:55 )             24.9     02-03    139  |  99  |  10  ----------------------------<  63<L>  4.3   |  30  |  0.28<L>    Ca    7.9<L>      03 Feb 2018 08:50          amylase   lipase  RADIOLOGY & ADDITIONAL TESTS:

## 2018-02-03 NOTE — PROGRESS NOTE ADULT - ASSESSMENT
Problem: Right upper quadrant abdominal pain.  Plan: diet per surgery  -pain control  -monitor GI function closely  -monitor drain outputs   -s/p PRBCS; f/u CBC and transfuse as needed  -check Metabolic panel daily; replete electrolytes prn  -continue with broad spectrum antibiotics    overall prognosis poor.

## 2018-02-04 LAB
ANION GAP SERPL CALC-SCNC: 11 MMOL/L — SIGNIFICANT CHANGE UP (ref 5–17)
BUN SERPL-MCNC: 10 MG/DL — SIGNIFICANT CHANGE UP (ref 7–23)
CALCIUM SERPL-MCNC: 7.9 MG/DL — LOW (ref 8.4–10.5)
CHLORIDE SERPL-SCNC: 99 MMOL/L — SIGNIFICANT CHANGE UP (ref 96–108)
CO2 SERPL-SCNC: 29 MMOL/L — SIGNIFICANT CHANGE UP (ref 22–31)
CREAT SERPL-MCNC: 0.22 MG/DL — LOW (ref 0.5–1.3)
GLUCOSE BLDC GLUCOMTR-MCNC: 111 MG/DL — HIGH (ref 70–99)
GLUCOSE BLDC GLUCOMTR-MCNC: 118 MG/DL — HIGH (ref 70–99)
GLUCOSE BLDC GLUCOMTR-MCNC: 119 MG/DL — HIGH (ref 70–99)
GLUCOSE BLDC GLUCOMTR-MCNC: 95 MG/DL — SIGNIFICANT CHANGE UP (ref 70–99)
GLUCOSE SERPL-MCNC: 73 MG/DL — SIGNIFICANT CHANGE UP (ref 70–99)
HCT VFR BLD CALC: 24.1 % — LOW (ref 34.5–45)
HGB BLD-MCNC: 7.7 G/DL — LOW (ref 11.5–15.5)
MCHC RBC-ENTMCNC: 27.6 PG — SIGNIFICANT CHANGE UP (ref 27–34)
MCHC RBC-ENTMCNC: 32 GM/DL — SIGNIFICANT CHANGE UP (ref 32–36)
MCV RBC AUTO: 86.4 FL — SIGNIFICANT CHANGE UP (ref 80–100)
PLATELET # BLD AUTO: 205 K/UL — SIGNIFICANT CHANGE UP (ref 150–400)
POTASSIUM SERPL-MCNC: 4.3 MMOL/L — SIGNIFICANT CHANGE UP (ref 3.5–5.3)
POTASSIUM SERPL-SCNC: 4.3 MMOL/L — SIGNIFICANT CHANGE UP (ref 3.5–5.3)
RBC # BLD: 2.79 M/UL — LOW (ref 3.8–5.2)
RBC # FLD: 14 % — SIGNIFICANT CHANGE UP (ref 10.3–14.5)
SODIUM SERPL-SCNC: 139 MMOL/L — SIGNIFICANT CHANGE UP (ref 135–145)
WBC # BLD: 6.25 K/UL — SIGNIFICANT CHANGE UP (ref 3.8–10.5)
WBC # FLD AUTO: 6.25 K/UL — SIGNIFICANT CHANGE UP (ref 3.8–10.5)

## 2018-02-04 RX ADMIN — Medication 1 TABLET(S): at 13:10

## 2018-02-04 RX ADMIN — ERTAPENEM SODIUM 120 MILLIGRAM(S): 1 INJECTION, POWDER, LYOPHILIZED, FOR SOLUTION INTRAMUSCULAR; INTRAVENOUS at 14:01

## 2018-02-04 RX ADMIN — Medication 1 TABLET(S): at 10:29

## 2018-02-04 RX ADMIN — ENOXAPARIN SODIUM 30 MILLIGRAM(S): 100 INJECTION SUBCUTANEOUS at 13:11

## 2018-02-04 RX ADMIN — HYDROMORPHONE HYDROCHLORIDE 30 MILLILITER(S): 2 INJECTION INTRAMUSCULAR; INTRAVENOUS; SUBCUTANEOUS at 19:42

## 2018-02-04 RX ADMIN — Medication 100 MILLIGRAM(S): at 05:34

## 2018-02-04 RX ADMIN — HYDROMORPHONE HYDROCHLORIDE 30 MILLILITER(S): 2 INJECTION INTRAMUSCULAR; INTRAVENOUS; SUBCUTANEOUS at 07:25

## 2018-02-04 RX ADMIN — Medication 100 MILLIGRAM(S): at 21:14

## 2018-02-04 RX ADMIN — FLUCONAZOLE 50 MILLIGRAM(S): 150 TABLET ORAL at 16:22

## 2018-02-04 RX ADMIN — SENNA PLUS 2 TABLET(S): 8.6 TABLET ORAL at 21:14

## 2018-02-04 RX ADMIN — Medication 1 TABLET(S): at 21:14

## 2018-02-04 RX ADMIN — Medication 1 TABLET(S): at 19:09

## 2018-02-04 RX ADMIN — Medication 100 MILLIGRAM(S): at 13:11

## 2018-02-04 NOTE — PROGRESS NOTE ADULT - SUBJECTIVE AND OBJECTIVE BOX
INTERVAL HPI/OVERNIGHT EVENTS:  HPI:  Patient is a 47F with metastatic ovarian CA (s/p TAHBSO, colostomy, previously seeking holistic care 12/2017 however has since established with new oncologist for chemo) p/w abdominal pain. History obtained by spouse at bedside. Per him, patient acutely had an increase in pain. Since this time she has had minimal to no ostomy output, worsening abdominal distention, and nausea, without vomiting. Due to progression of symptoms they presented to ED today. Per , patient has not had cough, chills, night sweats. Patient recently started on chemotherapy, and abdominal pain has been getting worse.  She has been having intermittent fever.  She has been becoming more and more lethargic.   noticed that there is bowel herniation through the colostomy.       MEDICATIONS  (STANDING):  dextrose 10% + sodium chloride 0.45% 1000 milliLiter(s) (50 mL/Hr) IV Continuous <Continuous>  docusate sodium 100 milliGRAM(s) Oral three times a day  enoxaparin Injectable 30 milliGRAM(s) SubCutaneous daily  ertapenem  IVPB 1000 milliGRAM(s) IV Intermittent every 24 hours  fluconAZOLE IVPB      fluconAZOLE IVPB 200 milliGRAM(s) IV Intermittent every 24 hours  HYDROmorphone PCA (1 mG/mL) 30 milliLiter(s) PCA Continuous PCA Continuous  potassium acid phosphate/sodium acid phosphate tablet (K-PHOS No. 2) 1 Tablet(s) Oral four times a day with meals  senna 2 Tablet(s) Oral at bedtime    MEDICATIONS  (PRN):  HYDROmorphone PCA (1 mG/mL) Rescue Clinician Bolus 1 milliGRAM(s) IV Push every 1 hour PRN for Pain Scale GREATER THAN 6  naloxone Injectable 0.1 milliGRAM(s) IV Push every 3 minutes PRN For ANY of the following changes in patient status:  A. RR LESS THAN 10 breaths per minute, B. Oxygen saturation LESS THAN 90%, C. Sedation score of 6  ondansetron Injectable 4 milliGRAM(s) IV Push every 8 hours PRN Nausea and/or Vomiting      Allergies    penicillin (Rash)    Intolerances          General:  No wt loss, fevers, chills, night sweats, fatigue,   Eyes:  Good vision, no reported pain  ENT:  No sore throat, pain, runny nose, dysphagia  CV:  No pain, palpitations, hypo/hypertension  Resp:  No dyspnea, cough, tachypnea, wheezing  GI:  No pain, No nausea, No vomiting, No diarrhea, No constipation, No weight loss, No fever, No pruritis, No rectal bleeding, No tarry stools, No dysphagia,  :  No pain, bleeding, incontinence, nocturia  Muscle:  No pain, weakness  Neuro:  No weakness, tingling, memory problems  Psych:  No fatigue, insomnia, mood problems, depression  Endocrine:  No polyuria, polydipsia, cold/heat intolerance  Heme:  No petechiae, ecchymosis, easy bruisability  Skin:  No rash, tattoos, scars, edema      PHYSICAL EXAM:   Vital Signs:  Vital Signs Last 24 Hrs  T(C): 37.1 (04 Feb 2018 09:43), Max: 37.1 (04 Feb 2018 09:43)  T(F): 98.8 (04 Feb 2018 09:43), Max: 98.8 (04 Feb 2018 09:43)  HR: 81 (04 Feb 2018 09:43) (81 - 99)  BP: 96/66 (04 Feb 2018 09:43) (93/66 - 107/70)  BP(mean): --  RR: 18 (04 Feb 2018 09:43) (18 - 18)  SpO2: 97% (04 Feb 2018 09:43) (96% - 99%)  Daily     Daily I&O's Summary    03 Feb 2018 07:01  -  04 Feb 2018 07:00  --------------------------------------------------------  IN: 2370 mL / OUT: 1121 mL / NET: 1249 mL    04 Feb 2018 07:01  -  04 Feb 2018 12:33  --------------------------------------------------------  IN: 240 mL / OUT: 300 mL / NET: -60 mL        GENERAL:  Appears stated age, well-groomed, well-nourished, no distress  HEENT:  NC/AT,  conjunctivae clear and pink, no thyromegaly, nodules, adenopathy, no JVD, sclera -anicteric  CHEST:  Full & symmetric excursion, no increased effort, breath sounds clear  HEART:  Regular rhythm, S1, S2, no murmur/rub/S3/S4, no abdominal bruit, no edema drains  ABDOMEN:  Soft, non-tender, non-distended, normoactive bowel sounds,  no masses ,no hepato-splenomegaly, no signs of chronic liver disease ostomy present  EXTEREMITIES:  no cyanosis,clubbing or edema  SKIN:  No rash/erythema/ecchymoses/petechiae/wounds/abscess/warm/dry  NEURO:  Alert, oriented, no asterixis, no tremor, no encephalopathy      LABS:                        7.7    6.25  )-----------( 205      ( 04 Feb 2018 11:38 )             24.1     02-04    139  |  99  |  10  ----------------------------<  73  4.3   |  29  |  0.22<L>    Ca    7.9<L>      04 Feb 2018 08:21          amylase   lipase  RADIOLOGY & ADDITIONAL TESTS:

## 2018-02-04 NOTE — PROGRESS NOTE ADULT - SUBJECTIVE AND OBJECTIVE BOX
Patient is a 47y old  Female who presents with a chief complaint of Abdominal Pain (24 Jan 2018 06:17)        SUBJECTIVE / OVERNIGHT EVENTS: pt denies chest pain, shortness of breath, nausea, vomiting    MEDICATIONS  (STANDING):  dextrose 10% + sodium chloride 0.45% 1000 milliLiter(s) (50 mL/Hr) IV Continuous <Continuous>  docusate sodium 100 milliGRAM(s) Oral three times a day  enoxaparin Injectable 30 milliGRAM(s) SubCutaneous daily  ertapenem  IVPB 1000 milliGRAM(s) IV Intermittent every 24 hours  fluconAZOLE IVPB      fluconAZOLE IVPB 200 milliGRAM(s) IV Intermittent every 24 hours  HYDROmorphone PCA (1 mG/mL) 30 milliLiter(s) PCA Continuous PCA Continuous  potassium acid phosphate/sodium acid phosphate tablet (K-PHOS No. 2) 1 Tablet(s) Oral four times a day with meals  senna 2 Tablet(s) Oral at bedtime    MEDICATIONS  (PRN):  HYDROmorphone PCA (1 mG/mL) Rescue Clinician Bolus 1 milliGRAM(s) IV Push every 1 hour PRN for Pain Scale GREATER THAN 6  naloxone Injectable 0.1 milliGRAM(s) IV Push every 3 minutes PRN For ANY of the following changes in patient status:  A. RR LESS THAN 10 breaths per minute, B. Oxygen saturation LESS THAN 90%, C. Sedation score of 6  ondansetron Injectable 4 milliGRAM(s) IV Push every 8 hours PRN Nausea and/or Vomiting      Vital Signs Last 24 Hrs  T(C): 37 (04 Feb 2018 21:35), Max: 37.2 (04 Feb 2018 17:36)  T(F): 98.6 (04 Feb 2018 21:35), Max: 98.9 (04 Feb 2018 17:36)  HR: 96 (04 Feb 2018 21:35) (81 - 96)  BP: 100/72 (04 Feb 2018 21:35) (96/66 - 111/74)  BP(mean): --  RR: 18 (04 Feb 2018 21:35) (18 - 18)  SpO2: 98% (04 Feb 2018 21:35) (95% - 99%)  Constitutional: No fever, fatigue  Skin: No rash.  Eyes: No recent vision problems or eye pain.  ENT: No congestion, ear pain, or sore throat.  Cardiovascular: No chest pain or palpation.  Respiratory: No cough, shortness of breath, congestion, or wheezing.  Gastrointestinal: No  nausea, vomiting, or diarrhea. bad discomfort +  Genitourinary: No dysuria.  Musculoskeletal: No joint swelling.  Neurologic: No headache.    PHYSICAL EXAM:  GENERAL: NAD  EYES: EOMI, PERRLA  NECK: Supple, No JVD  CHEST/LUNG: dec breath sounds at bases   HEART:  S1 , S2 +  ABDOMEN: mild distension+, ruq drain +  NEUROLOGY: alert awake oriented     LABS:  02-04    139  |  99  |  10  ----------------------------<  73  4.3   |  29  |  0.22<L>    Ca    7.9<L>      04 Feb 2018 08:21      Creatinine Trend: 0.22 <--, 0.28 <--, 0.23 <--, 0.27 <--, 0.26 <--                        7.7    6.25  )-----------( 205      ( 04 Feb 2018 11:38 )             24.1     Urine Studies:

## 2018-02-05 DIAGNOSIS — M54.9 DORSALGIA, UNSPECIFIED: ICD-10-CM

## 2018-02-05 LAB
GLUCOSE BLDC GLUCOMTR-MCNC: 102 MG/DL — HIGH (ref 70–99)
GLUCOSE BLDC GLUCOMTR-MCNC: 102 MG/DL — HIGH (ref 70–99)
GLUCOSE BLDC GLUCOMTR-MCNC: 116 MG/DL — HIGH (ref 70–99)
GLUCOSE BLDC GLUCOMTR-MCNC: 136 MG/DL — HIGH (ref 70–99)
HCT VFR BLD CALC: 22.9 % — LOW (ref 34.5–45)
HGB BLD-MCNC: 7.1 G/DL — LOW (ref 11.5–15.5)
MCHC RBC-ENTMCNC: 27.8 PG — SIGNIFICANT CHANGE UP (ref 27–34)
MCHC RBC-ENTMCNC: 31 GM/DL — LOW (ref 32–36)
MCV RBC AUTO: 89.8 FL — SIGNIFICANT CHANGE UP (ref 80–100)
PLATELET # BLD AUTO: 212 K/UL — SIGNIFICANT CHANGE UP (ref 150–400)
RBC # BLD: 2.55 M/UL — LOW (ref 3.8–5.2)
RBC # FLD: 14.2 % — SIGNIFICANT CHANGE UP (ref 10.3–14.5)
WBC # BLD: 6.28 K/UL — SIGNIFICANT CHANGE UP (ref 3.8–10.5)
WBC # FLD AUTO: 6.28 K/UL — SIGNIFICANT CHANGE UP (ref 3.8–10.5)

## 2018-02-05 PROCEDURE — 99233 SBSQ HOSP IP/OBS HIGH 50: CPT

## 2018-02-05 PROCEDURE — 99233 SBSQ HOSP IP/OBS HIGH 50: CPT | Mod: GC

## 2018-02-05 RX ORDER — HYDROMORPHONE HYDROCHLORIDE 2 MG/ML
30 INJECTION INTRAMUSCULAR; INTRAVENOUS; SUBCUTANEOUS
Qty: 0 | Refills: 0 | Status: DISCONTINUED | OUTPATIENT
Start: 2018-02-05 | End: 2018-02-06

## 2018-02-05 RX ORDER — HYDROMORPHONE HYDROCHLORIDE 2 MG/ML
1.6 INJECTION INTRAMUSCULAR; INTRAVENOUS; SUBCUTANEOUS
Qty: 0 | Refills: 0 | Status: DISCONTINUED | OUTPATIENT
Start: 2018-02-05 | End: 2018-02-06

## 2018-02-05 RX ADMIN — Medication 100 MILLIGRAM(S): at 21:04

## 2018-02-05 RX ADMIN — Medication 1 TABLET(S): at 18:22

## 2018-02-05 RX ADMIN — HYDROMORPHONE HYDROCHLORIDE 30 MILLILITER(S): 2 INJECTION INTRAMUSCULAR; INTRAVENOUS; SUBCUTANEOUS at 12:20

## 2018-02-05 RX ADMIN — HYDROMORPHONE HYDROCHLORIDE 30 MILLILITER(S): 2 INJECTION INTRAMUSCULAR; INTRAVENOUS; SUBCUTANEOUS at 18:57

## 2018-02-05 RX ADMIN — ERTAPENEM SODIUM 120 MILLIGRAM(S): 1 INJECTION, POWDER, LYOPHILIZED, FOR SOLUTION INTRAMUSCULAR; INTRAVENOUS at 13:39

## 2018-02-05 RX ADMIN — FLUCONAZOLE 50 MILLIGRAM(S): 150 TABLET ORAL at 15:58

## 2018-02-05 RX ADMIN — ENOXAPARIN SODIUM 30 MILLIGRAM(S): 100 INJECTION SUBCUTANEOUS at 13:38

## 2018-02-05 RX ADMIN — Medication 100 MILLIGRAM(S): at 05:02

## 2018-02-05 RX ADMIN — HYDROMORPHONE HYDROCHLORIDE 30 MILLILITER(S): 2 INJECTION INTRAMUSCULAR; INTRAVENOUS; SUBCUTANEOUS at 07:15

## 2018-02-05 RX ADMIN — Medication 1 TABLET(S): at 21:05

## 2018-02-05 RX ADMIN — SENNA PLUS 2 TABLET(S): 8.6 TABLET ORAL at 21:05

## 2018-02-05 RX ADMIN — HYDROMORPHONE HYDROCHLORIDE 30 MILLILITER(S): 2 INJECTION INTRAMUSCULAR; INTRAVENOUS; SUBCUTANEOUS at 15:52

## 2018-02-05 RX ADMIN — Medication 1 TABLET(S): at 12:21

## 2018-02-05 NOTE — PROGRESS NOTE ADULT - ASSESSMENT
, 47F with metastatic ovarian CA (s/p TAHBSO, colostomy, previously seeking holistic care 12/2017 however has since established with new oncologist for chemo) p/w abdominal pain.  Now with bowel obstruction  previously s/p colostomy,wright  GB perforation  Seen and followed by surgery,Poor surgical candidate -no intervention for now per surgery  s/p IR drainage  SIRS,sepsis resolving  PCN allergy-but remote and tolerating Invanz  Biliary cultures with ESBL  E Coli and yeast  Also s/p recent chemo       ID -  Continue Invanz and fluconazole for now, Monitor closely for any s/s other nosocomial focus , need  10-14 day course with repeat imaging , pt agreed for ct abd , will order   Surg - no acute surgical intervention   GI - -pain control, monitor GI function closely, monitor drain outputs , s/p PRBCS; f/u CBC and transfuse as needed    Palliative input appreciated , cont pca   discussed management plan in detail nausea length with pt

## 2018-02-05 NOTE — PROGRESS NOTE ADULT - SUBJECTIVE AND OBJECTIVE BOX
Patient is a 47y old  Female who presents with a chief complaint of Abdominal Pain (24 Jan 2018 06:17)        SUBJECTIVE / OVERNIGHT EVENTS: pt denies chest pain, shortness of breath, nausea, vomiting    MEDICATIONS  (STANDING):  dextrose 10% + sodium chloride 0.45% 1000 milliLiter(s) (50 mL/Hr) IV Continuous <Continuous>  docusate sodium 100 milliGRAM(s) Oral three times a day  enoxaparin Injectable 30 milliGRAM(s) SubCutaneous daily  ertapenem  IVPB 1000 milliGRAM(s) IV Intermittent every 24 hours  fluconAZOLE IVPB      fluconAZOLE IVPB 200 milliGRAM(s) IV Intermittent every 24 hours  HYDROmorphone PCA (1 mG/mL) 30 milliLiter(s) PCA Continuous PCA Continuous  potassium acid phosphate/sodium acid phosphate tablet (K-PHOS No. 2) 1 Tablet(s) Oral four times a day with meals  senna 2 Tablet(s) Oral at bedtime    MEDICATIONS  (PRN):  HYDROmorphone PCA (1 mG/mL) Rescue Clinician Bolus 1.6 milliGRAM(s) IV Push every 1 hour PRN for Pain Scale GREATER THAN 6  naloxone Injectable 0.1 milliGRAM(s) IV Push every 3 minutes PRN For ANY of the following changes in patient status:  A. RR LESS THAN 10 breaths per minute, B. Oxygen saturation LESS THAN 90%, C. Sedation score of 6  ondansetron Injectable 4 milliGRAM(s) IV Push every 8 hours PRN Nausea and/or Vomiting      Vital Signs Last 24 Hrs  T(C): 36.6 (05 Feb 2018 17:26), Max: 37.2 (05 Feb 2018 12:41)  T(F): 97.9 (05 Feb 2018 17:26), Max: 99 (05 Feb 2018 12:41)  HR: 96 (05 Feb 2018 17:26) (89 - 99)  BP: 98/66 (05 Feb 2018 17:26) (95/65 - 106/73)  BP(mean): --  RR: 18 (05 Feb 2018 17:26) (18 - 18)  SpO2: 98% (05 Feb 2018 17:26) (98% - 99%)    Constitutional: No fever, fatigue  Skin: No rash.  Eyes: No recent vision problems or eye pain.  ENT: No congestion, ear pain, or sore throat.  Cardiovascular: No chest pain or palpation.  Respiratory: No cough, shortness of breath, congestion, or wheezing.  Gastrointestinal: No  nausea, vomiting, or diarrhea. bad discomfort +  Genitourinary: No dysuria.  Musculoskeletal: No joint swelling.  Neurologic: No headache.    PHYSICAL EXAM:  GENERAL: NAD  EYES: EOMI, PERRLA  NECK: Supple, No JVD  CHEST/LUNG: dec breath sounds at bases   HEART:  S1 , S2 +  ABDOMEN: mild distension+, ruq drain +  NEUROLOGY: alert awake oriented     LABS:  02-04    139  |  99  |  10  ----------------------------<  73  4.3   |  29  |  0.22<L>    Ca    7.9<L>      04 Feb 2018 08:21      Creatinine Trend: 0.22 <--, 0.28 <--, 0.23 <--, 0.27 <--                        7.1    6.28  )-----------( 212      ( 05 Feb 2018 09:05 )             22.9     Urine Studies:

## 2018-02-05 NOTE — PROGRESS NOTE ADULT - ASSESSMENT
Problem: Right upper quadrant abdominal pain.  Plan: diet per surgery  -pain control  -monitor GI function closely  -monitor drain outputs   -s/p PRBCS; f/u CBC and transfuse as needed  -check Metabolic panel daily; replete electrolytes prn  -continue with broad spectrum antibiotics and antifungal    overall prognosis poor.   palliative is following the patient

## 2018-02-05 NOTE — PROGRESS NOTE ADULT - SUBJECTIVE AND OBJECTIVE BOX
INTERVAL HPI/OVERNIGHT EVENTS: Patient having back and buttock pain  in the past 24 hrs. Requiring extra doses in the past 24 hrs (18.5mg of extra dilaudid).     Allergies    penicillin (Rash)    Intolerances      ADVANCE DIRECTIVES:    Full code  MOLST [ ] YES [x ] NO     MEDICATIONS  (STANDING):  dextrose 10% + sodium chloride 0.45% 1000 milliLiter(s) (50 mL/Hr) IV Continuous <Continuous>  docusate sodium 100 milliGRAM(s) Oral three times a day  enoxaparin Injectable 30 milliGRAM(s) SubCutaneous daily  ertapenem  IVPB 1000 milliGRAM(s) IV Intermittent every 24 hours  fluconAZOLE IVPB      fluconAZOLE IVPB 200 milliGRAM(s) IV Intermittent every 24 hours  HYDROmorphone PCA (1 mG/mL) 30 milliLiter(s) PCA Continuous PCA Continuous  potassium acid phosphate/sodium acid phosphate tablet (K-PHOS No. 2) 1 Tablet(s) Oral four times a day with meals  senna 2 Tablet(s) Oral at bedtime    MEDICATIONS  (PRN):  HYDROmorphone PCA (1 mG/mL) Rescue Clinician Bolus 1.6 milliGRAM(s) IV Push every 1 hour PRN for Pain Scale GREATER THAN 6  naloxone Injectable 0.1 milliGRAM(s) IV Push every 3 minutes PRN For ANY of the following changes in patient status:  A. RR LESS THAN 10 breaths per minute, B. Oxygen saturation LESS THAN 90%, C. Sedation score of 6  ondansetron Injectable 4 milliGRAM(s) IV Push every 8 hours PRN Nausea and/or Vomiting    PRESENT SYMPTOMS:  Source: [x ] Patient   [ ] Family   [ ] Team     Pain:                        [ ] No [ x] Yes             [ ] Mild [ ] Moderate [x ] Severe    Onset - Days  Location - Lower back and buttock  Duration - constant  Character - Aching   Alleviating/Aggravating - Movement, not comfortable lying on her back.  Radiation - Non- radiating.  Timing - Constant    Dyspnea:                [x ] No [ ] Yes             [ ] Mild [ ] Moderate [ ] Severe    Anxiety:                  [ x] No [ ] Yes             [ ] Mild [ ] Moderate [ ] Severe    Fatigue:                  [ x] No [ ] Yes             [ ] Mild [ ] Moderate [ ] Severe    Nausea:                  [ x] No [ ] Yes             [ ] Mild [ ] Moderate [ ] Severe    Loss of appetite:   [ ] No [x ] Yes             [ ] Mild [ ] Moderate [ ] Severe    Constipation:        [x ] No [ ] Yes             [ ] Mild [ ] Moderate [ ] Severe    Other Symptoms:  [x ] All other review of systems negative   [ ] Unable to obtain due to poor mentation     Karnofsky Performance Score/Palliative Performance Status Version 2:  50 %    PHYSICAL EXAM:  Vital Signs Last 24 Hrs  T(C): 37 (05 Feb 2018 08:51), Max: 37.2 (04 Feb 2018 17:36)  T(F): 98.6 (05 Feb 2018 08:51), Max: 98.9 (04 Feb 2018 17:36)  HR: 96 (05 Feb 2018 08:51) (88 - 96)  BP: 106/73 (05 Feb 2018 08:51) (95/65 - 111/74)  BP(mean): --  RR: 18 (05 Feb 2018 08:51) (18 - 18)  SpO2: 98% (05 Feb 2018 08:51) (95% - 98%) I&O's Summary    04 Feb 2018 07:01  -  05 Feb 2018 07:00  --------------------------------------------------------  IN: 2220 mL / OUT: 965 mL / NET: 1255 mL    05 Feb 2018 07:01  -  05 Feb 2018 12:00  --------------------------------------------------------  IN: 140 mL / OUT: 5 mL / NET: 135 mL    General:  [x ] Alert  [x ] Oriented x      [ ] Lethargic  [ ] Agitated   [ x] Cachexia   [ ] Unarousable  [ x] Verbal  [ ] Non-Verbal    HEENT:  [ ] Normal   [x ] Dry mouth   [ ] ET Tube    [ ] Trach  [ ] Oral lesions    Lungs:   [x ] Clear [ ] Tachypnea  [ ] Audible excessive secretions   [ ] Rhonchi        [ ] Right [ ] Left [ ] Bilateral  [ ] Crackles        [ ] Right [ ] Left [ ] Bilateral  [ ] Wheezing     [ ] Right [ ] Left [ ] Bilateral    Cardiovascular:  [ x] Regular [ ] Irregular [ ] Tachycardia   [ ] Bradycardia  [ ] Murmur [ ] Other    Abdomen: [ ] Soft  [x ] Distended   [ ] +BS  [ ] Non tender [x ] Tender  [ ]PEG   [ ]OGT/ NGT   Last BM:     Ostomy  Biliary drain    Genitourinary: [x ] Normal [ ] Incontinent   [ ] Oliguria/Anuria   [ ] Lynn    Musculoskeletal:  [ ] Normal   [ x] Weakness  [ ] Bedbound/Wheelchair bound [ ] Edema    Neurological: [ x] No focal deficits  [ ] Cognitive impairment  [ ] Dysphagia [ ] Dysarthria [ ] Paresis [ ] Other     Skin: [ ] Normal   [ ] Pressure ulcer(s)                  [ ] Rash    LABS:                        7.1    6.28  )-----------( 212      ( 05 Feb 2018 09:05 )             22.9     02-04    139  |  99  |  10  ----------------------------<  73  4.3   |  29  |  0.22<L>    Ca    7.9<L>      04 Feb 2018 08:21    Shock: no [ ] Septic [ ] Cardiogenic [ ] Neurologic [ ] Hypovolemic  Vasopressors x none   Inotrophs x none    Oral Intake: [ ] Unable/mouth care only [ x] Minimal [ ] Moderate [ ] Full Capability    Diet: [ ] NPO [ ] Tube feeds [ ] TPN [ x] Other     RADIOLOGY & ADDITIONAL STUDIES: no new imaging    REFERRALS:   [ ] Chaplaincy  [ ] Hospice  [ ] Child Life  [ ] Social Work  [ ] Case management [ ] Holistic Therapy INTERVAL HPI/OVERNIGHT EVENTS: Patient has had back and buttock pain for  the past 24 hrs. Requiring extra doses of Dilaudid in the past 24 hrs (18.5mg of extra dilaudid).     PERTINENT PM/SXH:   Ovarian cancer    Colostomy in place  H/O abdominal hysterectomy    SOCIAL HISTORY:   Significant other/partner:  [ x] YES  [ ] NO               Children:  [ ] YES  [x ] NO                   Bahai/Spirituality:  Substance hx:  [ ] YES   [x ] NO                   Tobacco hx:  [ ] YES  [x ] NO                       Alcohol hx: [ ] YES  [x ] NO         Home Opioid hx:  [x ] YES  [ ] NO   Living Situation: [x ] Home  [ ] Long term care  [ ] Rehab [ ] Other    FAMILY HISTORY:  No pertinent family history in first degree relatives    [ x] Family history non-contributory     BASELINE (I)ADLs (prior to admission):  [place]: [ ] total  [ x] moderate [ ] dependent    ADVANCE DIRECTIVES:    Full code  MOLST  [ ] YES [x ] NO                      [ ] Completed  Health Care Proxy [ x] YES  [ ] NO   [ ] Completed  Living Will  [ ] YES [x ] NO             [ ] Surrogate  [x ] HCP  [ ] Guardian:  Copy in EMR alpha tab in 12/20/17 admission  Trung Buenrostro  Phone#: [phone]      Allergies    penicillin (Rash)    Intolerances      ADVANCE DIRECTIVES:    Full code  MOLST [ ] YES [x ] NO     MEDICATIONS  (STANDING):  dextrose 10% + sodium chloride 0.45% 1000 milliLiter(s) (50 mL/Hr) IV Continuous <Continuous>  docusate sodium 100 milliGRAM(s) Oral three times a day  enoxaparin Injectable 30 milliGRAM(s) SubCutaneous daily  ertapenem  IVPB 1000 milliGRAM(s) IV Intermittent every 24 hours  fluconAZOLE IVPB      fluconAZOLE IVPB 200 milliGRAM(s) IV Intermittent every 24 hours  HYDROmorphone PCA (1 mG/mL) 30 milliLiter(s) PCA Continuous PCA Continuous  potassium acid phosphate/sodium acid phosphate tablet (K-PHOS No. 2) 1 Tablet(s) Oral four times a day with meals  senna 2 Tablet(s) Oral at bedtime    MEDICATIONS  (PRN):  HYDROmorphone PCA (1 mG/mL) Rescue Clinician Bolus 1.6 milliGRAM(s) IV Push every 1 hour PRN for Pain Scale GREATER THAN 6  naloxone Injectable 0.1 milliGRAM(s) IV Push every 3 minutes PRN For ANY of the following changes in patient status:  A. RR LESS THAN 10 breaths per minute, B. Oxygen saturation LESS THAN 90%, C. Sedation score of 6  ondansetron Injectable 4 milliGRAM(s) IV Push every 8 hours PRN Nausea and/or Vomiting    PRESENT SYMPTOMS:  Source: [x ] Patient   [ ] Family   [ ] Team     Pain:                        [ ] No [ x] Yes             [ ] Mild [ ] Moderate [x ] Severe    Onset - Days  Location - Lower back and buttock  Duration - constant  Character - Aching   Alleviating/Aggravating - Movement, not comfortable lying on her back.  Radiation - Non- radiating.  Timing - Constant    Dyspnea:                [x ] No [ ] Yes             [ ] Mild [ ] Moderate [ ] Severe    Anxiety:                  [ x] No [ ] Yes             [ ] Mild [ ] Moderate [ ] Severe    Fatigue:                  [ x] No [ ] Yes             [ ] Mild [ ] Moderate [ ] Severe    Nausea:                  [ x] No [ ] Yes             [ ] Mild [ ] Moderate [ ] Severe    Loss of appetite:   [ ] No [x ] Yes             [ ] Mild [ ] Moderate [ ] Severe    Constipation:        [x ] No [ ] Yes             [ ] Mild [ ] Moderate [ ] Severe    Other Symptoms:  [x ] All other review of systems negative   [ ] Unable to obtain due to poor mentation     Karnofsky Performance Score/Palliative Performance Status Version 2:  50 %    PHYSICAL EXAM:  Vital Signs Last 24 Hrs  T(C): 37 (05 Feb 2018 08:51), Max: 37.2 (04 Feb 2018 17:36)  T(F): 98.6 (05 Feb 2018 08:51), Max: 98.9 (04 Feb 2018 17:36)  HR: 96 (05 Feb 2018 08:51) (88 - 96)  BP: 106/73 (05 Feb 2018 08:51) (95/65 - 111/74)  BP(mean): --  RR: 18 (05 Feb 2018 08:51) (18 - 18)  SpO2: 98% (05 Feb 2018 08:51) (95% - 98%) I&O's Summary    04 Feb 2018 07:01  -  05 Feb 2018 07:00  --------------------------------------------------------  IN: 2220 mL / OUT: 965 mL / NET: 1255 mL    05 Feb 2018 07:01  -  05 Feb 2018 12:00  --------------------------------------------------------  IN: 140 mL / OUT: 5 mL / NET: 135 mL    General:  [x ] Alert  [x ] Oriented x      [ ] Lethargic  [ ] Agitated   [ x] Cachexia   [ ] Unarousable  [ x] Verbal  [ ] Non-Verbal    HEENT:  [ ] Normal   [x ] Dry mouth   [ ] ET Tube    [ ] Trach  [ ] Oral lesions    Lungs:   [x ] Clear [ ] Tachypnea  [ ] Audible excessive secretions   [ ] Rhonchi        [ ] Right [ ] Left [ ] Bilateral  [ ] Crackles        [ ] Right [ ] Left [ ] Bilateral  [ ] Wheezing     [ ] Right [ ] Left [ ] Bilateral    Cardiovascular:  [ x] Regular [ ] Irregular [ ] Tachycardia   [ ] Bradycardia  [ ] Murmur [ ] Other    Abdomen: [ ] Soft  [x ] Distended   [ ] +BS  [ ] Non tender [x ] Tender  [ ]PEG   [ ]OGT/ NGT   Last BM:  Positive ostomy output    Ostomy  Biliary drain    Genitourinary: [x ] Normal [ ] Incontinent   [ ] Oliguria/Anuria   [ ] Lynn    Musculoskeletal:  [ ] Normal   [ x] Weakness  [ ] Bedbound/Wheelchair bound [ ] Edema    Neurological: [ x] No focal deficits  [ ] Cognitive impairment  [ ] Dysphagia [ ] Dysarthria [ ] Paresis [ ] Other     Skin: [ ] Normal   [ ] Pressure ulcer(s)                  [ ] Rash    LABS:                        7.1    6.28  )-----------( 212      ( 05 Feb 2018 09:05 )             22.9     02-04    139  |  99  |  10  ----------------------------<  73  4.3   |  29  |  0.22<L>    Ca    7.9<L>      04 Feb 2018 08:21    Shock: no [ ] Septic [ ] Cardiogenic [ ] Neurologic [ ] Hypovolemic  Vasopressors x none   Inotrophs x none    Oral Intake: [ ] Unable/mouth care only [ x] Minimal [ ] Moderate [ ] Full Capability    Diet: [ ] NPO [ ] Tube feeds [ ] TPN [ x] Other     RADIOLOGY & ADDITIONAL STUDIES: no new imaging    REFERRALS:   [ ] Chaplaincy  [ ] Hospice  [ ] Child Life  [ ] Social Work  [ ] Case management [ ] Holistic Therapy

## 2018-02-05 NOTE — PROGRESS NOTE ADULT - SUBJECTIVE AND OBJECTIVE BOX
[facility]  Carter Genao PA-C  [address] [place] [address] [phone]      INTERVAL HPI/OVERNIGHT EVENTS: Pt seen and examined at bedside, has some pain in her buttock, no nasuea or vomiting    MEDICATIONS  (STANDING):  dextrose 10% + sodium chloride 0.45% 1000 milliLiter(s) (50 mL/Hr) IV Continuous <Continuous>  docusate sodium 100 milliGRAM(s) Oral three times a day  enoxaparin Injectable 30 milliGRAM(s) SubCutaneous daily  ertapenem  IVPB 1000 milliGRAM(s) IV Intermittent every 24 hours  fluconAZOLE IVPB      fluconAZOLE IVPB 200 milliGRAM(s) IV Intermittent every 24 hours  HYDROmorphone PCA (1 mG/mL) 30 milliLiter(s) PCA Continuous PCA Continuous  potassium acid phosphate/sodium acid phosphate tablet (K-PHOS No. 2) 1 Tablet(s) Oral four times a day with meals  senna 2 Tablet(s) Oral at bedtime    MEDICATIONS  (PRN):  HYDROmorphone PCA (1 mG/mL) Rescue Clinician Bolus 1.6 milliGRAM(s) IV Push every 1 hour PRN for Pain Scale GREATER THAN 6  naloxone Injectable 0.1 milliGRAM(s) IV Push every 3 minutes PRN For ANY of the following changes in patient status:  A. RR LESS THAN 10 breaths per minute, B. Oxygen saturation LESS THAN 90%, C. Sedation score of 6  ondansetron Injectable 4 milliGRAM(s) IV Push every 8 hours PRN Nausea and/or Vomiting      Allergies    penicillin (Rash)    Intolerances        ROS:   General:  No wt loss, fevers, chills, night sweats, fatigue,   Eyes:  Good vision, no reported pain  ENT:  No sore throat, pain, runny nose, dysphagia  CV:  No pain, palpitations, hypo/hypertension  Resp:  No dyspnea, cough, tachypnea, wheezing  GI:  No pain, No nausea, No vomiting, No diarrhea, No constipation, No weight loss, No fever, No pruritis, No rectal bleeding, No tarry stools, No dysphagia,  :  No pain, bleeding, incontinence, nocturia  Muscle:  No pain, weakness  Neuro:  No weakness, tingling, memory problems  Psych:  No fatigue, insomnia, mood problems, depression  Endocrine:  No polyuria, polydipsia, cold/heat intolerance  Heme:  No petechiae, ecchymosis, easy bruisability  Skin:  No rash, tattoos, scars, edema      PHYSICAL EXAM:   Vital Signs:  Vital Signs Last 24 Hrs  T(C): 37.2 (05 Feb 2018 12:41), Max: 37.2 (04 Feb 2018 17:36)  T(F): 99 (05 Feb 2018 12:41), Max: 99 (05 Feb 2018 12:41)  HR: 99 (05 Feb 2018 12:41) (89 - 99)  BP: 104/72 (05 Feb 2018 12:41) (95/65 - 111/74)  BP(mean): --  RR: 18 (05 Feb 2018 12:41) (18 - 18)  SpO2: 99% (05 Feb 2018 12:41) (95% - 99%)  Daily     Daily     GENERAL:  Appears stated age, well-groomed, well-nourished, no distress  HEENT:  NC/AT,  conjunctivae clear and pink, no thyromegaly, nodules, adenopathy, no JVD, sclera -anicteric  CHEST:  Full & symmetric excursion, no increased effort, breath sounds clear  HEART:  Regular rhythm, S1, S2, no murmur/rub/S3/S4, no abdominal bruit, no edema  ABDOMEN:  Soft, non-tender, non-distended, normoactive bowel sounds,  no masses ,no hepato-splenomegaly, no signs of chronic liver disease  EXTEREMITIES:  no cyanosis,clubbing or edema  SKIN:  No rash/erythema/ecchymoses/petechiae/wounds/abscess/warm/dry  NEURO:  Alert, oriented, no asterixis, no tremor, no encephalopathy      LABS:                        7.1    6.28  )-----------( 212      ( 05 Feb 2018 09:05 )             22.9     02-04    139  |  99  |  10  ----------------------------<  73  4.3   |  29  |  0.22<L>    Ca    7.9<L>      04 Feb 2018 08:21            RADIOLOGY & ADDITIONAL TESTS:

## 2018-02-05 NOTE — PROGRESS NOTE ADULT - SUBJECTIVE AND OBJECTIVE BOX
Patient is a 47y old  Female who presents with a chief complaint of Abdominal Pain (24 Jan 2018 06:17)    Being followed by ID for GB perforation,cholecystitis    Interval history:still with abdominal pain though tolerating po intake  No other complaints  No other acute events      ROS:  No cough,SOB,CP  No N/V/ Colostomy output unchanged   No urinary complaints  No HA  No joint or limb pain  No other complaints      Antimicrobials:    ertapenem  IVPB 1000 milliGRAM(s) IV Intermittent every 24 hours  fluconAZOLE IVPB      fluconAZOLE IVPB 200 milliGRAM(s) IV Intermittent every 24 hours      other medications reviewed    Vital Signs Last 24 Hrs  T(C): 37 (02-05-18 @ 08:51), Max: 37.2 (02-04-18 @ 17:36)  T(F): 98.6 (02-05-18 @ 08:51), Max: 98.9 (02-04-18 @ 17:36)  HR: 96 (02-05-18 @ 08:51) (88 - 96)  BP: 106/73 (02-05-18 @ 08:51) (95/65 - 111/74)  BP(mean): --  RR: 18 (02-05-18 @ 08:51) (18 - 18)  SpO2: 98% (02-05-18 @ 08:51) (95% - 98%)    Physical Exam:    Constitutional well preserved,comfortable,pleasant    HEENT PERRLA EOMI,No pallor or icterus    No oral exudate or erythema    Neck supple no JVD or LN  R SC mediport no erythema or tenderness    Chest Good AE,CTA    CVS RRR S1 S2 WNl No murmur or rub or gallop    Abd soft BS normal diffuse mild  tenderness no masses  RUQ drain     Ext No cyanosis clubbing or edema    IV site no erythema tenderness or discharge    Joints no swelling or LOM    CNS AAO X 3 no focal    Lab Data:                          7.1    6.28  )-----------( 212      ( 05 Feb 2018 09:05 )             22.9       02-04    139  |  99  |  10  ----------------------------<  73  4.3   |  29  |  0.22<L>    Ca    7.9<L>      04 Feb 2018 08:21

## 2018-02-05 NOTE — PROGRESS NOTE ADULT - PROBLEM SELECTOR PLAN 4
Aching non-radiating. Uncomfortable while sleeping/lying in bed. Has used 18.5mg extra of Dilaudid in the past 24hrs. Will increase Dilaudid PCA to 0.8mg/hr and 0.8mg lockout of 15min, CB of 1.6mg. Likely musculoskeletal pain or pain 2/2 to skin issues.   Aching non-radiating. Uncomfortable while sleeping/lying in bed. Has used 18.5mg extra of Dilaudid in the past 24hrs. Will increase Dilaudid PCA to 0.8mg/hr and 0.8mg lockout of 15min, CB of 1.6mg.  Holistic Nurse therapies. Controlled. Dilaudid setting will be increased due to back pain as below.

## 2018-02-05 NOTE — CHART NOTE - NSCHARTNOTEFT_GEN_A_CORE
Source: Patient [x ]    Family [ ]     other [ x] medical record    Diet : Regular, Ensure Enlive- 2 per day (provides additional 700cal, 40gm protein)     Hospital course:  47F with metastatic ovarian cancer and gallbladder perforation s/p placement of a 10F drain in her gallbladder. On PCA for pain control.     Pt seen for malnutrition follow up. Pt eating breakfast at time of RD visit, reports she is eating better. Pt only able to tolerate 1 Ensure daily- noted to have multiple unopened Ensure at bedside. Recommend decrease to 1 Ensure daily. Pt denies GI distress at this time. Ostomy output noted (2/5) 30ml thus far (2/4) formed stool per flow sheet. Encouraged po intake and intake of protein/energy dense food.      PO intake:  < 50% [ ] 50-75% [x ]   % [ ]  other :     Source for PO intake [ x] Patient [ ] family [ ] chart [ ] staff [ ] other        Current Weight: (1/25) 102.9 pounds - no new weight    Pertinent Medications: MEDICATIONS  (STANDING):  dextrose 10% + sodium chloride 0.45% 1000 milliLiter(s) (50 mL/Hr) IV Continuous <Continuous>  docusate sodium 100 milliGRAM(s) Oral three times a day  enoxaparin Injectable 30 milliGRAM(s) SubCutaneous daily  ertapenem  IVPB 1000 milliGRAM(s) IV Intermittent every 24 hours  fluconAZOLE IVPB      fluconAZOLE IVPB 200 milliGRAM(s) IV Intermittent every 24 hours  HYDROmorphone PCA (1 mG/mL) 30 milliLiter(s) PCA Continuous PCA Continuous  potassium acid phosphate/sodium acid phosphate tablet (K-PHOS No. 2) 1 Tablet(s) Oral four times a day with meals  senna 2 Tablet(s) Oral at bedtime    MEDICATIONS  (PRN):  HYDROmorphone PCA (1 mG/mL) Rescue Clinician Bolus 1.6 milliGRAM(s) IV Push every 1 hour PRN for Pain Scale GREATER THAN 6  naloxone Injectable 0.1 milliGRAM(s) IV Push every 3 minutes PRN For ANY of the following changes in patient status:  A. RR LESS THAN 10 breaths per minute, B. Oxygen saturation LESS THAN 90%, C. Sedation score of 6  ondansetron Injectable 4 milliGRAM(s) IV Push every 8 hours PRN Nausea and/or Vomiting    Pertinent Labs:  Point of Care Testing BG: (2/4)  (2/5) 102    Skin: No edema. Stage II sacrum pressure ulcer.     Estimated Needs:   [ x] no change since previous assessment  [ ] recalculated:       Previous Nutrition Diagnosis:    [x ] Malnutrition          Nutrition Diagnosis is [x ] ongoing  [ ] resolved [ ] not applicable          New Nutrition Diagnosis: [x ] not applicable        Recommend    [ ] Change Diet To:    [x ] Nutrition Supplement Recommend decrease to Ensure Enlive- 1  per day (provides additional 350cal, 20gm protein)     [ ] Nutrition Support    [x ] Other: 1. Provide food preferences as requested by Pt/family within diet restrictions  2. Encourage PO intake during meal times 3. Provide feeding assistance as needed        Monitoring and Evaluation:     [ x] PO intake [ x] Tolerance to diet prescription [ x] weights [x ] follow up per protocol    [x ] other: RD remains available, Jeanne Browning RD pager #[phone]

## 2018-02-05 NOTE — PROGRESS NOTE ADULT - PROBLEM SELECTOR PLAN 3
Controlled. On PCA 0.5 mg/h, 0.5 mg q 15' DD, and 1 mg q 1 CB. Controlled. Dilaudid setting will be increased due to back pain as below. Likely musculoskeletal pain or pain 2/2 to skin issues.   Aching non-radiating. Uncomfortable while sleeping/lying in bed. Has used 18.5mg extra of Dilaudid in the past 24hrs. Will increase Dilaudid PCA to 0.8mg/hr and 0.8mg lockout of 15min, CB of 1.6mg.  Holistic Nurse therapies.

## 2018-02-05 NOTE — PROGRESS NOTE ADULT - ASSESSMENT
47F with metastatic ovarian CA (s/p TAHBSO, colostomy, previously seeking holistic care 12/2017 however has since established with new oncologist for chemo) p/w abdominal pain.  Now with bowel obstruction  previously s/p colostomy,wright  GB perforation  Seen and followed by surgery,Poor surgical candidate -no intervention for now per surgery  s/p IR drainage 11 days ago   SIRS,sepsis resolved  PCN allergy-but remote and tolerating Invanz  Biliary cultures with ESBL  E Coli and yeast  Also s/p recent chemo  Stable ,Blood  cx negative  Would recommend repeat abdominal imaging  ?Plan with drain-will defer to GI/surgery/primary  If stable and imaging stable will consider stopping abx after 14 days(3-4 days from now)  Overall prognosis poor  Case d/w Med Np  Will Follow.  Beeper [id number]-anhf/afterhours/No [id number]

## 2018-02-05 NOTE — PROGRESS NOTE ADULT - PROBLEM SELECTOR PLAN 7
Pt is full code, ongoing GOC discussions. Has appointed HCP - partner Trung Buenrostro. Patient's hope is for symptom control so pt can continue with further chemotherapy.

## 2018-02-06 LAB
ANION GAP SERPL CALC-SCNC: 10 MMOL/L — SIGNIFICANT CHANGE UP (ref 5–17)
BLD GP AB SCN SERPL QL: NEGATIVE — SIGNIFICANT CHANGE UP
BUN SERPL-MCNC: 8 MG/DL — SIGNIFICANT CHANGE UP (ref 7–23)
CALCIUM SERPL-MCNC: 7.9 MG/DL — LOW (ref 8.4–10.5)
CHLORIDE SERPL-SCNC: 102 MMOL/L — SIGNIFICANT CHANGE UP (ref 96–108)
CO2 SERPL-SCNC: 31 MMOL/L — SIGNIFICANT CHANGE UP (ref 22–31)
CREAT SERPL-MCNC: 0.23 MG/DL — LOW (ref 0.5–1.3)
GLUCOSE BLDC GLUCOMTR-MCNC: 114 MG/DL — HIGH (ref 70–99)
GLUCOSE BLDC GLUCOMTR-MCNC: 167 MG/DL — HIGH (ref 70–99)
GLUCOSE BLDC GLUCOMTR-MCNC: 85 MG/DL — SIGNIFICANT CHANGE UP (ref 70–99)
GLUCOSE SERPL-MCNC: 72 MG/DL — SIGNIFICANT CHANGE UP (ref 70–99)
HCT VFR BLD CALC: 21.2 % — LOW (ref 34.5–45)
HCT VFR BLD CALC: 27.7 % — LOW (ref 34.5–45)
HGB BLD-MCNC: 6.7 G/DL — CRITICAL LOW (ref 11.5–15.5)
HGB BLD-MCNC: 9.7 G/DL — LOW (ref 11.5–15.5)
MCHC RBC-ENTMCNC: 27.5 PG — SIGNIFICANT CHANGE UP (ref 27–34)
MCHC RBC-ENTMCNC: 31.6 GM/DL — LOW (ref 32–36)
MCHC RBC-ENTMCNC: 31.9 PG — SIGNIFICANT CHANGE UP (ref 27–34)
MCHC RBC-ENTMCNC: 35 GM/DL — SIGNIFICANT CHANGE UP (ref 32–36)
MCV RBC AUTO: 86.9 FL — SIGNIFICANT CHANGE UP (ref 80–100)
MCV RBC AUTO: 90.9 FL — SIGNIFICANT CHANGE UP (ref 80–100)
PLATELET # BLD AUTO: 207 K/UL — SIGNIFICANT CHANGE UP (ref 150–400)
PLATELET # BLD AUTO: 226 K/UL — SIGNIFICANT CHANGE UP (ref 150–400)
POTASSIUM SERPL-MCNC: 4.3 MMOL/L — SIGNIFICANT CHANGE UP (ref 3.5–5.3)
POTASSIUM SERPL-SCNC: 4.3 MMOL/L — SIGNIFICANT CHANGE UP (ref 3.5–5.3)
RBC # BLD: 2.44 M/UL — LOW (ref 3.8–5.2)
RBC # BLD: 3.04 M/UL — LOW (ref 3.8–5.2)
RBC # FLD: 13.1 % — SIGNIFICANT CHANGE UP (ref 10.3–14.5)
RBC # FLD: 14.5 % — SIGNIFICANT CHANGE UP (ref 10.3–14.5)
RH IG SCN BLD-IMP: POSITIVE — SIGNIFICANT CHANGE UP
SODIUM SERPL-SCNC: 143 MMOL/L — SIGNIFICANT CHANGE UP (ref 135–145)
WBC # BLD: 4.63 K/UL — SIGNIFICANT CHANGE UP (ref 3.8–10.5)
WBC # BLD: 6.9 K/UL — SIGNIFICANT CHANGE UP (ref 3.8–10.5)
WBC # FLD AUTO: 4.63 K/UL — SIGNIFICANT CHANGE UP (ref 3.8–10.5)
WBC # FLD AUTO: 6.9 K/UL — SIGNIFICANT CHANGE UP (ref 3.8–10.5)

## 2018-02-06 PROCEDURE — 99233 SBSQ HOSP IP/OBS HIGH 50: CPT | Mod: GC

## 2018-02-06 PROCEDURE — 74177 CT ABD & PELVIS W/CONTRAST: CPT | Mod: 26

## 2018-02-06 PROCEDURE — 99497 ADVNCD CARE PLAN 30 MIN: CPT | Mod: 25

## 2018-02-06 PROCEDURE — 99232 SBSQ HOSP IP/OBS MODERATE 35: CPT

## 2018-02-06 RX ORDER — HYDROMORPHONE HYDROCHLORIDE 2 MG/ML
2 INJECTION INTRAMUSCULAR; INTRAVENOUS; SUBCUTANEOUS
Qty: 0 | Refills: 0 | Status: DISCONTINUED | OUTPATIENT
Start: 2018-02-06 | End: 2018-02-12

## 2018-02-06 RX ORDER — HYDROMORPHONE HYDROCHLORIDE 2 MG/ML
30 INJECTION INTRAMUSCULAR; INTRAVENOUS; SUBCUTANEOUS
Qty: 0 | Refills: 0 | Status: DISCONTINUED | OUTPATIENT
Start: 2018-02-06 | End: 2018-02-12

## 2018-02-06 RX ADMIN — ENOXAPARIN SODIUM 30 MILLIGRAM(S): 100 INJECTION SUBCUTANEOUS at 13:23

## 2018-02-06 RX ADMIN — HYDROMORPHONE HYDROCHLORIDE 30 MILLILITER(S): 2 INJECTION INTRAMUSCULAR; INTRAVENOUS; SUBCUTANEOUS at 14:57

## 2018-02-06 RX ADMIN — Medication 1 TABLET(S): at 21:51

## 2018-02-06 RX ADMIN — ERTAPENEM SODIUM 120 MILLIGRAM(S): 1 INJECTION, POWDER, LYOPHILIZED, FOR SOLUTION INTRAMUSCULAR; INTRAVENOUS at 13:22

## 2018-02-06 RX ADMIN — FLUCONAZOLE 50 MILLIGRAM(S): 150 TABLET ORAL at 17:27

## 2018-02-06 RX ADMIN — HYDROMORPHONE HYDROCHLORIDE 30 MILLILITER(S): 2 INJECTION INTRAMUSCULAR; INTRAVENOUS; SUBCUTANEOUS at 19:14

## 2018-02-06 RX ADMIN — SODIUM CHLORIDE 50 MILLILITER(S): 9 INJECTION, SOLUTION INTRAVENOUS at 17:27

## 2018-02-06 RX ADMIN — Medication 100 MILLIGRAM(S): at 21:51

## 2018-02-06 RX ADMIN — SENNA PLUS 2 TABLET(S): 8.6 TABLET ORAL at 21:51

## 2018-02-06 RX ADMIN — Medication 100 MILLIGRAM(S): at 13:19

## 2018-02-06 RX ADMIN — HYDROMORPHONE HYDROCHLORIDE 30 MILLILITER(S): 2 INJECTION INTRAMUSCULAR; INTRAVENOUS; SUBCUTANEOUS at 07:12

## 2018-02-06 RX ADMIN — Medication 1 TABLET(S): at 13:19

## 2018-02-06 RX ADMIN — Medication 1 TABLET(S): at 17:27

## 2018-02-06 RX ADMIN — Medication 100 MILLIGRAM(S): at 05:59

## 2018-02-06 NOTE — PROVIDER CONTACT NOTE (OTHER) - ACTION/TREATMENT ORDERED:
NP notified, instructed to recheck at 0200 and notify him of the results.
NP notified, instructed to check FSACHS, no further interventions given at this time, will continue to monitor.
Rapid response called for AMS & vitals

## 2018-02-06 NOTE — GOALS OF CARE CONVERSATION - PERSONAL ADVANCE DIRECTIVE - CONVERSATION DETAILS
Palliative Care team discussed with patient advanced directives. Patient initially expressed that she had chosen to be full code as it was the default thing to do whenever a patient's heart would stop. We discussed in length about how CPR is done, and that the probability of her not surviving the intervention was high, also if she survived it was very likely that she would be dependent on a respirator. Patient expressed that she would not want her life to be prolonged with machines and that if the probability of her regaining her mental status after a cardiac arrest, given her metastatic cancer, was low she would not want CPR to be performed. CPR form in the chart. Will call TANNER Nino to inform him of the patient's decision, patient agreed for us to contact him.

## 2018-02-06 NOTE — PROGRESS NOTE ADULT - ASSESSMENT
47F with metastatic ovarian CA (s/p TAHBSO, colostomy, previously seeking holistic care 12/2017 however has since established with new oncologist for chemo) p/w abdominal pain.  Now with bowel obstruction  previously s/p colostomy,wright  GB perforation  Seen and followed by surgery,Poor surgical candidate -no intervention for now per surgery  s/p IR drainage 12 days ago   SIRS,sepsis resolved  PCN allergy-but remote and tolerating Invanz  Biliary cultures with ESBL  E Coli and yeast  Also s/p recent chemo  Stable ,Blood  cx negative  For Ct today   ?Plan with drain-will defer to GI/surgery/primary  If stable and imaging stable will consider stopping abx after 14 days(2-3 days from now)  Overall prognosis poor    Will Follow.  Beeper [id number]-cucs/afterhours/No response-[id number]

## 2018-02-06 NOTE — PROVIDER CONTACT NOTE (OTHER) - ASSESSMENT
Pt asymptomatic. No complaints at this time. Pt's systolic BP has been in the high 90's recently. Pt has PCA pump w/ demand of .8 mg.

## 2018-02-06 NOTE — PROGRESS NOTE ADULT - ASSESSMENT
, 47F with metastatic ovarian CA (s/p TAHBSO, colostomy, previously seeking holistic care 12/2017 however has since established with new oncologist for chemo) p/w abdominal pain.  Now with bowel obstruction  previously s/p colostomy,wright  GB perforation  Seen and followed by surgery,Poor surgical candidate -no intervention for now per surgery  s/p IR drainage  SIRS,sepsis resolving  PCN allergy-but remote and tolerating Invanz  Biliary cultures with ESBL  E Coli and yeast  Also s/p recent chemo       ID -  Continue Invanz and fluconazole for now, Monitor closely for any s/s other nosocomial focus , need  10-14 day course with repeat imaging , ct abd with improvement < from: CT Abdomen and Pelvis w/ Oral Cont and w/ IV Cont (02.06.18 @ 10:04) >  Significantly improved gallbladder fossa fluid collection.    Improved small bowel obstruction.    Metastatic disease is unchanged since recent prior CT.      < end of copied text >    Surg - no acute surgical intervention   GI - -pain control, monitor GI function closely, monitor drain outputs , s/p PRBCS; f/u CBC and transfuse as needed    Palliative input appreciated , cont pca   discussed management plan in detail nausea length with pt

## 2018-02-06 NOTE — PROGRESS NOTE ADULT - SUBJECTIVE AND OBJECTIVE BOX
[facility]  Carter Genao PA-C  [address] [place] [address] [phone]      INTERVAL HPI/OVERNIGHT EVENTS: Pt seen and examined at bedside, no new complaints    MEDICATIONS  (STANDING):  dextrose 10% + sodium chloride 0.45% 1000 milliLiter(s) (50 mL/Hr) IV Continuous <Continuous>  docusate sodium 100 milliGRAM(s) Oral three times a day  enoxaparin Injectable 30 milliGRAM(s) SubCutaneous daily  ertapenem  IVPB 1000 milliGRAM(s) IV Intermittent every 24 hours  fluconAZOLE IVPB      fluconAZOLE IVPB 200 milliGRAM(s) IV Intermittent every 24 hours  HYDROmorphone PCA (1 mG/mL) 30 milliLiter(s) PCA Continuous PCA Continuous  potassium acid phosphate/sodium acid phosphate tablet (K-PHOS No. 2) 1 Tablet(s) Oral four times a day with meals  senna 2 Tablet(s) Oral at bedtime    MEDICATIONS  (PRN):  HYDROmorphone PCA (1 mG/mL) Rescue Clinician Bolus 1.6 milliGRAM(s) IV Push every 1 hour PRN for Pain Scale GREATER THAN 6  naloxone Injectable 0.1 milliGRAM(s) IV Push every 3 minutes PRN For ANY of the following changes in patient status:  A. RR LESS THAN 10 breaths per minute, B. Oxygen saturation LESS THAN 90%, C. Sedation score of 6  ondansetron Injectable 4 milliGRAM(s) IV Push every 8 hours PRN Nausea and/or Vomiting      Allergies    penicillin (Rash)    Intolerances        ROS:   General:  No wt loss, fevers, chills, night sweats, fatigue,   Eyes:  Good vision, no reported pain  ENT:  No sore throat, pain, runny nose, dysphagia  CV:  No pain, palpitations, hypo/hypertension  Resp:  No dyspnea, cough, tachypnea, wheezing  GI:  No pain, No nausea, No vomiting, No diarrhea, No constipation, No weight loss, No fever, No pruritis, No rectal bleeding, No tarry stools, No dysphagia,  :  No pain, bleeding, incontinence, nocturia  Muscle:  No pain, weakness  Neuro:  No weakness, tingling, memory problems  Psych:  No fatigue, insomnia, mood problems, depression  Endocrine:  No polyuria, polydipsia, cold/heat intolerance  Heme:  No petechiae, ecchymosis, easy bruisability  Skin:  No rash, tattoos, scars, edema      PHYSICAL EXAM:   Vital Signs:  Vital Signs Last 24 Hrs  T(C): 37.2 (06 Feb 2018 10:41), Max: 37.2 (06 Feb 2018 10:41)  T(F): 99 (06 Feb 2018 10:41), Max: 99 (06 Feb 2018 10:41)  HR: 92 (06 Feb 2018 10:41) (83 - 96)  BP: 92/63 (06 Feb 2018 10:41) (90/67 - 98/66)  BP(mean): --  RR: 18 (06 Feb 2018 10:41) (18 - 18)  SpO2: 99% (06 Feb 2018 10:41) (98% - 100%)  Daily     Daily     GENERAL:  Appears stated age, well-groomed, well-nourished, no distress  HEENT:  NC/AT,  conjunctivae clear and pink, no thyromegaly, nodules, adenopathy, no JVD, sclera -anicteric  CHEST:  Full & symmetric excursion, no increased effort, breath sounds clear  HEART:  Regular rhythm, S1, S2, no murmur/rub/S3/S4, no abdominal bruit, no edema  ABDOMEN:  Soft, non-tender, non-distended, normoactive bowel sounds,  no masses ,no hepato-splenomegaly, no signs of chronic liver disease  EXTEREMITIES:  no cyanosis,clubbing or edema  SKIN:  No rash/erythema/ecchymoses/petechiae/wounds/abscess/warm/dry  NEURO:  Alert, oriented, no asterixis, no tremor, no encephalopathy      LABS:                        7.1    6.28  )-----------( 212      ( 05 Feb 2018 09:05 )             22.9     02-06    143  |  102  |  8   ----------------------------<  72  4.3   |  31  |  0.23<L>    Ca    7.9<L>      06 Feb 2018 08:22            RADIOLOGY & ADDITIONAL TESTS:

## 2018-02-06 NOTE — PROGRESS NOTE ADULT - SUBJECTIVE AND OBJECTIVE BOX
Patient is a 47y old  Female who presents with a chief complaint of Abdominal Pain (24 Jan 2018 06:17)        SUBJECTIVE / OVERNIGHT EVENTS: pt denies chest pain, shortness of breath, nausea, vomiting    MEDICATIONS  (STANDING):  dextrose 10% + sodium chloride 0.45% 1000 milliLiter(s) (50 mL/Hr) IV Continuous <Continuous>  docusate sodium 100 milliGRAM(s) Oral three times a day  enoxaparin Injectable 30 milliGRAM(s) SubCutaneous daily  ertapenem  IVPB 1000 milliGRAM(s) IV Intermittent every 24 hours  fluconAZOLE IVPB      fluconAZOLE IVPB 200 milliGRAM(s) IV Intermittent every 24 hours  HYDROmorphone PCA (1 mG/mL) 30 milliLiter(s) PCA Continuous PCA Continuous  potassium acid phosphate/sodium acid phosphate tablet (K-PHOS No. 2) 1 Tablet(s) Oral four times a day with meals  senna 2 Tablet(s) Oral at bedtime    MEDICATIONS  (PRN):  HYDROmorphone PCA (1 mG/mL) Rescue Clinician Bolus 2 milliGRAM(s) IV Push every 1 hour PRN for Pain Scale GREATER THAN 6  naloxone Injectable 0.1 milliGRAM(s) IV Push every 3 minutes PRN For ANY of the following changes in patient status:  A. RR LESS THAN 10 breaths per minute, B. Oxygen saturation LESS THAN 90%, C. Sedation score of 6  ondansetron Injectable 4 milliGRAM(s) IV Push every 8 hours PRN Nausea and/or Vomiting    Vital Signs Last 24 Hrs  T(C): 37.2 (06 Feb 2018 19:50), Max: 37.7 (06 Feb 2018 16:45)  T(F): 99 (06 Feb 2018 19:50), Max: 99.8 (06 Feb 2018 16:45)  HR: 87 (06 Feb 2018 19:50) (83 - 101)  BP: 103/70 (06 Feb 2018 19:50) (90/67 - 106/74)  BP(mean): --  RR: 18 (06 Feb 2018 19:50) (16 - 18)  SpO2: 99% (06 Feb 2018 19:50) (97% - 100%)    Constitutional: No fever, fatigue  Skin: No rash.  Eyes: No recent vision problems or eye pain.  ENT: No congestion, ear pain, or sore throat.  Cardiovascular: No chest pain or palpation.  Respiratory: No cough, shortness of breath, congestion, or wheezing.  Gastrointestinal: No  nausea, vomiting, or diarrhea. bad discomfort +  Genitourinary: No dysuria.  Musculoskeletal: No joint swelling.  Neurologic: No headache.    PHYSICAL EXAM:  GENERAL: NAD  EYES: EOMI, PERRLA  NECK: Supple, No JVD  CHEST/LUNG: dec breath sounds at bases   HEART:  S1 , S2 +  ABDOMEN: mild distension+, ruq drain +  NEUROLOGY: alert awake oriented     LABS:  02-06    143  |  102  |  8   ----------------------------<  72  4.3   |  31  |  0.23<L>    Ca    7.9<L>      06 Feb 2018 08:22      Creatinine Trend: 0.23 <--, 0.22 <--, 0.28 <--, 0.23 <--                        9.7    6.9   )-----------( 226      ( 06 Feb 2018 20:50 )             27.7     Urine Studies:

## 2018-02-06 NOTE — PROGRESS NOTE ADULT - PROBLEM SELECTOR PLAN 2
Tolerating po, denies any nausea/vomiting. Bowel regimen per Sx and GI. Tolerating po, denies any nausea/vomiting. New CT results noted.  Bowel regimen per Sx and GI.

## 2018-02-06 NOTE — PROVIDER CONTACT NOTE (CRITICAL VALUE NOTIFICATION) - ACTION/TREATMENT ORDERED:
will have supplements ordered
NP aware. pt reordered for type and screen and repeat CBC will continue to monitor.
will continue to monitor patient.

## 2018-02-06 NOTE — PROGRESS NOTE ADULT - SUBJECTIVE AND OBJECTIVE BOX
Patient is a 47y old  Female who presents with a chief complaint of Abdominal Pain (24 Jan 2018 06:17)    Being followed by ID for Gb perforation    Interval history:For Ct today   No acute events      ROS:  No cough,SOB,CP  No N/V/D. still with unchanged abd pain  No other complaints      Antimicrobials:    ertapenem  IVPB 1000 milliGRAM(s) IV Intermittent every 24 hours  fluconAZOLE IVPB      fluconAZOLE IVPB 200 milliGRAM(s) IV Intermittent every 24 hours    Other medications reviewed    Vital Signs Last 24 Hrs  T(C): 37.2 (02-06-18 @ 10:41), Max: 37.2 (02-05-18 @ 12:41)  T(F): 99 (02-06-18 @ 10:41), Max: 99 (02-05-18 @ 12:41)  HR: 92 (02-06-18 @ 10:41) (83 - 99)  BP: 92/63 (02-06-18 @ 10:41) (90/67 - 104/72)  BP(mean): --  RR: 18 (02-06-18 @ 10:41) (18 - 18)  SpO2: 99% (02-06-18 @ 10:41) (98% - 100%)    Physical Exam:    Cachectic    HEENT PERRLA EOMI    No oral exudate or erythema    Chest Good AE,CTA    CVS RRR S1 S2 WNl No murmur or rub or gallop    Abd soft BS normal diffuse mild  tenderness no masses RUQ biliary drain     LUE PICC  site no erythema tenderness or discharge  Mediport site no erythema or tenderness    CNS AAO X 3 no focal    Lab Data:                          7.1    6.28  )-----------( 212      ( 05 Feb 2018 09:05 )             22.9       02-06    143  |  102  |  8   ----------------------------<  72  4.3   |  31  |  0.23<L>    Ca    7.9<L>      06 Feb 2018 08:22

## 2018-02-06 NOTE — PROGRESS NOTE ADULT - SUBJECTIVE AND OBJECTIVE BOX
INTERVAL HPI/OVERNIGHT EVENTS: Indicates her pain is better controlled with the change in settings of PCA. Able to sleep well last night. Requiring only 7 rescues --> 5.6mg extra of dilaudid in the past 24 hrs.    PERTINENT PM/SXH:   Ovarian cancer    Colostomy in place  H/O abdominal hysterectomy    SOCIAL HISTORY:   Significant other/partner:  [ x] YES  [ ] NO               Children:  [ ] YES  [x ] NO                   Sikh/Spirituality:  Substance hx:  [ ] YES   [x ] NO                   Tobacco hx:  [ ] YES  [x ] NO                       Alcohol hx: [ ] YES  [x ] NO         Home Opioid hx:  [x ] YES  [ ] NO   Living Situation: [x ] Home  [ ] Long term care  [ ] Rehab [ ] Other    FAMILY HISTORY:  No pertinent family history in first degree relatives    [ x] Family history non-contributory     BASELINE (I)ADLs (prior to admission):  [place]: [ ] total  [ x] moderate [ ] dependent    ADVANCE DIRECTIVES:    Full code  MOLST  [ ] YES [x ] NO                      [ ] Completed  Health Care Proxy [ x] YES  [ ] NO   [ ] Completed  Living Will  [ ] YES [x ] NO             [ ] Surrogate  [x ] HCP  [ ] Guardian:  Copy in EMR alpha tab in 12/20/17 admission  Trung BONDSArchei Porter  Phone#: [phone]    Allergies    penicillin (Rash)    Intolerances    MEDICATIONS  (STANDING):  dextrose 10% + sodium chloride 0.45% 1000 milliLiter(s) (50 mL/Hr) IV Continuous <Continuous>  docusate sodium 100 milliGRAM(s) Oral three times a day  enoxaparin Injectable 30 milliGRAM(s) SubCutaneous daily  ertapenem  IVPB 1000 milliGRAM(s) IV Intermittent every 24 hours  fluconAZOLE IVPB      fluconAZOLE IVPB 200 milliGRAM(s) IV Intermittent every 24 hours  HYDROmorphone PCA (1 mG/mL) 30 milliLiter(s) PCA Continuous PCA Continuous  potassium acid phosphate/sodium acid phosphate tablet (K-PHOS No. 2) 1 Tablet(s) Oral four times a day with meals  senna 2 Tablet(s) Oral at bedtime    MEDICATIONS  (PRN):  HYDROmorphone PCA (1 mG/mL) Rescue Clinician Bolus 1.6 milliGRAM(s) IV Push every 1 hour PRN for Pain Scale GREATER THAN 6  naloxone Injectable 0.1 milliGRAM(s) IV Push every 3 minutes PRN For ANY of the following changes in patient status:  A. RR LESS THAN 10 breaths per minute, B. Oxygen saturation LESS THAN 90%, C. Sedation score of 6  ondansetron Injectable 4 milliGRAM(s) IV Push every 8 hours PRN Nausea and/or Vomiting    PRESENT SYMPTOMS:  Source: [x ] Patient   [ ] Family   [ ] Team     Pain:                        [ ] No [ x] Yes             [x ] Mild [ ] Moderate [ ] Severe    Onset - Days  Location - Lower back and buttock  Duration - constant  Character - Aching - 5-6/10; mainly when she moves   Alleviating/Aggravating - Movement, not comfortable lying on her back.  Radiation - Non- radiating.  Timing - Constant    Dyspnea:                [x ] No [ ] Yes             [ ] Mild [ ] Moderate [ ] Severe    Anxiety:                  [ x] No [ ] Yes             [ ] Mild [ ] Moderate [ ] Severe    Fatigue:                  [ ] No [x ] Yes             [x ] Mild [ ] Moderate [ ] Severe    Nausea:                  [ x] No [ ] Yes             [ ] Mild [ ] Moderate [ ] Severe    Loss of appetite:   [ ] No [x ] Yes             [ ] Mild [ ] Moderate [ ] Severe    Constipation:        [x ] No [ ] Yes             [ ] Mild [ ] Moderate [ ] Severe    Other Symptoms:  [x ] All other review of systems negative   [ ] Unable to obtain due to poor mentation     Karnofsky Performance Score/Palliative Performance Status Version 2:  40 %    PHYSICAL EXAM:  Vital Signs Last 24 Hrs  T(C): 37 (05 Feb 2018 08:51), Max: 37.2 (04 Feb 2018 17:36)  T(F): 98.6 (05 Feb 2018 08:51), Max: 98.9 (04 Feb 2018 17:36)  HR: 96 (05 Feb 2018 08:51) (88 - 96)  BP: 106/73 (05 Feb 2018 08:51) (95/65 - 111/74)  BP(mean): --  RR: 18 (05 Feb 2018 08:51) (18 - 18)  SpO2: 98% (05 Feb 2018 08:51) (95% - 98%) I&O's Summary    04 Feb 2018 07:01  -  05 Feb 2018 07:00  --------------------------------------------------------  IN: 2220 mL / OUT: 965 mL / NET: 1255 mL    05 Feb 2018 07:01  -  05 Feb 2018 12:00  --------------------------------------------------------  IN: 140 mL / OUT: 5 mL / NET: 135 mL    General:  [x ] Alert  [x ] Oriented x      [ ] Lethargic  [ ] Agitated   [ x] Cachexia   [ ] Unarousable  [ x] Verbal  [ ] Non-Verbal    HEENT:  [ ] Normal   [x ] Dry mouth   [ ] ET Tube    [ ] Trach  [ ] Oral lesions    Lungs:   [x ] Clear [ ] Tachypnea  [ ] Audible excessive secretions   [ ] Rhonchi        [ ] Right [ ] Left [ ] Bilateral  [ ] Crackles        [ ] Right [ ] Left [ ] Bilateral  [ ] Wheezing     [ ] Right [ ] Left [ ] Bilateral    Cardiovascular:  [ x] Regular [ ] Irregular [ ] Tachycardia   [ ] Bradycardia  [ ] Murmur [ ] Other    Abdomen: [ ] Soft  [x ] Distended   [ ] +BS  [ ] Non tender [x ] Tender  [ ]PEG   [ ]OGT/ NGT   Last BM:  No ostomy output seen at the time.    Ostomy  Biliary drain    Genitourinary: [x ] Normal [ ] Incontinent   [ ] Oliguria/Anuria   [ ] Lynn    Musculoskeletal:  [ ] Normal   [ x] Weakness  [ ] Bedbound/Wheelchair bound [ ] Edema    Neurological: [ x] No focal deficits  [ ] Cognitive impairment  [ ] Dysphagia [ ] Dysarthria [ ] Paresis [ ] Other     Skin: [ ] Normal   [ ] Pressure ulcer(s)                  [ ] Rash    LABS:                        6.7    4.63  )-----------( 207      ( 06 Feb 2018 07:49 )             21.2     02-06    143  |  102  |  8   ----------------------------<  72  4.3   |  31  |  0.23<L>    Ca    7.9<L>      06 Feb 2018 08:22    Shock: no [ ] Septic [ ] Cardiogenic [ ] Neurologic [ ] Hypovolemic  Vasopressors x none   Inotrophs x none    Oral Intake: [ ] Unable/mouth care only [ x] Minimal [ ] Moderate [ ] Full Capability    Diet: [ ] NPO [ ] Tube feeds [ ] TPN [ x] Other     RADIOLOGY & ADDITIONAL STUDIES: no new imaging    REFERRALS:   [ ] Chaplaincy  [ ] Hospice  [ ] Child Life  [ ] Social Work  [ ] Case management [ ] Holistic Therapy INTERVAL HPI/OVERNIGHT EVENTS: Indicates her pain is better controlled with the change in settings of PCA. Able to sleep well last night. Requiring only 7 rescues --> 5.6mg extra of dilaudid in the past 24 hrs.    PERTINENT PM/SXH:   Ovarian cancer    Colostomy in place  H/O abdominal hysterectomy    SOCIAL HISTORY:   Significant other/partner:  [ x] YES  [ ] NO               Children:  [ ] YES  [x ] NO                   Episcopal/Spirituality:  Substance hx:  [ ] YES   [x ] NO                   Tobacco hx:  [ ] YES  [x ] NO                       Alcohol hx: [ ] YES  [x ] NO         Home Opioid hx:  [x ] YES  [ ] NO   Living Situation: [x ] Home  [ ] Long term care  [ ] Rehab [ ] Other    FAMILY HISTORY:  No pertinent family history in first degree relatives    [ x] Family history non-contributory     BASELINE (I)ADLs (prior to admission):  [place]: [ ] total  [ x] moderate [ ] dependent    ADVANCE DIRECTIVES:    Full code  MOLST  [ ] YES [x ] NO                      [ ] Completed  Health Care Proxy [ x] YES  [ ] NO   [ ] Completed  Living Will  [ ] YES [x ] NO             [ ] Surrogate  [x ] HCP  [ ] Guardian:  Copy in EMR alpha tab in 12/20/17 admission  Trung BONDSArchie Porter  Phone#: [phone]    Allergies    penicillin (Rash)    Intolerances    MEDICATIONS  (STANDING):  dextrose 10% + sodium chloride 0.45% 1000 milliLiter(s) (50 mL/Hr) IV Continuous <Continuous>  docusate sodium 100 milliGRAM(s) Oral three times a day  enoxaparin Injectable 30 milliGRAM(s) SubCutaneous daily  ertapenem  IVPB 1000 milliGRAM(s) IV Intermittent every 24 hours  fluconAZOLE IVPB      fluconAZOLE IVPB 200 milliGRAM(s) IV Intermittent every 24 hours  HYDROmorphone PCA (1 mG/mL) 30 milliLiter(s) PCA Continuous PCA Continuous  potassium acid phosphate/sodium acid phosphate tablet (K-PHOS No. 2) 1 Tablet(s) Oral four times a day with meals  senna 2 Tablet(s) Oral at bedtime    MEDICATIONS  (PRN):  HYDROmorphone PCA (1 mG/mL) Rescue Clinician Bolus 1.6 milliGRAM(s) IV Push every 1 hour PRN for Pain Scale GREATER THAN 6  naloxone Injectable 0.1 milliGRAM(s) IV Push every 3 minutes PRN For ANY of the following changes in patient status:  A. RR LESS THAN 10 breaths per minute, B. Oxygen saturation LESS THAN 90%, C. Sedation score of 6  ondansetron Injectable 4 milliGRAM(s) IV Push every 8 hours PRN Nausea and/or Vomiting    PRESENT SYMPTOMS:  Source: [x ] Patient   [ ] Family   [ ] Team     Pain:                        [ ] No [ x] Yes             [x ] Mild [ ] Moderate [ ] Severe    Onset - Days  Location - Lower back and buttock  Duration - constant  Character - Aching - 5-6/10; mainly when she moves   Alleviating/Aggravating - Movement, not comfortable lying on her back.  Radiation - Non- radiating.  Timing - Constant    Dyspnea:                [x ] No [ ] Yes             [ ] Mild [ ] Moderate [ ] Severe    Anxiety:                  [ x] No [ ] Yes             [ ] Mild [ ] Moderate [ ] Severe    Fatigue:                  [ ] No [x ] Yes             [x ] Mild [ ] Moderate [ ] Severe    Nausea:                  [ x] No [ ] Yes             [ ] Mild [ ] Moderate [ ] Severe    Loss of appetite:   [ ] No [x ] Yes             [ ] Mild [ ] Moderate [ ] Severe    Constipation:        [x ] No [ ] Yes             [ ] Mild [ ] Moderate [ ] Severe    Other Symptoms:  [x ] All other review of systems negative   [ ] Unable to obtain due to poor mentation     Karnofsky Performance Score/Palliative Performance Status Version 2:  40 %    PHYSICAL EXAM:  Vital Signs Last 24 Hrs  T(C): 37.2 (06 Feb 2018 10:41), Max: 37.2 (06 Feb 2018 10:41)  T(F): 99 (06 Feb 2018 10:41), Max: 99 (06 Feb 2018 10:41)  HR: 92 (06 Feb 2018 10:41) (83 - 96)  BP: 92/63 (06 Feb 2018 10:41) (90/67 - 98/66)  BP(mean): --  RR: 18 (06 Feb 2018 10:41) (18 - 18)  SpO2: 99% (06 Feb 2018 10:41) (98% - 100%) I&O's Summary    05 Feb 2018 07:01  -  06 Feb 2018 07:00  --------------------------------------------------------  IN: 1250 mL / OUT: 430 mL / NET: 820 mL    06 Feb 2018 07:01  -  06 Feb 2018 13:59  --------------------------------------------------------  IN: 0 mL / OUT: 303 mL / NET: -303 mL    General:  [x ] Alert  [x ] Oriented x      [ ] Lethargic  [ ] Agitated   [ x] Cachexia   [ ] Unarousable  [ x] Verbal  [ ] Non-Verbal    HEENT:  [ ] Normal   [x ] Dry mouth   [ ] ET Tube    [ ] Trach  [ ] Oral lesions    Lungs:   [x ] Clear [ ] Tachypnea  [ ] Audible excessive secretions   [ ] Rhonchi        [ ] Right [ ] Left [ ] Bilateral  [ ] Crackles        [ ] Right [ ] Left [ ] Bilateral  [ ] Wheezing     [ ] Right [ ] Left [ ] Bilateral    Cardiovascular:  [ x] Regular [ ] Irregular [ ] Tachycardia   [ ] Bradycardia  [ ] Murmur [ ] Other    Abdomen: [ ] Soft  [x ] Distended   [ ] +BS  [ ] Non tender [x ] Tender  [ ]PEG   [ ]OGT/ NGT   Last BM:  No ostomy output seen at the time.    Ostomy  Biliary drain    Genitourinary: [x ] Normal [ ] Incontinent   [ ] Oliguria/Anuria   [ ] Lynn    Musculoskeletal:  [ ] Normal   [ x] Weakness  [ ] Bedbound/Wheelchair bound [ ] Edema    Neurological: [ x] No focal deficits  [ ] Cognitive impairment  [ ] Dysphagia [ ] Dysarthria [ ] Paresis [ ] Other     Skin: [ ] Normal   [ ] Pressure ulcer(s)                  [ ] Rash    LABS:                        6.7    4.63  )-----------( 207      ( 06 Feb 2018 07:49 )             21.2     02-06    143  |  102  |  8   ----------------------------<  72  4.3   |  31  |  0.23<L>    Ca    7.9<L>      06 Feb 2018 08:22    Shock: no [ ] Septic [ ] Cardiogenic [ ] Neurologic [ ] Hypovolemic  Vasopressors x none   Inotrophs x none    Oral Intake: [ ] Unable/mouth care only [ x] Minimal [ ] Moderate [ ] Full Capability    Diet: [ ] NPO [ ] Tube feeds [ ] TPN [ x] Other     RADIOLOGY & ADDITIONAL STUDIES: no new imaging    REFERRALS:   [ ] Chaplaincy  [ ] Hospice  [ ] Child Life  [ ] Social Work  [ ] Case management [ ] Holistic Therapy INTERVAL HPI/OVERNIGHT EVENTS: Indicates her pain is better controlled with the change in settings of PCA. Able to sleep well last night. Requiring only 7 rescues --> 5.6mg extra of dilaudid in the past 24 hrs.    PERTINENT PM/SXH:   Ovarian cancer    Colostomy in place  H/O abdominal hysterectomy    SOCIAL HISTORY:   Significant other/partner:  [ x] YES  [ ] NO               Children:  [ ] YES  [x ] NO                   Roman Catholic/Spirituality:  Substance hx:  [ ] YES   [x ] NO                   Tobacco hx:  [ ] YES  [x ] NO                       Alcohol hx: [ ] YES  [x ] NO         Home Opioid hx:  [x ] YES  [ ] NO   Living Situation: [x ] Home  [ ] Long term care  [ ] Rehab [ ] Other    FAMILY HISTORY:  No pertinent family history in first degree relatives    [ x] Family history non-contributory     BASELINE (I)ADLs (prior to admission):  [place]: [ ] total  [ x] moderate [ ] dependent    ADVANCE DIRECTIVES:    DNR/DNI  MOLST  [ ] YES [x ] NO                      [ ] Completed  Health Care Proxy [ x] YES  [ ] NO   [ ] Completed  Living Will  [ ] YES [x ] NO             [ ] Surrogate  [x ] HCP  [ ] Guardian:  Copy in EMR alpha tab in 12/20/17 admission  Trung Buenrostro  Phone#: [phone]    Allergies    penicillin (Rash)    Intolerances    MEDICATIONS  (STANDING):  dextrose 10% + sodium chloride 0.45% 1000 milliLiter(s) (50 mL/Hr) IV Continuous <Continuous>  docusate sodium 100 milliGRAM(s) Oral three times a day  enoxaparin Injectable 30 milliGRAM(s) SubCutaneous daily  ertapenem  IVPB 1000 milliGRAM(s) IV Intermittent every 24 hours  fluconAZOLE IVPB      fluconAZOLE IVPB 200 milliGRAM(s) IV Intermittent every 24 hours  HYDROmorphone PCA (1 mG/mL) 30 milliLiter(s) PCA Continuous PCA Continuous  potassium acid phosphate/sodium acid phosphate tablet (K-PHOS No. 2) 1 Tablet(s) Oral four times a day with meals  senna 2 Tablet(s) Oral at bedtime    MEDICATIONS  (PRN):  HYDROmorphone PCA (1 mG/mL) Rescue Clinician Bolus 1.6 milliGRAM(s) IV Push every 1 hour PRN for Pain Scale GREATER THAN 6  naloxone Injectable 0.1 milliGRAM(s) IV Push every 3 minutes PRN For ANY of the following changes in patient status:  A. RR LESS THAN 10 breaths per minute, B. Oxygen saturation LESS THAN 90%, C. Sedation score of 6  ondansetron Injectable 4 milliGRAM(s) IV Push every 8 hours PRN Nausea and/or Vomiting    PRESENT SYMPTOMS:  Source: [x ] Patient   [ ] Family   [ ] Team     Pain:                        [ ] No [ x] Yes             [x ] Mild [ ] Moderate [ ] Severe    Onset - Days  Location - Lower back and buttock  Duration - constant  Character - Aching - 5-6/10; 10/10 mainly when she moves or she sits on the chair for a long period of time.  Alleviating/Aggravating - Movement, not comfortable lying on her back.  Radiation - Non- radiating.  Timing - Constant    Dyspnea:                [x ] No [ ] Yes             [ ] Mild [ ] Moderate [ ] Severe    Anxiety:                  [ x] No [ ] Yes             [ ] Mild [ ] Moderate [ ] Severe    Fatigue:                  [ ] No [x ] Yes             [x ] Mild [ ] Moderate [ ] Severe    Nausea:                  [ x] No [ ] Yes             [ ] Mild [ ] Moderate [ ] Severe    Loss of appetite:   [ ] No [x ] Yes             [ ] Mild [ ] Moderate [ ] Severe    Constipation:        [x ] No [ ] Yes             [ ] Mild [ ] Moderate [ ] Severe    Other Symptoms:  [x ] All other review of systems negative   [ ] Unable to obtain due to poor mentation     Karnofsky Performance Score/Palliative Performance Status Version 2:  40 %    PHYSICAL EXAM:  Vital Signs Last 24 Hrs  T(C): 37.2 (06 Feb 2018 10:41), Max: 37.2 (06 Feb 2018 10:41)  T(F): 99 (06 Feb 2018 10:41), Max: 99 (06 Feb 2018 10:41)  HR: 92 (06 Feb 2018 10:41) (83 - 96)  BP: 92/63 (06 Feb 2018 10:41) (90/67 - 98/66)  BP(mean): --  RR: 18 (06 Feb 2018 10:41) (18 - 18)  SpO2: 99% (06 Feb 2018 10:41) (98% - 100%) I&O's Summary    05 Feb 2018 07:01  -  06 Feb 2018 07:00  --------------------------------------------------------  IN: 1250 mL / OUT: 430 mL / NET: 820 mL    06 Feb 2018 07:01  -  06 Feb 2018 13:59  --------------------------------------------------------  IN: 0 mL / OUT: 303 mL / NET: -303 mL    General:  [x ] Alert  [x ] Oriented x      [ ] Lethargic  [ ] Agitated   [ x] Cachexia   [ ] Unarousable  [ x] Verbal  [ ] Non-Verbal    HEENT:  [ ] Normal   [x ] Dry mouth   [ ] ET Tube    [ ] Trach  [ ] Oral lesions    Lungs:   [x ] Clear [ ] Tachypnea  [ ] Audible excessive secretions   [ ] Rhonchi        [ ] Right [ ] Left [ ] Bilateral  [ ] Crackles        [ ] Right [ ] Left [ ] Bilateral  [ ] Wheezing     [ ] Right [ ] Left [ ] Bilateral    Cardiovascular:  [ x] Regular [ ] Irregular [ ] Tachycardia   [ ] Bradycardia  [ ] Murmur [ ] Other    Abdomen: [ ] Soft  [x ] Distended   [ ] +BS  [ ] Non tender [x ] Tender  [ ]PEG   [ ]OGT/ NGT   Last BM:  No ostomy output seen at the time.    Ostomy  Biliary drain    Genitourinary: [x ] Normal [ ] Incontinent   [ ] Oliguria/Anuria   [ ] Lynn    Musculoskeletal:  [ ] Normal   [ x] Weakness  [ ] Bedbound/Wheelchair bound [ ] Edema    Neurological: [ x] No focal deficits  [ ] Cognitive impairment  [ ] Dysphagia [ ] Dysarthria [ ] Paresis [ ] Other     Skin: [ ] Normal   [ ] Pressure ulcer(s)                  [ ] Rash    LABS:                        6.7    4.63  )-----------( 207      ( 06 Feb 2018 07:49 )             21.2     02-06    143  |  102  |  8   ----------------------------<  72  4.3   |  31  |  0.23<L>    Ca    7.9<L>      06 Feb 2018 08:22    Shock: no [ ] Septic [ ] Cardiogenic [ ] Neurologic [ ] Hypovolemic  Vasopressors x none   Inotrophs x none    Oral Intake: [ ] Unable/mouth care only [ x] Minimal [ ] Moderate [ ] Full Capability    Diet: [ ] NPO [ ] Tube feeds [ ] TPN [ x] Other     RADIOLOGY & ADDITIONAL STUDIES: no new imaging    REFERRALS:   [ ] Chaplaincy  [ ] Hospice  [ ] Child Life  [ ] Social Work  [ ] Case management [ ] Holistic Therapy INTERVAL HPI/OVERNIGHT EVENTS: Indicates her pain is better controlled with the change in settings of PCA. Able to sleep well last night. Requiring only 7 rescues --> 5.6mg extra of dilaudid in the past 24 hrs. However, when she was seen in the afternoon, she indicated she had an episode of pain crisis while she was seating on the recliner. She indicated that pain was over her "bony prominences" and that it was not alleviated by using the PCA. She did not give any further details about her pain.     PERTINENT PM/SXH:   Ovarian cancer    Colostomy in place  H/O abdominal hysterectomy    SOCIAL HISTORY:   Significant other/partner:  [ x] YES  [ ] NO               Children:  [ ] YES  [x ] NO                   Orthodox/Spirituality:  Substance hx:  [ ] YES   [x ] NO                   Tobacco hx:  [ ] YES  [x ] NO                       Alcohol hx: [ ] YES  [x ] NO         Home Opioid hx:  [x ] YES  [ ] NO   Living Situation: [x ] Home  [ ] Long term care  [ ] Rehab [ ] Other    FAMILY HISTORY:  No pertinent family history in first degree relatives    [ x] Family history non-contributory     BASELINE (I)ADLs (prior to admission):  [place]: [ ] total  [ x] moderate [ ] dependent    ADVANCE DIRECTIVES:    DNR/DNI  MOLST  [ ] YES [x ] NO                      [ ] Completed  Health Care Proxy [ x] YES  [ ] NO   [ ] Completed  Living Will  [ ] YES [x ] NO             [ ] Surrogate  [x ] HCP  [ ] Guardian:  Copy in EMR alpha tab in 12/20/17 admission  Trung BONDSArchie Porter  Phone#: [phone]    Allergies    penicillin (Rash)    Intolerances    MEDICATIONS  (STANDING):  dextrose 10% + sodium chloride 0.45% 1000 milliLiter(s) (50 mL/Hr) IV Continuous <Continuous>  docusate sodium 100 milliGRAM(s) Oral three times a day  enoxaparin Injectable 30 milliGRAM(s) SubCutaneous daily  ertapenem  IVPB 1000 milliGRAM(s) IV Intermittent every 24 hours  fluconAZOLE IVPB      fluconAZOLE IVPB 200 milliGRAM(s) IV Intermittent every 24 hours  HYDROmorphone PCA (1 mG/mL) 30 milliLiter(s) PCA Continuous PCA Continuous  potassium acid phosphate/sodium acid phosphate tablet (K-PHOS No. 2) 1 Tablet(s) Oral four times a day with meals  senna 2 Tablet(s) Oral at bedtime    MEDICATIONS  (PRN):  HYDROmorphone PCA (1 mG/mL) Rescue Clinician Bolus 1.6 milliGRAM(s) IV Push every 1 hour PRN for Pain Scale GREATER THAN 6  naloxone Injectable 0.1 milliGRAM(s) IV Push every 3 minutes PRN For ANY of the following changes in patient status:  A. RR LESS THAN 10 breaths per minute, B. Oxygen saturation LESS THAN 90%, C. Sedation score of 6  ondansetron Injectable 4 milliGRAM(s) IV Push every 8 hours PRN Nausea and/or Vomiting    PRESENT SYMPTOMS:  Source: [x ] Patient   [ ] Family   [ ] Team     Pain:                        [ ] No [ x] Yes             [x ] Moderate to severe.      Onset - Days  Location - Lower back and buttock/Abdomen   Duration - constant when seating; however, not present when in bed.   Character - Aching - 5-6/10; 10/10 mainly when she moves or she sits on the chair for a long period of time.  Alleviating/Aggravating - Movement/seating   Radiation - Non- radiating.  Timing - Constant    Dyspnea:                [x ] No [ ] Yes             [ ] Mild [ ] Moderate [ ] Severe    Anxiety:                  [ x] No [ ] Yes             [ ] Mild [ ] Moderate [ ] Severe    Fatigue:                  [ ] No [x ] Yes             [x ] Mild [ ] Moderate [ ] Severe    Nausea:                  [ x] No [ ] Yes             [ ] Mild [ ] Moderate [ ] Severe    Loss of appetite:   [ ] No [x ] Yes             [ ] Mild [ ] Moderate [ ] Severe    Constipation:        [x ] No [ ] Yes             [ ] Mild [ ] Moderate [ ] Severe    Other Symptoms:  [x ] All other review of systems negative   [ ] Unable to obtain due to poor mentation     Karnofsky Performance Score/Palliative Performance Status Version 2:  40 %    PHYSICAL EXAM:  Vital Signs Last 24 Hrs  T(C): 37.2 (06 Feb 2018 10:41), Max: 37.2 (06 Feb 2018 10:41)  T(F): 99 (06 Feb 2018 10:41), Max: 99 (06 Feb 2018 10:41)  HR: 92 (06 Feb 2018 10:41) (83 - 96)  BP: 92/63 (06 Feb 2018 10:41) (90/67 - 98/66)  BP(mean): --  RR: 18 (06 Feb 2018 10:41) (18 - 18)  SpO2: 99% (06 Feb 2018 10:41) (98% - 100%) I&O's Summary    05 Feb 2018 07:01  -  06 Feb 2018 07:00  --------------------------------------------------------  IN: 1250 mL / OUT: 430 mL / NET: 820 mL    06 Feb 2018 07:01  -  06 Feb 2018 13:59  --------------------------------------------------------  IN: 0 mL / OUT: 303 mL / NET: -303 mL    General:  [x ] Alert  [x ] Oriented x      [ ] Lethargic  [ ] Agitated   [ x] Cachexia   [ ] Unarousable  [ x] Verbal  [ ] Non-Verbal    HEENT:  [ ] Normal   [x ] Dry mouth   [ ] ET Tube    [ ] Trach  [ ] Oral lesions    Lungs:   [x ] Clear [ ] Tachypnea  [ ] Audible excessive secretions   [ ] Rhonchi        [ ] Right [ ] Left [ ] Bilateral  [ ] Crackles        [ ] Right [ ] Left [ ] Bilateral  [ ] Wheezing     [ ] Right [ ] Left [ ] Bilateral    Cardiovascular:  [ x] Regular [ ] Irregular [ ] Tachycardia   [ ] Bradycardia  [ ] Murmur [ ] Other    Abdomen: [ ] Soft  [x ] Distended   [ ] +BS  [ ] Non tender [x ] Tender  [ ]PEG   [ ]OGT/ NGT   Last BM:  No ostomy output seen at this time.    Ostomy  Biliary drain    Genitourinary: [x ] Normal [ ] Incontinent   [ ] Oliguria/Anuria   [ ] Lynn    Musculoskeletal:  [ ] Normal   [ x] Weakness  [ ] Bedbound/Wheelchair bound [ ] Edema    Neurological: [ x] No focal deficits  [ ] Cognitive impairment  [ ] Dysphagia [ ] Dysarthria [ ] Paresis [ ] Other     Skin: [x] DTI on sacrum   [ ] Pressure ulcer(s)                  [ ] Rash    LABS:                        6.7    4.63  )-----------( 207      ( 06 Feb 2018 07:49 )             21.2     02-06    143  |  102  |  8   ----------------------------<  72  4.3   |  31  |  0.23<L>    Ca    7.9<L>      06 Feb 2018 08:22    Shock: no [ ] Septic [ ] Cardiogenic [ ] Neurologic [ ] Hypovolemic  Vasopressors x none   Inotrophs x none    Oral Intake: [ ] Unable/mouth care only [ x] Minimal [ ] Moderate [ ] Full Capability    Diet: [ ] NPO [ ] Tube feeds [ ] TPN [ x] Other     RADIOLOGY & ADDITIONAL STUDIES:   < from: CT Abdomen and Pelvis w/ Oral Cont and w/ IV Cont (02.06.18 @ 10:04) >  EXAM:  CT ABDOMEN AND PELVIS OC IC                            PROCEDURE DATE:  02/06/2018            INTERPRETATION:  CLINICAL INFORMATION: Biliary drain. Gallbladder   perforation.    COMPARISON: 1/24/2018IMPRESSION:   Significantly improved gallbladder fossa fluid collection.    Improved small bowel obstruction.    Metastatic disease is unchanged since recent prior CT.    < end of copied text >      REFERRALS:   [ ] Chaplaincy  [ ] Hospice  [ ] Child Life  [ ] Social Work  [ ] Case management [ ] Holistic Therapy

## 2018-02-06 NOTE — PROGRESS NOTE ADULT - ASSESSMENT
Patient is a 48 yo F with metastatic ovarian CA (s/p TAHBSO, s/p colostomy, with holistic care approach, started chemo on 12/2017 carboplatin/taxol every Wednesday, due for tx today). Admitted to MICU under the impression of malignant SBO, sepsis, GB perforation, hypoglycemia, and worsening metastatic disease. Palliative Care consult placed for pain management and GOC.

## 2018-02-06 NOTE — CHART NOTE - NSCHARTNOTEFT_GEN_A_CORE
6.7    4.63  )-----------( 207      ( 06 Feb 2018 07:49 )             21.2     Notified by the RN about H/H being 6.7/21.2. Pt was seen at the bedside and is asymptomatic. Will repeat STAT CBC and type and screen. Will discuss with Dr. Ray.     Yumiko COBIAN  # [id number]

## 2018-02-06 NOTE — PROGRESS NOTE ADULT - PROBLEM SELECTOR PLAN 7
Pt is full code, ongoing GOC discussions. Has appointed HCP - partner Trung Buenrostro. Patient's hope is for symptom control so pt can continue with further chemotherapy. Pt is DNR/DNI after conversation with patient today, 2/6. Has already appointed HCP - partner Trung Buenrostro, expressed wish to add a friend as alternate HCP, she will inform us later on. Patient's hope is for symptom control so pt can continue with further chemotherapy, although understands the extent of her illness. Pt is DNR/DNI after conversation with patient today, 2/6. Has already appointed HCP - partner Trung Buenrostro, expressed wish to add a friend as alternate HCP, she will inform us later on. Patient's hope is for symptom control so pt can continue with further chemotherapy, although understands the extent of her illness.  16' were spent by Dr Jones in ACP discussing code status (risks and benefits of CPR in the setting of advanced metastatic disease, poor functionality, and cachexia)

## 2018-02-06 NOTE — PROGRESS NOTE ADULT - PROBLEM SELECTOR PLAN 4
Controlled. Dilaudid setting will be increased due to back pain as below. Controlled. On PCA as above.

## 2018-02-06 NOTE — PROGRESS NOTE ADULT - PROBLEM SELECTOR PLAN 3
Likely musculoskeletal pain or pain 2/2 to skin issues.   Aching non-radiating. Uncomfortable while sleeping/lying in bed. Has used 18.5mg extra of Dilaudid in the past 24hrs. Will increase Dilaudid PCA to 0.8mg/hr and 0.8mg lockout of 15min, CB of 1.6mg.  Holistic Nurse therapies. Likely musculoskeletal pain or pain 2/2 to skin issues. Pain has improved since PCA symptoms have been adjusted. Has used 5.6mg extra of Dilaudid in the past 24hrs. Dilaudid PCA at 0.8mg/hr and 0.8mg lockout of 15min, CB of 1.6mg.  Holistic Nurse therapies. Likely musculoskeletal pain or pain 2/2 to skin issues. Has used 5.6mg extra of Dilaudid in the past 24hrs. Due to pain crisis, will maintain Dilaudid PCA continues rate at 0.8mg/hr and increased lockout to 1 mg q 15min, CB of 1.6mg.  Holistic Nurse therapies.

## 2018-02-07 LAB
ANION GAP SERPL CALC-SCNC: 9 MMOL/L — SIGNIFICANT CHANGE UP (ref 5–17)
BUN SERPL-MCNC: 13 MG/DL — SIGNIFICANT CHANGE UP (ref 7–23)
CALCIUM SERPL-MCNC: 8.2 MG/DL — LOW (ref 8.4–10.5)
CHLORIDE SERPL-SCNC: 99 MMOL/L — SIGNIFICANT CHANGE UP (ref 96–108)
CO2 SERPL-SCNC: 31 MMOL/L — SIGNIFICANT CHANGE UP (ref 22–31)
CREAT SERPL-MCNC: 0.31 MG/DL — LOW (ref 0.5–1.3)
GLUCOSE BLDC GLUCOMTR-MCNC: 107 MG/DL — HIGH (ref 70–99)
GLUCOSE BLDC GLUCOMTR-MCNC: 114 MG/DL — HIGH (ref 70–99)
GLUCOSE BLDC GLUCOMTR-MCNC: 94 MG/DL — SIGNIFICANT CHANGE UP (ref 70–99)
GLUCOSE BLDC GLUCOMTR-MCNC: 97 MG/DL — SIGNIFICANT CHANGE UP (ref 70–99)
GLUCOSE SERPL-MCNC: 73 MG/DL — SIGNIFICANT CHANGE UP (ref 70–99)
HCT VFR BLD CALC: 27.3 % — LOW (ref 34.5–45)
HGB BLD-MCNC: 8.4 G/DL — LOW (ref 11.5–15.5)
MCHC RBC-ENTMCNC: 27.7 PG — SIGNIFICANT CHANGE UP (ref 27–34)
MCHC RBC-ENTMCNC: 30.8 GM/DL — LOW (ref 32–36)
MCV RBC AUTO: 90.1 FL — SIGNIFICANT CHANGE UP (ref 80–100)
PLATELET # BLD AUTO: 223 K/UL — SIGNIFICANT CHANGE UP (ref 150–400)
POTASSIUM SERPL-MCNC: 4.2 MMOL/L — SIGNIFICANT CHANGE UP (ref 3.5–5.3)
POTASSIUM SERPL-SCNC: 4.2 MMOL/L — SIGNIFICANT CHANGE UP (ref 3.5–5.3)
RBC # BLD: 3.03 M/UL — LOW (ref 3.8–5.2)
RBC # FLD: 14.5 % — SIGNIFICANT CHANGE UP (ref 10.3–14.5)
SODIUM SERPL-SCNC: 139 MMOL/L — SIGNIFICANT CHANGE UP (ref 135–145)
WBC # BLD: 6.05 K/UL — SIGNIFICANT CHANGE UP (ref 3.8–10.5)
WBC # FLD AUTO: 6.05 K/UL — SIGNIFICANT CHANGE UP (ref 3.8–10.5)

## 2018-02-07 PROCEDURE — 99232 SBSQ HOSP IP/OBS MODERATE 35: CPT

## 2018-02-07 PROCEDURE — 99233 SBSQ HOSP IP/OBS HIGH 50: CPT

## 2018-02-07 PROCEDURE — 99497 ADVNCD CARE PLAN 30 MIN: CPT | Mod: 25

## 2018-02-07 RX ORDER — HYDROMORPHONE HYDROCHLORIDE 2 MG/ML
1 INJECTION INTRAMUSCULAR; INTRAVENOUS; SUBCUTANEOUS
Qty: 400 | Refills: 0 | OUTPATIENT
Start: 2018-02-07

## 2018-02-07 RX ADMIN — HYDROMORPHONE HYDROCHLORIDE 30 MILLILITER(S): 2 INJECTION INTRAMUSCULAR; INTRAVENOUS; SUBCUTANEOUS at 07:20

## 2018-02-07 RX ADMIN — Medication 1 TABLET(S): at 18:36

## 2018-02-07 RX ADMIN — Medication 1 TABLET(S): at 14:07

## 2018-02-07 RX ADMIN — ERTAPENEM SODIUM 120 MILLIGRAM(S): 1 INJECTION, POWDER, LYOPHILIZED, FOR SOLUTION INTRAMUSCULAR; INTRAVENOUS at 14:51

## 2018-02-07 RX ADMIN — Medication 1 TABLET(S): at 21:19

## 2018-02-07 RX ADMIN — Medication 100 MILLIGRAM(S): at 14:07

## 2018-02-07 RX ADMIN — HYDROMORPHONE HYDROCHLORIDE 30 MILLILITER(S): 2 INJECTION INTRAMUSCULAR; INTRAVENOUS; SUBCUTANEOUS at 19:17

## 2018-02-07 RX ADMIN — Medication 100 MILLIGRAM(S): at 21:19

## 2018-02-07 RX ADMIN — FLUCONAZOLE 50 MILLIGRAM(S): 150 TABLET ORAL at 16:57

## 2018-02-07 RX ADMIN — SENNA PLUS 2 TABLET(S): 8.6 TABLET ORAL at 21:19

## 2018-02-07 RX ADMIN — Medication 100 MILLIGRAM(S): at 05:12

## 2018-02-07 RX ADMIN — HYDROMORPHONE HYDROCHLORIDE 30 MILLILITER(S): 2 INJECTION INTRAMUSCULAR; INTRAVENOUS; SUBCUTANEOUS at 20:42

## 2018-02-07 RX ADMIN — Medication 1 TABLET(S): at 09:06

## 2018-02-07 RX ADMIN — ENOXAPARIN SODIUM 30 MILLIGRAM(S): 100 INJECTION SUBCUTANEOUS at 12:06

## 2018-02-07 NOTE — PROGRESS NOTE ADULT - SUBJECTIVE AND OBJECTIVE BOX
[facility]  Carter Genao PA-C  [address] [place] [address] [phone]      INTERVAL HPI/OVERNIGHT EVENTS: now dnr    MEDICATIONS  (STANDING):  dextrose 10% + sodium chloride 0.45% 1000 milliLiter(s) (50 mL/Hr) IV Continuous <Continuous>  docusate sodium 100 milliGRAM(s) Oral three times a day  enoxaparin Injectable 30 milliGRAM(s) SubCutaneous daily  ertapenem  IVPB 1000 milliGRAM(s) IV Intermittent every 24 hours  fluconAZOLE IVPB      fluconAZOLE IVPB 200 milliGRAM(s) IV Intermittent every 24 hours  HYDROmorphone PCA (1 mG/mL) 30 milliLiter(s) PCA Continuous PCA Continuous  potassium acid phosphate/sodium acid phosphate tablet (K-PHOS No. 2) 1 Tablet(s) Oral four times a day with meals  senna 2 Tablet(s) Oral at bedtime    MEDICATIONS  (PRN):  HYDROmorphone PCA (1 mG/mL) Rescue Clinician Bolus 1.6 milliGRAM(s) IV Push every 1 hour PRN for Pain Scale GREATER THAN 6  naloxone Injectable 0.1 milliGRAM(s) IV Push every 3 minutes PRN For ANY of the following changes in patient status:  A. RR LESS THAN 10 breaths per minute, B. Oxygen saturation LESS THAN 90%, C. Sedation score of 6  ondansetron Injectable 4 milliGRAM(s) IV Push every 8 hours PRN Nausea and/or Vomiting      Allergies    penicillin (Rash)    Intolerances        ROS:   General:  No wt loss, fevers, chills, night sweats, fatigue,   Eyes:  Good vision, no reported pain  ENT:  No sore throat, pain, runny nose, dysphagia  CV:  No pain, palpitations, hypo/hypertension  Resp:  No dyspnea, cough, tachypnea, wheezing  GI:  No pain, No nausea, No vomiting, No diarrhea, No constipation, No weight loss, No fever, No pruritis, No rectal bleeding, No tarry stools, No dysphagia,  :  No pain, bleeding, incontinence, nocturia  Muscle:  No pain, weakness  Neuro:  No weakness, tingling, memory problems  Psych:  No fatigue, insomnia, mood problems, depression  Endocrine:  No polyuria, polydipsia, cold/heat intolerance  Heme:  No petechiae, ecchymosis, easy bruisability  Skin:  No rash, tattoos, scars, edema      PHYSICAL EXAM:   Vital Signs:  Vital Signs Last 24 Hrs  T(C): 37.2 (06 Feb 2018 10:41), Max: 37.2 (06 Feb 2018 10:41)  T(F): 99 (06 Feb 2018 10:41), Max: 99 (06 Feb 2018 10:41)  HR: 92 (06 Feb 2018 10:41) (83 - 96)  BP: 92/63 (06 Feb 2018 10:41) (90/67 - 98/66)  BP(mean): --  RR: 18 (06 Feb 2018 10:41) (18 - 18)  SpO2: 99% (06 Feb 2018 10:41) (98% - 100%)  Daily     Daily     GENERAL:  Appears stated age, well-groomed, well-nourished, no distress  HEENT:  NC/AT,  conjunctivae clear and pink, no thyromegaly, nodules, adenopathy, no JVD, sclera -anicteric  CHEST:  Full & symmetric excursion, no increased effort, breath sounds clear  HEART:  Regular rhythm, S1, S2, no murmur/rub/S3/S4, no abdominal bruit, no edema  ABDOMEN:  Soft, non-tender, non-distended, normoactive bowel sounds,  no masses ,no hepato-splenomegaly, no signs of chronic liver disease  EXTEREMITIES:  no cyanosis,clubbing or edema  SKIN:  No rash/erythema/ecchymoses/petechiae/wounds/abscess/warm/dry  NEURO:  Alert, oriented, no asterixis, no tremor, no encephalopathy      LABS:                        7.1    6.28  )-----------( 212      ( 05 Feb 2018 09:05 )             22.9     02-06    143  |  102  |  8   ----------------------------<  72  4.3   |  31  |  0.23<L>    Ca    7.9<L>      06 Feb 2018 08:22            RADIOLOGY & ADDITIONAL TESTS:

## 2018-02-07 NOTE — PROGRESS NOTE ADULT - SUBJECTIVE AND OBJECTIVE BOX
INTERVAL HPI/OVERNIGHT EVENTS: Indicates her pain is better controlled with the change in settings of PCA. Able to sleep well last night. Requiring 14 mg  extra of Dilaudid after PCA setting were changed. She denied any other complaints. She also indicated positive ostomy output today.     PERTINENT PM/SXH:   Ovarian cancer    Colostomy in place  H/O abdominal hysterectomy    SOCIAL HISTORY:   Significant other/partner:  [ x] YES  [ ] NO               Children:  [ ] YES  [x ] NO                   Confucianist/Spirituality:  Substance hx:  [ ] YES   [x ] NO                   Tobacco hx:  [ ] YES  [x ] NO                       Alcohol hx: [ ] YES  [x ] NO         Home Opioid hx:  [x ] YES  [ ] NO   Living Situation: [x ] Home  [ ] Long term care  [ ] Rehab [ ] Other    FAMILY HISTORY:  No pertinent family history in first degree relatives    [ x] Family history non-contributory     BASELINE (I)ADLs (prior to admission):  [place]: [ ] total  [ x] moderate [ ] dependent    ADVANCE DIRECTIVES:    DNR/DNI  MOLST  [x ] YES [ ] NO                      [ ] Completed  Health Care Proxy [ x] YES  [ ] NO   [ ] Completed  Living Will  [ ] YES [x ] NO             [ ] Surrogate  [x ] HCP  [ ] Guardian:  Copy in EMR alpha tab in 12/20/17 admission  Trung Buenrostro  Phone#: [phone]    Allergies    penicillin (Rash)    Intolerances    MEDICATIONS  (STANDING):  dextrose 10% + sodium chloride 0.45% 1000 milliLiter(s) (50 mL/Hr) IV Continuous <Continuous>  docusate sodium 100 milliGRAM(s) Oral three times a day  enoxaparin Injectable 30 milliGRAM(s) SubCutaneous daily  ertapenem  IVPB 1000 milliGRAM(s) IV Intermittent every 24 hours  fluconAZOLE IVPB      fluconAZOLE IVPB 200 milliGRAM(s) IV Intermittent every 24 hours  HYDROmorphone PCA (1 mG/mL) 30 milliLiter(s) PCA Continuous PCA Continuous  potassium acid phosphate/sodium acid phosphate tablet (K-PHOS No. 2) 1 Tablet(s) Oral four times a day with meals  senna 2 Tablet(s) Oral at bedtime    MEDICATIONS  (PRN):  HYDROmorphone PCA (1 mG/mL) Rescue Clinician Bolus 2 milliGRAM(s) IV Push every 1 hour PRN for Pain Scale GREATER THAN 6  naloxone Injectable 0.1 milliGRAM(s) IV Push every 3 minutes PRN For ANY of the following changes in patient status:  A. RR LESS THAN 10 breaths per minute, B. Oxygen saturation LESS THAN 90%, C. Sedation score of 6  ondansetron Injectable 4 milliGRAM(s) IV Push every 8 hours PRN Nausea and/or Vomiting    PRESENT SYMPTOMS:  Source: [x ] Patient   [ ] Family   [ ] Team     Pain:                        [ ] No [ x] Yes             [x ] Moderate to severe only when she is seating. [x] Patient indicates abdominal pain is controlled.     Onset - Days  Location - Lower back and buttock   Duration - constant when seating; however, not present when in bed.   Character - Aching - 5-6/10; 10/10 mainly when she moves or she sits on the chair for a long period of time.  Alleviating/Aggravating - Movement/seating   Radiation - Non- radiating.  Timing - Constant    Dyspnea:                [x ] No [ ] Yes             [ ] Mild [ ] Moderate [ ] Severe    Anxiety:                  [ x] No [ ] Yes             [ ] Mild [ ] Moderate [ ] Severe    Fatigue:                  [ ] No [x ] Yes             [x ] Mild [ ] Moderate [ ] Severe    Nausea:                  [ x] No [ ] Yes             [ ] Mild [ ] Moderate [ ] Severe    Loss of appetite:   [ ] No [x ] Yes             [ ] Mild [ ] Moderate [ ] Severe    Constipation:        [x ] No [ ] Yes             [ ] Mild [ ] Moderate [ ] Severe    Other Symptoms:  [x ] All other review of systems negative   [ ] Unable to obtain due to poor mentation     Karnofsky Performance Score/Palliative Performance Status Version 2:  40 %    PHYSICAL EXAM:  Vital Signs Last 24 Hrs  T(C): 36.8 (07 Feb 2018 04:59), Max: 37.7 (06 Feb 2018 16:45)  T(F): 98.3 (07 Feb 2018 04:59), Max: 99.8 (06 Feb 2018 16:45)  HR: 83 (07 Feb 2018 04:59) (83 - 101)  BP: 98/68 (07 Feb 2018 04:59) (96/67 - 107/64)  BP(mean): --  RR: 18 (07 Feb 2018 04:59) (16 - 18)  SpO2: 99% (07 Feb 2018 04:59) (97% - 100%)  General:  [x ] Alert  [x ] Oriented x      [ ] Lethargic  [ ] Agitated   [ x] Cachexia   [ ] Unarousable  [ x] Verbal  [ ] Non-Verbal    HEENT:  [ ] Normal   [x ] Dry mouth   [ ] ET Tube    [ ] Trach  [ ] Oral lesions    Lungs:   [x ] Clear [ ] Tachypnea  [ ] Audible excessive secretions   [ ] Rhonchi        [ ] Right [ ] Left [ ] Bilateral  [ ] Crackles        [ ] Right [ ] Left [ ] Bilateral  [ ] Wheezing     [ ] Right [ ] Left [ ] Bilateral    Cardiovascular:  [ x] Regular [ ] Irregular [ ] Tachycardia   [ ] Bradycardia  [ ] Murmur [ ] Other    Abdomen: [ ] Soft  [x ] Distended   [ ] +BS  [ ] Non tender [x ] Tender on generalized palpation  [ ]PEG   [ ]OGT/ NGT   Last BM:  today     Ostomy  Biliary drain    Genitourinary: [x ] Normal [ ] Incontinent   [ ] Oliguria/Anuria   [ ] Lynn    Musculoskeletal:  [ ] Normal   [ x] Weakness  [ ] Bedbound/Wheelchair bound [ ] Edema    Neurological: [ x] No focal deficits  [ ] Cognitive impairment  [ ] Dysphagia [ ] Dysarthria [ ] Paresis [ ] Other     Skin: [x] DTI on sacrum   [ ] Pressure ulcer(s)                  [ ] Rash    LABS:                                       9.7    6.9   )-----------( 226      ( 06 Feb 2018 20:50 )             27.7   02-07    139  |  99  |  13  ----------------------------<  73  4.2   |  31  |  0.31<L>    Ca    8.2<L>      07 Feb 2018 09:26      Shock: no [ ] Septic [ ] Cardiogenic [ ] Neurologic [ ] Hypovolemic  Vasopressors x none   Inotrophs x none    Oral Intake: [ ] Unable/mouth care only [ x] Minimal [ ] Moderate [ ] Full Capability    Diet: [ ] NPO [ ] Tube feeds [ ] TPN [ x] Other     RADIOLOGY & ADDITIONAL STUDIES:   < from: CT Abdomen and Pelvis w/ Oral Cont and w/ IV Cont (02.06.18 @ 10:04) >  EXAM:  CT ABDOMEN AND PELVIS OC IC                            PROCEDURE DATE:  02/06/2018            INTERPRETATION:  CLINICAL INFORMATION: Biliary drain. Gallbladder   perforation.    COMPARISON: 1/24/2018IMPRESSION:   Significantly improved gallbladder fossa fluid collection.    Improved small bowel obstruction.    Metastatic disease is unchanged since recent prior CT.    < end of copied text >      REFERRALS:   [ ] Chaplaincy  [ ] Hospice  [ ] Child Life  [ ] Social Work  [ ] Case management [ ] Holistic Therapy

## 2018-02-07 NOTE — PROGRESS NOTE ADULT - PROBLEM SELECTOR PLAN 7
Pt is DNR/DNI. MOSLT form was completed. Patient's fiance was informed about current codes status and he understood the patient's advanced directives. I also d/w the patient's  about DC plan. Current plan is for the patient going to her fiance's house with home care and hopefully house calls. Patient and her fiance also know about hospice services.   Emotional support was given to the patient.   16' were spent in ACP.

## 2018-02-07 NOTE — PROGRESS NOTE ADULT - PROBLEM SELECTOR PLAN 2
Patient's GOC are toward trying to overcome her acute issues and trying to go back to her Oncologist to further discuss disease modifying Rx; however, she understands her illness is advanced and that it is important to plan based on her poor prognosis.

## 2018-02-07 NOTE — PROGRESS NOTE ADULT - SUBJECTIVE AND OBJECTIVE BOX
Patient is a 47y old  Female who presents with a chief complaint of Abdominal Pain (24 Jan 2018 06:17)    Being followed by ID for perforated GB, cholecystitis,ESBL    Interval history:  No other acute events      ROS:  No cough,SOB,CP  No N/V/D  + abd pain  No urinary complaints  No HA  No joint or limb pain  No other complaints      Antimicrobials:    ertapenem  IVPB 1000 milliGRAM(s) IV Intermittent every 24 hours  fluconAZOLE IVPB      fluconAZOLE IVPB 200 milliGRAM(s) IV Intermittent every 24 hours      other medications reviewed    Vital Signs Last 24 Hrs  T(C): 36.8 (02-07-18 @ 04:59), Max: 37.7 (02-06-18 @ 16:45)  T(F): 98.3 (02-07-18 @ 04:59), Max: 99.8 (02-06-18 @ 16:45)  HR: 83 (02-07-18 @ 04:59) (83 - 101)  BP: 98/68 (02-07-18 @ 04:59) (92/63 - 107/64)  BP(mean): --  RR: 18 (02-07-18 @ 04:59) (16 - 18)  SpO2: 99% (02-07-18 @ 04:59) (97% - 100%)    Physical Exam:    Constitutional well preserved,comfortable,pleasant    HEENT PERRLA EOMI,No pallor or icterus    No oral exudate or erythema    Neck supple no JVD or LN    Chest Good AE,CTA    CVS RRR S1 S2 WNl No murmur or rub or gallop    Abd soft BS normal  + tenderness no masses,RUQ biliary drain     Ext No cyanosis clubbing or edema    RUE PICC  site no erythema tenderness or discharge  R SC mediport no erythema or tenderness    Joints no swelling or LOM    CNS AAO X 3 no focal    Lab Data:                          9.7    6.9   )-----------( 226      ( 06 Feb 2018 20:50 )             27.7       02-07    139  |  99  |  13  ----------------------------<  73  4.2   |  31  |  0.31<L>    Ca    8.2<L>      07 Feb 2018 09:26        < from: CT Abdomen and Pelvis w/ Oral Cont and w/ IV Cont (02.06.18 @ 10:04) >  IMPRESSION:   Significantly improved gallbladder fossa fluid collection.    Improved small bowel obstruction.    Metastatic disease is unchanged since recent prior CT.      < end of copied text >

## 2018-02-07 NOTE — PROGRESS NOTE ADULT - ASSESSMENT
47F with metastatic ovarian CA (s/p TAHBSO, colostomy, previously seeking holistic care 12/2017 however has since established with new oncologist for chemo) p/w abdominal pain.  Now with bowel obstruction  previously s/p colostomy,wright  GB perforation  Seen and followed by surgery,Poor surgical candidate -no intervention for now per surgery  s/p IR drainage 13 days ago   SIRS,sepsis resolved  PCN allergy-but remote and tolerating Invanz  Biliary cultures with ESBL  E Coli and yeast  Stable ,Blood  cx negative  CT improved   Can DC antimicrobials in 2 daysif stable  Drain plan per primary  Prognosis guarded  Case d/w primary team   Will Follow.  Beeper [phone]-nsee/afterhours/No [id number]

## 2018-02-08 LAB
GLUCOSE BLDC GLUCOMTR-MCNC: 105 MG/DL — HIGH (ref 70–99)
GLUCOSE BLDC GLUCOMTR-MCNC: 109 MG/DL — HIGH (ref 70–99)
GLUCOSE BLDC GLUCOMTR-MCNC: 161 MG/DL — HIGH (ref 70–99)
GLUCOSE BLDC GLUCOMTR-MCNC: 90 MG/DL — SIGNIFICANT CHANGE UP (ref 70–99)

## 2018-02-08 PROCEDURE — 99232 SBSQ HOSP IP/OBS MODERATE 35: CPT

## 2018-02-08 PROCEDURE — 99233 SBSQ HOSP IP/OBS HIGH 50: CPT | Mod: GC

## 2018-02-08 RX ADMIN — Medication 1 TABLET(S): at 17:02

## 2018-02-08 RX ADMIN — ENOXAPARIN SODIUM 30 MILLIGRAM(S): 100 INJECTION SUBCUTANEOUS at 13:44

## 2018-02-08 RX ADMIN — Medication 1 TABLET(S): at 13:45

## 2018-02-08 RX ADMIN — Medication 1 TABLET(S): at 17:00

## 2018-02-08 RX ADMIN — Medication 1 TABLET(S): at 21:33

## 2018-02-08 RX ADMIN — HYDROMORPHONE HYDROCHLORIDE 30 MILLILITER(S): 2 INJECTION INTRAMUSCULAR; INTRAVENOUS; SUBCUTANEOUS at 19:11

## 2018-02-08 RX ADMIN — HYDROMORPHONE HYDROCHLORIDE 30 MILLILITER(S): 2 INJECTION INTRAMUSCULAR; INTRAVENOUS; SUBCUTANEOUS at 15:59

## 2018-02-08 RX ADMIN — HYDROMORPHONE HYDROCHLORIDE 30 MILLILITER(S): 2 INJECTION INTRAMUSCULAR; INTRAVENOUS; SUBCUTANEOUS at 07:19

## 2018-02-08 RX ADMIN — ERTAPENEM SODIUM 120 MILLIGRAM(S): 1 INJECTION, POWDER, LYOPHILIZED, FOR SOLUTION INTRAMUSCULAR; INTRAVENOUS at 13:43

## 2018-02-08 RX ADMIN — Medication 100 MILLIGRAM(S): at 05:42

## 2018-02-08 RX ADMIN — SENNA PLUS 2 TABLET(S): 8.6 TABLET ORAL at 21:33

## 2018-02-08 RX ADMIN — Medication 100 MILLIGRAM(S): at 21:33

## 2018-02-08 RX ADMIN — FLUCONAZOLE 50 MILLIGRAM(S): 150 TABLET ORAL at 16:55

## 2018-02-08 RX ADMIN — Medication 100 MILLIGRAM(S): at 13:44

## 2018-02-08 NOTE — PROGRESS NOTE ADULT - PROBLEM SELECTOR PLAN 7
Pt is DNR/DNI. Has appointed HCP - partner Trung Buenrostro. Patient's hope is for symptom control so pt can continue following outpatient oncologist, currently symptoms well controlled. Patient will have referral to house calls program. Pt is DNR/DNI. MELANIE in chart. Has appointed HCP - partner Trung Buenrostro. Patient's hope is for symptom control so pt can continue following outpatient oncologist, currently symptoms well controlled. Patient will have referral to house calls program.  Please include any known or completed advance care planning (e.g., MOLST, DNR, DNI, Full Code, no artificial nutrition, no HD, health care proxy form with name of decision makers, and other decisions made by the patient, health care proxy or surrogate) in the patient’s discharge summary. Thank you.

## 2018-02-08 NOTE — PROGRESS NOTE ADULT - ASSESSMENT
47F with metastatic ovarian CA (s/p TAHBSO, colostomy, previously seeking holistic care 12/2017 however has since established with new oncologist for chemo) p/w abdominal pain.  Now with bowel obstruction  previously s/p colostomy,wright  GB perforation  Seen and followed by surgery,Poor surgical candidate -no intervention for now per surgery  s/p IR drainage 14 days ago   SIRS,sepsis resolved  PCN allergy-but remote and tolerating Invanz  Biliary cultures with ESBL  E Coli and yeast  Stable ,Blood  cx negative  CT improved   Can DC antimicrobials tomorrow  Drain plan per primary  Prognosis guarded    Will Follow.  Beeper [id number]-nbmy/afterhours/No [id number]

## 2018-02-08 NOTE — PROGRESS NOTE ADULT - SUBJECTIVE AND OBJECTIVE BOX
Patient is a 47y old  Female who presents with a chief complaint of Abdominal Pain (24 Jan 2018 06:17)    Being followed by ID for GB perforation     Interval history:Feels well  some abd pain   No acute events      ROS:  No cough,SOB,CP  No N/V/D.  No other complaints      Antimicrobials:    ertapenem  IVPB 1000 milliGRAM(s) IV Intermittent every 24 hours  fluconAZOLE IVPB      fluconAZOLE IVPB 200 milliGRAM(s) IV Intermittent every 24 hours    Other medications reviewed    Vital Signs Last 24 Hrs  T(C): 36.8 (02-08-18 @ 09:12), Max: 37.3 (02-07-18 @ 17:02)  T(F): 98.2 (02-08-18 @ 09:12), Max: 99.1 (02-07-18 @ 17:02)  HR: 87 (02-08-18 @ 09:12) (85 - 96)  BP: 103/72 (02-08-18 @ 09:12) (95/72 - 103/72)  BP(mean): --  RR: 18 (02-08-18 @ 09:12) (16 - 18)  SpO2: 99% (02-08-18 @ 09:12) (94% - 99%)    Physical Exam:        HEENT PERRLA EOMI    No oral exudate or erythema    Chest Good AE,CTA    CVS RRR S1 S2 WNl No murmur or rub or gallop    Abd soft BS normal some  tenderness no masses RUQ biliary drain  Colostomy     PICC L  site no erythema tenderness or discharge  R SC mediport no erythema or tenderness    CNS AAO X 3 no focal    Lab Data:                          8.4    6.05  )-----------( 223      ( 07 Feb 2018 09:24 )             27.3       02-07    139  |  99  |  13  ----------------------------<  73  4.2   |  31  |  0.31<L>    Ca    8.2<L>      07 Feb 2018 09:26

## 2018-02-08 NOTE — PROGRESS NOTE ADULT - SUBJECTIVE AND OBJECTIVE BOX
Patient is a 47y old  Female who presents with a chief complaint of Abdominal Pain (24 Jan 2018 06:17)        SUBJECTIVE / OVERNIGHT EVENTS: pt denies chest pain, shortness of breath, nausea, vomiting    MEDICATIONS  (STANDING):  dextrose 10% + sodium chloride 0.45% 1000 milliLiter(s) (50 mL/Hr) IV Continuous <Continuous>  docusate sodium 100 milliGRAM(s) Oral three times a day  enoxaparin Injectable 30 milliGRAM(s) SubCutaneous daily  fluconAZOLE IVPB      fluconAZOLE IVPB 200 milliGRAM(s) IV Intermittent every 24 hours  HYDROmorphone PCA (1 mG/mL) 30 milliLiter(s) PCA Continuous PCA Continuous  potassium acid phosphate/sodium acid phosphate tablet (K-PHOS No. 2) 1 Tablet(s) Oral four times a day with meals  senna 2 Tablet(s) Oral at bedtime    MEDICATIONS  (PRN):  HYDROmorphone PCA (1 mG/mL) Rescue Clinician Bolus 2 milliGRAM(s) IV Push every 1 hour PRN for Pain Scale GREATER THAN 6  naloxone Injectable 0.1 milliGRAM(s) IV Push every 3 minutes PRN For ANY of the following changes in patient status:  A. RR LESS THAN 10 breaths per minute, B. Oxygen saturation LESS THAN 90%, C. Sedation score of 6  ondansetron Injectable 4 milliGRAM(s) IV Push every 8 hours PRN Nausea and/or Vomiting      Vital Signs Last 24 Hrs  T(C): 36.9 (08 Feb 2018 14:21), Max: 37.1 (07 Feb 2018 21:48)  T(F): 98.4 (08 Feb 2018 14:21), Max: 98.8 (07 Feb 2018 21:48)  HR: 70 (08 Feb 2018 14:21) (70 - 96)  BP: 104/71 (08 Feb 2018 14:21) (95/72 - 104/71)  BP(mean): --  RR: 18 (08 Feb 2018 14:21) (16 - 18)  SpO2: 96% (08 Feb 2018 14:21) (94% - 99%)    Constitutional: No fever, fatigue  Skin: No rash.  Eyes: No recent vision problems or eye pain.  ENT: No congestion, ear pain, or sore throat.  Cardiovascular: No chest pain or palpation.  Respiratory: No cough, shortness of breath, congestion, or wheezing.  Gastrointestinal: No  nausea, vomiting, or diarrhea. bad discomfort +  Genitourinary: No dysuria.  Musculoskeletal: No joint swelling.  Neurologic: No headache.    PHYSICAL EXAM:  GENERAL: NAD  EYES: EOMI, PERRLA  NECK: Supple, No JVD  CHEST/LUNG: dec breath sounds at bases   HEART:  S1 , S2 +  ABDOMEN: mild distension+, ruq drain +  NEUROLOGY: alert awake oriented     LABS:  02-07    139  |  99  |  13  ----------------------------<  73  4.2   |  31  |  0.31<L>    Ca    8.2<L>      07 Feb 2018 09:26      Creatinine Trend: 0.31 <--, 0.23 <--, 0.22 <--, 0.28 <--                        8.4    6.05  )-----------( 223      ( 07 Feb 2018 09:24 )             27.3     Urine Studies:

## 2018-02-08 NOTE — PROGRESS NOTE ADULT - ASSESSMENT
, 47F with metastatic ovarian CA (s/p TAHBSO, colostomy, previously seeking holistic care 12/2017 however has since established with new oncologist for chemo) p/w abdominal pain.  Now with bowel obstruction  previously s/p colostomy,wright  GB perforation  Seen and followed by surgery,Poor surgical candidate -no intervention for now per surgery  s/p IR drainage  SIRS,sepsis resolving  PCN allergy-but remote and tolerating Invanz  Biliary cultures with ESBL  E Coli and yeast  Also s/p recent chemo       ID -  Continue Invanz and fluconazole for now, Monitor closely for any s/s other nosocomial focus , need  10-14 day course with repeat imaging , ct abd with improvement < from: CT Abdomen and Pelvis w/ Oral Cont and w/ IV Cont (02.06.18 @ 10:04) >  Significantly improved gallbladder fossa fluid collection.    Improved small bowel obstruction.    Metastatic disease is unchanged since recent prior CT.      < end of copied text >    Surg - no acute surgical intervention   GI - -pain control, monitor GI function closely, monitor drain outputs    Palliative input appreciated , cont pca   discussed management plan in detail in  length with pt

## 2018-02-08 NOTE — PROGRESS NOTE ADULT - SUBJECTIVE AND OBJECTIVE BOX
Patient being followed for pain management and establishing GOC.     INTERVAL HPI/OVERNIGHT EVENTS: Patient is doing well today, has good appetite, having good output from ostomy. Received 13mg extra of dilaudid in 24 hrs, patient explains that at baseline she is well in terms of pain, able to sleep well, eat well, carry conversations well, it's just ocassionally when she is the chair or certain positions in the bed that are very uncomfortable and painful, appears that is more incidental pain than constant. Feels current PCA settings provide good relief.    PERTINENT PM/SXH:   Ovarian cancer    Colostomy in place  H/O abdominal hysterectomy    SOCIAL HISTORY:   Significant other/partner:  [ x] YES  [ ] NO               Children:  [ ] YES  [x ] NO                   Jainism/Spirituality:  Substance hx:  [ ] YES   [x ] NO                   Tobacco hx:  [ ] YES  [x ] NO                       Alcohol hx: [ ] YES  [x ] NO         Home Opioid hx:  [x ] YES  [ ] NO   Living Situation: [x ] Home  [ ] Long term care  [ ] Rehab [ ] Other    FAMILY HISTORY:  No pertinent family history in first degree relatives    [ x] Family history non-contributory     BASELINE (I)ADLs (prior to admission):  [place]: [ ] total  [ x] moderate [ ] dependent    ADVANCE DIRECTIVES:    DNR/DNI  MOLST  [ ] YES [x ] NO                      [ ] Completed  Health Care Proxy [ x] YES  [ ] NO   [ ] Completed  Living Will  [ ] YES [x ] NO             [ ] Surrogate  [x ] HCP  [ ] Guardian:  Copy in EMR alpha tab in 12/20/17 admission  Trung VAMSHIArchie Buenrostro  Phone#: [phone]    Allergies    penicillin (Rash)    Intolerances    MEDICATIONS  (STANDING):  dextrose 10% + sodium chloride 0.45% 1000 milliLiter(s) (50 mL/Hr) IV Continuous <Continuous>  docusate sodium 100 milliGRAM(s) Oral three times a day  enoxaparin Injectable 30 milliGRAM(s) SubCutaneous daily  ertapenem  IVPB 1000 milliGRAM(s) IV Intermittent every 24 hours  fluconAZOLE IVPB      fluconAZOLE IVPB 200 milliGRAM(s) IV Intermittent every 24 hours  HYDROmorphone PCA (1 mG/mL) 30 milliLiter(s) PCA Continuous PCA Continuous  potassium acid phosphate/sodium acid phosphate tablet (K-PHOS No. 2) 1 Tablet(s) Oral four times a day with meals  senna 2 Tablet(s) Oral at bedtime    MEDICATIONS  (PRN):  HYDROmorphone PCA (1 mG/mL) Rescue Clinician Bolus 2 milliGRAM(s) IV Push every 1 hour PRN for Pain Scale GREATER THAN 6  naloxone Injectable 0.1 milliGRAM(s) IV Push every 3 minutes PRN For ANY of the following changes in patient status:  A. RR LESS THAN 10 breaths per minute, B. Oxygen saturation LESS THAN 90%, C. Sedation score of 6  ondansetron Injectable 4 milliGRAM(s) IV Push every 8 hours PRN Nausea and/or Vomiting    PRESENT SYMPTOMS:  Source: [x ] Patient   [ ] Family   [ ] Team     Pain:                        [ ] No [ x] Yes             [x ] Moderate to severe.      Onset - Days  Location - Lower back and buttock/Abdomen   Duration - constant when seating; however, occasional when in bed.   Character - Aching - 5-6/10; 10/10 mainly when she moves or she sits on the chair for a long period of time.  Alleviating/Aggravating - Movement/seating   Radiation - Non- radiating.  Timing - Constant    Dyspnea:                [x ] No [ ] Yes             [ ] Mild [ ] Moderate [ ] Severe    Anxiety:                  [ x] No [ ] Yes             [ ] Mild [ ] Moderate [ ] Severe    Fatigue:                  [ ] No [x ] Yes             [x ] Mild [ ] Moderate [ ] Severe    Nausea:                  [ x] No [ ] Yes             [ ] Mild [ ] Moderate [ ] Severe    Loss of appetite:   [x ] No [ ] Yes             [ ] Mild [ ] Moderate [ ] Severe    Constipation:        [x ] No [ ] Yes             [ ] Mild [ ] Moderate [ ] Severe    Other Symptoms:  [x ] All other review of systems negative   [ ] Unable to obtain due to poor mentation     Karnofsky Performance Score/Palliative Performance Status Version 2:  40 %    PHYSICAL EXAM:  Vital Signs Last 24 Hrs  T(C): 36.8 (08 Feb 2018 09:12), Max: 37.3 (07 Feb 2018 17:02)  T(F): 98.2 (08 Feb 2018 09:12), Max: 99.1 (07 Feb 2018 17:02)  HR: 87 (08 Feb 2018 09:12) (85 - 96)  BP: 103/72 (08 Feb 2018 09:12) (95/72 - 103/72)  BP(mean): --  RR: 18 (08 Feb 2018 09:12) (16 - 18)  SpO2: 99% (08 Feb 2018 09:12) (94% - 99%) I&O's Summary    07 Feb 2018 07:01  -  08 Feb 2018 07:00  --------------------------------------------------------  IN: 1850 mL / OUT: 2675 mL / NET: -825 mL    General:  [x ] Alert  [x ] Oriented x      [ ] Lethargic  [ ] Agitated   [ x] Cachexia   [ ] Unarousable  [ x] Verbal  [ ] Non-Verbal    HEENT:  [ ] Normal   [x ] Dry mouth   [ ] ET Tube    [ ] Trach  [ ] Oral lesions    Lungs:   [x ] Clear [ ] Tachypnea  [ ] Audible excessive secretions   [ ] Rhonchi        [ ] Right [ ] Left [ ] Bilateral  [ ] Crackles        [ ] Right [ ] Left [ ] Bilateral  [ ] Wheezing     [ ] Right [ ] Left [ ] Bilateral    Cardiovascular:  [ x] Regular [ ] Irregular [ ] Tachycardia   [ ] Bradycardia  [ ] Murmur [ ] Other    Abdomen: [ ] Soft  [x ] Distended   [ ] +BS  [ ] Non tender [x ] Tender  [ ]PEG   [ ]OGT/ NGT   Last BM:  +ostomy output seen at this time.    Ostomy  Biliary drain    Genitourinary: [x ] Normal [ ] Incontinent   [ ] Oliguria/Anuria   [ ] Lynn    Musculoskeletal:  [ ] Normal   [ x] Weakness  [ ] Bedbound/Wheelchair bound [ ] Edema    Neurological: [ x] No focal deficits  [ ] Cognitive impairment  [ ] Dysphagia [ ] Dysarthria [ ] Paresis [ ] Other     Skin: [x] DTI on sacrum   [ ] Pressure ulcer(s)                  [ ] Rash    LABS:                        8.4    6.05  )-----------( 223      ( 07 Feb 2018 09:24 )             27.3     02-07    139  |  99  |  13  ----------------------------<  73  4.2   |  31  |  0.31<L>    Ca    8.2<L>      07 Feb 2018 09:26    Shock: no [ ] Septic [ ] Cardiogenic [ ] Neurologic [ ] Hypovolemic  Vasopressors x none   Inotrophs x none    Oral Intake: [ ] Unable/mouth care only [ x] Minimal [ ] Moderate [ ] Full Capability    Diet: [ ] NPO [ ] Tube feeds [ ] TPN [ x] Other     RADIOLOGY & ADDITIONAL STUDIES: No new imaging today.    REFERRALS:   [ ] Chaplaincy  [ ] Hospice  [ ] Child Life  [ ] Social Work  [ ] Case management [x ] Holistic Therapy

## 2018-02-08 NOTE — PROGRESS NOTE ADULT - PROBLEM SELECTOR PLAN 3
Likely musculoskeletal pain or pain 2/2 to skin issues. Current regimen: PCA 1mg/hr with 1mg demands 15 min lock out. Has used 14mg extra of Dilaudid in the past 24hrs. Patient being followed with Holistic Nurse therapies.

## 2018-02-08 NOTE — CHART NOTE - NSCHARTNOTEFT_GEN_A_CORE
Source: Patient [x ]    Family [ ]     other [x ] medical record    Diet : Regular, Ensure Enlive- 2 per day (provides additional 700cal, 40gm protein)       Hospital course:  48 yo F with metastatic ovarian CA (s/p TAHBSO, s/p colostomy, with holistic care approach, started chemo on 12/2017 carboplatin/taxol every Wednesday, due for tx today). Admitted to MICU under the impression of malignant SBO, sepsis, GB perforation, hypoglycemia, and worsening metastatic disease, followed by palliative care  for pain management and GOC.    Pt seen for malnutrition follow up. Pt reports good appetite and intake, eating breakfast at time of RD visit. Pt requested Ensure be exchanged for supplement with less sugar- pt amenable to Glucerna- 2 per day (additional 440cal, 19.8gm protein daily). Pt denies GI distress at this time. Ostomy output: (2/7) 300ml.        PO intake:  < 50% [ ] 50-75% [x ]   % [ ]  other :     Source for PO intake [ x] Patient [ ] family [ ] chart [ ] staff [ ] other    Current Weight: no new weight to assess      Pertinent Medications: MEDICATIONS  (STANDING):  dextrose 10% + sodium chloride 0.45% 1000 milliLiter(s) (50 mL/Hr) IV Continuous <Continuous>  docusate sodium 100 milliGRAM(s) Oral three times a day  enoxaparin Injectable 30 milliGRAM(s) SubCutaneous daily  ertapenem  IVPB 1000 milliGRAM(s) IV Intermittent every 24 hours  fluconAZOLE IVPB      fluconAZOLE IVPB 200 milliGRAM(s) IV Intermittent every 24 hours  HYDROmorphone PCA (1 mG/mL) 30 milliLiter(s) PCA Continuous PCA Continuous  potassium acid phosphate/sodium acid phosphate tablet (K-PHOS No. 2) 1 Tablet(s) Oral four times a day with meals  senna 2 Tablet(s) Oral at bedtime    MEDICATIONS  (PRN):  HYDROmorphone PCA (1 mG/mL) Rescue Clinician Bolus 2 milliGRAM(s) IV Push every 1 hour PRN for Pain Scale GREATER THAN 6  naloxone Injectable 0.1 milliGRAM(s) IV Push every 3 minutes PRN For ANY of the following changes in patient status:  A. RR LESS THAN 10 breaths per minute, B. Oxygen saturation LESS THAN 90%, C. Sedation score of 6  ondansetron Injectable 4 milliGRAM(s) IV Push every 8 hours PRN Nausea and/or Vomiting    Pertinent Labs: Point of Care Testing BG: (2/8) 90, (2/7)      Skin: no edema, stage 2 sacrum pressure ulcer.     Estimated Needs:   [ x] no change since previous assessment  [ ] recalculated:       Previous Nutrition Diagnosis:      [ x] Malnutrition          Nutrition Diagnosis is [x ] ongoing  [ ] resolved [ ] not applicable          New Nutrition Diagnosis: [ x] not applicable      Recommend    [ ] Change Diet To:    [x ] Nutrition Supplement: Discontinue Ensure supplement, recommend Glucerna- 2 per day (additional 440cal, 19.8gm protein daily).     [ ] Nutrition Support    [x ] Other:  1. Provide food preferences as requested by Pt/family within diet restrictions  2. Encourage PO intake during meal times 3. Provide feeding assistance as needed       Monitoring and Evaluation:     [ x] PO intake [ x] Tolerance to diet prescription [ x] weights [x ] follow up per protocol    [x ] other: RD remains available, Jeanne Browning RD pager #[phone].

## 2018-02-08 NOTE — PROGRESS NOTE ADULT - PROBLEM SELECTOR PLAN 2
Tolerating po, good ostomy output. Denies any nausea/vomiting. CT results noted.  Bowel regimen per Sx and GI.

## 2018-02-08 NOTE — PROGRESS NOTE ADULT - SUBJECTIVE AND OBJECTIVE BOX
[facility]  Carter Genao PA-C  [address] [place] [address] [phone]      INTERVAL HPI/OVERNIGHT EVENTS: events noted    MEDICATIONS  (STANDING):  dextrose 10% + sodium chloride 0.45% 1000 milliLiter(s) (50 mL/Hr) IV Continuous <Continuous>  docusate sodium 100 milliGRAM(s) Oral three times a day  enoxaparin Injectable 30 milliGRAM(s) SubCutaneous daily  ertapenem  IVPB 1000 milliGRAM(s) IV Intermittent every 24 hours  fluconAZOLE IVPB      fluconAZOLE IVPB 200 milliGRAM(s) IV Intermittent every 24 hours  HYDROmorphone PCA (1 mG/mL) 30 milliLiter(s) PCA Continuous PCA Continuous  potassium acid phosphate/sodium acid phosphate tablet (K-PHOS No. 2) 1 Tablet(s) Oral four times a day with meals  senna 2 Tablet(s) Oral at bedtime    MEDICATIONS  (PRN):  HYDROmorphone PCA (1 mG/mL) Rescue Clinician Bolus 1.6 milliGRAM(s) IV Push every 1 hour PRN for Pain Scale GREATER THAN 6  naloxone Injectable 0.1 milliGRAM(s) IV Push every 3 minutes PRN For ANY of the following changes in patient status:  A. RR LESS THAN 10 breaths per minute, B. Oxygen saturation LESS THAN 90%, C. Sedation score of 6  ondansetron Injectable 4 milliGRAM(s) IV Push every 8 hours PRN Nausea and/or Vomiting      Allergies    penicillin (Rash)    Intolerances        ROS:   General:  No wt loss, fevers, chills, night sweats, fatigue,   Eyes:  Good vision, no reported pain  ENT:  No sore throat, pain, runny nose, dysphagia  CV:  No pain, palpitations, hypo/hypertension  Resp:  No dyspnea, cough, tachypnea, wheezing  GI:  No pain, No nausea, No vomiting, No diarrhea, No constipation, No weight loss, No fever, No pruritis, No rectal bleeding, No tarry stools, No dysphagia,  :  No pain, bleeding, incontinence, nocturia  Muscle:  No pain, weakness  Neuro:  No weakness, tingling, memory problems  Psych:  No fatigue, insomnia, mood problems, depression  Endocrine:  No polyuria, polydipsia, cold/heat intolerance  Heme:  No petechiae, ecchymosis, easy bruisability  Skin:  No rash, tattoos, scars, edema      PHYSICAL EXAM:   Vital Signs:  Vital Signs Last 24 Hrs  T(C): 37.2 (06 Feb 2018 10:41), Max: 37.2 (06 Feb 2018 10:41)  T(F): 99 (06 Feb 2018 10:41), Max: 99 (06 Feb 2018 10:41)  HR: 92 (06 Feb 2018 10:41) (83 - 96)  BP: 92/63 (06 Feb 2018 10:41) (90/67 - 98/66)  BP(mean): --  RR: 18 (06 Feb 2018 10:41) (18 - 18)  SpO2: 99% (06 Feb 2018 10:41) (98% - 100%)  Daily     Daily     GENERAL:  Appears stated age, well-groomed, well-nourished, no distress  HEENT:  NC/AT,  conjunctivae clear and pink, no thyromegaly, nodules, adenopathy, no JVD, sclera -anicteric  CHEST:  Full & symmetric excursion, no increased effort, breath sounds clear  HEART:  Regular rhythm, S1, S2, no murmur/rub/S3/S4, no abdominal bruit, no edema  ABDOMEN:  Soft, non-tender, non-distended, normoactive bowel sounds,  no masses ,no hepato-splenomegaly, no signs of chronic liver disease  EXTEREMITIES:  no cyanosis,clubbing or edema  SKIN:  No rash/erythema/ecchymoses/petechiae/wounds/abscess/warm/dry  NEURO:  Alert, oriented, no asterixis, no tremor, no encephalopathy      LABS:                        7.1    6.28  )-----------( 212      ( 05 Feb 2018 09:05 )             22.9     02-06    143  |  102  |  8   ----------------------------<  72  4.3   |  31  |  0.23<L>    Ca    7.9<L>      06 Feb 2018 08:22            RADIOLOGY & ADDITIONAL TESTS:

## 2018-02-09 LAB
GLUCOSE BLDC GLUCOMTR-MCNC: 101 MG/DL — HIGH (ref 70–99)
GLUCOSE BLDC GLUCOMTR-MCNC: 105 MG/DL — HIGH (ref 70–99)
GLUCOSE BLDC GLUCOMTR-MCNC: 111 MG/DL — HIGH (ref 70–99)
GLUCOSE BLDC GLUCOMTR-MCNC: 98 MG/DL — SIGNIFICANT CHANGE UP (ref 70–99)
HCT VFR BLD CALC: 27 % — LOW (ref 34.5–45)
HGB BLD-MCNC: 8.4 G/DL — LOW (ref 11.5–15.5)
MCHC RBC-ENTMCNC: 28 PG — SIGNIFICANT CHANGE UP (ref 27–34)
MCHC RBC-ENTMCNC: 31.1 GM/DL — LOW (ref 32–36)
MCV RBC AUTO: 90 FL — SIGNIFICANT CHANGE UP (ref 80–100)
PLATELET # BLD AUTO: 262 K/UL — SIGNIFICANT CHANGE UP (ref 150–400)
RBC # BLD: 3 M/UL — LOW (ref 3.8–5.2)
RBC # FLD: 15.5 % — HIGH (ref 10.3–14.5)
WBC # BLD: 5.47 K/UL — SIGNIFICANT CHANGE UP (ref 3.8–10.5)
WBC # FLD AUTO: 5.47 K/UL — SIGNIFICANT CHANGE UP (ref 3.8–10.5)

## 2018-02-09 PROCEDURE — 99232 SBSQ HOSP IP/OBS MODERATE 35: CPT

## 2018-02-09 PROCEDURE — 99233 SBSQ HOSP IP/OBS HIGH 50: CPT

## 2018-02-09 RX ADMIN — Medication 100 MILLIGRAM(S): at 14:11

## 2018-02-09 RX ADMIN — HYDROMORPHONE HYDROCHLORIDE 30 MILLILITER(S): 2 INJECTION INTRAMUSCULAR; INTRAVENOUS; SUBCUTANEOUS at 22:27

## 2018-02-09 RX ADMIN — SODIUM CHLORIDE 50 MILLILITER(S): 9 INJECTION, SOLUTION INTRAVENOUS at 15:11

## 2018-02-09 RX ADMIN — HYDROMORPHONE HYDROCHLORIDE 30 MILLILITER(S): 2 INJECTION INTRAMUSCULAR; INTRAVENOUS; SUBCUTANEOUS at 08:57

## 2018-02-09 RX ADMIN — Medication 1 TABLET(S): at 09:30

## 2018-02-09 RX ADMIN — HYDROMORPHONE HYDROCHLORIDE 30 MILLILITER(S): 2 INJECTION INTRAMUSCULAR; INTRAVENOUS; SUBCUTANEOUS at 19:23

## 2018-02-09 RX ADMIN — Medication 1 TABLET(S): at 14:11

## 2018-02-09 RX ADMIN — Medication 1 TABLET(S): at 18:54

## 2018-02-09 RX ADMIN — ENOXAPARIN SODIUM 30 MILLIGRAM(S): 100 INJECTION SUBCUTANEOUS at 12:01

## 2018-02-09 RX ADMIN — Medication 100 MILLIGRAM(S): at 05:18

## 2018-02-09 RX ADMIN — HYDROMORPHONE HYDROCHLORIDE 30 MILLILITER(S): 2 INJECTION INTRAMUSCULAR; INTRAVENOUS; SUBCUTANEOUS at 07:12

## 2018-02-09 NOTE — PROGRESS NOTE ADULT - PROBLEM SELECTOR PLAN 7
Pt is DNR/DNI. MELANIE in chart. Has appointed HCP - partner Trung Buenrostro. Patient's hope is for symptom control so pt can continue following outpatient oncologist, currently symptoms well controlled. Patient was referred for house calls and they already contacted the patient's fiance. Patient will also be referring home care and a referral to  home care was recommended.   Please include any known or completed advance care planning (e.g., MOLST, DNR, DNI, Full Code, no artificial nutrition, no HD, health care proxy form with name of decision makers, and other decisions made by the patient, health care proxy or surrogate) in the patient’s discharge summary. Thank you.  Will sign off once symptoms are controlled and GOC are defined. Please call us as needed.

## 2018-02-09 NOTE — PROGRESS NOTE ADULT - PROBLEM SELECTOR PLAN 1
GOC: Ongoing discussion about code status.  Patient's GOC are toward trying to overcome her acute issues and trying to go back to her Oncologist to further discuss disease modifying Rx; however, she understands her illness is advanced and that it is important to plan based on her poor prognosis.
diet per surgery  pain control  monitor gi function  monitor drain outputs   cont broad spectrum antibiotics  overall prognosis poor
GOC ongoing, discussion about code status. Patient's GOC are toward trying to overcome her acute issues and trying to go back to her Oncologist to further discuss disease modifying Rx; however, she understands her illness is advanced and that it is important to plan based on her poor prognosis.
GOC ongoing, discussion about code status. Patient's GOC are toward trying to overcome her acute issues and trying to go back to her Oncologist to further discuss disease modifying Rx; however, she understands her illness is advanced and that it is important to plan based on her poor prognosis.
GOC: Ongoing discussion about code status.  Patient's GOC are toward trying to overcome her acute issues and trying to go back to her Oncologist to further discuss disease modifying Rx; however, she understands her illness is advanced and that it is important to plan based on her poor prognosis.
GOC: Updated patient and her fiance about CT results showing progression of disease. The patient and her fiance were open to a discussion about the patient's worsening clinical status and the importance of planning. They indicated they are working on a power of  or a will for her possession. I also discussed with  the patient about code status and the limited benefits of CPR understanding her advanced metastatic disease, frailty, poor nutritional, and poor functional status. She indicated she will think about it and will f/u with me tomorrow.     As per Dr Salgado, the patient's brother also has CA and his mother is his main caregiver and therefore she may not be able to come to the [place]. At this point she indicated her fiance (who is also her HCP) is her main support.    Chaplaincy referral
Likely musculoskeletal pain or pain 2/2 to skin issues. Has used 5.6mg extra of Dilaudid in the past 24hrs. Due to pain crisis, will maintain Dilaudid PCA continues rate at 0.8mg/hr and increased lockout to 1 mg q 15min, CB of 1.6mg.  Holistic Nurse therapies.
Patient is DNR/DNI. Patient's goal is toward trying to overcome her acute issues and trying to go back to her Oncologist to further discuss treatment. She understands her illness is advanced and that it is important to plan based on her poor prognosis.
Patient is DNR/DNI. Patient's goal is toward trying to overcome her acute issues and trying to go back to her Oncologist to further discuss treatment. She understands her illness is advanced and that it is important to plan based on her poor prognosis.

## 2018-02-09 NOTE — PROGRESS NOTE ADULT - ASSESSMENT
47F with metastatic ovarian CA (s/p TAHBSO, colostomy, previously seeking holistic care 12/2017 however has since established with new oncologist for chemo) p/w abdominal pain.  Now with bowel obstruction  previously s/p colostomy,wright  GB perforation  Seen and followed by surgery,Poor surgical candidate -no intervention for now per surgery  s/p IR drainage 15 days ago   SIRS,sepsis resolved  s/p antimicrobial course with clinical and radiological improvement  Stable off abx  Drain plan per primary  Prognosis guarded  case d/w Dr duenas  ID will follow as needed,please call [phone] if any questions or issues.

## 2018-02-09 NOTE — PROGRESS NOTE ADULT - PROBLEM SELECTOR PROBLEM 2
Small bowel obstruction
Small bowel obstruction
Ovarian cancer
Small bowel obstruction

## 2018-02-09 NOTE — PROGRESS NOTE ADULT - SUBJECTIVE AND OBJECTIVE BOX
Patient being followed for pain management and establishing GOC.     INTERVAL HPI/OVERNIGHT EVENTS: Patient is doing well today, has good appetite, having good output from ostomy. Received 13mg extra of dilaudid in 24 hrs, patient explains that at baseline she is well in terms of pain, able to sleep well, eat well, carry conversations well, it's just ocassionally when she is the chair or certain positions in the bed that are very uncomfortable and painful, appears that is more incidental pain than constant. Feels current PCA settings provide good relief.     PERTINENT PM/SXH:   Ovarian cancer    Colostomy in place  H/O abdominal hysterectomy    SOCIAL HISTORY:   Significant other/partner:  [ x] YES  [ ] NO               Children:  [ ] YES  [x ] NO                   Jain/Spirituality:  Substance hx:  [ ] YES   [x ] NO                   Tobacco hx:  [ ] YES  [x ] NO                       Alcohol hx: [ ] YES  [x ] NO         Home Opioid hx:  [x ] YES  [ ] NO   Living Situation: [x ] Home  [ ] Long term care  [ ] Rehab [ ] Other    FAMILY HISTORY:  No pertinent family history in first degree relatives    [ x] Family history non-contributory     BASELINE (I)ADLs (prior to admission):  [place]: [ ] total  [ x] moderate [ ] dependent    ADVANCE DIRECTIVES:    DNR/DNI  MOLST  [ ] YES [x ] NO                      [ ] Completed  Health Care Proxy [ x] YES  [ ] NO   [ ] Completed  Living Will  [ ] YES [x ] NO             [ ] Surrogate  [x ] HCP  [ ] Guardian:  Copy in EMR alpha tab in 12/20/17 admission  Trung BONDSArchie Buenrostro  Phone#: [phone]    Allergies    penicillin (Rash)    Intolerances    MEDICATIONS  (STANDING):  dextrose 10% + sodium chloride 0.45% 1000 milliLiter(s) (50 mL/Hr) IV Continuous <Continuous>  docusate sodium 100 milliGRAM(s) Oral three times a day  enoxaparin Injectable 30 milliGRAM(s) SubCutaneous daily  HYDROmorphone PCA (1 mG/mL) 30 milliLiter(s) PCA Continuous PCA Continuous  potassium acid phosphate/sodium acid phosphate tablet (K-PHOS No. 2) 1 Tablet(s) Oral four times a day with meals  senna 2 Tablet(s) Oral at bedtime    MEDICATIONS  (PRN):  HYDROmorphone PCA (1 mG/mL) Rescue Clinician Bolus 2 milliGRAM(s) IV Push every 1 hour PRN for Pain Scale GREATER THAN 6  naloxone Injectable 0.1 milliGRAM(s) IV Push every 3 minutes PRN For ANY of the following changes in patient status:  A. RR LESS THAN 10 breaths per minute, B. Oxygen saturation LESS THAN 90%, C. Sedation score of 6  ondansetron Injectable 4 milliGRAM(s) IV Push every 8 hours PRN Nausea and/or Vomiting    PRESENT SYMPTOMS:  Source: [x ] Patient   [ ] Family   [ ] Team     Pain:                        [ ] No [ x] Yes             [x ] Mild to Moderate      Onset - Days  Location - Lower back and buttock/Abdomen   Duration - constant when seating; however, occasional when in bed.   Character - Aching - 5-6/10 but up to 10/10 mainly when she moves or she sits on the chair for a long period of time.  Alleviating/Aggravating - Movement/seating   Radiation - Non- radiating.  Timing - Constant    Dyspnea:                [x ] No [ ] Yes             [ ] Mild [ ] Moderate [ ] Severe    Anxiety:                  [ x] No [ ] Yes             [ ] Mild [ ] Moderate [ ] Severe    Fatigue:                  [ ] No [x ] Yes             [x ] Mild [ ] Moderate [ ] Severe    Nausea:                  [ x] No [ ] Yes             [ ] Mild [ ] Moderate [ ] Severe    Loss of appetite:   [x ] No [ ] Yes             [ ] Mild [ ] Moderate [ ] Severe    Constipation:        [x ] No [ ] Yes             [ ] Mild [ ] Moderate [ ] Severe    Other Symptoms:  [x ] All other review of systems negative   [ ] Unable to obtain due to poor mentation     Karnofsky Performance Score/Palliative Performance Status Version 2:  40 %    PHYSICAL EXAM:  Vital Signs Last 24 Hrs  T(C): 36.8 (08 Feb 2018 09:12), Max: 37.3 (07 Feb 2018 17:02)  T(F): 98.2 (08 Feb 2018 09:12), Max: 99.1 (07 Feb 2018 17:02)  HR: 87 (08 Feb 2018 09:12) (85 - 96)  BP: 103/72 (08 Feb 2018 09:12) (95/72 - 103/72)  BP(mean): --  RR: 18 (08 Feb 2018 09:12) (16 - 18)  SpO2: 99% (08 Feb 2018 09:12) (94% - 99%) I&O's Summary    07 Feb 2018 07:01  -  08 Feb 2018 07:00  --------------------------------------------------------  IN: 1850 mL / OUT: 2675 mL / NET: -825 mL    General:  [x ] Alert  [x ] Oriented x      [ ] Lethargic  [ ] Agitated   [ x] Cachexia   [ ] Unarousable  [ x] Verbal  [ ] Non-Verbal    HEENT:  [ ] Normal   [x ] Dry mouth   [ ] ET Tube    [ ] Trach  [ ] Oral lesions    Lungs:   [x ] Clear [ ] Tachypnea  [ ] Audible excessive secretions   [ ] Rhonchi        [ ] Right [ ] Left [ ] Bilateral  [ ] Crackles        [ ] Right [ ] Left [ ] Bilateral  [ ] Wheezing     [ ] Right [ ] Left [ ] Bilateral    Cardiovascular:  [ x] Regular [ ] Irregular [ ] Tachycardia   [ ] Bradycardia  [ ] Murmur [ ] Other    Abdomen: [ ] Soft  [x ] Distended   [ ] +BS  [ ] Non tender [x ] Tender  [ ]PEG   [ ]OGT/ NGT   Last BM: 2/9   Ostomy  Biliary drain    Genitourinary: [x ] Normal [ ] Incontinent   [ ] Oliguria/Anuria   [ ] Lynn    Musculoskeletal:  [ ] Normal   [ x] Weakness  [ ] Bedbound/Wheelchair bound [ ] Edema    Neurological: [ x] No focal deficits  [ ] Cognitive impairment  [ ] Dysphagia [ ] Dysarthria [ ] Paresis [ ] Other     Skin: [x] DTI on sacrum   [ ] Pressure ulcer(s)                  [ ] Rash    LABS:                                        8.4    5.47  )-----------( 262      ( 09 Feb 2018 09:07 )             27.0       Shock: no [ ] Septic [ ] Cardiogenic [ ] Neurologic [ ] Hypovolemic  Vasopressors x none   Inotrophs x none    Oral Intake: [ ] Unable/mouth care only [ x] Minimal [ ] Moderate [ ] Full Capability    Diet: [ ] NPO [ ] Tube feeds [ ] TPN [ x] Other     RADIOLOGY & ADDITIONAL STUDIES: No new imaging today.    REFERRALS:   [ ] Chaplaincy  [ ] Hospice  [ ] Child Life  [ ] Social Work  [ ] Case management [x ] Holistic Therapy Patient being followed for pain management and establishing GOC.     INTERVAL HPI/OVERNIGHT EVENTS: Patient is doing well today, has good appetite, having good output from ostomy. Received 13mg extra of dilaudid in 24 hrs, patient explains that at baseline she is well in terms of pain, able to sleep well, eat well, carry conversations well, it's just ocassionally when she is the chair or certain positions in the bed that are very uncomfortable and painful, appears that is more incidental pain than constant. Feels current PCA settings provide good relief.     PERTINENT PM/SXH:   Ovarian cancer    Colostomy in place  H/O abdominal hysterectomy    SOCIAL HISTORY:   Significant other/partner:  [ x] YES  [ ] NO               Children:  [ ] YES  [x ] NO                   Jainism/Spirituality:  Substance hx:  [ ] YES   [x ] NO                   Tobacco hx:  [ ] YES  [x ] NO                       Alcohol hx: [ ] YES  [x ] NO         Home Opioid hx:  [x ] YES  [ ] NO   Living Situation: [x ] Home  [ ] Long term care  [ ] Rehab [ ] Other    FAMILY HISTORY:  No pertinent family history in first degree relatives    [ x] Family history non-contributory     BASELINE (I)ADLs (prior to admission):  [place]: [ ] total  [ x] moderate [ ] dependent    ADVANCE DIRECTIVES:    DNR/DNI  MOLST  [ ] YES [x ] NO                      [ ] Completed  Health Care Proxy [ x] YES  [ ] NO   [ ] Completed  Living Will  [ ] YES [x ] NO             [ ] Surrogate  [x ] HCP  [ ] Guardian:  Copy in EMR alpha tab in 12/20/17 admission  Trung BONDSArchie Buenrostro  Phone#: [phone]    Allergies    penicillin (Rash)    Intolerances    MEDICATIONS  (STANDING):  dextrose 10% + sodium chloride 0.45% 1000 milliLiter(s) (50 mL/Hr) IV Continuous <Continuous>  docusate sodium 100 milliGRAM(s) Oral three times a day  enoxaparin Injectable 30 milliGRAM(s) SubCutaneous daily  HYDROmorphone PCA (1 mG/mL) 30 milliLiter(s) PCA Continuous PCA Continuous  potassium acid phosphate/sodium acid phosphate tablet (K-PHOS No. 2) 1 Tablet(s) Oral four times a day with meals  senna 2 Tablet(s) Oral at bedtime    MEDICATIONS  (PRN):  HYDROmorphone PCA (1 mG/mL) Rescue Clinician Bolus 2 milliGRAM(s) IV Push every 1 hour PRN for Pain Scale GREATER THAN 6  naloxone Injectable 0.1 milliGRAM(s) IV Push every 3 minutes PRN For ANY of the following changes in patient status:  A. RR LESS THAN 10 breaths per minute, B. Oxygen saturation LESS THAN 90%, C. Sedation score of 6  ondansetron Injectable 4 milliGRAM(s) IV Push every 8 hours PRN Nausea and/or Vomiting    PRESENT SYMPTOMS:  Source: [x ] Patient   [ ] Family   [ ] Team     Pain:                        [ ] No [ x] Yes             [x ] Mild to Moderate      Onset - Days  Location - Lower back and buttock/Abdomen   Duration - constant when seating; however, occasional when in bed.   Character - Aching - 5-6/10 but up to 10/10 mainly when she moves or she sits on the chair for a long period of time.  Alleviating/Aggravating - Movement/seating   Radiation - Non- radiating.  Timing - Constant    Dyspnea:                [x ] No [ ] Yes             [ ] Mild [ ] Moderate [ ] Severe    Anxiety:                  [ x] No [ ] Yes             [ ] Mild [ ] Moderate [ ] Severe    Fatigue:                  [ ] No [x ] Yes             [x ] Mild [ ] Moderate [ ] Severe    Nausea:                  [ x] No [ ] Yes             [ ] Mild [ ] Moderate [ ] Severe    Loss of appetite:   [x ] No [ ] Yes             [ ] Mild [ ] Moderate [ ] Severe    Constipation:        [x ] No [ ] Yes             [ ] Mild [ ] Moderate [ ] Severe    Other Symptoms:  [x ] All other review of systems negative   [ ] Unable to obtain due to poor mentation     Karnofsky Performance Score/Palliative Performance Status Version 2:  40 %    PHYSICAL EXAM:  Vital Signs Last 24 Hrs  T(C): 37.1 (09 Feb 2018 14:08), Max: 37.1 (09 Feb 2018 01:13)  T(F): 98.8 (09 Feb 2018 14:08), Max: 98.8 (09 Feb 2018 01:13)  HR: 90 (09 Feb 2018 14:08) (86 - 96)  BP: 92/60 (09 Feb 2018 14:08) (92/60 - 107/70)  BP(mean): --  RR: 18 (09 Feb 2018 14:08) (16 - 18)  SpO2: 99% (09 Feb 2018 14:08) (95% - 99%)    General:  [x ] Alert  [x ] Oriented x      [ ] Lethargic  [ ] Agitated   [ x] Cachexia   [ ] Unarousable  [ x] Verbal  [ ] Non-Verbal    HEENT:  [ ] Normal   [x ] Dry mouth   [ ] ET Tube    [ ] Trach  [ ] Oral lesions    Lungs:   [x ] Clear [ ] Tachypnea  [ ] Audible excessive secretions   [ ] Rhonchi        [ ] Right [ ] Left [ ] Bilateral  [ ] Crackles        [ ] Right [ ] Left [ ] Bilateral  [ ] Wheezing     [ ] Right [ ] Left [ ] Bilateral    Cardiovascular:  [ x] Regular [ ] Irregular [ ] Tachycardia   [ ] Bradycardia  [ ] Murmur [ ] Other    Abdomen: [ ] Soft  [x ] Distended   [ ] +BS  [ ] Non tender [x ] Tender  [ ]PEG   [ ]OGT/ NGT   Last BM: 2/9   Ostomy  Biliary drain    Genitourinary: [x ] Normal [ ] Incontinent   [ ] Oliguria/Anuria   [ ] Lynn    Musculoskeletal:  [ ] Normal   [ x] Weakness  [ ] Bedbound/Wheelchair bound [ ] Edema    Neurological: [ x] No focal deficits  [ ] Cognitive impairment  [ ] Dysphagia [ ] Dysarthria [ ] Paresis [ ] Other     Skin: [x] DTI on sacrum   [ ] Pressure ulcer(s)                  [ ] Rash    LABS:                                        8.4    5.47  )-----------( 262      ( 09 Feb 2018 09:07 )             27.0       Shock: no [ ] Septic [ ] Cardiogenic [ ] Neurologic [ ] Hypovolemic  Vasopressors x none   Inotrophs x none    Oral Intake: [ ] Unable/mouth care only [ x] Minimal [ ] Moderate [ ] Full Capability    Diet: [ ] NPO [ ] Tube feeds [ ] TPN [ x] Other     RADIOLOGY & ADDITIONAL STUDIES: No new imaging today.    REFERRALS:   [ ] Chaplaincy  [ ] Hospice  [ ] Child Life  [ ] Social Work  [ ] Case management [x ] Holistic Therapy

## 2018-02-09 NOTE — PROGRESS NOTE ADULT - SUBJECTIVE AND OBJECTIVE BOX
Patient is a 47y old  Female who presents with a chief complaint of Abdominal Pain (24 Jan 2018 06:17)        SUBJECTIVE / OVERNIGHT EVENTS: pt denies chest pain, shortness of breath, nausea, vomiting    MEDICATIONS  (STANDING):  dextrose 10% + sodium chloride 0.45% 1000 milliLiter(s) (50 mL/Hr) IV Continuous <Continuous>  docusate sodium 100 milliGRAM(s) Oral three times a day  enoxaparin Injectable 30 milliGRAM(s) SubCutaneous daily  HYDROmorphone PCA (1 mG/mL) 30 milliLiter(s) PCA Continuous PCA Continuous  potassium acid phosphate/sodium acid phosphate tablet (K-PHOS No. 2) 1 Tablet(s) Oral four times a day with meals  senna 2 Tablet(s) Oral at bedtime    MEDICATIONS  (PRN):  HYDROmorphone PCA (1 mG/mL) Rescue Clinician Bolus 2 milliGRAM(s) IV Push every 1 hour PRN for Pain Scale GREATER THAN 6  naloxone Injectable 0.1 milliGRAM(s) IV Push every 3 minutes PRN For ANY of the following changes in patient status:  A. RR LESS THAN 10 breaths per minute, B. Oxygen saturation LESS THAN 90%, C. Sedation score of 6  ondansetron Injectable 4 milliGRAM(s) IV Push every 8 hours PRN Nausea and/or Vomiting      Vital Signs Last 24 Hrs  T(C): 36.7 (09 Feb 2018 21:53), Max: 37.1 (09 Feb 2018 01:13)  T(F): 98.1 (09 Feb 2018 21:53), Max: 98.8 (09 Feb 2018 01:13)  HR: 91 (09 Feb 2018 21:53) (86 - 96)  BP: 97/67 (09 Feb 2018 21:53) (92/60 - 107/70)  BP(mean): --  RR: 18 (09 Feb 2018 21:53) (16 - 18)  SpO2: 97% (09 Feb 2018 21:53) (95% - 99%)    Constitutional: No fever, fatigue  Skin: No rash.  Eyes: No recent vision problems or eye pain.  ENT: No congestion, ear pain, or sore throat.  Cardiovascular: No chest pain or palpation.  Respiratory: No cough, shortness of breath, congestion, or wheezing.  Gastrointestinal: No  nausea, vomiting, or diarrhea. bad discomfort +  Genitourinary: No dysuria.  Musculoskeletal: No joint swelling.  Neurologic: No headache.    PHYSICAL EXAM:  GENERAL: NAD  EYES: EOMI, PERRLA  NECK: Supple, No JVD  CHEST/LUNG: dec breath sounds at bases   HEART:  S1 , S2 +  ABDOMEN: mild distension+, ruq drain +  NEUROLOGY: alert awake oriented     LABS:        Creatinine Trend: 0.31 <--, 0.23 <--, 0.22 <--, 0.28 <--                        8.4    5.47  )-----------( 262      ( 09 Feb 2018 09:07 )             27.0     Urine Studies:

## 2018-02-09 NOTE — PROGRESS NOTE ADULT - SUBJECTIVE AND OBJECTIVE BOX
[facility]  Carter Genao PA-C  [address] [place] [address] [phone]      INTERVAL HPI/OVERNIGHT EVENTS: no new events    MEDICATIONS  (STANDING):  dextrose 10% + sodium chloride 0.45% 1000 milliLiter(s) (50 mL/Hr) IV Continuous <Continuous>  docusate sodium 100 milliGRAM(s) Oral three times a day  enoxaparin Injectable 30 milliGRAM(s) SubCutaneous daily  ertapenem  IVPB 1000 milliGRAM(s) IV Intermittent every 24 hours  fluconAZOLE IVPB      fluconAZOLE IVPB 200 milliGRAM(s) IV Intermittent every 24 hours  HYDROmorphone PCA (1 mG/mL) 30 milliLiter(s) PCA Continuous PCA Continuous  potassium acid phosphate/sodium acid phosphate tablet (K-PHOS No. 2) 1 Tablet(s) Oral four times a day with meals  senna 2 Tablet(s) Oral at bedtime    MEDICATIONS  (PRN):  HYDROmorphone PCA (1 mG/mL) Rescue Clinician Bolus 1.6 milliGRAM(s) IV Push every 1 hour PRN for Pain Scale GREATER THAN 6  naloxone Injectable 0.1 milliGRAM(s) IV Push every 3 minutes PRN For ANY of the following changes in patient status:  A. RR LESS THAN 10 breaths per minute, B. Oxygen saturation LESS THAN 90%, C. Sedation score of 6  ondansetron Injectable 4 milliGRAM(s) IV Push every 8 hours PRN Nausea and/or Vomiting      Allergies    penicillin (Rash)    Intolerances        ROS:   General:  No wt loss, fevers, chills, night sweats, fatigue,   Eyes:  Good vision, no reported pain  ENT:  No sore throat, pain, runny nose, dysphagia  CV:  No pain, palpitations, hypo/hypertension  Resp:  No dyspnea, cough, tachypnea, wheezing  GI:  No pain, No nausea, No vomiting, No diarrhea, No constipation, No weight loss, No fever, No pruritis, No rectal bleeding, No tarry stools, No dysphagia,  :  No pain, bleeding, incontinence, nocturia  Muscle:  No pain, weakness  Neuro:  No weakness, tingling, memory problems  Psych:  No fatigue, insomnia, mood problems, depression  Endocrine:  No polyuria, polydipsia, cold/heat intolerance  Heme:  No petechiae, ecchymosis, easy bruisability  Skin:  No rash, tattoos, scars, edema      PHYSICAL EXAM:   Vital Signs:  Vital Signs Last 24 Hrs  T(C): 37.2 (06 Feb 2018 10:41), Max: 37.2 (06 Feb 2018 10:41)  T(F): 99 (06 Feb 2018 10:41), Max: 99 (06 Feb 2018 10:41)  HR: 92 (06 Feb 2018 10:41) (83 - 96)  BP: 92/63 (06 Feb 2018 10:41) (90/67 - 98/66)  BP(mean): --  RR: 18 (06 Feb 2018 10:41) (18 - 18)  SpO2: 99% (06 Feb 2018 10:41) (98% - 100%)  Daily     Daily     GENERAL:  Appears stated age, well-groomed, well-nourished, no distress  HEENT:  NC/AT,  conjunctivae clear and pink, no thyromegaly, nodules, adenopathy, no JVD, sclera -anicteric  CHEST:  Full & symmetric excursion, no increased effort, breath sounds clear  HEART:  Regular rhythm, S1, S2, no murmur/rub/S3/S4, no abdominal bruit, no edema  ABDOMEN:  Soft, non-tender, non-distended, normoactive bowel sounds,  no masses ,no hepato-splenomegaly, no signs of chronic liver disease  EXTEREMITIES:  no cyanosis,clubbing or edema  SKIN:  No rash/erythema/ecchymoses/petechiae/wounds/abscess/warm/dry  NEURO:  Alert, oriented, no asterixis, no tremor, no encephalopathy      LABS:                        7.1    6.28  )-----------( 212      ( 05 Feb 2018 09:05 )             22.9     02-06    143  |  102  |  8   ----------------------------<  72  4.3   |  31  |  0.23<L>    Ca    7.9<L>      06 Feb 2018 08:22            RADIOLOGY & ADDITIONAL TESTS:

## 2018-02-09 NOTE — PROGRESS NOTE ADULT - PROBLEM SELECTOR PROBLEM 6
Palliative care by specialist
Nausea
Palliative care by specialist
Palliative care by specialist

## 2018-02-09 NOTE — PROGRESS NOTE ADULT - SUBJECTIVE AND OBJECTIVE BOX
Patient is a 47y old  Female who presents with a chief complaint of Abdominal Pain (24 Jan 2018 06:17)    Being followed by ID for GB perforation,ESBl    Interval history:feels well  Some abd pain but well controlled  No acute events      ROS:  No cough,SOB,CP  No N/V/D.  No other complaints      Antimicrobials:None       Other medications reviewed    Vital Signs Last 24 Hrs  T(C): 36.8 (02-09-18 @ 09:48), Max: 37.1 (02-09-18 @ 01:13)  T(F): 98.2 (02-09-18 @ 09:48), Max: 98.8 (02-09-18 @ 01:13)  HR: 96 (02-09-18 @ 09:48) (70 - 96)  BP: 100/67 (02-09-18 @ 09:48) (94/69 - 107/70)  BP(mean): --  RR: 18 (02-09-18 @ 09:48) (16 - 18)  SpO2: 99% (02-09-18 @ 09:48) (95% - 99%)    Physical Exam:    cachectic    HEENT PERRLA EOMI    No oral exudate or erythema    Chest Good AE,CTA    CVS RRR S1 S2 WNl No murmur or rub or gallop    Abd soft BS normal No tenderness no masses Colostomy  GB RUQ drain     PICC site no erythema tenderness or discharge  R SC mediport no erythema or tenderness    CNS AAO X 3 no focal    Lab Data:                          8.4    5.47  )-----------( 262      ( 09 Feb 2018 09:07 )             27.0

## 2018-02-09 NOTE — PROGRESS NOTE ADULT - PROBLEM SELECTOR PROBLEM 7
Palliative care by specialist

## 2018-02-09 NOTE — PROGRESS NOTE ADULT - ASSESSMENT
, 47F with metastatic ovarian CA (s/p TAHBSO, colostomy, previously seeking holistic care 12/2017 however has since established with new oncologist for chemo) p/w abdominal pain.  Now with bowel obstruction  previously s/p colostomy,wright  GB perforation  Seen and followed by surgery,Poor surgical candidate -no intervention for now per surgery  s/p IR drainage  SIRS,sepsis resolving  PCN allergy-but remote and tolerating Invanz  Biliary cultures with ESBL  E Coli and yeast  Also s/p recent chemo       ID -  s/p abx , ct abd with improvement < from: CT Abdomen and Pelvis w/ Oral Cont and w/ IV Cont (02.06.18 @ 10:04) >  Significantly improved gallbladder fossa fluid collection.    Improved small bowel obstruction.    Metastatic disease is unchanged since recent prior CT.      < end of copied text >    Surg - no acute surgical intervention   GI - -pain control, monitor GI function closely, monitor drain outputs    Palliative input appreciated , cont pca   discussed management plan in detail in  length with pt

## 2018-02-09 NOTE — PROGRESS NOTE ADULT - ASSESSMENT
Patient is a 48 yo F with metastatic ovarian CA (s/p TAHBSO, s/p colostomy, with holistic care approach, started chemo on 12/2017 carboplatin/taxol every Wednesday, due for tx today). Admitted to MICU under the impression of malignant SBO, sepsis, GB perforation (s/p biliary drain), hypoglycemia, and worsening metastatic disease. Palliative Care consult placed for pain management and GOC.

## 2018-02-09 NOTE — PROGRESS NOTE ADULT - PROBLEM SELECTOR PROBLEM 1
Ovarian cancer
Right upper quadrant abdominal pain
Ovarian cancer
Back pain
Ovarian cancer

## 2018-02-09 NOTE — CHART NOTE - NSCHARTNOTEFT_GEN_A_CORE
Source: Patient [ x]    Family [ ]     other [ x] medical record    Diet : Regular, Glucerna- 2 per day (additional 440cal, 19.8gm protein daily).       Hospital course:  48 yo F with metastatic ovarian CA (s/p TAHBSO, s/p colostomy, with holistic care approach, started chemo on 12/2017 carboplatin/taxol every Wednesday, due for tx today). Admitted to MICU under the impression of malignant SBO, sepsis, GB perforation, hypoglycemia, and worsening metastatic disease, followed by palliative care  for pain management and GOC.    Pt seen for malnutrition follow up. Pt reports good appetite and intake, observed breakfast tray ,100% consumed. Pt received Glucerna today, reports liking better than Ensure- will continue with Glucerna. Pt denies GI distress at this time.        PO intake:  < 50% [ ] 50-75% [ ]   % [ x]  other :     Source for PO intake [ x] Patient [ ] family [ ] chart [ ] staff [ ] other    Current Weight: No new weight to assess      Pertinent Medications: MEDICATIONS  (STANDING):  dextrose 10% + sodium chloride 0.45% 1000 milliLiter(s) (50 mL/Hr) IV Continuous <Continuous>  docusate sodium 100 milliGRAM(s) Oral three times a day  enoxaparin Injectable 30 milliGRAM(s) SubCutaneous daily  fluconAZOLE IVPB      fluconAZOLE IVPB 200 milliGRAM(s) IV Intermittent every 24 hours  HYDROmorphone PCA (1 mG/mL) 30 milliLiter(s) PCA Continuous PCA Continuous  potassium acid phosphate/sodium acid phosphate tablet (K-PHOS No. 2) 1 Tablet(s) Oral four times a day with meals  senna 2 Tablet(s) Oral at bedtime    MEDICATIONS  (PRN):  HYDROmorphone PCA (1 mG/mL) Rescue Clinician Bolus 2 milliGRAM(s) IV Push every 1 hour PRN for Pain Scale GREATER THAN 6  naloxone Injectable 0.1 milliGRAM(s) IV Push every 3 minutes PRN For ANY of the following changes in patient status:  A. RR LESS THAN 10 breaths per minute, B. Oxygen saturation LESS THAN 90%, C. Sedation score of 6  ondansetron Injectable 4 milliGRAM(s) IV Push every 8 hours PRN Nausea and/or Vomiting    Pertinent Labs:  Point of Care Testing BG: (2/9) 98, (2/8)     Skin: No edema, stage 2 sacrum pressure ulcer.    Estimated Needs:   [x ] no change since previous assessment  [ ] recalculated:       Previous Nutrition Diagnosis:     [ x] Malnutrition          Nutrition Diagnosis is [ x] ongoing  [ ] resolved [ ] not applicable          New Nutrition Diagnosis: [x ] not applicable        Recommend    [ ] Change Diet To:    [x ] Nutrition Supplement: Continue Glucerna- 2 per day (additional 440cal, 19.8gm protein daily).     [ ] Nutrition Support    [x ] Other:  1. Provide food preferences as requested by Pt/family within diet restrictions  2. Encourage PO intake during meal times 3. Provide feeding assistance as needed       Monitoring and Evaluation:     [ x] PO intake [ x] Tolerance to diet prescription [ x] weights [x ] follow up per protocol    [x ] other: RD remains available, Jeanne Browning RD pager #[phone].

## 2018-02-10 LAB
GLUCOSE BLDC GLUCOMTR-MCNC: 102 MG/DL — HIGH (ref 70–99)
GLUCOSE BLDC GLUCOMTR-MCNC: 112 MG/DL — HIGH (ref 70–99)
GLUCOSE BLDC GLUCOMTR-MCNC: 318 MG/DL — HIGH (ref 70–99)
GLUCOSE BLDC GLUCOMTR-MCNC: 327 MG/DL — HIGH (ref 70–99)
GLUCOSE BLDC GLUCOMTR-MCNC: 94 MG/DL — SIGNIFICANT CHANGE UP (ref 70–99)
GLUCOSE BLDC GLUCOMTR-MCNC: 99 MG/DL — SIGNIFICANT CHANGE UP (ref 70–99)

## 2018-02-10 RX ADMIN — Medication 1 TABLET(S): at 13:30

## 2018-02-10 RX ADMIN — HYDROMORPHONE HYDROCHLORIDE 30 MILLILITER(S): 2 INJECTION INTRAMUSCULAR; INTRAVENOUS; SUBCUTANEOUS at 13:06

## 2018-02-10 RX ADMIN — Medication 1 TABLET(S): at 00:10

## 2018-02-10 RX ADMIN — Medication 100 MILLIGRAM(S): at 21:34

## 2018-02-10 RX ADMIN — Medication 100 MILLIGRAM(S): at 00:09

## 2018-02-10 RX ADMIN — ENOXAPARIN SODIUM 30 MILLIGRAM(S): 100 INJECTION SUBCUTANEOUS at 11:53

## 2018-02-10 RX ADMIN — Medication 1 TABLET(S): at 09:39

## 2018-02-10 RX ADMIN — HYDROMORPHONE HYDROCHLORIDE 30 MILLILITER(S): 2 INJECTION INTRAMUSCULAR; INTRAVENOUS; SUBCUTANEOUS at 19:13

## 2018-02-10 RX ADMIN — HYDROMORPHONE HYDROCHLORIDE 30 MILLILITER(S): 2 INJECTION INTRAMUSCULAR; INTRAVENOUS; SUBCUTANEOUS at 07:14

## 2018-02-10 RX ADMIN — Medication 1 TABLET(S): at 18:03

## 2018-02-10 RX ADMIN — SENNA PLUS 2 TABLET(S): 8.6 TABLET ORAL at 21:34

## 2018-02-10 RX ADMIN — Medication 100 MILLIGRAM(S): at 09:39

## 2018-02-10 RX ADMIN — SENNA PLUS 2 TABLET(S): 8.6 TABLET ORAL at 00:09

## 2018-02-10 RX ADMIN — SODIUM CHLORIDE 50 MILLILITER(S): 9 INJECTION, SOLUTION INTRAVENOUS at 11:00

## 2018-02-10 RX ADMIN — Medication 100 MILLIGRAM(S): at 13:30

## 2018-02-10 NOTE — PROGRESS NOTE ADULT - SUBJECTIVE AND OBJECTIVE BOX
Patient is a 47y old  Female who presents with a chief complaint of Abdominal Pain (24 Jan 2018 06:17)        SUBJECTIVE / OVERNIGHT EVENTS: pt denies chest pain, shortness of breath, nausea, vomiting    MEDICATIONS  (STANDING):  dextrose 10% + sodium chloride 0.45% 1000 milliLiter(s) (50 mL/Hr) IV Continuous <Continuous>  docusate sodium 100 milliGRAM(s) Oral three times a day  enoxaparin Injectable 30 milliGRAM(s) SubCutaneous daily  HYDROmorphone PCA (1 mG/mL) 30 milliLiter(s) PCA Continuous PCA Continuous  potassium acid phosphate/sodium acid phosphate tablet (K-PHOS No. 2) 1 Tablet(s) Oral four times a day with meals  senna 2 Tablet(s) Oral at bedtime    MEDICATIONS  (PRN):  HYDROmorphone PCA (1 mG/mL) Rescue Clinician Bolus 2 milliGRAM(s) IV Push every 1 hour PRN for Pain Scale GREATER THAN 6  naloxone Injectable 0.1 milliGRAM(s) IV Push every 3 minutes PRN For ANY of the following changes in patient status:  A. RR LESS THAN 10 breaths per minute, B. Oxygen saturation LESS THAN 90%, C. Sedation score of 6  ondansetron Injectable 4 milliGRAM(s) IV Push every 8 hours PRN Nausea and/or Vomiting      Vital Signs Last 24 Hrs  T(C): 37.4 (10 Feb 2018 21:30), Max: 37.4 (10 Feb 2018 21:30)  T(F): 99.3 (10 Feb 2018 21:30), Max: 99.3 (10 Feb 2018 21:30)  HR: 93 (10 Feb 2018 21:30) (82 - 93)  BP: 115/78 (10 Feb 2018 21:30) (96/66 - 115/78)  BP(mean): --  RR: 17 (10 Feb 2018 21:30) (17 - 18)  SpO2: 98% (10 Feb 2018 21:30) (96% - 99%)    Constitutional: No fever, fatigue  Skin: No rash.  Eyes: No recent vision problems or eye pain.  ENT: No congestion, ear pain, or sore throat.  Cardiovascular: No chest pain or palpation.  Respiratory: No cough, shortness of breath, congestion, or wheezing.  Gastrointestinal: No  nausea, vomiting, or diarrhea. bad discomfort +  Genitourinary: No dysuria.  Musculoskeletal: No joint swelling.  Neurologic: No headache.    PHYSICAL EXAM:  GENERAL: NAD  EYES: EOMI, PERRLA  NECK: Supple, No JVD  CHEST/LUNG: dec breath sounds at bases   HEART:  S1 , S2 +  ABDOMEN: mild distension+, ruq drain +  NEUROLOGY: alert awake oriented     LABS:        Creatinine Trend: 0.31 <--, 0.23 <--, 0.22 <--                        8.4    5.47  )-----------( 262      ( 09 Feb 2018 09:07 )             27.0     Urine Studies:

## 2018-02-11 LAB
ANION GAP SERPL CALC-SCNC: 11 MMOL/L — SIGNIFICANT CHANGE UP (ref 5–17)
BUN SERPL-MCNC: 10 MG/DL — SIGNIFICANT CHANGE UP (ref 7–23)
CALCIUM SERPL-MCNC: 8.1 MG/DL — LOW (ref 8.4–10.5)
CHLORIDE SERPL-SCNC: 99 MMOL/L — SIGNIFICANT CHANGE UP (ref 96–108)
CO2 SERPL-SCNC: 30 MMOL/L — SIGNIFICANT CHANGE UP (ref 22–31)
CREAT SERPL-MCNC: 0.32 MG/DL — LOW (ref 0.5–1.3)
GLUCOSE BLDC GLUCOMTR-MCNC: 114 MG/DL — HIGH (ref 70–99)
GLUCOSE BLDC GLUCOMTR-MCNC: 121 MG/DL — HIGH (ref 70–99)
GLUCOSE BLDC GLUCOMTR-MCNC: 123 MG/DL — HIGH (ref 70–99)
GLUCOSE BLDC GLUCOMTR-MCNC: 92 MG/DL — SIGNIFICANT CHANGE UP (ref 70–99)
GLUCOSE SERPL-MCNC: 72 MG/DL — SIGNIFICANT CHANGE UP (ref 70–99)
HCT VFR BLD CALC: 25.3 % — LOW (ref 34.5–45)
HGB BLD-MCNC: 8.4 G/DL — LOW (ref 11.5–15.5)
MCHC RBC-ENTMCNC: 31.2 PG — SIGNIFICANT CHANGE UP (ref 27–34)
MCHC RBC-ENTMCNC: 33.2 GM/DL — SIGNIFICANT CHANGE UP (ref 32–36)
MCV RBC AUTO: 93.8 FL — SIGNIFICANT CHANGE UP (ref 80–100)
PLATELET # BLD AUTO: 299 K/UL — SIGNIFICANT CHANGE UP (ref 150–400)
POTASSIUM SERPL-MCNC: 4 MMOL/L — SIGNIFICANT CHANGE UP (ref 3.5–5.3)
POTASSIUM SERPL-SCNC: 4 MMOL/L — SIGNIFICANT CHANGE UP (ref 3.5–5.3)
RBC # BLD: 2.7 M/UL — LOW (ref 3.8–5.2)
RBC # FLD: 15 % — HIGH (ref 10.3–14.5)
SODIUM SERPL-SCNC: 140 MMOL/L — SIGNIFICANT CHANGE UP (ref 135–145)
WBC # BLD: 4.6 K/UL — SIGNIFICANT CHANGE UP (ref 3.8–10.5)
WBC # FLD AUTO: 4.6 K/UL — SIGNIFICANT CHANGE UP (ref 3.8–10.5)

## 2018-02-11 RX ADMIN — Medication 1 TABLET(S): at 16:55

## 2018-02-11 RX ADMIN — Medication 100 MILLIGRAM(S): at 21:44

## 2018-02-11 RX ADMIN — Medication 100 MILLIGRAM(S): at 13:18

## 2018-02-11 RX ADMIN — Medication 1 TABLET(S): at 10:27

## 2018-02-11 RX ADMIN — ENOXAPARIN SODIUM 30 MILLIGRAM(S): 100 INJECTION SUBCUTANEOUS at 11:32

## 2018-02-11 RX ADMIN — HYDROMORPHONE HYDROCHLORIDE 30 MILLILITER(S): 2 INJECTION INTRAMUSCULAR; INTRAVENOUS; SUBCUTANEOUS at 06:10

## 2018-02-11 RX ADMIN — SENNA PLUS 2 TABLET(S): 8.6 TABLET ORAL at 21:44

## 2018-02-11 RX ADMIN — Medication 1 TABLET(S): at 13:18

## 2018-02-11 RX ADMIN — Medication 1 TABLET(S): at 21:44

## 2018-02-11 RX ADMIN — HYDROMORPHONE HYDROCHLORIDE 30 MILLILITER(S): 2 INJECTION INTRAMUSCULAR; INTRAVENOUS; SUBCUTANEOUS at 18:01

## 2018-02-11 RX ADMIN — HYDROMORPHONE HYDROCHLORIDE 30 MILLILITER(S): 2 INJECTION INTRAMUSCULAR; INTRAVENOUS; SUBCUTANEOUS at 04:24

## 2018-02-11 NOTE — PROGRESS NOTE ADULT - ASSESSMENT
, 47F with metastatic ovarian CA (s/p TAHBSO, colostomy, previously seeking holistic care 12/2017 however has since established with new oncologist for chemo) p/w abdominal pain.  Now with bowel obstruction  previously s/p colostomy,wright  GB perforation  Seen and followed by surgery,Poor surgical candidate -no intervention for now per surgery  s/p IR drainage  SIRS,sepsis resolVED   PCN allergy-but remote and tolerating Invanz  Biliary cultures with ESBL  E Coli and yeast  Also s/p recent chemo       ID -  s/p abx , ct abd with improvement < from: CT Abdomen and Pelvis w/ Oral Cont and w/ IV Cont (02.06.18 @ 10:04) >  Significantly improved gallbladder fossa fluid collection.    Improved small bowel obstruction.    Metastatic disease is unchanged since recent prior CT.      < end of copied text >    Surg - no acute surgical intervention   GI - -pain control, monitor GI function closely, monitor drain outputs    Palliative input appreciated , cont pca   discussed management plan in detail in  length with pt

## 2018-02-11 NOTE — PROGRESS NOTE ADULT - SUBJECTIVE AND OBJECTIVE BOX
Patient is a 47y old  Female who presents with a chief complaint of Abdominal Pain (24 Jan 2018 06:17)        SUBJECTIVE / OVERNIGHT EVENTS: pt denies chest pain, shortness of breath, nausea, vomiting    MEDICATIONS  (STANDING):  dextrose 10% + sodium chloride 0.45% 1000 milliLiter(s) (50 mL/Hr) IV Continuous <Continuous>  docusate sodium 100 milliGRAM(s) Oral three times a day  enoxaparin Injectable 30 milliGRAM(s) SubCutaneous daily  HYDROmorphone PCA (1 mG/mL) 30 milliLiter(s) PCA Continuous PCA Continuous  potassium acid phosphate/sodium acid phosphate tablet (K-PHOS No. 2) 1 Tablet(s) Oral four times a day with meals  senna 2 Tablet(s) Oral at bedtime    MEDICATIONS  (PRN):  HYDROmorphone PCA (1 mG/mL) Rescue Clinician Bolus 2 milliGRAM(s) IV Push every 1 hour PRN for Pain Scale GREATER THAN 6  naloxone Injectable 0.1 milliGRAM(s) IV Push every 3 minutes PRN For ANY of the following changes in patient status:  A. RR LESS THAN 10 breaths per minute, B. Oxygen saturation LESS THAN 90%, C. Sedation score of 6  ondansetron Injectable 4 milliGRAM(s) IV Push every 8 hours PRN Nausea and/or Vomiting    Vital Signs Last 24 Hrs  T(C): 37 (11 Feb 2018 21:29), Max: 37 (11 Feb 2018 16:46)  T(F): 98.6 (11 Feb 2018 21:29), Max: 98.6 (11 Feb 2018 16:46)  HR: 90 (11 Feb 2018 21:29) (84 - 92)  BP: 92/62 (11 Feb 2018 21:29) (92/60 - 99/68)  BP(mean): --  RR: 17 (11 Feb 2018 21:29) (17 - 18)  SpO2: 98% (11 Feb 2018 21:29) (96% - 98%)    Constitutional: No fever, fatigue  Skin: No rash.  Eyes: No recent vision problems or eye pain.  ENT: No congestion, ear pain, or sore throat.  Cardiovascular: No chest pain or palpation.  Respiratory: No cough, shortness of breath, congestion, or wheezing.  Gastrointestinal: No  nausea, vomiting, or diarrhea. bad discomfort +  Genitourinary: No dysuria.  Musculoskeletal: No joint swelling.  Neurologic: No headache.    PHYSICAL EXAM:  GENERAL: NAD  EYES: EOMI, PERRLA  NECK: Supple, No JVD  CHEST/LUNG: dec breath sounds at bases   HEART:  S1 , S2 +  ABDOMEN: mild distension+, ruq drain +  NEUROLOGY: alert awake oriented     LABS:  02-11    140  |  99  |  10  ----------------------------<  72  4.0   |  30  |  0.32<L>    Ca    8.1<L>      11 Feb 2018 07:23      Creatinine Trend: 0.32 <--, 0.31 <--, 0.23 <--                        8.4    4.6   )-----------( 299      ( 11 Feb 2018 07:23 )             25.3     Urine Studies:

## 2018-02-12 ENCOUNTER — TRANSCRIPTION ENCOUNTER (OUTPATIENT)
Age: 48
End: 2018-02-12

## 2018-02-12 VITALS
SYSTOLIC BLOOD PRESSURE: 103 MMHG | HEART RATE: 93 BPM | RESPIRATION RATE: 16 BRPM | OXYGEN SATURATION: 98 % | TEMPERATURE: 98 F | DIASTOLIC BLOOD PRESSURE: 72 MMHG

## 2018-02-12 LAB
ANION GAP SERPL CALC-SCNC: 10 MMOL/L — SIGNIFICANT CHANGE UP (ref 5–17)
BUN SERPL-MCNC: 12 MG/DL — SIGNIFICANT CHANGE UP (ref 7–23)
CALCIUM SERPL-MCNC: 8.3 MG/DL — LOW (ref 8.4–10.5)
CHLORIDE SERPL-SCNC: 100 MMOL/L — SIGNIFICANT CHANGE UP (ref 96–108)
CO2 SERPL-SCNC: 29 MMOL/L — SIGNIFICANT CHANGE UP (ref 22–31)
CREAT SERPL-MCNC: 0.27 MG/DL — LOW (ref 0.5–1.3)
GLUCOSE BLDC GLUCOMTR-MCNC: 94 MG/DL — SIGNIFICANT CHANGE UP (ref 70–99)
GLUCOSE BLDC GLUCOMTR-MCNC: 96 MG/DL — SIGNIFICANT CHANGE UP (ref 70–99)
GLUCOSE SERPL-MCNC: 78 MG/DL — SIGNIFICANT CHANGE UP (ref 70–99)
HCT VFR BLD CALC: 25 % — LOW (ref 34.5–45)
HGB BLD-MCNC: 8.1 G/DL — LOW (ref 11.5–15.5)
MCHC RBC-ENTMCNC: 30.1 PG — SIGNIFICANT CHANGE UP (ref 27–34)
MCHC RBC-ENTMCNC: 32.4 GM/DL — SIGNIFICANT CHANGE UP (ref 32–36)
MCV RBC AUTO: 92.9 FL — SIGNIFICANT CHANGE UP (ref 80–100)
PLATELET # BLD AUTO: 340 K/UL — SIGNIFICANT CHANGE UP (ref 150–400)
POTASSIUM SERPL-MCNC: 4.1 MMOL/L — SIGNIFICANT CHANGE UP (ref 3.5–5.3)
POTASSIUM SERPL-SCNC: 4.1 MMOL/L — SIGNIFICANT CHANGE UP (ref 3.5–5.3)
RBC # BLD: 2.69 M/UL — LOW (ref 3.8–5.2)
RBC # FLD: 16.2 % — HIGH (ref 10.3–14.5)
SODIUM SERPL-SCNC: 139 MMOL/L — SIGNIFICANT CHANGE UP (ref 135–145)
WBC # BLD: 4.03 K/UL — SIGNIFICANT CHANGE UP (ref 3.8–10.5)
WBC # FLD AUTO: 4.03 K/UL — SIGNIFICANT CHANGE UP (ref 3.8–10.5)

## 2018-02-12 PROCEDURE — 86923 COMPATIBILITY TEST ELECTRIC: CPT

## 2018-02-12 PROCEDURE — 96374 THER/PROPH/DIAG INJ IV PUSH: CPT | Mod: XU

## 2018-02-12 PROCEDURE — 87486 CHLMYD PNEUM DNA AMP PROBE: CPT

## 2018-02-12 PROCEDURE — 87798 DETECT AGENT NOS DNA AMP: CPT

## 2018-02-12 PROCEDURE — C1769: CPT

## 2018-02-12 PROCEDURE — 74018 RADEX ABDOMEN 1 VIEW: CPT

## 2018-02-12 PROCEDURE — 97110 THERAPEUTIC EXERCISES: CPT

## 2018-02-12 PROCEDURE — 83605 ASSAY OF LACTIC ACID: CPT

## 2018-02-12 PROCEDURE — 74177 CT ABD & PELVIS W/CONTRAST: CPT

## 2018-02-12 PROCEDURE — 87070 CULTURE OTHR SPECIMN AEROBIC: CPT

## 2018-02-12 PROCEDURE — 71045 X-RAY EXAM CHEST 1 VIEW: CPT

## 2018-02-12 PROCEDURE — 85027 COMPLETE CBC AUTOMATED: CPT

## 2018-02-12 PROCEDURE — 87581 M.PNEUMON DNA AMP PROBE: CPT

## 2018-02-12 PROCEDURE — 97530 THERAPEUTIC ACTIVITIES: CPT

## 2018-02-12 PROCEDURE — 82565 ASSAY OF CREATININE: CPT

## 2018-02-12 PROCEDURE — 87186 SC STD MICRODIL/AGAR DIL: CPT

## 2018-02-12 PROCEDURE — P9011: CPT

## 2018-02-12 PROCEDURE — 87075 CULTR BACTERIA EXCEPT BLOOD: CPT

## 2018-02-12 PROCEDURE — 85610 PROTHROMBIN TIME: CPT

## 2018-02-12 PROCEDURE — 36430 TRANSFUSION BLD/BLD COMPNT: CPT

## 2018-02-12 PROCEDURE — C1729: CPT

## 2018-02-12 PROCEDURE — 80076 HEPATIC FUNCTION PANEL: CPT

## 2018-02-12 PROCEDURE — 97161 PT EVAL LOW COMPLEX 20 MIN: CPT

## 2018-02-12 PROCEDURE — 85730 THROMBOPLASTIN TIME PARTIAL: CPT

## 2018-02-12 PROCEDURE — 82962 GLUCOSE BLOOD TEST: CPT

## 2018-02-12 PROCEDURE — 80202 ASSAY OF VANCOMYCIN: CPT

## 2018-02-12 PROCEDURE — 81001 URINALYSIS AUTO W/SCOPE: CPT

## 2018-02-12 PROCEDURE — 84132 ASSAY OF SERUM POTASSIUM: CPT

## 2018-02-12 PROCEDURE — 82435 ASSAY OF BLOOD CHLORIDE: CPT

## 2018-02-12 PROCEDURE — 82330 ASSAY OF CALCIUM: CPT

## 2018-02-12 PROCEDURE — 87205 SMEAR GRAM STAIN: CPT

## 2018-02-12 PROCEDURE — 82803 BLOOD GASES ANY COMBINATION: CPT

## 2018-02-12 PROCEDURE — P9016: CPT

## 2018-02-12 PROCEDURE — 87040 BLOOD CULTURE FOR BACTERIA: CPT

## 2018-02-12 PROCEDURE — 87086 URINE CULTURE/COLONY COUNT: CPT

## 2018-02-12 PROCEDURE — 97116 GAIT TRAINING THERAPY: CPT

## 2018-02-12 PROCEDURE — 84702 CHORIONIC GONADOTROPIN TEST: CPT

## 2018-02-12 PROCEDURE — 84295 ASSAY OF SERUM SODIUM: CPT

## 2018-02-12 PROCEDURE — 80048 BASIC METABOLIC PNL TOTAL CA: CPT

## 2018-02-12 PROCEDURE — 86901 BLOOD TYPING SEROLOGIC RH(D): CPT

## 2018-02-12 PROCEDURE — 86900 BLOOD TYPING SEROLOGIC ABO: CPT

## 2018-02-12 PROCEDURE — 84100 ASSAY OF PHOSPHORUS: CPT

## 2018-02-12 PROCEDURE — 49406 IMAGE CATH FLUID PERI/RETRO: CPT

## 2018-02-12 PROCEDURE — 85014 HEMATOCRIT: CPT

## 2018-02-12 PROCEDURE — 80053 COMPREHEN METABOLIC PANEL: CPT

## 2018-02-12 PROCEDURE — 87633 RESP VIRUS 12-25 TARGETS: CPT

## 2018-02-12 PROCEDURE — 99285 EMERGENCY DEPT VISIT HI MDM: CPT | Mod: 25

## 2018-02-12 PROCEDURE — 99232 SBSQ HOSP IP/OBS MODERATE 35: CPT

## 2018-02-12 PROCEDURE — 97112 NEUROMUSCULAR REEDUCATION: CPT

## 2018-02-12 PROCEDURE — 86850 RBC ANTIBODY SCREEN: CPT

## 2018-02-12 PROCEDURE — 82947 ASSAY GLUCOSE BLOOD QUANT: CPT

## 2018-02-12 PROCEDURE — 83735 ASSAY OF MAGNESIUM: CPT

## 2018-02-12 RX ORDER — HYDROMORPHONE HYDROCHLORIDE 2 MG/ML
0 INJECTION INTRAMUSCULAR; INTRAVENOUS; SUBCUTANEOUS
Qty: 0 | Refills: 0 | COMMUNITY

## 2018-02-12 RX ORDER — SODIUM,POTASSIUM PHOSPHATES 278-250MG
1 POWDER IN PACKET (EA) ORAL
Qty: 56 | Refills: 0 | OUTPATIENT
Start: 2018-02-12 | End: 2018-02-25

## 2018-02-12 RX ADMIN — HYDROMORPHONE HYDROCHLORIDE 30 MILLILITER(S): 2 INJECTION INTRAMUSCULAR; INTRAVENOUS; SUBCUTANEOUS at 07:30

## 2018-02-12 RX ADMIN — Medication 1 TABLET(S): at 13:22

## 2018-02-12 RX ADMIN — Medication 100 MILLIGRAM(S): at 13:22

## 2018-02-12 RX ADMIN — Medication 1 TABLET(S): at 08:57

## 2018-02-12 RX ADMIN — HYDROMORPHONE HYDROCHLORIDE 30 MILLILITER(S): 2 INJECTION INTRAMUSCULAR; INTRAVENOUS; SUBCUTANEOUS at 08:53

## 2018-02-12 RX ADMIN — ENOXAPARIN SODIUM 30 MILLIGRAM(S): 100 INJECTION SUBCUTANEOUS at 13:23

## 2018-02-12 NOTE — DISCHARGE NOTE ADULT - MEDICATION SUMMARY - MEDICATIONS TO TAKE
I will START or STAY ON the medications listed below when I get home from the hospital:    Rolling Walker  -- Indication: For Frailty    Commode  -- Indication: For Frailty    Hospital Bed  -- Indication: For Frailty    Olga Chair   -- Indication: For Frailty    Dilaudid  -- PCA   Initial demand dose - 1mg  Lockout - 15 min  Continuous rate - 1mg/hr  4 hour limit - 20mg  -- Indication: For Ovarian cancer    ondansetron 4 mg oral tablet, disintegrating  -- 1 tab(s) by mouth every 6 hours, As Needed nausea or vomiting  -- Indication: For Nausea    polyethylene glycol 3350 oral powder for reconstitution  -- 17 gram(s) by mouth once a day, As needed, Constipation  -- Indication: For Constipation    docusate sodium 100 mg oral capsule  -- 1 cap(s) by mouth 3 times a day  -- Indication: For Constipation    senna oral tablet  -- 2 tab(s) by mouth once a day (at bedtime)  -- Indication: For Constipation    potassium phosphate-sodium phosphate 305 mg-700 mg oral tablet  -- 1 tab(s) by mouth 4 times a day (with meals and at bedtime)  -- Indication: For Supplement

## 2018-02-12 NOTE — DISCHARGE NOTE ADULT - CARE PROVIDERS DIRECT ADDRESSES
,DirectAddress_Unknown,[email or web address],DirectAddress_Unknown

## 2018-02-12 NOTE — DISCHARGE NOTE ADULT - MEDICATION SUMMARY - MEDICATIONS TO CHANGE
I will SWITCH the dose or number of times a day I take the medications listed below when I get home from the hospital:    Dilaudid  -- PCA

## 2018-02-12 NOTE — CHART NOTE - NSCHARTNOTEFT_GEN_A_CORE
Source: Patient [ x]    Family [ ]     other [ x] medical record    Diet : Regular, Glucerna- 2 per day (additional 440cal, 19.8gm protein daily).     Hospital course: Per chart,  46 yo F with metastatic ovarian CA (s/p TAHBSO, s/p colostomy, with holistic care approach, started chemo on 12/2017 carboplatin/taxol every Wednesday, due for tx today). Admitted to MICU under the impression of malignant SBO, sepsis, GB perforation, hypoglycemia, and worsening metastatic disease, followed by palliative care  for pain management and GOC.      Pt seen for malnutrition follow up. Pt reports good appetite and intake continue, >75% of breakfast tray consumed. Pt noted to have thick ostomy output per chart, encouraged adequate fluid intake. Pt reports she believes she is staying hydrated.  If thick ostomy output continues, pt encouraged to limit foods that thicken stool such as bananas, pretzels, yogurt. Prunes and fruit juice can be given to loosen stool. Pt declined to have any nutrition-related questions/concerns at this time. Pt made aware RD remains available.        PO intake:  < 50% [ ] 50-75% [ ]   % [ x]  other :     Source for PO intake [ x] Patient [ ] family [ ] chart [ ] staff [ ] other      Current Weight: (1/25) 102.9 pounds       Pertinent Medications: MEDICATIONS  (STANDING):  dextrose 10% + sodium chloride 0.45% 1000 milliLiter(s) (50 mL/Hr) IV Continuous <Continuous>  docusate sodium 100 milliGRAM(s) Oral three times a day  enoxaparin Injectable 30 milliGRAM(s) SubCutaneous daily  HYDROmorphone PCA (1 mG/mL) 30 milliLiter(s) PCA Continuous PCA Continuous  potassium acid phosphate/sodium acid phosphate tablet (K-PHOS No. 2) 1 Tablet(s) Oral four times a day with meals  senna 2 Tablet(s) Oral at bedtime    MEDICATIONS  (PRN):  HYDROmorphone PCA (1 mG/mL) Rescue Clinician Bolus 2 milliGRAM(s) IV Push every 1 hour PRN for Pain Scale GREATER THAN 6  naloxone Injectable 0.1 milliGRAM(s) IV Push every 3 minutes PRN For ANY of the following changes in patient status:  A. RR LESS THAN 10 breaths per minute, B. Oxygen saturation LESS THAN 90%, C. Sedation score of 6  ondansetron Injectable 4 milliGRAM(s) IV Push every 8 hours PRN Nausea and/or Vomiting    Pertinent Labs:  02-12 Na139 mmol/L Glu 78 mg/dL K+ 4.1 mmol/L Cr  0.27 mg/dL<L> BUN 12 mg/dL      Skin: No edema. Stage II sacrum pressure ulcer.     Estimated Needs:   [ x] no change since previous assessment  [ ] recalculated:       Previous Nutrition Diagnosis:    [x ] Malnutrition          Nutrition Diagnosis is [ x] ongoing  [ ] resolved [ ] not applicable          New Nutrition Diagnosis: [ x] not applicable        Recommend    [ ] Change Diet To:    [ x] Nutrition Supplement: Continue Glucerna- 2 per day (additional 440cal, 19.8gm protein daily).     [ ] Nutrition Support    [ ] Other:        Monitoring and Evaluation:     [x ] PO intake [ x] Tolerance to diet prescription [x ] weights [x ] follow up per protocol    [ x] other: RD remains available, Jeanne Browning RD pager #[phone] Source: Patient [ x]    Family [ ]     other [ x] medical record    Diet : Regular, Glucerna- 2 per day (additional 440cal, 19.8gm protein daily).     Hospital course: Per chart,  46 yo F with metastatic ovarian CA (s/p TAHBSO, s/p colostomy, with holistic care approach, started chemo on 12/2017 carboplatin/taxol every Wednesday, due for tx today). Admitted to MICU under the impression of malignant SBO, sepsis, GB perforation, hypoglycemia, and worsening metastatic disease, followed by palliative care  for pain management and GOC.      Pt seen for malnutrition follow up. Pt reports good appetite and intake continue, >75% of breakfast tray consumed. Pt noted to have thick ostomy output per chart, encouraged adequate fluid intake. Pt reports she believes she is staying hydrated.  If thick ostomy output continues, pt encouraged to limit foods that thicken stool such as bananas, pretzels, peanut butter, pasta, yogurt. Prunes and fruit juice can be given to loosen stool. Pt declined to have any nutrition-related questions/concerns at this time. Pt made aware RD remains available.        PO intake:  < 50% [ ] 50-75% [ ]   % [ x]  other :     Source for PO intake [ x] Patient [ ] family [ ] chart [ ] staff [ ] other      Current Weight: (1/25) 102.9 pounds       Pertinent Medications: MEDICATIONS  (STANDING):  dextrose 10% + sodium chloride 0.45% 1000 milliLiter(s) (50 mL/Hr) IV Continuous <Continuous>  docusate sodium 100 milliGRAM(s) Oral three times a day  enoxaparin Injectable 30 milliGRAM(s) SubCutaneous daily  HYDROmorphone PCA (1 mG/mL) 30 milliLiter(s) PCA Continuous PCA Continuous  potassium acid phosphate/sodium acid phosphate tablet (K-PHOS No. 2) 1 Tablet(s) Oral four times a day with meals  senna 2 Tablet(s) Oral at bedtime    MEDICATIONS  (PRN):  HYDROmorphone PCA (1 mG/mL) Rescue Clinician Bolus 2 milliGRAM(s) IV Push every 1 hour PRN for Pain Scale GREATER THAN 6  naloxone Injectable 0.1 milliGRAM(s) IV Push every 3 minutes PRN For ANY of the following changes in patient status:  A. RR LESS THAN 10 breaths per minute, B. Oxygen saturation LESS THAN 90%, C. Sedation score of 6  ondansetron Injectable 4 milliGRAM(s) IV Push every 8 hours PRN Nausea and/or Vomiting    Pertinent Labs:  02-12 Na139 mmol/L Glu 78 mg/dL K+ 4.1 mmol/L Cr  0.27 mg/dL<L> BUN 12 mg/dL      Skin: No edema. Stage II sacrum pressure ulcer.     Estimated Needs:   [ x] no change since previous assessment  [ ] recalculated:       Previous Nutrition Diagnosis:    [x ] Malnutrition          Nutrition Diagnosis is [ x] ongoing  [ ] resolved [ ] not applicable          New Nutrition Diagnosis: [ x] not applicable        Recommend    [ ] Change Diet To:    [ x] Nutrition Supplement: Continue Glucerna- 2 per day (additional 440cal, 19.8gm protein daily).     [ ] Nutrition Support    [ ] Other:        Monitoring and Evaluation:     [x ] PO intake [ x] Tolerance to diet prescription [x ] weights [x ] follow up per protocol    [ x] other: RD remains available, Jeanne Browning RD pager #[phone]

## 2018-02-12 NOTE — DISCHARGE NOTE ADULT - PATIENT PORTAL LINK FT
You can access the [facility], offered by [facility], by registering with the following website: [email or web address]

## 2018-02-12 NOTE — DISCHARGE NOTE ADULT - ADDITIONAL INSTRUCTIONS
You will need to follow up with your primary medical doctor within one week of discharge - you are being set up with a visiting MD - please follow up to verify this appointment. You will need to follow up with your primary medical doctor within one week of discharge - you are being set up with a visiting MD - please follow up to verify this appointment.  You will need to follow up with your palliative doctor within one week of discharge. You will need to follow up with your primary medical doctor within one week of discharge - you are being set up with a visiting MD - please follow up to verify this appointment.  You will need a CBC checked within one week to monitor your hemoglobin/hematocrit.  You will need to follow up with your palliative doctor within one week of discharge. You will need to follow up with your primary medical doctor within one week of discharge - you are being set up with a visiting MD - please follow up to verify this appointment.  You will need a CBC and BMP/MG/Phos checked.  You will need to follow up with your palliative doctor within one week of discharge.

## 2018-02-12 NOTE — PROGRESS NOTE ADULT - PROVIDER SPECIALTY LIST ADULT
Gastroenterology
Infectious Disease
Internal Medicine
Intervent Radiology
Palliative Care
SICU
Surgery
Gastroenterology
Surgery
Gastroenterology
Gastroenterology
Palliative Care
Palliative Care

## 2018-02-12 NOTE — PROGRESS NOTE ADULT - ASSESSMENT
47F with metastatic ovarian CA (s/p TAHBSO, colostomy, previously seeking holistic care 12/2017 however has since established with new oncologist for chemo) p/w abdominal pain.  Now with bowel obstruction  previously s/p colostomy,wirght  GB perforation  Seen and followed by surgery,Poor surgical candidate -no intervention for now per surgery  s/p IR drainage   SIRS,sepsis resolved  s/p antimicrobial course with clinical and radiological improvement  Stable off abx  Drain plan per primary  Prognosis guarded-other plan per primary, palliative     ID will follow as needed,please call [phone] if any questions or issues.

## 2018-02-12 NOTE — DISCHARGE NOTE ADULT - CARE PROVIDER_API CALL
Audi Ray (SAL), Internal Medicine  [id number] [place] [address]  Phone: [phone]  Fax: [phone]    Anson Jones), Family Medicine; Geriatric Medicine; [facility] Medicine  [address] [place] [address]  Phone: [phone]  Fax: [phone] Anson Jones), Family Medicine; Geriatric Medicine; [facility]  [address] [place] [address]  Phone: [phone]  Fax: [phone]    Clara Najera), [facility]; [facility] Medicine  [address] [place] [address]  Phone: [phone]  Fax: [phone]    Audi Ray (SAL), Internal Medicine  [address] [place] [address]  Phone: [phone]  Fax: [phone]

## 2018-02-12 NOTE — DISCHARGE NOTE ADULT - CARE PLAN
Principal Discharge DX:	Abdominal pain  Goal:	symptoms improved with PCA pump  Assessment and plan of treatment:	PCA pump as per palliative/pain management team  Secondary Diagnosis:	Ovarian cancer  Secondary Diagnosis:	Small bowel obstruction Principal Discharge DX:	Abdominal pain  Goal:	symptoms improved with PCA pump  Assessment and plan of treatment:	PCA pump as per palliative/pain management team  Secondary Diagnosis:	Ovarian cancer  Assessment and plan of treatment:	You will need to follow up with your oncologist upon discharge - please call to make an appointment.  Secondary Diagnosis:	Small bowel obstruction  Assessment and plan of treatment:	You will need to follow up with your primary medical doctor within one week of discharge - you are being set up with a visiting MD - please follow up to verify this appointment. Principal Discharge DX:	Abdominal pain  Goal:	symptoms improved with PCA pump  Assessment and plan of treatment:	PCA pump as per palliative/pain management team  Secondary Diagnosis:	Ovarian cancer  Assessment and plan of treatment:	You will need to follow up with your palliative doctor upon discharge - please call to make an appointment.  Secondary Diagnosis:	Small bowel obstruction  Assessment and plan of treatment:	You will need to follow up with your primary medical doctor within one week of discharge - you are being set up with a visiting MD - please follow up to verify this appointment. Principal Discharge DX:	Abdominal pain  Goal:	symptoms improved with PCA pump  Assessment and plan of treatment:	PCA pump as per palliative/pain management team  Gallbladder drain - continue with care/flushing 5cc NS daily  Secondary Diagnosis:	Ovarian cancer  Assessment and plan of treatment:	You will need to follow up with your palliative doctor upon discharge - please call to make an appointment.  Secondary Diagnosis:	Small bowel obstruction  Assessment and plan of treatment:	You will need to follow up with your primary medical doctor within one week of discharge - you are being set up with a visiting MD - please follow up to verify this appointment.  Secondary Diagnosis:	Anemia  Assessment and plan of treatment:	You will need a CBC checked within one week to monitor your hemoglobin/hematocrit.

## 2018-02-12 NOTE — DISCHARGE NOTE ADULT - HOSPITAL COURSE
47F with metastatic ovarian cancer and gallbladder perforation s/p placement of a 10F drain in her gallbladder and yaa-cholecystic fluid with immediate drainage of 55cc. Hemodynamically stable on floor. Patient is a 47F with metastatic ovarian CA (s/p TAHBSO, colostomy, previously seeking holistic care 12/2017 however has since established with new oncologist for chemo) p/w abdominal pain. History obtained by spouse at bedside. Per him, patient acutely had an increase in pain. Since this time she has had minimal to no ostomy output, worsening abdominal distention, and nausea, without vomiting. Due to progression of symptoms they presented to ED today. Per , patient has not had cough, chills, night sweats. Patient recently started on chemotherapy, and abdominal pain has been getting worse.  She has been having intermittent fever.  She has been becoming more and more lethargic.   noticed that there is bowel herniation through the colostomy.   1/23/18 CXray - Bilateral pleural effusions, right greater than left.              Abd Xray - Nonobstructive bowel gas pattern              RVP negative  1/24/18 CT Abdomen/Pelvis - Worsening peritoneal carcinomatosis. Small bowel obstruction with transition point in the pelvis on the basis of peritoneal disease.  Gallbladder perforation with right upper quadrant collection.  A few droplets of air in the deep pelvis do not appear within the bowel lumen raising concern for bowel perforation.              IV antibiotics started              s/p CT guided percutaneous drainage of the gallbladder and pericholecystic fluid     2/6/18 CT Abdomen/Pelvis - Significantly improved gallbladder fossa fluid collection.  Improved small bowel obstruction.  Metastatic disease is unchanged since recent prior CT.    2/9/18 IV antibiotics completed    2/12/18 Discharge to home with PCA pump

## 2018-02-12 NOTE — DISCHARGE NOTE ADULT - PLAN OF CARE
symptoms improved with PCA pump PCA pump as per palliative/pain management team You will need to follow up with your oncologist upon discharge - please call to make an appointment. You will need to follow up with your primary medical doctor within one week of discharge - you are being set up with a visiting MD - please follow up to verify this appointment. You will need to follow up with your palliative doctor upon discharge - please call to make an appointment. PCA pump as per palliative/pain management team  Gallbladder drain - continue with care/flushing 5cc NS daily You will need a CBC checked within one week to monitor your hemoglobin/hematocrit.

## 2018-02-12 NOTE — DISCHARGE NOTE ADULT - PROVIDER TOKENS
TOKEN:'[id number]:'[id number]' TOKEN:'[id number]:'[id number]:'[id number]'

## 2018-02-12 NOTE — PROGRESS NOTE ADULT - SUBJECTIVE AND OBJECTIVE BOX
Patient is a 47y old  Female who presents with a chief complaint of Abdominal Pain (24 Jan 2018 06:17)    Being followed by ID for GB perfioration s/p cholecystotomy    Interval history:Feels well  abd pain controlled  No acute events      ROS:  No cough,SOB,CP  No N/V/D   No other complaints      Antimicrobials:None       Other medications reviewed    Vital Signs Last 24 Hrs  T(C): 36.9 (02-12-18 @ 05:22), Max: 37.1 (02-12-18 @ 00:49)  T(F): 98.4 (02-12-18 @ 05:22), Max: 98.7 (02-12-18 @ 00:49)  HR: 84 (02-12-18 @ 05:22) (83 - 92)  BP: 93/59 (02-12-18 @ 05:22) (92/60 - 96/69)  BP(mean): --  RR: 18 (02-12-18 @ 05:22) (17 - 18)  SpO2: 96% (02-12-18 @ 05:22) (96% - 98%)    Physical Exam:        HEENT PERRLA EOMI    No oral exudate or erythema    Chest Good AE,CTA    CVS RRR S1 S2 WNl No murmur or rub or gallop    Abd soft BS normalmild tenderness,colostomy  RUQ biliary drain-no tenderness    L PICC  site no erythema tenderness or discharge  R SC mediport no erythema or tenderness    CNS AAO X 3 no focal    Lab Data:                          8.1    4.03  )-----------( 340      ( 12 Feb 2018 07:45 )             25.0       02-11    140  |  99  |  10  ----------------------------<  72  4.0   |  30  |  0.32<L>    Ca    8.1<L>      11 Feb 2018 07:23

## 2018-02-20 ENCOUNTER — APPOINTMENT (OUTPATIENT)
Dept: HOME HEALTH SERVICES | Facility: HOME HEALTH | Age: 48
End: 2018-02-20
Payer: COMMERCIAL

## 2018-02-20 VITALS
BODY MASS INDEX: 14.94 KG/M2 | SYSTOLIC BLOOD PRESSURE: 98 MMHG | TEMPERATURE: 100.4 F | RESPIRATION RATE: 15 BRPM | WEIGHT: 93 LBS | OXYGEN SATURATION: 95 % | HEIGHT: 66 IN | DIASTOLIC BLOOD PRESSURE: 60 MMHG | HEART RATE: 87 BPM

## 2018-02-20 DIAGNOSIS — K59.03 DRUG INDUCED CONSTIPATION: ICD-10-CM

## 2018-02-20 DIAGNOSIS — C56.9 MALIGNANT NEOPLASM OF UNSPECIFIED OVARY: ICD-10-CM

## 2018-02-20 DIAGNOSIS — F11.20 OPIOID DEPENDENCE, UNCOMPLICATED: ICD-10-CM

## 2018-02-20 DIAGNOSIS — C80.1 MALIGNANT (PRIMARY) NEOPLASM, UNSPECIFIED: ICD-10-CM

## 2018-02-20 DIAGNOSIS — L89.302 PRESSURE ULCER OF UNSPECIFIED BUTTOCK, STAGE 2: ICD-10-CM

## 2018-02-20 DIAGNOSIS — T40.2X5A DRUG INDUCED CONSTIPATION: ICD-10-CM

## 2018-02-20 DIAGNOSIS — K80.50 CALCULUS OF BILE DUCT W/OUT CHOLANGITIS OR CHOLECYSTITIS W/OUT OBSTRUCTION: ICD-10-CM

## 2018-02-20 DIAGNOSIS — R50.9 FEVER, UNSPECIFIED: ICD-10-CM

## 2018-02-20 DIAGNOSIS — Z71.89 OTHER SPECIFIED COUNSELING: ICD-10-CM

## 2018-02-20 DIAGNOSIS — R63.0 ANOREXIA: ICD-10-CM

## 2018-02-20 DIAGNOSIS — Z74.01 BED CONFINEMENT STATUS: ICD-10-CM

## 2018-02-20 PROCEDURE — 99497 ADVNCD CARE PLAN 30 MIN: CPT

## 2018-02-20 PROCEDURE — G0506: CPT

## 2018-02-20 PROCEDURE — 99345 HOME/RES VST NEW HIGH MDM 75: CPT | Mod: 25

## 2018-02-20 RX ORDER — FENTANYL 25 UG/H
25 PATCH, EXTENDED RELEASE TRANSDERMAL
Qty: 10 | Refills: 0 | Status: DISCONTINUED | COMMUNITY
Start: 2017-10-13 | End: 2018-02-20

## 2018-02-20 RX ORDER — FENTANYL 100 UG/H
100 PATCH, EXTENDED RELEASE TRANSDERMAL
Qty: 10 | Refills: 0 | Status: DISCONTINUED | COMMUNITY
Start: 2017-11-20 | End: 2018-02-20

## 2018-02-20 RX ORDER — FENTANYL 50 UG/H
50 PATCH, EXTENDED RELEASE TRANSDERMAL
Qty: 10 | Refills: 0 | Status: DISCONTINUED | COMMUNITY
Start: 2017-07-13 | End: 2018-02-20

## 2018-02-20 RX ORDER — SENNOSIDES 8.6 MG TABLETS 8.6 MG/1
8.6 TABLET ORAL
Qty: 60 | Refills: 4 | Status: DISCONTINUED | COMMUNITY
Start: 2017-09-07 | End: 2018-02-20

## 2018-02-20 RX ORDER — FENTANYL 100 UG/H
100 PATCH, EXTENDED RELEASE TRANSDERMAL
Qty: 10 | Refills: 0 | Status: DISCONTINUED | COMMUNITY
Start: 2017-11-03 | End: 2018-02-20

## 2018-02-20 RX ORDER — IBUPROFEN 600 MG/1
600 TABLET, FILM COATED ORAL
Refills: 0 | Status: DISCONTINUED | COMMUNITY
End: 2018-02-20

## 2018-02-20 RX ORDER — NEOMYCIN AND POLYMYXIN B SULFATES AND BACITRACIN ZINC 400; 3.5; 5 [USP'U]/G; MG/G; [USP'U]/G
400-5-5000 OINTMENT TOPICAL 3 TIMES DAILY
Qty: 1 | Refills: 0 | Status: ACTIVE | COMMUNITY
Start: 2017-07-13

## 2018-02-20 RX ORDER — DOCUSATE SODIUM 10 MG/ML
150 LIQUID ORAL
Qty: 1 | Refills: 0 | Status: ACTIVE | COMMUNITY
Start: 2018-02-20

## 2018-02-20 RX ORDER — SENNOSIDES 8.6 MG/1
8.6 CAPSULE, GELATIN COATED ORAL
Qty: 60 | Refills: 0 | Status: DISCONTINUED | COMMUNITY
Start: 2017-11-20 | End: 2018-02-20

## 2018-02-20 RX ORDER — HYDROMORPHONE HYDROCHLORIDE 4 MG/1
4 TABLET ORAL
Qty: 120 | Refills: 0 | Status: DISCONTINUED | COMMUNITY
Start: 2017-07-28 | End: 2018-02-20

## 2018-02-20 RX ORDER — ACETAMINOPHEN 500 MG/1
500 TABLET ORAL
Refills: 0 | Status: ACTIVE | COMMUNITY

## 2018-02-20 RX ORDER — DOCUSATE SODIUM 100 MG/1
100 CAPSULE ORAL 3 TIMES DAILY
Qty: 90 | Refills: 0 | Status: DISCONTINUED | COMMUNITY
Start: 2017-07-10 | End: 2018-02-20

## 2018-02-20 RX ORDER — HYDROMORPHONE HYDROCHLORIDE 2 MG/1
2 TABLET ORAL EVERY 4 HOURS
Qty: 180 | Refills: 0 | Status: DISCONTINUED | COMMUNITY
Start: 2017-07-13 | End: 2018-02-20

## 2018-02-20 RX ORDER — METHADONE HYDROCHLORIDE 5 MG/1
5 TABLET ORAL 3 TIMES DAILY
Qty: 90 | Refills: 0 | Status: DISCONTINUED | COMMUNITY
Start: 2017-10-20 | End: 2018-02-20

## 2018-02-20 RX ORDER — OXYCODONE HYDROCHLORIDE 30 MG/1
30 TABLET, FILM COATED, EXTENDED RELEASE ORAL 3 TIMES DAILY
Qty: 90 | Refills: 0 | Status: DISCONTINUED | COMMUNITY
Start: 2017-12-20 | End: 2018-02-20

## 2018-02-20 RX ORDER — SENNA 417.12 MG/237ML
8.8 SYRUP ORAL
Qty: 1 | Refills: 3 | Status: ACTIVE | COMMUNITY
Start: 2018-02-20

## 2018-02-23 RX ORDER — DIBASIC SODIUM PHOSPHATE, MONOBASIC POTASSIUM PHOSPHATE AND MONOBASIC SODIUM PHOSPHATE 852; 155; 130 MG/1; MG/1; MG/1
155-852-130 TABLET ORAL EVERY OTHER DAY
Qty: 1 | Refills: 0 | Status: ACTIVE | COMMUNITY
Start: 2018-02-23 | End: 1900-01-01

## 2018-02-27 ENCOUNTER — CLINICAL ADVICE (OUTPATIENT)
Age: 48
End: 2018-02-27

## 2018-02-27 ENCOUNTER — APPOINTMENT (OUTPATIENT)
Dept: HOME HEALTH SERVICES | Facility: HOME HEALTH | Age: 48
End: 2018-02-27

## 2018-02-27 DIAGNOSIS — Z87.19 PERSONAL HISTORY OF OTHER DISEASES OF THE DIGESTIVE SYSTEM: ICD-10-CM

## 2018-02-27 RX ORDER — POTASSIUM & SODIUM PHOSPHATES POWDER PACK 280-160-250 MG 280-160-250 MG
280-160-250 PACK ORAL
Qty: 1 | Refills: 0 | Status: ACTIVE | COMMUNITY
Start: 2018-02-27 | End: 1900-01-01

## 2018-02-28 ENCOUNTER — INPATIENT (INPATIENT)
Facility: HOSPITAL | Age: 48
LOS: 3 days | DRG: 919 | End: 2018-03-04
Attending: INTERNAL MEDICINE | Admitting: INTERNAL MEDICINE
Payer: COMMERCIAL

## 2018-02-28 VITALS
SYSTOLIC BLOOD PRESSURE: 87 MMHG | TEMPERATURE: 100 F | HEART RATE: 118 BPM | OXYGEN SATURATION: 100 % | RESPIRATION RATE: 30 BRPM | DIASTOLIC BLOOD PRESSURE: 70 MMHG

## 2018-02-28 DIAGNOSIS — C56.9 MALIGNANT NEOPLASM OF UNSPECIFIED OVARY: ICD-10-CM

## 2018-02-28 DIAGNOSIS — J96.00 ACUTE RESPIRATORY FAILURE, UNSPECIFIED WHETHER WITH HYPOXIA OR HYPERCAPNIA: ICD-10-CM

## 2018-02-28 DIAGNOSIS — Z93.3 COLOSTOMY STATUS: Chronic | ICD-10-CM

## 2018-02-28 DIAGNOSIS — Z90.710 ACQUIRED ABSENCE OF BOTH CERVIX AND UTERUS: Chronic | ICD-10-CM

## 2018-02-28 LAB
ALBUMIN SERPL ELPH-MCNC: 2.8 G/DL — LOW (ref 3.3–5)
ALP SERPL-CCNC: 1951 U/L — HIGH (ref 40–120)
ALT FLD-CCNC: 214 U/L RC — HIGH (ref 10–45)
AMPHET UR-MCNC: NEGATIVE — SIGNIFICANT CHANGE UP
ANION GAP SERPL CALC-SCNC: 14 MMOL/L — SIGNIFICANT CHANGE UP (ref 5–17)
APPEARANCE UR: CLEAR — SIGNIFICANT CHANGE UP
APTT BLD: 29.7 SEC — SIGNIFICANT CHANGE UP (ref 27.5–37.4)
APTT BLD: 34 SEC — SIGNIFICANT CHANGE UP (ref 27.5–37.4)
AST SERPL-CCNC: 481 U/L — HIGH (ref 10–40)
BARBITURATES UR SCN-MCNC: NEGATIVE — SIGNIFICANT CHANGE UP
BASE EXCESS BLDV CALC-SCNC: 9.1 MMOL/L — HIGH (ref -2–2)
BASOPHILS # BLD AUTO: 0 K/UL — SIGNIFICANT CHANGE UP (ref 0–0.2)
BASOPHILS NFR BLD AUTO: 0.1 % — SIGNIFICANT CHANGE UP (ref 0–2)
BENZODIAZ UR-MCNC: POSITIVE
BILIRUB SERPL-MCNC: 1.3 MG/DL — HIGH (ref 0.2–1.2)
BILIRUB UR-MCNC: NEGATIVE — SIGNIFICANT CHANGE UP
BUN SERPL-MCNC: 45 MG/DL — HIGH (ref 7–23)
CA-I SERPL-SCNC: 1.08 MMOL/L — LOW (ref 1.12–1.3)
CALCIUM SERPL-MCNC: 8 MG/DL — LOW (ref 8.4–10.5)
CHLORIDE BLDV-SCNC: 103 MMOL/L — SIGNIFICANT CHANGE UP (ref 96–108)
CHLORIDE SERPL-SCNC: 99 MMOL/L — SIGNIFICANT CHANGE UP (ref 96–108)
CK MB CFR SERPL CALC: 1.5 NG/ML — SIGNIFICANT CHANGE UP (ref 0–3.8)
CO2 BLDV-SCNC: 35 MMOL/L — HIGH (ref 22–30)
CO2 SERPL-SCNC: 29 MMOL/L — SIGNIFICANT CHANGE UP (ref 22–31)
COCAINE METAB.OTHER UR-MCNC: NEGATIVE — SIGNIFICANT CHANGE UP
COLOR SPEC: YELLOW — SIGNIFICANT CHANGE UP
CREAT SERPL-MCNC: 0.37 MG/DL — LOW (ref 0.5–1.3)
DIFF PNL FLD: NEGATIVE — SIGNIFICANT CHANGE UP
EOSINOPHIL # BLD AUTO: 0 K/UL — SIGNIFICANT CHANGE UP (ref 0–0.5)
EOSINOPHIL NFR BLD AUTO: 0 % — SIGNIFICANT CHANGE UP (ref 0–6)
GAS PNL BLDV: 139 MMOL/L — SIGNIFICANT CHANGE UP (ref 136–145)
GAS PNL BLDV: SIGNIFICANT CHANGE UP
GAS PNL BLDV: SIGNIFICANT CHANGE UP
GLUCOSE BLDC GLUCOMTR-MCNC: 135 MG/DL — HIGH (ref 70–99)
GLUCOSE BLDC GLUCOMTR-MCNC: 138 MG/DL — HIGH (ref 70–99)
GLUCOSE BLDC GLUCOMTR-MCNC: 152 MG/DL — HIGH (ref 70–99)
GLUCOSE BLDV-MCNC: 83 MG/DL — SIGNIFICANT CHANGE UP (ref 70–99)
GLUCOSE SERPL-MCNC: 84 MG/DL — SIGNIFICANT CHANGE UP (ref 70–99)
GLUCOSE UR QL: NEGATIVE — SIGNIFICANT CHANGE UP
HCO3 BLDV-SCNC: 34 MMOL/L — HIGH (ref 21–29)
HCT VFR BLD CALC: 33.8 % — LOW (ref 34.5–45)
HCT VFR BLD CALC: 34.2 % — LOW (ref 34.5–45)
HCT VFR BLDA CALC: 33 % — LOW (ref 39–50)
HGB BLD CALC-MCNC: 10.7 G/DL — LOW (ref 11.5–15.5)
HGB BLD-MCNC: 10.6 G/DL — LOW (ref 11.5–15.5)
HGB BLD-MCNC: 10.9 G/DL — LOW (ref 11.5–15.5)
INR BLD: 1.71 RATIO — HIGH (ref 0.88–1.16)
KETONES UR-MCNC: NEGATIVE — SIGNIFICANT CHANGE UP
LACTATE BLDV-MCNC: 2 MMOL/L — SIGNIFICANT CHANGE UP (ref 0.7–2)
LEUKOCYTE ESTERASE UR-ACNC: ABNORMAL
LYMPHOCYTES # BLD AUTO: 0.8 K/UL — LOW (ref 1–3.3)
LYMPHOCYTES # BLD AUTO: 6 % — LOW (ref 13–44)
MCHC RBC-ENTMCNC: 30.1 PG — SIGNIFICANT CHANGE UP (ref 27–34)
MCHC RBC-ENTMCNC: 30.4 PG — SIGNIFICANT CHANGE UP (ref 27–34)
MCHC RBC-ENTMCNC: 31.4 GM/DL — LOW (ref 32–36)
MCHC RBC-ENTMCNC: 31.8 GM/DL — LOW (ref 32–36)
MCV RBC AUTO: 95.4 FL — SIGNIFICANT CHANGE UP (ref 80–100)
MCV RBC AUTO: 96 FL — SIGNIFICANT CHANGE UP (ref 80–100)
METHADONE UR-MCNC: NEGATIVE — SIGNIFICANT CHANGE UP
MONOCYTES # BLD AUTO: 0.3 K/UL — SIGNIFICANT CHANGE UP (ref 0–0.9)
MONOCYTES NFR BLD AUTO: 2.7 % — SIGNIFICANT CHANGE UP (ref 2–14)
NEUTROPHILS # BLD AUTO: 11.4 K/UL — HIGH (ref 1.8–7.4)
NEUTROPHILS NFR BLD AUTO: 91.1 % — HIGH (ref 43–77)
NITRITE UR-MCNC: NEGATIVE — SIGNIFICANT CHANGE UP
OPIATES UR-MCNC: POSITIVE
OTHER CELLS CSF MANUAL: 9 ML/DL — LOW (ref 18–22)
OXYCODONE UR-MCNC: NEGATIVE — SIGNIFICANT CHANGE UP
PCO2 BLDV: 49 MMHG — SIGNIFICANT CHANGE UP (ref 35–50)
PCP SPEC-MCNC: SIGNIFICANT CHANGE UP
PCP UR-MCNC: NEGATIVE — SIGNIFICANT CHANGE UP
PH BLDV: 7.46 — HIGH (ref 7.35–7.45)
PH UR: 6 — SIGNIFICANT CHANGE UP (ref 5–8)
PLATELET # BLD AUTO: 266 K/UL — SIGNIFICANT CHANGE UP (ref 150–400)
PLATELET # BLD AUTO: 368 K/UL — SIGNIFICANT CHANGE UP (ref 150–400)
PO2 BLDV: 34 MMHG — SIGNIFICANT CHANGE UP (ref 25–45)
POTASSIUM BLDV-SCNC: 2.9 MMOL/L — CRITICAL LOW (ref 3.5–5)
POTASSIUM SERPL-MCNC: 3.3 MMOL/L — LOW (ref 3.5–5.3)
POTASSIUM SERPL-SCNC: 3.3 MMOL/L — LOW (ref 3.5–5.3)
PROT SERPL-MCNC: 5.8 G/DL — LOW (ref 6–8.3)
PROT UR-MCNC: SIGNIFICANT CHANGE UP
PROTHROM AB SERPL-ACNC: 18.7 SEC — HIGH (ref 9.8–12.7)
RBC # BLD: 3.52 M/UL — LOW (ref 3.8–5.2)
RBC # BLD: 3.59 M/UL — LOW (ref 3.8–5.2)
RBC # FLD: 14.7 % — HIGH (ref 10.3–14.5)
RBC # FLD: 14.8 % — HIGH (ref 10.3–14.5)
RBC CASTS # UR COMP ASSIST: SIGNIFICANT CHANGE UP /HPF (ref 0–2)
SAO2 % BLDV: 60 % — LOW (ref 67–88)
SODIUM SERPL-SCNC: 142 MMOL/L — SIGNIFICANT CHANGE UP (ref 135–145)
SP GR SPEC: 1.01 — SIGNIFICANT CHANGE UP (ref 1.01–1.02)
THC UR QL: NEGATIVE — SIGNIFICANT CHANGE UP
TROPONIN T SERPL-MCNC: 0.04 NG/ML — SIGNIFICANT CHANGE UP (ref 0–0.06)
UROBILINOGEN FLD QL: NEGATIVE — SIGNIFICANT CHANGE UP
WBC # BLD: 12.5 K/UL — HIGH (ref 3.8–10.5)
WBC # BLD: 12.6 K/UL — HIGH (ref 3.8–10.5)
WBC # FLD AUTO: 12.5 K/UL — HIGH (ref 3.8–10.5)
WBC # FLD AUTO: 12.6 K/UL — HIGH (ref 3.8–10.5)
WBC UR QL: SIGNIFICANT CHANGE UP /HPF (ref 0–5)

## 2018-02-28 PROCEDURE — 71045 X-RAY EXAM CHEST 1 VIEW: CPT | Mod: 26,77

## 2018-02-28 PROCEDURE — 71045 X-RAY EXAM CHEST 1 VIEW: CPT | Mod: 26

## 2018-02-28 PROCEDURE — 93308 TTE F-UP OR LMTD: CPT | Mod: 26,GC

## 2018-02-28 PROCEDURE — 99497 ADVNCD CARE PLAN 30 MIN: CPT | Mod: 25

## 2018-02-28 PROCEDURE — 70450 CT HEAD/BRAIN W/O DYE: CPT | Mod: 26

## 2018-02-28 PROCEDURE — 99223 1ST HOSP IP/OBS HIGH 75: CPT

## 2018-02-28 PROCEDURE — 76604 US EXAM CHEST: CPT | Mod: 26,GC

## 2018-02-28 PROCEDURE — 99291 CRITICAL CARE FIRST HOUR: CPT

## 2018-02-28 PROCEDURE — 70450 CT HEAD/BRAIN W/O DYE: CPT | Mod: 26,77

## 2018-02-28 RX ORDER — PROPOFOL 10 MG/ML
20 INJECTION, EMULSION INTRAVENOUS
Qty: 1000 | Refills: 0 | Status: DISCONTINUED | OUTPATIENT
Start: 2018-02-28 | End: 2018-03-01

## 2018-02-28 RX ORDER — HYDROMORPHONE HYDROCHLORIDE 2 MG/ML
1 INJECTION INTRAMUSCULAR; INTRAVENOUS; SUBCUTANEOUS
Qty: 100 | Refills: 0 | Status: DISCONTINUED | OUTPATIENT
Start: 2018-02-28 | End: 2018-02-28

## 2018-02-28 RX ORDER — MEROPENEM 1 G/30ML
INJECTION INTRAVENOUS
Qty: 0 | Refills: 0 | Status: DISCONTINUED | OUTPATIENT
Start: 2018-02-28 | End: 2018-03-04

## 2018-02-28 RX ORDER — DEXTROSE 50 % IN WATER 50 %
25 SYRINGE (ML) INTRAVENOUS ONCE
Qty: 0 | Refills: 0 | Status: COMPLETED | OUTPATIENT
Start: 2018-02-28 | End: 2018-02-28

## 2018-02-28 RX ORDER — SODIUM CHLORIDE 9 MG/ML
1000 INJECTION, SOLUTION INTRAVENOUS
Qty: 0 | Refills: 0 | Status: DISCONTINUED | OUTPATIENT
Start: 2018-02-28 | End: 2018-03-01

## 2018-02-28 RX ORDER — MIDAZOLAM HYDROCHLORIDE 1 MG/ML
1.5 INJECTION, SOLUTION INTRAMUSCULAR; INTRAVENOUS
Qty: 100 | Refills: 0 | Status: DISCONTINUED | OUTPATIENT
Start: 2018-02-28 | End: 2018-02-28

## 2018-02-28 RX ORDER — GLUCAGON INJECTION, SOLUTION 0.5 MG/.1ML
1 INJECTION, SOLUTION SUBCUTANEOUS ONCE
Qty: 0 | Refills: 0 | Status: DISCONTINUED | OUTPATIENT
Start: 2018-02-28 | End: 2018-03-04

## 2018-02-28 RX ORDER — SODIUM CHLORIDE 9 MG/ML
1000 INJECTION INTRAMUSCULAR; INTRAVENOUS; SUBCUTANEOUS ONCE
Qty: 0 | Refills: 0 | Status: COMPLETED | OUTPATIENT
Start: 2018-02-28 | End: 2018-02-28

## 2018-02-28 RX ORDER — DEXTROSE 50 % IN WATER 50 %
1 SYRINGE (ML) INTRAVENOUS ONCE
Qty: 0 | Refills: 0 | Status: DISCONTINUED | OUTPATIENT
Start: 2018-02-28 | End: 2018-03-04

## 2018-02-28 RX ORDER — MEROPENEM 1 G/30ML
1000 INJECTION INTRAVENOUS EVERY 8 HOURS
Qty: 0 | Refills: 0 | Status: DISCONTINUED | OUTPATIENT
Start: 2018-03-01 | End: 2018-03-04

## 2018-02-28 RX ORDER — DEXTROSE 50 % IN WATER 50 %
12.5 SYRINGE (ML) INTRAVENOUS ONCE
Qty: 0 | Refills: 0 | Status: DISCONTINUED | OUTPATIENT
Start: 2018-02-28 | End: 2018-03-04

## 2018-02-28 RX ORDER — DEXTROSE 50 % IN WATER 50 %
25 SYRINGE (ML) INTRAVENOUS ONCE
Qty: 0 | Refills: 0 | Status: DISCONTINUED | OUTPATIENT
Start: 2018-02-28 | End: 2018-03-04

## 2018-02-28 RX ORDER — HYDROMORPHONE HYDROCHLORIDE 2 MG/ML
1 INJECTION INTRAMUSCULAR; INTRAVENOUS; SUBCUTANEOUS
Qty: 100 | Refills: 0 | Status: DISCONTINUED | OUTPATIENT
Start: 2018-02-28 | End: 2018-03-04

## 2018-02-28 RX ORDER — HYDROMORPHONE HYDROCHLORIDE 2 MG/ML
1 INJECTION INTRAMUSCULAR; INTRAVENOUS; SUBCUTANEOUS
Qty: 0 | Refills: 0 | Status: DISCONTINUED | OUTPATIENT
Start: 2018-02-28 | End: 2018-03-03

## 2018-02-28 RX ORDER — MIDAZOLAM HYDROCHLORIDE 1 MG/ML
1 INJECTION, SOLUTION INTRAMUSCULAR; INTRAVENOUS
Qty: 100 | Refills: 0 | Status: DISCONTINUED | OUTPATIENT
Start: 2018-02-28 | End: 2018-02-28

## 2018-02-28 RX ORDER — PANTOPRAZOLE SODIUM 20 MG/1
40 TABLET, DELAYED RELEASE ORAL DAILY
Qty: 0 | Refills: 0 | Status: DISCONTINUED | OUTPATIENT
Start: 2018-02-28 | End: 2018-03-04

## 2018-02-28 RX ORDER — MEROPENEM 1 G/30ML
1000 INJECTION INTRAVENOUS ONCE
Qty: 0 | Refills: 0 | Status: COMPLETED | OUTPATIENT
Start: 2018-02-28 | End: 2018-02-28

## 2018-02-28 RX ORDER — INFLUENZA VIRUS VACCINE 15; 15; 15; 15 UG/.5ML; UG/.5ML; UG/.5ML; UG/.5ML
0.5 SUSPENSION INTRAMUSCULAR ONCE
Qty: 0 | Refills: 0 | Status: DISCONTINUED | OUTPATIENT
Start: 2018-02-28 | End: 2018-03-04

## 2018-02-28 RX ORDER — MIDAZOLAM HYDROCHLORIDE 1 MG/ML
5 INJECTION, SOLUTION INTRAMUSCULAR; INTRAVENOUS ONCE
Qty: 0 | Refills: 0 | Status: DISCONTINUED | OUTPATIENT
Start: 2018-02-28 | End: 2018-02-28

## 2018-02-28 RX ADMIN — MIDAZOLAM HYDROCHLORIDE 5 MILLIGRAM(S): 1 INJECTION, SOLUTION INTRAMUSCULAR; INTRAVENOUS at 11:03

## 2018-02-28 RX ADMIN — SODIUM CHLORIDE 1000 MILLILITER(S): 9 INJECTION INTRAMUSCULAR; INTRAVENOUS; SUBCUTANEOUS at 11:04

## 2018-02-28 RX ADMIN — Medication 25 MILLILITER(S): at 11:38

## 2018-02-28 RX ADMIN — MIDAZOLAM HYDROCHLORIDE 1.5 MG/HR: 1 INJECTION, SOLUTION INTRAMUSCULAR; INTRAVENOUS at 14:15

## 2018-02-28 RX ADMIN — SODIUM CHLORIDE 100 MILLILITER(S): 9 INJECTION, SOLUTION INTRAVENOUS at 18:00

## 2018-02-28 RX ADMIN — MIDAZOLAM HYDROCHLORIDE 1 MG/HR: 1 INJECTION, SOLUTION INTRAMUSCULAR; INTRAVENOUS at 11:04

## 2018-02-28 RX ADMIN — HYDROMORPHONE HYDROCHLORIDE 0.5 MG/HR: 2 INJECTION INTRAMUSCULAR; INTRAVENOUS; SUBCUTANEOUS at 14:14

## 2018-02-28 RX ADMIN — PANTOPRAZOLE SODIUM 40 MILLIGRAM(S): 20 TABLET, DELAYED RELEASE ORAL at 21:54

## 2018-02-28 RX ADMIN — MEROPENEM 100 MILLIGRAM(S): 1 INJECTION INTRAVENOUS at 18:39

## 2018-02-28 RX ADMIN — SODIUM CHLORIDE 100 MILLILITER(S): 9 INJECTION, SOLUTION INTRAVENOUS at 12:15

## 2018-02-28 RX ADMIN — PROPOFOL 4.36 MICROGRAM(S)/KG/MIN: 10 INJECTION, EMULSION INTRAVENOUS at 18:00

## 2018-02-28 NOTE — ED PROVIDER NOTE - MEDICAL DECISION MAKING DETAILS
47F advanced ovarian cancer, unresponsive at home s/p intubation - broad ddx will do labs, CT head, admit 47F advanced ovarian cancer, unresponsive at home s/p intubation - broad ddx will do labs, CT head, palliative and MICU c/s, admit

## 2018-02-28 NOTE — H&P ADULT - NSHPPHYSICALEXAM_GEN_ALL_CORE
General: Pale, cachective  Neurology: A&Ox0, unable to assess  Eyes: PERRLA  ENT/Neck: Neck supple, trachea midline, No JVD  Respiratory: CTA B/L, No wheezing, rales, rhonchi  CV: RRR, +S1/S2, -S3/S4, no murmurs, rubs or gallops  Abdominal: Soft, NT, ND +BS, No HSM  MSK: cannot assess strength in current clinical condition  Extremities: No edema, 1+ peripheral pulses  Skin: No Rashes, Hematoma, Ecchymosis  Tubes: +right sided mediport, c/d/i.

## 2018-02-28 NOTE — ED ADULT NURSE NOTE - OBJECTIVE STATEMENT
47 y.o female arrived by EMS intubated d/t unresponsiveness. EMS arrived on scene pts glucose registering low- given D50 by EMS. Pt awoke and became combative- intubated ETT 6.5- 5mg versed given. Repeat FS in ER-99. Metastatic Ca- primary ovarian spread to bowel. HARSHIL drain present to gallbladder area. Intubated PTA by EMS. Stage 2 sacral ulcer. Arrived in urine soaked sheets- change. Lynn inserted 5cc output. Rectal temp 99.7. Cathectic.  states pt was running a fever yesterday.  arrived to hospital shortly after pt arrived.

## 2018-02-28 NOTE — ED PROVIDER NOTE - CRITICAL CARE INDICATION, MLM
patient was critically ill... Patient was critically ill with a high probability of imminent or life threatening deterioration. Direct care for 15 mintues, discussion of case with consultants for 15 minutes, discussion of case with family

## 2018-02-28 NOTE — H&P ADULT - PSH
Colostomy in place  dec 2016 (placed during hysterectomy in dec 2016 at [facility])  H/O abdominal hysterectomy  dec 2016

## 2018-02-28 NOTE — ED PROVIDER NOTE - OBJECTIVE STATEMENT
47 year old woman extensive PMH including metastatic ovarian cancer on chemo presents unresponsive. Found unresponsive by  who called EMS. Was hypoglycemic, improved after D50 but still minimally responsive so intubated for airway protection. No preceding symptoms. Hx obtained from EMR, , pt unresponsive. Of note recently hospitalized here earlier this month, was made DNR/DNI at that time but due to clinical improvement this was changed to full code on current MOLST. 47 year old woman extensive PMH including metastatic ovarian cancer on chemo presents unresponsive. Found unresponsive by  who called EMS. Was hypoglycemic, improved after D50 but still minimally responsive so intubated for airway protection. No preceding symptoms. Hx obtained from EMR, , pt unresponsive. Of note recently hospitalized here earlier this month, was made DNR/DNI at that time but due to clinical improvement this was changed to full code on current MOLST.    Augustin: metastatic disease with acute mental status change

## 2018-02-28 NOTE — ED PROVIDER NOTE - PHYSICAL EXAMINATION
Augustin:  General: intubated, cachectic.   HEENT: WNL  Chest/Lungs: CTAB, No wheeze, No retractions, No increased work of breathing, Normal rate  Heart: S1S2 RRR, No M/R/G, Pules equal Bilaterally in upper and lower extremities distally  Abd: soft, NT/ND, No guarding, No rebound.  No hernias, no palpable masses.  Extrem: FROM in all joints, no significant edema noted, No ulcers.  Cap refil < 2sec.  Skin: No rash noted, warm dry.  Neuro:  altered, not responsive  Psychiatric: No evidence of delusions. No SI/HI.

## 2018-02-28 NOTE — CONSULT NOTE ADULT - ASSESSMENT
Patient is a 46 yo F with metastatic ovarian CA (s/p TAHBSO, s/p colostomy, with holistic care approach, started chemo on 12/2017 carboplatin/taxol every Wednesday, due for tx today). Admitted to MICU under the impression of malignant SBO, sepsis, GB perforation (s/p biliary drain), hypoglycemia, and worsening metastatic disease. Palliative Care consult placed for pain management and GOC. Patient is a 48 yo F with metastatic ovarian CA (s/p TAHBSO, s/p colostomy, with holistic care approach, started chemo. She was recently admitted to MICU under the impression of malignant SBO, sepsis, GB perforation (s/p biliary drain), hypoglycemia, and worsening metastatic disease. She is now presenting with AMS in the setting of hypoglycemia and s/p intubation. Palliative Care consult placed for pain management and GOC.

## 2018-02-28 NOTE — CONSULT NOTE ADULT - SUBJECTIVE AND OBJECTIVE BOX
HPI: Patient is a 48 yo F with metastatic ovarian CA (s/p TAHBSO, s/p colostomy, with holistic care approach, started chemo. She was recently admitted to MICU under the impression of malignant SBO, sepsis, GB perforation (s/p biliary drain), hypoglycemia, and worsening metastatic disease. She is now presenting with AMS in the setting of hypoglycemia and s/p intubation. Palliative Care consult placed for pain management and GOC.    PERTINENT PM/SXH:   Ovarian cancer    Colostomy in place  H/O abdominal hysterectomy    SOCIAL HISTORY:   Significant other/partner:  [ x] YES  [ ] NO               Children:  [ ] YES  [x ] NO                   Gnosticist/Spirituality:  Substance hx:  [ ] YES   [x ] NO                   Tobacco hx:  [ ] YES  [x ] NO                       Alcohol hx: [ ] YES  [x ] NO         Home Opioid hx:  [x ] YES  [ ] NO   Living Situation: [x ] Living with Fiance  [ ] Long term care  [ ] Rehab [ ] Other  Has NS house calls.   FAMILY HISTORY:  No pertinent family history in first degree relatives    [ x] Family history non-contributory     BASELINE (I)ADLs (prior to admission):  [place]: [ ] total  [ x] moderate [ ] dependent    ADVANCE DIRECTIVES:    DNR [x]   MOLST  [x ] YES [  ] NO               however, it was rescinded today.   Health Care Proxy [ x] YES  [ ] NO   [ ] Completed  Living Will  [ ] YES [x ] NO             [ ] Surrogate  [x ] HCP  [ ] Guardian:  Copy in EMR alpha tab in 12/20/17 admission  Trung BONDSArchie Buenrostro  Phone#: [phone]    Allergies    penicillin (Rash)    Intolerances    MEDICATIONS  (STANDING):  dextrose 5% + sodium chloride 0.9%. 1000 milliLiter(s) (100 mL/Hr) IV Continuous <Continuous>  HYDROmorphone Infusion 0.5 mG/Hr (0.5 mL/Hr) IV Continuous <Continuous>  midazolam Infusion 1.5 mG/Hr (1.5 mL/Hr) IV Continuous <Continuous>    MEDICATIONS  (PRN):  HYDROmorphone  Injectable 1 milliGRAM(s) IV Push every 1 hour PRN Breakthrough pain      PRESENT SYMPTOMS:  Source: [ ] Patient   [ ] Family   [ x] Team     Pain:                        [x ] No [ ] Yes                Dyspnea:                [ ] No [ ] Yes             [ ] Mild [ ] Moderate [ ] Severe    Anxiety:                  [ ] No [ ] Yes             [ ] Mild [ ] Moderate [ ] Severe    Fatigue:                  [ ] No [ ] Yes             [x ] Mild [ ] Moderate [ ] Severe    Nausea:                  [ } No [ ] Yes             [ ] Mild [ ] Moderate [ ] Severe    Loss of appetite:   [ ] No [ ] Yes             [ ] Mild [ ] Moderate [ ] Severe    Constipation:        [ ] No [ ] Yes             [ ] Mild [ ] Moderate [ ] Severe    Other Symptoms:  [ ] All other review of systems negative   [x ] Unable to obtain due to poor mentation. However, her fiance indicated she has been loosing more weight, and with recurrent hypoglycemia.     Karnofsky Performance Score/Palliative Performance Status Version 2:  40 %    PHYSICAL EXAM:  Vital Signs Last 24 Hrs  T(C): 37.6 (28 Feb 2018 10:27), Max: 37.6 (28 Feb 2018 10:27)  T(F): 99.7 (28 Feb 2018 10:27), Max: 99.7 (28 Feb 2018 10:27)  HR: 105 (28 Feb 2018 12:33) (105 - 137)  BP: 105/87 (28 Feb 2018 12:33) (86/70 - 105/87)  BP(mean): --  RR: 21 (28 Feb 2018 12:33) (13 - 30)  SpO2: 100% (28 Feb 2018 12:33) (100% - 100%)    General:  [ ] Alert  [ ] Oriented x      [ ] Lethargic  [ ] Agitated   [ ] Cachexia    ] Unarousable  [ ] Verbal  [x ] Non-Verbal    HEENT:  [ ] Normal   [x ] Dry mouth   [x ] ET Tube    [ ] Trach  [ ] Oral lesions    Lungs:   [x ] Clear [ ] Tachypnea  [ ] Audible excessive secretions   [ ] Rhonchi        [ ] Right [ ] Left [ ] Bilateral  [ ] Crackles        [ ] Right [ ] Left [ ] Bilateral  [ ] Wheezing     [ ] Right [ ] Left [ ] Bilateral    Cardiovascular:  [ x] Regular [ ] Irregular [ ] Tachycardia   [ ] Bradycardia  [ ] Murmur [ ] Other    Abdomen: [x ] Soft  [ ] Distended   [x ] +BS (+) [ ] Non tender [x ] Tender  [ ]PEG   [ ]OGT/ NGT   Last BM: Yesterday (as per fiance)   Ostomy. Biliary drain    Genitourinary: [x ] Normal [ ] Incontinent   [ ] Oliguria/Anuria   [ ] Lynn    Musculoskeletal:  [ ] Normal   [ x] Weakness  [ ] Bedbound/Wheelchair bound [ ] Edema    Neurological: [ x]No focal deficits  [ ] Cognitive impairment  [ ] Dysphagia [ ] Dysarthria [ ] Paresis [x ] Other: Unarousable due to sedation/encephalopathy     Skin: [ ] DTI on sacrum   [ ] Pressure ulcer(s)                  [ ] Rash    LABS:                                                           10.9   12.5  )-----------( 368      ( 28 Feb 2018 10:45 )             34.2   02-28    142  |  99  |  45<H>  ----------------------------<  84  3.3<L>   |  29  |  0.37<L>    Ca    8.0<L>      28 Feb 2018 10:45    TPro  5.8<L>  /  Alb  2.8<L>  /  TBili  1.3<H>  /  DBili  x   /  AST  481<H>  /  ALT  214<H>  /  AlkPhos  1951<H>  02-28      Shock: no [ ] Septic [ ] Cardiogenic [ ] Neurologic [ ] Hypovolemic  Vasopressors x none   Inotrophs x none    Oral Intake: [ ] Unable/mouth care only [ x] Minimal [ ] Moderate [ ] Full Capability    Diet: [ ] NPO [ ] Tube feeds [ ] TPN [ x] Other     RADIOLOGY & ADDITIONAL STUDIES:   < from: CT Head No Cont (02.28.18 @ 12:31) >  EXAM:  CT BRAIN                            PROCEDURE DATE:  02/28/2018  Impression:    Motion limited study.  Increased density along the anterior interhemispheric falx. Subdural   hemorrhage in this location cannot be excluded. Close interval follow-up   is recommended.  Poor definition of the intraorbital contents may be artifactual. Repeat   imaging can be obtained.    The results of this examination were discussed with Dr. Augustin in the ED   at 1:15 PM on 2/28/2018.    < end of copied text >      REFERRALS:   [ ] Chaplaincy  [ ] Hospice  [ ] Child Life  [ ] Social Work  [ ] Case management [x ] Holistic Therapy

## 2018-02-28 NOTE — ED PROVIDER NOTE - CRITICAL CARE PROVIDED
documentation/direct patient care (not related to procedure)/conducted a detailed discussion of DNR status/consult w/ pt's family directly relating to pts condition

## 2018-02-28 NOTE — CONSULT NOTE ADULT - PROBLEM SELECTOR RECOMMENDATION 9
In the setting of AMS (questionable 2/2 to hypoglycemia vs infection)  Patient is intubated   GOC: D/W the patient's HCP that gave verbalized understanding about the patient's current medical situation (End stage metastatic CA, Anorexia/Cachexia, now with Acute Respiratory Failure associated to Encephalopathy)  and poor prognosis. He indicated current goals are towards trying to resolve acute issues so the patient can be extubated from the vent. He agree on MICU level of care as a short term trial in order to improve this acute situation (assuming that it is manly due to hypoglycemia). However, if the patient were not to improve in a timely manner that he will consider a more conservative approach and probably PCU transferring.   He agree on DNR. MELANIE was rescinded.  35' spent in ACP

## 2018-02-28 NOTE — H&P ADULT - ATTENDING COMMENTS
1. Acute hypoxemic respiratory failure due to hypoglycemia from bacteremia and severe sepsis. Continue current AC vent settings.  2.Hypotension from septic shock due to gram negative bacteremia. Continue with meropenem. Likely source bowels or cholecystotomy drainage. Continue pressors.  3. Pt with metastatic ovarian cancer and is cachectic. Pt with molst form DNR / DNI . Discussed with family and palliative care. Will give pt 24 hours on vent and electively extubate tomorrow if patient is awake.  4. Hypoglycemia: D 10 drip.    cc time 35 min

## 2018-02-28 NOTE — H&P ADULT - ASSESSMENT
47F PMH metastatic ovarian CA (s/p TAHBSO, s/p colostomy, with holistic care approach, started chemo, currently not a candidate 2/2 nutrition status, on home dilaudid PCA pump with the following settings, 1 mg/h, 1 mg q 15' demand dose presents after being found in bed by  to be cold and unresponsive, intubated in the field by EMS, currently sedated, pending mental status recovery.     # Neuro:       # CVS:      # Pulm:      # GI:      # Renal:      # ID:      # Endocrine:      # Heme:      # Electrolytes:      # Skin:      # DVT p/px:        Edgardo Blair MD  PGY-3 | Internal Medicine  Pager: [phone] 47F PMH metastatic ovarian CA (s/p TAHBSO, s/p colostomy, with holistic care approach, started chemo, currently not a candidate 2/2 nutrition status, on home dilaudid PCA pump with the following settings, 1 mg/h, 1 mg q 15' demand dose presents after being found in bed by  to be cold and unresponsive, intubated in the field by EMS, currently sedated, pending mental status recovery.     # Neuro:   - Patient currently sedated. Do not suspect any infectious CNS process at this time  - CThead demonstrates ?SDH. Will check CT again when patient is sedated to confirm any interval change.   - Will wean from sedation tomorrow and attempt to establish mental status    # CVS:  - Patient with intact LV function on POCUS and maintaining MAPs > 65 without pressor use'  - Check VS and c/w IVF for now    # Pulm:  - A-line predominant with bilateral PLEFF's with associated atelectasis vs consolidation  - Will cover empirically as outlined below with Meropenem  - Patient currently intubated.    # GI:  - Will start PUD p/px protonix as patient is intubated  - Consider OG tube and can begin feed    # Renal:  - no known renal dysfunction  - will f/u SCr    # ID:  - patient with suspected infection which can either be UTI, viral, pulmonary, or biliary in origin  - Will cover broadly with Meropenem for now  - Check RVP, UCx, and BCx    # Endocrine:  - Patient with hypoglycemia, unclear source, will check FS q6h and c/w D5 NS gtt for now. ?Infectious etiology  - Will check c-peptide    # Heme:  - H/H stable. No lineage deficiencies  - will continue to trend cbc    # Electrolytes:  - Follow and replete PRN. No current derangements    # Skin:  - s/p I/O which was removed from RLE in MICU  - Right sided mediport.   - PIV in place    # DVT p/px:  - ? subdural hematoma, will not cover patient with chemoprophylaxis, SCDs for now      Edgardo Blair MD  PGY-3 | Internal Medicine  Pager: [phone]

## 2018-02-28 NOTE — ED PROVIDER NOTE - ATTENDING CONTRIBUTION TO CARE
Demetria:  I have independently evaluated the patient and have documented in the appropriate sections above.  I agree with the exam and plan as noted above.

## 2018-02-28 NOTE — H&P ADULT - NSHPLABSRESULTS_GEN_ALL_CORE
10.9   12.5  )-----------( 368      ( 28 Feb 2018 10:45 )             34.2     02-28    142  |  99  |  45<H>  ----------------------------<  84  3.3<L>   |  29  |  0.37<L>    Ca    8.0<L>      28 Feb 2018 10:45    TPro  5.8<L>  /  Alb  2.8<L>  /  TBili  1.3<H>  /  DBili  x   /  AST  481<H>  /  ALT  214<H>  /  AlkPhos  1951<H>  02-28    10:44 - VBG - pH: 7.46  | pCO2: 49    | pO2: 34    | Lactate: 2.0        IMAGING:     CXR:  < from: Xray Chest 1 View- PORTABLE-Urgent (02.28.18 @ 10:42) >    INTERPRETATION:  Indication: Endotracheal tube placement.    Technique: Single portable view of the chest.    Comparison: 1/26/2018 at 9:30 AM    Findings: There is an endotracheal tube with its tip in good position, at   the level of the clavicular heads. There is a right-sided Mediport with   its tip overlying the superior vena cava. The cardiac silhouette is   normal in size. There is a smallleft pleural effusion.    Impression: Endotracheal tube in good position. Small left pleural   effusion.    < end of copied text >      CThead:   < from: CT Head No Cont (02.28.18 @ 12:31) >    Impression:    Motion limited study.  Increased density along the anterior interhemispheric falx. Subdural   hemorrhage in this location cannot be excluded. Close interval follow-up   is recommended.  Poor definition of the intraorbital contents may be artifactual. Repeat   imaging can be obtained.    < end of copied text >    POCUS:   Lungs: A-line predominant in the anterior fields. bilateral PLEFFs L > R with associated atelectasis  Cardiac: No PEFF. LV function grossly intact. RV not dilated.   LE: No non-compressibility of LE veins suggestive of DVT    [x] Reviewed by me personally

## 2018-02-28 NOTE — CONSULT NOTE ADULT - PROBLEM SELECTOR RECOMMENDATION 3
Patient was in a PCA at home. If she is to be alert and stable, she should be placed back on Dilaudid PCA 1 mg/h, 1 mg q 15' demand dose, and 2 mg q 1h PRN clinician bolus. Meanwhile, I will recommend, starting Dilaudid infusion 0.5 mg/h with 1 mg q 1 PRN. Will not recommend stopping opioids suddenly once the patient is using opioids chronically.

## 2018-03-01 DIAGNOSIS — G93.40 ENCEPHALOPATHY, UNSPECIFIED: ICD-10-CM

## 2018-03-01 LAB
-  K. PNEUMONIAE GROUP: SIGNIFICANT CHANGE UP
ALBUMIN SERPL ELPH-MCNC: 2.5 G/DL — LOW (ref 3.3–5)
ALBUMIN SERPL ELPH-MCNC: 2.6 G/DL — LOW (ref 3.3–5)
ALP SERPL-CCNC: 1468 U/L — HIGH (ref 40–120)
ALP SERPL-CCNC: 1658 U/L — HIGH (ref 40–120)
ALT FLD-CCNC: 141 U/L RC — HIGH (ref 10–45)
ALT FLD-CCNC: 167 U/L RC — HIGH (ref 10–45)
ANION GAP SERPL CALC-SCNC: 12 MMOL/L — SIGNIFICANT CHANGE UP (ref 5–17)
ANION GAP SERPL CALC-SCNC: 6 MMOL/L — SIGNIFICANT CHANGE UP (ref 5–17)
ANION GAP SERPL CALC-SCNC: 9 MMOL/L — SIGNIFICANT CHANGE UP (ref 5–17)
APTT BLD: 34 SEC — SIGNIFICANT CHANGE UP (ref 27.5–37.4)
AST SERPL-CCNC: 188 U/L — HIGH (ref 10–40)
AST SERPL-CCNC: 246 U/L — HIGH (ref 10–40)
BILIRUB SERPL-MCNC: 1 MG/DL — SIGNIFICANT CHANGE UP (ref 0.2–1.2)
BILIRUB SERPL-MCNC: 1.6 MG/DL — HIGH (ref 0.2–1.2)
BUN SERPL-MCNC: 22 MG/DL — SIGNIFICANT CHANGE UP (ref 7–23)
BUN SERPL-MCNC: 27 MG/DL — HIGH (ref 7–23)
BUN SERPL-MCNC: 35 MG/DL — HIGH (ref 7–23)
C PEPTIDE SERPL-MCNC: <0.1 NG/ML — LOW (ref 0.9–7.1)
CALCIUM SERPL-MCNC: 7.3 MG/DL — LOW (ref 8.4–10.5)
CALCIUM SERPL-MCNC: 7.6 MG/DL — LOW (ref 8.4–10.5)
CALCIUM SERPL-MCNC: 7.7 MG/DL — LOW (ref 8.4–10.5)
CHLORIDE SERPL-SCNC: 102 MMOL/L — SIGNIFICANT CHANGE UP (ref 96–108)
CHLORIDE SERPL-SCNC: 102 MMOL/L — SIGNIFICANT CHANGE UP (ref 96–108)
CHLORIDE SERPL-SCNC: 103 MMOL/L — SIGNIFICANT CHANGE UP (ref 96–108)
CO2 SERPL-SCNC: 29 MMOL/L — SIGNIFICANT CHANGE UP (ref 22–31)
CO2 SERPL-SCNC: 32 MMOL/L — HIGH (ref 22–31)
CO2 SERPL-SCNC: 33 MMOL/L — HIGH (ref 22–31)
CREAT SERPL-MCNC: 0.35 MG/DL — LOW (ref 0.5–1.3)
CREAT SERPL-MCNC: <0.3 MG/DL — LOW (ref 0.5–1.3)
CREAT SERPL-MCNC: <0.3 MG/DL — LOW (ref 0.5–1.3)
GLUCOSE BLDC GLUCOMTR-MCNC: 126 MG/DL — HIGH (ref 70–99)
GLUCOSE BLDC GLUCOMTR-MCNC: 283 MG/DL — HIGH (ref 70–99)
GLUCOSE BLDC GLUCOMTR-MCNC: 49 MG/DL — LOW (ref 70–99)
GLUCOSE BLDC GLUCOMTR-MCNC: 56 MG/DL — LOW (ref 70–99)
GLUCOSE SERPL-MCNC: 106 MG/DL — HIGH (ref 70–99)
GLUCOSE SERPL-MCNC: 137 MG/DL — HIGH (ref 70–99)
GLUCOSE SERPL-MCNC: 97 MG/DL — SIGNIFICANT CHANGE UP (ref 70–99)
GRAM STN FLD: SIGNIFICANT CHANGE UP
HCT VFR BLD CALC: 32.3 % — LOW (ref 34.5–45)
HCT VFR BLD CALC: 34.5 % — SIGNIFICANT CHANGE UP (ref 34.5–45)
HGB BLD-MCNC: 10 G/DL — LOW (ref 11.5–15.5)
HGB BLD-MCNC: 10.4 G/DL — LOW (ref 11.5–15.5)
INR BLD: 1.52 RATIO — HIGH (ref 0.88–1.16)
INR BLD: 1.82 RATIO — HIGH (ref 0.88–1.16)
MAGNESIUM SERPL-MCNC: 1.6 MG/DL — SIGNIFICANT CHANGE UP (ref 1.6–2.6)
MAGNESIUM SERPL-MCNC: 1.6 MG/DL — SIGNIFICANT CHANGE UP (ref 1.6–2.6)
MAGNESIUM SERPL-MCNC: 2.2 MG/DL — SIGNIFICANT CHANGE UP (ref 1.6–2.6)
MCHC RBC-ENTMCNC: 29.2 PG — SIGNIFICANT CHANGE UP (ref 27–34)
MCHC RBC-ENTMCNC: 30 GM/DL — LOW (ref 32–36)
MCHC RBC-ENTMCNC: 30 PG — SIGNIFICANT CHANGE UP (ref 27–34)
MCHC RBC-ENTMCNC: 31 GM/DL — LOW (ref 32–36)
MCV RBC AUTO: 96.8 FL — SIGNIFICANT CHANGE UP (ref 80–100)
MCV RBC AUTO: 97.2 FL — SIGNIFICANT CHANGE UP (ref 80–100)
METHOD TYPE: SIGNIFICANT CHANGE UP
PHOSPHATE SERPL-MCNC: 1.9 MG/DL — LOW (ref 2.5–4.5)
PHOSPHATE SERPL-MCNC: 2.5 MG/DL — SIGNIFICANT CHANGE UP (ref 2.5–4.5)
PHOSPHATE SERPL-MCNC: 4.1 MG/DL — SIGNIFICANT CHANGE UP (ref 2.5–4.5)
PLATELET # BLD AUTO: 297 K/UL — SIGNIFICANT CHANGE UP (ref 150–400)
PLATELET # BLD AUTO: 300 K/UL — SIGNIFICANT CHANGE UP (ref 150–400)
POTASSIUM SERPL-MCNC: 2.6 MMOL/L — CRITICAL LOW (ref 3.5–5.3)
POTASSIUM SERPL-MCNC: 3.1 MMOL/L — LOW (ref 3.5–5.3)
POTASSIUM SERPL-MCNC: 4 MMOL/L — SIGNIFICANT CHANGE UP (ref 3.5–5.3)
POTASSIUM SERPL-SCNC: 2.6 MMOL/L — CRITICAL LOW (ref 3.5–5.3)
POTASSIUM SERPL-SCNC: 3.1 MMOL/L — LOW (ref 3.5–5.3)
POTASSIUM SERPL-SCNC: 4 MMOL/L — SIGNIFICANT CHANGE UP (ref 3.5–5.3)
PROT SERPL-MCNC: 5.1 G/DL — LOW (ref 6–8.3)
PROT SERPL-MCNC: 5.4 G/DL — LOW (ref 6–8.3)
PROTHROM AB SERPL-ACNC: 16.6 SEC — HIGH (ref 9.8–12.7)
PROTHROM AB SERPL-ACNC: 20.1 SEC — HIGH (ref 9.8–12.7)
RAPID RVP RESULT: SIGNIFICANT CHANGE UP
RBC # BLD: 3.33 M/UL — LOW (ref 3.8–5.2)
RBC # BLD: 3.55 M/UL — LOW (ref 3.8–5.2)
RBC # FLD: 14.7 % — HIGH (ref 10.3–14.5)
RBC # FLD: 14.9 % — HIGH (ref 10.3–14.5)
SODIUM SERPL-SCNC: 141 MMOL/L — SIGNIFICANT CHANGE UP (ref 135–145)
SODIUM SERPL-SCNC: 143 MMOL/L — SIGNIFICANT CHANGE UP (ref 135–145)
SODIUM SERPL-SCNC: 144 MMOL/L — SIGNIFICANT CHANGE UP (ref 135–145)
SPECIMEN SOURCE: SIGNIFICANT CHANGE UP
SPECIMEN SOURCE: SIGNIFICANT CHANGE UP
WBC # BLD: 17.7 K/UL — HIGH (ref 3.8–10.5)
WBC # BLD: 19 K/UL — HIGH (ref 3.8–10.5)
WBC # FLD AUTO: 17.7 K/UL — HIGH (ref 3.8–10.5)
WBC # FLD AUTO: 19 K/UL — HIGH (ref 3.8–10.5)

## 2018-03-01 PROCEDURE — 71045 X-RAY EXAM CHEST 1 VIEW: CPT | Mod: 26,76

## 2018-03-01 PROCEDURE — 99291 CRITICAL CARE FIRST HOUR: CPT

## 2018-03-01 PROCEDURE — 99233 SBSQ HOSP IP/OBS HIGH 50: CPT

## 2018-03-01 RX ORDER — POTASSIUM PHOSPHATE, MONOBASIC POTASSIUM PHOSPHATE, DIBASIC 236; 224 MG/ML; MG/ML
30 INJECTION, SOLUTION INTRAVENOUS ONCE
Qty: 0 | Refills: 0 | Status: COMPLETED | OUTPATIENT
Start: 2018-03-01 | End: 2018-03-01

## 2018-03-01 RX ORDER — DEXTROSE 10 % IN WATER 10 %
1000 INTRAVENOUS SOLUTION INTRAVENOUS
Qty: 0 | Refills: 0 | Status: DISCONTINUED | OUTPATIENT
Start: 2018-03-01 | End: 2018-03-01

## 2018-03-01 RX ORDER — MAGNESIUM SULFATE 500 MG/ML
1 VIAL (ML) INJECTION ONCE
Qty: 0 | Refills: 0 | Status: COMPLETED | OUTPATIENT
Start: 2018-03-01 | End: 2018-03-01

## 2018-03-01 RX ORDER — FENTANYL CITRATE 50 UG/ML
25 INJECTION INTRAVENOUS ONCE
Qty: 0 | Refills: 0 | Status: DISCONTINUED | OUTPATIENT
Start: 2018-03-01 | End: 2018-03-01

## 2018-03-01 RX ORDER — SODIUM CHLORIDE 9 MG/ML
1000 INJECTION, SOLUTION INTRAVENOUS
Qty: 0 | Refills: 0 | Status: DISCONTINUED | OUTPATIENT
Start: 2018-03-01 | End: 2018-03-01

## 2018-03-01 RX ORDER — POTASSIUM CHLORIDE 20 MEQ
40 PACKET (EA) ORAL ONCE
Qty: 0 | Refills: 0 | Status: COMPLETED | OUTPATIENT
Start: 2018-03-01 | End: 2018-03-01

## 2018-03-01 RX ORDER — POTASSIUM CHLORIDE 20 MEQ
20 PACKET (EA) ORAL ONCE
Qty: 0 | Refills: 0 | Status: COMPLETED | OUTPATIENT
Start: 2018-03-01 | End: 2018-03-01

## 2018-03-01 RX ORDER — CALCIUM GLUCONATE 100 MG/ML
1 VIAL (ML) INTRAVENOUS ONCE
Qty: 0 | Refills: 0 | Status: COMPLETED | OUTPATIENT
Start: 2018-03-01 | End: 2018-03-01

## 2018-03-01 RX ORDER — POTASSIUM CHLORIDE 20 MEQ
10 PACKET (EA) ORAL
Qty: 0 | Refills: 0 | Status: COMPLETED | OUTPATIENT
Start: 2018-03-01 | End: 2018-03-01

## 2018-03-01 RX ORDER — PHENYLEPHRINE HYDROCHLORIDE 10 MG/ML
0.4 INJECTION INTRAVENOUS
Qty: 80 | Refills: 0 | Status: DISCONTINUED | OUTPATIENT
Start: 2018-03-01 | End: 2018-03-03

## 2018-03-01 RX ORDER — DEXTROSE 50 % IN WATER 50 %
50 SYRINGE (ML) INTRAVENOUS ONCE
Qty: 0 | Refills: 0 | Status: COMPLETED | OUTPATIENT
Start: 2018-03-01 | End: 2018-03-01

## 2018-03-01 RX ORDER — DEXTROSE 10 % IN WATER 10 %
1000 INTRAVENOUS SOLUTION INTRAVENOUS
Qty: 0 | Refills: 0 | Status: DISCONTINUED | OUTPATIENT
Start: 2018-03-01 | End: 2018-03-03

## 2018-03-01 RX ADMIN — Medication 100 GRAM(S): at 16:59

## 2018-03-01 RX ADMIN — Medication 100 MILLIEQUIVALENT(S): at 02:55

## 2018-03-01 RX ADMIN — MEROPENEM 100 MILLIGRAM(S): 1 INJECTION INTRAVENOUS at 01:52

## 2018-03-01 RX ADMIN — MEROPENEM 100 MILLIGRAM(S): 1 INJECTION INTRAVENOUS at 21:21

## 2018-03-01 RX ADMIN — MEROPENEM 100 MILLIGRAM(S): 1 INJECTION INTRAVENOUS at 16:20

## 2018-03-01 RX ADMIN — Medication 100 MILLIEQUIVALENT(S): at 13:39

## 2018-03-01 RX ADMIN — Medication 100 MILLIEQUIVALENT(S): at 02:17

## 2018-03-01 RX ADMIN — Medication 100 MILLIEQUIVALENT(S): at 01:38

## 2018-03-01 RX ADMIN — Medication 100 MILLIEQUIVALENT(S): at 15:01

## 2018-03-01 RX ADMIN — SODIUM CHLORIDE 100 MILLILITER(S): 9 INJECTION, SOLUTION INTRAVENOUS at 06:39

## 2018-03-01 RX ADMIN — Medication 100 GRAM(S): at 19:10

## 2018-03-01 RX ADMIN — PHENYLEPHRINE HYDROCHLORIDE 5.45 MICROGRAM(S)/KG/MIN: 10 INJECTION INTRAVENOUS at 00:30

## 2018-03-01 RX ADMIN — PANTOPRAZOLE SODIUM 40 MILLIGRAM(S): 20 TABLET, DELAYED RELEASE ORAL at 18:54

## 2018-03-01 RX ADMIN — POTASSIUM PHOSPHATE, MONOBASIC POTASSIUM PHOSPHATE, DIBASIC 83.33 MILLIMOLE(S): 236; 224 INJECTION, SOLUTION INTRAVENOUS at 17:00

## 2018-03-01 RX ADMIN — Medication 40 MILLIEQUIVALENT(S): at 02:43

## 2018-03-01 RX ADMIN — Medication 100 GRAM(S): at 02:19

## 2018-03-01 RX ADMIN — FENTANYL CITRATE 25 MICROGRAM(S): 50 INJECTION INTRAVENOUS at 03:00

## 2018-03-01 RX ADMIN — Medication 200 GRAM(S): at 03:42

## 2018-03-01 RX ADMIN — Medication 50 MILLILITER(S): at 18:45

## 2018-03-01 RX ADMIN — FENTANYL CITRATE 25 MICROGRAM(S): 50 INJECTION INTRAVENOUS at 02:42

## 2018-03-01 RX ADMIN — PROPOFOL 4.36 MICROGRAM(S)/KG/MIN: 10 INJECTION, EMULSION INTRAVENOUS at 06:39

## 2018-03-01 NOTE — AIRWAY REMOVAL NOTE  ADULT & PEDS - ARTIFICAL AIRWAY REMOVAL COMMENTS
Written order for extubation verified. The patient was identified by full name and birth date compared to the identification band. Present during the procedure was Roque Little

## 2018-03-01 NOTE — PROGRESS NOTE ADULT - ASSESSMENT
47F PMH metastatic ovarian CA (s/p TAHBSO, s/p colostomy, with holistic care approach, started chemo, currently not a candidate 2/2 nutrition status, on home dilaudid PCA pump with the following settings, 1 mg/h, 1 mg q 15' demand dose presents after being found in bed by  to be cold and unresponsive, intubated in the field by EMS, found to be hypoglycemic and bacteremic.     # Neuro:   - patient intubated and sedated; AMS likely secondary to hypoglycemia- now resolved  - CT head demonstrates   - CT head demonstrates ?SDH. Will check CT again when patient is sedated to confirm any interval change.   - Will wean from sedation tomorrow and attempt to establish mental status    # CVS:  - Patient with intact LV function on POCUS and maintaining MAPs > 65 without pressor use'  - Check VS and c/w IVF for now    # Pulm:  - A-line predominant with bilateral PLEFF's with associated atelectasis vs consolidation  - Will cover empirically as outlined below with Meropenem  - Patient currently intubated.    # GI:  - Will start PUD p/px protonix as patient is intubated  - Consider OG tube and can begin feed    # Renal:  - no known renal dysfunction  - will f/u SCr    # ID:  - patient with suspected infection which can either be UTI, viral, pulmonary, or biliary in origin  - Will cover broadly with Meropenem for now  - Check RVP, UCx, and BCx    # Endocrine:  - Patient with hypoglycemia, unclear source, will check FS q6h and c/w D5 NS gtt for now. ?Infectious etiology  - Will check c-peptide    # Heme:  - H/H stable. No lineage deficiencies  - will continue to trend cbc    # Electrolytes:  - Follow and replete PRN. No current derangements    # Skin:  - s/p I/O which was removed from RLE in MICU  - Right sided mediport.   - PIV in place    # DVT p/px:  - ? subdural hematoma, will not cover patient with chemoprophylaxis, SCDs for now      Edgardo Blair MD 47F PMH metastatic ovarian CA (s/p TAHBSO, s/p colostomy, with holistic care approach, started chemo, currently not a candidate 2/2 nutrition status, on home dilaudid PCA pump with the following settings, 1 mg/h, 1 mg q 15' demand dose presents after being found in bed by  to be cold and unresponsive, intubated in the field by EMS, found to be hypoglycemic and bacteremic.     # Neuro:   - patient intubated and sedated; AMS likely secondary to hypoglycemia- now resolved  - CT head demonstrates repeat subdural hematoma-   - Will wean from sedation tomorrow and attempt to establish mental status and extubation today    # CVS:  - requiring phenylephrine- 3 mcg at this time  - Patient with intact LV function on POCUS and maintaining MAPs > 65 without pressor use'    # Pulm:  - A-line predominant with bilateral pleural effusions   - Patient currently intubated  - per family discussion with  and Pall care- patient for extubation today    # GI:  - Will start PUD p/px protonix as patient is intubated  - c/w tube feeds    # Renal:  - no known renal dysfunction  - will f/u SCr    # ID:  - klebsiella bacteremia- c/w meropenem at this time, will narrow based on specificities  - source likely from gallbladder vs intestinal (hx of perf gallbladder with perc ubaldo in place)    # Endocrine:  - Patient with hypoglycemia, unclear source, will check FS q6h and c/w D5 NS gtt for now.  - may be in setting of infection     # Heme:  - H/H stable.   - will continue to trend cbc    # Electrolytes:  - Follow and replete PRN as needed    # Skin:  - s/p I/O which was removed from RLE in MICU  - Right sided Mediport   - PIV in place  - sacral decub- care per nursing     # DVT p/px:  - subdural hematoma, will not cover patient with chemoprophylaxis, SCDs for now

## 2018-03-01 NOTE — PROGRESS NOTE ADULT - SUBJECTIVE AND OBJECTIVE BOX
CHIEF COMPLAINT:Patient is a 47y old  Female who presents with a chief complaint of AMS (2018 14:58)        Interval Events:    REVIEW OF SYSTEMS:  Constitutional: [ ] negative [ ] fevers [ ] chills [ ] weight loss [ ] weight gain  HEENT: [ ] negative [ ] dry eyes [ ] eye irritation [ ] postnasal drip [ ] nasal congestion  CV: [ ] negative  [ ] chest pain [ ] orthopnea [ ] palpitations [ ] murmur  Resp: [ ] negative [ ] cough [ ] shortness of breath [ ] dyspnea [ ] wheezing [ ] sputum [ ] hemoptysis  GI: [ ] negative [ ] nausea [ ] vomiting [ ] diarrhea [ ] constipation [ ] abd pain [ ] dysphagia   : [ ] negative [ ] dysuria [ ] nocturia [ ] hematuria [ ] increased urinary frequency  Musculoskeletal: [ ] negative [ ] back pain [ ] myalgias [ ] arthralgias [ ] fracture  Skin: [ ] negative [ ] rash [ ] itch  Neurological: [ ] negative [ ] headache [ ] dizziness [ ] syncope [ ] weakness [ ] numbness  Psychiatric: [ ] negative [ ] anxiety [ ] depression  Endocrine: [ ] negative [ ] diabetes [ ] thyroid problem  Hematologic/Lymphatic: [ ] negative [ ] anemia [ ] bleeding problem  Allergic/Immunologic: [ ] negative [ ] itchy eyes [ ] nasal discharge [ ] hives [ ] angioedema  [ ] All other systems negative  [ ] Unable to assess ROS because ________    OBJECTIVE:  ICU Vital Signs Last 24 Hrs  T(C): 37.3 (01 Mar 2018 08:00), Max: 37.4 (2018 19:00)  T(F): 99.2 (01 Mar 2018 08:00), Max: 99.3 (2018 19:00)  HR: 78 (01 Mar 2018 11:28) (66 - 105)  BP: 88/61 (01 Mar 2018 11:15) (78/53 - 114/77)  BP(mean): 71 (01 Mar 2018 11:15) (60 - 92)  ABP: --  ABP(mean): --  RR: 15 (01 Mar 2018 11:15) (12 - 25)  SpO2: 100% (01 Mar 2018 11:28) (99% - 100%)    Mode: AC/ CMV (Assist Control/ Continuous Mandatory Ventilation), RR (machine): 12, TV (machine): 350, FiO2: 40, PEEP: 5, ITime: 0.9, MAP: 14, PIP: 20     @ 07: @ 07:00  --------------------------------------------------------  IN: 2685.4 mL / OUT: 1975 mL / NET: 710.4 mL     @ 07: @ 12:20  --------------------------------------------------------  IN: 432.4 mL / OUT: 600 mL / NET: -167.6 mL      CAPILLARY BLOOD GLUCOSE      POCT Blood Glucose.: 126 mg/dL (01 Mar 2018 06:44)      PHYSICAL EXAM:  General:   HEENT:   Lymph Nodes:  Neck:   Respiratory:   Cardiovascular:   Abdomen:   Extremities:   Skin:   Neurological:  Psychiatry:    LINES:    HOSPITAL MEDICATIONS:  Standing Meds:  dextrose 5% + sodium chloride 0.9%. 1000 milliLiter(s) IV Continuous <Continuous>  dextrose 5%. 1000 milliLiter(s) IV Continuous <Continuous>  dextrose 50% Injectable 12.5 Gram(s) IV Push once  dextrose 50% Injectable 25 Gram(s) IV Push once  dextrose 50% Injectable 25 Gram(s) IV Push once  HYDROmorphone Infusion 0.5 mG/Hr IV Continuous <Continuous>  influenza   Vaccine 0.5 milliLiter(s) IntraMuscular once  meropenem  IVPB      meropenem  IVPB 1000 milliGRAM(s) IV Intermittent every 8 hours  pantoprazole  Injectable 40 milliGRAM(s) IV Push daily  phenylephrine    Infusion 0.4 MICROgram(s)/kG/Min IV Continuous <Continuous>  propofol Infusion 20 MICROgram(s)/kG/Min IV Continuous <Continuous>      PRN Meds:  dextrose Gel 1 Dose(s) Oral once PRN  glucagon  Injectable 1 milliGRAM(s) IntraMuscular once PRN  HYDROmorphone  Injectable 1 milliGRAM(s) IV Push every 1 hour PRN      LABS:                        10.6   12.6  )-----------( 266      ( 2018 23:41 )             33.8     Hgb Trend: 10.6<--, 10.9<--      144  |  102  |  35<H>  ----------------------------<  106<H>  2.6<LL>   |  33<H>  |  0.35<L>    Ca    7.7<L>      2018 23:41  Phos  2.5       Mg     1.6         TPro  5.4<L>  /  Alb  2.6<L>  /  TBili  1.6<H>  /  DBili  x   /  AST  246<H>  /  ALT  167<H>  /  AlkPhos  1658<H>      Creatinine Trend: 0.35<--, 0.37<--, 0.27<--, 0.32<--, 0.31<--, 0.23<--  PT/INR - ( 2018 23:42 )   PT: 20.1 sec;   INR: 1.82 ratio         PTT - ( 2018 23:42 )  PTT:34.0 sec  Urinalysis Basic - ( 2018 12:02 )    Color: Yellow / Appearance: Clear / S.013 / pH: x  Gluc: x / Ketone: Negative  / Bili: Negative / Urobili: Negative   Blood: x / Protein: Trace / Nitrite: Negative   Leuk Esterase: Moderate / RBC: 0-2 /HPF / WBC 11-25 /HPF   Sq Epi: x / Non Sq Epi: x / Bacteria: x        Venous Blood Gas:   @ 10:44  7.46/49/34/34/60  VBG Lactate: 2.0      MICROBIOLOGY:     Culture - Blood (collected 2018 15:38)  Source: .Blood Blood  Gram Stain (01 Mar 2018 05:58):    Growth in aerobic and anaerobic bottles:    Gram Negative Rods  Preliminary Report (01 Mar 2018 05:59):    Growth in aerobic and anaerobic bottles:    Gram Negative Rods    Culture - Blood (collected 2018 15:38)  Source: .Blood Blood  Gram Stain (01 Mar 2018 06:05):    Growth in aerobic bottle: Gram Negative Rods    Growth in anaerobic bottle: Gram Negative Rods  Preliminary Report (01 Mar 2018 06:06):    Growth in aerobic bottle: Gram Negative Rods    "Due to technical problems, Proteus sp. will Not be reported as part of    the BCID panel until further notice"    ***Blood Panel PCR results on this specimen are available    approximately 3 hours after the Gram stain result.***    Gram stain, PCR, and/or culture results may not always    correspond due to difference in methodologies.    ************************************************************    This PCR assay was performed using [employer].    The following targets are tested for: Enterococcus,    vancomycin resistant enterococci, Listeria monocytogenes,    coagulase negative staphylococci, S. aureus,    methicillin resistant S. aureus, Streptococcus agalactiae    (Group B), S. pneumoniae, S. pyogenes (Group A),    Acinetobacter baumannii, Enterobacter cloacae, E. coli,    Klebsiella oxytoca, K. pneumoniae, Proteus sp.,    Serratia marcescens, Haemophilus influenzae,    Neisseria meningitidis, Pseudomonas aeruginosa, Candida    albicans, C. glabrata, C krusei, C parapsilosis,    C. tropicalis and the KPC resistance gene.    Growth in anaerobic bottle: Gram Negative Rods  Organism: Blood Culture PCR (01 Mar 2018 09:05)  Organism: Blood Culture PCR (01 Mar 2018 09:05)      RADIOLOGY:  [ ] Reviewed and interpreted by me    EKG: CHIEF COMPLAINT:Patient is a 47y old  Female who presents with a chief complaint of AMS (2018 14:58)    Interval Events:  Overnight, no acute events.      REVIEW OF SYSTEMS:  Constitutional: [ ] negative [ ] fevers [ ] chills [ ] weight loss [ ] weight gain  HEENT: [ ] negative [ ] dry eyes [ ] eye irritation [ ] postnasal drip [ ] nasal congestion  CV: [ ] negative  [ ] chest pain [ ] orthopnea [ ] palpitations [ ] murmur  Resp: [ ] negative [ ] cough [ ] shortness of breath [ ] dyspnea [ ] wheezing [ ] sputum [ ] hemoptysis  GI: [ ] negative [ ] nausea [ ] vomiting [ ] diarrhea [ ] constipation [ ] abd pain [ ] dysphagia   : [ ] negative [ ] dysuria [ ] nocturia [ ] hematuria [ ] increased urinary frequency  Musculoskeletal: [ ] negative [ ] back pain [ ] myalgias [ ] arthralgias [ ] fracture  Skin: [ ] negative [ ] rash [ ] itch  Neurological: [ ] negative [ ] headache [ ] dizziness [ ] syncope [ ] weakness [ ] numbness  Psychiatric: [ ] negative [ ] anxiety [ ] depression  Endocrine: [ ] negative [ ] diabetes [ ] thyroid problem  Hematologic/Lymphatic: [ ] negative [ ] anemia [ ] bleeding problem  Allergic/Immunologic: [ ] negative [ ] itchy eyes [ ] nasal discharge [ ] hives [ ] angioedema  [ ] All other systems negative  [ X] Unable to assess ROS because patient intubated and sedated    OBJECTIVE:  ICU Vital Signs Last 24 Hrs  T(C): 37.3 (01 Mar 2018 08:00), Max: 37.4 (2018 19:00)  T(F): 99.2 (01 Mar 2018 08:00), Max: 99.3 (2018 19:00)  HR: 78 (01 Mar 2018 11:28) (66 - 105)  BP: 88/61 (01 Mar 2018 11:15) (78/53 - 114/77)  BP(mean): 71 (01 Mar 2018 11:15) (60 - 92)  ABP: --  ABP(mean): --  RR: 15 (01 Mar 2018 11:15) (12 - 25)  SpO2: 100% (01 Mar 2018 11:28) (99% - 100%)    Mode: AC/ CMV (Assist Control/ Continuous Mandatory Ventilation), RR (machine): 12, TV (machine): 350, FiO2: 40, PEEP: 5, ITime: 0.9, MAP: 14, PIP: 20     @ 07: @ 07:00  --------------------------------------------------------  IN: 2685.4 mL / OUT: 1975 mL / NET: 710.4 mL     @ 07: @ 12:20  --------------------------------------------------------  IN: 432.4 mL / OUT: 600 mL / NET: -167.6 mL      CAPILLARY BLOOD GLUCOSE      POCT Blood Glucose.: 126 mg/dL (01 Mar 2018 06:44)      General: WN/WD NAD  Neurology: A&Ox3, nonfocal, DAVIS x 4  Head:  Normocephalic, atraumatic  ENT:  Mucosa moist, no ulcerations  Neck:  Supple, no sinuses or palpable masses  Lymphatic:  No palpable cervical, supraclavicular, axillary or inguinal adenopathy  Respiratory: CTA B/L  CV: RRR, S1S2, no murmur  Abdominal: Soft, NT, ND no palpable mass  MSK: No edema, + peripheral pulses, FROM all 4 extremity  Incisions: intact, no erythema or drainage  PHYSICAL EXAM:  General:   HEENT:   Lymph Nodes:  Neck:   Respiratory:   Cardiovascular:   Abdomen:   Extremities:   Skin:   Neurological:  Psychiatry:    LINES:    HOSPITAL MEDICATIONS:  Standing Meds:  dextrose 5% + sodium chloride 0.9%. 1000 milliLiter(s) IV Continuous <Continuous>  dextrose 5%. 1000 milliLiter(s) IV Continuous <Continuous>  dextrose 50% Injectable 12.5 Gram(s) IV Push once  dextrose 50% Injectable 25 Gram(s) IV Push once  dextrose 50% Injectable 25 Gram(s) IV Push once  HYDROmorphone Infusion 0.5 mG/Hr IV Continuous <Continuous>  influenza   Vaccine 0.5 milliLiter(s) IntraMuscular once  meropenem  IVPB      meropenem  IVPB 1000 milliGRAM(s) IV Intermittent every 8 hours  pantoprazole  Injectable 40 milliGRAM(s) IV Push daily  phenylephrine    Infusion 0.4 MICROgram(s)/kG/Min IV Continuous <Continuous>  propofol Infusion 20 MICROgram(s)/kG/Min IV Continuous <Continuous>      PRN Meds:  dextrose Gel 1 Dose(s) Oral once PRN  glucagon  Injectable 1 milliGRAM(s) IntraMuscular once PRN  HYDROmorphone  Injectable 1 milliGRAM(s) IV Push every 1 hour PRN      LABS:                        10.6   12.6  )-----------( 266      ( 2018 23:41 )             33.8     Hgb Trend: 10.6<--, 10.9<--      144  |  102  |  35<H>  ----------------------------<  106<H>  2.6<LL>   |  33<H>  |  0.35<L>    Ca    7.7<L>      2018 23:41  Phos  2.5       Mg     1.6         TPro  5.4<L>  /  Alb  2.6<L>  /  TBili  1.6<H>  /  DBili  x   /  AST  246<H>  /  ALT  167<H>  /  AlkPhos  1658<H>      Creatinine Trend: 0.35<--, 0.37<--, 0.27<--, 0.32<--, 0.31<--, 0.23<--  PT/INR - ( 2018 23:42 )   PT: 20.1 sec;   INR: 1.82 ratio         PTT - ( 2018 23:42 )  PTT:34.0 sec  Urinalysis Basic - ( 2018 12:02 )    Color: Yellow / Appearance: Clear / S.013 / pH: x  Gluc: x / Ketone: Negative  / Bili: Negative / Urobili: Negative   Blood: x / Protein: Trace / Nitrite: Negative   Leuk Esterase: Moderate / RBC: 0-2 /HPF / WBC 11-25 /HPF   Sq Epi: x / Non Sq Epi: x / Bacteria: x        Venous Blood Gas:   @ 10:44  7.46/49/34/34/60  VBG Lactate: 2.0      MICROBIOLOGY:     Culture - Blood (collected 2018 15:38)  Source: .Blood Blood  Gram Stain (01 Mar 2018 05:58):    Growth in aerobic and anaerobic bottles:    Gram Negative Rods  Preliminary Report (01 Mar 2018 05:59):    Growth in aerobic and anaerobic bottles:    Gram Negative Rods    Culture - Blood (collected 2018 15:38)  Source: .Blood Blood  Gram Stain (01 Mar 2018 06:05):    Growth in aerobic bottle: Gram Negative Rods    Growth in anaerobic bottle: Gram Negative Rods  Preliminary Report (01 Mar 2018 06:06):    Growth in aerobic bottle: Gram Negative Rods    "Due to technical problems, Proteus sp. will Not be reported as part of    the BCID panel until further notice"    ***Blood Panel PCR results on this specimen are available    approximately 3 hours after the Gram stain result.***    Gram stain, PCR, and/or culture results may not always    correspond due to difference in methodologies.    ************************************************************    This PCR assay was performed using [employer].    The following targets are tested for: Enterococcus,    vancomycin resistant enterococci, Listeria monocytogenes,    coagulase negative staphylococci, S. aureus,    methicillin resistant S. aureus, Streptococcus agalactiae    (Group B), S. pneumoniae, S. pyogenes (Group A),    Acinetobacter baumannii, Enterobacter cloacae, E. coli,    Klebsiella oxytoca, K. pneumoniae, Proteus sp.,    Serratia marcescens, Haemophilus influenzae,    Neisseria meningitidis, Pseudomonas aeruginosa, Candida    albicans, C. glabrata, C krusei, C parapsilosis,    C. tropicalis and the KPC resistance gene.    Growth in anaerobic bottle: Gram Negative Rods  Organism: Blood Culture PCR (01 Mar 2018 09:05)  Organism: Blood Culture PCR (01 Mar 2018 09:05)      RADIOLOGY:  [ ] Reviewed and interpreted by me    EKG: CHIEF COMPLAINT:Patient is a 47y old  Female who presents with a chief complaint of AMS (2018 14:58)    Interval Events:  Overnight, no acute events.      REVIEW OF SYSTEMS:  Constitutional: [ ] negative [ ] fevers [ ] chills [ ] weight loss [ ] weight gain  HEENT: [ ] negative [ ] dry eyes [ ] eye irritation [ ] postnasal drip [ ] nasal congestion  CV: [ ] negative  [ ] chest pain [ ] orthopnea [ ] palpitations [ ] murmur  Resp: [ ] negative [ ] cough [ ] shortness of breath [ ] dyspnea [ ] wheezing [ ] sputum [ ] hemoptysis  GI: [ ] negative [ ] nausea [ ] vomiting [ ] diarrhea [ ] constipation [ ] abd pain [ ] dysphagia   : [ ] negative [ ] dysuria [ ] nocturia [ ] hematuria [ ] increased urinary frequency  Musculoskeletal: [ ] negative [ ] back pain [ ] myalgias [ ] arthralgias [ ] fracture  Skin: [ ] negative [ ] rash [ ] itch  Neurological: [ ] negative [ ] headache [ ] dizziness [ ] syncope [ ] weakness [ ] numbness  Psychiatric: [ ] negative [ ] anxiety [ ] depression  Endocrine: [ ] negative [ ] diabetes [ ] thyroid problem  Hematologic/Lymphatic: [ ] negative [ ] anemia [ ] bleeding problem  Allergic/Immunologic: [ ] negative [ ] itchy eyes [ ] nasal discharge [ ] hives [ ] angioedema  [ ] All other systems negative  [ X] Unable to assess ROS because patient intubated and sedated    OBJECTIVE:  ICU Vital Signs Last 24 Hrs  T(C): 37.3 (01 Mar 2018 08:00), Max: 37.4 (2018 19:00)  T(F): 99.2 (01 Mar 2018 08:00), Max: 99.3 (2018 19:00)  HR: 78 (01 Mar 2018 11:28) (66 - 105)  BP: 88/61 (01 Mar 2018 11:15) (78/53 - 114/77)  BP(mean): 71 (01 Mar 2018 11:15) (60 - 92)  ABP: --  ABP(mean): --  RR: 15 (01 Mar 2018 11:15) (12 - 25)  SpO2: 100% (01 Mar 2018 11:28) (99% - 100%)    Mode: AC/ CMV (Assist Control/ Continuous Mandatory Ventilation), RR (machine): 12, TV (machine): 350, FiO2: 40, PEEP: 5, ITime: 0.9, MAP: 14, PIP: 20     @ 07: @ 07:00  --------------------------------------------------------  IN: 2685.4 mL / OUT: 1975 mL / NET: 710.4 mL     @ 07: @ 12:20  --------------------------------------------------------  IN: 432.4 mL / OUT: 600 mL / NET: -167.6 mL      CAPILLARY BLOOD GLUCOSE      POCT Blood Glucose.: 126 mg/dL (01 Mar 2018 06:44)      General: cachectic appearing, intubated/sedated  Neurology: sedated in the AM  Head:  Normocephalic, atraumatic  ENT:  dry mucosa  Neck:  Supple, no sinuses or palpable masses  Respiratory: CTA B/L  CV: RRR, S1S2, no murmur  Abdominal: Soft, NT, ND no palpable mass; cholecystostomy tube in place  MSK: No edema, + peripheral pulses, FROM all 4 extremity        LINES:    HOSPITAL MEDICATIONS:  Standing Meds:  dextrose 5% + sodium chloride 0.9%. 1000 milliLiter(s) IV Continuous <Continuous>  dextrose 5%. 1000 milliLiter(s) IV Continuous <Continuous>  dextrose 50% Injectable 12.5 Gram(s) IV Push once  dextrose 50% Injectable 25 Gram(s) IV Push once  dextrose 50% Injectable 25 Gram(s) IV Push once  HYDROmorphone Infusion 0.5 mG/Hr IV Continuous <Continuous>  influenza   Vaccine 0.5 milliLiter(s) IntraMuscular once  meropenem  IVPB      meropenem  IVPB 1000 milliGRAM(s) IV Intermittent every 8 hours  pantoprazole  Injectable 40 milliGRAM(s) IV Push daily  phenylephrine    Infusion 0.4 MICROgram(s)/kG/Min IV Continuous <Continuous>  propofol Infusion 20 MICROgram(s)/kG/Min IV Continuous <Continuous>      PRN Meds:  dextrose Gel 1 Dose(s) Oral once PRN  glucagon  Injectable 1 milliGRAM(s) IntraMuscular once PRN  HYDROmorphone  Injectable 1 milliGRAM(s) IV Push every 1 hour PRN      LABS:                        10.6   12.6  )-----------( 266      ( 2018 23:41 )             33.8     Hgb Trend: 10.6<--, 10.9<--      144  |  102  |  35<H>  ----------------------------<  106<H>  2.6<LL>   |  33<H>  |  0.35<L>    Ca    7.7<L>      2018 23:41  Phos  2.5       Mg     1.6         TPro  5.4<L>  /  Alb  2.6<L>  /  TBili  1.6<H>  /  DBili  x   /  AST  246<H>  /  ALT  167<H>  /  AlkPhos  1658<H>      Creatinine Trend: 0.35<--, 0.37<--, 0.27<--, 0.32<--, 0.31<--, 0.23<--  PT/INR - ( 2018 23:42 )   PT: 20.1 sec;   INR: 1.82 ratio         PTT - ( 2018 23:42 )  PTT:34.0 sec  Urinalysis Basic - ( 2018 12:02 )    Color: Yellow / Appearance: Clear / S.013 / pH: x  Gluc: x / Ketone: Negative  / Bili: Negative / Urobili: Negative   Blood: x / Protein: Trace / Nitrite: Negative   Leuk Esterase: Moderate / RBC: 0-2 /HPF / WBC 11-25 /HPF   Sq Epi: x / Non Sq Epi: x / Bacteria: x        Venous Blood Gas:   @ 10:44  7.46/49/34/34/60  VBG Lactate: 2.0      MICROBIOLOGY:     Culture - Blood (collected 2018 15:38)  Source: .Blood Blood  Gram Stain (01 Mar 2018 05:58):    Growth in aerobic and anaerobic bottles:    Gram Negative Rods  Preliminary Report (01 Mar 2018 05:59):    Growth in aerobic and anaerobic bottles:    Gram Negative Rods    Culture - Blood (collected 2018 15:38)  Source: .Blood Blood  Gram Stain (01 Mar 2018 06:05):    Growth in aerobic bottle: Gram Negative Rods    Growth in anaerobic bottle: Gram Negative Rods  Preliminary Report (01 Mar 2018 06:06):    Growth in aerobic bottle: Gram Negative Rods    "Due to technical problems, Proteus sp. will Not be reported as part of    the BCID panel until further notice"    ***Blood Panel PCR results on this specimen are available    approximately 3 hours after the Gram stain result.***    Gram stain, PCR, and/or culture results may not always    correspond due to difference in methodologies.    ************************************************************    This PCR assay was performed using [employer].    The following targets are tested for: Enterococcus,    vancomycin resistant enterococci, Listeria monocytogenes,    coagulase negative staphylococci, S. aureus,    methicillin resistant S. aureus, Streptococcus agalactiae    (Group B), S. pneumoniae, S. pyogenes (Group A),    Acinetobacter baumannii, Enterobacter cloacae, E. coli,    Klebsiella oxytoca, K. pneumoniae, Proteus sp.,    Serratia marcescens, Haemophilus influenzae,    Neisseria meningitidis, Pseudomonas aeruginosa, Candida    albicans, C. glabrata, C krusei, C parapsilosis,    C. tropicalis and the KPC resistance gene.    Growth in anaerobic bottle: Gram Negative Rods  Organism: Blood Culture PCR (01 Mar 2018 09:05)  Organism: Blood Culture PCR (01 Mar 2018 09:05)      RADIOLOGY:  [ ] Reviewed and interpreted by me    EKG: CHIEF COMPLAINT:Patient is a 47y old  Female who presents with a chief complaint of AMS (2018 14:58)    Interval Events:  Overnight, no acute events.      REVIEW OF SYSTEMS:  Constitutional: [ ] negative [ ] fevers [ ] chills [ ] weight loss [ ] weight gain  HEENT: [ ] negative [ ] dry eyes [ ] eye irritation [ ] postnasal drip [ ] nasal congestion  CV: [ ] negative  [ ] chest pain [ ] orthopnea [ ] palpitations [ ] murmur  Resp: [ ] negative [ ] cough [ ] shortness of breath [ ] dyspnea [ ] wheezing [ ] sputum [ ] hemoptysis  GI: [ ] negative [ ] nausea [ ] vomiting [ ] diarrhea [ ] constipation [ ] abd pain [ ] dysphagia   : [ ] negative [ ] dysuria [ ] nocturia [ ] hematuria [ ] increased urinary frequency  Musculoskeletal: [ ] negative [ ] back pain [ ] myalgias [ ] arthralgias [ ] fracture  Skin: [ ] negative [ ] rash [ ] itch  Neurological: [ ] negative [ ] headache [ ] dizziness [ ] syncope [ ] weakness [ ] numbness  Psychiatric: [ ] negative [ ] anxiety [ ] depression  Endocrine: [ ] negative [ ] diabetes [ ] thyroid problem  Hematologic/Lymphatic: [ ] negative [ ] anemia [ ] bleeding problem  Allergic/Immunologic: [ ] negative [ ] itchy eyes [ ] nasal discharge [ ] hives [ ] angioedema  [ ] All other systems negative  [ X] Unable to assess ROS because patient intubated and sedated    OBJECTIVE:  ICU Vital Signs Last 24 Hrs  T(C): 37.3 (01 Mar 2018 08:00), Max: 37.4 (2018 19:00)  T(F): 99.2 (01 Mar 2018 08:00), Max: 99.3 (2018 19:00)  HR: 78 (01 Mar 2018 11:28) (66 - 105)  BP: 88/61 (01 Mar 2018 11:15) (78/53 - 114/77)  BP(mean): 71 (01 Mar 2018 11:15) (60 - 92)  ABP: --  ABP(mean): --  RR: 15 (01 Mar 2018 11:15) (12 - 25)  SpO2: 100% (01 Mar 2018 11:28) (99% - 100%)    Mode: AC/ CMV (Assist Control/ Continuous Mandatory Ventilation), RR (machine): 12, TV (machine): 350, FiO2: 40, PEEP: 5, ITime: 0.9, MAP: 14, PIP: 20     @ 07: @ 07:00  --------------------------------------------------------  IN: 2685.4 mL / OUT: 1975 mL / NET: 710.4 mL     @ 07: @ 12:20  --------------------------------------------------------  IN: 432.4 mL / OUT: 600 mL / NET: -167.6 mL      CAPILLARY BLOOD GLUCOSE      POCT Blood Glucose.: 126 mg/dL (01 Mar 2018 06:44)      General: cachectic appearing, intubated/sedated  Neurology: sedated in the AM  Head:  Normocephalic, atraumatic  ENT:  dry mucosa  Neck:  Supple, no sinuses or palpable masses  Respiratory: CTA B/L  CV: RRR, S1S2, no murmur  Abdominal: Soft, NT, ND no palpable mass; cholecystostomy tube in place  MSK: No edema, + peripheral pulses,  ext cool to touch      LINES:    HOSPITAL MEDICATIONS:  Standing Meds:  dextrose 5% + sodium chloride 0.9%. 1000 milliLiter(s) IV Continuous <Continuous>  dextrose 5%. 1000 milliLiter(s) IV Continuous <Continuous>  dextrose 50% Injectable 12.5 Gram(s) IV Push once  dextrose 50% Injectable 25 Gram(s) IV Push once  dextrose 50% Injectable 25 Gram(s) IV Push once  HYDROmorphone Infusion 0.5 mG/Hr IV Continuous <Continuous>  influenza   Vaccine 0.5 milliLiter(s) IntraMuscular once  meropenem  IVPB      meropenem  IVPB 1000 milliGRAM(s) IV Intermittent every 8 hours  pantoprazole  Injectable 40 milliGRAM(s) IV Push daily  phenylephrine    Infusion 0.4 MICROgram(s)/kG/Min IV Continuous <Continuous>  propofol Infusion 20 MICROgram(s)/kG/Min IV Continuous <Continuous>      PRN Meds:  dextrose Gel 1 Dose(s) Oral once PRN  glucagon  Injectable 1 milliGRAM(s) IntraMuscular once PRN  HYDROmorphone  Injectable 1 milliGRAM(s) IV Push every 1 hour PRN      LABS:                        10.6   12.6  )-----------( 266      ( 2018 23:41 )             33.8     Hgb Trend: 10.6<--, 10.9<--      144  |  102  |  35<H>  ----------------------------<  106<H>  2.6<LL>   |  33<H>  |  0.35<L>    Ca    7.7<L>      2018 23:41  Phos  2.5       Mg     1.6         TPro  5.4<L>  /  Alb  2.6<L>  /  TBili  1.6<H>  /  DBili  x   /  AST  246<H>  /  ALT  167<H>  /  AlkPhos  1658<H>      Creatinine Trend: 0.35<--, 0.37<--, 0.27<--, 0.32<--, 0.31<--, 0.23<--  PT/INR - ( 2018 23:42 )   PT: 20.1 sec;   INR: 1.82 ratio         PTT - ( 2018 23:42 )  PTT:34.0 sec  Urinalysis Basic - ( 2018 12:02 )    Color: Yellow / Appearance: Clear / S.013 / pH: x  Gluc: x / Ketone: Negative  / Bili: Negative / Urobili: Negative   Blood: x / Protein: Trace / Nitrite: Negative   Leuk Esterase: Moderate / RBC: 0-2 /HPF / WBC 11-25 /HPF   Sq Epi: x / Non Sq Epi: x / Bacteria: x        Venous Blood Gas:   @ 10:44  7.46/49/34/34/60  VBG Lactate: 2.0      MICROBIOLOGY:     Culture - Blood (collected 2018 15:38)  Source: .Blood Blood  Gram Stain (01 Mar 2018 05:58):    Growth in aerobic and anaerobic bottles:    Gram Negative Rods  Preliminary Report (01 Mar 2018 05:59):    Growth in aerobic and anaerobic bottles:    Gram Negative Rods    Culture - Blood (collected 2018 15:38)  Source: .Blood Blood  Gram Stain (01 Mar 2018 06:05):    Growth in aerobic bottle: Gram Negative Rods    Growth in anaerobic bottle: Gram Negative Rods  Preliminary Report (01 Mar 2018 06:06):    Growth in aerobic bottle: Gram Negative Rods    "Due to technical problems, Proteus sp. will Not be reported as part of    the BCID panel until further notice"    ***Blood Panel PCR results on this specimen are available    approximately 3 hours after the Gram stain result.***    Gram stain, PCR, and/or culture results may not always    correspond due to difference in methodologies.    ************************************************************    This PCR assay was performed using [employer].    The following targets are tested for: Enterococcus,    vancomycin resistant enterococci, Listeria monocytogenes,    coagulase negative staphylococci, S. aureus,    methicillin resistant S. aureus, Streptococcus agalactiae    (Group B), S. pneumoniae, S. pyogenes (Group A),    Acinetobacter baumannii, Enterobacter cloacae, E. coli,    Klebsiella oxytoca, K. pneumoniae, Proteus sp.,    Serratia marcescens, Haemophilus influenzae,    Neisseria meningitidis, Pseudomonas aeruginosa, Candida    albicans, C. glabrata, C krusei, C parapsilosis,    C. tropicalis and the KPC resistance gene.    Growth in anaerobic bottle: Gram Negative Rods  Organism: Blood Culture PCR (01 Mar 2018 09:05)  Organism: Blood Culture PCR (01 Mar 2018 09:05)      RADIOLOGY:  [ ] Reviewed and interpreted by me    EKG:

## 2018-03-01 NOTE — DIETITIAN INITIAL EVALUATION ADULT. - PHYSICAL APPEARANCE
malnourished. Nutrition Physical Focus Exam with consent of significant other: Findings: temporal - severe muscle wasting, occipital - severe fat wasting, clavicle - severe muscle wasting, patella - severe muscle wasting

## 2018-03-01 NOTE — PROGRESS NOTE ADULT - PROBLEM SELECTOR PLAN 1
GOC: Met with MICU team and the patient's HCP. HCP gave verbalized understanding about the patient's acute issues (Sepsis in the setting of advanced metastatic CA). Dr Hoffman indicate the patient was optimized for elective extubation and the HCP agree on it. HCP also agree on DNI after extubation.   -Continue Dilaudid 1 mg/hour and 1 mg q 1 PRN.

## 2018-03-01 NOTE — DIETITIAN INITIAL EVALUATION ADULT. - ENERGY NEEDS
Ht: 64 inches (162.56 cm); Weight:  88.1pounds (40kg); BMI: 15.3kg/m2; IBW: 120 pounds (+/- 10%); %IBW: 73%; PMH: ovarian cancer (dx: 12/2016); colostomy (12/2016); hysterectomy (12/2016); Skin: sacrum -unstageable, sacral spine -unstageable; Edema: none noted

## 2018-03-01 NOTE — DIETITIAN INITIAL EVALUATION ADULT. - PERTINENT LABORATORY DATA
3/1/2018: potassium 3.1(L), BUN 27(H), calcium 7.6(L); 2/28/2018: protein 5.4(L),  creatinine 0.35(L), 3/1-2/28/2018 Fingerstick range: 126, 138, 135, 152

## 2018-03-01 NOTE — DIETITIAN INITIAL EVALUATION ADULT. - NS FNS REASON FOR WEIGHT CHANG
catabolic illness (metastatic ovarian cancer) catabolic illness (metastatic ovarian cancer), Per significant other weight has been 88 to 93 pounds over the past month

## 2018-03-01 NOTE — DIETITIAN INITIAL EVALUATION ADULT. - ORAL INTAKE PTA
fair/Daily Intake per significant other: 6 small meals day consisting of meat, vegetables, Chinese food, and blueberries.  Pt also consumed apricot tea, dandelion tea, non-GMO whey protein (approximately 20 grams/day), and tumeric for a short time.

## 2018-03-01 NOTE — PROGRESS NOTE ADULT - ASSESSMENT
Patient is a 46 yo F with metastatic ovarian CA (s/p TAHBSO, s/p colostomy, with holistic care approach, started chemo on 12/2017 carboplatin/taxol every Wednesday, due for tx today). Admitted to MICU under the impression of malignant SBO, sepsis, GB perforation (s/p biliary drain), hypoglycemia, and worsening metastatic disease. Palliative Care consult placed for pain management and GOC. Patient is a 48 yo F with metastatic ovarian CA (s/p TAHBSO, s/p colostomy, with holistic care approach, started chemo. She was recently admitted to MICU under the impression of malignant SBO, sepsis, GB perforation (s/p biliary drain), hypoglycemia, and worsening metastatic disease. She is now presenting with AMS and acute respiratory failure in the setting of hypoglycemia and sepsis. Palliative Care consult placed for pain management and GOC.

## 2018-03-01 NOTE — DIETITIAN INITIAL EVALUATION ADULT. - OTHER INFO
Pt seen for BMI<18. Admitting Diagnosis: hypoglycemia 2/2 sepsis vs metastatic ovarian cancer, currently intubated and sedated. Pt S/P TAHSBO, S/P colostomy, chemotherapy on hold 2/2 to nutrition status. Per chart 300cc stool output (2/28). NKFA. Pt seen for consult, BMI<18. Admitting Diagnosis: hypoglycemia 2/2 sepsis vs metastatic ovarian cancer, currently intubated and sedated. Pt S/P TAHSBO, S/P colostomy, chemotherapy on hold 2/2 to nutrition status. Per chart 300cc stool output (2/28). NKFA.

## 2018-03-01 NOTE — PROGRESS NOTE ADULT - PROBLEM SELECTOR PLAN 3
2/2 to CA  Dilaudid as above.   If encephalopathy resolves and the patient is with it and strong enough to use a PCA, may consider re-staring Dilaudid PCA 1 mg/h, 1mg q 15'  DD, and 2 mg q 1 CB.

## 2018-03-01 NOTE — PROGRESS NOTE ADULT - SUBJECTIVE AND OBJECTIVE BOX
HPI: Patient is a 48 yo F with metastatic ovarian CA (s/p TAHBSO, s/p colostomy, with holistic care approach, started chemo. She was recently admitted to MICU under the impression of malignant SBO, sepsis, GB perforation (s/p biliary drain), hypoglycemia, and worsening metastatic disease. She is now presenting with AMS in the setting of hypoglycemia and s/p intubation. Palliative Care consult placed for pain management and GOC.    PERTINENT PM/SXH:   Ovarian cancer    Colostomy in place  H/O abdominal hysterectomy    SOCIAL HISTORY:   Significant other/partner:  [ x] YES  [ ] NO               Children:  [ ] YES  [x ] NO                   Bahai/Spirituality:  Substance hx:  [ ] YES   [x ] NO                   Tobacco hx:  [ ] YES  [x ] NO                       Alcohol hx: [ ] YES  [x ] NO         Home Opioid hx:  [x ] YES  [ ] NO   Living Situation: [x ] Living with Fiance  [ ] Long term care  [ ] Rehab [ ] Other  Has NS house calls.   FAMILY HISTORY:  No pertinent family history in first degree relatives    [ x] Family history non-contributory     BASELINE (I)ADLs (prior to admission):  [place]: [ ] total  [ x] moderate [ ] dependent    ADVANCE DIRECTIVES:    DNR [x]   MOLST  [x ] YES [  ] NO               however, it was rescinded today.   Health Care Proxy [ x] YES  [ ] NO   [ ] Completed  Living Will  [ ] YES [x ] NO             [ ] Surrogate  [x ] HCP  [ ] Guardian:  Copy in EMR alpha tab in 12/20/17 admission  Trung Buenrostro  Phone#: [phone]    Allergies    penicillin (Rash)    Intolerances    MEDICATIONS  (STANDING):  dextrose 5% + lactated ringers. 1000 milliLiter(s) (100 mL/Hr) IV Continuous <Continuous>  dextrose 5%. 1000 milliLiter(s) (50 mL/Hr) IV Continuous <Continuous>  dextrose 50% Injectable 12.5 Gram(s) IV Push once  dextrose 50% Injectable 25 Gram(s) IV Push once  dextrose 50% Injectable 25 Gram(s) IV Push once  HYDROmorphone Infusion 1 mG/Hr (1 mL/Hr) IV Continuous <Continuous>  influenza   Vaccine 0.5 milliLiter(s) IntraMuscular once  meropenem  IVPB      meropenem  IVPB 1000 milliGRAM(s) IV Intermittent every 8 hours  pantoprazole  Injectable 40 milliGRAM(s) IV Push daily  phenylephrine    Infusion 0.4 MICROgram(s)/kG/Min (5.445 mL/Hr) IV Continuous <Continuous>  propofol Infusion 20 MICROgram(s)/kG/Min (4.356 mL/Hr) IV Continuous <Continuous>    MEDICATIONS  (PRN):  dextrose Gel 1 Dose(s) Oral once PRN Blood Glucose LESS THAN 70 milliGRAM(s)/deciliter  glucagon  Injectable 1 milliGRAM(s) IntraMuscular once PRN Glucose LESS THAN 70 milligrams/deciliter  HYDROmorphone  Injectable 1 milliGRAM(s) IV Push every 1 hour PRN Breakthrough pain        PRESENT SYMPTOMS:  Source: [ ] Patient   [ ] Family   [ x] Team     Pain:                        [x ] No [ ] Yes                Dyspnea:                [ ] No [ ] Yes             [ ] Mild [ ] Moderate [ ] Severe    Anxiety:                  [ ] No [ ] Yes             [ ] Mild [ ] Moderate [ ] Severe    Fatigue:                  [ ] No [ ] Yes             [x ] Mild [ ] Moderate [ ] Severe    Nausea:                  [ } No [ ] Yes             [ ] Mild [ ] Moderate [ ] Severe    Loss of appetite:   [ ] No [ ] Yes             [ ] Mild [ ] Moderate [ ] Severe    Constipation:        [ ] No [ ] Yes             [ ] Mild [ ] Moderate [ ] Severe    Other Symptoms:  [ ] All other review of systems negative   [x ] Unable to obtain due to poor mentation. However, her fiance indicated she has been loosing more weight, and with recurrent hypoglycemia.     Karnofsky Performance Score/Palliative Performance Status Version 2:  40 %    PHYSICAL EXAM:  Vital Signs Last 24 Hrs  T(C): 37.3 (01 Mar 2018 08:00), Max: 37.4 (28 Feb 2018 19:00)  T(F): 99.2 (01 Mar 2018 08:00), Max: 99.3 (28 Feb 2018 19:00)  HR: 78 (01 Mar 2018 11:28) (66 - 103)  BP: 88/61 (01 Mar 2018 11:15) (78/53 - 114/77)  BP(mean): 71 (01 Mar 2018 11:15) (60 - 92)  RR: 15 (01 Mar 2018 11:15) (12 - 25)  SpO2: 100% (01 Mar 2018 11:28) (99% - 100%)  General:  [ ] Alert  [ ] Oriented x      [ ] Lethargic  [ ] Agitated   [ ] Cachexia    ] Unarousable  [ ] Verbal  [x ] Non-Verbal    HEENT:  [ ] Normal   [x ] Dry mouth   [x ] ET Tube    [ ] Trach  [ ] Oral lesions    Lungs:   [x ] Clear [ ] Tachypnea  [ ] Audible excessive secretions   [ ] Rhonchi        [ ] Right [ ] Left [ ] Bilateral  [ ] Crackles        [ ] Right [ ] Left [ ] Bilateral  [ ] Wheezing     [ ] Right [ ] Left [ ] Bilateral    Cardiovascular:  [ x] Regular [ ] Irregular [ ] Tachycardia   [ ] Bradycardia  [ ] Murmur [ ] Other    Abdomen: [x ] Soft  [ ] Distended   [x ] +BS (+) [ ] Non tender [x ] Tender  [ ]PEG   [ ]OGT/ NGT   Last BM: Yesterday (as per fiance)   Ostomy. Biliary drain    Genitourinary: [x ] Normal [ ] Incontinent   [ ] Oliguria/Anuria   [ ] Lynn    Musculoskeletal:  [ ] Normal   [ x] Weakness  [ ] Bedbound/Wheelchair bound [ ] Edema    Neurological: [ x]No focal deficits  [ ] Cognitive impairment  [ ] Dysphagia [ ] Dysarthria [ ] Paresis [x ] Other: Unarousable due to sedation/encephalopathy     Skin: [ ] DTI on sacrum   [ ] Pressure ulcer(s)                  [ ] Rash    LABS:                                                                        10.4   19.0  )-----------( 297      ( 01 Mar 2018 12:43 )             34.5   02-28    144  |  102  |  35<H>  ----------------------------<  106<H>  2.6<LL>   |  33<H>  |  0.35<L>    Ca    7.7<L>      28 Feb 2018 23:41  Phos  2.5     02-28  Mg     1.6     02-28    TPro  5.4<L>  /  Alb  2.6<L>  /  TBili  1.6<H>  /  DBili  x   /  AST  246<H>  /  ALT  167<H>  /  AlkPhos  1658<H>  02-28      Shock: no [ ] Septic [ ] Cardiogenic [ ] Neurologic [ ] Hypovolemic  Vasopressors x none   Inotrophs x none    Oral Intake: [ ] Unable/mouth care only [ x] Minimal [ ] Moderate [ ] Full Capability    Diet: [ ] NPO [ ] Tube feeds [ ] TPN [ x] Other     RADIOLOGY & ADDITIONAL STUDIES:   < from: CT Head No Cont (02.28.18 @ 12:31) >  EXAM:  CT BRAIN                            < from: CT Head No Cont (02.28.18 @ 22:56) >  EXAM:  CT BRAIN                            PROCEDURE DATE:  02/28/2018    IMPRESSION:    Stable thin acute subdural hemorrhage in the anterior interhemispheric   fissure.    No parenchymal hemorrhage or mass effect.    Dr. Wong discussed these findings with Dr. Dwyer on 3/1/2018 9:00 AM   with read back.    < end of copied text >      REFERRALS:   [ ] Chaplaincy  [ ] Hospice  [ ] Child Life  [ ] Social Work  [ ] Case management [x ] Holistic Therapy HPI: Patient is a 48 yo F with metastatic ovarian CA (s/p TAHBSO, s/p colostomy, with holistic care approach, started chemo. She was recently admitted to MICU under the impression of malignant SBO, sepsis, GB perforation (s/p biliary drain), hypoglycemia, and worsening metastatic disease. She is now presenting with AMS in the setting of hypoglycemia and s/p intubation. Palliative Care consult placed for pain management and GOC.    PERTINENT PM/SXH:   Ovarian cancer    Colostomy in place  H/O abdominal hysterectomy    SOCIAL HISTORY:   Significant other/partner:  [ x] YES  [ ] NO               Children:  [ ] YES  [x ] NO                   Adventism/Spirituality:  Substance hx:  [ ] YES   [x ] NO                   Tobacco hx:  [ ] YES  [x ] NO                       Alcohol hx: [ ] YES  [x ] NO         Home Opioid hx:  [x ] YES  [ ] NO   Living Situation: [x ] Living with Fiance  [ ] Long term care  [ ] Rehab [ ] Other  Has NS house calls.   FAMILY HISTORY:  No pertinent family history in first degree relatives    [ x] Family history non-contributory     BASELINE (I)ADLs (prior to admission):  [place]: [ ] total  [ x] moderate [ ] dependent    ADVANCE DIRECTIVES:    DNR [x]   MOLST  [x ] YES [  ] NO               however, it was rescinded today.   Health Care Proxy [ x] YES  [ ] NO   [ ] Completed  Living Will  [ ] YES [x ] NO             [ ] Surrogate  [x ] HCP  [ ] Guardian:  Copy in EMR alpha tab in 12/20/17 admission  Trung BONDSArchie Buenrostro  Phone#: [phone]    Allergies    penicillin (Rash)    Intolerances    MEDICATIONS  (STANDING):  dextrose 10%. 1000 milliLiter(s) (30 mL/Hr) IV Continuous <Continuous>  dextrose 50% Injectable 12.5 Gram(s) IV Push once  dextrose 50% Injectable 25 Gram(s) IV Push once  dextrose 50% Injectable 25 Gram(s) IV Push once  HYDROmorphone Infusion 1 mG/Hr (1 mL/Hr) IV Continuous <Continuous>  influenza   Vaccine 0.5 milliLiter(s) IntraMuscular once  meropenem  IVPB      meropenem  IVPB 1000 milliGRAM(s) IV Intermittent every 8 hours  pantoprazole  Injectable 40 milliGRAM(s) IV Push daily  phenylephrine    Infusion 0.4 MICROgram(s)/kG/Min (5.445 mL/Hr) IV Continuous <Continuous>    MEDICATIONS  (PRN):  dextrose Gel 1 Dose(s) Oral once PRN Blood Glucose LESS THAN 70 milliGRAM(s)/deciliter  glucagon  Injectable 1 milliGRAM(s) IntraMuscular once PRN Glucose LESS THAN 70 milligrams/deciliter  HYDROmorphone  Injectable 1 milliGRAM(s) IV Push every 1 hour PRN Breakthrough pain      PRESENT SYMPTOMS:  Source: [ ] Patient   [ ] Family   [ x] Team     Pain:                        [x ] No [ ] Yes                Dyspnea:                [ ] No [ ] Yes             [ ] Mild [ ] Moderate [ ] Severe    Anxiety:                  [ ] No [ ] Yes             [ ] Mild [ ] Moderate [ ] Severe    Fatigue:                  [ ] No [ ] Yes             [x ] Mild [ ] Moderate [ ] Severe    Nausea:                  [ } No [ ] Yes             [ ] Mild [ ] Moderate [ ] Severe    Loss of appetite:   [ ] No [ ] Yes             [ ] Mild [ ] Moderate [ ] Severe    Constipation:        [ ] No [ ] Yes             [ ] Mild [ ] Moderate [ ] Severe    Other Symptoms:  [ ] All other review of systems negative   [x ] Unable to obtain due to sedation/ Encephalopathy.      Karnofsky Performance Score/Palliative Performance Status Version 2:  40 %    PHYSICAL EXAM:  Vital Signs Last 24 Hrs  T(C): 37.4 (01 Mar 2018 16:00), Max: 37.4 (28 Feb 2018 19:00)  T(F): 99.4 (01 Mar 2018 16:00), Max: 99.4 (01 Mar 2018 12:00)  HR: 93 (01 Mar 2018 17:00) (66 - 99)  BP: 94/65 (01 Mar 2018 17:00) (78/53 - 105/75)  BP(mean): 75 (01 Mar 2018 17:00) (60 - 86)  RR: 23 (01 Mar 2018 17:00) (12 - 25)  SpO2: 99% (01 Mar 2018 17:00) (97% - 100%)  General:  [ ] Alert  [ ] Oriented x      [ ] Lethargic  [ ] Agitated   [ ] Cachexia    ] Unarousable  [ ] Verbal  [x ] Non-Verbal    HEENT:  [ ] Normal   [x ] Dry mouth   [x ] ET Tube    [ ] Trach  [ ] Oral lesions    Lungs:   [x ] Clear [ ] Tachypnea  [ ] Audible excessive secretions   [ ] Rhonchi        [ ] Right [ ] Left [ ] Bilateral  [ ] Crackles        [ ] Right [ ] Left [ ] Bilateral  [ ] Wheezing     [ ] Right [ ] Left [ ] Bilateral    Cardiovascular:  [ x] Regular [ ] Irregular [ ] Tachycardia   [ ] Bradycardia  [ ] Murmur [ ] Other    Abdomen: [x ] Soft  [ ] Distended   [x ] +BS (+) [ ] Non tender [x ] Tender  [ ]PEG   [ ]OGT/ NGT   Last BM: today  Ostomy. Biliary drain    Genitourinary: [ ] Normal [ ] Incontinent   [ ] Oliguria/Anuria   [x ] Lynn    Musculoskeletal:  [ ] Normal   [ x] Weakness  [ ] Bedbound/Wheelchair bound [ ] Edema    Neurological: [ x]No focal deficits  [ ] Cognitive impairment  [ ] Dysphagia [ ] Dysarthria [ ] Paresis [x ] Other: Unarousable due to sedation/encephalopathy     Skin: [ ] DTI on sacrum   [x ] Pressure ulcer(s)  sacrum               [ ] Rash    LABS:                                                                                        10.4   19.0  )-----------( 297      ( 01 Mar 2018 12:43 )             34.5   03-01    143  |  102  |  27<H>  ----------------------------<  97  3.1<L>   |  29  |  <0.30<L>    Ca    7.6<L>      01 Mar 2018 12:43  Phos  1.9     03-01  Mg     1.6     03-01    TPro  5.4<L>  /  Alb  2.6<L>  /  TBili  1.6<H>  /  DBili  x   /  AST  246<H>  /  ALT  167<H>  /  AlkPhos  1658<H>  02-28        Shock: no [ ] Septic [ ] Cardiogenic [ ] Neurologic [ ] Hypovolemic  Vasopressors x none   Inotrophs x none    Oral Intake: [ ] Unable/mouth care only [ x] Minimal [ ] Moderate [ ] Full Capability    Diet: [ ] NPO [ ] Tube feeds [ ] TPN [ x] Other     RADIOLOGY & ADDITIONAL STUDIES:   < from: CT Head No Cont (02.28.18 @ 12:31) >  EXAM:  CT BRAIN                            < from: CT Head No Cont (02.28.18 @ 22:56) >  EXAM:  CT BRAIN                            PROCEDURE DATE:  02/28/2018    IMPRESSION:    Stable thin acute subdural hemorrhage in the anterior interhemispheric   fissure.    No parenchymal hemorrhage or mass effect.    Dr. Wong discussed these findings with Dr. Dwyer on 3/1/2018 9:00 AM   with read back.    < end of copied text >      REFERRALS:   [ ] Chaplaincy  [ ] Hospice  [ ] Child Life  [ ] Social Work  [ ] Case management [x ] Holistic Therapy

## 2018-03-01 NOTE — DIETITIAN INITIAL EVALUATION ADULT. - SIGNS/SYMPTOMS
BMI of 13.7, 14.6% weight loss in 1 month <75% of nutrition needs >1month, severe fat/muscle wasting, 39% weight loss [id number], BMI: 13.7%

## 2018-03-01 NOTE — DIETITIAN INITIAL EVALUATION ADULT. - ETIOLOGY
catabolic illness (ovarian cancer) inability to consume sufficient energy and protein due to catabolic illness (ovarian cancer)

## 2018-03-01 NOTE — DIETITIAN INITIAL EVALUATION ADULT. - NS AS NUTRI INTERV ENTERAL NUTRITION
Recommend Vital 1.2: 60cc/hr x18 hrs to provide 1296 kcals (32.4kcals/kg), 81 grams protein (2.0grams protein/kg), 876mL free water

## 2018-03-01 NOTE — CHART NOTE - NSCHARTNOTEFT_GEN_A_CORE
Upon Nutritional Assessment by the Registered Dietitian your patient was determined to meet criteria / has evidence of the following diagnosis/diagnoses:          [ ]  Mild Protein Calorie Malnutrition        [ ]  Moderate Protein Calorie Malnutrition        [x ] Severe Protein Calorie Malnutrition        [ ] Unspecified Protein Calorie Malnutrition        [ ] Underweight / BMI <19        [ ] Morbid Obesity / BMI > 40      Findings as based on:  [x ] Comprehensive nutrition assessment   [x ] Nutrition Focused Physical Exam  [ ] Other:       Nutrition Plan/Recommendations:  pt intubated, recommend EN Vital 1.2 goal 60cc/hr x 18 hrs        PROVIDER Section:     By signing this assessment you are acknowledging and agree with the diagnosis/diagnoses assigned by the Registered Dietitian    Comments:

## 2018-03-02 DIAGNOSIS — G89.3 NEOPLASM RELATED PAIN (ACUTE) (CHRONIC): ICD-10-CM

## 2018-03-02 DIAGNOSIS — C56.9 MALIGNANT NEOPLASM OF UNSPECIFIED OVARY: ICD-10-CM

## 2018-03-02 DIAGNOSIS — Z51.5 ENCOUNTER FOR PALLIATIVE CARE: ICD-10-CM

## 2018-03-02 LAB
APTT BLD: 38.1 SEC — HIGH (ref 27.5–37.4)
GLUCOSE BLDC GLUCOMTR-MCNC: 116 MG/DL — HIGH (ref 70–99)
GLUCOSE BLDC GLUCOMTR-MCNC: 124 MG/DL — HIGH (ref 70–99)
GLUCOSE BLDC GLUCOMTR-MCNC: 133 MG/DL — HIGH (ref 70–99)
HCT VFR BLD CALC: 32.4 % — LOW (ref 34.5–45)
HGB BLD-MCNC: 10.4 G/DL — LOW (ref 11.5–15.5)
INR BLD: 1.47 RATIO — HIGH (ref 0.88–1.16)
MCHC RBC-ENTMCNC: 30.9 PG — SIGNIFICANT CHANGE UP (ref 27–34)
MCHC RBC-ENTMCNC: 32.1 GM/DL — SIGNIFICANT CHANGE UP (ref 32–36)
MCV RBC AUTO: 96.4 FL — SIGNIFICANT CHANGE UP (ref 80–100)
PLATELET # BLD AUTO: 260 K/UL — SIGNIFICANT CHANGE UP (ref 150–400)
PROTHROM AB SERPL-ACNC: 16.1 SEC — HIGH (ref 9.8–12.7)
RBC # BLD: 3.36 M/UL — LOW (ref 3.8–5.2)
RBC # FLD: 14.6 % — HIGH (ref 10.3–14.5)
WBC # BLD: 15.7 K/UL — HIGH (ref 3.8–10.5)
WBC # FLD AUTO: 15.7 K/UL — HIGH (ref 3.8–10.5)

## 2018-03-02 PROCEDURE — 99291 CRITICAL CARE FIRST HOUR: CPT

## 2018-03-02 PROCEDURE — 99233 SBSQ HOSP IP/OBS HIGH 50: CPT

## 2018-03-02 RX ORDER — ENOXAPARIN SODIUM 100 MG/ML
30 INJECTION SUBCUTANEOUS EVERY 24 HOURS
Qty: 0 | Refills: 0 | Status: DISCONTINUED | OUTPATIENT
Start: 2018-03-02 | End: 2018-03-04

## 2018-03-02 RX ORDER — POLYETHYLENE GLYCOL 3350 17 G/17G
17 POWDER, FOR SOLUTION ORAL DAILY
Qty: 0 | Refills: 0 | Status: DISCONTINUED | OUTPATIENT
Start: 2018-03-02 | End: 2018-03-04

## 2018-03-02 RX ORDER — SENNA PLUS 8.6 MG/1
2 TABLET ORAL AT BEDTIME
Qty: 0 | Refills: 0 | Status: DISCONTINUED | OUTPATIENT
Start: 2018-03-02 | End: 2018-03-04

## 2018-03-02 RX ORDER — SODIUM CHLORIDE 9 MG/ML
1000 INJECTION, SOLUTION INTRAVENOUS
Qty: 0 | Refills: 0 | Status: DISCONTINUED | OUTPATIENT
Start: 2018-03-02 | End: 2018-03-03

## 2018-03-02 RX ADMIN — HYDROMORPHONE HYDROCHLORIDE 1 MILLIGRAM(S): 2 INJECTION INTRAMUSCULAR; INTRAVENOUS; SUBCUTANEOUS at 15:32

## 2018-03-02 RX ADMIN — PANTOPRAZOLE SODIUM 40 MILLIGRAM(S): 20 TABLET, DELAYED RELEASE ORAL at 18:19

## 2018-03-02 RX ADMIN — MEROPENEM 100 MILLIGRAM(S): 1 INJECTION INTRAVENOUS at 05:26

## 2018-03-02 RX ADMIN — HYDROMORPHONE HYDROCHLORIDE 1 MILLIGRAM(S): 2 INJECTION INTRAMUSCULAR; INTRAVENOUS; SUBCUTANEOUS at 15:36

## 2018-03-02 RX ADMIN — HYDROMORPHONE HYDROCHLORIDE 1 MILLIGRAM(S): 2 INJECTION INTRAMUSCULAR; INTRAVENOUS; SUBCUTANEOUS at 20:05

## 2018-03-02 RX ADMIN — MEROPENEM 100 MILLIGRAM(S): 1 INJECTION INTRAVENOUS at 21:14

## 2018-03-02 RX ADMIN — HYDROMORPHONE HYDROCHLORIDE 1 MILLIGRAM(S): 2 INJECTION INTRAMUSCULAR; INTRAVENOUS; SUBCUTANEOUS at 19:21

## 2018-03-02 RX ADMIN — SENNA PLUS 2 TABLET(S): 8.6 TABLET ORAL at 21:14

## 2018-03-02 RX ADMIN — ENOXAPARIN SODIUM 30 MILLIGRAM(S): 100 INJECTION SUBCUTANEOUS at 14:31

## 2018-03-02 RX ADMIN — MEROPENEM 100 MILLIGRAM(S): 1 INJECTION INTRAVENOUS at 14:32

## 2018-03-02 NOTE — PROGRESS NOTE ADULT - ASSESSMENT
47F PMH metastatic ovarian CA (s/p TAHBSO, s/p colostomy, with holistic care approach, started chemo, currently not a candidate 2/2 nutrition status, on home dilaudid PCA pump with the following settings, 1 mg/h, 1 mg q 15' demand dose presents after being found in bed by  to be cold and unresponsive, intubated in the field by EMS, found to be hypoglycemic and bacteremic, s/p extubation continuing to require pressor support    # Neuro:   - s/p extubation on 3/1, doing well on NC  - CT head demonstrates repeat subdural hematoma- stable    # CVS:  - requiring phenylephrine- 4 mcg at this time  - c/w with treatment of septic shock secondary to klebsiella bacteremia    # Pulm:  - no acute pulmonary issues at this time  - c/w NC    # GI:  -   -    # Renal:  - no known renal dysfunction  - will f/u SCr    # ID:  - klebsiella bacteremia- c/w meropenem at this time, will narrow based on specificities  - source likely from gallbladder vs intestinal (hx of perf gallbladder with perc ubaldo in place)    # Endocrine:  - Patient with hypoglycemia, unclear source likely 2/2 to lack of nutrition  - c/w D10 at 75 cc/hr    # Heme:  - H/H stable.   - will continue to trend cbc    # Electrolytes:  - Follow and replete PRN as needed    # Skin:  - s/p I/O which was removed from RLE in MICU  - Right sided Mediport   - PIV in place  - sacral decub- care per nursing     # DVT p/px:  - lovenox sub q (30 mg as weight < 50 kg)    #GOC:  - patient DNR/ DNI at this time  - will continue with abx, fluids and pressor support  - if clinically worsening, will consider PCU at that time  - palliative care Dr. Jones following 47F PMH metastatic ovarian CA (s/p TAHBSO, s/p colostomy, with holistic care approach, started chemo, currently not a candidate 2/2 nutrition status, on home dilaudid PCA pump with the following settings, 1 mg/h, 1 mg q 15' demand dose presents after being found in bed by  to be cold and unresponsive, intubated in the field by EMS, found to be hypoglycemic and bacteremic, s/p extubation continuing to require pressor support    # Neuro:   - s/p extubation on 3/1, doing well on NC  - CT head demonstrates repeat subdural hematoma- stable    # CVS:  - requiring phenylephrine- 4 mcg at this time  - c/w with treatment of septic shock secondary to klebsiella bacteremia    # Pulm:  - no acute pulmonary issues at this time  - c/w NC    # GI:  - pleasure feeds  - continues to be hypoglycemic likely 2/2 to poor PO intake- will continue with d10 for now    # Renal:  - no known renal dysfunction  - bun/cr at baseline  -     # ID:  - klebsiella bacteremia- c/w meropenem at this time, will narrow based on specificities  - source likely from gallbladder vs intestinal (hx of perf gallbladder with perc ubaldo in place)    # Endocrine:  - Patient with hypoglycemia, unclear source likely 2/2 to lack of nutrition  - c/w D10 at 75 cc/hr    # Heme:  - H/H stable.   - will continue to trend cbc    # Electrolytes:  - Follow and replete PRN as needed    # Skin:  - s/p I/O which was removed from RLE in MICU  - Right sided Mediport   - PIV in place  - sacral decub- care per nursing     # DVT p/px:  - lovenox sub q (30 mg as weight < 50 kg)    #GOC:  - patient DNR/ DNI at this time  - will continue with abx, fluids and pressor support  - if clinically worsening, will consider PCU at that time  - palliative care Dr. Jones following

## 2018-03-02 NOTE — PROGRESS NOTE ADULT - PROBLEM SELECTOR PLAN 1
Met with MICU team and the patient's HCP. HCP gave verbalized understanding about the patient's acute issues (Septic Shock in the setting of advanced metastatic CA). Plan is for waiting until tomorrow morning for clinical improvement (able to wean off pressors and improvement of hypoglecemia). If so they will probably be looking for continuing ICU care. However, if there is not clinical improvement, the patient's HCP will consider transferring to PCU on capped pressors, IVF, and Abx. If transferring to PCU, the HCP will be open to further discussing deescalation of care (transitioning IV pressors to oral pressors or stopping pressors, discontinuing Abx, and decreasing IVF) depending on clinical decline. Patient is already DNR/I. We will follow up with the MICU team and the HCP tomorrow. Met with MICU team (Dr Velasquez) and the patient's HCP. HCP gave verbalized understanding about the patient's acute issues (Septic Shock in the setting of advanced metastatic CA. Recurrent hypoglycemia on high dose of Phenylephrine). Plan is for waiting until tomorrow morning for clinical improvement (able to be titrated down of pressors and improvement of hypoglycemia). If so they will probably be looking for continuing ICU care. However, if there is not clinical improvement, the patient's HCP will consider transferring to PCU on capped pressors, IVF, and Abx. If transferring to PCU, the HCP will be open to further discussing deescalation of care (transitioning IV pressors to oral pressors or stopping pressors, discontinuing Abx, and decreasing IVF) depending on clinical decline. Patient is already DNR/I. We will follow up with the MICU team and the HCP tomorrow. Met with MICU team (Dr Velasquze) and the patient's HCP. HCP gave verbalized understanding about the patient's acute issues (Septic Shock in the setting of advanced metastatic CA. Recurrent hypoglycemia on high dose of Phenylephrine). Plan is for waiting until tomorrow morning for clinical improvement (able to be titrated down of pressors and improvement of hypoglycemia). If so they will probably be looking for continuing ICU care. However, if there is not clinical improvement, the patient's HCP will consider transferring to PCU on capped pressors, IVF, and Abx. If transferring to PCU, the HCP will be open to further discussing deescalation of care (transitioning IV pressors to oral pressors or stopping pressors, discontinuing Abx, and decreasing IVF) depending on clinical decline. Patient is already DNR/I. We will follow up with the MICU team and the HCP tomorrow in order to decide about transferring to PCU.

## 2018-03-02 NOTE — PROGRESS NOTE ADULT - PROBLEM SELECTOR PLAN 4
On Dilaudid 1 mg/hr and 1 mg q 1 PRN Controlled.   On Dilaudid 1 mg/hr and 1 mg q 1 PRN s/p extubation   No acute symptoms at this time.

## 2018-03-02 NOTE — PROGRESS NOTE ADULT - PROBLEM SELECTOR PROBLEM 1
Acute respiratory failure, unspecified whether with hypoxia or hypercapnia Septic shock due to Klebsiella pneumoniae

## 2018-03-02 NOTE — PROGRESS NOTE ADULT - PROBLEM SELECTOR PLAN 5
GOC as above. GOC as above.  Please call ro mcintosh.   Please place a holistic nurse No plans for DMT

## 2018-03-02 NOTE — PROGRESS NOTE ADULT - ASSESSMENT
Patient is a 48 yo F with metastatic ovarian CA (s/p TAHBSO, s/p colostomy, with holistic care approach, started chemo. She was recently admitted to MICU under the impression of malignant SBO, sepsis, GB perforation (s/p biliary drain), hypoglycemia, and worsening metastatic disease. She is now presenting with AMS and acute respiratory failure in the setting of hypoglycemia and sepsis. Palliative Care consult placed for pain management and GOC.

## 2018-03-02 NOTE — PROGRESS NOTE ADULT - SUBJECTIVE AND OBJECTIVE BOX
HPI: Patient is a 48 yo F with metastatic ovarian CA (s/p TAHBSO, s/p colostomy, with holistic care approach, started chemo. She was recently admitted to MICU under the impression of malignant SBO, sepsis, GB perforation (s/p biliary drain), hypoglycemia, and worsening metastatic disease. She is now presenting with AMS in the setting of hypoglycemia and s/p intubation. Palliative Care consult placed for pain management and GOC.    PERTINENT PM/SXH:   Ovarian cancer    Colostomy in place  H/O abdominal hysterectomy    SOCIAL HISTORY:   Significant other/partner:  [ x] YES  [ ] NO               Children:  [ ] YES  [x ] NO                   Latter-day/Spirituality:  Substance hx:  [ ] YES   [x ] NO                   Tobacco hx:  [ ] YES  [x ] NO                       Alcohol hx: [ ] YES  [x ] NO         Home Opioid hx:  [x ] YES  [ ] NO   Living Situation: [x ] Living with Fiance  [ ] Long term care  [ ] Rehab [ ] Other  Has NS house calls.   FAMILY HISTORY:  No pertinent family history in first degree relatives    [ x] Family history non-contributory     BASELINE (I)ADLs (prior to admission):  [place]: [ ] total  [ x] moderate [ ] dependent    ADVANCE DIRECTIVES:    DNR [x]   MOLST  [x ] YES [  ] NO               however, it was rescinded today.   Health Care Proxy [ x] YES  [ ] NO   [ ] Completed  Living Will  [ ] YES [x ] NO             [ ] Surrogate  [x ] HCP  [ ] Guardian:  Copy in EMR alpha tab in 12/20/17 admission  Trung PERFECTO Buenrostro  Phone#: [phone]    Allergies    penicillin (Rash)    Intolerances    MEDICATIONS  (STANDING):  dextrose 10%. 1000 milliLiter(s) (50 mL/Hr) IV Continuous <Continuous>  dextrose 5% + sodium chloride 0.9%. 1000 milliLiter(s) (50 mL/Hr) IV Continuous <Continuous>  dextrose 50% Injectable 12.5 Gram(s) IV Push once  dextrose 50% Injectable 25 Gram(s) IV Push once  dextrose 50% Injectable 25 Gram(s) IV Push once  enoxaparin Injectable 30 milliGRAM(s) SubCutaneous every 24 hours  HYDROmorphone Infusion 1 mG/Hr (1 mL/Hr) IV Continuous <Continuous>  influenza   Vaccine 0.5 milliLiter(s) IntraMuscular once  meropenem  IVPB      meropenem  IVPB 1000 milliGRAM(s) IV Intermittent every 8 hours  pantoprazole  Injectable 40 milliGRAM(s) IV Push daily  phenylephrine    Infusion 0.4 MICROgram(s)/kG/Min (5.445 mL/Hr) IV Continuous <Continuous>  polyethylene glycol 3350 17 Gram(s) Oral daily  senna 2 Tablet(s) Oral at bedtime    MEDICATIONS  (PRN):  dextrose Gel 1 Dose(s) Oral once PRN Blood Glucose LESS THAN 70 milliGRAM(s)/deciliter  glucagon  Injectable 1 milliGRAM(s) IntraMuscular once PRN Glucose LESS THAN 70 milligrams/deciliter  HYDROmorphone  Injectable 1 milliGRAM(s) IV Push every 1 hour PRN Breakthrough pain        PRESENT SYMPTOMS:  Source: [ ] Patient   [ ] Family   [ x] Team     Pain:                        [x ] No [ ] Yes                Dyspnea:                [ ] No [ ] Yes             [ ] Mild [ ] Moderate [ ] Severe    Anxiety:                  [ ] No [ ] Yes             [ ] Mild [ ] Moderate [ ] Severe    Fatigue:                  [ ] No [ ] Yes             [x ] Mild [ ] Moderate [ ] Severe    Nausea:                  [ } No [ ] Yes             [ ] Mild [ ] Moderate [ ] Severe    Loss of appetite:   [ ] No [ ] Yes             [ ] Mild [ ] Moderate [ ] Severe    Constipation:        [ ] No [ ] Yes             [ ] Mild [ ] Moderate [ ] Severe    Other Symptoms:  [ ] All other review of systems negative   [x ] Unable to obtain due to sedation/ Encephalopathy.      Karnofsky Performance Score/Palliative Performance Status Version 2:  40 %    PHYSICAL EXAM:  Vital Signs Last 24 Hrs  T(C): 36.9 (02 Mar 2018 08:00), Max: 36.9 (01 Mar 2018 19:00)  T(F): 98.4 (02 Mar 2018 08:00), Max: 98.4 (01 Mar 2018 19:00)  HR: 96 (02 Mar 2018 15:30) (76 - 101)  BP: 107/71 (02 Mar 2018 15:30) (76/50 - 111/78)  BP(mean): 86 (02 Mar 2018 15:30) (59 - 90)  RR: 11 (02 Mar 2018 15:30) (7 - 28)  SpO2: 98% (02 Mar 2018 15:30) (97% - 98%)  General:  [ ] Alert  [ ] Oriented x      [ ] Lethargic  [ ] Agitated   [ ] Cachexia    ] Unarousable  [ ] Verbal  [x ] Non-Verbal    HEENT:  [ ] Normal   [x ] Dry mouth   [x ] ET Tube    [ ] Trach  [ ] Oral lesions    Lungs:   [x ] Clear [ ] Tachypnea  [ ] Audible excessive secretions   [ ] Rhonchi        [ ] Right [ ] Left [ ] Bilateral  [ ] Crackles        [ ] Right [ ] Left [ ] Bilateral  [ ] Wheezing     [ ] Right [ ] Left [ ] Bilateral    Cardiovascular:  [ x] Regular [ ] Irregular [ ] Tachycardia   [ ] Bradycardia  [ ] Murmur [ ] Other    Abdomen: [x ] Soft  [ ] Distended   [x ] +BS (+) [ ] Non tender [x ] Tender  [ ]PEG   [ ]OGT/ NGT   Last BM: today  Ostomy. Biliary drain    Genitourinary: [ ] Normal [ ] Incontinent   [ ] Oliguria/Anuria   [x ] Lynn    Musculoskeletal:  [ ] Normal   [ x] Weakness  [ ] Bedbound/Wheelchair bound [ ] Edema    Neurological: [ x]No focal deficits  [ ] Cognitive impairment  [ ] Dysphagia [ ] Dysarthria [ ] Paresis [x ] Other: Unarousable due to sedation/encephalopathy     Skin: [ ] DTI on sacrum   [x ] Pressure ulcer(s)  sacrum               [ ] Rash    LABS:                                                                                                      10.0   17.7  )-----------( 300      ( 01 Mar 2018 21:20 )             32.3   03-01    141  |  103  |  22  ----------------------------<  137<H>  4.0   |  32<H>  |  <0.30<L>    Ca    7.3<L>      01 Mar 2018 21:20  Phos  4.1     03-01  Mg     2.2     03-01    TPro  5.1<L>  /  Alb  2.5<L>  /  TBili  1.0  /  DBili  x   /  AST  188<H>  /  ALT  141<H>  /  AlkPhos  1468<H>  03-01        Shock: no [ ] Septic [ ] Cardiogenic [ ] Neurologic [ ] Hypovolemic  Vasopressors x none   Inotrophs x none    Oral Intake: [ ] Unable/mouth care only [ x] Minimal [ ] Moderate [ ] Full Capability    Diet: [ ] NPO [ ] Tube feeds [ ] TPN [ x] Other     RADIOLOGY & ADDITIONAL STUDIES:   < from: CT Head No Cont (02.28.18 @ 12:31) >  EXAM:  CT BRAIN                            < from: CT Head No Cont (02.28.18 @ 22:56) >  EXAM:  CT BRAIN                            PROCEDURE DATE:  02/28/2018    IMPRESSION:    Stable thin acute subdural hemorrhage in the anterior interhemispheric   fissure.    No parenchymal hemorrhage or mass effect.    Dr. Wong discussed these findings with Dr. Dwyer on 3/1/2018 9:00 AM   with read back.    < end of copied text >      REFERRALS:   [ ] Chaplaincy  [ ] Hospice  [ ] Child Life  [ ] Social Work  [ ] Case management [x ] Holistic Therapy HPI: Patient is a 46 yo F with metastatic ovarian CA (s/p TAHBSO, s/p colostomy, with holistic care approach, started chemo. She was recently admitted to MICU under the impression of malignant SBO, sepsis, GB perforation (s/p biliary drain), hypoglycemia, and worsening metastatic disease. She is now presenting with AMS in the setting of hypoglycemia and s/p intubation. Patient is now extubated. As per primary team she continues to have  recurrent hypoglycemia and needing high doses of phenylephrine. Palliative Care is following up for pain management and GOC.    PERTINENT PM/SXH:   Ovarian cancer    Colostomy in place  H/O abdominal hysterectomy    SOCIAL HISTORY:   Significant other/partner:  [ x] YES  [ ] NO               Children:  [ ] YES  [x ] NO                   Protestant/Spirituality:  Substance hx:  [ ] YES   [x ] NO                   Tobacco hx:  [ ] YES  [x ] NO                       Alcohol hx: [ ] YES  [x ] NO         Home Opioid hx:  [x ] YES  [ ] NO   Living Situation: [x ] Living with Fiance  [ ] Long term care  [ ] Rehab [ ] Other  Has NS house calls.   FAMILY HISTORY:  No pertinent family history in first degree relatives    [ x] Family history non-contributory     BASELINE (I)ADLs (prior to admission):  [place]: [ ] total  [ x] moderate [ ] dependent    ADVANCE DIRECTIVES:    DNR [x]   MOLST  [x ] YES [  ] NO               however, it was rescinded today.   Health Care Proxy [ x] YES  [ ] NO   [ ] Completed  Living Will  [ ] YES [x ] NO             [ ] Surrogate  [x ] HCP  [ ] Guardian:  Copy in EMR alpha tab in 12/20/17 admission  Trung Buenrostro  Phone#: [phone]    Allergies    penicillin (Rash)    Intolerances    MEDICATIONS  (STANDING):  dextrose 10%. 1000 milliLiter(s) (50 mL/Hr) IV Continuous <Continuous>  dextrose 5% + sodium chloride 0.9%. 1000 milliLiter(s) (50 mL/Hr) IV Continuous <Continuous>  dextrose 50% Injectable 12.5 Gram(s) IV Push once  dextrose 50% Injectable 25 Gram(s) IV Push once  dextrose 50% Injectable 25 Gram(s) IV Push once  enoxaparin Injectable 30 milliGRAM(s) SubCutaneous every 24 hours  HYDROmorphone Infusion 1 mG/Hr (1 mL/Hr) IV Continuous <Continuous>  influenza   Vaccine 0.5 milliLiter(s) IntraMuscular once  meropenem  IVPB      meropenem  IVPB 1000 milliGRAM(s) IV Intermittent every 8 hours  pantoprazole  Injectable 40 milliGRAM(s) IV Push daily  phenylephrine    Infusion 0.4 MICROgram(s)/kG/Min (5.445 mL/Hr) IV Continuous <Continuous>  polyethylene glycol 3350 17 Gram(s) Oral daily  senna 2 Tablet(s) Oral at bedtime    MEDICATIONS  (PRN):  dextrose Gel 1 Dose(s) Oral once PRN Blood Glucose LESS THAN 70 milliGRAM(s)/deciliter  glucagon  Injectable 1 milliGRAM(s) IntraMuscular once PRN Glucose LESS THAN 70 milligrams/deciliter  HYDROmorphone  Injectable 1 milliGRAM(s) IV Push every 1 hour PRN Breakthrough pain        PRESENT SYMPTOMS:  Source: [x ] Patient   [ ] Family   [ ] Team     Pain:                        [x ] No [ ] Yes                Dyspnea:                [x ] No [ ] Yes             [ ] Mild [ ] Moderate [ ] Severe    Anxiety:                  [ x] No [ ] Yes             [ ] Mild [ ] Moderate [ ] Severe    Fatigue:                  [ ] No [x ] Yes             [ ] Mild [x ] Moderate [ ] Severe    Nausea:                  [x } No [ ] Yes             [ ] Mild [ ] Moderate [ ] Severe    Loss of appetite:   [ ] No [ x] Yes             [ ] Mild [ ] Moderate [x ] Severe    Constipation:        [x ] No [ ] Yes             [ ] Mild [ ] Moderate [ ] Severe    Other Symptoms:  [x ] All other review of systems negative   [ ] Unable to obtain due to sedation/ Encephalopathy.      Karnofsky Performance Score/Palliative Performance Status Version 2:  40 %    PHYSICAL EXAM:  Vital Signs Last 24 Hrs  T(C): 36.9 (02 Mar 2018 08:00), Max: 36.9 (01 Mar 2018 19:00)  T(F): 98.4 (02 Mar 2018 08:00), Max: 98.4 (01 Mar 2018 19:00)  HR: 96 (02 Mar 2018 15:30) (76 - 101)  BP: 107/71 (02 Mar 2018 15:30) (76/50 - 111/78)  BP(mean): 86 (02 Mar 2018 15:30) (59 - 90)  RR: 11 (02 Mar 2018 15:30) (7 - 28)  SpO2: 98% (02 Mar 2018 15:30) (97% - 98%)  General:  [ ] Alert  [ ] Oriented x      [ ] Lethargic  [ ] Agitated   [ ] Cachexia    ] Unarousable  [ ] Verbal  [x ] Non-Verbal    HEENT:  [ ] Normal   [x ] Dry mouth   [x ] ET Tube    [ ] Trach  [ ] Oral lesions    Lungs:   [x ] Clear [ ] Tachypnea  [ ] Audible excessive secretions   [ ] Rhonchi        [ ] Right [ ] Left [ ] Bilateral  [ ] Crackles        [ ] Right [ ] Left [ ] Bilateral  [ ] Wheezing     [ ] Right [ ] Left [ ] Bilateral    Cardiovascular:  [ x] Regular [ ] Irregular [ ] Tachycardia   [ ] Bradycardia  [ ] Murmur [ ] Other    Abdomen: [x ] Soft  [ ] Distended   [x ] +BS (+) [ ] Non tender [x ] Tender  [ ]PEG   [ ]OGT/ NGT   Last BM: 3/1  Ostomy. Biliary drain    Genitourinary: [ ] Normal [ ] Incontinent   [ ] Oliguria/Anuria   [x ] Lynn    Musculoskeletal:  [ ] Normal   [ x] Weakness  [ ] Bedbound/Wheelchair bound [ ] Edema    Neurological: [ x]No focal deficits  [ ] Cognitive impairment  [ ] Dysphagia [ ] Dysarthria [ ] Paresis [ ] Other:     Psych: After explaining multiple times to the patient her current situation, she was not able to give a proper understanding about her illness and treatment options.      Skin: [ ] DTI on sacrum   [x ] Pressure ulcer(s)  sacrum               [ ] Rash    LABS:                                     10.0   17.7  )-----------( 300      ( 01 Mar 2018 21:20 )             32.3   03-01    141  |  103  |  22  ----------------------------<  137<H>  4.0   |  32<H>  |  <0.30<L>    Ca    7.3<L>      01 Mar 2018 21:20  Phos  4.1     03-01  Mg     2.2     03-01    TPro  5.1<L>  /  Alb  2.5<L>  /  TBili  1.0  /  DBili  x   /  AST  188<H>  /  ALT  141<H>  /  AlkPhos  1468<H>  03-01        Shock: no [ ] Septic [ ] Cardiogenic [ ] Neurologic [ ] Hypovolemic  Vasopressors x none   Inotrophs x none    Oral Intake: [ ] Unable/mouth care only [ x] Minimal [ ] Moderate [ ] Full Capability    Diet: [ ] NPO [ ] Tube feeds [ ] TPN [ x] Other     RADIOLOGY & ADDITIONAL STUDIES:   < from: CT Head No Cont (02.28.18 @ 12:31) >  EXAM:  CT BRAIN                            < from: CT Head No Cont (02.28.18 @ 22:56) >  EXAM:  CT BRAIN                            PROCEDURE DATE:  02/28/2018    IMPRESSION:    Stable thin acute subdural hemorrhage in the anterior interhemispheric   fissure.    No parenchymal hemorrhage or mass effect.    Dr. Wong discussed these findings with Dr. Dwyer on 3/1/2018 9:00 AM   with read back.    < end of copied text >      REFERRALS:   [ ] Chaplaincy  [ ] Hospice  [ ] Child Life  [ ] Social Work  [ ] Case management [x ] Holistic Therapy

## 2018-03-02 NOTE — CHART NOTE - NSCHARTNOTEFT_GEN_A_CORE
FOLLOWUP NOTE    Pt seen for followup. Admitting Diagnosis: hypoglycemia 2/2 sepsis vs metastatic ovarian cancer. Pt S/P TAHSBO, S/P colostomy, chemotherapy on hold 2/2 to nutrition status. Per chart 5cc stool output (3/1). Pt extubated. Palliative Care consult placed for pain management and GOC.    Source: Medical Records, RN    Diet : NPO. Per RN, pt will not be put on a specific diet due to family moving in the direction of Palliative Care; Plan is for Pt to have PO intake for pleasure feeds       RN reports: No GI issues      Source for PO intake: RN (pt currently NPO     Enteral /Parenteral Nutrition:       Current Weight: Weight (kg): 3/1/2018: 88.1 pounds (40kg)  % Weight Change: 38.83% weight loss ( 40 pound weight loss over 9 month period)    Pertinent Medications: MEDICATIONS  (STANDING):  dextrose 10%. 1000 milliLiter(s) (75 mL/Hr) IV Continuous <Continuous>  dextrose 50% Injectable 12.5 Gram(s) IV Push once  dextrose 50% Injectable 25 Gram(s) IV Push once  dextrose 50% Injectable 25 Gram(s) IV Push once  enoxaparin Injectable 30 milliGRAM(s) SubCutaneous every 24 hours  HYDROmorphone Infusion 1 mG/Hr (1 mL/Hr) IV Continuous <Continuous>  influenza   Vaccine 0.5 milliLiter(s) IntraMuscular once  meropenem  IVPB      meropenem  IVPB 1000 milliGRAM(s) IV Intermittent every 8 hours  pantoprazole  Injectable 40 milliGRAM(s) IV Push daily  phenylephrine    Infusion 0.4 MICROgram(s)/kG/Min (5.445 mL/Hr) IV Continuous <Continuous>    MEDICATIONS  (PRN):  dextrose Gel 1 Dose(s) Oral once PRN Blood Glucose LESS THAN 70 milliGRAM(s)/deciliter  glucagon  Injectable 1 milliGRAM(s) IntraMuscular once PRN Glucose LESS THAN 70 milligrams/deciliter  HYDROmorphone  Injectable 1 milliGRAM(s) IV Push every 1 hour PRN Breakthrough pain    Pertinent Labs:  03-01 Na141 mmol/L Glu 137 mg/dL<H> K+ 4.0 mmol/L Cr  <0.30 mg/dL<L> BUN 22 mg/dL Phos 4.1 mg/dL Alb 2.5 g/dL<L> PAB n/a         Skin: Pressure Injuries: sacrum - unstageable, sacral spine -unstageable     Estimated Needs:   [x] no change since previous assessment  [ ] recalculated:       Previous Nutrition Diagnosis:  Chronic Severe Malnutrition          Nutrition Diagnosis is [x] ongoing  [ ] resolved [ ] not applicable          New Nutrition Diagnosis: [x] not applicable       Interventions: PO intake as tolerated. Accommodate dietary wishes of pt and family.    Recommend    [x] Nutrition Supplement: Recommend Ensure Enlive 2x day if medically feasible and tolerated.     Monitoring and Evaluation:     [x] PO intake [x] Tolerance to diet prescription [x] weights [x] follow up per protocol    [x] other: RD to remain available MALNUTRITION FOLLOWUP NOTE    Pt seen for Malnutrition followup. Admitting Diagnosis: hypoglycemia 2/2 sepsis vs metastatic ovarian cancer. Pt S/P TAHSBO, S/P colostomy, chemotherapy on hold 2/2 to nutrition status. Per chart 5cc stool output (3/1). Pt extubated. Palliative Care consult placed for pain management and GOC.    Source: Medical Records, RN    Diet Clear liquid with Ensure Clear 3x day      RN reports: No GI issues      Source for PO intake: To start clear liquids today      Current Weight: Weight (kg): 3/1/2018: 88.1 pounds (40kg)      Pertinent Medications: MEDICATIONS  (STANDING):  dextrose 10%. 1000 milliLiter(s) (75 mL/Hr) IV Continuous <Continuous>  dextrose 50% Injectable 12.5 Gram(s) IV Push once  dextrose 50% Injectable 25 Gram(s) IV Push once  dextrose 50% Injectable 25 Gram(s) IV Push once  enoxaparin Injectable 30 milliGRAM(s) SubCutaneous every 24 hours  HYDROmorphone Infusion 1 mG/Hr (1 mL/Hr) IV Continuous <Continuous>  influenza   Vaccine 0.5 milliLiter(s) IntraMuscular once  meropenem  IVPB      meropenem  IVPB 1000 milliGRAM(s) IV Intermittent every 8 hours  pantoprazole  Injectable 40 milliGRAM(s) IV Push daily  phenylephrine    Infusion 0.4 MICROgram(s)/kG/Min (5.445 mL/Hr) IV Continuous <Continuous>    MEDICATIONS  (PRN):  dextrose Gel 1 Dose(s) Oral once PRN Blood Glucose LESS THAN 70 milliGRAM(s)/deciliter  glucagon  Injectable 1 milliGRAM(s) IntraMuscular once PRN Glucose LESS THAN 70 milligrams/deciliter  HYDROmorphone  Injectable 1 milliGRAM(s) IV Push every 1 hour PRN Breakthrough pain    Pertinent Labs:  03-01 Na141 mmol/L Glu 137 mg/dL<H> K+ 4.0 mmol/L Cr  <0.30 mg/dL<L> BUN 22 mg/dL Phos 4.1 mg/dL Alb 2.5 g/dL<L> PAB n/a         Skin: Pressure Injuries: sacrum - unstageable, sacral spine -unstageable     Estimated Needs:   [x] no change since previous assessment  [ ] recalculated:       Previous Nutrition Diagnosis:  Chronic Severe Malnutrition          Nutrition Diagnosis is [x] ongoing: Addressed with start of clear liquid diet, Ensure Clear 3x day         New Nutrition Diagnosis: [x] not applicable       Interventions:     Recommend: Advancing diet per pt tolerance    [x] Nutrition Supplement: Recommend continue Ensure Clear 3x day     Monitoring and Evaluation:     [x] PO intake [x] Tolerance to diet prescription [x] weights [x] follow up per protocol    [x] other: RD to remain available

## 2018-03-02 NOTE — PROGRESS NOTE ADULT - SUBJECTIVE AND OBJECTIVE BOX
CHIEF COMPLAINT:Patient is a 47y old  Female who presents with a chief complaint of AMS (2018 14:58)    Interval Events:  Overnight, patient became hypoglycemic to 40s and 50s, D10 uptitrated to 75 cc/hr.      REVIEW OF SYSTEMS:  Constitutional: [ ] negative [ ] fevers [ ] chills [ ] weight loss [ ] weight gain  HEENT: [ ] negative [ ] dry eyes [ ] eye irritation [ ] postnasal drip [ ] nasal congestion  CV: [ ] negative  [ ] chest pain [ ] orthopnea [ ] palpitations [ ] murmur  Resp: [ ] negative [ ] cough [ ] shortness of breath [ ] dyspnea [ ] wheezing [ ] sputum [ ] hemoptysis  GI: [ ] negative [ ] nausea [ ] vomiting [ ] diarrhea [ ] constipation [ ] abd pain [ ] dysphagia   : [ ] negative [ ] dysuria [ ] nocturia [ ] hematuria [ ] increased urinary frequency  Musculoskeletal: [ ] negative [ ] back pain [ ] myalgias [ ] arthralgias [ ] fracture  Skin: [ ] negative [ ] rash [ ] itch  Neurological: [ ] negative [ ] headache [ ] dizziness [ ] syncope [ ] weakness [ ] numbness  Psychiatric: [ ] negative [ ] anxiety [ ] depression  Endocrine: [ ] negative [ ] diabetes [ ] thyroid problem  Hematologic/Lymphatic: [ ] negative [ ] anemia [ ] bleeding problem  Allergic/Immunologic: [ ] negative [ ] itchy eyes [ ] nasal discharge [ ] hives [ ] angioedema  [ ] All other systems negative  [ ] Unable to assess ROS because ________    OBJECTIVE:  ICU Vital Signs Last 24 Hrs  T(C): 36.7 (02 Mar 2018 03:45), Max: 37.4 (01 Mar 2018 12:00)  T(F): 98 (02 Mar 2018 03:45), Max: 99.4 (01 Mar 2018 12:00)  HR: 84 (02 Mar 2018 07:00) (66 - 93)  BP: 101/70 (02 Mar 2018 07:00) (76/50 - 105/75)  BP(mean): 82 (02 Mar 2018 07:00) (59 - 86)  ABP: --  ABP(mean): --  RR: 11 (02 Mar 2018 07:00) (7 - 29)  SpO2: 98% (02 Mar 2018 07:00) (97% - 100%)    Mode: AC/ CMV (Assist Control/ Continuous Mandatory Ventilation), RR (machine): 12, TV (machine): 350, FiO2: 40, PEEP: 5, ITime: 0.9, MAP: 14, PIP: 20     @ 07:01  -  03-02 @ 07:00  --------------------------------------------------------  IN: 4257.7 mL / OUT: 3038 mL / NET: 1219.7 mL      CAPILLARY BLOOD GLUCOSE      POCT Blood Glucose.: 133 mg/dL (02 Mar 2018 06:18)      PHYSICAL EXAM:  General:   HEENT:   Lymph Nodes:  Neck:   Respiratory:   Cardiovascular:   Abdomen:   Extremities:   Skin:   Neurological:  Psychiatry:    LINES:    HOSPITAL MEDICATIONS:  Standing Meds:  dextrose 10%. 1000 milliLiter(s) IV Continuous <Continuous>  dextrose 50% Injectable 12.5 Gram(s) IV Push once  dextrose 50% Injectable 25 Gram(s) IV Push once  dextrose 50% Injectable 25 Gram(s) IV Push once  HYDROmorphone Infusion 1 mG/Hr IV Continuous <Continuous>  influenza   Vaccine 0.5 milliLiter(s) IntraMuscular once  meropenem  IVPB      meropenem  IVPB 1000 milliGRAM(s) IV Intermittent every 8 hours  pantoprazole  Injectable 40 milliGRAM(s) IV Push daily  phenylephrine    Infusion 0.4 MICROgram(s)/kG/Min IV Continuous <Continuous>      PRN Meds:  dextrose Gel 1 Dose(s) Oral once PRN  glucagon  Injectable 1 milliGRAM(s) IntraMuscular once PRN  HYDROmorphone  Injectable 1 milliGRAM(s) IV Push every 1 hour PRN      LABS:                        10.0   17.7  )-----------( 300      ( 01 Mar 2018 21:20 )             32.3     Hgb Trend: 10.0<--, 10.4<--, 10.6<--, 10.9<--      141  |  103  |  22  ----------------------------<  137<H>  4.0   |  32<H>  |  <0.30<L>    Ca    7.3<L>      01 Mar 2018 21:20  Phos  4.1     03-  Mg     2.2     03-    TPro  5.1<L>  /  Alb  2.5<L>  /  TBili  1.0  /  DBili  x   /  AST  188<H>  /  ALT  141<H>  /  AlkPhos  1468<H>  03-    Creatinine Trend: <0.30<--, <0.30<--, 0.35<--, 0.37<--, 0.27<--, 0.32<--  PT/INR - ( 01 Mar 2018 21:20 )   PT: 16.6 sec;   INR: 1.52 ratio         PTT - ( 01 Mar 2018 21:20 )  PTT:34.0 sec  Urinalysis Basic - ( 2018 12:02 )    Color: Yellow / Appearance: Clear / S.013 / pH: x  Gluc: x / Ketone: Negative  / Bili: Negative / Urobili: Negative   Blood: x / Protein: Trace / Nitrite: Negative   Leuk Esterase: Moderate / RBC: 0-2 /HPF / WBC 11-25 /HPF   Sq Epi: x / Non Sq Epi: x / Bacteria: x        Venous Blood Gas:   @ 10:44  7.46/49/34/34/60  VBG Lactate: 2.0      MICROBIOLOGY:     Culture - Blood (collected 2018 15:38)  Source: .Blood Blood  Gram Stain (01 Mar 2018 05:58):    Growth in aerobic and anaerobic bottles:    Gram Negative Rods  Preliminary Report (01 Mar 2018 05:59):    Growth in aerobic and anaerobic bottles:    Gram Negative Rods    Culture - Blood (collected 2018 15:38)  Source: .Blood Blood  Gram Stain (01 Mar 2018 06:05):    Growth in aerobic bottle: Gram Negative Rods    Growth in anaerobic bottle: Gram Negative Rods  Preliminary Report (01 Mar 2018 06:06):    Growth in aerobic bottle: Gram Negative Rods    "Due to technical problems, Proteus sp. will Not be reported as part of    the BCID panel until further notice"    ***Blood Panel PCR results on this specimen are available    approximately 3 hours after the Gram stain result.***    Gram stain, PCR, and/or culture results may not always    correspond due to difference in methodologies.    ************************************************************    This PCR assay was performed using [id number].    The following targets are tested for: Enterococcus,    vancomycin resistant enterococci, Listeria monocytogenes,    coagulase negative staphylococci, S. aureus,    methicillin resistant S. aureus, Streptococcus agalactiae    (Group B), S. pneumoniae, S. pyogenes (Group A),    Acinetobacter baumannii, Enterobacter cloacae, E. coli,    Klebsiella oxytoca, K. pneumoniae, Proteus sp.,    Serratia marcescens, Haemophilus influenzae,    Neisseria meningitidis, Pseudomonas aeruginosa, Candida    albicans, C. glabrata, C krusei, C parapsilosis,    C. tropicalis and the KPC resistance gene.    Growth in anaerobic bottle: Gram Negative Rods  Organism: Blood Culture PCR (01 Mar 2018 09:05)  Organism: Blood Culture PCR (01 Mar 2018 09:05)      RADIOLOGY:  [ ] Reviewed and interpreted by me    EKG: CHIEF COMPLAINT:Patient is a 47y old  Female who presents with a chief complaint of AMS (2018 14:58)    Interval Events:  Overnight, patient became hypoglycemic to 40s and 50s, D10 uptitrated to 75 cc/hr.      REVIEW OF SYSTEMS:  Constitutional: [ ] negative [ ] fevers [ ] chills [ ] weight loss [ ] weight gain  HEENT: [ ] negative [ ] dry eyes [ ] eye irritation [ ] postnasal drip [ ] nasal congestion  CV: [ ] negative  [ ] chest pain [ ] orthopnea [ ] palpitations [ ] murmur  Resp: [ ] negative [ ] cough [ ] shortness of breath [ ] dyspnea [ ] wheezing [ ] sputum [ ] hemoptysis  GI: [ ] negative [ ] nausea [ ] vomiting [ ] diarrhea [ ] constipation [ ] abd pain [ ] dysphagia   : [ ] negative [ ] dysuria [ ] nocturia [ ] hematuria [ ] increased urinary frequency  Musculoskeletal: [ ] negative [ ] back pain [ ] myalgias [ ] arthralgias [ ] fracture  Skin: [ ] negative [ ] rash [ ] itch  Neurological: [ ] negative [ ] headache [ ] dizziness [ ] syncope [ ] weakness [ ] numbness  Psychiatric: [ ] negative [ ] anxiety [ ] depression  Endocrine: [ ] negative [ ] diabetes [ ] thyroid problem  Hematologic/Lymphatic: [ ] negative [ ] anemia [ ] bleeding problem  Allergic/Immunologic: [ ] negative [ ] itchy eyes [ ] nasal discharge [ ] hives [ ] angioedema  [ ] All other systems negative  [ ] Unable to assess ROS because ________    OBJECTIVE:  ICU Vital Signs Last 24 Hrs  T(C): 36.7 (02 Mar 2018 03:45), Max: 37.4 (01 Mar 2018 12:00)  T(F): 98 (02 Mar 2018 03:45), Max: 99.4 (01 Mar 2018 12:00)  HR: 84 (02 Mar 2018 07:00) (66 - 93)  BP: 101/70 (02 Mar 2018 07:00) (76/50 - 105/75)  BP(mean): 82 (02 Mar 2018 07:00) (59 - 86)  ABP: --  ABP(mean): --  RR: 11 (02 Mar 2018 07:00) (7 - 29)  SpO2: 98% (02 Mar 2018 07:00) (97% - 100%)    Mode: AC/ CMV (Assist Control/ Continuous Mandatory Ventilation), RR (machine): 12, TV (machine): 350, FiO2: 40, PEEP: 5, ITime: 0.9, MAP: 14, PIP: 20     @ 07:01  -  03-02 @ 07:00  --------------------------------------------------------  IN: 4257.7 mL / OUT: 3038 mL / NET: 1219.7 mL      CAPILLARY BLOOD GLUCOSE      POCT Blood Glucose.: 133 mg/dL (02 Mar 2018 06:18)      General: WN/WD NAD  Neurology: A&Ox3, nonfocal, DAVIS x 4  Head:  Normocephalic, atraumatic  ENT:  Mucosa moist, no ulcerations  Neck:  Supple, no sinuses or palpable masses  Lymphatic:  No palpable cervical, supraclavicular, axillary or inguinal adenopathy  Respiratory: CTA B/L  CV: RRR, S1S2, no murmur  Abdominal: Soft, NT, ND no palpable mass  MSK: No edema, + peripheral pulses, FROM all 4 extremity  Incisions: intact, no erythema or drainage      LINES:    HOSPITAL MEDICATIONS:  Standing Meds:  dextrose 10%. 1000 milliLiter(s) IV Continuous <Continuous>  dextrose 50% Injectable 12.5 Gram(s) IV Push once  dextrose 50% Injectable 25 Gram(s) IV Push once  dextrose 50% Injectable 25 Gram(s) IV Push once  HYDROmorphone Infusion 1 mG/Hr IV Continuous <Continuous>  influenza   Vaccine 0.5 milliLiter(s) IntraMuscular once  meropenem  IVPB      meropenem  IVPB 1000 milliGRAM(s) IV Intermittent every 8 hours  pantoprazole  Injectable 40 milliGRAM(s) IV Push daily  phenylephrine    Infusion 0.4 MICROgram(s)/kG/Min IV Continuous <Continuous>      PRN Meds:  dextrose Gel 1 Dose(s) Oral once PRN  glucagon  Injectable 1 milliGRAM(s) IntraMuscular once PRN  HYDROmorphone  Injectable 1 milliGRAM(s) IV Push every 1 hour PRN      LABS:                        10.0   17.7  )-----------( 300      ( 01 Mar 2018 21:20 )             32.3     Hgb Trend: 10.0<--, 10.4<--, 10.6<--, 10.9<--  03    141  |  103  |  22  ----------------------------<  137<H>  4.0   |  32<H>  |  <0.30<L>    Ca    7.3<L>      01 Mar 2018 21:20  Phos  4.1       Mg     2.2         TPro  5.1<L>  /  Alb  2.5<L>  /  TBili  1.0  /  DBili  x   /  AST  188<H>  /  ALT  141<H>  /  AlkPhos  1468<H>      Creatinine Trend: <0.30<--, <0.30<--, 0.35<--, 0.37<--, 0.27<--, 0.32<--  PT/INR - ( 01 Mar 2018 21:20 )   PT: 16.6 sec;   INR: 1.52 ratio         PTT - ( 01 Mar 2018 21:20 )  PTT:34.0 sec  Urinalysis Basic - ( 2018 12:02 )    Color: Yellow / Appearance: Clear / S.013 / pH: x  Gluc: x / Ketone: Negative  / Bili: Negative / Urobili: Negative   Blood: x / Protein: Trace / Nitrite: Negative   Leuk Esterase: Moderate / RBC: 0-2 /HPF / WBC 11-25 /HPF   Sq Epi: x / Non Sq Epi: x / Bacteria: x        Venous Blood Gas:   @ 10:44  7.46/49/34/34/60  VBG Lactate: 2.0      MICROBIOLOGY:     Culture - Blood (collected 2018 15:38)  Source: .Blood Blood  Gram Stain (01 Mar 2018 05:58):    Growth in aerobic and anaerobic bottles:    Gram Negative Rods  Preliminary Report (01 Mar 2018 05:59):    Growth in aerobic and anaerobic bottles:    Gram Negative Rods    Culture - Blood (collected 2018 15:38)  Source: .Blood Blood  Gram Stain (01 Mar 2018 06:05):    Growth in aerobic bottle: Gram Negative Rods    Growth in anaerobic bottle: Gram Negative Rods  Preliminary Report (01 Mar 2018 06:06):    Growth in aerobic bottle: Gram Negative Rods    "Due to technical problems, Proteus sp. will Not be reported as part of    the BCID panel until further notice"    ***Blood Panel PCR results on this specimen are available    approximately 3 hours after the Gram stain result.***    Gram stain, PCR, and/or culture results may not always    correspond due to difference in methodologies.    ************************************************************    This PCR assay was performed using [employer].    The following targets are tested for: Enterococcus,    vancomycin resistant enterococci, Listeria monocytogenes,    coagulase negative staphylococci, S. aureus,    methicillin resistant S. aureus, Streptococcus agalactiae    (Group B), S. pneumoniae, S. pyogenes (Group A),    Acinetobacter baumannii, Enterobacter cloacae, E. coli,    Klebsiella oxytoca, K. pneumoniae, Proteus sp.,    Serratia marcescens, Haemophilus influenzae,    Neisseria meningitidis, Pseudomonas aeruginosa, Candida    albicans, C. glabrata, C krusei, C parapsilosis,    C. tropicalis and the KPC resistance gene.    Growth in anaerobic bottle: Gram Negative Rods  Organism: Blood Culture PCR (01 Mar 2018 09:05)  Organism: Blood Culture PCR (01 Mar 2018 09:05)      RADIOLOGY:  [ ] Reviewed and interpreted by me    EKG:

## 2018-03-03 VITALS
DIASTOLIC BLOOD PRESSURE: 78 MMHG | SYSTOLIC BLOOD PRESSURE: 106 MMHG | RESPIRATION RATE: 16 BRPM | OXYGEN SATURATION: 93 % | TEMPERATURE: 103 F | HEART RATE: 104 BPM

## 2018-03-03 DIAGNOSIS — A41.4 SEPSIS DUE TO ANAEROBES: ICD-10-CM

## 2018-03-03 LAB
-  AMIKACIN: SIGNIFICANT CHANGE UP
-  AMPICILLIN/SULBACTAM: SIGNIFICANT CHANGE UP
-  AMPICILLIN: SIGNIFICANT CHANGE UP
-  AZTREONAM: SIGNIFICANT CHANGE UP
-  CEFAZOLIN: SIGNIFICANT CHANGE UP
-  CEFEPIME: SIGNIFICANT CHANGE UP
-  CEFOXITIN: SIGNIFICANT CHANGE UP
-  CEFTAZIDIME: SIGNIFICANT CHANGE UP
-  CEFTRIAXONE: SIGNIFICANT CHANGE UP
-  CIPROFLOXACIN: SIGNIFICANT CHANGE UP
-  ERTAPENEM: SIGNIFICANT CHANGE UP
-  GENTAMICIN: SIGNIFICANT CHANGE UP
-  IMIPENEM: SIGNIFICANT CHANGE UP
-  LEVOFLOXACIN: SIGNIFICANT CHANGE UP
-  MEROPENEM: SIGNIFICANT CHANGE UP
-  PIPERACILLIN/TAZOBACTAM: SIGNIFICANT CHANGE UP
-  TOBRAMYCIN: SIGNIFICANT CHANGE UP
-  TRIMETHOPRIM/SULFAMETHOXAZOLE: SIGNIFICANT CHANGE UP
ALBUMIN SERPL ELPH-MCNC: 2.4 G/DL — LOW (ref 3.3–5)
ALP SERPL-CCNC: 1294 U/L — HIGH (ref 40–120)
ALT FLD-CCNC: 95 U/L RC — HIGH (ref 10–45)
ANION GAP SERPL CALC-SCNC: 11 MMOL/L — SIGNIFICANT CHANGE UP (ref 5–17)
AST SERPL-CCNC: 65 U/L — HIGH (ref 10–40)
BILIRUB SERPL-MCNC: 1.4 MG/DL — HIGH (ref 0.2–1.2)
BUN SERPL-MCNC: 16 MG/DL — SIGNIFICANT CHANGE UP (ref 7–23)
CALCIUM SERPL-MCNC: 7.1 MG/DL — LOW (ref 8.4–10.5)
CHLORIDE SERPL-SCNC: 98 MMOL/L — SIGNIFICANT CHANGE UP (ref 96–108)
CO2 SERPL-SCNC: 29 MMOL/L — SIGNIFICANT CHANGE UP (ref 22–31)
CREAT SERPL-MCNC: <0.3 MG/DL — LOW (ref 0.5–1.3)
CULTURE RESULTS: SIGNIFICANT CHANGE UP
CULTURE RESULTS: SIGNIFICANT CHANGE UP
GLUCOSE BLDC GLUCOMTR-MCNC: 162 MG/DL — HIGH (ref 70–99)
GLUCOSE BLDC GLUCOMTR-MCNC: 175 MG/DL — HIGH (ref 70–99)
GLUCOSE SERPL-MCNC: 153 MG/DL — HIGH (ref 70–99)
MAGNESIUM SERPL-MCNC: 1.5 MG/DL — LOW (ref 1.6–2.6)
METHOD TYPE: SIGNIFICANT CHANGE UP
ORGANISM # SPEC MICROSCOPIC CNT: SIGNIFICANT CHANGE UP
PHOSPHATE SERPL-MCNC: 1.5 MG/DL — LOW (ref 2.5–4.5)
POTASSIUM SERPL-MCNC: 2.8 MMOL/L — CRITICAL LOW (ref 3.5–5.3)
POTASSIUM SERPL-SCNC: 2.8 MMOL/L — CRITICAL LOW (ref 3.5–5.3)
PROT SERPL-MCNC: 5 G/DL — LOW (ref 6–8.3)
SODIUM SERPL-SCNC: 138 MMOL/L — SIGNIFICANT CHANGE UP (ref 135–145)
SPECIMEN SOURCE: SIGNIFICANT CHANGE UP
SPECIMEN SOURCE: SIGNIFICANT CHANGE UP

## 2018-03-03 PROCEDURE — 99233 SBSQ HOSP IP/OBS HIGH 50: CPT | Mod: GC

## 2018-03-03 RX ORDER — POTASSIUM CHLORIDE 20 MEQ
10 PACKET (EA) ORAL
Qty: 0 | Refills: 0 | Status: COMPLETED | OUTPATIENT
Start: 2018-03-03 | End: 2018-03-03

## 2018-03-03 RX ORDER — HYDROMORPHONE HYDROCHLORIDE 2 MG/ML
2 INJECTION INTRAMUSCULAR; INTRAVENOUS; SUBCUTANEOUS
Qty: 0 | Refills: 0 | Status: DISCONTINUED | OUTPATIENT
Start: 2018-03-03 | End: 2018-03-04

## 2018-03-03 RX ORDER — SODIUM CHLORIDE 9 MG/ML
1000 INJECTION INTRAMUSCULAR; INTRAVENOUS; SUBCUTANEOUS
Qty: 0 | Refills: 0 | Status: DISCONTINUED | OUTPATIENT
Start: 2018-03-03 | End: 2018-03-04

## 2018-03-03 RX ORDER — POTASSIUM PHOSPHATE, MONOBASIC POTASSIUM PHOSPHATE, DIBASIC 236; 224 MG/ML; MG/ML
30 INJECTION, SOLUTION INTRAVENOUS ONCE
Qty: 0 | Refills: 0 | Status: COMPLETED | OUTPATIENT
Start: 2018-03-03 | End: 2018-03-03

## 2018-03-03 RX ORDER — CALCIUM GLUCONATE 100 MG/ML
1 VIAL (ML) INTRAVENOUS ONCE
Qty: 0 | Refills: 0 | Status: COMPLETED | OUTPATIENT
Start: 2018-03-03 | End: 2018-03-03

## 2018-03-03 RX ORDER — ACETAMINOPHEN 500 MG
650 TABLET ORAL ONCE
Qty: 0 | Refills: 0 | Status: COMPLETED | OUTPATIENT
Start: 2018-03-03 | End: 2018-03-03

## 2018-03-03 RX ORDER — PHENYLEPHRINE HYDROCHLORIDE 10 MG/ML
4 INJECTION INTRAVENOUS
Qty: 80 | Refills: 0 | Status: DISCONTINUED | OUTPATIENT
Start: 2018-03-03 | End: 2018-03-04

## 2018-03-03 RX ORDER — POTASSIUM CHLORIDE 20 MEQ
40 PACKET (EA) ORAL ONCE
Qty: 0 | Refills: 0 | Status: COMPLETED | OUTPATIENT
Start: 2018-03-03 | End: 2018-03-03

## 2018-03-03 RX ORDER — MAGNESIUM SULFATE 500 MG/ML
2 VIAL (ML) INJECTION ONCE
Qty: 0 | Refills: 0 | Status: COMPLETED | OUTPATIENT
Start: 2018-03-03 | End: 2018-03-03

## 2018-03-03 RX ADMIN — PHENYLEPHRINE HYDROCHLORIDE 5.45 MICROGRAM(S)/KG/MIN: 10 INJECTION INTRAVENOUS at 15:18

## 2018-03-03 RX ADMIN — Medication 50 GRAM(S): at 01:18

## 2018-03-03 RX ADMIN — ENOXAPARIN SODIUM 30 MILLIGRAM(S): 100 INJECTION SUBCUTANEOUS at 15:16

## 2018-03-03 RX ADMIN — HYDROMORPHONE HYDROCHLORIDE 1 MILLIGRAM(S): 2 INJECTION INTRAMUSCULAR; INTRAVENOUS; SUBCUTANEOUS at 01:45

## 2018-03-03 RX ADMIN — MEROPENEM 100 MILLIGRAM(S): 1 INJECTION INTRAVENOUS at 05:58

## 2018-03-03 RX ADMIN — Medication 100 MILLIEQUIVALENT(S): at 03:03

## 2018-03-03 RX ADMIN — Medication 200 GRAM(S): at 04:00

## 2018-03-03 RX ADMIN — HYDROMORPHONE HYDROCHLORIDE 1 MILLIGRAM(S): 2 INJECTION INTRAMUSCULAR; INTRAVENOUS; SUBCUTANEOUS at 07:10

## 2018-03-03 RX ADMIN — HYDROMORPHONE HYDROCHLORIDE 2 MILLIGRAM(S): 2 INJECTION INTRAMUSCULAR; INTRAVENOUS; SUBCUTANEOUS at 21:22

## 2018-03-03 RX ADMIN — MEROPENEM 100 MILLIGRAM(S): 1 INJECTION INTRAVENOUS at 21:21

## 2018-03-03 RX ADMIN — POTASSIUM PHOSPHATE, MONOBASIC POTASSIUM PHOSPHATE, DIBASIC 83.33 MILLIMOLE(S): 236; 224 INJECTION, SOLUTION INTRAVENOUS at 04:01

## 2018-03-03 RX ADMIN — Medication 100 MILLIEQUIVALENT(S): at 01:14

## 2018-03-03 RX ADMIN — HYDROMORPHONE HYDROCHLORIDE 1 MILLIGRAM(S): 2 INJECTION INTRAMUSCULAR; INTRAVENOUS; SUBCUTANEOUS at 01:23

## 2018-03-03 RX ADMIN — SODIUM CHLORIDE 50 MILLILITER(S): 9 INJECTION, SOLUTION INTRAVENOUS at 15:20

## 2018-03-03 RX ADMIN — HYDROMORPHONE HYDROCHLORIDE 1 MILLIGRAM(S): 2 INJECTION INTRAMUSCULAR; INTRAVENOUS; SUBCUTANEOUS at 08:41

## 2018-03-03 RX ADMIN — HYDROMORPHONE HYDROCHLORIDE 1 MG/HR: 2 INJECTION INTRAMUSCULAR; INTRAVENOUS; SUBCUTANEOUS at 15:18

## 2018-03-03 RX ADMIN — Medication 260 MILLIGRAM(S): at 12:09

## 2018-03-03 RX ADMIN — SODIUM CHLORIDE 50 MILLILITER(S): 9 INJECTION INTRAMUSCULAR; INTRAVENOUS; SUBCUTANEOUS at 16:23

## 2018-03-03 RX ADMIN — MEROPENEM 100 MILLIGRAM(S): 1 INJECTION INTRAVENOUS at 16:13

## 2018-03-03 RX ADMIN — Medication 50 MILLILITER(S): at 15:19

## 2018-03-03 RX ADMIN — HYDROMORPHONE HYDROCHLORIDE 2 MILLIGRAM(S): 2 INJECTION INTRAMUSCULAR; INTRAVENOUS; SUBCUTANEOUS at 18:16

## 2018-03-03 RX ADMIN — PANTOPRAZOLE SODIUM 40 MILLIGRAM(S): 20 TABLET, DELAYED RELEASE ORAL at 17:57

## 2018-03-03 RX ADMIN — Medication 100 MILLIEQUIVALENT(S): at 02:08

## 2018-03-03 RX ADMIN — HYDROMORPHONE HYDROCHLORIDE 1 MILLIGRAM(S): 2 INJECTION INTRAMUSCULAR; INTRAVENOUS; SUBCUTANEOUS at 09:00

## 2018-03-03 RX ADMIN — HYDROMORPHONE HYDROCHLORIDE 1 MILLIGRAM(S): 2 INJECTION INTRAMUSCULAR; INTRAVENOUS; SUBCUTANEOUS at 05:55

## 2018-03-03 RX ADMIN — HYDROMORPHONE HYDROCHLORIDE 2 MILLIGRAM(S): 2 INJECTION INTRAMUSCULAR; INTRAVENOUS; SUBCUTANEOUS at 18:39

## 2018-03-03 NOTE — CHART NOTE - NSCHARTNOTEFT_GEN_A_CORE
MICU Transfer Note    Transfer from: MICU    Transfer to: (  ) Medicine    (  ) Telemetry     (   ) RCU        (   X ) Palliative         (   ) Stroke Unit          (   ) __________________    Accepting Physician:  Signout given to: Dr. Muñoz    MICU COURSE:  47F PMH metastatic ovarian CA (s/p TAHBSO, s/p colostomy, with holistic care approach, started chemo, currently not a candidate 2/2 nutrition status, on home dilaudid PCA pump with the following settings, 1 mg/h, 1 mg q 15' demand dose). She was recently admitted to MICU under the impression of malignant SBO, sepsis, GB perforation (s/p biliary drain), hypoglycemia, and worsening metastatic disease, and was successfully treated and subsequently discharged home. Per her , he last saw her at her normal at 3am. When he awoke in the AM, the patient was reportedly cold and not moving. He called EMS and the patient was intubated for hypoxia on scene and transported to [facility].    Patient admitted to MICU for septic shock secondary to klebsiella bacteremia. She was started on phenylephrine and titrated up to 4mcg, unable to be weaned off. Patient was also found to be markedly hypoglycemic on admission, likely the cause of her AMS. She was extubated on HD to nasal cannula, saturating well. Per discussion with her and her HCP, Trung, we observed patient for 48 hours in MICU to determine pressor needs and clinical improvement. Patient continues to require pressor support and continues to be febrile despite broad spectrum antibiotics. In addition, she requires continues D10 drip for glycemic control due to her lack of PO intake.    Patient will now be transferred to the PCU per discussion with Dr. Muñoz and family,  with continued pressor support (capped at 4 mcg of phenylephrine), D10 and antibiotic coverage.       ASSESSMENT & PLAN:   47F PMH metastatic ovarian CA (s/p TAHBSO, s/p colostomy, with holistic care approach, started chemo, currently not a candidate 2/2 nutrition status, on home dilaudid PCA pump with the following settings, 1 mg/h, 1 mg q 15' demand dose presents after being found in bed by  to be cold and unresponsive, intubated in the field by EMS, found to be hypoglycemic and bacteremic, s/p extubation continuing to require pressor support, with family decision made for transfer to PCU.       # Neuro:   - s/p extubation on 3/1, doing well on NC  - CT head demonstrates repeat subdural hematoma- stable    # CVS:  - requiring phenylephrine- 4 mcg at this time; pressors capped and will continue in PCU    # Pulm:  - no acute pulmonary issues at this time  - c/w NC    # GI:  - pleasure feeds  - continues to be hypoglycemic likely 2/2 to poor PO intake- will continue with d10 for now    # Renal:  - no known renal dysfunction  - bun/cr at baseline      # ID:  - klebsiella bacteremia- c/w meropenem at this time, will narrow based on specificities  - source likely from gallbladder vs intestinal (hx of perf gallbladder with perc ubaldo in place)    # Endocrine:  - Patient with hypoglycemia, unclear source likely 2/2 to lack of nutrition  - c/w D10 at 75 cc/hr    # Heme:  - H/H stable.   - will continue to trend cbc    # Electrolytes:  - Follow and replete PRN as needed    # Skin:  - s/p I/O which was removed from RLE in MICU  - Right sided Mediport   - PIV in place  - sacral decub- care per nursing     # DVT p/px:  - lovenox sub q (30 mg as weight < 50 kg)      Transfer to the palliative care unit with continue pressor support (capped at 4 mcg of phenylephrine), D10 and antibiotic coverage.

## 2018-03-03 NOTE — PROGRESS NOTE ADULT - PROBLEM SELECTOR PLAN 5
Palliative team spoke to HCP this AM who was agreeable to symptomatic management in PCU as well as ongoing GOC.  For now, will cap pressors, continue abx, KVO IVF, decrease blood glucose testing to BID, no further lab draws.  Pt is DNR/I.  She appears comfortable on Dilaudid gtt.  Will call chaplaincy and holistic nurse to see pt.  Will continue ongoing GOC - pt may benefit from hospice services  Please call chaplaincyahir mcintosh.   Please place a holistic nurse

## 2018-03-03 NOTE — PROGRESS NOTE ADULT - PROBLEM SELECTOR PLAN 2
Improving; however, she is difficulty to understand due to loss of voice and still appears confused
Improving; however, the patient is not fully able to give verbalized understanding about her current illness and treatment options, therefore medical decision making should go through her HCP.
Likely 2/2 to hypoglycemia and sepsis   Abx and Dextrose as per primary  Work up as per primary as well.

## 2018-03-03 NOTE — PROGRESS NOTE ADULT - ATTENDING COMMENTS
1. Septic shock from Klebsiella bacteremia. Source likely from GB (percutaneous drainage tube in place) vs small bowel. Pt remain dependent on phenylephrine. Continue Meropenum pending sensitivities.  2 Hypoglycemia from  severe sepsis. D10 at 50 cc and start D5LR @ 50 cc/hr.  3.Tolerating extubation  4. Metastatic ovarian cancer with extreme cachexia. Dilaudid drip for pain control plus prn IV Dilaudid.  5.Pt will likely not come off pressors since we are not being aggressive(appropriately)  with source control. Pt will likely benefit from PCU  unit.      cc time 35 min
47 year old woman with metastatic ovarian CA admitted to MICU septic shock with Klebsiella bactermia s/p cholecystostomy drain on antibiotics    Dilaudid for pain control  vasopressors capped  on D10 for hypoglycemia  awaiting PCU bed
1. Acute hypoxemic respiratory failure due to klebsiella bacteremia. Pt extubated as per plan. DNI.  2. Klebsiella bacteremia causing septic shock. Continue meropenem and pressors.  3. Metastatic ovarian Cancer. Continue Dilaudid drip.    cc time 35 min

## 2018-03-03 NOTE — PROGRESS NOTE ADULT - SUBJECTIVE AND OBJECTIVE BOX
Palliative Care Progress Note    HPI: Patient is a 46 yo F with metastatic ovarian CA (s/p TAHBSO, s/p colostomy, with holistic care approach, started chemo. She was recently admitted to MICU under the impression of malignant SBO, sepsis, GB perforation (s/p biliary drain), hypoglycemia, and worsening metastatic disease. She is now presenting with AMS in the setting of hypoglycemia and s/p intubation. Patient is now extubated. As per primary team she continues to have  recurrent hypoglycemia and needing high doses of phenylephrine. Palliative Care is following up for pain management and GOC.    SUBJECTIVE: Today, patient transferred to PCU for symptom management.  She is very soft spoken, but does not c/o pain at this time.  No SOB.  No family at bedside.    PERTINENT PM/SXH:   Ovarian cancer    Colostomy in place  H/O abdominal hysterectomy    SOCIAL HISTORY:   Significant other/partner:  [ x] YES  [ ] NO               Children:  [ ] YES  [x ] NO                   Hoahaoism/Spirituality:  Substance hx:  [ ] YES   [x ] NO                   Tobacco hx:  [ ] YES  [x ] NO                       Alcohol hx: [ ] YES  [x ] NO         Home Opioid hx:  [x ] YES  [ ] NO   Living Situation: [x ] Living with Fiance  [ ] Long term care  [ ] Rehab [ ] Other  Has NS house calls.   FAMILY HISTORY:  No pertinent family history in first degree relatives    [ x] Family history non-contributory     BASELINE (I)ADLs (prior to admission):  [place]: [ ] total  [ x] moderate [ ] dependent    ADVANCE DIRECTIVES:    DNR [x]   MOLST  [x ] YES [  ] NO               however, it was rescinded today.   Health Care Proxy [ x] YES  [ ] NO   [ ] Completed  Living Will  [ ] YES [x ] NO             [ ] Surrogate  [x ] HCP  [ ] Guardian:  Copy in EMR alpha tab in 12/20/17 admission  Trung Buenrostro  Phone#: [phone]    Allergies    penicillin (Rash)    Intolerances    MEDICATIONS  (STANDING):  dextrose 50% Injectable 12.5 Gram(s) IV Push once  dextrose 50% Injectable 25 Gram(s) IV Push once  dextrose 50% Injectable 25 Gram(s) IV Push once  enoxaparin Injectable 30 milliGRAM(s) SubCutaneous every 24 hours  HYDROmorphone Infusion 1 mG/Hr (1 mL/Hr) IV Continuous <Continuous>  influenza   Vaccine 0.5 milliLiter(s) IntraMuscular once  meropenem  IVPB      meropenem  IVPB 1000 milliGRAM(s) IV Intermittent every 8 hours  pantoprazole  Injectable 40 milliGRAM(s) IV Push daily  phenylephrine    Infusion 4.004 MICROgram(s)/kG/Min (54.5 mL/Hr) IV Continuous <Continuous>  polyethylene glycol 3350 17 Gram(s) Oral daily  senna 2 Tablet(s) Oral at bedtime  sodium chloride 0.9%. 1000 milliLiter(s) (50 mL/Hr) IV Continuous <Continuous>    MEDICATIONS  (PRN):  dextrose Gel 1 Dose(s) Oral once PRN Blood Glucose LESS THAN 70 milliGRAM(s)/deciliter  glucagon  Injectable 1 milliGRAM(s) IntraMuscular once PRN Glucose LESS THAN 70 milligrams/deciliter  HYDROmorphone  Injectable 2 milliGRAM(s) IV Push every 1 hour PRN Breakthrough pain  HYDROmorphone  Injectable 2 milliGRAM(s) IV Push every 1 hour PRN dyspnea      PRESENT SYMPTOMS:  Source: [x ] Patient   [ ] Family   [ ] Team     Pain:                        [x ] No [ ] Yes                Dyspnea:                [x ] No [ ] Yes             [ ] Mild [ ] Moderate [ ] Severe    Anxiety:                  [ x] No [ ] Yes             [ ] Mild [ ] Moderate [ ] Severe    Fatigue:                  [ ] No [x ] Yes             [ ] Mild [x ] Moderate [ ] Severe    Nausea:                  [x } No [ ] Yes             [ ] Mild [ ] Moderate [ ] Severe    Loss of appetite:   [ ] No [ x] Yes             [ ] Mild [ ] Moderate [x ] Severe    Constipation:        [x ] No [ ] Yes             [ ] Mild [ ] Moderate [ ] Severe    Other Symptoms:  [x ] All other review of systems negative   [ ] Unable to obtain due to sedation/ Encephalopathy.      Karnofsky Performance Score/Palliative Performance Status Version 2:  40 %    PHYSICAL EXAM:  Vital Signs Last 24 Hrs  T(C): 39.2 (03 Mar 2018 13:55), Max: 39.2 (03 Mar 2018 13:55)  T(F): 102.6 (03 Mar 2018 13:55), Max: 102.6 (03 Mar 2018 13:55)  HR: 104 (03 Mar 2018 13:55) (92 - 124)  BP: 106/78 (03 Mar 2018 13:55) (101/70 - 118/80)  BP(mean): 94 (03 Mar 2018 12:45) (81 - 96)  RR: 16 (03 Mar 2018 13:55) (6 - 22)  SpO2: 93% (03 Mar 2018 13:55) (93% - 98%)    General:  [x ] Alert  [ ] Oriented x      [ ] Lethargic  [ ] Agitated   [x ] Cachexia  [ ] Unarousable  [ ] Verbal  [ ] Non-Verbal    HEENT:  [ ] Normal   [x ] Dry mouth   [x ] ET Tube    [ ] Trach  [ ] Oral lesions    Lungs:   [x ] Clear [ ] Tachypnea  [ ] Audible excessive secretions   [ ] Rhonchi        [ ] Right [ ] Left [ ] Bilateral  [ ] Crackles        [ ] Right [ ] Left [ ] Bilateral  [ ] Wheezing     [ ] Right [ ] Left [ ] Bilateral    Cardiovascular:  [ x] Regular [ ] Irregular [ ] Tachycardia   [ ] Bradycardia  [ ] Murmur [ ] Other    Abdomen: [x ] Soft  [ ] Distended   [x ] +BS (+) [ ] Non tender [x ] Tender  [ ]PEG   [ ]OGT/ NGT   Last BM: 3/1  Ostomy. Biliary drain    Genitourinary: [ ] Normal [ ] Incontinent   [ ] Oliguria/Anuria   [x ] Lynn    Musculoskeletal:  [ ] Normal   [ x] Weakness  [ ] Bedbound/Wheelchair bound [ ] Edema    Neurological: [ x]No focal deficits  [ ] Cognitive impairment  [ ] Dysphagia [ ] Dysarthria [ ] Paresis [ ] Other:     Psych: After explaining multiple times to the patient her current situation, she was not able to give a proper understanding about her illness and treatment options.      Skin: [ ] DTI on sacrum   [x ] Pressure ulcer(s)  sacrum               [ ] Rash    LABS:                                  10.4   15.7  )-----------( 260      ( 02 Mar 2018 23:19 )             32.4   03-02    138  |  98  |  16  ----------------------------<  153<H>  2.8<LL>   |  29  |  <0.30<L>    Ca    7.1<L>      02 Mar 2018 23:19  Phos  1.5     03-02  Mg     1.5     03-02    TPro  5.0<L>  /  Alb  2.4<L>  /  TBili  1.4<H>  /  DBili  x   /  AST  65<H>  /  ALT  95<H>  /  AlkPhos  1294<H>  03-02    Shock: no [ ] Septic [ ] Cardiogenic [ ] Neurologic [ ] Hypovolemic  Vasopressors x none   Inotrophs x none    Oral Intake: [ ] Unable/mouth care only [ x] Minimal [ ] Moderate [ ] Full Capability    Diet: [ ] NPO [ ] Tube feeds [ ] TPN [ x] Other     RADIOLOGY & ADDITIONAL STUDIES: No new studies.    REFERRALS:   [ ] Chaplaincy  [ ] Hospice  [ ] Child Life  [x ] Social Work  [x ] Case management [x ] Holistic Therapy

## 2018-03-03 NOTE — PROGRESS NOTE ADULT - SUBJECTIVE AND OBJECTIVE BOX
CHIEF COMPLAINT:Patient is a 47y old  Female who presents with a chief complaint of AMS (28 Feb 2018 14:58)      Interval Events:  No acute events overnight.      REVIEW OF SYSTEMS:  Constitutional: [ ] negative [ ] fevers [ ] chills [ ] weight loss [ ] weight gain  HEENT: [ ] negative [ ] dry eyes [ ] eye irritation [ ] postnasal drip [ ] nasal congestion  CV: [ ] negative  [ ] chest pain [ ] orthopnea [ ] palpitations [ ] murmur  Resp: [ ] negative [ ] cough [ ] shortness of breath [ ] dyspnea [ ] wheezing [ ] sputum [ ] hemoptysis  GI: [ ] negative [ ] nausea [ ] vomiting [ ] diarrhea [ ] constipation [ ] abd pain [ ] dysphagia   : [ ] negative [ ] dysuria [ ] nocturia [ ] hematuria [ ] increased urinary frequency  Musculoskeletal: [ ] negative [ ] back pain [ ] myalgias [ ] arthralgias [ ] fracture  Skin: [ ] negative [ ] rash [ ] itch  Neurological: [ ] negative [ ] headache [ ] dizziness [ ] syncope [ ] weakness [ ] numbness  Psychiatric: [ ] negative [ ] anxiety [ ] depression  Endocrine: [ ] negative [ ] diabetes [ ] thyroid problem  Hematologic/Lymphatic: [ ] negative [ ] anemia [ ] bleeding problem  Allergic/Immunologic: [ ] negative [ ] itchy eyes [ ] nasal discharge [ ] hives [ ] angioedema  [ ] All other systems negative  [ ] Unable to assess ROS because ________    OBJECTIVE:  ICU Vital Signs Last 24 Hrs  T(C): 37.2 (03 Mar 2018 04:00), Max: 37.3 (02 Mar 2018 19:00)  T(F): 99 (03 Mar 2018 04:00), Max: 99.1 (02 Mar 2018 19:00)  HR: 106 (03 Mar 2018 06:30) (81 - 117)  BP: 108/78 (03 Mar 2018 06:30) (97/66 - 115/75)  BP(mean): 90 (03 Mar 2018 06:30) (77 - 93)  ABP: --  ABP(mean): --  RR: 7 (03 Mar 2018 06:30) (6 - 28)  SpO2: 98% (03 Mar 2018 06:30) (97% - 98%)        03-02 @ 07:01  -  03-03 @ 07:00  --------------------------------------------------------  IN: 4258.7 mL / OUT: 2225 mL / NET: 2033.7 mL      CAPILLARY BLOOD GLUCOSE      POCT Blood Glucose.: 175 mg/dL (03 Mar 2018 06:07)      Physical Exam:  General: cachectic appearing female, NAD  Neurology: A&Ox3, nonfocal,   Head:  Normocephalic, atraumatic  ENT:  Mucosa moist, no ulcerations  Neck:  Supple, no sinuses or palpable masses  Lymphatic:  No palpable cervical, supraclavicular, axillary or inguinal adenopathy  Respiratory: CTA B/L  CV: RRR, S1S2, no murmur  Abdominal: Soft, NT, ND no palpable mass  MSK: No edema, + peripheral pulses        LINES:    HOSPITAL MEDICATIONS:  Standing Meds:  dextrose 10%. 1000 milliLiter(s) IV Continuous <Continuous>  dextrose 5% + sodium chloride 0.9%. 1000 milliLiter(s) IV Continuous <Continuous>  dextrose 50% Injectable 12.5 Gram(s) IV Push once  dextrose 50% Injectable 25 Gram(s) IV Push once  dextrose 50% Injectable 25 Gram(s) IV Push once  enoxaparin Injectable 30 milliGRAM(s) SubCutaneous every 24 hours  HYDROmorphone Infusion 1 mG/Hr IV Continuous <Continuous>  influenza   Vaccine 0.5 milliLiter(s) IntraMuscular once  meropenem  IVPB      meropenem  IVPB 1000 milliGRAM(s) IV Intermittent every 8 hours  pantoprazole  Injectable 40 milliGRAM(s) IV Push daily  phenylephrine    Infusion 0.4 MICROgram(s)/kG/Min IV Continuous <Continuous>  polyethylene glycol 3350 17 Gram(s) Oral daily  senna 2 Tablet(s) Oral at bedtime      PRN Meds:  dextrose Gel 1 Dose(s) Oral once PRN  glucagon  Injectable 1 milliGRAM(s) IntraMuscular once PRN  HYDROmorphone  Injectable 1 milliGRAM(s) IV Push every 1 hour PRN      LABS:                        10.4   15.7  )-----------( 260      ( 02 Mar 2018 23:19 )             32.4     Hgb Trend: 10.4<--, 10.0<--, 10.4<--, 10.6<--, 10.9<--  03-02    138  |  98  |  16  ----------------------------<  153<H>  2.8<LL>   |  29  |  <0.30<L>    Ca    7.1<L>      02 Mar 2018 23:19  Phos  1.5     03-02  Mg     1.5     03-02    TPro  5.0<L>  /  Alb  2.4<L>  /  TBili  1.4<H>  /  DBili  x   /  AST  65<H>  /  ALT  95<H>  /  AlkPhos  1294<H>  03-02    Creatinine Trend: <0.30<--, <0.30<--, <0.30<--, 0.35<--, 0.37<--, 0.27<--  PT/INR - ( 02 Mar 2018 23:19 )   PT: 16.1 sec;   INR: 1.47 ratio         PTT - ( 02 Mar 2018 23:19 )  PTT:38.1 sec          MICROBIOLOGY:     Culture - Blood (collected 28 Feb 2018 15:38)  Source: .Blood Blood  Gram Stain (01 Mar 2018 05:58):    Growth in aerobic and anaerobic bottles:    Gram Negative Rods  Preliminary Report (02 Mar 2018 13:04):    Growth in aerobic and anaerobic bottles: Klebsiella pneumoniae    Culture - Blood (collected 28 Feb 2018 15:38)  Source: .Blood Blood  Gram Stain (01 Mar 2018 06:05):    Growth in aerobic bottle: Gram Negative Rods    Growth in anaerobic bottle: Gram Negative Rods  Preliminary Report (02 Mar 2018 11:55):    Growth in aerobic bottle: Klebsiella pneumoniae    "Due to technical problems, Proteus sp. will Not be reported as part of    the BCID panel until further notice"    ***Blood Panel PCR results on this specimen are available    approximately 3 hours after the Gram stain result.***    Gram stain, PCR, and/or culture results may not always    correspond due to difference in methodologies.    ************************************************************    This PCR assay was performed using [employer].    The following targets are tested for: Enterococcus,    vancomycin resistant enterococci, Listeria monocytogenes,    coagulase negative staphylococci, S. aureus,    methicillin resistant S. aureus, Streptococcus agalactiae    (Group B), S. pneumoniae, S. pyogenes (Group A),    Acinetobacter baumannii, Enterobacter cloacae, E. coli,    Klebsiella oxytoca, K. pneumoniae, Proteus sp.,    Serratia marcescens, Haemophilus influenzae,    Neisseria meningitidis, Pseudomonas aeruginosa, Candida    albicans, C. glabrata, C krusei, C parapsilosis,    C. tropicalis and the KPC resistance gene.    Growth in anaerobic bottle: Gram Negative Rods  Organism: Blood Culture PCR (01 Mar 2018 09:05)  Organism: Blood Culture PCR (01 Mar 2018 09:05)      RADIOLOGY:  [ ] Reviewed and interpreted by me    EKG: CHIEF COMPLAINT:Patient is a 47y old  Female who presents with a chief complaint of AMS (28 Feb 2018 14:58)      Interval Events:  No acute events overnight.      REVIEW OF SYSTEMS:  Constitutional: [X ] negative [ ] fevers [ ] chills [ ] weight loss [ ] weight gain  HEENT: [ X] negative [ ] dry eyes [ ] eye irritation [ ] postnasal drip [ ] nasal congestion  CV: [ X] negative  [ ] chest pain [ ] orthopnea [ ] palpitations [ ] murmur  Resp: [X ] negative [ ] cough [ ] shortness of breath [ ] dyspnea [ ] wheezing [ ] sputum [ ] hemoptysis  GI: [ X] negative [ ] nausea [ ] vomiting [ ] diarrhea [ ] constipation [ ] abd pain [ ] dysphagia   : [X ] negative [ ] dysuria [ ] nocturia [ ] hematuria [ ] increased urinary frequency  Musculoskeletal: [ X] negative [ ] back pain [ ] myalgias [ ] arthralgias [ ] fracture  Skin: [X ] negative [ ] rash [ ] itch  Neurological: [ ] negative [ ] headache [ ] dizziness [ ] syncope [ ] weakness [ ] numbness  Psychiatric: [ ] negative [ ] anxiety [ ] depression  Endocrine: [ ] negative [ ] diabetes [ ] thyroid problem  Hematologic/Lymphatic: [ ] negative [ ] anemia [ ] bleeding problem  Allergic/Immunologic: [ ] negative [ ] itchy eyes [ ] nasal discharge [ ] hives [ ] angioedema  [ X] All other systems negative  [ ] Unable to assess ROS because ________    OBJECTIVE:  ICU Vital Signs Last 24 Hrs  T(C): 37.2 (03 Mar 2018 04:00), Max: 37.3 (02 Mar 2018 19:00)  T(F): 99 (03 Mar 2018 04:00), Max: 99.1 (02 Mar 2018 19:00)  HR: 106 (03 Mar 2018 06:30) (81 - 117)  BP: 108/78 (03 Mar 2018 06:30) (97/66 - 115/75)  BP(mean): 90 (03 Mar 2018 06:30) (77 - 93)  ABP: --  ABP(mean): --  RR: 7 (03 Mar 2018 06:30) (6 - 28)  SpO2: 98% (03 Mar 2018 06:30) (97% - 98%)        03-02 @ 07:01  -  03-03 @ 07:00  --------------------------------------------------------  IN: 4258.7 mL / OUT: 2225 mL / NET: 2033.7 mL      CAPILLARY BLOOD GLUCOSE      POCT Blood Glucose.: 175 mg/dL (03 Mar 2018 06:07)      Physical Exam:  General: cachectic appearing female, NAD  Neurology: A&Ox3, nonfocal,   Head:  Normocephalic, atraumatic  ENT:  Mucosa moist, no ulcerations  Neck:  Supple, no sinuses or palpable masses  Lymphatic:  No palpable cervical, supraclavicular, axillary or inguinal adenopathy  Respiratory: CTA B/L  CV: RRR, S1S2, no murmur  Abdominal: Soft, NT, ND no palpable mass  MSK: No edema, + peripheral pulses        LINES:    HOSPITAL MEDICATIONS:  Standing Meds:  dextrose 10%. 1000 milliLiter(s) IV Continuous <Continuous>  dextrose 5% + sodium chloride 0.9%. 1000 milliLiter(s) IV Continuous <Continuous>  dextrose 50% Injectable 12.5 Gram(s) IV Push once  dextrose 50% Injectable 25 Gram(s) IV Push once  dextrose 50% Injectable 25 Gram(s) IV Push once  enoxaparin Injectable 30 milliGRAM(s) SubCutaneous every 24 hours  HYDROmorphone Infusion 1 mG/Hr IV Continuous <Continuous>  influenza   Vaccine 0.5 milliLiter(s) IntraMuscular once  meropenem  IVPB      meropenem  IVPB 1000 milliGRAM(s) IV Intermittent every 8 hours  pantoprazole  Injectable 40 milliGRAM(s) IV Push daily  phenylephrine    Infusion 0.4 MICROgram(s)/kG/Min IV Continuous <Continuous>  polyethylene glycol 3350 17 Gram(s) Oral daily  senna 2 Tablet(s) Oral at bedtime      PRN Meds:  dextrose Gel 1 Dose(s) Oral once PRN  glucagon  Injectable 1 milliGRAM(s) IntraMuscular once PRN  HYDROmorphone  Injectable 1 milliGRAM(s) IV Push every 1 hour PRN      LABS:                        10.4   15.7  )-----------( 260      ( 02 Mar 2018 23:19 )             32.4     Hgb Trend: 10.4<--, 10.0<--, 10.4<--, 10.6<--, 10.9<--  03-02    138  |  98  |  16  ----------------------------<  153<H>  2.8<LL>   |  29  |  <0.30<L>    Ca    7.1<L>      02 Mar 2018 23:19  Phos  1.5     03-02  Mg     1.5     03-02    TPro  5.0<L>  /  Alb  2.4<L>  /  TBili  1.4<H>  /  DBili  x   /  AST  65<H>  /  ALT  95<H>  /  AlkPhos  1294<H>  03-02    Creatinine Trend: <0.30<--, <0.30<--, <0.30<--, 0.35<--, 0.37<--, 0.27<--  PT/INR - ( 02 Mar 2018 23:19 )   PT: 16.1 sec;   INR: 1.47 ratio         PTT - ( 02 Mar 2018 23:19 )  PTT:38.1 sec          MICROBIOLOGY:     Culture - Blood (collected 28 Feb 2018 15:38)  Source: .Blood Blood  Gram Stain (01 Mar 2018 05:58):    Growth in aerobic and anaerobic bottles:    Gram Negative Rods  Preliminary Report (02 Mar 2018 13:04):    Growth in aerobic and anaerobic bottles: Klebsiella pneumoniae    Culture - Blood (collected 28 Feb 2018 15:38)  Source: .Blood Blood  Gram Stain (01 Mar 2018 06:05):    Growth in aerobic bottle: Gram Negative Rods    Growth in anaerobic bottle: Gram Negative Rods  Preliminary Report (02 Mar 2018 11:55):    Growth in aerobic bottle: Klebsiella pneumoniae    "Due to technical problems, Proteus sp. will Not be reported as part of    the BCID panel until further notice"    ***Blood Panel PCR results on this specimen are available    approximately 3 hours after the Gram stain result.***    Gram stain, PCR, and/or culture results may not always    correspond due to difference in methodologies.    ************************************************************    This PCR assay was performed using [employer].    The following targets are tested for: Enterococcus,    vancomycin resistant enterococci, Listeria monocytogenes,    coagulase negative staphylococci, S. aureus,    methicillin resistant S. aureus, Streptococcus agalactiae    (Group B), S. pneumoniae, S. pyogenes (Group A),    Acinetobacter baumannii, Enterobacter cloacae, E. coli,    Klebsiella oxytoca, K. pneumoniae, Proteus sp.,    Serratia marcescens, Haemophilus influenzae,    Neisseria meningitidis, Pseudomonas aeruginosa, Candida    albicans, C. glabrata, C krusei, C parapsilosis,    C. tropicalis and the KPC resistance gene.    Growth in anaerobic bottle: Gram Negative Rods  Organism: Blood Culture PCR (01 Mar 2018 09:05)  Organism: Blood Culture PCR (01 Mar 2018 09:05)      RADIOLOGY:  [ ] Reviewed and interpreted by me    EKG:

## 2018-03-03 NOTE — PROGRESS NOTE ADULT - ASSESSMENT
47F PMH metastatic ovarian CA (s/p TAHBSO, s/p colostomy, with holistic care approach, started chemo, currently not a candidate 2/2 nutrition status, on home dilaudid PCA pump with the following settings, 1 mg/h, 1 mg q 15' demand dose presents after being found in bed by  to be cold and unresponsive, intubated in the field by EMS, found to be hypoglycemic and bacteremic, s/p extubation continuing to require pressor support    # Neuro:   - s/p extubation on 3/1, doing well on NC  - CT head demonstrates repeat subdural hematoma- stable    # CVS:  - requiring phenylephrine- 4 mcg at this time  - f/u discussion with  today- per discussion yesterday, if patient is not HD improving, will begin process for transfer to PCU  - c/w with treatment of septic shock secondary to klebsiella bacteremia at this time    # Pulm:  - no acute pulmonary issues at this time  - c/w NC    # GI:  - pleasure feeds  - continues to be hypoglycemic likely 2/2 to poor PO intake- will continue with d10 for now    # Renal:  - no known renal dysfunction  - bun/cr at baseline      # ID:  - klebsiella bacteremia- c/w meropenem at this time, will narrow based on specificities  - source likely from gallbladder vs intestinal (hx of perf gallbladder with perc ubaldo in place)    # Endocrine:  - Patient with hypoglycemia, unclear source likely 2/2 to lack of nutrition  - c/w D10 at 75 cc/hr    # Heme:  - H/H stable.   - will continue to trend cbc    # Electrolytes:  - Follow and replete PRN as needed    # Skin:  - s/p I/O which was removed from RLE in MICU  - Right sided Mediport   - PIV in place  - sacral decub- care per nursing     # DVT p/px:  - lovenox sub q (30 mg as weight < 50 kg)    #GOC:  - patient DNR/ DNI at this time  - will continue with abx, fluids and pressor support  - PCU discussion pending today

## 2018-03-03 NOTE — PROGRESS NOTE ADULT - PROBLEM SELECTOR PLAN 1
Patient and HCP agreed to transfer to PICU.  Continue Abx for now, cap pressors.  Will discuss further de-escalation of care with pt and HCP over the weekend

## 2018-03-04 LAB
GLUCOSE BLDC GLUCOMTR-MCNC: 146 MG/DL — HIGH (ref 70–99)
GLUCOSE BLDC GLUCOMTR-MCNC: 16 MG/DL — CRITICAL LOW (ref 70–99)
GLUCOSE BLDC GLUCOMTR-MCNC: 18 MG/DL — CRITICAL LOW (ref 70–99)

## 2018-03-04 PROCEDURE — 94799 UNLISTED PULMONARY SVC/PX: CPT

## 2018-03-04 PROCEDURE — 99233 SBSQ HOSP IP/OBS HIGH 50: CPT | Mod: GC

## 2018-03-04 PROCEDURE — 87486 CHLMYD PNEUM DNA AMP PROBE: CPT

## 2018-03-04 PROCEDURE — 83735 ASSAY OF MAGNESIUM: CPT

## 2018-03-04 PROCEDURE — 87040 BLOOD CULTURE FOR BACTERIA: CPT

## 2018-03-04 PROCEDURE — 82565 ASSAY OF CREATININE: CPT

## 2018-03-04 PROCEDURE — 87150 DNA/RNA AMPLIFIED PROBE: CPT

## 2018-03-04 PROCEDURE — 85014 HEMATOCRIT: CPT

## 2018-03-04 PROCEDURE — 84295 ASSAY OF SERUM SODIUM: CPT

## 2018-03-04 PROCEDURE — 80048 BASIC METABOLIC PNL TOTAL CA: CPT

## 2018-03-04 PROCEDURE — 96374 THER/PROPH/DIAG INJ IV PUSH: CPT

## 2018-03-04 PROCEDURE — 71045 X-RAY EXAM CHEST 1 VIEW: CPT

## 2018-03-04 PROCEDURE — 80307 DRUG TEST PRSMV CHEM ANLYZR: CPT

## 2018-03-04 PROCEDURE — 87581 M.PNEUMON DNA AMP PROBE: CPT

## 2018-03-04 PROCEDURE — 82330 ASSAY OF CALCIUM: CPT

## 2018-03-04 PROCEDURE — 82435 ASSAY OF BLOOD CHLORIDE: CPT

## 2018-03-04 PROCEDURE — 83605 ASSAY OF LACTIC ACID: CPT

## 2018-03-04 PROCEDURE — 99291 CRITICAL CARE FIRST HOUR: CPT | Mod: 25

## 2018-03-04 PROCEDURE — 87633 RESP VIRUS 12-25 TARGETS: CPT

## 2018-03-04 PROCEDURE — 94002 VENT MGMT INPAT INIT DAY: CPT

## 2018-03-04 PROCEDURE — 84484 ASSAY OF TROPONIN QUANT: CPT

## 2018-03-04 PROCEDURE — 82553 CREATINE MB FRACTION: CPT

## 2018-03-04 PROCEDURE — 84681 ASSAY OF C-PEPTIDE: CPT

## 2018-03-04 PROCEDURE — 84132 ASSAY OF SERUM POTASSIUM: CPT

## 2018-03-04 PROCEDURE — 82962 GLUCOSE BLOOD TEST: CPT

## 2018-03-04 PROCEDURE — 87186 SC STD MICRODIL/AGAR DIL: CPT

## 2018-03-04 PROCEDURE — 81001 URINALYSIS AUTO W/SCOPE: CPT

## 2018-03-04 PROCEDURE — 85610 PROTHROMBIN TIME: CPT

## 2018-03-04 PROCEDURE — 84100 ASSAY OF PHOSPHORUS: CPT

## 2018-03-04 PROCEDURE — 82947 ASSAY GLUCOSE BLOOD QUANT: CPT

## 2018-03-04 PROCEDURE — 85027 COMPLETE CBC AUTOMATED: CPT

## 2018-03-04 PROCEDURE — 94003 VENT MGMT INPAT SUBQ DAY: CPT

## 2018-03-04 PROCEDURE — 85730 THROMBOPLASTIN TIME PARTIAL: CPT

## 2018-03-04 PROCEDURE — 87798 DETECT AGENT NOS DNA AMP: CPT

## 2018-03-04 PROCEDURE — 96375 TX/PRO/DX INJ NEW DRUG ADDON: CPT

## 2018-03-04 PROCEDURE — 82803 BLOOD GASES ANY COMBINATION: CPT

## 2018-03-04 PROCEDURE — 70450 CT HEAD/BRAIN W/O DYE: CPT

## 2018-03-04 PROCEDURE — 80053 COMPREHEN METABOLIC PANEL: CPT

## 2018-03-04 RX ORDER — SODIUM CHLORIDE 9 MG/ML
1000 INJECTION, SOLUTION INTRAVENOUS
Qty: 0 | Refills: 0 | Status: DISCONTINUED | OUTPATIENT
Start: 2018-03-04 | End: 2018-03-04

## 2018-03-04 RX ORDER — DEXTROSE 50 % IN WATER 50 %
25 SYRINGE (ML) INTRAVENOUS ONCE
Qty: 0 | Refills: 0 | Status: COMPLETED | OUTPATIENT
Start: 2018-03-04 | End: 2018-03-04

## 2018-03-04 RX ADMIN — SODIUM CHLORIDE 50 MILLILITER(S): 9 INJECTION, SOLUTION INTRAVENOUS at 00:38

## 2018-03-04 RX ADMIN — Medication 0.5 MILLIGRAM(S): at 00:03

## 2018-03-04 RX ADMIN — MEROPENEM 100 MILLIGRAM(S): 1 INJECTION INTRAVENOUS at 05:15

## 2018-03-04 RX ADMIN — PHENYLEPHRINE HYDROCHLORIDE 54.5 MICROGRAM(S)/KG/MIN: 10 INJECTION INTRAVENOUS at 07:15

## 2018-03-04 RX ADMIN — HYDROMORPHONE HYDROCHLORIDE 1 MG/HR: 2 INJECTION INTRAMUSCULAR; INTRAVENOUS; SUBCUTANEOUS at 07:15

## 2018-03-04 RX ADMIN — Medication 25 GRAM(S): at 00:24

## 2018-03-04 RX ADMIN — SODIUM CHLORIDE 50 MILLILITER(S): 9 INJECTION, SOLUTION INTRAVENOUS at 07:15

## 2018-03-04 NOTE — PROVIDER CONTACT NOTE (OTHER) - ASSESSMENT
FS 18, baseline: lethargic, unable to tolerate PO, hypoglycemia protocol initiated
No response to external stimuli  No spontaneous respirations  No apical heart rate  Negative papillary response to light
no

## 2018-03-04 NOTE — PROVIDER CONTACT NOTE (OTHER) - ACTION/TREATMENT ORDERED:
NP made aware, orders to recheck FS s/p D5 amp and will change IV fluid, pt safety maintained, will continue to monitor

## 2018-03-04 NOTE — CHART NOTE - NSCHARTNOTEFT_GEN_A_CORE
Informed by RN that pt's fingerstick is 16/18, hypoglycemic protocol followed, f/u FS is 146.   As pt RN pt was mildly agitated after D50 given.   On exam pt unresponsive to sternal rub, pupils slow to react and smaller. She had spontaneous respirations and hemodynamically stable.   She is on a dilaudid and ativan gtt.   IVF changed from NS to D5 NS at 50 ml/ hr for now   - primary team recs appreciated for further management     Lisa Crocker NP [id number]

## 2018-03-04 NOTE — DISCHARGE NOTE FOR THE EXPIRED PATIENT - HOSPITAL COURSE
48 yo F with metastatic ovarian CA (s/p TAHBSO, s/p colostomy, with holistic care approach, started chemo. She was recently admitted to MICU under the impression of malignant SBO, sepsis, GB perforation (s/p biliary drain), hypoglycemia, and worsening metastatic disease. She is now presenting with AMS in the setting of hypoglycemia and s/p intubation. Patient is now extubated. As per primary team she continues to have  recurrent hypoglycemia and needing high doses of phenylephrine. Palliative Care is following up for pain management and GOC. After lengthy GOC discussion with Palliative team, her HCP agreed to transfer to PCU for symptom management and de-escalation of pressors, IVF, antibiotics.  Pt's became more lethargic and confused upon arrival to PCU. She passed away comfortably 3/4/19.

## 2018-04-24 ENCOUNTER — APPOINTMENT (OUTPATIENT)
Dept: SURGICAL ONCOLOGY | Facility: CLINIC | Age: 48
End: 2018-04-24

## 2018-07-17 NOTE — PROGRESS NOTE ADULT - SUBJECTIVE AND OBJECTIVE BOX
ACS GENERAL SURGERY DAILY PROGRESS NOTE:       SUBJECTIVE/ROS:   Pt stable overnight. Pt has no ostomy function. NGT in place. Abdominal pain is controlled with pca.  No nausea or vomiting. Stable upper abdominal pain, left > right. No radiation. No fevers.      T(C): 36.9, Max: 36.9 (06-14 @ 09:06)  HR: 64 (64 - 82)  BP: 114/73 (99/68 - 114/73)  RR: 18 (16 - 18)  SpO2: 95% (94% - 96%)  Wt(kg): --      I & Os for current day (as of 06-15 @ 07:27)  =============================================  IN: 2550 ml / OUT: 1910 ml / NET: 640 ml      cefepime  IVPB 2000milliGRAM(s) IV Intermittent every 12 hours  acetaminophen  IVPB. 1000milliGRAM(s) IV Intermittent once  naloxone Injectable 0.1milliGRAM(s) IV Push every 3 minutes PRN  ondansetron Injectable 4milliGRAM(s) IV Push every 8 hours PRN  ondansetron Injectable 4milliGRAM(s) IV Push every 8 hours  fluconAZOLE IVPB  IV Intermittent   fluconAZOLE IVPB 200milliGRAM(s) IV Intermittent every 24 hours  tetracaine/benzocaine/butamben Spray 1Spray(s) Topical three times a day  dextrose 5% + sodium chloride 0.9%. 1000milliLiter(s) IV Continuous <Continuous>  enoxaparin Injectable 40milliGRAM(s) SubCutaneous daily  metroNIDAZOLE  IVPB  IV Intermittent   metroNIDAZOLE  IVPB 500milliGRAM(s) IV Intermittent every 8 hours  HYDROmorphone PCA (1 mG/mL) 30milliLiter(s) PCA Continuous PCA Continuous  HYDROmorphone PCA (1 mG/mL) Rescue Clinician Bolus 1milliGRAM(s) IV Push every 1 hour PRN  octreotide  Injectable 300MICROGram(s) SubCutaneous three times a day              PHYSICAL EXAM:  Constitutional: well developed, well nourished, NAD  Eyes: anicteric  ENMT: normal facies, symmetric  Neck: supple  Respiratory: CTA bilaterally  Cardiovascular: RRR  Gastrointestinal: abdomen soft, tender in upper abdomen, mild distention. No obvious masses. No peritonitis   ostomy- viable, no function. HARSHIL drain with purulent output. Clogged on my visit, flushed twice with 10 ml saline with return of large amount of pus.  Extremities: FROM, warm  Neurological: intact, non-focal  Skin: no gross lesions  Lymph Nodes: no gross adenopathy  Musculoskeletal: equal strength bilateral upper and lower extremities  Psychiatric: oriented x 3; appropriate  Rectal: not examined Note Text (......Xxx Chief Complaint.): This diagnosis correlates with the ACS GENERAL SURGERY DAILY PROGRESS NOTE:       SUBJECTIVE/ROS:   Pt stable overnight. Pt has no ostomy function. NGT in place. Abdominal pain is controlled with pca.  No nausea or vomiting. Stable upper abdominal pain, left > right. No radiation. No fevers.      T(C): 36.9, Max: 36.9 (06-14 @ 09:06)  HR: 64 (64 - 82)  BP: 114/73 (99/68 - 114/73)  RR: 18 (16 - 18)  SpO2: 95% (94% - 96%)  Wt(kg): --      I & Os for current day (as of 06-15 @ 07:27)  =============================================  IN: 2550 ml / OUT: 1910 ml / NET: 640 ml      cefepime  IVPB 2000milliGRAM(s) IV Intermittent every 12 hours  acetaminophen  IVPB. 1000milliGRAM(s) IV Intermittent once  naloxone Injectable 0.1milliGRAM(s) IV Push every 3 minutes PRN  ondansetron Injectable 4milliGRAM(s) IV Push every 8 hours PRN  ondansetron Injectable 4milliGRAM(s) IV Push every 8 hours  fluconAZOLE IVPB  IV Intermittent   fluconAZOLE IVPB 200milliGRAM(s) IV Intermittent every 24 hours  tetracaine/benzocaine/butamben Spray 1Spray(s) Topical three times a day  dextrose 5% + sodium chloride 0.9%. 1000milliLiter(s) IV Continuous <Continuous>  enoxaparin Injectable 40milliGRAM(s) SubCutaneous daily  metroNIDAZOLE  IVPB  IV Intermittent   metroNIDAZOLE  IVPB 500milliGRAM(s) IV Intermittent every 8 hours  HYDROmorphone PCA (1 mG/mL) 30milliLiter(s) PCA Continuous PCA Continuous  HYDROmorphone PCA (1 mG/mL) Rescue Clinician Bolus 1milliGRAM(s) IV Push every 1 hour PRN  octreotide  Injectable 300MICROGram(s) SubCutaneous three times a day              PHYSICAL EXAM:  Constitutional: well developed, well nourished, NAD  Eyes: anicteric  ENMT: normal facies, symmetric  Neck: supple  Respiratory: CTA bilaterally  Cardiovascular: RRR  Gastrointestinal: abdomen soft, tender in upper abdomen, mild distention. No obvious masses. No peritonitis   ostomy- viable, no function. HARSHIL drain with purulent output  Extremities: FROM, warm  Neurological: intact, non-focal  Skin: no gross lesions  Lymph Nodes: no gross adenopathy  Musculoskeletal: equal strength bilateral upper and lower extremities  Psychiatric: oriented x 3; appropriate  Rectal: not examined

## 2018-11-06 NOTE — H&P ADULT - NEUROLOGICAL DETAILS
alert and oriented x 3 patient with psychotic symptoms interfering with his ability to care for himself

## 2018-11-14 NOTE — H&P ADULT - CLICK TO LAUNCH ORM
. Burow's Advancement Flap Text: The defect edges were debeveled with a #15 scalpel blade.  Given the location of the defect and the proximity to free margins a Burow's advancement flap was deemed most appropriate.  Using a sterile surgical marker, the appropriate advancement flap was drawn incorporating the defect and placing the expected incisions within the relaxed skin tension lines where possible.    The area thus outlined was incised deep to adipose tissue with a #15 scalpel blade.  The skin margins were undermined to an appropriate distance in all directions utilizing iris scissors.

## 2019-06-06 NOTE — DISCHARGE NOTE ADULT - NSFTFSERV1RD_GEN_ALL_CORE
Is This A New Presentation, Or A Follow-Up?: Skin Lesion
observation and assessment/wound care and assessment/teaching and training

## 2019-07-31 NOTE — PATIENT PROFILE ADULT. - CENTRAL VENOUS CATHETER
Detail Level: Detailed Quality 110: Preventive Care And Screening: Influenza Immunization: Influenza Immunization not Administered for Documented Reasons. yes

## 2019-08-14 NOTE — AIRWAY REMOVAL NOTE  ADULT & PEDS - COUGH
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 .  Need for regular SSE/FSE reviewed d/c lipstick until improved.  Local gentle care, HC1%bid until improved, taper down when improved.  Consider additional evaluation if not improving or if worsening

## 2019-09-01 NOTE — ED PROVIDER NOTE - NS ED ATTENDING STATEMENT MOD
Surgery Progress Note     Subjective/24hour Events:   Patient seen and examined.   No acute events overnight.   Pain controlled.     Vital Signs:  Vital Signs Last 24 Hrs  T(C): 36.4 (01 Sep 2019 04:00), Max: 37.3 (31 Aug 2019 12:00)  T(F): 97.5 (01 Sep 2019 04:00), Max: 99.1 (31 Aug 2019 12:00)  HR: 82 (01 Sep 2019 04:00) (60 - 88)  BP: --  BP(mean): --  RR: 23 (01 Sep 2019 04:00) (11 - 48)  SpO2: 100% (01 Sep 2019 04:00) (89% - 100%)    CAPILLARY BLOOD GLUCOSE          I&O's Detail    30 Aug 2019 07:01  -  31 Aug 2019 07:00  --------------------------------------------------------  IN:    Albumin 5%  - 250 mL: 250 mL    dexmedetomidine Infusion: 456.2 mL    insulin regular Infusion: 12 mL    IV PiggyBack: 550 mL    lactated ringers.: 150 mL    norepinephrine Infusion: 26 mL    sodium chloride 0.9%.: 210 mL    vasopressin Infusion: 104.6 mL  Total IN: 1758.8 mL    OUT:    Chest Tube: 110 mL    Chest Tube: 190 mL    Chest Tube: 120 mL    Indwelling Catheter - Urethral: 910 mL    Other: 210 mL  Total OUT: 1540 mL    Total NET: 218.8 mL      31 Aug 2019 07:01  -  01 Sep 2019 04:56  --------------------------------------------------------  IN:    dexmedetomidine Infusion: 204.7 mL    lactated ringers.: 375 mL    sodium chloride 0.9%.: 50 mL    sodium chloride 0.9%.: 700 mL    vasopressin Infusion: 67 mL  Total IN: 1396.7 mL    OUT:    Chest Tube: 170 mL    Chest Tube: 120 mL    Chest Tube: 60 mL    Indwelling Catheter - Urethral: 955 mL    Other: 190 mL  Total OUT: 1495 mL    Total NET: -98.3 mL          MEDICATIONS  (STANDING):  chlorhexidine 2% Cloths 1 Application(s) Topical daily  dexmedetomidine Infusion 0.5 MICROgram(s)/kG/Hr (8.45 mL/Hr) IV Continuous <Continuous>  dextrose 50% Injectable 50 milliLiter(s) IV Push every 15 minutes  dextrose 50% Injectable 25 milliLiter(s) IV Push every 15 minutes  docusate sodium 100 milliGRAM(s) Oral three times a day  famotidine    Tablet 20 milliGRAM(s) Oral daily  HYDROmorphone  Injectable 0.5 milliGRAM(s) IV Push once  insulin lispro (HumaLOG) corrective regimen sliding scale   SubCutaneous Before meals and at bedtime  niCARdipine Infusion 3 mG/Hr (15 mL/Hr) IV Continuous <Continuous>  norepinephrine Infusion 0.05 MICROgram(s)/kG/Min (6.337 mL/Hr) IV Continuous <Continuous>  sevelamer carbonate 800 milliGRAM(s) Oral three times a day with meals  sodium chloride 0.9%. 1000 milliLiter(s) (50 mL/Hr) IV Continuous <Continuous>  sodium chloride 0.9%. 1000 milliLiter(s) (10 mL/Hr) IV Continuous <Continuous>  vasopressin Infusion 0.033 Unit(s)/Min (2 mL/Hr) IV Continuous <Continuous>    MEDICATIONS  (PRN):      Physical Exam:  Gen: NAD.  Lungs: Non labored breathing.   Ab: Soft, nontender, nondistended.   Ext: Moves all 4 spontaneously. SILT. +EHL/FHL/TA/GS       Labs:    09-01    142  |  107  |  42<H>  ----------------------------<  135<H>  4.0   |  22  |  2.43<H>    Ca    8.5      01 Sep 2019 02:56  Phos  4.4     09-01  Mg     2.4     09-01    TPro  5.5<L>  /  Alb  3.4  /  TBili  0.6  /  DBili  x   /  AST  85<H>  /  ALT  90<H>  /  AlkPhos  51  09-01    LIVER FUNCTIONS - ( 01 Sep 2019 02:56 )  Alb: 3.4 g/dL / Pro: 5.5 g/dL / ALK PHOS: 51 U/L / ALT: 90 U/L / AST: 85 U/L / GGT: x                                 8.4    10.7  )-----------( 82       ( 01 Sep 2019 02:56 )             24.8         Imaging: Attending Only

## 2020-02-27 NOTE — ED ADULT NURSE NOTE - CHIEF COMPLAINT QUOTE
CERTIFICATE OF WORK    2/27/2020      Re: Sander Steen        [id number] [facility] [address]      This is to certify that Sander Steen has been under my care from2/27/2020 and is excused from work till 2/29/20.           SIGNATURE:___________________________________________,   2/27/2020  Jadiel Lopez MD        [facility]  [address] [facility] [address]  [phone]   abd pain

## 2020-05-14 NOTE — CONSULT NOTE ADULT - PROBLEM SELECTOR RECOMMENDATION 2
Neuro - Choledocholithiasis noted on last IR tube check of perc drain / mild biliary dilatation on CT ; Also w/ worsening peritoneal carcinomatosis  - Plan for ERCP tentatively for Thursday 8/3 - Choledocholithiasis noted on last IR tube check of perc drain / mild biliary dilatation on CT ; Also w/ worsening peritoneal carcinomatosis  - Plan for ERCP tentatively for tomorrow 8/2 @ 1:15 PM - NPO p [place]  - AM Labs

## 2020-07-14 NOTE — PROGRESS NOTE ADULT - PROBLEM SELECTOR PLAN 3
Headaches are unchanged.  Medication changes per orders.     Restat Depakote as did not give sufficient trial        Patient has evidence of bowel obstruction on CT.  As DW ATP, it is possible the patient may have a mechanical obstruction due to fluid collection. It is hoped that bowel movement will re-start after draining the fluid. If there is a persistent obstruction after fluid is drained will then need to re-eval treatment options with GI/Sx.   Continue Octreotide  s/p NGT Patient has evidence of bowel obstruction on CT.  As DW Dr More, it is possible the patient may have a mechanical obstruction due to fluid collection. It is hoped that bowel movement will re-start after draining the fluid. If there is a persistent obstruction after fluid is drained will then need to re-eval treatment options with GI/Sx.   Continue Octreotide  s/p NGT

## 2020-09-14 NOTE — ED ADULT TRIAGE NOTE - NS ED NOTE AC HIGH RISK COUNTRIES

## 2020-12-18 NOTE — PROGRESS NOTE ADULT - PROBLEM SELECTOR PROBLEM 1
Intractable abdominal pain
[facility] [address] [place]  [address]  Phone: [phone]    December 21, 2020      Emily Zhu  [address]  [facility] [address]              Dear Clint,     It was a pleasure seeing you at your recent appointment. I have summarized your lab results below.  Your glucose = 192 and your Hemoglobin A1C = 7.6%. Diabetes fairly well controlled but important to keep up healthy diet & exercise.   Creatinine a bit elevated and your potassium was up also. This could indicate dehydration. Recommend you drink at least 80 oz of water daily.  Please let us know if you have questions or concerns.     Sincerely,     CARROL Pretty CNP    Results for orders placed or performed in visit on 12/18/20   Hemoglobin A1C ([facility])     Status: Abnormal   Result Value Ref Range    Hemoglobin A1C 7.6 (H) 4.8 - 5.6 %    Narrative    Performed at:  01 - [facility]  [employer] [place], [place]  [id number]  : Arsh Caceres MD, Phone:  [phone]   Basic Metabolic Panel (8) ([facility])     Status: Abnormal   Result Value Ref Range    Glucose 192 (H) 65 - 99 mg/dL    Urea Nitrogen 22 8 - 27 mg/dL    Creatinine 1.77 (H) 0.76 - 1.27 mg/dL    eGFR If NonAfricn Am 40 (L) >59 mL/min/1.73    eGFR If Africn Am 47 (L) >59 mL/min/1.73    BUN/Creatinine Ratio 12 10 - 24    Sodium 134 134 - 144 mmol/L    Potassium 5.3 (H) 3.5 - 5.2 mmol/L    Chloride 96 96 - 106 mmol/L    Total CO2 24 20 - 29 mmol/L    Calcium 9.7 8.6 - 10.2 mg/dL    Narrative    Performed at:  01 - [facility]  [employer] [place], [place]  [id number]  : Arsh Caceres MD, Phone:  [phone]      
Fever, unspecified fever cause
Intractable abdominal pain
Fever, unspecified fever cause

## 2021-01-20 NOTE — H&P ADULT - MINUTES
Below are the statements from the [address] [place] [address] of Maternal Fetal Medicine regarding COVID-19 vaccination and pregnancy  ACOG:  [employer] [facility]:  [email or web address])  pdf 70

## 2021-03-16 NOTE — ED PROVIDER NOTE - CPE EDP CARDIAC NORM
See mychart messages, pt wants to confirm Neuro referral, see referral for ENT and neuro??    Routed to AB, see referral, are there 2 referrals for pt? Please confirm, route to INFORM pt via [employer] [facility] Chavez Guillen PA-C   [address]   [facility] [address]   Phone: [phone]   Fax: [phone]    Diagnoses: Dizziness   Tinnitus, bilateral   Order: Otolaryngology Referral    [facility] OF NEUROLOG[facility]   [address] [facility] [address]   Phone: [phone]   Fax: [phone]      Comment: Your provider has referred you to: N: [facility] [phone]   [email or web address]   [facility] [phone]   [email or web address]   N: [facility] [phone]   [email or web address]      Please be aware that coverage of these services is subject to the terms and limitations of your health insurance plan.  Call member services at your health plan with any benefit or coverage questions.        Please bring the following with you to your appointment:      (1) Any X-Rays, CTs or MRIs which have been performed.  Contact the facility where they were done to arrange for  prior to your scheduled appointment.    (2) List of current medications   (3) This referral request    (4) Any documents/labs given to you for this referral     Elizabet Melendez RN, BSN  Message handled by CLINIC NURSE.     normal...

## 2021-04-07 NOTE — ED PROVIDER NOTE - NSCAREINITIATED _GEN_ER
Problem: Safety  Goal: Will remain free from injury  Outcome: PROGRESSING AS EXPECTED  Note: Pt call light and belongings with in reach, bed in locked and low position, treaded socks on, bed rails up x2, bed alarm in place       Problem: Pain Management  Goal: Pain level will decrease to patient's comfort goal  Outcome: PROGRESSING AS EXPECTED  Note: Patient has no c/o pain throughout shift      Alexander Parikh(Resident)

## 2021-05-18 NOTE — PATIENT PROFILE ADULT. - PRESSURE ULCER(S)
[facility]  [address] [place] [address]  Phone: [phone]  Fax: [phone]             May 18, 2021    Patient: Efren Canales   YOB: 1994   Date of Visit: 5/18/2021       To Whom It May Concern:    Jung Rashid was seen and treated in our emergency department on 5/18/2021. He may return to work on 5/21/2021.       Sincerely,             Signature:__________________________________ yes

## 2021-06-04 NOTE — ED PROVIDER NOTE - DISCUSSED CLINICAL AND RADIOLOGICAL FINDINGS WITH, MDM
UROLOGY CLINIC VISIT PATIENT INSTRUCTIONS    Your symptoms are most likely due to inflammation in the prostate / general pelvic floor muscle inflammation and soreness from your recent increase in cycling.    Recommend that you avoid cycling until your symptoms improve.    Consider sitting on a donut pillow or taking frequent breaks to stand during the day.    Use medications like ibuprofen or naproxen to help reduce inflammation and for pain. You can take 400-600 mg every 6-8 hours as needed.    Try soaking in a tub of warm water for 10-15 minutes a few nights a week.    Limit bladder irritants like caffeine, alcohol, spicy foods, etc until your symptoms have improved.    Follow up with Dr. Werner Cowart next week for infertility evaluation. Bring the copy of your semen analysis with you to this appointment. Get labs done prior to be discussed at the time of your appointment.     If you have any issues, questions or concerns in the meantime, do not hesitate to contact us at [phone] or via [employer].     It was a pleasure meeting with you today.  Thank you for allowing me and my team the privilege of caring for you today.  YOU are the reason we are here, and I truly hope we provided you with the excellent service you deserve.  Please let us know if there is anything else we can do for you so that we can be sure you are leaving completely satisfied with your care experience.         family/patient

## 2022-01-15 NOTE — PROGRESS NOTE ADULT - PROBLEM SELECTOR PLAN 4
Pt presents to ED c/o dry cough starting this am. Pt also c/o chest pain due to constant cough. Denies fever, Denies known covid exposure. Pt received x 2 covid vaccine, x1 booster.    Jessica is already following up.

## 2022-07-22 NOTE — PATIENT PROFILE ADULT. - NS TRANSFER RESPONSE BELONGING
Patient was away in IR procedure when I was present for rounds.  Plan for IR drainage, continue antibiotics, multimodal pain control.  Team to re-evaluate following procedure, possibly initiate sips. yes

## 2022-09-30 NOTE — H&P ADULT - HISTORY OF PRESENT ILLNESS
[address]  Department of Emergency Medicine   ED  Encounter Note  Admit Date/RoomTime: 2022  9:54 PM  ED Room:     NAME: Michael Bagley  : 1986  MRN: [id number]     Chief Complaint:  Flank Pain (Left side. Hx of kidney stone)    History of Present Illness       Michael Bagley is a 29 y.o. old female who presents to the emergency department by private vehicle with her step mother for sudden onset of sharp left flank pain with radiation to left lower abdomen which began 6 pm prior to arrival.   There has been similar episodes in the past with kidney stones. Since onset the symptoms have been persistent and gradually worsening. The pain is associated with nausea. The pain is aggravated by nothing in particular and relieved by nothing. There has been NO chest pain, shortness of breath, fever, chills, sweating, anorexia, weight loss, diarrhea, constipation, dark/black stools, blood in stool, blood in emesis, cloudy urine, hematuria, urinary urgency, urinary incontinence, vaginal discharge, vaginal itching, or vaginal bleeding. Last Menstrual  Cycle or OB history not available or N/A.  GYN history non-contributory or N/A.      ROS   Pertinent positives and negatives are stated within HPI, all other systems reviewed and are negative. Past Medical History:  has a past medical history of Kidney stone, Migraine, and Scoliosis. Surgical History has a past surgical history that includes Cholecystectomy; Cystocopy (Right, 2018); Cystocopy (2018); and Lithotripsy (Left, 2019). Social History:  reports that she has never smoked. She has never used smokeless tobacco. She reports current alcohol use. She reports that she does not use drugs. Family History: family history includes High Blood Pressure in her father and mother. Allergies: Patient has no known allergies.     Physical Exam   Oxygen Saturation Interpretation: Normal on room air analysis. ED Triage Vitals [09/29/22 2026]   BP Temp Temp Source Heart Rate Resp SpO2 Height Weight   (!) 132/95 97.3 °F (36.3 °C) Temporal (!) 102 16 100 % 5' 2\" (1.575 m) 180 lb (81.6 kg)        Physical Exam  General Appearance/Constitutional:  Alert, development consistent with age. HEENT:  NC/NT. PERRLA. Airway patent. Neck:  Supple. No lymphadenopathy. Respiratory:  No retractions. Lungs Clear to auscultation and breath sounds equal.  CV:  Regular rate and rhythm. GI:  normal appearing, non-distended with no visible hernias. Bowel sounds: normal bowel sounds. Tenderness: There is moderate tenderness present - located in the left flank., There is no rebound tenderness. , There is no guarding. , There is no distension. , There is no pulsatile mass. .        Liver: non-tender and non-palpable. Spleen:  non-tender and non-palpable. Back: CVA Tenderness: moderate tenderness present on left. : /Pelvic examination deferred / declined. Integument:  Normal turgor. Warm, dry, without visible rash, unless noted elsewhere. Lymphatics: No edema, cap.refill <3sec. Neurological:  Orientation age-appropriate. Motor functions intact.     Lab / Imaging Results   (All laboratory and radiology results have been personally reviewed by myself)  Labs:  Results for orders placed or performed during the hospital encounter of 09/29/22   Urinalysis   Result Value Ref Range    Color, UA Yellow Straw/Yellow    Clarity, UA Clear Clear    Glucose, Ur Negative Negative mg/dL    Bilirubin Urine Negative Negative    Ketones, Urine Negative Negative mg/dL    Specific Gravity, UA 1.015 1.005 - 1.030    Blood, Urine SMALL (A) Negative    pH, UA 6.0 5.0 - 9.0    Protein, UA Negative Negative mg/dL    Urobilinogen, Urine 0.2 <2.0 E.U./dL    Nitrite, Urine Negative Negative    Leukocyte Esterase, Urine SMALL (A) Negative   CBC with Auto Differential   Result Value Ref Range WBC 12.3 (H) 4.5 - 11.5 E9/L    RBC 4.74 3.50 - 5.50 E12/L    Hemoglobin 13.8 11.5 - 15.5 g/dL    Hematocrit 39.6 34.0 - 48.0 %    MCV 83.5 80.0 - 99.9 fL    MCH 29.1 26.0 - 35.0 pg    MCHC 34.8 (H) 32.0 - 34.5 %    RDW 12.4 11.5 - 15.0 fL    Platelets 550 229 - 262 E9/L    MPV 11.4 7.0 - 12.0 fL    Neutrophils % 72.2 43.0 - 80.0 %    Immature Granulocytes % 0.5 0.0 - 5.0 %    Lymphocytes % 17.1 (L) 20.0 - 42.0 %    Monocytes % 6.7 2.0 - 12.0 %    Eosinophils % 2.8 0.0 - 6.0 %    Basophils % 0.7 0.0 - 2.0 %    Neutrophils Absolute 8.88 (H) 1.80 - 7.30 E9/L    Immature Granulocytes # 0.06 E9/L    Lymphocytes Absolute 2.10 1.50 - 4.00 E9/L    Monocytes Absolute 0.82 0.10 - 0.95 E9/L    Eosinophils Absolute 0.35 0.05 - 0.50 E9/L    Basophils Absolute 0.09 0.00 - 0.20 E9/L   Comprehensive Metabolic Panel   Result Value Ref Range    Sodium 137 132 - 146 mmol/L    Potassium 3.7 3.5 - 5.0 mmol/L    Chloride 102 98 - 107 mmol/L    CO2 26 22 - 29 mmol/L    Anion Gap 9 7 - 16 mmol/L    Glucose 84 74 - 99 mg/dL    BUN 11 6 - 20 mg/dL    Creatinine 0.8 0.5 - 1.0 mg/dL    GFR Non-African American >60 >=60 mL/min/1.73    GFR African American >60     Calcium 9.1 8.6 - 10.2 mg/dL    Total Protein 7.1 6.4 - 8.3 g/dL    Albumin 4.1 3.5 - 5.2 g/dL    Total Bilirubin 0.4 0.0 - 1.2 mg/dL    Alkaline Phosphatase 64 35 - 104 U/L    ALT 16 0 - 32 U/L    AST 24 0 - 31 U/L   Lactic Acid   Result Value Ref Range    Lactic Acid 1.6 0.5 - 2.2 mmol/L   Pregnancy, Urine   Result Value Ref Range    HCG(Urine) Pregnancy Test NEGATIVE NEGATIVE   Microscopic Urinalysis   Result Value Ref Range    WBC, UA 5-10 (A) 0 - 5 /HPF    RBC, UA 1-3 0 - 2 /HPF    Epithelial Cells, UA FEW /HPF    Bacteria, UA NONE SEEN None Seen /HPF     Imaging: All Radiology results interpreted by Radiologist unless otherwise noted. CT ABDOMEN PELVIS WO CONTRAST Additional Contrast? None   Final Result   1. No hydronephrosis or hydroureter.   A 2 mm stone is present within 47F PMH metastatic ovarian CA (s/p TAHBSO, s/p colostomy, with holistic care approach, started chemo, currently not a candidate 2/2 nutrition status, on home dilaudid PCA pump with the following settings, 1 mg/h, 1 mg q 15' demand dose). She was recently admitted to MICU under the impression of malignant SBO, sepsis, GB perforation (s/p biliary drain), hypoglycemia, and worsening metastatic disease, and was successfully treated and subsequently discharged home. Per her , he last saw her at her normal at 3am. When he awoke in the AM, the patient was reportedly cold and not moving. He called EMS and the patient was intubated for hypoxia on scene and transported to [facility].    In ED: T: 97.4  BP: 108/83  HR: 100  RR:16  SpO2: 100% on 100%FiO2. Patient was provided 1L NS and Palliative Care was consulted. Patient is currently DNR with trial of intubation and oral feeding per the patient's MOLST form which was filled out with Dr. Jones. Patient transferred to MICU for clinical monitoring and potential extubation. the   urinary bladder suggesting recent passage. 2. 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 3. Normal appendix right lower quadrant. RECOMMENDATIONS:   Careful clinical correlation and follow up recommended. ED Course / Medical Decision Making     Medications   0.9 % sodium chloride bolus (0 mLs IntraVENous Stopped 9/30/22 0004)   ketorolac (TORADOL) injection 30 mg (30 mg IntraMUSCular Given 9/29/22 2220)   ondansetron (ZOFRAN) injection 4 mg (4 mg IntraVENous Given 9/29/22 2219)   cefdinir (OMNICEF) capsule 300 mg (300 mg Oral Given 9/30/22 0004)          Re-Evaluations:  9/29/22      Time: 2317  Discussed results of Ct with patient who appears to be in no acute distress. Patients condition is improving after treatment. Consultations:             None    Procedures:   none    MDM:  This is a 73-year-old female patient has a history of kidney stones in the past and states she had a sudden onset of flank pain that started at 6:30 PM with nausea and will complete labs, urinalysis and CT of the abdomen. Patient will be medicated with IV fluids, Zofran and Toradol. WBCs 12.3; BUN and creatinine normal.  Urinalysis reveals to 5-10 WBCs will submit urine culture and treat with Omnicef. Patient has no signs of pyelonephritis at this time. Patient had improvement of symptoms and complete resolution of nausea. She will be given a short course of Norco, NSAIDs and antiemetics for home. Patient advised on signs and symptoms warranting immediate return to the ED for reevaluation a time. Advised to follow-up with her PCP she states she did lose the number for urology in the past and can follow-up. Patient advised not to drink alcohol, drive or operate heavy equipment while taking narcotics if needed. Also, instructed on increase fluid intake.     Controlled Substance Monitoring:    Acute and Chronic Pain Monitoring:   RX Monitoring 9/29/2022   Periodic Controlled Substance Monitoring Possible medication side effects, risk of tolerance/dependence & alternative treatments discussed. ;No signs of potential drug abuse or diversion identified. Plan of Care/Counseling:  HUSAM Mishra CNP reviewed today's visit with the patient and step mother in addition to providing specific details for the plan of care and counseling regarding the diagnosis and prognosis. Questions are answered at this time and are agreeable with the plan. Assessment      1. Renal colic    2. Left flank pain    3. Nausea    4. Nephrocalcinosis      This patient's ED course included: a personal history and physicial examination, re-evaluation prior to disposition, multiple bedside re-evaluations, IV medications, and complex medical decision making and emergency management  This patient has remained hemodynamically stable, improved, and been closely monitored during their ED course. Plan   Discharged home  Patient condition is good. New Medications     Discharge Medication List as of 9/29/2022 11:35 PM        START taking these medications    Details   cefdinir (OMNICEF) 300 MG capsule Take 1 capsule by mouth 2 times daily for 10 days, Disp-20 capsule, R-0Normal      naproxen (NAPROSYN) 500 MG tablet Take 1 tablet by mouth 2 times daily as needed for Pain (take with food and full glass of water.), Disp-14 tablet, R-0Normal      HYDROcodone-acetaminophen (NORCO) 5-325 MG per tablet Take 1 tablet by mouth every 8 hours as needed for Pain for up to 3 days. , Disp-12 tablet, R-0Normal           Electronically signed by HUSAM Mishra CNP   DD: 9/29/22  **This report was transcribed using voice recognition software. Every effort was made to ensure accuracy; however, inadvertent computerized transcription errors may be present.   END OF PROVIDER NOTE      HUSAM Mishra CNP  09/30/22 1985

## 2022-11-05 NOTE — PROGRESS NOTE ADULT - SUBJECTIVE AND OBJECTIVE BOX
Patient seen and examined.  NGT removed by team and patient started on clears.  She is currently belching and slightly nauseous, without any GI fxn.    HPI:  46F presents with abdominal pain. Patient stated that the pain began 5/31, at which point she came to the [facility] ED, was discharged when the pain improved. Patient endorses subjective, low-grade fevers at home with decrease in ostomy output, diaphoresis, one episode of nausea/vomiting. Pain is localized primarily to RUQ.Gonzalez  Patient's past medical/surgical history is primarily significant for ovarian CA s/p GRETCHEN Dec 2016, also has had bowel resection with ostomy at that time. Her surgeries and treatments were at [facility], Dr. Roth. Initiation of chemotherapy was delayed 2/2 poor wound healing. Patient was scheduled to start chemotherapy in the next few weeks, sees Dr. PJ Lane of [facility]. Denies other PMH, denies medications. Allergy to PCN.  On exam patient was afebrile with stable vital signs, uncomfortable appearing. Healing midline incision scar on abdomen with small area of open skin in inferior aspect. LLQ ostomy with gas and stool in bag. Tender RUQ with guarding.   WBC 24, T bili 1.3, CT shows distended gallbladder with thickened wall and pericholecystic fluid. (02 Jun 2017 05:03)      PAST MEDICAL & SURGICAL HISTORY:  Ovarian cancer: dx in dec 2016 (started as fibroid --&gt; large cancerous tumor)  Colostomy in place: dec 2016 ([facility]  H/O abdominal hysterectomy: dec 2016      MEDICATIONS  (STANDING):  cefepime  IVPB 2000milliGRAM(s) IV Intermittent every 12 hours  acetaminophen  IVPB. 1000milliGRAM(s) IV Intermittent once  ondansetron Injectable 4milliGRAM(s) IV Push every 8 hours  fluconAZOLE IVPB  IV Intermittent   fluconAZOLE IVPB 200milliGRAM(s) IV Intermittent every 24 hours  tetracaine/benzocaine/butamben Spray 1Spray(s) Topical three times a day  dextrose 5% + sodium chloride 0.9%. 1000milliLiter(s) IV Continuous <Continuous>  enoxaparin Injectable 40milliGRAM(s) SubCutaneous daily  metroNIDAZOLE  IVPB  IV Intermittent   metroNIDAZOLE  IVPB 500milliGRAM(s) IV Intermittent every 8 hours  HYDROmorphone PCA (1 mG/mL) 30milliLiter(s) PCA Continuous PCA Continuous  octreotide  Injectable 300MICROGram(s) SubCutaneous three times a day    MEDICATIONS  (PRN):  naloxone Injectable 0.1milliGRAM(s) IV Push every 3 minutes PRN For ANY of the following changes in patient status:  A. RR LESS THAN 10 breaths per minute, B. Oxygen saturation LESS THAN 90%, C. Sedation score of 6  HYDROmorphone PCA (1 mG/mL) Rescue Clinician Bolus 1milliGRAM(s) IV Push every 1 hour PRN for Pain Scale GREATER THAN 6      Allergies    penicillin (Rash)    Intolerances        FAMILY HISTORY:  No pertinent family history in first degree relatives      Review of Systems    Constitutional, Eyes, ENT, Cardiovascular, Respiratory, Gastrointestinal, Genitourinary, Musculoskeletal, Integumentary, Neurological, Psychiatric, Endocrine, Heme/Lymph and Allergic/Immunologic review of systems are otherwise negative except as noted in HPI.     Vital Signs Last 24 Hrs  T(C): 37, Max: 37 (06-16 @ 05:50)  T(F): 98.6, Max: 98.6 (06-16 @ 05:50)  HR: 71 (64 - 74)  BP: 119/81 (95/66 - 119/81)  BP(mean): --  RR: 17 (16 - 18)  SpO2: 96% (95% - 97%)    Physical Exam  Constitutional: well developed, well nourished, in no acute distress and thin.   Eyes: PERRL, EOMI, no conjunctival infection, anicteric.   ENT: pharynx is unremarkable, moist mucus membrane, no oral lesions.   Neck: supple without JVD, no thyromegaly or masses appreciated.   Pulmonary: clear to auscultation bilaterally, no dullness, no wheezing.   Cardiac: RRR, normal S1S2, no murmurs, rubs, gallops.   Vascular: no JVD, no calf tenderness, venous stasis changes, varices.   Abdomen: normoactive bowel sounds, soft and nontender, no hepatosplenomegaly or masses appreciated.   Lymphatic: no peripheral adenopathy appreciated.   Musculoskeletal: full range of motion and no deformities appreciated.   Skin: normal appearance, no rash, nodules, vesicles, ulcers, erythema.   Neurology: grossly intact.   Psychiatric: affect appropriate.       LABS:  CBC Full  -  ( 15 Alexx 2017 07:21 )  WBC Count : 7.3 K/uL  Hemoglobin : 10.7 g/dL  Hematocrit : 37.5 %  Platelet Count - Automated : 582 K/uL  Mean Cell Volume : 88.5 fl  Mean Cell Hemoglobin : 25.4 pg  Mean Cell Hemoglobin Concentration : 28.6 gm/dL  Auto Neutrophil # : x  Auto Lymphocyte # : x  Auto Monocyte # : x  Auto Eosinophil # : x  Auto Basophil # : x  Auto Neutrophil % : x  Auto Lymphocyte % : x  Auto Monocyte % : x  Auto Eosinophil % : x  Auto Basophil % : x    06-15    135  |  97  |  5<L>  ----------------------------<  135<H>  4.3   |  25  |  0.49<L>    Ca    8.2<L>      15 Alexx 2017 07:21  Phos  3.0     06-15  Mg     1.9     06-15            RADIOLOGY & ADDITIONAL STUDIES: No pallor, no cervical/supraclavicular/inguinal adenopathy.  No splenomegaly

## 2022-12-19 NOTE — PROGRESS NOTE ADULT - PROVIDER SPECIALTY LIST ADULT
Palliative Care Cimzia Pregnancy And Lactation Text: This medication crosses the placenta but can be considered safe in certain situations. Cimzia may be excreted in breast milk.

## 2023-03-21 NOTE — PROGRESS NOTE ADULT - PROBLEM SELECTOR PLAN 3
Patient has evidence of ileus vs. partial obstruction.  Continue Octreotide  Care per Primary Patient has evidence of ileus vs. partial obstruction.  Continue Octreotide  Care per Primary  s/p NGT Detail Level: Zone

## 2023-05-01 NOTE — CONSULT NOTE ADULT - PROBLEM SELECTOR RECOMMENDATION 3
Controlled with current settings, will resume Dilaudid PCA pump at 0.5mg/hr, demand 0.5mg, lockout of 15min, Clinician bolus of 1mg Q1hr PRN for severe pain. Page palliative for uncontrolled pain [phone] Cardiac Monitor/Defib/ACLS/Rescue Kit/O2/BVM

## 2023-06-02 NOTE — DISCHARGE NOTE ADULT - NS MD DC FALL RISK RISK
Patient with Persistent (7 days or more) atrial fibrillation which is controlled currently with Beta Blocker. Patient is currently in atrial fibrillation.[id number] Score: 2. . Anticoagulation not indicated due to GI bleed.  Tele monitoring       For information on Fall & Injury Prevention, visit [email or web address]

## 2023-11-14 NOTE — H&P ADULT - NSHPATTENDINGPLANDISCUSS_GEN_ALL_CORE
Pt called requesting MRI of Left hip be sent to [facility] in [place],    Per messages via the portal with Yayo, both MRI of lumbar spine & Lt hip were ordered. I changed location to [facility].    Spoke with Patient who stated she scheduled her MRI's for 11/16/23. I made a F/U Appt for her to receive those results on 11/29/23 at 1:30 p.m.  
resident and  fellow

## 2024-02-22 NOTE — ED ADULT NURSE NOTE - NS ED NURSE LEVEL OF CONSCIOUSNESS AFFECT
Group Topic: Personal Responsibility   Group Date: 2/22/2024  Start Time: 10:00 AM  End Time: 11:00 AM  Facilitators: GAB Pham; GAB Pascal   [facility]    This was a video IOP group on a HIPAA compliant program. All patients were provided with informed consent.     Date: 2/22/2024, Time: 10-11a, Number of Patients: 8, Username: [email or web address], Number of Staff: 2  Group Topic(s): communication styles. The group began by exploring the concept of communication and identifying positive and negative examples of communication. Individuals shared their own experiences and discussed types of communication they have seen.     Name: Hussain Villafana YOB: 1988   MR: [id number]      Kirby was on topic and attentive. Patient was present and followed along appropriately. Patient shared examples of healthy communication including asserting your needs and wants.   GAB Reed    Secondary facilitator: DALI Schmidt   CT supervised by GAB Fajardo-S, ATR        Calm Appropriate/Calm

## 2024-04-10 NOTE — ED ADULT NURSE NOTE - NS ED NURSE DC INFO COMPLEXITY
Dr. Snyder,  Pt requesting lab and xray results.  Please review and advise.     Court Perera MA  [facility]  4/10/2024     Verbalized Understanding

## 2024-05-07 NOTE — ED PROVIDER NOTE - PMH
all other ROS negative except as per HPI Ovarian cancer  dx in dec 2016 (started as fibroid --> large cancerous tumor)

## 2024-06-07 NOTE — PATIENT PROFILE ADULT. - AS SC BRADEN MOBILITY
Add High Risk Medication Management Associated Diagnosis?: No
Detail Level: Zone
Patient Reported Weight(Optional But Include Units): 30kg
(2) very limited
